# Patient Record
Sex: FEMALE | Race: WHITE | NOT HISPANIC OR LATINO | Employment: OTHER | URBAN - METROPOLITAN AREA
[De-identification: names, ages, dates, MRNs, and addresses within clinical notes are randomized per-mention and may not be internally consistent; named-entity substitution may affect disease eponyms.]

---

## 2017-01-17 NOTE — PROGRESS NOTES
Rheumatology Visit     Heather Guillory MRN# [id number]   YOB: 1980 Age: 36 year old     Date of Visit: January 18, 2017    Primary care provider: No Ref-Primary, Physician          Assessment and Plan:   Mixed connective tissue disease (fatigue, peripartum polyarthralgia, +NINOSKA, +RNP): apart from modest, episodic fatigue/joint pain, symptoms have been well-controlled since summer 2016. Exam is benign. 8-16 laboratory evaluation reveals borderline positive NINOSKA and RNP antibodies, but negative RF and CCP; 1-17 CBC is WNL. Overall disease activity is controlled.    We discussed once more the overall good prognosis of MCTD compared with other NINOSKA spectrum disorders (SLE, Systemic sclerosis), and the notion that early immunomodulation with hydroxychloroquine might both quell symptoms and forestall development of additional autoimmune phenomena.     We discussed again the risks and benefits of being on HCQ, and the large dataset attesting to the relative safety of HCQ for both mother and child during pregnancy and lactation. Patient voiced intention to continue with her current dose while attempting to conceive. We discussed the following plan:     A. Continue  mg BID and reassess benefit in 3-6 months   B. Yearly eye exam while on HCQ. Patient had baseline exam last month.   C. Repeat UA, BMP, CBC in 6 weeks with plan to repeat labs every 4 months to monitor organ function.    Follow up in 6 months.     Maikol Brewer M.D.  Staff Rheumatologist,  Health  Pager [phone]          Active Problem List:     Patient Active Problem List    Diagnosis Date Noted     Family history of genetic disorder 12/23/2015     Priority: Medium     Patient's sister's son with Elijah's disease       Female infertility 04/04/2014     Priority: Medium     IVF with first pregnancy.       Anxiety 04/04/2014     Priority: Medium     Has weaned off Zoloft, feels stable.  Will consider re-starting if she feels anxiety is  "increasing.       Family history of malignant neoplasm of breast 07/12/2013     Priority: Medium     Overview:   Paternal GM       Irritable colon 10/26/2009     Priority: Medium     Eating disorder 02/05/2004     Priority: Medium     History of, feels stable now.              History of Present Illness:   Heather Guillory presents for f/u for probable mixed connective tissue disease. Last seen in 9-16, when HCQ was continued.    Background: received diagnosis of mixed connective tissue given pain in hand and feet, stiffness, Raynaud's  Syndrome, fatigue, positive NINOSKA and anti-RNP in 2016. Ms. Guillory first experienced swelling and pain in her fingers and feet during her second pregnancy this past spring that prevented her from wearing rings on her fingers and provoked an evaluation for DVT, which was negative. Despite weight loss post partum, her pain, mild swelling, and stiffness in her digits persisted. She described the pain as a 7/10 when she wakes to feed her infant in the middle of the night and in the morning that decreases to a 2-3/10 over the course of the day. Stiffness lasts 30 mins to 1 hour in the morning. In general she feels tired all the time, but cannot determine if it is associated with that fact that she has two young children and is breastfeeding every 2 hours or if it is related to her joint symptoms. She was evaluated by an internist who found that she had a positive NINOSKA/ She saw Dr. Iniguez in 8-16 who discovered +RNP. She started Plaquenil 200 mg BID.    Interval history 1-17:    Doing well overall. She had an episode of \"feeling low\" aching joints and muscles, shortly after d/cing nursing in 11-16, and one other episode of fatigue/pain around the time of menses. She had trouble getting out of bed. Carrying her infant children is sometimes stressful. Raynaud's is being controlled with thermo protection, nonetheless, Patient has noted increased cold on her feet. Mrs. Guillory complained about eye " "dryness, exacerbated during cold days.  HCQ treatment has been tolerated (200 BID) with no GI or other adverse effects noted. Also, she was seen by Ophthalmology where she had a OCT test, and counselled to return in 1 year. She reports not using birth control with plans to conceive in the near future.    She denies any skin changes, rashes, mouth ulcers, fevers, SOB, CP, visual changes, diarrhea, hematuria, or dysuria.          Review of Systems:   Review Of Systems  Constitutional: HPI  Skin: negative  Eyes: dry eyes--occasional \"spots\" and shooting spots--saw Ophtho in the Fall 2016  Ears/Nose/Throat: negative  Respiratory: No shortness of breath, dyspnea on exertion, cough, or hemoptysis  Cardiovascular: negative  Gastrointestinal: mild nausea before her period  Genitourinary: negative  Musculoskeletal: negative  Neurologic: negative  Psychiatric: negative  Hematologic/Lymphatic/Immunologic: negative  Endocrine: resumed menses in 12-16          Past Medical History:     Past Medical History   Diagnosis Date     NO ACTIVE PROBLEMS      Abnormal Pap smear      Over 10 years ago     Anxiety      Hx of previous reproductive problem December 2013     Past Surgical History   Procedure Laterality Date     Dilation and curettage suction       Operative hysteroscopy with morcellator  4/8/2014     Procedure: OPERATIVE HYSTEROSCOPY WITH MORCELLATOR;  Hysteroscopic Polypectomy;  Surgeon: Cate Mansfield MD;  Location: [facility] OR     Biopsy  April 2014     Uterine polyp removed     Raynaud's syndrome since puberty. Her main trigger is cold.  She had two uneventful pregnancies with vaginal births, though the conception of her first child required IVF.       Social History:     Social History     Occupational History      [employer]     Social History Main Topics     Smoking status: Never Smoker      Smokeless tobacco: Never Used     Alcohol Use: No     Drug Use: No     Sexual Activity:     Partners: " "Male     Birth Control/ Protection: None            Family History:     Family History   Problem Relation Age of Onset     DIABETES Paternal Grandfather      C.A.D. Paternal Grandfather      Hypertension Paternal Grandfather      Breast Cancer Paternal Grandmother      CANCER Paternal Grandmother      lung ca     OSTEOPOROSIS Paternal Grandmother      Hypertension Paternal Grandmother      Cancer - colorectal Maternal Grandfather      CANCER Maternal Grandmother      cervical ca     Neurologic Disorder Maternal Grandmother      alzheimers     Alzheimer Disease Maternal Grandmother      Cardiovascular Brother 28     cardiomyopathy     HEART DISEASE Paternal Uncle 55     mitral valve replacement     Colon Cancer Maternal Grandfather      Genetic Disorder Sister 21     una's disease     Genetic Disorder Sister      No history of AI disease       Allergies:     Allergies   Allergen Reactions     Kiwi Other (See Comments)     Throat itching     Benzoyl Peroxide Swelling     Duricef [Cefadroxil] Unknown     Monistat [Miconazole] Swelling     Sulfa Drugs Rash            Medications:     Current Outpatient Prescriptions   Medication Sig Dispense Refill     hydroxychloroquine (PLAQUENIL) 200 MG tablet Take 2 tablets (400 mg) by mouth daily 60 tablet 5     ibuprofen (ADVIL,MOTRIN) 600 MG tablet Take 1 tablet (600 mg) by mouth every 6 hours as needed for moderate pain 30 tablet 1     sertraline (ZOLOFT) 50 MG tablet Take 1 tablet (50 mg) by mouth daily 90 tablet 3     PRENATAL VITAMINS PO               Physical Exam:   Blood pressure 93/62, pulse 66, height 1.702 m (5' 7\"), weight 78.472 kg (173 lb), SpO2 99 %, currently breastfeeding.  Wt Readings from Last 4 Encounters:   01/18/17 78.472 kg (173 lb)   09/16/16 77.565 kg (171 lb)   09/09/16 77.248 kg (170 lb 4.8 oz)   07/22/16 80.74 kg (178 lb)       Constitutional: well-developed, appearing stated age; cooperative  Eyes: nl EOM, PERRLA, vision, conjunctiva, sclera  ENT: " nl external ears, nose, hearing, lips, teeth, gums, throat  No mucous membrane lesions, normal saliva pool  Neck: no mass or thyroid enlargement  Resp: lungs clear to auscultation, nl to palpation  CV: RRR, no murmurs, rubs or gallops, no edema  GI: no ABD mass or tenderness, no HSM  : not tested  Lymph: no cervical, supraclavicular, inguinal or epitrochlear nodes  MS: The TMJ, neck, shoulder, elbow, wrist, MCP/PIP/DIP, spine, hip, knee, ankle, and foot MTP/IP joints were examined. No active synovitis or altered joint anatomy. Full joint ROM. Normal  strength. No dactylitis,  tenosynovitis, enthesopathy.  Skin: no nail pitting, alopecia, rash, nodules or lesions  Neuro: nl cranial nerves, strength, sensation, DTRs.   Psych: nl judgement, orientation, memory, affect.         Data:     Results for orders placed or performed in visit on 10/24/16   Creatinine   Result Value Ref Range    Creatinine 0.77 0.52 - 1.04 mg/dL    GFR Estimate 86 >60 mL/min/1.7m2    GFR Estimate If Black >90   GFR Calc   >60 mL/min/1.7m2   CBC with platelets   Result Value Ref Range    WBC 6.4 4.0 - 11.0 10e9/L    RBC Count 4.55 3.8 - 5.2 10e12/L    Hemoglobin 13.2 11.7 - 15.7 g/dL    Hematocrit 39.8 35.0 - 47.0 %    MCV 88 78 - 100 fl    MCH 29.0 26.5 - 33.0 pg    MCHC 33.2 31.5 - 36.5 g/dL    RDW 12.8 10.0 - 15.0 %    Platelet Count 189 150 - 450 10e9/L   CRP inflammation   Result Value Ref Range    CRP Inflammation <2.9 0.0 - 8.0 mg/L   *UA reflex to Microscopic and Culture ([facility] (except [place] and [place])   Result Value Ref Range    Color Urine Yellow     Appearance Urine Clear     Glucose Urine Negative NEG mg/dL    Bilirubin Urine Negative NEG    Ketones Urine Negative NEG mg/dL    Specific Gravity Urine >1.030 1.003 - 1.035    Blood Urine Trace (A) NEG    pH Urine 5.5 5.0 - 7.0 pH    Protein Albumin Urine Negative NEG mg/dL    Urobilinogen Urine 0.2 0.2 - 1.0 EU/dL    Nitrite  Urine Negative NEG    Leukocyte Esterase Urine Trace (A) NEG    Source Midstream Urine    Urine Microscopic   Result Value Ref Range    WBC Urine 2-5 (A) 0 - 2 /HPF    RBC Urine O - 2 0 - 2 /HPF    Squamous Epithelial /LPF Urine Few FEW /LPF    Mucous Urine Present (A) NEG /LPF     Labs from outside laboratory reviewed from 8/12/16  RNP antibodies 1.6- positive    Negative for Parvo Virus B19, Lyme Disease  Negative SLE panel   Negative SSA, SSB  Negative Anti-Sury-1  Negative Centromere B antibodies  Negative  CCP  Recent Labs   Lab Test  07/22/16   1110  05/29/16   0651  05/27/16   1032   01/09/16   0724   WBC  8.1   --   15.4*   --   16.8*   RBC  4.86   --   4.25   --   4.75   HGB  14.1  11.2*  12.4   < >  14.2   HCT  42.1   --   37.6   --   40.1   MCV  87   --   89   --   84   RDW  13.0   --   14.0   --   13.7   PLT  228   --   184   --   202   ALBUMIN   --    --    --    --   3.3*   CRP  3.0   --    --    --    --    BUN   --    --    --    --   9    < > = values in this interval not displayed.      Recent Labs   Lab Test  07/22/16   1110   TSH  1.10     HEMOGLOBIN   Date Value Ref Range Status   01/18/2017 12.8 11.7 - 15.7 g/dL Final   10/24/2016 13.2 11.7 - 15.7 g/dL Final   10/05/2016 12.8 11.7 - 15.7 g/dL Final     UREA NITROGEN   Date Value Ref Range Status   01/09/2016 9 7 - 30 mg/dL Final     SED RATE   Date Value Ref Range Status   07/22/2016 9 0 - 20 mm/h Final     CRP INFLAMMATION   Date Value Ref Range Status   10/24/2016 <2.9 0.0 - 8.0 mg/L Final   10/05/2016 42.0* 0.0 - 8.0 mg/L Final   07/22/2016 3.0 0.0 - 8.0 mg/L Final     AST   Date Value Ref Range Status   01/09/2016 12 0 - 45 U/L Final     ALBUMIN   Date Value Ref Range Status   01/09/2016 3.3* 3.4 - 5.0 g/dL Final     ALKALINE PHOSPHATASE   Date Value Ref Range Status   01/09/2016 70 40 - 150 U/L Final     ALT   Date Value Ref Range Status   01/09/2016 15 0 - 50 U/L Final     RHEUMATOID FACTOR   Date Value Ref Range Status   07/22/2016 <20 <20  IU/mL Final     Recent Labs   Lab Test  01/18/17   0845  10/24/16   0910  10/05/16   1331  07/22/16   1110   01/09/16   0724   WBC  5.3  6.4  14.0*  8.1   < >  16.8*   HGB  12.8  13.2  12.8  14.1   < >  14.2   HCT  39.2  39.8  37.9  42.1   < >  40.1   MCV  86  88  86  87   < >  84   PLT  188  189  230  228   < >  202   BUN   --    --    --    --    --   9   TSH   --    --    --   1.10   --    --    AST   --    --    --    --    --   12   ALT   --    --    --    --    --   15   ALKPHOS   --    --    --    --    --   70    < > = values in this interval not displayed.       Reviewed Rheumatology lab flowsheet

## 2017-01-18 ENCOUNTER — OFFICE VISIT (OUTPATIENT)
Dept: RHEUMATOLOGY | Facility: CLINIC | Age: 37
End: 2017-01-18
Attending: INTERNAL MEDICINE
Payer: COMMERCIAL

## 2017-01-18 VITALS
HEIGHT: 67 IN | HEART RATE: 66 BPM | WEIGHT: 173 LBS | OXYGEN SATURATION: 99 % | DIASTOLIC BLOOD PRESSURE: 62 MMHG | SYSTOLIC BLOOD PRESSURE: 93 MMHG | BODY MASS INDEX: 27.15 KG/M2

## 2017-01-18 DIAGNOSIS — M35.1 MCTD (MIXED CONNECTIVE TISSUE DISEASE) (H): Primary | ICD-10-CM

## 2017-01-18 DIAGNOSIS — Z13.9 SCREENING FOR CONDITION: ICD-10-CM

## 2017-01-18 DIAGNOSIS — M35.1 MCTD (MIXED CONNECTIVE TISSUE DISEASE) (H): ICD-10-CM

## 2017-01-18 LAB
ALBUMIN UR-MCNC: NEGATIVE MG/DL
APPEARANCE UR: CLEAR
BILIRUB UR QL STRIP: NEGATIVE
COLOR UR AUTO: ABNORMAL
CREAT SERPL-MCNC: 0.63 MG/DL (ref 0.52–1.04)
ERYTHROCYTE [DISTWIDTH] IN BLOOD BY AUTOMATED COUNT: 14.2 % (ref 10–15)
GFR SERPL CREATININE-BSD FRML MDRD: NORMAL ML/MIN/1.7M2
GLUCOSE UR STRIP-MCNC: NEGATIVE MG/DL
HCT VFR BLD AUTO: 39.2 % (ref 35–47)
HGB BLD-MCNC: 12.8 G/DL (ref 11.7–15.7)
HGB UR QL STRIP: NEGATIVE
KETONES UR STRIP-MCNC: NEGATIVE MG/DL
LEUKOCYTE ESTERASE UR QL STRIP: NEGATIVE
MCH RBC QN AUTO: 27.9 PG (ref 26.5–33)
MCHC RBC AUTO-ENTMCNC: 32.7 G/DL (ref 31.5–36.5)
MCV RBC AUTO: 86 FL (ref 78–100)
MUCOUS THREADS #/AREA URNS LPF: PRESENT /LPF
NITRATE UR QL: NEGATIVE
PH UR STRIP: 7 PH (ref 5–7)
PLATELET # BLD AUTO: 188 10E9/L (ref 150–450)
RBC # BLD AUTO: 4.58 10E12/L (ref 3.8–5.2)
RBC #/AREA URNS AUTO: 2 /HPF (ref 0–2)
SP GR UR STRIP: 1 (ref 1–1.03)
SQUAMOUS #/AREA URNS AUTO: 1 /HPF (ref 0–1)
URN SPEC COLLECT METH UR: ABNORMAL
UROBILINOGEN UR STRIP-MCNC: 0 MG/DL (ref 0–2)
WBC # BLD AUTO: 5.3 10E9/L (ref 4–11)
WBC #/AREA URNS AUTO: 0 /HPF (ref 0–2)

## 2017-01-18 PROCEDURE — 81001 URINALYSIS AUTO W/SCOPE: CPT | Performed by: INTERNAL MEDICINE

## 2017-01-18 PROCEDURE — 86747 PARVOVIRUS ANTIBODY: CPT | Performed by: OBSTETRICS & GYNECOLOGY

## 2017-01-18 PROCEDURE — 36415 COLL VENOUS BLD VENIPUNCTURE: CPT | Performed by: OBSTETRICS & GYNECOLOGY

## 2017-01-18 PROCEDURE — 82565 ASSAY OF CREATININE: CPT | Performed by: OBSTETRICS & GYNECOLOGY

## 2017-01-18 PROCEDURE — 85027 COMPLETE CBC AUTOMATED: CPT | Performed by: OBSTETRICS & GYNECOLOGY

## 2017-01-18 PROCEDURE — 99212 OFFICE O/P EST SF 10 MIN: CPT | Mod: ZF

## 2017-01-18 RX ORDER — HYDROXYCHLOROQUINE SULFATE 200 MG/1
400 TABLET, FILM COATED ORAL DAILY
Qty: 60 TABLET | Refills: 5 | Status: SHIPPED | OUTPATIENT
Start: 2017-01-18 | End: 2017-08-14

## 2017-01-18 ASSESSMENT — PAIN SCALES - GENERAL: PAINLEVEL: NO PAIN (0)

## 2017-01-18 NOTE — MR AVS SNAPSHOT
After Visit Summary   1/18/2017    Heather Guillory    MRN: [id number]           Patient Information     Date Of Birth          1980        Visit Information        Provider Department      1/18/2017 7:30 AM Maikol Brewer MD [facility] Rheumatology        Today's Diagnoses     MCTD (mixed connective tissue disease) (H)    -  1        Follow-ups after your visit        Follow-up notes from your care team     Return in about 6 months (around 7/18/2017).      Your next 10 appointments already scheduled     Jan 18, 2017  8:30 AM   Lab with  LAB   [facility] ([facility])    [address]  [address] [facility] [address] [phone]            Jul 14, 2017  9:00 AM   (Arrive by 8:45 AM)   Return Visit with Maikol Brewer MD   [facility] Rheumatology ([facility])    [address] [place] [address]  [address] [facility] [address] [phone]              Future tests that were ordered for you today     Open Standing Orders        Priority Remaining Interval Expires Ordered    CBC with platelets Routine 4/4 q 4 mos 1/18/2018 1/18/2017    Routine UA with Micro Reflex to Culture Routine 4/4 q 4 mos 1/18/2018 1/18/2017    Creatinine Routine 4/4 q 4 mos 1/18/2018 1/18/2017            Who to contact     If you have questions or need follow up information about today's clinic visit or your schedule please contact [facility] RHEUMATOLOGY directly at [phone].  Normal or non-critical lab and imaging results will be communicated to you by MyChart, letter or phone within 4 business days after the clinic has received the results. If you do not hear from us within 7 days, please contact the clinic through MyChart or phone. If you have a critical or abnormal lab result, we will notify you by phone as soon as possible.  Submit refill requests through [employer] or call your pharmacy and they will forward the refill request to us. Please allow 3 business  "days for your refill to be completed.          Additional Information About Your Visit        MyChart Information     [employer] gives you secure access to your electronic health record. If you see a primary care provider, you can also send messages to your care team and make appointments. If you have questions, please call your primary care clinic.  If you do not have a primary care provider, please call [phone] and they will assist you.        Care EveryWhere ID     This is your Care EveryWhere ID. This could be used by other organizations to access your [facility] medical records  [id number]        Your Vitals Were     Pulse Height BMI (Body Mass Index) Pulse Oximetry          66 1.702 m (5' 7\") 27.09 kg/m2 99%         Blood Pressure from Last 3 Encounters:   01/18/17 93/62   09/16/16 100/58   09/09/16 102/67    Weight from Last 3 Encounters:   01/18/17 78.472 kg (173 lb)   09/16/16 77.565 kg (171 lb)   09/09/16 77.248 kg (170 lb 4.8 oz)                 Today's Medication Changes          These changes are accurate as of: 1/18/17  8:17 AM.  If you have any questions, ask your nurse or doctor.               Stop taking these medicines if you haven't already. Please contact your care team if you have questions.     amoxicillin 500 MG capsule   Commonly known as:  AMOXIL   Stopped by:  Maikol Brewer MD           predniSONE 5 MG tablet   Commonly known as:  DELTASONE   Stopped by:  Maikol Brewer MD                Where to get your medicines      These medications were sent to [facility] [id number] [place] [address] [address]     Phone:  [phone]    - hydroxychloroquine 200 MG tablet             Primary Care Provider    Physician No Ref-Primary       No address on file        Thank you!     Thank you for choosing [facility] RHEUMATOLOGY  for your care. Our goal is always to provide you with excellent care. Hearing back from our patients is one way we can " continue to improve our services. Please take a few minutes to complete the written survey that you may receive in the mail after your visit with us. Thank you!             Your Updated Medication List - Protect others around you: Learn how to safely use, store and throw away your medicines at www.disposemymeds.org.          This list is accurate as of: 1/18/17  8:17 AM.  Always use your most recent med list.                   Brand Name Dispense Instructions for use    hydroxychloroquine 200 MG tablet    PLAQUENIL    60 tablet    Take 2 tablets (400 mg) by mouth daily       ibuprofen 600 MG tablet    ADVIL/MOTRIN    30 tablet    Take 1 tablet (600 mg) by mouth every 6 hours as needed for moderate pain       PRENATAL VITAMINS PO          sertraline 50 MG tablet    ZOLOFT    90 tablet    Take 1 tablet (50 mg) by mouth daily

## 2017-01-18 NOTE — Clinical Note
1/18/2017      RE: Heather Guillory  [address] [facility] [place] [address]       Rheumatology Visit     Heather Guillory MRN# [id number]   YOB: 1980 Age: 36 year old     Date of Visit: January 18, 2017    Primary care provider: No Ref-Primary, Physician          Assessment and Plan:   Mixed connective tissue disease (fatigue, peripartum polyarthralgia, +NINOSKA, +RNP): apart from modest, episodic fatigue/joint pain, symptoms have been well-controlled since summer 2016. Exam is benign. 8-16 laboratory evaluation reveals borderline positive NINOSKA and RNP antibodies, but negative RF and CCP; 1-17 CBC is WNL. Overall disease activity is controlled.    We discussed once more the overall good prognosis of MCTD compared with other NINOSKA spectrum disorders (SLE, Systemic sclerosis), and the notion that early immunomodulation with hydroxychloroquine might both quell symptoms and forestall development of additional autoimmune phenomena.     We discussed again the risks and benefits of being on HCQ, and the large dataset attesting to the relative safety of HCQ for both mother and child during pregnancy and lactation. Patient voiced intention to continue with her current dose while attempting to conceive. We discussed the following plan:     A. Continue  mg BID and reassess benefit in 3-6 months   B. Yearly eye exam while on HCQ. Patient had baseline exam last month.   C. Repeat UA, BMP, CBC in 6 weeks with plan to repeat labs every 4 months to monitor organ function.    Follow up in 6 months.     Maikol Brewer M.D.  Staff Rheumatologist,  Health  Pager [phone]          Active Problem List:     Patient Active Problem List    Diagnosis Date Noted     Family history of genetic disorder 12/23/2015     Priority: Medium     Patient's sister's son with Elijah's disease       Female infertility 04/04/2014     Priority: Medium     IVF with first pregnancy.       Anxiety 04/04/2014     Priority: Medium     Has  "weaned off Zoloft, feels stable.  Will consider re-starting if she feels anxiety is increasing.       Family history of malignant neoplasm of breast 07/12/2013     Priority: Medium     Overview:   Paternal GM       Irritable colon 10/26/2009     Priority: Medium     Eating disorder 02/05/2004     Priority: Medium     History of, feels stable now.              History of Present Illness:   Heather Guillory presents for f/u for probable mixed connective tissue disease. Last seen in 9-16, when HCQ was continued.    Background: received diagnosis of mixed connective tissue given pain in hand and feet, stiffness, Raynaud's  Syndrome, fatigue, positive NINOSKA and anti-RNP in 2016. Ms. Guillory first experienced swelling and pain in her fingers and feet during her second pregnancy this past spring that prevented her from wearing rings on her fingers and provoked an evaluation for DVT, which was negative. Despite weight loss post partum, her pain, mild swelling, and stiffness in her digits persisted. She described the pain as a 7/10 when she wakes to feed her infant in the middle of the night and in the morning that decreases to a 2-3/10 over the course of the day. Stiffness lasts 30 mins to 1 hour in the morning. In general she feels tired all the time, but cannot determine if it is associated with that fact that she has two young children and is breastfeeding every 2 hours or if it is related to her joint symptoms. She was evaluated by an internist who found that she had a positive NINOSKA/ She saw Dr. Iniguez in 8-16 who discovered +RNP. She started Plaquenil 200 mg BID.    Interval history 1-17:    Doing well overall. She had an episode of \"feeling low\" aching joints and muscles, shortly after d/cing nursing in 11-16, and one other episode of fatigue/pain around the time of menses. She had trouble getting out of bed. Carrying her infant children is sometimes stressful. Raynaud's is being controlled with thermo protection, " "nonetheless, Patient has noted increased cold on her feet. Mrs. Guillory complained about eye dryness, exacerbated during cold days.  HCQ treatment has been tolerated (200 BID) with no GI or other adverse effects noted. Also, she was seen by Ophthalmology where she had a OCT test, and counselled to return in 1 year. She reports not using birth control with plans to conceive in the near future.    She denies any skin changes, rashes, mouth ulcers, fevers, SOB, CP, visual changes, diarrhea, hematuria, or dysuria.          Review of Systems:   Review Of Systems  Constitutional: HPI  Skin: negative  Eyes: dry eyes--occasional \"spots\" and shooting spots--saw Ophtho in the Fall 2016  Ears/Nose/Throat: negative  Respiratory: No shortness of breath, dyspnea on exertion, cough, or hemoptysis  Cardiovascular: negative  Gastrointestinal: mild nausea before her period  Genitourinary: negative  Musculoskeletal: negative  Neurologic: negative  Psychiatric: negative  Hematologic/Lymphatic/Immunologic: negative  Endocrine: resumed menses in 12-16          Past Medical History:     Past Medical History   Diagnosis Date     NO ACTIVE PROBLEMS      Abnormal Pap smear      Over 10 years ago     Anxiety      Hx of previous reproductive problem December 2013     Past Surgical History   Procedure Laterality Date     Dilation and curettage suction       Operative hysteroscopy with morcellator  4/8/2014     Procedure: OPERATIVE HYSTEROSCOPY WITH MORCELLATOR;  Hysteroscopic Polypectomy;  Surgeon: Cate Mansfield MD;  Location: [facility] OR     Biopsy  April 2014     Uterine polyp removed     Raynaud's syndrome since puberty. Her main trigger is cold.  She had two uneventful pregnancies with vaginal births, though the conception of her first child required IVF.       Social History:     Social History     Occupational History      [employer]     Social History Main Topics     Smoking status: Never Smoker      Smokeless " "tobacco: Never Used     Alcohol Use: No     Drug Use: No     Sexual Activity:     Partners: Male     Birth Control/ Protection: None            Family History:     Family History   Problem Relation Age of Onset     DIABETES Paternal Grandfather      C.A.D. Paternal Grandfather      Hypertension Paternal Grandfather      Breast Cancer Paternal Grandmother      CANCER Paternal Grandmother      lung ca     OSTEOPOROSIS Paternal Grandmother      Hypertension Paternal Grandmother      Cancer - colorectal Maternal Grandfather      CANCER Maternal Grandmother      cervical ca     Neurologic Disorder Maternal Grandmother      alzheimers     Alzheimer Disease Maternal Grandmother      Cardiovascular Brother 28     cardiomyopathy     HEART DISEASE Paternal Uncle 55     mitral valve replacement     Colon Cancer Maternal Grandfather      Genetic Disorder Sister 21     una's disease     Genetic Disorder Sister      No history of AI disease       Allergies:     Allergies   Allergen Reactions     Kiwi Other (See Comments)     Throat itching     Benzoyl Peroxide Swelling     Duricef [Cefadroxil] Unknown     Monistat [Miconazole] Swelling     Sulfa Drugs Rash            Medications:     Current Outpatient Prescriptions   Medication Sig Dispense Refill     hydroxychloroquine (PLAQUENIL) 200 MG tablet Take 2 tablets (400 mg) by mouth daily 60 tablet 5     ibuprofen (ADVIL,MOTRIN) 600 MG tablet Take 1 tablet (600 mg) by mouth every 6 hours as needed for moderate pain 30 tablet 1     sertraline (ZOLOFT) 50 MG tablet Take 1 tablet (50 mg) by mouth daily 90 tablet 3     PRENATAL VITAMINS PO               Physical Exam:   Blood pressure 93/62, pulse 66, height 1.702 m (5' 7\"), weight 78.472 kg (173 lb), SpO2 99 %, currently breastfeeding.  Wt Readings from Last 4 Encounters:   01/18/17 78.472 kg (173 lb)   09/16/16 77.565 kg (171 lb)   09/09/16 77.248 kg (170 lb 4.8 oz)   07/22/16 80.74 kg (178 lb)       Constitutional: well-developed, " appearing stated age; cooperative  Eyes: nl EOM, PERRLA, vision, conjunctiva, sclera  ENT: nl external ears, nose, hearing, lips, teeth, gums, throat  No mucous membrane lesions, normal saliva pool  Neck: no mass or thyroid enlargement  Resp: lungs clear to auscultation, nl to palpation  CV: RRR, no murmurs, rubs or gallops, no edema  GI: no ABD mass or tenderness, no HSM  : not tested  Lymph: no cervical, supraclavicular, inguinal or epitrochlear nodes  MS: The TMJ, neck, shoulder, elbow, wrist, MCP/PIP/DIP, spine, hip, knee, ankle, and foot MTP/IP joints were examined. No active synovitis or altered joint anatomy. Full joint ROM. Normal  strength. No dactylitis,  tenosynovitis, enthesopathy.  Skin: no nail pitting, alopecia, rash, nodules or lesions  Neuro: nl cranial nerves, strength, sensation, DTRs.   Psych: nl judgement, orientation, memory, affect.         Data:     Results for orders placed or performed in visit on 10/24/16   Creatinine   Result Value Ref Range    Creatinine 0.77 0.52 - 1.04 mg/dL    GFR Estimate 86 >60 mL/min/1.7m2    GFR Estimate If Black >90   GFR Calc   >60 mL/min/1.7m2   CBC with platelets   Result Value Ref Range    WBC 6.4 4.0 - 11.0 10e9/L    RBC Count 4.55 3.8 - 5.2 10e12/L    Hemoglobin 13.2 11.7 - 15.7 g/dL    Hematocrit 39.8 35.0 - 47.0 %    MCV 88 78 - 100 fl    MCH 29.0 26.5 - 33.0 pg    MCHC 33.2 31.5 - 36.5 g/dL    RDW 12.8 10.0 - 15.0 %    Platelet Count 189 150 - 450 10e9/L   CRP inflammation   Result Value Ref Range    CRP Inflammation <2.9 0.0 - 8.0 mg/L   *UA reflex to Microscopic and Culture ([facility] (except [place] and [place])   Result Value Ref Range    Color Urine Yellow     Appearance Urine Clear     Glucose Urine Negative NEG mg/dL    Bilirubin Urine Negative NEG    Ketones Urine Negative NEG mg/dL    Specific Gravity Urine >1.030 1.003 - 1.035    Blood Urine Trace (A) NEG    pH Urine 5.5 5.0 - 7.0 pH     Protein Albumin Urine Negative NEG mg/dL    Urobilinogen Urine 0.2 0.2 - 1.0 EU/dL    Nitrite Urine Negative NEG    Leukocyte Esterase Urine Trace (A) NEG    Source Midstream Urine    Urine Microscopic   Result Value Ref Range    WBC Urine 2-5 (A) 0 - 2 /HPF    RBC Urine O - 2 0 - 2 /HPF    Squamous Epithelial /LPF Urine Few FEW /LPF    Mucous Urine Present (A) NEG /LPF     Labs from outside laboratory reviewed from 8/12/16  RNP antibodies 1.6- positive    Negative for Parvo Virus B19, Lyme Disease  Negative SLE panel   Negative SSA, SSB  Negative Anti-Sury-1  Negative Centromere B antibodies  Negative  CCP  Recent Labs   Lab Test  07/22/16   1110  05/29/16   0651  05/27/16   1032   01/09/16   0724   WBC  8.1   --   15.4*   --   16.8*   RBC  4.86   --   4.25   --   4.75   HGB  14.1  11.2*  12.4   < >  14.2   HCT  42.1   --   37.6   --   40.1   MCV  87   --   89   --   84   RDW  13.0   --   14.0   --   13.7   PLT  228   --   184   --   202   ALBUMIN   --    --    --    --   3.3*   CRP  3.0   --    --    --    --    BUN   --    --    --    --   9    < > = values in this interval not displayed.      Recent Labs   Lab Test  07/22/16   1110   TSH  1.10     HEMOGLOBIN   Date Value Ref Range Status   01/18/2017 12.8 11.7 - 15.7 g/dL Final   10/24/2016 13.2 11.7 - 15.7 g/dL Final   10/05/2016 12.8 11.7 - 15.7 g/dL Final     UREA NITROGEN   Date Value Ref Range Status   01/09/2016 9 7 - 30 mg/dL Final     SED RATE   Date Value Ref Range Status   07/22/2016 9 0 - 20 mm/h Final     CRP INFLAMMATION   Date Value Ref Range Status   10/24/2016 <2.9 0.0 - 8.0 mg/L Final   10/05/2016 42.0* 0.0 - 8.0 mg/L Final   07/22/2016 3.0 0.0 - 8.0 mg/L Final     AST   Date Value Ref Range Status   01/09/2016 12 0 - 45 U/L Final     ALBUMIN   Date Value Ref Range Status   01/09/2016 3.3* 3.4 - 5.0 g/dL Final     ALKALINE PHOSPHATASE   Date Value Ref Range Status   01/09/2016 70 40 - 150 U/L Final     ALT   Date Value Ref Range Status    01/09/2016 15 0 - 50 U/L Final     RHEUMATOID FACTOR   Date Value Ref Range Status   07/22/2016 <20 <20 IU/mL Final     Recent Labs   Lab Test  01/18/17   0845  10/24/16   0910  10/05/16   1331  07/22/16   1110   01/09/16   0724   WBC  5.3  6.4  14.0*  8.1   < >  16.8*   HGB  12.8  13.2  12.8  14.1   < >  14.2   HCT  39.2  39.8  37.9  42.1   < >  40.1   MCV  86  88  86  87   < >  84   PLT  188  189  230  228   < >  202   BUN   --    --    --    --    --   9   TSH   --    --    --   1.10   --    --    AST   --    --    --    --    --   12   ALT   --    --    --    --    --   15   ALKPHOS   --    --    --    --    --   70    < > = values in this interval not displayed.       Reviewed Rheumatology lab flowsheet    Maikol Brewer MD

## 2017-01-18 NOTE — NURSING NOTE
"Chief Complaint   Patient presents with     RECHECK     Follow up for joint pain/MCTD       Initial BP 93/62 mmHg  Pulse 66  Ht 1.702 m (5' 7\")  Wt 78.472 kg (173 lb)  BMI 27.09 kg/m2  SpO2 99% Estimated body mass index is 27.09 kg/(m^2) as calculated from the following:    Height as of this encounter: 1.702 m (5' 7\").    Weight as of this encounter: 78.472 kg (173 lb).  BP completed using cuff size: hayden Rondon CMA    "

## 2017-01-20 LAB
B19V IGG SER IA-ACNC: 0.67
B19V IGM SER IA-ACNC: 0.4

## 2017-02-15 ENCOUNTER — MYC MEDICAL ADVICE (OUTPATIENT)
Dept: RHEUMATOLOGY | Facility: CLINIC | Age: 37
End: 2017-02-15

## 2017-03-13 NOTE — TELEPHONE ENCOUNTER
Copy of the article mailed to pt.  RON Barragan., BENTLEY Workman, MICHAEL Rome., GUERITA Kaplan., CRISTY [place]., GERTRUDE England, . . . ISMAEL Whyte (2017). In Vitro Fertilization in 37 Women with Systemic Lupus Erythematosus or Antiphospholipid Syndrome: A Series of 97 Procedures. The Journal of Rheumatology. doi:[email or web address]

## 2017-03-19 ENCOUNTER — MYC MEDICAL ADVICE (OUTPATIENT)
Dept: RHEUMATOLOGY | Facility: CLINIC | Age: 37
End: 2017-03-19

## 2017-03-20 NOTE — TELEPHONE ENCOUNTER
Thanks for the good question. My reading of the literature suggests that incidence of pulmonary hypertension (PAH) in MCTD is extremely low--much lower than with some other connective tissue disorders (like scleroderma, which you do not have). If you are not experiencing symptoms that might suggest pulmonary vascular issues--such as shortness of breath with slight exertion-- I think that you can confidently go ahead with pregnancy without additional diagnostic testing presently.

## 2017-05-19 DIAGNOSIS — F41.9 ANXIETY: ICD-10-CM

## 2017-05-19 NOTE — TELEPHONE ENCOUNTER
Received refill request for sertraline.  Last in clinic 7/2016 for 6 week post-partum visit.    Spoke with Heather and advised annual exam is needed for refills. She reports she does not see a primary care provider and Dr. Mansfield has prescribed this for her for several years. She is doing well and wishes to remain on current dose.      Heather will call back to make annual exam appointment. One month refill sent.

## 2017-07-13 NOTE — PROGRESS NOTES
Rheumatology Visit     Heather Guillory MRN# [id number]   YOB: 1980 Age: 37 year old     Date of Visit: July 14, 2017  Primary care provider: No Ref-Primary, Physician          Assessment and Plan:   Mixed connective tissue disease (fatigue, peripartum polyarthralgia, +NINOSKA, +RNP): overall stable symptoms, suspect fatigue is more disrupted sleep and possibly from stopping her Zoloft, rather than to MCTD. She does have dry, rough skin on lateral aspects of most of her digits bilaterally which likely is secondary to frequent handwashing at home rather than a manifestation of MCTD.  CBC, urinalysis, and creatinine are within normal limits today. Overall, I think that autoimmunity is clinically quiescent.    I do not know whether patient should expect any effects of estrogen therapy on her connective tissue disease. Certainly, estrogen containing oral contraceptives are safe for use in patients with systemic lupus erythematosus, but the doses of estrogen used in preparation for embryo transfer are likely somewhat higher than in standard contraceptives. I think it is reasonable to proceed with embryo transfer and associated treatments, and to watch carefully for symptoms and signs of autoimmunity.    She is planning to undergo embryo transfer on 7/24/17 and after discussion about HCQ at last visit and her own research, she feels comfortable continuing this during her pregnancy. Discussed that it was reasonable to take her  mg once daily rather than 200 BID. She will have retinal exam for possible in the next week.      A.  mg daily and reassess benefit in 6 months   B. Yearly eye exam while on HCQ. Will be completed in next week.   C. Repeat UA, BMP, CBC now and at 6 month follow up.     Follow up in 6 months.     The patient was seen and examined with Dr. Osman MD.    Omer Hurtado  Internal Medicine PGY-3    I saw this patient with the medical resident. I agree with the findings and  recommendations.    Maikol Brewer M.D.  Staff Rheumatologist,  Health  Pager [phone]              Active Problem List:     Patient Active Problem List    Diagnosis Date Noted     Family history of genetic disorder 12/23/2015     Priority: Medium     Patient's sister's son with Elijah's disease       Female infertility 04/04/2014     Priority: Medium     IVF with first pregnancy.       Anxiety 04/04/2014     Priority: Medium     Has weaned off Zoloft, feels stable.  Will consider re-starting if she feels anxiety is increasing.       Family history of malignant neoplasm of breast 07/12/2013     Priority: Medium     Overview:   Paternal GM       Irritable colon 10/26/2009     Priority: Medium     Eating disorder 02/05/2004     Priority: Medium     History of, feels stable now.              History of Present Illness:   Heather Guillory presents for f/u for probable mixed connective tissue disease. Last seen in 1-17, when HCQ was continued.    Background: received diagnosis of mixed connective tissue given pain in hand and feet, stiffness, Raynaud's  Syndrome, fatigue, positive NINOSKA and anti-RNP in 2016. Ms. Guillory first experienced swelling and pain in her fingers and feet during her second pregnancy this past spring that prevented her from wearing rings on her fingers and provoked an evaluation for DVT, which was negative. Despite weight loss post partum, her pain, mild swelling, and stiffness in her digits persisted. She described the pain as a 7/10 when she wakes to feed her infant in the middle of the night and in the morning that decreases to a 2-3/10 over the course of the day. Stiffness lasts 30 mins to 1 hour in the morning. In general she feels tired all the time, but cannot determine if it is associated with that fact that she has two young children and is breastfeeding every 2 hours or if it is related to her joint symptoms. She was evaluated by an internist who found that she had a positive NINOSKA/ She saw  Dr. Iniguez in 8-16 who discovered +RNP. She started Plaquenil 200 mg BID.    Interval history 7/14/17  Heather Guillory is stable since her last visit in January, despite feeling more fatigued recently. She denied arthralgias/myalgias but reports having sore feet/toes at end of day and when she first wakes up. She states the fatigue worsened since she ran out of Zoloft 3.5 weeks ago; also reports irritability and no sleeping soundly during the night since that time too. Zoloft prescribed by her OBGYN.     She is currently receiving fertility treatment and plans to undergo embryo transfer on 7/24/17. She remains on HCQ and feels comfortable continuing this medication during pregnancy. She wonders if taking HCQ once daily (400) rather than 200 twice daily is acceptable.  She is using estrogen therapy in preparation for the embryo transfer, and relates that her gynecologist recommends that she remained on the supplement for approximately one month after the transfer.    She recently pinched a nerve in her neck and has numbness/tingling with burning sensation in her left arm that radiates down into her 1st-3rd digits. She was evaluated at [facility], received percocet for pain and told to have PT. If it did not improve in 6 weeks, she will return for another evaluation.     Lastly, she had dry, rough skin on lateral aspects of fingers. She states this often occurs in winter due to weather but it is new for her this summer. She is using a lot of moisturizers but it has not been helping.           Review of Systems:   Review Of Systems  Constitutional: HPI  Skin: no new rashes but has dry skin on hands  Eyes: denied dry eyes, irritation, redness  Ears/Nose/Throat: negative for dry mouth, sores  Respiratory: No shortness of breath, dyspnea on exertion, cough, or hemoptysis  Cardiovascular: negative for chest pain; has raynauds with cold weather (not new)  Gastrointestinal: mild nausea before her period; occasional  abdominal pain with eating certain foods, associated with diarrhea  Genitourinary: negative  Musculoskeletal: negative for joint pains, swelling, weakness  Neurologic: reports left arm numbness/tingling, burning sensation since sleeping strange on neck few weeks ago  Psychiatric: negative  Hematologic/Lymphatic/Immunologic: negative  Endocrine: resumed menses in 12-16          Past Medical History:     Past Medical History:   Diagnosis Date     Abnormal Pap smear     Over 10 years ago     Anxiety      Hx of previous reproductive problem December 2013     NO ACTIVE PROBLEMS      Past Surgical History:   Procedure Laterality Date     BIOPSY  April 2014    Uterine polyp removed     DILATION AND CURETTAGE SUCTION       OPERATIVE HYSTEROSCOPY WITH MORCELLATOR  4/8/2014    Procedure: OPERATIVE HYSTEROSCOPY WITH MORCELLATOR;  Hysteroscopic Polypectomy;  Surgeon: Cate Mansfield MD;  Location: [facility] OR     Raynaud's syndrome since puberty. Her main trigger is cold.  She had two uneventful pregnancies with vaginal births, though the conception of her first child required IVF.   she is undergoing embryo transfer in July 2017 in an attempt to conceive again. Estrogen therapy was used in preparation for and following embryo transfer.   cervical radiculopathy, 2017.       Social History:     Social History     Occupational History      [employer]     Social History Main Topics     Smoking status: Never Smoker     Smokeless tobacco: Never Used     Alcohol use No     Drug use: No     Sexual activity: Yes     Partners: Male     Birth control/ protection: None            Family History:     Family History   Problem Relation Age of Onset     DIABETES Paternal Grandfather      C.A.D. Paternal Grandfather      Hypertension Paternal Grandfather      Breast Cancer Paternal Grandmother      CANCER Paternal Grandmother      lung ca     OSTEOPOROSIS Paternal Grandmother      Hypertension Paternal Grandmother       "Cancer - colorectal Maternal Grandfather      CANCER Maternal Grandmother      cervical ca     Neurologic Disorder Maternal Grandmother      alzheimers     Alzheimer Disease Maternal Grandmother      Cardiovascular Brother 28     cardiomyopathy     HEART DISEASE Paternal Uncle 55     mitral valve replacement     Colon Cancer Maternal Grandfather      Genetic Disorder Sister 21     una's disease     Genetic Disorder Sister      No history of AI disease       Allergies:     Allergies   Allergen Reactions     Kiwi Other (See Comments)     Throat itching     Benzoyl Peroxide Swelling     Duricef [Cefadroxil] Unknown     Monistat [Miconazole] Swelling     Sulfa Drugs Rash            Medications:     Current Outpatient Prescriptions   Medication Sig Dispense Refill     Estradiol (ESTRACE PO) Take 2 mg by mouth 3 times daily       aspirin 81 MG tablet Take 81 mg by mouth daily       hydroxychloroquine (PLAQUENIL) 200 MG tablet Take 2 tablets (400 mg) by mouth daily 60 tablet 5     PRENATAL VITAMINS PO        sertraline (ZOLOFT) 50 MG tablet Take 1 tablet (50 mg) by mouth daily (Patient not taking: Reported on 7/14/2017) 30 tablet 0     ibuprofen (ADVIL,MOTRIN) 600 MG tablet Take 1 tablet (600 mg) by mouth every 6 hours as needed for moderate pain (Patient not taking: Reported on 7/14/2017) 30 tablet 1            Physical Exam:   Blood pressure 112/71, pulse 73, height 1.702 m (5' 7\"), weight 75.8 kg (167 lb), SpO2 97 %, currently breastfeeding.  Wt Readings from Last 4 Encounters:   07/14/17 75.8 kg (167 lb)   01/18/17 78.5 kg (173 lb)   09/16/16 77.6 kg (171 lb)   09/09/16 77.2 kg (170 lb 4.8 oz)     Constitutional: well-developed, appearing stated age; cooperative  Eyes: nl EOM, PERRLA, vision, conjunctiva, sclera  ENT: nl external ears, nose, hearing, lips, teeth, gums, throat  No mucous membrane lesions, normal saliva pool  Neck: no mass or thyroid enlargement  Resp: lungs clear to auscultation, nl to palpation  CV: " RRR, no murmurs, rubs or gallops, no edema  GI: no ABD mass or tenderness, no HSM  : not tested  Lymph: no cervical, supraclavicular, inguinal or epitrochlear nodes  MS: The TMJ, neck, shoulder, elbow, wrist, MCP/PIP/DIP, spine, hip, knee, ankle, and foot MTP/IP joints were examined. No active synovitis or altered joint anatomy. Full joint ROM. Normal  strength. No dactylitis,  tenosynovitis, enthesopathy.  Skin: dry, rough skin on lateral aspects of most digits bilaterally. No nail pitting, alopecia, rash, nodules or lesions  Neuro: nl cranial nerves, strength, abnormal sensation of left arm compared to right  Psych: nl judgement, orientation, memory, affect.         Data:     Results for orders placed or performed in visit on 01/18/17   Parvovirus B19 antibodies IgG IgM   Result Value Ref Range    Parvovirus B19 IgG 0.67     Parvovirus B19 IgM 0.40    CBC with platelets   Result Value Ref Range    WBC 5.3 4.0 - 11.0 10e9/L    RBC Count 4.58 3.8 - 5.2 10e12/L    Hemoglobin 12.8 11.7 - 15.7 g/dL    Hematocrit 39.2 35.0 - 47.0 %    MCV 86 78 - 100 fl    MCH 27.9 26.5 - 33.0 pg    MCHC 32.7 31.5 - 36.5 g/dL    RDW 14.2 10.0 - 15.0 %    Platelet Count 188 150 - 450 10e9/L   Routine UA with Micro Reflex to Culture   Result Value Ref Range    Color Urine Straw     Appearance Urine Clear     Glucose Urine Negative NEG mg/dL    Bilirubin Urine Negative NEG    Ketones Urine Negative NEG mg/dL    Specific Gravity Urine 1.001 (L) 1.003 - 1.035    Blood Urine Negative NEG    pH Urine 7.0 5.0 - 7.0 pH    Protein Albumin Urine Negative NEG mg/dL    Urobilinogen mg/dL 0.0 0.0 - 2.0 mg/dL    Nitrite Urine Negative NEG    Leukocyte Esterase Urine Negative NEG    Source Midstream Urine     WBC Urine 0 0 - 2 /HPF    RBC Urine 2 0 - 2 /HPF    Squamous Epithelial /HPF Urine 1 0 - 1 /HPF    Mucous Urine Present (A) NEG /LPF   Creatinine   Result Value Ref Range    Creatinine 0.63 0.52 - 1.04 mg/dL    GFR Estimate >90  Non African  American GFR Calc   >60 mL/min/1.7m2    GFR Estimate If Black >90   GFR Calc   >60 mL/min/1.7m2     Labs from outside laboratory reviewed from 8/12/16  RNP antibodies 1.6- positive    Negative for Parvo Virus B19, Lyme Disease  Negative SLE panel   Negative SSA, SSB  Negative Anti-Sury-1  Negative Centromere B antibodies  Negative  CCP  Recent Labs   Lab Test  07/22/16   1110  05/29/16   0651  05/27/16   1032   01/09/16   0724   WBC  8.1   --   15.4*   --   16.8*   RBC  4.86   --   4.25   --   4.75   HGB  14.1  11.2*  12.4   < >  14.2   HCT  42.1   --   37.6   --   40.1   MCV  87   --   89   --   84   RDW  13.0   --   14.0   --   13.7   PLT  228   --   184   --   202   ALBUMIN   --    --    --    --   3.3*   CRP  3.0   --    --    --    --    BUN   --    --    --    --   9    < > = values in this interval not displayed.      Recent Labs   Lab Test  07/22/16   1110   TSH  1.10     Hemoglobin   Date Value Ref Range Status   07/14/2017 13.8 11.7 - 15.7 g/dL Final   01/18/2017 12.8 11.7 - 15.7 g/dL Final   10/24/2016 13.2 11.7 - 15.7 g/dL Final     Urea Nitrogen   Date Value Ref Range Status   01/09/2016 9 7 - 30 mg/dL Final     Sed Rate   Date Value Ref Range Status   07/22/2016 9 0 - 20 mm/h Final     CRP Inflammation   Date Value Ref Range Status   10/24/2016 <2.9 0.0 - 8.0 mg/L Final   10/05/2016 42.0 (H) 0.0 - 8.0 mg/L Final   07/22/2016 3.0 0.0 - 8.0 mg/L Final     AST   Date Value Ref Range Status   01/09/2016 12 0 - 45 U/L Final     Albumin   Date Value Ref Range Status   01/09/2016 3.3 (L) 3.4 - 5.0 g/dL Final     Alkaline Phosphatase   Date Value Ref Range Status   01/09/2016 70 40 - 150 U/L Final     ALT   Date Value Ref Range Status   01/09/2016 15 0 - 50 U/L Final     Rheumatoid Factor   Date Value Ref Range Status   07/22/2016 <20 <20 IU/mL Final     RHEUM RESULTS Latest Ref Rng & Units 10/24/2016 1/18/2017 7/14/2017   SED RATE 0 - 20 mm/h - - -   CRP, INFLAMMATION 0.0 - 8.0 mg/L <2.9 - -    RHEUMATOID FACTOR <20 IU/mL - - -   NINOSKA SCREEN BY EIA <1.0 - - -   AST 0 - 45 U/L - - -   ALT 0 - 50 U/L - - -   ALBUMIN 3.4 - 5.0 g/dL - - -   WBC 4.0 - 11.0 10e9/L 6.4 5.3 6.4   RBC 3.8 - 5.2 10e12/L 4.55 4.58 4.91   HGB 11.7 - 15.7 g/dL 13.2 12.8 13.8   HCT 35.0 - 47.0 % 39.8 39.2 43.0   MCV 78 - 100 fl 88 86 88   MCHC 31.5 - 36.5 g/dL 33.2 32.7 32.1   RDW 10.0 - 15.0 % 12.8 14.2 12.2    - 450 10e9/L 189 188 193   CREATININE 0.52 - 1.04 mg/dL 0.77 0.63 0.60   GFR ESTIMATE, IF BLACK >60 mL/min/1.7m2 >90  African American GFR Calc   >90   GFR Calc   >90   GFR Calc     GFR ESTIMATE >60 mL/min/1.7m2 86 >90  Non  GFR Calc   >90  Non  GFR Calc       Reviewed Rheumatology lab flowsheet

## 2017-07-14 ENCOUNTER — OFFICE VISIT (OUTPATIENT)
Dept: RHEUMATOLOGY | Facility: CLINIC | Age: 37
End: 2017-07-14
Attending: INTERNAL MEDICINE
Payer: COMMERCIAL

## 2017-07-14 VITALS
OXYGEN SATURATION: 97 % | WEIGHT: 167 LBS | SYSTOLIC BLOOD PRESSURE: 112 MMHG | HEIGHT: 67 IN | HEART RATE: 73 BPM | BODY MASS INDEX: 26.21 KG/M2 | DIASTOLIC BLOOD PRESSURE: 71 MMHG

## 2017-07-14 DIAGNOSIS — M35.1 MCTD (MIXED CONNECTIVE TISSUE DISEASE) (H): ICD-10-CM

## 2017-07-14 DIAGNOSIS — M35.1 MCTD (MIXED CONNECTIVE TISSUE DISEASE) (H): Primary | ICD-10-CM

## 2017-07-14 LAB
ALBUMIN UR-MCNC: NEGATIVE MG/DL
APPEARANCE UR: ABNORMAL
BACTERIA #/AREA URNS HPF: ABNORMAL /HPF
BILIRUB UR QL STRIP: NEGATIVE
COLOR UR AUTO: YELLOW
CREAT SERPL-MCNC: 0.6 MG/DL (ref 0.52–1.04)
ERYTHROCYTE [DISTWIDTH] IN BLOOD BY AUTOMATED COUNT: 12.2 % (ref 10–15)
GFR SERPL CREATININE-BSD FRML MDRD: NORMAL ML/MIN/1.7M2
GLUCOSE UR STRIP-MCNC: NEGATIVE MG/DL
HCT VFR BLD AUTO: 43 % (ref 35–47)
HGB BLD-MCNC: 13.8 G/DL (ref 11.7–15.7)
HGB UR QL STRIP: NEGATIVE
KETONES UR STRIP-MCNC: NEGATIVE MG/DL
LEUKOCYTE ESTERASE UR QL STRIP: ABNORMAL
MCH RBC QN AUTO: 28.1 PG (ref 26.5–33)
MCHC RBC AUTO-ENTMCNC: 32.1 G/DL (ref 31.5–36.5)
MCV RBC AUTO: 88 FL (ref 78–100)
NITRATE UR QL: NEGATIVE
PH UR STRIP: 8 PH (ref 5–7)
PLATELET # BLD AUTO: 193 10E9/L (ref 150–450)
RBC # BLD AUTO: 4.91 10E12/L (ref 3.8–5.2)
RBC #/AREA URNS AUTO: 3 /HPF (ref 0–2)
SP GR UR STRIP: 1.01 (ref 1–1.03)
SPERM #/AREA URNS HPF: PRESENT /HPF
SQUAMOUS #/AREA URNS AUTO: 9 /HPF (ref 0–1)
URN SPEC COLLECT METH UR: ABNORMAL
UROBILINOGEN UR STRIP-MCNC: 0 MG/DL (ref 0–2)
WBC # BLD AUTO: 6.4 10E9/L (ref 4–11)
WBC #/AREA URNS AUTO: 5 /HPF (ref 0–2)

## 2017-07-14 PROCEDURE — 36415 COLL VENOUS BLD VENIPUNCTURE: CPT | Performed by: INTERNAL MEDICINE

## 2017-07-14 PROCEDURE — 81001 URINALYSIS AUTO W/SCOPE: CPT | Performed by: INTERNAL MEDICINE

## 2017-07-14 PROCEDURE — 85027 COMPLETE CBC AUTOMATED: CPT | Performed by: INTERNAL MEDICINE

## 2017-07-14 PROCEDURE — 82565 ASSAY OF CREATININE: CPT | Performed by: INTERNAL MEDICINE

## 2017-07-14 PROCEDURE — 99212 OFFICE O/P EST SF 10 MIN: CPT | Mod: ZF

## 2017-07-14 ASSESSMENT — PAIN SCALES - GENERAL: PAINLEVEL: EXTREME PAIN (9)

## 2017-07-14 NOTE — NURSING NOTE
"Chief Complaint   Patient presents with     RECHECK     Follow up for MCTD       Initial /71  Pulse 73  Ht 1.702 m (5' 7\")  Wt 75.8 kg (167 lb)  SpO2 97%  BMI 26.16 kg/m2 Estimated body mass index is 26.16 kg/(m^2) as calculated from the following:    Height as of this encounter: 1.702 m (5' 7\").    Weight as of this encounter: 75.8 kg (167 lb).  Medication Reconciliation: complete   Christine Rondon CMA    "

## 2017-07-14 NOTE — LETTER
7/14/2017      RE: Heather Guillory  [address] [facility] [place] [address]       Rheumatology Visit     Heather Guillory MRN# [id number]   YOB: 1980 Age: 37 year old     Date of Visit: July 14, 2017  Primary care provider: No Ref-Primary, Physician          Assessment and Plan:   Mixed connective tissue disease (fatigue, peripartum polyarthralgia, +NINOSKA, +RNP): overall stable symptoms, suspect fatigue is more disrupted sleep and possibly from stopping her Zoloft, rather than to MCTD. She does have dry, rough skin on lateral aspects of most of her digits bilaterally which likely is secondary to frequent handwashing at home rather than a manifestation of MCTD.  CBC, urinalysis, and creatinine are within normal limits today. Overall, I think that autoimmunity is clinically quiescent.    I do not know whether patient should expect any effects of estrogen therapy on her connective tissue disease. Certainly, estrogen containing oral contraceptives are safe for use in patients with systemic lupus erythematosus, but the doses of estrogen used in preparation for embryo transfer are likely somewhat higher than in standard contraceptives. I think it is reasonable to proceed with embryo transfer and associated treatments, and to watch carefully for symptoms and signs of autoimmunity.    She is planning to undergo embryo transfer on 7/24/17 and after discussion about HCQ at last visit and her own research, she feels comfortable continuing this during her pregnancy. Discussed that it was reasonable to take her  mg once daily rather than 200 BID. She will have retinal exam for possible in the next week.      A.  mg daily and reassess benefit in 6 months   B. Yearly eye exam while on HCQ. Will be completed in next week.   C. Repeat UA, BMP, CBC now and at 6 month follow up.     Follow up in 6 months.     The patient was seen and examined with Dr. Osman MD.    Omer Hurtado  Internal Medicine  PGY-3    I saw this patient with the medical resident. I agree with the findings and recommendations.    Maikol Brewer M.D.  Staff Rheumatologist,  Health  Pager [phone]              Active Problem List:     Patient Active Problem List    Diagnosis Date Noted     Family history of genetic disorder 12/23/2015     Priority: Medium     Patient's sister's son with Elijah's disease       Female infertility 04/04/2014     Priority: Medium     IVF with first pregnancy.       Anxiety 04/04/2014     Priority: Medium     Has weaned off Zoloft, feels stable.  Will consider re-starting if she feels anxiety is increasing.       Family history of malignant neoplasm of breast 07/12/2013     Priority: Medium     Overview:   Paternal GM       Irritable colon 10/26/2009     Priority: Medium     Eating disorder 02/05/2004     Priority: Medium     History of, feels stable now.              History of Present Illness:   Heather Guillory presents for f/u for probable mixed connective tissue disease. Last seen in 1-17, when HCQ was continued.    Background: received diagnosis of mixed connective tissue given pain in hand and feet, stiffness, Raynaud's  Syndrome, fatigue, positive NINOSKA and anti-RNP in 2016. Ms. Guillory first experienced swelling and pain in her fingers and feet during her second pregnancy this past spring that prevented her from wearing rings on her fingers and provoked an evaluation for DVT, which was negative. Despite weight loss post partum, her pain, mild swelling, and stiffness in her digits persisted. She described the pain as a 7/10 when she wakes to feed her infant in the middle of the night and in the morning that decreases to a 2-3/10 over the course of the day. Stiffness lasts 30 mins to 1 hour in the morning. In general she feels tired all the time, but cannot determine if it is associated with that fact that she has two young children and is breastfeeding every 2 hours or if it is related to her joint  symptoms. She was evaluated by an internist who found that she had a positive NINOSKA/ She saw Dr. Iniguez in 8-16 who discovered +RNP. She started Plaquenil 200 mg BID.    Interval history 7/14/17  Heather Guillory is stable since her last visit in January, despite feeling more fatigued recently. She denied arthralgias/myalgias but reports having sore feet/toes at end of day and when she first wakes up. She states the fatigue worsened since she ran out of Zoloft 3.5 weeks ago; also reports irritability and no sleeping soundly during the night since that time too. Zoloft prescribed by her OBGYN.     She is currently receiving fertility treatment and plans to undergo embryo transfer on 7/24/17. She remains on HCQ and feels comfortable continuing this medication during pregnancy. She wonders if taking HCQ once daily (400) rather than 200 twice daily is acceptable.  She is using estrogen therapy in preparation for the embryo transfer, and relates that her gynecologist recommends that she remained on the supplement for approximately one month after the transfer.    She recently pinched a nerve in her neck and has numbness/tingling with burning sensation in her left arm that radiates down into her 1st-3rd digits. She was evaluated at [facility] orthopedics, received percocet for pain and told to have PT. If it did not improve in 6 weeks, she will return for another evaluation.     Lastly, she had dry, rough skin on lateral aspects of fingers. She states this often occurs in winter due to weather but it is new for her this summer. She is using a lot of moisturizers but it has not been helping.           Review of Systems:   Review Of Systems  Constitutional: HPI  Skin: no new rashes but has dry skin on hands  Eyes: denied dry eyes, irritation, redness  Ears/Nose/Throat: negative for dry mouth, sores  Respiratory: No shortness of breath, dyspnea on exertion, cough, or hemoptysis  Cardiovascular: negative for chest pain; has raynauds  with cold weather (not new)  Gastrointestinal: mild nausea before her period; occasional abdominal pain with eating certain foods, associated with diarrhea  Genitourinary: negative  Musculoskeletal: negative for joint pains, swelling, weakness  Neurologic: reports left arm numbness/tingling, burning sensation since sleeping strange on neck few weeks ago  Psychiatric: negative  Hematologic/Lymphatic/Immunologic: negative  Endocrine: resumed menses in 12-16          Past Medical History:     Past Medical History:   Diagnosis Date     Abnormal Pap smear     Over 10 years ago     Anxiety      Hx of previous reproductive problem December 2013     NO ACTIVE PROBLEMS      Past Surgical History:   Procedure Laterality Date     BIOPSY  April 2014    Uterine polyp removed     DILATION AND CURETTAGE SUCTION       OPERATIVE HYSTEROSCOPY WITH MORCELLATOR  4/8/2014    Procedure: OPERATIVE HYSTEROSCOPY WITH MORCELLATOR;  Hysteroscopic Polypectomy;  Surgeon: Cate Mansfield MD;  Location: [facility] OR     Raynaud's syndrome since puberty. Her main trigger is cold.  She had two uneventful pregnancies with vaginal births, though the conception of her first child required IVF.   she is undergoing embryo transfer in July 2017 in an attempt to conceive again. Estrogen therapy was used in preparation for and following embryo transfer.   cervical radiculopathy, 2017.       Social History:     Social History     Occupational History      [employer]     Social History Main Topics     Smoking status: Never Smoker     Smokeless tobacco: Never Used     Alcohol use No     Drug use: No     Sexual activity: Yes     Partners: Male     Birth control/ protection: None            Family History:     Family History   Problem Relation Age of Onset     DIABETES Paternal Grandfather      C.A.D. Paternal Grandfather      Hypertension Paternal Grandfather      Breast Cancer Paternal Grandmother      CANCER Paternal Grandmother       "lung ca     OSTEOPOROSIS Paternal Grandmother      Hypertension Paternal Grandmother      Cancer - colorectal Maternal Grandfather      CANCER Maternal Grandmother      cervical ca     Neurologic Disorder Maternal Grandmother      alzheimers     Alzheimer Disease Maternal Grandmother      Cardiovascular Brother 28     cardiomyopathy     HEART DISEASE Paternal Uncle 55     mitral valve replacement     Colon Cancer Maternal Grandfather      Genetic Disorder Sister 21     una's disease     Genetic Disorder Sister      No history of AI disease       Allergies:     Allergies   Allergen Reactions     Kiwi Other (See Comments)     Throat itching     Benzoyl Peroxide Swelling     Duricef [Cefadroxil] Unknown     Monistat [Miconazole] Swelling     Sulfa Drugs Rash            Medications:     Current Outpatient Prescriptions   Medication Sig Dispense Refill     Estradiol (ESTRACE PO) Take 2 mg by mouth 3 times daily       aspirin 81 MG tablet Take 81 mg by mouth daily       hydroxychloroquine (PLAQUENIL) 200 MG tablet Take 2 tablets (400 mg) by mouth daily 60 tablet 5     PRENATAL VITAMINS PO        sertraline (ZOLOFT) 50 MG tablet Take 1 tablet (50 mg) by mouth daily (Patient not taking: Reported on 7/14/2017) 30 tablet 0     ibuprofen (ADVIL,MOTRIN) 600 MG tablet Take 1 tablet (600 mg) by mouth every 6 hours as needed for moderate pain (Patient not taking: Reported on 7/14/2017) 30 tablet 1            Physical Exam:   Blood pressure 112/71, pulse 73, height 1.702 m (5' 7\"), weight 75.8 kg (167 lb), SpO2 97 %, currently breastfeeding.  Wt Readings from Last 4 Encounters:   07/14/17 75.8 kg (167 lb)   01/18/17 78.5 kg (173 lb)   09/16/16 77.6 kg (171 lb)   09/09/16 77.2 kg (170 lb 4.8 oz)     Constitutional: well-developed, appearing stated age; cooperative  Eyes: nl EOM, PERRLA, vision, conjunctiva, sclera  ENT: nl external ears, nose, hearing, lips, teeth, gums, throat  No mucous membrane lesions, normal saliva " pool  Neck: no mass or thyroid enlargement  Resp: lungs clear to auscultation, nl to palpation  CV: RRR, no murmurs, rubs or gallops, no edema  GI: no ABD mass or tenderness, no HSM  : not tested  Lymph: no cervical, supraclavicular, inguinal or epitrochlear nodes  MS: The TMJ, neck, shoulder, elbow, wrist, MCP/PIP/DIP, spine, hip, knee, ankle, and foot MTP/IP joints were examined. No active synovitis or altered joint anatomy. Full joint ROM. Normal  strength. No dactylitis,  tenosynovitis, enthesopathy.  Skin: dry, rough skin on lateral aspects of most digits bilaterally. No nail pitting, alopecia, rash, nodules or lesions  Neuro: nl cranial nerves, strength, abnormal sensation of left arm compared to right  Psych: nl judgement, orientation, memory, affect.         Data:     Results for orders placed or performed in visit on 01/18/17   Parvovirus B19 antibodies IgG IgM   Result Value Ref Range    Parvovirus B19 IgG 0.67     Parvovirus B19 IgM 0.40    CBC with platelets   Result Value Ref Range    WBC 5.3 4.0 - 11.0 10e9/L    RBC Count 4.58 3.8 - 5.2 10e12/L    Hemoglobin 12.8 11.7 - 15.7 g/dL    Hematocrit 39.2 35.0 - 47.0 %    MCV 86 78 - 100 fl    MCH 27.9 26.5 - 33.0 pg    MCHC 32.7 31.5 - 36.5 g/dL    RDW 14.2 10.0 - 15.0 %    Platelet Count 188 150 - 450 10e9/L   Routine UA with Micro Reflex to Culture   Result Value Ref Range    Color Urine Straw     Appearance Urine Clear     Glucose Urine Negative NEG mg/dL    Bilirubin Urine Negative NEG    Ketones Urine Negative NEG mg/dL    Specific Gravity Urine 1.001 (L) 1.003 - 1.035    Blood Urine Negative NEG    pH Urine 7.0 5.0 - 7.0 pH    Protein Albumin Urine Negative NEG mg/dL    Urobilinogen mg/dL 0.0 0.0 - 2.0 mg/dL    Nitrite Urine Negative NEG    Leukocyte Esterase Urine Negative NEG    Source Midstream Urine     WBC Urine 0 0 - 2 /HPF    RBC Urine 2 0 - 2 /HPF    Squamous Epithelial /HPF Urine 1 0 - 1 /HPF    Mucous Urine Present (A) NEG /LPF    Creatinine   Result Value Ref Range    Creatinine 0.63 0.52 - 1.04 mg/dL    GFR Estimate >90  Non  GFR Calc   >60 mL/min/1.7m2    GFR Estimate If Black >90   GFR Calc   >60 mL/min/1.7m2     Labs from outside laboratory reviewed from 8/12/16  RNP antibodies 1.6- positive    Negative for Parvo Virus B19, Lyme Disease  Negative SLE panel   Negative SSA, SSB  Negative Anti-Sury-1  Negative Centromere B antibodies  Negative  CCP  Recent Labs   Lab Test  07/22/16   1110  05/29/16   0651  05/27/16   1032   01/09/16   0724   WBC  8.1   --   15.4*   --   16.8*   RBC  4.86   --   4.25   --   4.75   HGB  14.1  11.2*  12.4   < >  14.2   HCT  42.1   --   37.6   --   40.1   MCV  87   --   89   --   84   RDW  13.0   --   14.0   --   13.7   PLT  228   --   184   --   202   ALBUMIN   --    --    --    --   3.3*   CRP  3.0   --    --    --    --    BUN   --    --    --    --   9    < > = values in this interval not displayed.      Recent Labs   Lab Test  07/22/16   1110   TSH  1.10     Hemoglobin   Date Value Ref Range Status   07/14/2017 13.8 11.7 - 15.7 g/dL Final   01/18/2017 12.8 11.7 - 15.7 g/dL Final   10/24/2016 13.2 11.7 - 15.7 g/dL Final     Urea Nitrogen   Date Value Ref Range Status   01/09/2016 9 7 - 30 mg/dL Final     Sed Rate   Date Value Ref Range Status   07/22/2016 9 0 - 20 mm/h Final     CRP Inflammation   Date Value Ref Range Status   10/24/2016 <2.9 0.0 - 8.0 mg/L Final   10/05/2016 42.0 (H) 0.0 - 8.0 mg/L Final   07/22/2016 3.0 0.0 - 8.0 mg/L Final     AST   Date Value Ref Range Status   01/09/2016 12 0 - 45 U/L Final     Albumin   Date Value Ref Range Status   01/09/2016 3.3 (L) 3.4 - 5.0 g/dL Final     Alkaline Phosphatase   Date Value Ref Range Status   01/09/2016 70 40 - 150 U/L Final     ALT   Date Value Ref Range Status   01/09/2016 15 0 - 50 U/L Final     Rheumatoid Factor   Date Value Ref Range Status   07/22/2016 <20 <20 IU/mL Final     RHEUM RESULTS Latest Ref Rng & Units  10/24/2016 1/18/2017 7/14/2017   SED RATE 0 - 20 mm/h - - -   CRP, INFLAMMATION 0.0 - 8.0 mg/L <2.9 - -   RHEUMATOID FACTOR <20 IU/mL - - -   NINOSKA SCREEN BY EIA <1.0 - - -   AST 0 - 45 U/L - - -   ALT 0 - 50 U/L - - -   ALBUMIN 3.4 - 5.0 g/dL - - -   WBC 4.0 - 11.0 10e9/L 6.4 5.3 6.4   RBC 3.8 - 5.2 10e12/L 4.55 4.58 4.91   HGB 11.7 - 15.7 g/dL 13.2 12.8 13.8   HCT 35.0 - 47.0 % 39.8 39.2 43.0   MCV 78 - 100 fl 88 86 88   MCHC 31.5 - 36.5 g/dL 33.2 32.7 32.1   RDW 10.0 - 15.0 % 12.8 14.2 12.2    - 450 10e9/L 189 188 193   CREATININE 0.52 - 1.04 mg/dL 0.77 0.63 0.60   GFR ESTIMATE, IF BLACK >60 mL/min/1.7m2 >90  African American GFR Calc   >90   GFR Calc   >90   GFR Calc     GFR ESTIMATE >60 mL/min/1.7m2 86 >90  Non  GFR Calc   >90  Non  GFR Calc       Reviewed Rheumatology lab flowsheet    Maikol Brewer MD

## 2017-07-14 NOTE — MR AVS SNAPSHOT
After Visit Summary   7/14/2017    Heather Guillory    MRN: [id number]           Patient Information     Date Of Birth          1980        Visit Information        Provider Department      7/14/2017 9:00 AM Maikol Brewer MD [facility] Rheumatology         Follow-ups after your visit        Your next 10 appointments already scheduled     Jul 17, 2017  2:15 PM CDT   RETURN RETINA with Tj Britt MD   Eye Clinic ([facility])    [address]  [address]  [facility] [id number] [phone]              Who to contact     If you have questions or need follow up information about today's clinic visit or your schedule please contact [facility] RHEUMATOLOGY directly at [phone].  Normal or non-critical lab and imaging results will be communicated to you by MyChart, letter or phone within 4 business days after the clinic has received the results. If you do not hear from us within 7 days, please contact the clinic through MyChart or phone. If you have a critical or abnormal lab result, we will notify you by phone as soon as possible.  Submit refill requests through [facility] or call your pharmacy and they will forward the refill request to us. Please allow 3 business days for your refill to be completed.          Additional Information About Your Visit        MyChart Information     [facility] gives you secure access to your electronic health record. If you see a primary care provider, you can also send messages to your care team and make appointments. If you have questions, please call your primary care clinic.  If you do not have a primary care provider, please call [phone] and they will assist you.        Care EveryWhere ID     This is your Care EveryWhere ID. This could be used by other organizations to access your [facility] medical records  [id number]        Your Vitals Were     Pulse Height Pulse Oximetry BMI (Body Mass Index)     "      73 1.702 m (5' 7\") 97% 26.16 kg/m2         Blood Pressure from Last 3 Encounters:   07/14/17 112/71   01/18/17 93/62   09/16/16 100/58    Weight from Last 3 Encounters:   07/14/17 75.8 kg (167 lb)   01/18/17 78.5 kg (173 lb)   09/16/16 77.6 kg (171 lb)              Today, you had the following     No orders found for display       Primary Care Provider    Physician No Ref-Primary       No address on file        Equal Access to Services     [facility]: Hadii juanjo Bahena, wagisselda luqadaha, qaybta kaalmada cailin, john graham . So [facility] [phone].    ATENCIÓN: Si habla español, tiene a nelson disposición servicios gratuitos de asistencia lingüística. Llame al [phone].    We comply with applicable federal civil rights laws and [place] laws. We do not discriminate on the basis of race, color, national origin, age, disability sex, sexual orientation or gender identity.            Thank you!     Thank you for choosing [facility] RHEUMATOLOGY  for your care. Our goal is always to provide you with excellent care. Hearing back from our patients is one way we can continue to improve our services. Please take a few minutes to complete the written survey that you may receive in the mail after your visit with us. Thank you!             Your Updated Medication List - Protect others around you: Learn how to safely use, store and throw away your medicines at www.disposemymeds.org.          This list is accurate as of: 7/14/17 10:27 AM.  Always use your most recent med list.                   Brand Name Dispense Instructions for use Diagnosis    aspirin 81 MG tablet      Take 81 mg by mouth daily        ESTRACE PO      Take 2 mg by mouth 3 times daily        hydroxychloroquine 200 MG tablet    PLAQUENIL    60 tablet    Take 2 tablets (400 mg) by mouth daily    MCTD (mixed connective tissue disease) (H)       ibuprofen 600 MG tablet    ADVIL/MOTRIN    30 tablet    Take 1 tablet " (600 mg) by mouth every 6 hours as needed for moderate pain     (normal spontaneous vaginal delivery)       PRENATAL VITAMINS PO           sertraline 50 MG tablet    ZOLOFT    30 tablet    Take 1 tablet (50 mg) by mouth daily    Anxiety

## 2017-07-17 ENCOUNTER — OFFICE VISIT (OUTPATIENT)
Dept: OPHTHALMOLOGY | Facility: CLINIC | Age: 37
End: 2017-07-17
Attending: OPHTHALMOLOGY
Payer: COMMERCIAL

## 2017-07-17 ENCOUNTER — MYC MEDICAL ADVICE (OUTPATIENT)
Dept: RHEUMATOLOGY | Facility: CLINIC | Age: 37
End: 2017-07-17

## 2017-07-17 DIAGNOSIS — Z79.899 LONG-TERM USE OF PLAQUENIL: ICD-10-CM

## 2017-07-17 DIAGNOSIS — H52.13 MYOPIA OF BOTH EYES: Primary | ICD-10-CM

## 2017-07-17 DIAGNOSIS — R82.90 ABNORMAL URINE FINDINGS: Primary | ICD-10-CM

## 2017-07-17 PROCEDURE — 92250 FUNDUS PHOTOGRAPHY W/I&R: CPT | Mod: ZF | Performed by: OPHTHALMOLOGY

## 2017-07-17 PROCEDURE — 99212 OFFICE O/P EST SF 10 MIN: CPT | Mod: 25,ZF

## 2017-07-17 PROCEDURE — 92134 CPTRZ OPH DX IMG PST SGM RTA: CPT | Mod: ZF | Performed by: OPHTHALMOLOGY

## 2017-07-17 ASSESSMENT — TONOMETRY
OS_IOP_MMHG: 12
IOP_METHOD: TONOPEN
OD_IOP_MMHG: 12

## 2017-07-17 ASSESSMENT — VISUAL ACUITY
OD_SC+: -2
OS_SC: 20/30
METHOD: SNELLEN - LINEAR
OD_SC: 20/25
OS_PH_SC: 20/25
OS_SC+: -2

## 2017-07-17 ASSESSMENT — CONF VISUAL FIELD
OS_NORMAL: 1
OD_NORMAL: 1

## 2017-07-17 ASSESSMENT — CUP TO DISC RATIO
OD_RATIO: 0.15
OS_RATIO: 0.15

## 2017-07-17 ASSESSMENT — SLIT LAMP EXAM - LIDS
COMMENTS: NORMAL
COMMENTS: NORMAL

## 2017-07-17 ASSESSMENT — REFRACTION_WEARINGRX
OS_CYLINDER: +1.25
OD_AXIS: 45
OD_CYLINDER: +0.75
OS_SPHERE: -1.50
OS_AXIS: 120
OD_SPHERE: -1.25

## 2017-07-17 ASSESSMENT — EXTERNAL EXAM - LEFT EYE: OS_EXAM: NORMAL

## 2017-07-17 ASSESSMENT — EXTERNAL EXAM - RIGHT EYE: OD_EXAM: NORMAL

## 2017-07-17 NOTE — PROGRESS NOTES
I have confirmed the patient's and reviewed Past Medical History, Past Surgical History, Social History, Family History, Problem List, Medication List and agree with Tech note.    CC: started on plaquenil 1 year ago    HPI: Currently on 400mg daily plaquenil for mixed CT disorder. She denies vision change, denies flashes, new floaters. Denies pain, redness, or tearing.     Imaging:  OCT 7/17/17   OD normal OCT, no maculopathy   OS normal OCT, no maculopathy    FAF 7/17/17   OD normal pigmentation, normal fluorescence   OS normal pigmentation, normal fluorescence    Assessment/plan:   1.  Plaquenil use   Normal dilated exam   Return to clinic one year  Reviewed testing and exam schedule for next five years   Current dose 400 mg daily.     2. Myopia with astigmatism   -doing well with current Rx    RTC one year for dilated Exam, OVF Macula top both eyes     Braulio Vieira MD  PGY3, Dept of Ophthalmology  Pager [phone]    ATTESTATION:  I have seen and examined the patient with Dr. Vieira and agree with the findings in this note, as well as the interpretations of the diagnostic tests.    Tj Horta MD PhD.  Professor & Chair

## 2017-07-17 NOTE — NURSING NOTE
Chief Complaints and History of Present Illnesses   Patient presents with     Follow Up For     Plaquenil use     HPI    Affected eye(s):  Both   Symptoms:        Duration:  9 months   Frequency:  Constant       Do you have eye pain now?:  No      Comments:  Pt. States that she is doing well.  Does have occasional light sensitivity.   No c/o comfort BE.  No change in VA BE.  Currently undergoing fertility treatment.   nAtonella Spaulding COT 2:16 PM July 17, 2017

## 2017-07-17 NOTE — MR AVS SNAPSHOT
After Visit Summary   7/17/2017    Heather Guillory    MRN: [id number]           Patient Information     Date Of Birth          1980        Visit Information        Provider Department      7/17/2017 2:15 PM Tj [facility] MD [facility]        Today's Diagnoses     Myopia of both eyes    -  1    Long-term use of Plaquenil           Follow-ups after your visit        Follow-up notes from your care team     Return in about 1 year (around 7/17/2018) for plauenil screening, OVF macula top.      Your next 10 appointments already scheduled     Jan 12, 2018  9:00 AM CST   (Arrive by 8:45 AM)   Return Visit with Maikol Brewer MD   [facility] ([facility])    [address] [address] [phone]              Future tests that were ordered for you today     Open Future Orders        Priority Expected Expires Ordered    Macula Top OU Routine  1/18/2019 7/17/2017            Who to contact     Please call your clinic at [phone] to:    Ask questions about your health    Make or cancel appointments    Discuss your medicines    Learn about your test results    Speak to your doctor   If you have compliments or concerns about an experience at your clinic, or if you wish to file a complaint, please contact [facility] Patient Relations at [phone] or email us at [email or web address]         Additional Information About Your Visit        MyChart Information     LocalMedhart gives you secure access to your electronic health record. If you see a primary care provider, you can also send messages to your care team and make appointments. If you have questions, please call your primary care clinic.  If you do not have a primary care provider, please call [phone] and they will assist you.      Impulsiv is an electronic gateway that provides easy, online access to your medical records. With  Jihan, you can request a clinic appointment, read your test results, renew a prescription or communicate with your care team.     To access your existing account, please contact your [facility] or call [phone] for assistance.        Care EveryWhere ID     This is your Care EveryWhere ID. This could be used by other organizations to access your [place] medical records  [id number]         Blood Pressure from Last 3 Encounters:   07/14/17 112/71   01/18/17 93/62   09/16/16 100/58    Weight from Last 3 Encounters:   07/14/17 75.8 kg (167 lb)   01/18/17 78.5 kg (173 lb)   09/16/16 77.6 kg (171 lb)              We Performed the Following     Fundus Autofluorescence Image (FAF) OU (both eyes)     OCT Retina Spectralis OU (both eyes)        Primary Care Provider    Physician No Ref-Primary       No address on file        Equal Access to Services     ROSELIA OREILLY : Hadii juanjo corea [place], waaxda luqadaha, qaybta kaalmada adegabby, john graham . So [facility] [phone].    ATENCIÓN: Si habla español, tiene a nelson disposición servicios gratuitos de asistencia lingüística. Llame al [phone].    We comply with applicable federal civil rights laws and [place] laws. We do not discriminate on the basis of race, color, national origin, age, disability sex, sexual orientation or gender identity.            Thank you!     Thank you for choosing EYE CLINIC  for your care. Our goal is always to provide you with excellent care. Hearing back from our patients is one way we can continue to improve our services. Please take a few minutes to complete the written survey that you may receive in the mail after your visit with us. Thank you!             Your Updated Medication List - Protect others around you: Learn how to safely use, store and throw away your medicines at www.disposemymeds.org.          This list is accurate as of: 7/17/17  3:39 PM.  Always use your  most recent med list.                   Brand Name Dispense Instructions for use Diagnosis    aspirin 81 MG tablet      Take 81 mg by mouth daily        ESTRACE PO      Take 2 mg by mouth 3 times daily        hydroxychloroquine 200 MG tablet    PLAQUENIL    60 tablet    Take 2 tablets (400 mg) by mouth daily    MCTD (mixed connective tissue disease) (H)       ibuprofen 600 MG tablet    ADVIL/MOTRIN    30 tablet    Take 1 tablet (600 mg) by mouth every 6 hours as needed for moderate pain     (normal spontaneous vaginal delivery)       PRENATAL VITAMINS PO           sertraline 50 MG tablet    ZOLOFT    30 tablet    Take 1 tablet (50 mg) by mouth daily    Anxiety

## 2017-07-26 NOTE — TELEPHONE ENCOUNTER
Pulled from communication note;     Maikol Brewer MD                    There are a few red and white blood cells in the urine. The significance is uncertain; most likely simple contamination from vaginal fluid is the cause. I recommend repeat UA/UCx in 3 weeks--please set up order. (Routing comment)               Writer updated patient of provider's note. Patient stated understanding and plans to schedule a lab visit via MyChart.   Thais Marin LPN

## 2017-08-04 ENCOUNTER — TELEPHONE (OUTPATIENT)
Dept: OBGYN | Facility: CLINIC | Age: 37
End: 2017-08-04

## 2017-08-04 NOTE — TELEPHONE ENCOUNTER
Spoke with Heather who reports she is pregnant with help from Reproductive medicine and is on plaquenil due to mixed connective tissue disorder. She was told by rheumatology that it was OK to continue on plaquenil during pregnnacy. When she picked up the prescription, the pharmacist emphatically told her NOT to take the medicine so she is concerned.    Spoke with Dr. Mansfield who consulted with [facility]. Recommendation is to continue taking plaquenil.    Spoke again with Heather and gave her this information. She agreed with plan and will schedule prenatal appointment. Discussed having early consultation appointment with MD/resident to discuss connectie tissue disorder in pregnancy. She agreed with plan.

## 2017-08-14 DIAGNOSIS — R82.90 ABNORMAL URINE FINDINGS: ICD-10-CM

## 2017-08-14 DIAGNOSIS — M35.1 MCTD (MIXED CONNECTIVE TISSUE DISEASE) (H): ICD-10-CM

## 2017-08-14 LAB
ALBUMIN UR-MCNC: NEGATIVE MG/DL
APPEARANCE UR: CLEAR
BILIRUB UR QL STRIP: NEGATIVE
COLOR UR AUTO: YELLOW
GLUCOSE UR STRIP-MCNC: NEGATIVE MG/DL
HGB UR QL STRIP: ABNORMAL
KETONES UR STRIP-MCNC: NEGATIVE MG/DL
LEUKOCYTE ESTERASE UR QL STRIP: NEGATIVE
NITRATE UR QL: NEGATIVE
PH UR STRIP: 6.5 PH (ref 5–7)
RBC #/AREA URNS AUTO: NORMAL /HPF (ref 0–2)
SP GR UR STRIP: 1.01 (ref 1–1.03)
URN SPEC COLLECT METH UR: ABNORMAL
UROBILINOGEN UR STRIP-ACNC: 0.2 EU/DL (ref 0.2–1)
WBC #/AREA URNS AUTO: NORMAL /HPF (ref 0–2)

## 2017-08-14 PROCEDURE — 81001 URINALYSIS AUTO W/SCOPE: CPT | Performed by: INTERNAL MEDICINE

## 2017-08-14 NOTE — LETTER
Patient:  Heather Guillory  :   1980  MRN:     [id number]        Ms.Jennifer Guillory  [address] [address]        August 15, 2017    Dear ,    We are writing to inform you of your test results.      Resulted Orders   *UA reflex to Microscopic and Culture ([place] [facility] [place] [facility] (except [place] and [place])   Result Value Ref Range    Color Urine Yellow     Appearance Urine Clear     Glucose Urine Negative NEG mg/dL    Bilirubin Urine Negative NEG    Ketones Urine Negative NEG mg/dL    Specific Gravity Urine 1.010 1.003 - 1.035    Blood Urine Trace (A) NEG    pH Urine 6.5 5.0 - 7.0 pH    Protein Albumin Urine Negative NEG mg/dL    Urobilinogen Urine 0.2 0.2 - 1.0 EU/dL    Nitrite Urine Negative NEG    Leukocyte Esterase Urine Negative NEG    Source Midstream Urine    Urine Microscopic   Result Value Ref Range    WBC Urine O - 2 0 - 2 /HPF    RBC Urine O - 2 0 - 2 /HPF     Urine is now clear. Please let me know if you have questions.    Sincerely,    Maikol Brewer MD  Rheumatologist, [facility]   [phone]  1980

## 2017-08-15 RX ORDER — HYDROXYCHLOROQUINE SULFATE 200 MG/1
400 TABLET, FILM COATED ORAL DAILY
Qty: 180 TABLET | Refills: 1 | Status: ON HOLD | OUTPATIENT
Start: 2017-08-15 | End: 2017-09-06

## 2017-08-15 NOTE — TELEPHONE ENCOUNTER
PLAQUENIL  200 MG       Last Written Prescription Date:  1/18/17  Last Fill Quantity: 60,   # refills: 5  Last Office Visit : 7/14/17  Future Office visit:  1/12/18  EY  EXAM   7/17/17

## 2017-08-21 ENCOUNTER — TELEPHONE (OUTPATIENT)
Dept: OBGYN | Facility: CLINIC | Age: 37
End: 2017-08-21

## 2017-08-21 DIAGNOSIS — O09.521 AMA (ADVANCED MATERNAL AGE) MULTIGRAVIDA 35+, FIRST TRIMESTER: Primary | ICD-10-CM

## 2017-08-21 NOTE — TELEPHONE ENCOUNTER
Patient called would like to establish prenatal care  Patient LMP 17  Patient is   Patient complains of nausea and Patient is taking prenatal vitamins

## 2017-08-31 ENCOUNTER — TELEPHONE (OUTPATIENT)
Dept: OBGYN | Facility: CLINIC | Age: 37
End: 2017-08-31

## 2017-08-31 ENCOUNTER — TELEPHONE (OUTPATIENT)
Dept: RHEUMATOLOGY | Facility: CLINIC | Age: 37
End: 2017-08-31

## 2017-08-31 DIAGNOSIS — R30.0 DYSURIA: ICD-10-CM

## 2017-08-31 DIAGNOSIS — O09.91 HIGH-RISK PREGNANCY IN FIRST TRIMESTER: ICD-10-CM

## 2017-08-31 DIAGNOSIS — R30.0 DYSURIA: Primary | ICD-10-CM

## 2017-08-31 LAB
ALBUMIN UR-MCNC: 30 MG/DL
APPEARANCE UR: ABNORMAL
BACTERIA #/AREA URNS HPF: ABNORMAL /HPF
BILIRUB UR QL STRIP: NEGATIVE
COLOR UR AUTO: YELLOW
GLUCOSE UR STRIP-MCNC: NEGATIVE MG/DL
HGB UR QL STRIP: ABNORMAL
KETONES UR STRIP-MCNC: NEGATIVE MG/DL
LEUKOCYTE ESTERASE UR QL STRIP: ABNORMAL
NITRATE UR QL: NEGATIVE
NON-SQ EPI CELLS #/AREA URNS LPF: ABNORMAL /LPF
PH UR STRIP: 7 PH (ref 5–7)
RBC #/AREA URNS AUTO: ABNORMAL /HPF
SOURCE: ABNORMAL
SP GR UR STRIP: >1.03 (ref 1–1.03)
UROBILINOGEN UR STRIP-ACNC: 0.2 EU/DL (ref 0.2–1)
WBC #/AREA URNS AUTO: ABNORMAL /HPF

## 2017-08-31 PROCEDURE — 87086 URINE CULTURE/COLONY COUNT: CPT | Performed by: ADVANCED PRACTICE MIDWIFE

## 2017-08-31 PROCEDURE — 81001 URINALYSIS AUTO W/SCOPE: CPT | Performed by: ADVANCED PRACTICE MIDWIFE

## 2017-08-31 PROCEDURE — 87088 URINE BACTERIA CULTURE: CPT | Performed by: ADVANCED PRACTICE MIDWIFE

## 2017-08-31 PROCEDURE — 87186 SC STD MICRODIL/AGAR DIL: CPT | Performed by: ADVANCED PRACTICE MIDWIFE

## 2017-08-31 PROCEDURE — 87181 SC STD AGAR DILUTION PER AGT: CPT | Performed by: ADVANCED PRACTICE MIDWIFE

## 2017-08-31 RX ORDER — NITROFURANTOIN 25; 75 MG/1; MG/1
100 CAPSULE ORAL 2 TIMES DAILY
Qty: 10 CAPSULE | Refills: 0 | Status: SHIPPED | OUTPATIENT
Start: 2017-08-31 | End: 2017-09-06

## 2017-08-31 NOTE — TELEPHONE ENCOUNTER
----- Message from Kwabena Madrigal sent at 8/28/2017 10:18 AM CDT -----  Regarding: Dr King /Os[place] - pt req to see Dr King   Contact: [phone]  Return Dr Brewer pt, req to switch providers and wants to see Dr King sometime this year. She can be reached at [phone]  Thanks  gigi [facility] call center  Please DO NOT send this message and/or reply back to sender.  Call Center Representatives DO NOT respond to messages.

## 2017-08-31 NOTE — TELEPHONE ENCOUNTER
Received call from Heather reporting s/s of uti with burning, frequency, and urgency. She states she is about 8w1d pregnant. Advised order placed per RN protocol for her to have ua/uc done in outpatient lab and will discuss with on call CNM once resulted. Text page sent to on call CNM as fyi. Encouraged her to increase fluids. She understood plan and had no further questions.

## 2017-08-31 NOTE — TELEPHONE ENCOUNTER
Discussed UA with on call CNM. Pts symptoms have worsened and she can see blood in her urine. Will send rx for nitrofurantoin and will wait for urine culture results. Pt will  rx today.

## 2017-08-31 NOTE — TELEPHONE ENCOUNTER
"Patient reported that she is newly pregnant and would like to transfer care from Dr. Brewer to Dr. King as \" Dr. King has the expertise with her condition and pregnancy.\" Writer informed patient of the process behind transfer of care/providers. Patient stated understanding.   "

## 2017-09-05 ENCOUNTER — TELEPHONE (OUTPATIENT)
Dept: OBGYN | Facility: CLINIC | Age: 37
End: 2017-09-05

## 2017-09-05 ASSESSMENT — ANXIETY QUESTIONNAIRES
3. WORRYING TOO MUCH ABOUT DIFFERENT THINGS: NOT AT ALL
5. BEING SO RESTLESS THAT IT IS HARD TO SIT STILL: NOT AT ALL
4. TROUBLE RELAXING: NOT AT ALL
6. BECOMING EASILY ANNOYED OR IRRITABLE: NOT AT ALL
1. FEELING NERVOUS, ANXIOUS, OR ON EDGE: NOT AT ALL
GAD7 TOTAL SCORE: 0
GAD7 TOTAL SCORE: 0
7. FEELING AFRAID AS IF SOMETHING AWFUL MIGHT HAPPEN: NOT AT ALL
7. FEELING AFRAID AS IF SOMETHING AWFUL MIGHT HAPPEN: NOT AT ALL
4. TROUBLE RELAXING: NOT AT ALL
6. BECOMING EASILY ANNOYED OR IRRITABLE: NOT AT ALL
2. NOT BEING ABLE TO STOP OR CONTROL WORRYING: NOT AT ALL
3. WORRYING TOO MUCH ABOUT DIFFERENT THINGS: NOT AT ALL
7. FEELING AFRAID AS IF SOMETHING AWFUL MIGHT HAPPEN: NOT AT ALL
GAD7 TOTAL SCORE: 0
7. FEELING AFRAID AS IF SOMETHING AWFUL MIGHT HAPPEN: NOT AT ALL
2. NOT BEING ABLE TO STOP OR CONTROL WORRYING: NOT AT ALL
GAD7 TOTAL SCORE: 0
GAD7 TOTAL SCORE: 0
5. BEING SO RESTLESS THAT IT IS HARD TO SIT STILL: NOT AT ALL
GAD7 TOTAL SCORE: 0
1. FEELING NERVOUS, ANXIOUS, OR ON EDGE: NOT AT ALL

## 2017-09-05 NOTE — TELEPHONE ENCOUNTER
Spoke to Heather who is about 9 wks GA and had an UC +for e-coli ESBL 8/31/17 and prescribed Macrobid x 5 days.  She finished her macrobid last night.  Today she is feeling run down, achy, and chills. She doesn't have any pain or burning with urination or voiding small frequent amounts.  No other family members ill at home.  She is scheduled for dating US and OB intake tomorrow in clinic.      Spoke to on call midwife, Babita Stevens and informed her above. She didn't want to do anything more tonight.   If Heather continue to feels worse, fever 100.4 or any urinary s/s she should go to ED.Pt indicated understanding and agreed with plan.

## 2017-09-06 ENCOUNTER — HOSPITAL ENCOUNTER (INPATIENT)
Facility: CLINIC | Age: 37
LOS: 1 days | Discharge: HOME | End: 2017-09-07
Attending: OBSTETRICS & GYNECOLOGY | Admitting: OBSTETRICS & GYNECOLOGY
Payer: COMMERCIAL

## 2017-09-06 ENCOUNTER — OFFICE VISIT (OUTPATIENT)
Dept: OBGYN | Facility: CLINIC | Age: 37
End: 2017-09-06
Attending: OBSTETRICS & GYNECOLOGY
Payer: COMMERCIAL

## 2017-09-06 VITALS
TEMPERATURE: 98.6 F | BODY MASS INDEX: 26.35 KG/M2 | WEIGHT: 167.9 LBS | SYSTOLIC BLOOD PRESSURE: 107 MMHG | DIASTOLIC BLOOD PRESSURE: 73 MMHG | HEIGHT: 67 IN | HEART RATE: 67 BPM

## 2017-09-06 DIAGNOSIS — Z36.82 ENCOUNTER FOR NUCHAL TRANSLUCENCY TESTING: ICD-10-CM

## 2017-09-06 DIAGNOSIS — O09.521 AMA (ADVANCED MATERNAL AGE) MULTIGRAVIDA 35+, FIRST TRIMESTER: ICD-10-CM

## 2017-09-06 DIAGNOSIS — O09.811 PREGNANCY RESULTING FROM IN VITRO FERTILIZATION IN FIRST TRIMESTER: ICD-10-CM

## 2017-09-06 DIAGNOSIS — Z36.89 ENCOUNTER FOR FETAL ANATOMIC SURVEY: ICD-10-CM

## 2017-09-06 DIAGNOSIS — O09.529 SUPERVISION OF HIGH-RISK PREGNANCY OF ELDERLY MULTIGRAVIDA: Primary | ICD-10-CM

## 2017-09-06 DIAGNOSIS — N30.01 ACUTE CYSTITIS WITH HEMATURIA: ICD-10-CM

## 2017-09-06 PROBLEM — L85.3 XEROSIS CUTIS: Status: ACTIVE | Noted: 2017-07-21

## 2017-09-06 PROBLEM — M35.1 MCTD (MIXED CONNECTIVE TISSUE DISEASE) (H): Status: ACTIVE | Noted: 2017-09-06

## 2017-09-06 PROBLEM — N39.0 UTI (URINARY TRACT INFECTION): Status: ACTIVE | Noted: 2017-09-06

## 2017-09-06 PROCEDURE — 12000001 ZZH R&B MED SURG/OB UMMC

## 2017-09-06 PROCEDURE — 25000128 H RX IP 250 OP 636: Performed by: STUDENT IN AN ORGANIZED HEALTH CARE EDUCATION/TRAINING PROGRAM

## 2017-09-06 PROCEDURE — 25000132 ZZH RX MED GY IP 250 OP 250 PS 637: Performed by: STUDENT IN AN ORGANIZED HEALTH CARE EDUCATION/TRAINING PROGRAM

## 2017-09-06 PROCEDURE — 76817 TRANSVAGINAL US OBSTETRIC: CPT | Mod: ZF

## 2017-09-06 PROCEDURE — 99213 OFFICE O/P EST LOW 20 MIN: CPT | Mod: 25,ZF

## 2017-09-06 RX ORDER — METOCLOPRAMIDE HYDROCHLORIDE 5 MG/ML
10 INJECTION INTRAMUSCULAR; INTRAVENOUS EVERY 6 HOURS PRN
Status: DISCONTINUED | OUTPATIENT
Start: 2017-09-06 | End: 2017-09-07 | Stop reason: HOSPADM

## 2017-09-06 RX ORDER — OXYCODONE HYDROCHLORIDE 5 MG/1
5-10 TABLET ORAL
Status: DISCONTINUED | OUTPATIENT
Start: 2017-09-06 | End: 2017-09-07 | Stop reason: HOSPADM

## 2017-09-06 RX ORDER — HYDROXYZINE HYDROCHLORIDE 50 MG/1
50-100 TABLET, FILM COATED ORAL
Status: DISCONTINUED | OUTPATIENT
Start: 2017-09-06 | End: 2017-09-07 | Stop reason: HOSPADM

## 2017-09-06 RX ORDER — METOCLOPRAMIDE 10 MG/1
10 TABLET ORAL EVERY 6 HOURS PRN
Status: DISCONTINUED | OUTPATIENT
Start: 2017-09-06 | End: 2017-09-07 | Stop reason: HOSPADM

## 2017-09-06 RX ORDER — PROCHLORPERAZINE MALEATE 5 MG
5-10 TABLET ORAL EVERY 6 HOURS PRN
Status: DISCONTINUED | OUTPATIENT
Start: 2017-09-06 | End: 2017-09-07 | Stop reason: HOSPADM

## 2017-09-06 RX ORDER — SODIUM CHLORIDE 9 MG/ML
INJECTION, SOLUTION INTRAVENOUS
Status: DISPENSED
Start: 2017-09-06 | End: 2017-09-07

## 2017-09-06 RX ORDER — ACETAMINOPHEN 325 MG/1
650 TABLET ORAL EVERY 4 HOURS PRN
Status: DISCONTINUED | OUTPATIENT
Start: 2017-09-06 | End: 2017-09-07 | Stop reason: HOSPADM

## 2017-09-06 RX ORDER — PRENATAL VIT/IRON FUM/FOLIC AC 27MG-0.8MG
1 TABLET ORAL DAILY
Status: DISCONTINUED | OUTPATIENT
Start: 2017-09-07 | End: 2017-09-07 | Stop reason: HOSPADM

## 2017-09-06 RX ORDER — ONDANSETRON 4 MG/1
4 TABLET, ORALLY DISINTEGRATING ORAL EVERY 6 HOURS PRN
Status: DISCONTINUED | OUTPATIENT
Start: 2017-09-06 | End: 2017-09-07 | Stop reason: HOSPADM

## 2017-09-06 RX ORDER — HYDROXYCHLOROQUINE SULFATE 200 MG/1
200 TABLET, FILM COATED ORAL 2 TIMES DAILY
COMMUNITY
End: 2018-06-29

## 2017-09-06 RX ORDER — NALOXONE HYDROCHLORIDE 0.4 MG/ML
.1-.4 INJECTION, SOLUTION INTRAMUSCULAR; INTRAVENOUS; SUBCUTANEOUS
Status: DISCONTINUED | OUTPATIENT
Start: 2017-09-06 | End: 2017-09-07 | Stop reason: HOSPADM

## 2017-09-06 RX ORDER — ERTAPENEM 1 G/1
1 INJECTION, POWDER, LYOPHILIZED, FOR SOLUTION INTRAMUSCULAR; INTRAVENOUS EVERY 24 HOURS
Status: DISCONTINUED | OUTPATIENT
Start: 2017-09-06 | End: 2017-09-07 | Stop reason: HOSPADM

## 2017-09-06 RX ORDER — ESTRADIOL 2 MG/1
2 TABLET ORAL 3 TIMES DAILY
Status: DISCONTINUED | OUTPATIENT
Start: 2017-09-06 | End: 2017-09-07 | Stop reason: HOSPADM

## 2017-09-06 RX ORDER — HYDROXYCHLOROQUINE SULFATE 200 MG/1
400 TABLET, FILM COATED ORAL
COMMUNITY
Start: 2016-08-23 | End: 2017-09-06

## 2017-09-06 RX ORDER — PNV NO.95/FERROUS FUM/FOLIC AC 28MG-0.8MG
1 TABLET ORAL DAILY
Status: ON HOLD | COMMUNITY
Start: 2013-08-14 | End: 2021-04-23

## 2017-09-06 RX ORDER — HYDROXYCHLOROQUINE SULFATE 200 MG/1
200 TABLET, FILM COATED ORAL 2 TIMES DAILY
Status: DISCONTINUED | OUTPATIENT
Start: 2017-09-06 | End: 2017-09-07 | Stop reason: HOSPADM

## 2017-09-06 RX ORDER — ONDANSETRON 2 MG/ML
4 INJECTION INTRAMUSCULAR; INTRAVENOUS EVERY 6 HOURS PRN
Status: DISCONTINUED | OUTPATIENT
Start: 2017-09-06 | End: 2017-09-07 | Stop reason: HOSPADM

## 2017-09-06 RX ORDER — ESTRADIOL 0.5 MG/1
2 TABLET ORAL
Status: ON HOLD | COMMUNITY
End: 2017-09-06

## 2017-09-06 RX ORDER — PROCHLORPERAZINE 25 MG
25 SUPPOSITORY, RECTAL RECTAL EVERY 12 HOURS PRN
Status: DISCONTINUED | OUTPATIENT
Start: 2017-09-06 | End: 2017-09-07 | Stop reason: HOSPADM

## 2017-09-06 RX ORDER — ESTRADIOL 2 MG/1
2 TABLET ORAL DAILY
Status: DISCONTINUED | OUTPATIENT
Start: 2017-09-06 | End: 2017-09-06 | Stop reason: CLARIF

## 2017-09-06 RX ADMIN — ERTAPENEM SODIUM 1 G: 1 INJECTION, POWDER, LYOPHILIZED, FOR SOLUTION INTRAMUSCULAR; INTRAVENOUS at 19:45

## 2017-09-06 RX ADMIN — HYDROXYCHLOROQUINE SULFATE 200 MG: 200 TABLET, FILM COATED ENTERAL at 19:45

## 2017-09-06 RX ADMIN — ESTRADIOL 2 MG: 2 TABLET ORAL at 21:49

## 2017-09-06 ASSESSMENT — ANXIETY QUESTIONNAIRES
GAD7 TOTAL SCORE: 0
GAD7 TOTAL SCORE: 0

## 2017-09-06 ASSESSMENT — PAIN SCALES - GENERAL: PAINLEVEL: NO PAIN (0)

## 2017-09-06 NOTE — MR AVS SNAPSHOT
"              After Visit Summary   9/6/2017    Heather Guillory    MRN: [id number]           Patient Information     Date Of Birth          1980        Visit Information        Provider Department      9/6/2017 1:30 PM Dalila Stringer CNM [facility]        Today's Diagnoses     Supervision of high-risk pregnancy of elderly multigravida    -  1    Acute cystitis with hematuria        Pregnancy resulting from in vitro fertilization in first trimester        Encounter for nuchal translucency testing        Encounter for fetal anatomic survey          Care Instructions      Thank you for choosing [facility] for your obstetrical care.  We are an integrated health clinic with obstetricians, midwives, a psychologist, an acupuncturist, a nutritionist, a pharmacist, internal medicine and family practice all in one.  If you have questions about services offered please ask.      o Please keep all appointments with your provider.  You will help catch, prevent and treat problems early.  o Review your Expectant Family booklet and folder given to you at this intake visit.  It can answer many basic questions including:    Treatment for nausea and vomiting    Medications that are safe in pregnancy    Healthy diet and weight gain    Exercise and activity  o ASK questions!!  Please use \"Questions for my New OB visit\" form to write down any questions or concerns for your next visit.  o Eat a healthy diet.  Visit www.choosemyplate.gov and click on  Pregnancy and Breastfeeding  for information and tips  o Do not smoke.  Avoid other people's smoke, too.  We are happy to help with referrals to stop smoking programs.  o Do not drink alcohol.  o Try to avoid people who have colds or other infections.  Practice good hand washing.  o Consider registering for our Healthy Pregnancy Class here at [facility].  This class is offered every 3rd Wednesday from 2:30-4:30 p.m.  [place] at " "[phone] or online at TopOPPS or [email or web address]  o Consider registering for prenatal education classes through [place] at [facility].  You can view class schedules and register online at [email or web address] or call [phone] for questions     For urgent concerns, call [facility] at [phone] to speak with a triage nurse during regular clinic hours (8:00 am - 4:30 pm).  This line is answered by our service 24 hours a day, after hours a provider will return your call.  Thank you for choosing [facility] for your obstetrical care.  We are an integrated health clinic with obstetricians, midwives, a psychologist, an acupuncturist, a nutritionist, a pharmacist, internal medicine and family practice all in one.  If you have questions about services offered please ask.      o Please keep all appointments with your provider.  You will help catch, prevent and treat problems early.  o Review your Expectant Family booklet and folder given to you at this intake visit.  It can answer many basic questions including:    Treatment for nausea and vomiting    Medications that are safe in pregnancy    Healthy diet and weight gain    Exercise and activity  o ASK questions!!  Please use \"Questions for my New OB visit\" form to write down any questions or concerns for your next visit.  o Eat a healthy diet.  Visit www.choosemyplate.gov and click on  Pregnancy and Breastfeeding  for information and tips  o Do not smoke.  Avoid other people's smoke, too.  We are happy to help with referrals to stop smoking programs.  o Do not drink alcohol.  o Try to avoid people who have colds or other infections.  Practice good hand washing.  o Consider registering for our Healthy Pregnancy Class here at [facility].  This class is offered every 3rd Wednesday from 2:30-4:30 p.m.  [place] at [phone] or online at TopOPPS or " [email or web address]  o Consider registering for prenatal education classes through [place] at [facility].  You can view class schedules and register online at [email or web address] or call [phone] for questions     For urgent concerns, call [facility] at [phone] to speak with a triage nurse during regular clinic hours (8:00 am - 4:30 pm).  This line is answered by our service 24 hours a day, after hours a provider will return your call.          Follow-ups after your visit        Additional Services     MAT FETAL MED CTR REFERRAL-PREGNANCY       IVF pregnancy, desires genetic testing.  Fetal echo indicated 22-24 weeks     >> Patient may proceed with recommendations for further testing as directed by the Maternal Fetal Medicine Specialist >>    >> If requesting Fetal Echo: MFM will determine appropriate location for exam due to indication.    >> If requesting Lung Maturity Amnio:  If results indicate fetal lung maturity, induction or C/S is recommended within 36 hours.  Please schedule accordingly.     Dear Patient:   Please be aware that coverage of these services is subject to the terms and limitations of your health insurance plan.  Call member services at your health plan with any benefit or coverage questions.      Please bring the following to your appointment:    >>  Any x-rays, CTs or MRIs which have been performed.  Contact the facility where they were done to arrange for  prior to your scheduled appointment.  Any new CT, MRI or other procedures ordered by your specialist must be performed at a [place] facility or coordinated by your clinic's referral office.  >>  List of current medications   >>  This referral request   >>  Any documents/labs given to you for this referral                  Follow-up notes from your care team     Return for Admit to inpatient, NOB approx 1-2 weeks.      Your next 10 appointments already scheduled     Sep 09, 2017  1:30  PM CDT   Level 1 with SH INFUSION CHAIR 12   [facility] ([facility])    [facility]  [address]  [place] [address]   [phone]            Sep 10, 2017  1:30 PM CDT   Level 1 with SH INFUSION CHAIR 14   [facility] ([facility])    [facility]  [address]  [place] [address]   [phone]            Sep 11, 2017 12:00 PM CDT   Level 1 with SH INFUSION CHAIR 19   [facility] ([facility])    [facility]  [address]  [place] [address]   [phone]            Sep 12, 2017  1:30 PM CDT   Level 1 with SH INFUSION CHAIR 11   [facility] ([facility])    [facility]  [address]  [place] [address]   [phone]            Sep 15, 2017  1:00 PM CDT   RETURN EXTENDED with Cate Mansfield MD   [facility] ([facility])    [facility]  [address]  [address]  [facility] [address] [phone]            Sep 28, 2017  8:00 AM CDT   (Arrive by 7:45 AM)   Return Visit with Nasrin King MD   [facility] ([facility])    [address] [place] [id number] [phone]            Jan 12, 2018  9:00 AM CST   (Arrive by 8:45 AM)   Return Visit with Maikol Brewer MD   [facility] ([facility])    [address] [place] [id number] [phone]              Who to contact     Please call your clinic at [phone] to:    Ask questions about your health    Make or cancel appointments    Discuss your medicines    Learn about your test results    Speak to your doctor   If you have compliments or concerns  "about an experience at your clinic, or if you wish to file a complaint, please contact [facility] Patient Relations at [phone] or email us at [email or web address]         Additional Information About Your Visit        Incuronhart Information     Eco-Sitet gives you secure access to your electronic health record. If you see a primary care provider, you can also send messages to your care team and make appointments. If you have questions, please call your primary care clinic.  If you do not have a primary care provider, please call [phone] and they will assist you.      Mengcao is an electronic gateway that provides easy, online access to your medical records. With Mengcao, you can request a clinic appointment, read your test results, renew a prescription or communicate with your care team.     To access your existing account, please contact your [facility] or call [phone] for assistance.        Care EveryWhere ID     This is your Care EveryWhere ID. This could be used by other organizations to access your [place] medical records  [id number]        Your Vitals Were     Pulse Temperature Height BMI (Body Mass Index)          67 98.6  F (37  C) (Oral) 1.702 m (5' 7\") 26.3 kg/m2         Blood Pressure from Last 3 Encounters:   09/08/17 94/64   09/07/17 93/59   09/06/17 107/73    Weight from Last 3 Encounters:   09/06/17 76.2 kg (167 lb 14.4 oz)   07/14/17 75.8 kg (167 lb)   01/18/17 78.5 kg (173 lb)              We Performed the Following     MAT FETAL MED CTR REFERRAL-PREGNANCY        Primary Care Provider    Physician No Ref-Primary       No address on file        Equal Access to Services     ROSELIA OREILLY : columba Arce, john kellogg. So [facility] [phone].    ATENCIÓN: Si habla español, tiene a nelson disposición servicios gratuitos de asistencia " lingüística. Julieta al [phone].    We comply with applicable federal civil rights laws and [place] laws. We do not discriminate on the basis of race, color, national origin, age, disability sex, sexual orientation or gender identity.            Thank you!     Thank you for choosing [facility]  for your care. Our goal is always to provide you with excellent care. Hearing back from our patients is one way we can continue to improve our services. Please take a few minutes to complete the written survey that you may receive in the mail after your visit with us. Thank you!             Your Updated Medication List - Protect others around you: Learn how to safely use, store and throw away your medicines at www.disposemymeds.org.          This list is accurate as of: 9/6/17  5:14 PM.  Always use your most recent med list.                   Brand Name Dispense Instructions for use Diagnosis    Prenatal Vitamins 28-0.8 MG Tabs      Take 1 tablet by mouth daily    Supervision of high-risk pregnancy of elderly multigravida       progesterone 100 MG    ENDOMETRIN     Place 100 mg vaginally 3 times daily    Supervision of high-risk pregnancy of elderly multigravida

## 2017-09-06 NOTE — PHARMACY-ADMISSION MEDICATION HISTORY
Admission Medication History status for the 9/6/2017 admission is complete.  See EPIC admission navigator for Prior to Admission medications.    Medication history sources:  Patient. Also pt brought prescription of estrace     Medication history source reliability: Good    Medication adherence:  Good    Changes made to PTA medication list (reason)  Added: None  Deleted: None  Changed: Plaquenil from 400 mg daily to 200 mg BID. Estrace from 2 mg daily to 2 mg TID. Endometrin Suppository to 100 mg TID    Additional medication history information (including reliability of information, actions taken by pharmacist): None    Time spent in this activity: 15 minutes    Medication history completed by: Patricia Blackwell     Prior to Admission medications    Medication Sig Last Dose Taking? Auth Provider   progesterone (ENDOMETRIN) 100 MG Place 100 mg vaginally 3 times daily  9/6/2017 at Unknown time Yes Reported, Patient   Prenatal Vit-Fe Fumarate-FA (PRENATAL VITAMINS) 28-0.8 MG TABS Take 1 tablet by mouth daily  9/6/2017 at Unknown time Yes Reported, Patient   Estradiol (ESTRACE PO) Take 2 mg by mouth 3 times daily 9/6/2017 at Unknown time Yes Unknown, Entered By History   hydroxychloroquine (PLAQUENIL) 200 MG tablet Take 200 mg by mouth 2 times daily 9/6/2017 at Unknown time Yes Unknown, Entered By History

## 2017-09-06 NOTE — LETTER
2017     RE: Heather Guillory  [address] [address] [place] [address]     Dear Colleague,    Thank you for referring your patient, Heather Guillory, to the [facility] at [facility]. Please see a copy of my visit note below.    37 year old female, ,  Estimated Date of Delivery: 18, 9 0/7 weeks  presents for confirmation of dates and assessment of viability. This study was done transvaginally.    Measurements     CRL = 24.7 mm = 9 1/7 weeks  EGA.   JOSEPH = 4/10/18.     Fetal anatomy appears normal for gestational age.     Fetal/Fetal Cardiac Activity: Present.  FHR = 180bpm.     Implantation: Intrauterine.     Cervix = 6.0 cm      Maternal structures appear normal.    Impression: Viable IUP @9w0d     Recommend comprehensive scan at 18 to 20 weeks.    ZEUS Stearns MD

## 2017-09-06 NOTE — IP AVS SNAPSHOT
MRN:[id number]                      After Visit Summary   9/6/2017    Heather Guillory    MRN: [id number]           Thank you!     Thank you for choosing [facility] for your care. Our goal is always to provide you with excellent care. Hearing back from our patients is one way we can continue to improve our services. Please take a few minutes to complete the written survey that you may receive in the mail after you visit with us. Thank you!        Patient Information     Date Of Birth          1980        Designated Caregiver       Most Recent Value    Caregiver    Will someone help with your care after discharge? yes    Name of designated caregiver  Chi    Phone number of caregiver home phone    Caregiver address home address      About your hospital stay     You were admitted on:  September 6, 2017 You last received care in the:  [facility] Unit [facility]    You were discharged on:  September 7, 2017        Reason for your hospital stay       Urinary tract infection                  Who to Call     For medical emergencies, please call 911.  For non-urgent questions about your medical care, please call your primary care provider or clinic, None          Attending Provider     Provider Specialty    Cate Mansfield MD OB/Gyn       Primary Care Provider    Physician No Ref-Primary      After Care Instructions     Activity       Your activity upon discharge: activity as tolerated            Diet       Follow this diet upon discharge: regular diet            IV access       **Ordering Provider MUST call/page Care Coordinator/ to discuss arranging this service**    You are going home with the following vascular access device: Peripheral IV.                  Follow-up Appointments     Adult [facility] Follow-up and recommended labs and tests       Follow-up with Infusion center for final abx dose 9/8/17    Appointments on [place] and/or [facility] (with [facility] or [facility] provider or  service). Call [phone] if you haven't heard regarding these appointments within 7 days of discharge.                  Your next 10 appointments already scheduled     Sep 08, 2017  3:00 PM CDT   Level 1 with  INFUSION CHAIR 13   [facility] ([facility])    [facility] Raheem Crowe  [address]  [place] [address]   [phone]            Sep 15, 2017  1:00 PM CDT   RETURN EXTENDED with Cate Mansfield MD   [facility] ([facility])    [place] [facility] [address]  [address]rd [address]  [address]  [facility] [address] [phone]            Sep 28, 2017  8:00 AM CDT   (Arrive by 7:45 AM)   Return Visit with Nasrin King MD   [facility] ([facility])    [address]  3rd [facility] [id number] [phone]            Jan 12, 2018  9:00 AM CST   (Arrive by 8:45 AM)   Return Visit with Maikol Brewer MD   [facility] ([facility])    [address]  3rd Floor  [facility] [address] [phone]              Pending Results     Date and Time Order Name Status Description    9/7/2017 0736 Rubella Antibody IgG Quantitative In process     9/7/2017 0005 US Renal Complete Preliminary             Statement of Approval     Ordered          09/07/17 1999  I have reviewed and agree with all the recommendations and orders detailed in this document.  EFFECTIVE NOW     Approved and electronically signed by:  Rcahelle Anderson MD             Admission Information     Date & Time Provider Department Dept. Phone    9/6/2017 Cate Mansfield MD [facility] Unit [facility] [phone]      Your Vitals Were     Blood Pressure Pulse Temperature Respirations Pulse Oximetry       93/59 70 97.8  F (36.6  C) (Oral) 16 97%       MyChart Information     Share [employer] Style[employer] gives you secure access to your electronic health record.  If you see a primary care provider, you can also send messages to your care team and make appointments. If you have questions, please call your primary care clinic.  If you do not have a primary care provider, please call [phone] and they will assist you.        Care EveryWhere ID     This is your Care EveryWhere ID. This could be used by other organizations to access your [place] medical records  [id number]        Equal Access to Services     ROSELIA OREILLY : Hadii juanjo freyo Soemilianoali, waaxda luqadaha, qaybta kaalmada adesumirylielexis, john corleyalbakarrie sheikh. So [facility] [phone].    ATENCIÓN: Si habla lane, tiene a nelson disposición servicios gratuitos de asistencia lingüística. Llame al [phone].    We comply with applicable federal civil rights laws and [place] laws. We do not discriminate on the basis of race, color, national origin, age, disability sex, sexual orientation or gender identity.               Review of your medicines      CONTINUE these medicines which have NOT CHANGED        Dose / Directions    ESTRACE PO        Dose:  2 mg   Take 2 mg by mouth 3 times daily   Refills:  0       hydroxychloroquine 200 MG tablet   Commonly known as:  PLAQUENIL        Dose:  200 mg   Take 200 mg by mouth 2 times daily   Refills:  0       Prenatal Vitamins 28-0.8 MG Tabs   Used for:  Supervision of high-risk pregnancy of elderly multigravida        Dose:  1 tablet   Take 1 tablet by mouth daily   Refills:  0       progesterone 100 MG   Commonly known as:  ENDOMETRIN   Used for:  Supervision of high-risk pregnancy of elderly multigravida        Dose:  100 mg   Place 100 mg vaginally 3 times daily   Refills:  0                Protect others around you: Learn how to safely use, store and throw away your medicines at www.disposemymeds.org.             Medication List: This is a list of all your medications and when to take them. Check marks below indicate your daily home schedule. Keep this  list as a reference.      Medications           Morning Afternoon Evening Bedtime As Needed    ESTRACE PO   Take 2 mg by mouth 3 times daily   Last time this was given:  2 mg on 9/7/2017  2:10 PM                                hydroxychloroquine 200 MG tablet   Commonly known as:  PLAQUENIL   Take 200 mg by mouth 2 times daily   Last time this was given:  200 mg on 9/7/2017  8:15 AM                                Prenatal Vitamins 28-0.8 MG Tabs   Take 1 tablet by mouth daily                                progesterone 100 MG   Commonly known as:  ENDOMETRIN   Place 100 mg vaginally 3 times daily   Last time this was given:  100 mg on 9/6/2017  9:49 PM

## 2017-09-06 NOTE — LETTER
Transition Communication Hand-off for Care Transitions to Next Level of Care Provider    Name: Heather Guillory  MRN #: [id number]  Primary Care Provider: Physician No Ref-Primary     Primary Clinic: No address on file     Reason for Hospitalization:  complicated UTI  UTI (urinary tract infection)  Admit Date/Time: 9/6/2017  5:14 PM  Discharge Date: 9/7/17  Payor Source: Payor: [employer] PPO / Plan: HEALTH EZ / Product Type: PPO /              Discharge Plan: home with outpatient infusion tomorrow for last dose of IV Ertepenum  Follow-up plan:  Future Appointments  Date Time Provider Department Center   9/8/2017 3:00 PM  INFUSION CHAIR 13 [facility]   9/15/2017 1:00 PM Cate Mansfield MD [facility]   9/28/2017 8:00 AM Nasrin King MD [facility]   1/12/2018 9:00 AM Maikol Brewer MD [facility]       AVS/Discharge Summary included

## 2017-09-06 NOTE — MR AVS SNAPSHOT
After Visit Summary   9/6/2017    Heather Guillory    MRN: [id number]           Patient Information     Date Of Birth          1980        Visit Information        Provider Department      9/6/2017 1:00 PM [facility] ULTRASOUND [facility]        Today's Diagnoses     AMA (advanced maternal age) multigravida 35+, first trimester           Follow-ups after your visit        Your next 10 appointments already scheduled     Sep 15, 2017  1:00 PM CDT   RETURN EXTENDED with Cate Mansfield MD   [facility] ([facility])    [place] [facility] [address]  [address]  [address]  [facility] [address]   [phone]            Sep 28, 2017  8:00 AM CDT   (Arrive by 7:45 AM)   Return Visit with Nasrin King MD   [facility] ([facility])    [address]  3rd [facility] [address] [phone]            Jan 12, 2018  9:00 AM CST   (Arrive by 8:45 AM)   Return Visit with Maikol Brewer MD   [facility] ([facility])    [address]  [address]rd [facility] [address] [phone]              Who to contact     Please call your clinic at [phone] to:    Ask questions about your health    Make or cancel appointments    Discuss your medicines    Learn about your test results    Speak to your doctor   If you have compliments or concerns about an experience at your clinic, or if you wish to file a complaint, please contact [facility] Patient Relations at [phone] or email us at [email or web address]         Additional Information About Your Visit        MyChart Information     [employer] gives you secure access to your electronic health record. If you see a primary care provider, you can also send messages to your care team and make appointments. If you have questions, please call your primary care  clinic.  If you do not have a primary care provider, please call [phone] and they will assist you.      [email or web address] is an electronic gateway that provides easy, online access to your medical records. With [email or web address], you can request a clinic appointment, read your test results, renew a prescription or communicate with your care team.     To access your existing account, please contact your [facility] or call [phone] for assistance.        Care EveryWhere ID     This is your Care EveryWhere ID. This could be used by other organizations to access your [place] medical records  [id number]         Blood Pressure from Last 3 Encounters:   09/06/17 107/73   07/14/17 112/71   01/18/17 93/62    Weight from Last 3 Encounters:   09/06/17 76.2 kg (167 lb 14.4 oz)   07/14/17 75.8 kg (167 lb)   01/18/17 78.5 kg (173 lb)              We Performed the Following     Dating ultrasound less than 14 weeks gestation Transvag  - 12342 (In Clinic)        Primary Care Provider    Physician No Ref-Primary       No address on file        Equal Access to Services     ROSELIA OREILLY : Hadii juanjo freyo [place], waaxda luqadaha, qaybta kaalmalexis simeon, john graham . So [place] -[phone].    ATENCIÓN: Si habla español, tiene a nelson disposición servicios gratuitos de asistencia lingüística. Llame al [phone].    We comply with applicable federal civil rights laws and [place] laws. We do not discriminate on the basis of race, color, national origin, age, disability sex, sexual orientation or gender identity.            Thank you!     Thank you for choosing [facility]  for your care. Our goal is always to provide you with excellent care. Hearing back from our patients is one way we can continue to improve our services. Please take a few minutes to complete the written survey that you may receive in the mail after your visit with us. Thank you!              Your Updated Medication List - Protect others around you: Learn how to safely use, store and throw away your medicines at www.disposemymeds.org.          This list is accurate as of: 9/6/17  3:35 PM.  Always use your most recent med list.                   Brand Name Dispense Instructions for use Diagnosis    estradiol 0.5 MG tablet    ESTRACE     Take 2 mg by mouth        hydroxychloroquine 200 MG tablet    PLAQUENIL    180 tablet    Take 2 tablets (400 mg) by mouth daily    MCTD (mixed connective tissue disease) (H)       Prenatal Vitamins 28-0.8 MG Tabs           progesterone 100 MG    ENDOMETRIN

## 2017-09-06 NOTE — PATIENT INSTRUCTIONS
"  Thank you for choosing [facility] for your obstetrical care.  We are an integrated health clinic with obstetricians, midwives, a psychologist, an acupuncturist, a nutritionist, a pharmacist, internal medicine and family practice all in one.  If you have questions about services offered please ask.      o Please keep all appointments with your provider.  You will help catch, prevent and treat problems early.  o Review your Expectant Family booklet and folder given to you at this intake visit.  It can answer many basic questions including:    Treatment for nausea and vomiting    Medications that are safe in pregnancy    Healthy diet and weight gain    Exercise and activity  o ASK questions!!  Please use \"Questions for my New OB visit\" form to write down any questions or concerns for your next visit.  o Eat a healthy diet.  Visit www.choosemyplate.gov and click on  Pregnancy and Breastfeeding  for information and tips  o Do not smoke.  Avoid other people's smoke, too.  We are happy to help with referrals to stop smoking programs.  o Do not drink alcohol.  o Try to avoid people who have colds or other infections.  Practice good hand washing.  o Consider registering for our Healthy Pregnancy Class here at [facility].  This class is offered every 3rd Wednesday from 2:30-4:30 p.m.  [place] at [phone] or online at [email or web address]  o Consider registering for prenatal education classes through [place] at [facility].  You can view class schedules and register online at [email or web address] or call [phone] for questions     For urgent concerns, call [facility] at [phone] to speak with a triage nurse during regular clinic hours (8:00 am - 4:30 pm).  This line is answered by our service 24 hours a day, after hours a provider will return your call.  Thank you for choosing [facility] for your " "obstetrical care.  We are an integrated health clinic with obstetricians, midwives, a psychologist, an acupuncturist, a nutritionist, a pharmacist, internal medicine and family practice all in one.  If you have questions about services offered please ask.      o Please keep all appointments with your provider.  You will help catch, prevent and treat problems early.  o Review your Expectant Family booklet and folder given to you at this intake visit.  It can answer many basic questions including:    Treatment for nausea and vomiting    Medications that are safe in pregnancy    Healthy diet and weight gain    Exercise and activity  o ASK questions!!  Please use \"Questions for my New OB visit\" form to write down any questions or concerns for your next visit.  o Eat a healthy diet.  Visit www.choosemyplate.gov and click on  Pregnancy and Breastfeeding  for information and tips  o Do not smoke.  Avoid other people's smoke, too.  We are happy to help with referrals to stop smoking programs.  o Do not drink alcohol.  o Try to avoid people who have colds or other infections.  Practice good hand washing.  o Consider registering for our Healthy Pregnancy Class here at [facility].  This class is offered every 3rd Wednesday from 2:30-4:30 p.m.  [place] at [phone] or online at [email or web address] or [email or web address]  o Consider registering for prenatal education classes through [place] at [facility].  You can view class schedules and register online at [email or web address] or call [phone] for questions     For urgent concerns, call [facility] at [phone] to speak with a triage nurse during regular clinic hours (8:00 am - 4:30 pm).  This line is answered by our service 24 hours a day, after hours a provider will return your call.  "

## 2017-09-06 NOTE — PROGRESS NOTES
"  Subjective   37 year old female who presents to clinic for initiation of OB care.     at 9w0d with estimated date of delivery of 2018 based on date of IVF. Dating ultrasound today consistent.     Pregnancy is planned, IVF pregnancy.       PERSONAL/SOCIAL HISTORY  Lives  and children.  . , Chi.  Employment:  at [employer].   Additional items: Denies past or present domestic violence, sexual and psychological abuse.    CONCERNS:  1) UTI s/s    Pt reports urinary symptoms started last Thursday- initially dysuria and urgency.  She was started on PO macrobid. UC on 17 showed >100,000 E. Coli and ESBL.   Reports she has been taking macrobid for approx 5 days and her symptoms are worsening. She is tearful as she explains how she is feeling.  Reports dysuria, hematuria, frequency, urgency, suprapubic tenderness, malaise and myalgias. States she feels \"feverish.\" Notes some mild back pain over the last day and also with palpation at costovertebral angle.      Received phone call from Dr. Mansfield prior to pt's appointment recommending possible admission for IV abx. Paged Dr. Mansfield after speaking with patient to give her an update of pt's condition.    2) MCTD- Mixed connective tissue disease    Diagnosed approx 10 weeks postpartum after her last baby.  Presented as fatigue and joint pain. Has been followed by MD who started her on plaquenil. Recently switched providers to Dr. Steele. She will be having her intial appt on .    Has been symptom free for 1 year.   She spoke with Dr. Mansfield who consulted with [facility] about plaquenil- ok to take in pregnancy.    3) IVF pregnancy  - Desires 1st trimester screening.  Discussed recommendation for fetal echo 22-24 weeks.        Objective  -VS: reviewed and within normal limits   -General appearance: no acute distress, patient is comfortable   NEUROLOGICAL/PSYCHIATRIC   - Orientated x3,   -Mood and affect: : normal " "  Genetic/Infection questionnaire completed, risks include:   - IVF pregnancy    Pap 3/20/15: NIL with neg HPV    Exam:  +suprapubic tenderness  + mild CVAT  /73  Pulse 67  Temp 98.6  F (37  C) (Oral)  Ht 1.702 m (5' 7\")  Wt 76.2 kg (167 lb 14.4 oz)  BMI 26.3 kg/m2     Answers for HPI/ROS submitted by the patient on 2017   JAVIER 7 TOTAL SCORE: 0    Assessment/Plan   at 9w0d  by IVF date, c/w dating u/s    Encounter Diagnoses   Name Primary?     Supervision of high-risk pregnancy of elderly multigravida Yes     Acute cystitis with hematuria      Pregnancy resulting from in vitro fertilization in first trimester      Encounter for nuchal translucency testing      Encounter for fetal anatomic survey      Orders Placed This Encounter   Procedures     Vitamin D Deficiency     CBC with platelets differential     Anti Treponema     Rubella Antibody IgG Quantitative     Hepatitis B surface antigen     HIV Antigen Antibody Combo     ABO/Rh type and screen     Orders Placed This Encounter   Medications     progesterone (ENDOMETRIN) 100 MG     Sig: Place 100 mg vaginally 3 times daily      DISCONTD: estradiol (ESTRACE) 0.5 MG tablet     Sig: Take 2 mg by mouth     DISCONTD: hydroxychloroquine (PLAQUENIL) 200 MG tablet     Sig: Take 400 mg by mouth     Prenatal Vit-Fe Fumarate-FA (PRENATAL VITAMINS) 28-0.8 MG TABS     Sig: Take 1 tablet by mouth daily      - Oriented to Practice, types of care, and how to reach a provider. Desires MD care.  - Patient received 1st trimester new OB education packet complete with aide of The Expectant Family booklet including information on genetic screening test options.  - Patient desires 1st trimester screening which was ordered.  - Patient was encouraged to continue prenatal vitamins as tolerated.     - Patient instructed to schedule new OB exam with provider in 1-2 weeks.    - Pregnancy concerns to be addressed by provider at new OB exam include: Needs GC/CT    - Reviewed " pt's current urinary s/s with Dr. Mansfield and Dr. Anderson.  - Recommended IV antibiotic treatment in the inpatient setting.  - Pt waiting in clinic for bed assignment on inpatient unit.    - OBI education reviewed.  - OBI labs ordered.  - Continue care with Dr. Steele for MCTD.  - Pap with hpv cotesting up to date.  - [facility]eds [facility].  - [place] 1st trimester screening and level 2 ultrasoud, MFM referral placed.  - Will need fetal echo d/t IVF- noted in mfm referral.  - Follow up for NOB appt in 1-2 weeks.  - [place] YAN care.      HARVEY Adamson, RIANM

## 2017-09-06 NOTE — PROGRESS NOTES
37 year old female, ,  Estimated Date of Delivery: 18, 9 0/7 weeks  presents for confirmation of dates and assessment of viability. This study was done transvaginally.    Measurements     CRL = 24.7 mm = 9 1/7 weeks  EGA.   JOSEPH = 4/10/18.     Fetal anatomy appears normal for gestational age.     Fetal/Fetal Cardiac Activity: Present.  FHR = 180bpm.     Implantation: Intrauterine.     Cervix = 6.0 cm      Maternal structures appear normal.    Impression: Viable IUP @9w0d     Recommend comprehensive scan at 18 to 20 weeks.    ZEUS Stearns MD

## 2017-09-06 NOTE — H&P
[facility] History and Physical    Heather Guillory MRN# [id number]   Age: 37 year old YOB: 1980     Date of Admission: 2017    Primary care provider: No Ref-Primary, Physician    HPI: Heather Guillory is a 37 year old  at 9w0d set by ETD who presents for antibiotic therapy for persistent UTI symptoms in early pregnancy. Was initially started on a six day course of Macrobid for urinary frequency, urgency, and burning and UA suggestive of UTI. Urine culture on  growing extended beta lactamase E. Coli and patient is still experiencing s/s today, including dysuria, hematuria, frequency, urgency, suprapubic tenderness. Also endorses myalgias.  Denies fevers, back pain, abdominal pain, change in bowel movements.  Was seen in clinic today by Dr. Anderson.    Pregnancy is complicated by:  - Mixed connective tissue disorder, on plaquenil  - Embryo transfer on 17    ROS: 10 point ROS neg other than the symptoms noted above in the HPI.    PMH:  Past Medical History:   Diagnosis Date     Abnormal Pap smear     Over 10 years ago     Anxiety      Hx of previous reproductive problem 2013     Mixed connective tissue disease (H)     Dr. Steele- switching        PSHx:   Past Surgical History:   Procedure Laterality Date     BIOPSY  2014    Uterine polyp removed     DILATION AND CURETTAGE SUCTION       OPERATIVE HYSTEROSCOPY WITH MORCELLATOR  2014    Procedure: OPERATIVE HYSTEROSCOPY WITH MORCELLATOR;  Hysteroscopic Polypectomy;  Surgeon: Cate Mansfield MD;  Location: [facility] OR       Medications:   Progesterone (for IVF)  Estradiol (for IVF)  Plaquenil for connective tissue disorder  Prenatal vitamins    Allergies:  Allergies   Allergen Reactions     Kiwi Other (See Comments)     Throat itching     Benzoyl Peroxide Swelling     Duricef [Cefadroxil] Unknown     Monistat [Miconazole] Swelling     Sulfa Drugs Rash     Social History:   Social History     Social History      Marital status:      Spouse name: Chi     Number of children: 2     Years of education: N/A     Occupational History      [employer]     Social History Main Topics     Smoking status: Never Smoker     Smokeless tobacco: Never Used     Alcohol use No     Drug use: No     Sexual activity: Yes     Partners: Male     Birth control/ protection: None     Other Topics Concern      Service No     Blood Transfusions No     Caffeine Concern No     Occupational Exposure No     Hobby Hazards No     Sleep Concern No     Stress Concern No     Weight Concern No     Special Diet No     Back Care No     Exercise No     Bike Helmet Yes     n/a     Seat Belt Yes     Self-Exams No     Parent/Sibling W/ Cabg, Mi Or Angioplasty Before 65f 55m? No     Social History Narrative    How much exercise per week? n/a    How much calcium per day?1-2       How much caffeine per day? 0    How much vitamin D per day? no    Do you/your family wear seatbelts?  Yes    Do you/your family use safety helmets? No-n/a    Do you/your family use sunscreen? Yes    Do you/your family keep firearms in the home? No    Do you/your family have a smoke detector(s)? Yes        Do you feel safe in your home? Yes    Has anyone ever touched you in an unwanted manner? No                      Social History     Social History     Marital status:      Spouse name: Chi     Number of children: 2     Years of education: N/A     Occupational History      [employer]     Social History Main Topics     Smoking status: Never Smoker     Smokeless tobacco: Never Used     Alcohol use No     Drug use: No     Sexual activity: Yes     Partners: Male     Birth control/ protection: None     Other Topics Concern      Service No     Blood Transfusions No     Caffeine Concern No     Occupational Exposure No     Hobby Hazards No     Sleep Concern No     Stress Concern No     Weight Concern No     Special Diet No      Back Care No     Exercise No     Bike Helmet Yes     n/a     Seat Belt Yes     Self-Exams No     Parent/Sibling W/ Cabg, Mi Or Angioplasty Before 65f 55m? No     Social History Narrative    How much exercise per week? n/a    How much calcium per day?1-2       How much caffeine per day? 0    How much vitamin D per day? no    Do you/your family wear seatbelts?  Yes    Do you/your family use safety helmets? No-n/a    Do you/your family use sunscreen? Yes    Do you/your family keep firearms in the home? No    Do you/your family have a smoke detector(s)? Yes        Do you feel safe in your home? Yes    Has anyone ever touched you in an unwanted manner? No                        Physical Exam:   Vitals:    17 1700   BP: 112/74   Pulse: 78   Resp: 16   Temp: 98  F (36.7  C)   TempSrc: Oral      Gen: No distress. Sitting comfortably in bed.  HEENT: Neck supple, no cervical lymphadenopathy; oral MMM  CV: RRR, no murmurs/rubs/gallops  Pulm: CTAB, no increased work of breathing, no wheezing/rhonchi/crackles  Abd: non-tender, non-distended  Pelvis: Deferred  Extremities: non-tender, no erythema; no edema    A&P: Heather Guillory is a 37 year old  at 9w0d who presents for inpatient antibiotic therapy for persistent UTI symptoms in early pregnancy. Initially on 7 day course of Macrobid, but s/s not improving after 5 days. Urine culture growing extended beta lactamase E. Coli. Will admit for IV antibiotic therapy and renal US due to recurrent UTIs, h/o nephrolithiasis.  - Ertapenem 1g IV x3 doses (q24h) per discussion with pharmacy   - May go home tomorrow and return to infusion center for last two doses.   - Follow-up with lab regarding fosfomycin sensitivies in AM  - Renal US in AM to rule out other renal pathology given hx of stones and recurrent pain  - Continue Plaquenil, ok to use in pregnancy per MFM  - Tylenol prn for pain  -Continue estradiol and progesterone for IVF per KELVIN.    Cedric Oconnor MD,  PGY-1  9/6/2017 4:45 PM     I personally examined and evaluated Heather Guillory on 9/6/2017.  I discussed the patient with Dr. Oconnor and agree with the presentation, exam and plan of care documented in this note with edits by me.   Marta Ordonez MD MPH

## 2017-09-06 NOTE — LETTER
"2017       RE: Heather Guillory  [id number] [facility] [place] [address]     Dear Colleague,    Thank you for referring your patient, Heather Guillory, to the [facility] at [facility]. Please see a copy of my visit note below.      Subjective   37 year old female who presents to clinic for initiation of OB care.     at 9w0d with estimated date of delivery of 2018 based on date of IVF. Dating ultrasound today consistent.     Pregnancy is planned, IVF pregnancy.       PERSONAL/SOCIAL HISTORY  Lives  and children.  . , Chi.  Employment:  at [employer].   Additional items: Denies past or present domestic violence, sexual and psychological abuse.    CONCERNS:  1) UTI s/s    Pt reports urinary symptoms started last Thursday- initially dysuria and urgency.  She was started on PO macrobid. UC on 17 showed >100,000 E. Coli and ESBL.   Reports she has been taking macrobid for approx 5 days and her symptoms are worsening. She is tearful as she explains how she is feeling.  Reports dysuria, hematuria, frequency, urgency, suprapubic tenderness, malaise and myalgias. States she feels \"feverish.\" Notes some mild back pain over the last day and also with palpation at costovertebral angle.      Received phone call from Dr. Mansfield prior to pt's appointment recommending possible admission for IV abx. Paged Dr. Mansfield after speaking with patient to give her an update of pt's condition.    2) MCTD- Mixed connective tissue disease    Diagnosed approx 10 weeks postpartum after her last baby.  Presented as fatigue and joint pain. Has been followed by MD who started her on plaquenil. Recently switched providers to Dr. Steele. She will be having her intial appt on .    Has been symptom free for 1 year.   She spoke with Dr. Mansfield who consulted with [facility] about plaquenil- ok to take in pregnancy.    3) IVF " "pregnancy  - Desires 1st trimester screening.  Discussed recommendation for fetal echo 22-24 weeks.        Objective  -VS: reviewed and within normal limits   -General appearance: no acute distress, patient is comfortable   NEUROLOGICAL/PSYCHIATRIC   - Orientated x3,   -Mood and affect: : normal   Genetic/Infection questionnaire completed, risks include:   - IVF pregnancy    Pap 3/20/15: NIL with neg HPV    Exam:  +suprapubic tenderness  + mild CVAT  /73  Pulse 67  Temp 98.6  F (37  C) (Oral)  Ht 1.702 m (5' 7\")  Wt 76.2 kg (167 lb 14.4 oz)  BMI 26.3 kg/m2     Answers for HPI/ROS submitted by the patient on 2017   JAVIER 7 TOTAL SCORE: 0    Assessment/Plan   at 9w0d  by IVF date, c/w dating u/s    Encounter Diagnoses   Name Primary?     Supervision of high-risk pregnancy of elderly multigravida Yes     Acute cystitis with hematuria      Pregnancy resulting from in vitro fertilization in first trimester      Encounter for nuchal translucency testing      Encounter for fetal anatomic survey      Orders Placed This Encounter   Procedures     Vitamin D Deficiency     CBC with platelets differential     Anti Treponema     Rubella Antibody IgG Quantitative     Hepatitis B surface antigen     HIV Antigen Antibody Combo     ABO/Rh type and screen     Orders Placed This Encounter   Medications     progesterone (ENDOMETRIN) 100 MG     Sig: Place 100 mg vaginally 3 times daily      DISCONTD: estradiol (ESTRACE) 0.5 MG tablet     Sig: Take 2 mg by mouth     DISCONTD: hydroxychloroquine (PLAQUENIL) 200 MG tablet     Sig: Take 400 mg by mouth     Prenatal Vit-Fe Fumarate-FA (PRENATAL VITAMINS) 28-0.8 MG TABS     Sig: Take 1 tablet by mouth daily      - Oriented to Practice, types of care, and how to reach a provider. Desires MD care.  - Patient received 1st trimester new OB education packet complete with aide of [employer] booklet including information on genetic screening test options.  - Patient " desires 1st trimester screening which was ordered.  - Patient was encouraged to continue prenatal vitamins as tolerated.     - Patient instructed to schedule new OB exam with provider in 1-2 weeks.    - Pregnancy concerns to be addressed by provider at new OB exam include: Needs GC/CT    - Reviewed pt's current urinary s/s with Dr. Mansfield and Dr. Anderson.  - Recommended IV antibiotic treatment in the inpatient setting.  - Pt waiting in clinic for bed assignment on inpatient unit.    - OBI education reviewed.  - OBI labs ordered.  - Continue care with Dr. Steele for MCTD.  - Pap with hpv cotesting up to date.  - Needs GC/CT at [facility].  - Desires 1st trimester screening and level 2 ultrasoud, MFM referral placed.  - Will need fetal echo d/t IVF- noted in mfm referral.  - Follow up for NOB appt in 1-2 weeks.  - Ofelia YAN care.      Again, thank you for allowing me to participate in the care of your patient.      Sincerely,    Dalila Stringer CNM

## 2017-09-07 ENCOUNTER — APPOINTMENT (OUTPATIENT)
Dept: ULTRASOUND IMAGING | Facility: CLINIC | Age: 37
End: 2017-09-07
Attending: OBSTETRICS & GYNECOLOGY
Payer: COMMERCIAL

## 2017-09-07 VITALS
TEMPERATURE: 97.8 F | DIASTOLIC BLOOD PRESSURE: 59 MMHG | RESPIRATION RATE: 16 BRPM | SYSTOLIC BLOOD PRESSURE: 93 MMHG | HEART RATE: 70 BPM | OXYGEN SATURATION: 97 %

## 2017-09-07 LAB
ABO + RH BLD: NORMAL
ABO + RH BLD: NORMAL
BASOPHILS # BLD AUTO: 0 10E9/L (ref 0–0.2)
BASOPHILS NFR BLD AUTO: 0.6 %
BLD GP AB SCN SERPL QL: NORMAL
BLOOD BANK CMNT PATIENT-IMP: NORMAL
DEPRECATED CALCIDIOL+CALCIFEROL SERPL-MC: 54 UG/L (ref 20–75)
DIFFERENTIAL METHOD BLD: NORMAL
EOSINOPHIL # BLD AUTO: 0.1 10E9/L (ref 0–0.7)
EOSINOPHIL NFR BLD AUTO: 1 %
ERYTHROCYTE [DISTWIDTH] IN BLOOD BY AUTOMATED COUNT: 12.4 % (ref 10–15)
HBV SURFACE AB SERPL IA-ACNC: 157.66 M[IU]/ML
HCT VFR BLD AUTO: 38.6 % (ref 35–47)
HGB BLD-MCNC: 13.1 G/DL (ref 11.7–15.7)
HIV 1+2 AB+HIV1 P24 AG SERPL QL IA: NONREACTIVE
IMM GRANULOCYTES # BLD: 0 10E9/L (ref 0–0.4)
IMM GRANULOCYTES NFR BLD: 0 %
LYMPHOCYTES # BLD AUTO: 2.2 10E9/L (ref 0.8–5.3)
LYMPHOCYTES NFR BLD AUTO: 43.4 %
MCH RBC QN AUTO: 28.4 PG (ref 26.5–33)
MCHC RBC AUTO-ENTMCNC: 33.9 G/DL (ref 31.5–36.5)
MCV RBC AUTO: 84 FL (ref 78–100)
MONOCYTES # BLD AUTO: 0.3 10E9/L (ref 0–1.3)
MONOCYTES NFR BLD AUTO: 5.1 %
NEUTROPHILS # BLD AUTO: 2.5 10E9/L (ref 1.6–8.3)
NEUTROPHILS NFR BLD AUTO: 49.9 %
NRBC # BLD AUTO: 0 10*3/UL
NRBC BLD AUTO-RTO: 0 /100
PLATELET # BLD AUTO: 168 10E9/L (ref 150–450)
RBC # BLD AUTO: 4.62 10E12/L (ref 3.8–5.2)
SPECIMEN EXP DATE BLD: NORMAL
T PALLIDUM IGG+IGM SER QL: NEGATIVE
WBC # BLD AUTO: 5.1 10E9/L (ref 4–11)

## 2017-09-07 PROCEDURE — 25000128 H RX IP 250 OP 636: Performed by: STUDENT IN AN ORGANIZED HEALTH CARE EDUCATION/TRAINING PROGRAM

## 2017-09-07 PROCEDURE — 86762 RUBELLA ANTIBODY: CPT | Performed by: OBSTETRICS & GYNECOLOGY

## 2017-09-07 PROCEDURE — 25000132 ZZH RX MED GY IP 250 OP 250 PS 637: Performed by: STUDENT IN AN ORGANIZED HEALTH CARE EDUCATION/TRAINING PROGRAM

## 2017-09-07 PROCEDURE — 36415 COLL VENOUS BLD VENIPUNCTURE: CPT | Performed by: OBSTETRICS & GYNECOLOGY

## 2017-09-07 PROCEDURE — 86706 HEP B SURFACE ANTIBODY: CPT | Performed by: OBSTETRICS & GYNECOLOGY

## 2017-09-07 PROCEDURE — 85025 COMPLETE CBC W/AUTO DIFF WBC: CPT | Performed by: OBSTETRICS & GYNECOLOGY

## 2017-09-07 PROCEDURE — 86780 TREPONEMA PALLIDUM: CPT | Performed by: OBSTETRICS & GYNECOLOGY

## 2017-09-07 PROCEDURE — 87389 HIV-1 AG W/HIV-1&-2 AB AG IA: CPT | Performed by: OBSTETRICS & GYNECOLOGY

## 2017-09-07 PROCEDURE — 86900 BLOOD TYPING SEROLOGIC ABO: CPT | Performed by: OBSTETRICS & GYNECOLOGY

## 2017-09-07 PROCEDURE — 25000125 ZZHC RX 250: Performed by: STUDENT IN AN ORGANIZED HEALTH CARE EDUCATION/TRAINING PROGRAM

## 2017-09-07 PROCEDURE — 76770 US EXAM ABDO BACK WALL COMP: CPT

## 2017-09-07 PROCEDURE — 82306 VITAMIN D 25 HYDROXY: CPT | Performed by: OBSTETRICS & GYNECOLOGY

## 2017-09-07 PROCEDURE — 86901 BLOOD TYPING SEROLOGIC RH(D): CPT | Performed by: OBSTETRICS & GYNECOLOGY

## 2017-09-07 PROCEDURE — 86850 RBC ANTIBODY SCREEN: CPT | Performed by: OBSTETRICS & GYNECOLOGY

## 2017-09-07 RX ORDER — ERTAPENEM 1 G/1
1 INJECTION, POWDER, LYOPHILIZED, FOR SOLUTION INTRAMUSCULAR; INTRAVENOUS ONCE
Status: CANCELLED | OUTPATIENT
Start: 2017-09-08 | End: 2017-09-08

## 2017-09-07 RX ADMIN — ESTRADIOL 2 MG: 2 TABLET ORAL at 08:15

## 2017-09-07 RX ADMIN — ERTAPENEM SODIUM 1 G: 1 INJECTION, POWDER, LYOPHILIZED, FOR SOLUTION INTRAMUSCULAR; INTRAVENOUS at 15:02

## 2017-09-07 RX ADMIN — HYDROXYCHLOROQUINE SULFATE 200 MG: 200 TABLET, FILM COATED ENTERAL at 08:15

## 2017-09-07 RX ADMIN — PRENATAL VIT W/ FE FUMARATE-FA TAB 27-0.8 MG 1 TABLET: 27-0.8 TAB at 08:15

## 2017-09-07 RX ADMIN — ESTRADIOL 2 MG: 2 TABLET ORAL at 14:10

## 2017-09-07 RX ADMIN — ONDANSETRON 4 MG: 4 TABLET, ORALLY DISINTEGRATING ORAL at 16:07

## 2017-09-07 NOTE — PLAN OF CARE
Problem: Goal Outcome Summary  Goal: Goal Outcome Summary  Outcome: No Change  Pt admitted to unit 1700 for UTI caused by extended beta lactamase E. Coli.  VSS.  LS clear. No SOB or CP.  BS x4, BM this am (somewhat loose, pt reports her single daily BM's have been since starting abx).  Voiding clear, yellow urine with dysuria.  Pain is tolerable and pt denies pain otherwise.  Tolerating reg diet no N/V. IV Ertapenem infused (see MAR).   Independent in room.  Call light is within reach and pt is able to make needs known.

## 2017-09-07 NOTE — PLAN OF CARE
Problem: Goal Outcome Summary  Goal: Goal Outcome Summary  Outcome: Improving  Pt reports improvement with urination. Only some mild burning present at the end of urination. Otherwise denies pain. Up independently in room. Renal US completed this morning. Tolerating regular diet. Took suppository from home as we don't have correct dose here. Plan for pt to get abx this afternoon around 1500 and then discharge later this afternoon. Pt will continue care with infusion center. Pt able to make needs known.

## 2017-09-07 NOTE — PLAN OF CARE
Problem: Goal Outcome Summary  Goal: Goal Outcome Summary  Outcome: Improving  Pt alert and oriented.  Experiencing slight dysuria, but feels it has gotten better.  VSS.  LS clear.  BS+.  Tolerating reg diet no N/V.  Voiding spontaneously.  IV ABX x2 and pt will be dc'd after second infusion and receive next dose of ABX at infusion clinic.  After second dose of ABX pt experienced sudden severe nausea.  Admin of PO disintegrating Zofran with positive results.  IV was also bothering pt and was removed.  Pt is independent in room and will DC once orders are in place.

## 2017-09-07 NOTE — PLAN OF CARE
Problem: Goal Outcome Summary  Goal: Goal Outcome Summary  Outcome: Adequate for Discharge Date Met:  09/07/17  Pt discharged to home.  Appt is scheduled for tomorrow at infusion clinic for 3rd dose of ABX.  Primary OB provider will follow up regarding possible additional doses of ABX.

## 2017-09-07 NOTE — DISCHARGE SUMMARY
Discharge Summary    Heather Guillory MRN# [id number]   YOB: 1980 Age: 37 year old     Date of Admission:  9/6/2017  Date of Discharge:  9/7/2017   Admitting Physician:  Cate Mansfield MD  Discharge Physician:  Rachelle Anderson MD  Discharging Service:  Gynecology     Primary Provider: Dr. Anderson          Admission Diagnoses:   complicated UTI  UTI (urinary tract infection)  Mixed connective tissue disease, on plaquenil  IUP at 9w0d via IVF, on estradiol and progesterone          Discharge Diagnosis:   Same as above             Discharge Disposition:   Discharged to home           Procedures:   Imaging performed:   Renal ultrasound          Medications Prior to Admission:     Prescriptions Prior to Admission   Medication Sig Dispense Refill Last Dose     progesterone (ENDOMETRIN) 100 MG Place 100 mg vaginally 3 times daily    9/6/2017 at Unknown time     Prenatal Vit-Fe Fumarate-FA (PRENATAL VITAMINS) 28-0.8 MG TABS Take 1 tablet by mouth daily    9/6/2017 at Unknown time     Estradiol (ESTRACE PO) Take 2 mg by mouth 3 times daily   9/6/2017 at Unknown time     hydroxychloroquine (PLAQUENIL) 200 MG tablet Take 200 mg by mouth 2 times daily   9/6/2017 at Unknown time             Discharge Medications:     Current Discharge Medication List      CONTINUE these medications which have NOT CHANGED    Details   progesterone (ENDOMETRIN) 100 MG Place 100 mg vaginally 3 times daily     Associated Diagnoses: Supervision of high-risk pregnancy of elderly multigravida      Prenatal Vit-Fe Fumarate-FA (PRENATAL VITAMINS) 28-0.8 MG TABS Take 1 tablet by mouth daily     Associated Diagnoses: Supervision of high-risk pregnancy of elderly multigravida      Estradiol (ESTRACE PO) Take 2 mg by mouth 3 times daily      hydroxychloroquine (PLAQUENIL) 200 MG tablet Take 200 mg by mouth 2 times daily                   Consultations:   No consultations were requested during this admission             Brief  History of Illness:     Heather Guillory is a 37 year old  at 9w0d set by ETD who presented for antibiotic therapy for persistent UTI symptoms in early pregnancy. Was initially started on a six day course of Macrobid for urinary frequency, urgency, and burning and UA suggestive of UTI. Urine culture on  grew extended beta lactamase resistant E. Coli sensitive to ertapenem. Patient was still experiencing s/s upon admission, including dysuria, hematuria, frequency, urgency, suprapubic tenderness. Denied fevers, back pain, abdominal pain, change in bowel movements. Was seen in clinic day of admission by Dr. Anderson.          Hospital Course:     Patient admitted to floor 17 and started on IV ertapenem x3 doses q24h, sensitivities to fosfomycin pending. Patient stable o/n, Tmax 98.6, pain well controlled on prn tylenol. HD2 urinary sx improved. Renal US performed HD2 to rule out other renal pathology given hx of stones and persistent s/s revealed mild bilateral pelviectasis with improvement on post void imaging, otherwise within normal limits. Second dose of ertapenem administered prior to discharge with plan to receive 3rd dose as outpatient at infusion center 17. Pre- labs also obtained during admission.            Discharge Instructions and Follow-Up:   Discharge diet: Regular   Discharge activity: Activity as tolerated   Discharge follow-up: Follow up with Dr. Mansfield in clinic Friday, 9/15/17   Other instructions: Return to infusion dose of ertapenem 17     Jay Jay Oconnor MD, PGY1  2017, 11:57 AM    Appreciate note by Dr. Oconnor. Patient has been seen and examined by me separate from the resident, agree with above note. Will re-discuss with ID and consider 7 total doses of IV abx.     Rachelle Anderson MD  10:54 PM

## 2017-09-07 NOTE — PROGRESS NOTES
Gynecology Progress Note     S: Pt doing well. Slept well overnight and urinary symptoms have improved. Denies fevers, chills, chest pain, shortness of breath, abdominal pain, n/v. Wondering if she can receive her next antibiotic dose before discharge.    Vitals: BP 96/55 (BP Location: Left arm)  Pulse 70  Temp 97.9  F (36.6  C) (Oral)  Resp 16  SpO2 98%      Gen: Alert, oriented. No distress. Sitting comfortably in bed.  HEENT: Neck supple, no cervical lymphadenopathy; oral MMM  CV/Pulm: CTAB, no increased work of breathing, no wheezing/rhonchi/crackles  Abd: non-tender, non-distended,   Pelvis: Deferred  Extremities:no erythema; no edema    UOP:  No new labs  Renal US this morning    A/P:  Heather Guillory is a 37 year old  at 9w1d who presented for antibiotic therapy for persistent UTI symptoms in early pregnancy. Initially on 6 day course of Macrobid, but s/s not improving after 5 days. Urine culture growing extended beta lactamase E. Coli. Admitted for IV antibiotic therapy and renal US due to recurrent UTIs  - Ertapenem 1g IV x3 doses (q24h) per discussion with pharmacy                         - Likely to discharge toady and return to infusion center for last two doses.                         - Follow-up with lab regarding fosfomycin sensitivies this AM  - Renal US this AM to rule out other renal pathology given hx of stones and persistent s/s  - Continue Plaquenil, ok to use in pregnancy per MFM  - Ibuprofen, tylenol prn for pain  - Prenatal care:                         -Continue estradiol and progesterone for IVF. Will follow up in clinic this week for further management.                       -FEN: Regular diet    -Dispo: home today     Cedric Oconnor MD  OB/GYN PGY1    Appreciate note by Dr. Oconnor. Patient has been seen and examined by me separate from the resident, agree with above note. Has outpatient set up for tomorrow, will discuss with ID about possibly continuing x 7 doses.      Rachelle Anderson MD

## 2017-09-07 NOTE — PLAN OF CARE
Problem: Goal Outcome Summary  Goal: Goal Outcome Summary  Outcome: Improving  A&O x4. VSS. Soft BP pt. Reported this is her baseline high 90s SBP. BP 96/55. LS clear. Denies SOB, chest pain, nausea. Vomiting. BS active and audible. Voiding clear yellow urine with mild dysuria. Pt. Reports the burning and pressure with urination was lesser this morning. Pt. Reports neglible pain otherwise. Tolerating reg. Diet. Independent in room. Call light in reach and pt. Is able to make needs known. Pt. Slept throughout the night. Continue with POC.

## 2017-09-08 ENCOUNTER — TELEPHONE (OUTPATIENT)
Dept: OBGYN | Facility: CLINIC | Age: 37
End: 2017-09-08

## 2017-09-08 ENCOUNTER — INFUSION THERAPY VISIT (OUTPATIENT)
Dept: INFUSION THERAPY | Facility: CLINIC | Age: 37
End: 2017-09-08
Attending: OBSTETRICS & GYNECOLOGY
Payer: COMMERCIAL

## 2017-09-08 VITALS
HEART RATE: 66 BPM | SYSTOLIC BLOOD PRESSURE: 94 MMHG | TEMPERATURE: 98.2 F | RESPIRATION RATE: 20 BRPM | DIASTOLIC BLOOD PRESSURE: 64 MMHG | OXYGEN SATURATION: 99 %

## 2017-09-08 DIAGNOSIS — N30.01 ACUTE CYSTITIS WITH HEMATURIA: Primary | ICD-10-CM

## 2017-09-08 PROBLEM — O09.811 PREGNANCY RESULTING FROM IN VITRO FERTILIZATION IN FIRST TRIMESTER: Status: ACTIVE | Noted: 2017-09-08

## 2017-09-08 LAB
BACTERIA SPEC CULT: ABNORMAL
BACTERIA SPEC CULT: ABNORMAL
RUBV IGG SERPL IA-ACNC: 20 IU/ML
SPECIMEN SOURCE: ABNORMAL

## 2017-09-08 PROCEDURE — 25000128 H RX IP 250 OP 636: Performed by: STUDENT IN AN ORGANIZED HEALTH CARE EDUCATION/TRAINING PROGRAM

## 2017-09-08 RX ORDER — ERTAPENEM 1 G/1
1 INJECTION, POWDER, LYOPHILIZED, FOR SOLUTION INTRAMUSCULAR; INTRAVENOUS ONCE
Status: CANCELLED | OUTPATIENT
Start: 2017-09-09 | End: 2017-09-09

## 2017-09-08 RX ADMIN — SODIUM CHLORIDE 1 G: 9 INJECTION, SOLUTION INTRAVENOUS at 15:05

## 2017-09-08 ASSESSMENT — PAIN SCALES - GENERAL: PAINLEVEL: NO PAIN (0)

## 2017-09-08 NOTE — MR AVS SNAPSHOT
After Visit Summary   9/8/2017    Heather Guillory    MRN: [id number]           Patient Information     Date Of Birth          1980        Visit Information        Provider Department      9/8/2017 3:00 PM SH INFUSION CHAIR 13 [facility]        Today's Diagnoses     Acute cystitis with hematuria    -  1       Follow-ups after your visit        Your next 10 appointments already scheduled     Sep 09, 2017  1:30 PM CDT   Level 1 with SH INFUSION CHAIR 12   [facility] ([facility])    [facility] [place]  [address]  [place] [address]   [phone]            Sep 10, 2017  1:30 PM CDT   Level 1 with SH INFUSION CHAIR 14   [facility] ([facility])    [facility] [place]  [address]  [place] [address]   [phone]            Sep 11, 2017 12:00 PM CDT   Level 1 with SH INFUSION CHAIR 19   [facility] ([facility])    [facility] [place]  [address]  [place] [address]   [phone]            Sep 12, 2017  1:30 PM CDT   Level 1 with SH INFUSION CHAIR 11   [facility] ([facility])    [facility] [place]  [address]  [place] [address]   [phone]            Sep 15, 2017  1:00 PM CDT   RETURN EXTENDED with Cate Mansfield MD   [facility] ([facility])    [facility] [facility] [address]  [address]  [address]  [facility] [address]   [phone]            Sep 28, 2017  8:00 AM CDT   (Arrive by 7:45 AM)   Return Visit with Nasrin King MD   [facility] ([facility])    [address] [facility]  3rd Floor  [facility] [address]   [phone]            Jan 12, 2018  9:00 AM CST    (Arrive by 8:45 AM)   Return Visit with Maikol Brewer MD   [facility] ([facility])    [address]  3rd Floor  [facility] [address] [phone]              Who to contact     If you have questions or need follow up information about today's clinic visit or your schedule please contact [facility] CANCER [facility] AND [facility] directly at [phone].  Normal or non-critical lab and imaging results will be communicated to you by [employer], letter or phone within 4 business days after the clinic has received the results. If you do not hear from us within 7 days, please contact the clinic through LiveSaf[employer] or phone. If you have a critical or abnormal lab result, we will notify you by phone as soon as possible.  Submit refill requests through [employer] or call your pharmacy and they will forward the refill request to us. Please allow 3 business days for your refill to be completed.          Additional Information About Your Visit        [employer] Information     [employer] gives you secure access to your electronic health record. If you see a primary care provider, you can also send messages to your care team and make appointments. If you have questions, please call your primary care clinic.  If you do not have a primary care provider, please call [phone] and they will assist you.        Care EveryWhere ID     This is your Care EveryWhere ID. This could be used by other organizations to access your [place] medical records  [id number]        Your Vitals Were     Pulse Temperature Respirations Pulse Oximetry          66 98.2  F (36.8  C) (Oral) 20 99%         Blood Pressure from Last 3 Encounters:   09/08/17 94/64   09/07/17 93/59   09/06/17 107/73    Weight from Last 3 Encounters:   09/06/17 76.2 kg (167 lb 14.4 oz)   07/14/17 75.8 kg (167 lb)   01/18/17 78.5 kg (173 lb)              Today, you had the following     No orders found for display       Primary Care  Provider    Physician No Ref-Primary       No address on file        Equal Access to Services     ROSELIA OREILLY : Hadii juanjo galarza nahomy Bahena, wagisselda demetricepatrice, jerrod fridabiancalexis rairylielexis, john corleyalbakarrie sheikh. So [facility] [phone].    ATENCIÓN: Si habla español, tiene a nelson disposición servicios gratuitos de asistencia lingüística. Llame al [phone].    We comply with applicable federal civil rights laws and [place] laws. We do not discriminate on the basis of race, color, national origin, age, disability sex, sexual orientation or gender identity.            Thank you!     Thank you for choosing [facility]ER [facility] AND [facility]ENTER  for your care. Our goal is always to provide you with excellent care. Hearing back from our patients is one way we can continue to improve our services. Please take a few minutes to complete the written survey that you may receive in the mail after your visit with us. Thank you!             Your Updated Medication List - Protect others around you: Learn how to safely use, store and throw away your medicines at www.disposemymeds.org.          This list is accurate as of: 9/8/17  3:51 PM.  Always use your most recent med list.                   Brand Name Dispense Instructions for use Diagnosis    ESTRACE PO      Take 2 mg by mouth 3 times daily        hydroxychloroquine 200 MG tablet    PLAQUENIL     Take 200 mg by mouth 2 times daily        Prenatal Vitamins 28-0.8 MG Tabs      Take 1 tablet by mouth daily    Supervision of high-risk pregnancy of elderly multigravida       progesterone 100 MG    ENDOMETRIN     Place 100 mg vaginally 3 times daily    Supervision of high-risk pregnancy of elderly multigravida

## 2017-09-08 NOTE — TELEPHONE ENCOUNTER
Received request to update antibiotic order to reflect 7 days needed instead of 3 (see note copied below). Therapy plan updated and pt notified of status.    NOTE: pt states the infusion center is open both Saturday and Sunday so she can get the infusions over the weekend.      Cate Mansfield MD sent to Rachelle Anderson MD Cc: RON Luo Rn-p [facility]                   I am fine with 7 days, but the rec came from Dr. Quevedo in [facility].       I will have the RNs set that up if family is more comfortable with that?     RN team, please help facilitate 7 days of IV abx as out pt. Currently getting 3 days per prior rec.     SMD

## 2017-09-08 NOTE — PROGRESS NOTES
Infusion Nursing Note:  Heather Guillory presents today for Invanz.    Patient seen by provider today: No   present during visit today: Not Applicable.    Note: N/A.    Intravenous Access:  Peripheral IV placed.    Treatment Conditions:  Not Applicable.      Post Infusion Assessment:  Patient tolerated infusion without incident.  Blood return noted pre and post infusion.  Site patent and intact, free from redness, edema or discomfort.  No evidence of extravasations.    Discharge Plan:   Patient declined prescription refills.  Discharge instructions reviewed with: Patient.  Patient and/or family verbalized understanding of discharge instructions and all questions answered.  AVS to patient via [employer]  Patient will return 9/9/17 for next appointment.   Patient discharged in stable condition accompanied by: self.  Departure Mode: Ambulatory.    Heather Powell RN

## 2017-09-09 ENCOUNTER — INFUSION THERAPY VISIT (OUTPATIENT)
Dept: INFUSION THERAPY | Facility: CLINIC | Age: 37
End: 2017-09-09
Attending: OBSTETRICS & GYNECOLOGY
Payer: COMMERCIAL

## 2017-09-09 VITALS
HEART RATE: 61 BPM | RESPIRATION RATE: 16 BRPM | TEMPERATURE: 97.7 F | SYSTOLIC BLOOD PRESSURE: 96 MMHG | DIASTOLIC BLOOD PRESSURE: 65 MMHG

## 2017-09-09 DIAGNOSIS — N30.01 ACUTE CYSTITIS WITH HEMATURIA: Primary | ICD-10-CM

## 2017-09-09 DIAGNOSIS — R30.0 DYSURIA: Primary | ICD-10-CM

## 2017-09-09 DIAGNOSIS — R11.0 NAUSEA: ICD-10-CM

## 2017-09-09 PROCEDURE — 96365 THER/PROPH/DIAG IV INF INIT: CPT

## 2017-09-09 PROCEDURE — 25000128 H RX IP 250 OP 636: Performed by: STUDENT IN AN ORGANIZED HEALTH CARE EDUCATION/TRAINING PROGRAM

## 2017-09-09 RX ORDER — SODIUM CHLORIDE, SODIUM LACTATE, POTASSIUM CHLORIDE, CALCIUM CHLORIDE 600; 310; 30; 20 MG/100ML; MG/100ML; MG/100ML; MG/100ML
INJECTION, SOLUTION INTRAVENOUS ONCE
Status: CANCELLED
Start: 2017-09-10 | End: 2017-09-10

## 2017-09-09 RX ORDER — ONDANSETRON 4 MG/1
4 TABLET, FILM COATED ORAL EVERY 4 HOURS PRN
Qty: 20 TABLET | Refills: 0 | Status: SHIPPED | OUTPATIENT
Start: 2017-09-09 | End: 2017-10-06

## 2017-09-09 RX ORDER — ONDANSETRON 2 MG/ML
8 INJECTION INTRAMUSCULAR; INTRAVENOUS DAILY PRN
Status: CANCELLED
Start: 2017-09-10

## 2017-09-09 RX ADMIN — SODIUM CHLORIDE 1 G: 9 INJECTION, SOLUTION INTRAVENOUS at 13:32

## 2017-09-09 ASSESSMENT — PAIN SCALES - GENERAL: PAINLEVEL: NO PAIN (0)

## 2017-09-09 NOTE — MR AVS SNAPSHOT
After Visit Summary   9/9/2017    Heather Guillory    MRN: [id number]           Patient Information     Date Of Birth          1980        Visit Information        Provider Department      9/9/2017 1:30 PM SH INFUSION CHAIR 12 [facility]        Today's Diagnoses     Acute cystitis with hematuria    -  1       Follow-ups after your visit        Your next 10 appointments already scheduled     Sep 10, 2017  1:30 PM CDT   Level 1 with SH INFUSION CHAIR 14   [facility] ([facility])    [facility]  [address]  [place] [address]   [phone]            Sep 11, 2017 12:00 PM CDT   Level 1 with SH INFUSION CHAIR 19   [facility] ([facility])    [facility]  [address]  [place] [address]   [phone]            Sep 12, 2017  1:30 PM CDT   Level 1 with SH INFUSION CHAIR 11   [facility] ([facility])    [facility]  [address]  [place] [address]   [phone]            Sep 15, 2017  1:00 PM CDT   RETURN EXTENDED with Cate Mansfield MD   [facility] ([facility])    [place] [facility] [address]  [address]  [address]  [facility] [address]   [phone]            Sep 28, 2017  8:00 AM CDT   (Arrive by 7:45 AM)   Return Visit with Nasrin King MD   [facility] ([facility])    [address] [place] [address]  3rd [facility] [id number] [phone]            Jan 12, 2018  9:00 AM CST   (Arrive by 8:45 AM)   Return Visit with Maikol Brewer MD   [facility] ([facility])    [address] [place] [address]  3rd [facility] [address] [phone]              Who to contact     If you have  questions or need follow up information about today's clinic visit or your schedule please contact [facility] AND [facility]ENTER directly at [phone].  Normal or non-critical lab and imaging results will be communicated to you by MyChart, letter or phone within 4 business days after the clinic has received the results. If you do not hear from us within 7 days, please contact the clinic through TeamVisibilityhart or phone. If you have a critical or abnormal lab result, we will notify you by phone as soon as possible.  Submit refill requests through [employer] or call your pharmacy and they will forward the refill request to us. Please allow 3 business days for your refill to be completed.          Additional Information About Your Visit        [employer] Information     [employer] gives you secure access to your electronic health record. If you see a primary care provider, you can also send messages to your care team and make appointments. If you have questions, please call your primary care clinic.  If you do not have a primary care provider, please call [phone] and they will assist you.        Care EveryWhere ID     This is your Care EveryWhere ID. This could be used by other organizations to access your [place] medical records  [id number]        Your Vitals Were     Pulse Temperature Respirations             61 97.7  F (36.5  C) (Oral) 16          Blood Pressure from Last 3 Encounters:   09/09/17 96/65   09/08/17 94/64   09/07/17 93/59    Weight from Last 3 Encounters:   09/06/17 76.2 kg (167 lb 14.4 oz)   07/14/17 75.8 kg (167 lb)   01/18/17 78.5 kg (173 lb)              Today, you had the following     No orders found for display       Primary Care Provider    Physician No Ref-Primary       No address on file        Equal Access to Services     ROSELIA OREILLY : Arabella Bahen[place], columba major, john kellogg . So [facility]  [phone].    ATENCIÓN: Si veronika sherman, tiene a nelson disposición servicios gratuitos de asistencia lingüística. Julieta vidal [phone].    We comply with applicable federal civil rights laws and [place] laws. We do not discriminate on the basis of race, color, national origin, age, disability sex, sexual orientation or gender identity.            Thank you!     Thank you for choosing [facility] CANCER [facility] AND [facility]  for your care. Our goal is always to provide you with excellent care. Hearing back from our patients is one way we can continue to improve our services. Please take a few minutes to complete the written survey that you may receive in the mail after your visit with us. Thank you!             Your Updated Medication List - Protect others around you: Learn how to safely use, store and throw away your medicines at www.disposemymeds.org.          This list is accurate as of: 9/9/17  2:22 PM.  Always use your most recent med list.                   Brand Name Dispense Instructions for use Diagnosis    ESTRACE PO      Take 2 mg by mouth 3 times daily        hydroxychloroquine 200 MG tablet    PLAQUENIL     Take 200 mg by mouth 2 times daily        Prenatal Vitamins 28-0.8 MG Tabs      Take 1 tablet by mouth daily    Supervision of high-risk pregnancy of elderly multigravida       progesterone 100 MG    ENDOMETRIN     Place 100 mg vaginally 3 times daily    Supervision of high-risk pregnancy of elderly multigravida

## 2017-09-09 NOTE — TELEPHONE ENCOUNTER
Called patient to check in with her and offer support since her office visit on Wednesday when she was admitted to inpatient unit for IV antibiotics for uti.  States she is feeling much better. Has been receiving outpatient IV abx since her discharge. Notes that her urinary symptoms have improved greatly, and she no longer feels malaise and myalgias.   Very thankful for the follow up phone call. Expressed that she feels grateful for the care that she received.   Has follow up appt with Dr. Mansfield on 9/15. Has her remaining abx treatment scheduled with infusion center.     Reviewed s/s to notify provider. Pt verbalized understanding and agreed with poc.

## 2017-09-09 NOTE — PROGRESS NOTES
Infusion Nursing Note:  Heather Guillory presents today for Invanz.    Patient seen by provider today: No   present during visit today: Not Applicable.    Note: N/A.    Intravenous Access:  Peripheral IV placed.    Treatment Conditions:  Not Applicable.      Post Infusion Assessment:  Patient tolerated infusion without incident.  Site patent and intact, free from redness, edema or discomfort.  No evidence of extravasations.    Discharge Plan:   Discharge instructions reviewed with: Patient.  Patient and/or family verbalized understanding of discharge instructions and all questions answered.  AVS to patient via [email or web address].  Patient will return 9/10/2017 for next appointment.   Patient discharged in stable condition accompanied by: self.  Departure Mode: Ambulatory.    Eva Nelson RN

## 2017-09-10 ENCOUNTER — INFUSION THERAPY VISIT (OUTPATIENT)
Dept: INFUSION THERAPY | Facility: CLINIC | Age: 37
End: 2017-09-10
Attending: OBSTETRICS & GYNECOLOGY
Payer: COMMERCIAL

## 2017-09-10 VITALS
TEMPERATURE: 97.7 F | SYSTOLIC BLOOD PRESSURE: 109 MMHG | HEART RATE: 58 BPM | DIASTOLIC BLOOD PRESSURE: 71 MMHG | RESPIRATION RATE: 16 BRPM

## 2017-09-10 DIAGNOSIS — N30.01 ACUTE CYSTITIS WITH HEMATURIA: Primary | ICD-10-CM

## 2017-09-10 PROCEDURE — 25000128 H RX IP 250 OP 636: Performed by: STUDENT IN AN ORGANIZED HEALTH CARE EDUCATION/TRAINING PROGRAM

## 2017-09-10 PROCEDURE — 96365 THER/PROPH/DIAG IV INF INIT: CPT

## 2017-09-10 PROCEDURE — 25000128 H RX IP 250 OP 636: Performed by: OBSTETRICS & GYNECOLOGY

## 2017-09-10 RX ORDER — ONDANSETRON 2 MG/ML
8 INJECTION INTRAMUSCULAR; INTRAVENOUS DAILY PRN
Status: CANCELLED
Start: 2017-09-10

## 2017-09-10 RX ORDER — SODIUM CHLORIDE, SODIUM LACTATE, POTASSIUM CHLORIDE, CALCIUM CHLORIDE 600; 310; 30; 20 MG/100ML; MG/100ML; MG/100ML; MG/100ML
INJECTION, SOLUTION INTRAVENOUS ONCE
Status: COMPLETED | OUTPATIENT
Start: 2017-09-10 | End: 2017-09-10

## 2017-09-10 RX ORDER — SODIUM CHLORIDE, SODIUM LACTATE, POTASSIUM CHLORIDE, CALCIUM CHLORIDE 600; 310; 30; 20 MG/100ML; MG/100ML; MG/100ML; MG/100ML
INJECTION, SOLUTION INTRAVENOUS ONCE
Status: CANCELLED
Start: 2017-09-10 | End: 2017-09-10

## 2017-09-10 RX ADMIN — SODIUM CHLORIDE 1 G: 9 INJECTION, SOLUTION INTRAVENOUS at 13:34

## 2017-09-10 RX ADMIN — SODIUM CHLORIDE, POTASSIUM CHLORIDE, SODIUM LACTATE AND CALCIUM CHLORIDE: 600; 310; 30; 20 INJECTION, SOLUTION INTRAVENOUS at 13:31

## 2017-09-10 ASSESSMENT — PAIN SCALES - GENERAL: PAINLEVEL: NO PAIN (0)

## 2017-09-10 NOTE — PROGRESS NOTES
Infusion Nursing Note:  Heather Guillory presents today for Fluids and Invanz.    Patient seen by provider today: No   present during visit today: Not Applicable.    Note: Increased nausea overnight. She talked to on call Ob/Gyn yesterday; new orders placed for LR fluid bolus and PRN IV compazine and zofran available today. Pt was prescribed oral zofran which as been effective in controlling nausea. Denies need for IV antiemetic.     Intravenous Access:  Peripheral IV placed.    Treatment Conditions:  Not Applicable.      Post Infusion Assessment:  Patient tolerated infusion without incident.  Site patent and intact, free from redness, edema or discomfort.  No evidence of extravasations.  Access discontinued.     Discharge Plan:   Discharge instructions reviewed with: Patient.  Patient and/or family verbalized understanding of discharge instructions and all questions answered.  AVS to patient via [employer].  Patient will return 9/11/2017 for next appointment.   Patient discharged in stable condition accompanied by: self.  Departure Mode: Ambulatory.    Eva Nelson RN

## 2017-09-10 NOTE — MR AVS SNAPSHOT
After Visit Summary   9/10/2017    Heather Guillory    MRN: [id number]           Patient Information     Date Of Birth          1980        Visit Information        Provider Department      9/10/2017 1:30 PM  INFUSION CHAIR 14 [facility]        Today's Diagnoses     Acute cystitis with hematuria    -  1       Follow-ups after your visit        Your next 10 appointments already scheduled     Sep 11, 2017 12:00 PM CDT   Level 1 with  INFUSION CHAIR 19   [facility] ([facility])    [facility]  [address]  [place] [address]   [phone]            Sep 12, 2017  1:30 PM CDT   Level 1 with  INFUSION CHAIR 11   [facility] ([facility])    [facility]  [address]  [place] [address]   [phone]            Sep 15, 2017  1:00 PM CDT   RETURN EXTENDED with Cate Mansfield MD   [facility] ([facility])    [place] [facility] [address]  [address]  [address]  [facility] [address]   [phone]            Sep 28, 2017  8:00 AM CDT   (Arrive by 7:45 AM)   Return Visit with Nasrin King MD   [facility] ([facility])    [address] [facility]  3rd [facility] [address] [phone]            Jan 12, 2018  9:00 AM CST   (Arrive by 8:45 AM)   Return Visit with Maikol Brewer MD   [facility] ([facility])    [address] [facility]  [address]rd [facility] [address] [phone]              Who to contact     If you have questions or need follow up information about today's clinic visit or your schedule please contact [facility] AND INFUSION CENTER directly at [phone].  Normal or non-critical lab and imaging results will be communicated to you by  MyChart, letter or phone within 4 business days after the clinic has received the results. If you do not hear from us within 7 days, please contact the clinic through [employer] or phone. If you have a critical or abnormal lab result, we will notify you by phone as soon as possible.  Submit refill requests through [employer] or call your pharmacy and they will forward the refill request to us. Please allow 3 business days for your refill to be completed.          Additional Information About Your Visit        [employer] Information     [employer] gives you secure access to your electronic health record. If you see a primary care provider, you can also send messages to your care team and make appointments. If you have questions, please call your primary care clinic.  If you do not have a primary care provider, please call [phone] and they will assist you.        Care EveryWhere ID     This is your Care EveryWhere ID. This could be used by other organizations to access your [place] medical records  [id number]        Your Vitals Were     Pulse Temperature Respirations             58 97.7  F (36.5  C) (Oral) 16          Blood Pressure from Last 3 Encounters:   09/10/17 109/71   09/09/17 96/65   09/08/17 94/64    Weight from Last 3 Encounters:   09/06/17 76.2 kg (167 lb 14.4 oz)   07/14/17 75.8 kg (167 lb)   01/18/17 78.5 kg (173 lb)              Today, you had the following     No orders found for display       Primary Care Provider    Physician No Ref-Primary       No address on file        Equal Access to Services     ROSELIA OREILLY : Hadii juanjo corea [place], waaxda luqadaha, qaybta kaalmada adeegyada, john graham . So [facility] [phone].    ATENCIÓN: Si habla español, tiene a nelson disposición servicios gratuitos de asistencia lingüística. Llame al [phone].    We comply with applicable federal civil rights laws and [place] laws. We do not discriminate on the basis of race, color,  national origin, age, disability sex, sexual orientation or gender identity.            Thank you!     Thank you for choosing [facility] CANC[facility] AND [facility] CENTER  for your care. Our goal is always to provide you with excellent care. Hearing back from our patients is one way we can continue to improve our services. Please take a few minutes to complete the written survey that you may receive in the mail after your visit with us. Thank you!             Your Updated Medication List - Protect others around you: Learn how to safely use, store and throw away your medicines at www.disposemymeds.org.          This list is accurate as of: 9/10/17  3:20 PM.  Always use your most recent med list.                   Brand Name Dispense Instructions for use Diagnosis    ESTRACE PO      Take 2 mg by mouth 3 times daily        hydroxychloroquine 200 MG tablet    PLAQUENIL     Take 200 mg by mouth 2 times daily        ondansetron 4 MG tablet    ZOFRAN    20 tablet    Take 1 tablet (4 mg) by mouth every 4 hours as needed for nausea or vomiting    Nausea       Prenatal Vitamins 28-0.8 MG Tabs      Take 1 tablet by mouth daily    Supervision of high-risk pregnancy of elderly multigravida       progesterone 100 MG    ENDOMETRIN     Place 100 mg vaginally 3 times daily    Supervision of high-risk pregnancy of elderly multigravida

## 2017-09-10 NOTE — PROGRESS NOTES
Telephone note    Received call from patient. She is currently on day 5/7 of IV ertapenem for ESBL e coli UTI. She has had some nausea with antibiotics for the last week. The nausea significantly worsened today and she has additionally had vomiting and loose stools. She denies abdominal pain, fever, chills, dysuria, cramping or bleeding. She has tried various foods including plain rice, and has been unable to keep anything down. No sick contacts at home.    I suspect her GI upset may be due in part to IV antibiotics, however her symptoms are concerning for an acute viral gastroenteritis given the sudden worsening and association with diarrhea. Discussed options with patient of coming to ED for IV fluids and antiemetics, vs trying to manage at home. She desires to try to manage at home at this time. Rx for Zofran called into pharmacy, as this antiemetic worked well for her while in the hospital. Explained importance of staying well hydrated, and recommended electrolyte containing liquids such as Pedialyte or gatorade, as well as continuing small bites of bland foods such as crackers or toast. If symptoms not improving with these measures, recommended coming to ED for further eval and treatment.     Heather has 3 more infusions of ertapenem scheduled, and will plan to also give IV fluids at appointment tomorrow at 1300 at [facility]. We could add IV antiemetics as well if needed- she will call back if so.    Jenelle Gay MD

## 2017-09-11 ENCOUNTER — TELEPHONE (OUTPATIENT)
Dept: OBGYN | Facility: CLINIC | Age: 37
End: 2017-09-11

## 2017-09-11 ENCOUNTER — INFUSION THERAPY VISIT (OUTPATIENT)
Dept: INFUSION THERAPY | Facility: CLINIC | Age: 37
End: 2017-09-11
Attending: OBSTETRICS & GYNECOLOGY
Payer: COMMERCIAL

## 2017-09-11 VITALS
SYSTOLIC BLOOD PRESSURE: 104 MMHG | RESPIRATION RATE: 16 BRPM | HEART RATE: 65 BPM | DIASTOLIC BLOOD PRESSURE: 63 MMHG | TEMPERATURE: 97.8 F

## 2017-09-11 DIAGNOSIS — N30.01 ACUTE CYSTITIS WITH HEMATURIA: Primary | ICD-10-CM

## 2017-09-11 PROCEDURE — 96365 THER/PROPH/DIAG IV INF INIT: CPT

## 2017-09-11 PROCEDURE — 25000128 H RX IP 250 OP 636: Performed by: STUDENT IN AN ORGANIZED HEALTH CARE EDUCATION/TRAINING PROGRAM

## 2017-09-11 RX ORDER — ONDANSETRON 2 MG/ML
8 INJECTION INTRAMUSCULAR; INTRAVENOUS DAILY PRN
Status: CANCELLED
Start: 2017-09-11

## 2017-09-11 RX ADMIN — SODIUM CHLORIDE 1 G: 9 INJECTION, SOLUTION INTRAVENOUS at 12:29

## 2017-09-11 ASSESSMENT — PAIN SCALES - GENERAL: PAINLEVEL: NO PAIN (0)

## 2017-09-11 NOTE — PROGRESS NOTES
Infusion Nursing Note:  Heather Guillory presents today for Invanz.    Patient seen by provider today: No   present during visit today: Not Applicable.    Note: Patient reports two episodes of diarrhea this morning. States she is hydrating well and drinking Gatorade. She will call her physician if this worsens. Continues with nausea which is well managed with antiemetics. Declines IV antiemetics today.    Intravenous Access:  Peripheral IV placed.    Treatment Conditions:  Not Applicable.      Post Infusion Assessment:  Patient tolerated infusion without incident.  Blood return noted pre and post infusion.  Site patent and intact, free from redness, edema or discomfort.  No evidence of extravasations.  Access discontinued per protocol.    Discharge Plan:   Discharge instructions reviewed with: Patient.  Patient and/or family verbalized understanding of discharge instructions and all questions answered.  AVS to patient via [employer].  Patient will return tomorrow for next appointment.   Patient discharged in stable condition accompanied by: self.  Departure Mode: Ambulatory.    Susie Hinkle RN

## 2017-09-11 NOTE — TELEPHONE ENCOUNTER
Spoke with Heather who is 10 weeks pregnant. She states that both of her kids have come down with Hand foot mouth disease. She is wondering if this impacts her pregnancy. Per guidelines, nurse explained that there is no danger to pregnancy. Informed patient that she should monitor for fever and call back if she develops a fever greater than 100.4. Patient agreeable to plan.

## 2017-09-11 NOTE — MR AVS SNAPSHOT
After Visit Summary   9/11/2017    Heather Guillory    MRN: [id number]           Patient Information     Date Of Birth          1980        Visit Information        Provider Department      9/11/2017 12:00 PM  INFUSION CHAIR 19 [facility]        Today's Diagnoses     Acute cystitis with hematuria    -  1       Follow-ups after your visit        Your next 10 appointments already scheduled     Sep 12, 2017  1:30 PM CDT   Level 1 with  INFUSION CHAIR 11   [facility] ([facility])    [facility] [place]  [address]  [place] [address]   [phone]            Sep 15, 2017  1:00 PM CDT   RETURN EXTENDED with Cate Mansfield MD   [facility] ([facility])    [place] [facility] [address]  [address]  [facility] [address]   [phone]            Sep 27, 2017  8:45 AM CDT   Genetic Counseling with UR GEN COUNSELOR 2   [facility] Maternal Fetal Medicine - [place] ([facility])    [address]  [facility] [address]   [phone]            Sep 27, 2017  9:30 AM CDT   MFM NUCHAL TRANSLUCENCY with JOHNNY   [facility] Maternal [facility]etal Medicine Ultrasound - [place] ([facility])    [address]  [facility] [address]   [phone]            Sep 27, 2017 10:00 AM CDT   Radiology MD with KRYSTA BOWMAN MD   [facility] - [place] ([facility])    [address]  [facility] [address]   [phone]           Please arrive at the time given for your first appointment. This visit is used internally to schedule the physician's time during your ultrasound.            Sep 28, 2017  8:00 AM CDT   (Arrive by 7:45 AM)   Return Visit with Nasrin King MD   [facility] ([facility])    [address]  [address] [facility] [id number] [phone]            Jan 12, 2018  9:00 AM CST   (Arrive by 8:45 AM)   Return Visit with Maikol Brewer MD   [facility] ([facility])    [address]  [address] [facility] [address] [phone]              Future tests that were ordered for you today     Open Future Orders        Priority Expected Expires Ordered    MFM Genetic Counseling Routine  9/11/2018 9/11/2017    MFM Nuchal Transluc w US Single Routine  7/11/2018 9/11/2017    MFM US Comprehensive Single Routine  7/11/2018 9/11/2017    MFM Read Screen Fetal Echo Single Routine  7/11/2018 9/11/2017            Who to contact     If you have questions or need follow up information about today's clinic visit or your schedule please contact [facility] directly at [phone].  Normal or non-critical lab and imaging results will be communicated to you by [employer], letter or phone within 4 business days after the clinic has received the results. If you do not hear from us within 7 days, please contact the clinic through [employer] or phone. If you have a critical or abnormal lab result, we will notify you by phone as soon as possible.  Submit refill requests through [employer] across global offices or call your pharmacy and they will forward the refill request to us. Please allow 3 business days for your refill to be completed.          Additional Information About Your Visit        [employer] Information     [employer] team[employer] across global offices gives you secure access to your electronic health record. If you see a primary care provider, you can also send messages to your care team and make appointments. If you have questions, please call your primary care clinic.  If you do not have a primary care provider, please call [phone] and they will assist you.        Care EveryWhere ID     This is your Care EveryWhere ID. This could be used by other  organizations to access your [place] medical records  [id number]        Your Vitals Were     Pulse Temperature Respirations             65 97.8  F (36.6  C) (Oral) 16          Blood Pressure from Last 3 Encounters:   09/11/17 104/63   09/10/17 109/71   09/09/17 96/65    Weight from Last 3 Encounters:   09/06/17 76.2 kg (167 lb 14.4 oz)   07/14/17 75.8 kg (167 lb)   01/18/17 78.5 kg (173 lb)              Today, you had the following     No orders found for display       Primary Care Provider    Physician No Ref-Primary       No address on file        Equal Access to Services     [facility]: Hadii juanjo Bahena, wasierra major, ceceliaybbernadette kaalmada cailin, john graham . So [facility] [phone].    ATENCIÓN: Si habla español, tiene a nelson disposición servicios gratuitos de asistencia lingüística. Llame al [phone].    We comply with applicable federal civil rights laws and [place] laws. We do not discriminate on the basis of race, color, national origin, age, disability sex, sexual orientation or gender identity.            Thank you!     Thank you for choosing [facility] AND [facility]  for your care. Our goal is always to provide you with excellent care. Hearing back from our patients is one way we can continue to improve our services. Please take a few minutes to complete the written survey that you may receive in the mail after your visit with us. Thank you!             Your Updated Medication List - Protect others around you: Learn how to safely use, store and throw away your medicines at www.disposemymeds.org.          This list is accurate as of: 9/11/17  1:09 PM.  Always use your most recent med list.                   Brand Name Dispense Instructions for use Diagnosis    ESTRACE PO      Take 2 mg by mouth 3 times daily        hydroxychloroquine 200 MG tablet    PLAQUENIL     Take 200 mg by mouth 2 times daily        ondansetron 4 MG tablet     ZOFRAN    20 tablet    Take 1 tablet (4 mg) by mouth every 4 hours as needed for nausea or vomiting    Nausea       Prenatal Vitamins 28-0.8 MG Tabs      Take 1 tablet by mouth daily    Supervision of high-risk pregnancy of elderly multigravida       progesterone 100 MG    ENDOMETRIN     Place 100 mg vaginally 3 times daily    Supervision of high-risk pregnancy of elderly multigravida

## 2017-09-12 ENCOUNTER — INFUSION THERAPY VISIT (OUTPATIENT)
Dept: INFUSION THERAPY | Facility: CLINIC | Age: 37
End: 2017-09-12
Attending: OBSTETRICS & GYNECOLOGY
Payer: COMMERCIAL

## 2017-09-12 VITALS
SYSTOLIC BLOOD PRESSURE: 93 MMHG | HEART RATE: 67 BPM | RESPIRATION RATE: 16 BRPM | TEMPERATURE: 98.1 F | DIASTOLIC BLOOD PRESSURE: 69 MMHG

## 2017-09-12 DIAGNOSIS — N30.01 ACUTE CYSTITIS WITH HEMATURIA: Primary | ICD-10-CM

## 2017-09-12 PROCEDURE — 96365 THER/PROPH/DIAG IV INF INIT: CPT

## 2017-09-12 PROCEDURE — 25000128 H RX IP 250 OP 636: Performed by: STUDENT IN AN ORGANIZED HEALTH CARE EDUCATION/TRAINING PROGRAM

## 2017-09-12 RX ORDER — ONDANSETRON 2 MG/ML
8 INJECTION INTRAMUSCULAR; INTRAVENOUS DAILY PRN
Status: CANCELLED
Start: 2017-09-12

## 2017-09-12 RX ADMIN — SODIUM CHLORIDE 1 G: 9 INJECTION, SOLUTION INTRAVENOUS at 13:48

## 2017-09-12 ASSESSMENT — PAIN SCALES - GENERAL: PAINLEVEL: NO PAIN (0)

## 2017-09-12 NOTE — PROGRESS NOTES
Infusion Nursing Note:  Heather Guillory presents today for Invanz.    Patient seen by provider today: No   present during visit today: Not Applicable.    Note: Patient complained of waking up with a scratchy throat. Throat looks slightly red; no evidence of mouth sores or thrush.   Heather states she took zofran prior to arrival today.    Intravenous Access:  Peripheral IV placed.    Treatment Conditions:  Not Applicable.    Post Infusion Assessment:  Patient tolerated infusion without incident.  Site patent and intact, free from redness, edema or discomfort.  No evidence of extravasations.  Access discontinued per protocol.    Discharge Plan:   Discharge instructions reviewed with: Patient.  Patient and/or family verbalized understanding of discharge instructions and all questions answered.  AVS to patient via [employer].  Patient will return as directed by MD for next appointment.   Patient discharged in stable condition accompanied by: self.  Departure Mode: Ambulatory.    Eva Nelson RN

## 2017-09-12 NOTE — MR AVS SNAPSHOT
After Visit Summary   9/12/2017    Heather Guillory    MRN: [id number]           Patient Information     Date Of Birth          1980        Visit Information        Provider Department      9/12/2017 1:30 PM  INFUSION CHAIR 11 [facility]        Today's Diagnoses     Acute cystitis with hematuria    -  1       Follow-ups after your visit        Your next 10 appointments already scheduled     Sep 15, 2017  1:00 PM CDT   RETURN EXTENDED with Cate Mansfield MD   [facility] ([facility])    [place] [facility]  [address]  [address]  [facility] [address]   [phone]            Sep 27, 2017  8:45 AM CDT   Genetic Counseling with KRYSTA GEN COUNSELOR 2   [facility] Maternal Fetal Medicine - [facility])    [address]  [facility] [address]   [phone]            Sep 27, 2017  9:30 AM CDT   MFM NUCHAL TRANSLUCENCY with [id number]   [facility]etal [facility] Ultrasound - [facility])    [address]  [facility] [address] [phone]            Sep 27, 2017 10:00 AM CDT   Radiology MD with KRYSTA BOWMAN MD   [facility] Maternal Fetal Medicine - [facility])    [address]  [facility] [address]   [phone]           Please arrive at the time given for your first appointment. This visit is used internally to schedule the physician's time during your ultrasound.            Sep 28, 2017  8:00 AM CDT   (Arrive by 7:45 AM)   Return Visit with Nasrin King MD   [facility] Rheumatology ([facility])    [address] [place] [address]  3rd Floor  [facility] [address]   [phone]            Jan 12, 2018  9:00 AM CST   (Arrive by 8:45 AM)   Return Visit with Maikol Brewer MD   [facility] ([facility]  [place])    [address] [facility]  3rd [facility] [address] [phone]              Future tests that were ordered for you today     Open Future Orders        Priority Expected Expires Ordered    MFM Genetic Counseling Routine  9/11/2018 9/11/2017    MFM Nuchal Transluc w US Single Routine  7/11/2018 9/11/2017    MFM US Comprehensive Single Routine  7/11/2018 9/11/2017    MFM Read Screen Fetal Echo Single Routine  7/11/2018 9/11/2017            Who to contact     If you have questions or need follow up information about today's clinic visit or your schedule please contact [facility] directly at [phone].  Normal or non-critical lab and imaging results will be communicated to you by [employer], letter or phone within 4 business days after the clinic has received the results. If you do not hear from us within 7 days, please contact the clinic through [employer] or phone. If you have a critical or abnormal lab result, we will notify you by phone as soon as possible.  Submit refill requests through [employer] or call your pharmacy and they will forward the refill request to us. Please allow 3 business days for your refill to be completed.          Additional Information About Your Visit        [employer] Information     [employer] gives you secure access to your electronic health record. If you see a primary care provider, you can also send messages to your care team and make appointments. If you have questions, please call your primary care clinic.  If you do not have a primary care provider, please call [phone] and they will assist you.        Care EveryWhere ID     This is your Care EveryWhere ID. This could be used by other organizations to access your [place] medical records  [id number]        Your Vitals Were     Pulse Temperature Respirations             67 98.1  F (36.7  C) (Oral) 16          Blood Pressure from Last 3 Encounters:   09/12/17 93/69   09/11/17 104/63    09/10/17 109/71    Weight from Last 3 Encounters:   09/06/17 76.2 kg (167 lb 14.4 oz)   07/14/17 75.8 kg (167 lb)   01/18/17 78.5 kg (173 lb)              Today, you had the following     No orders found for display       Primary Care Provider    Physician No Ref-Primary       No address on file        Equal Access to Services     [facility]: Hadii aad ku hadasho Soomaali, waaxda luqadaha, qaybta kaalmada adeegyada, john reyna archanan adesumi octavianokarrie graham . So [facility] [phone].    ATENCIÓN: Si habla español, tiene a nelson disposición servicios gratuitos de asistencia lingüística. Corinnaame al [phone].    We comply with applicable federal civil rights laws and [place] laws. We do not discriminate on the basis of race, color, national origin, age, disability sex, sexual orientation or gender identity.            Thank you!     Thank you for choosing [facility] AND [facility]  for your care. Our goal is always to provide you with excellent care. Hearing back from our patients is one way we can continue to improve our services. Please take a few minutes to complete the written survey that you may receive in the mail after your visit with us. Thank you!             Your Updated Medication List - Protect others around you: Learn how to safely use, store and throw away your medicines at www.disposemymeds.org.          This list is accurate as of: 9/12/17  2:22 PM.  Always use your most recent med list.                   Brand Name Dispense Instructions for use Diagnosis    ESTRACE PO      Take 2 mg by mouth 3 times daily        hydroxychloroquine 200 MG tablet    PLAQUENIL     Take 200 mg by mouth 2 times daily        ondansetron 4 MG tablet    ZOFRAN    20 tablet    Take 1 tablet (4 mg) by mouth every 4 hours as needed for nausea or vomiting    Nausea       Prenatal Vitamins 28-0.8 MG Tabs      Take 1 tablet by mouth daily    Supervision of high-risk pregnancy of elderly multigravida        progesterone 100 MG    ENDOMETRIN     Place 100 mg vaginally 3 times daily    Supervision of high-risk pregnancy of elderly multigravida

## 2017-09-15 ENCOUNTER — OFFICE VISIT (OUTPATIENT)
Dept: OBGYN | Facility: CLINIC | Age: 37
End: 2017-09-15
Attending: OBSTETRICS & GYNECOLOGY
Payer: COMMERCIAL

## 2017-09-15 VITALS
DIASTOLIC BLOOD PRESSURE: 77 MMHG | WEIGHT: 166.7 LBS | SYSTOLIC BLOOD PRESSURE: 111 MMHG | HEART RATE: 78 BPM | HEIGHT: 67 IN | BODY MASS INDEX: 26.16 KG/M2

## 2017-09-15 DIAGNOSIS — Z86.19 HISTORY OF ESBL E. COLI INFECTION: ICD-10-CM

## 2017-09-15 DIAGNOSIS — O09.529 SUPERVISION OF HIGH-RISK PREGNANCY OF ELDERLY MULTIGRAVIDA: Primary | ICD-10-CM

## 2017-09-15 DIAGNOSIS — F41.9 ANXIETY: ICD-10-CM

## 2017-09-15 PROCEDURE — 87086 URINE CULTURE/COLONY COUNT: CPT | Performed by: OBSTETRICS & GYNECOLOGY

## 2017-09-15 PROCEDURE — 99212 OFFICE O/P EST SF 10 MIN: CPT | Mod: ZF

## 2017-09-15 RX ORDER — SERTRALINE HYDROCHLORIDE 25 MG/1
25 TABLET, FILM COATED ORAL DAILY
Qty: 90 TABLET | Refills: 6 | Status: SHIPPED | OUTPATIENT
Start: 2017-09-15 | End: 2018-01-21

## 2017-09-15 NOTE — MR AVS SNAPSHOT
"              After Visit Summary   9/15/2017    Heather Guillory    MRN: [id number]           Patient Information     Date Of Birth          1980        Visit Information        Provider Department      9/15/2017 1:00 PM Cate Mansfield MD [facility]        Today's Diagnoses     Supervision of high-risk pregnancy of elderly multigravida    -  1    Anxiety        History of ESBL E. coli infection          Care Instructions      Thank you for choosing [facility] for your obstetrical care.  We are an integrated health clinic with obstetricians, midwives, a psychologist, an acupuncturist, a nutritionist, a pharmacist, internal medicine and family practice all in one.  If you have questions about services offered please ask.      o Please keep all appointments with your provider.  You will help catch, prevent and treat problems early.  o Review your Expectant Family booklet and folder given to you at this intake visit.  It can answer many basic questions including:    Treatment for nausea and vomiting    Medications that are safe in pregnancy    Healthy diet and weight gain    Exercise and activity  o ASK questions!!  Please use \"Questions for my New OB visit\" form to write down any questions or concerns for your next visit.  o Eat a healthy diet.  Visit www.choosemyplate.gov and click on  Pregnancy and Breastfeeding  for information and tips  o Do not smoke.  Avoid other people's smoke, too.  We are happy to help with referrals to stop smoking programs.  o Do not drink alcohol.  o Try to avoid people who have colds or other infections.  Practice good hand washing.  o Consider registering for our Healthy Pregnancy Class here at [facility].  This class is offered every 3rd Wednesday from 2:30-4:30 p.m.  [place] at [phone] or online at [email or web address]  o Consider registering for prenatal education classes through " [place] [facility].  You can view class schedules and register online at [email or web address] or call [phone] for questions     For urgent concerns, call [facility] at [phone] to speak with a triage nurse during regular clinic hours (8:00 am - 4:30 pm).  This line is answered by our service 24 hours a day, after hours a provider will return your call.          Follow-ups after your visit        Follow-up notes from your care team     Return for next OB visit.      Your next 10 appointments already scheduled     Sep 27, 2017  8:45 AM CDT   Genetic Counseling with UR GEN COUNSELOR 2   [facility] Maternal Fetal Medicine - [facility])    [address]  [facility] [address] [phone]            Sep 27, 2017  9:30 AM CDT   MFM NUCHAL TRANSLUCENCY with BUCKUSR2   [facility] Maternal Fetal Medicine Ultrasound - [facility])    [address]  [facility] [address] [phone]            Sep 27, 2017 10:00 AM CDT   Radiology MD with KRYSTA BOWMAN MD   [facility] - [facility])    [address]  [facility] [address]   [phone]           Please arrive at the time given for your first appointment. This visit is used internally to schedule the physician's time during your ultrasound.            Sep 28, 2017  8:00 AM CDT   (Arrive by 7:45 AM)   Return Visit with Nasrin King MD   [facility] Rheumatology ([facility])    [address]  3rd Floor  [facility] [address]   [phone]            Oct 13, 2017  2:30 PM CDT   Return Visit with Cate Mansfield MD   [facility] ([facility])    [facility] [address]  [address]  [address]  [facility] [address] [phone]            Jan 12, 2018  9:00 AM CST   (Arrive by 8:45  "AM)   Return Visit with Maikol Brewer MD   [facility] Rheumatology ([facility] [place])    [address] [address] [phone]              Who to contact     Please call your clinic at [phone] to:    Ask questions about your health    Make or cancel appointments    Discuss your medicines    Learn about your test results    Speak to your doctor   If you have compliments or concerns about an experience at your clinic, or if you wish to file a complaint, please contact [facility] Patient Relations at [phone] or email us at [email or web address]         Additional Information About Your Visit        Obvious EngineeringharOfferial Information     [employer] gives you secure access to your electronic health record. If you see a primary care provider, you can also send messages to your care team and make appointments. If you have questions, please call your primary care clinic.  If you do not have a primary care provider, please call [phone] and they will assist you.      [employer] is an electronic gateway that provides easy, online access to your medical records. With [employer], you can request a clinic appointment, read your test results, renew a prescription or communicate with your care team.     To access your existing account, please contact your [facility] or call [phone] for assistance.        Care EveryWhere ID     This is your Care EveryWhere ID. This could be used by other organizations to access your [facility] medical records  [id number]        Your Vitals Were     Pulse Height BMI (Body Mass Index)             78 1.702 m (5' 7\") 26.11 kg/m2          Blood Pressure from Last 3 Encounters:   09/15/17 111/77   09/12/17 93/69   09/11/17 104/63    Weight from Last 3 Encounters:   09/15/17 75.6 kg (166 lb 11.2 oz)   09/06/17 76.2 kg (167 lb 14.4 oz)   07/14/17 75.8 kg (167 lb)              We " Performed the Following     Urine Culture Aerobic Bacterial [CTP367]          Today's Medication Changes          These changes are accurate as of: 9/15/17  2:38 PM.  If you have any questions, ask your nurse or doctor.               Start taking these medicines.        Dose/Directions    sertraline 25 MG tablet   Commonly known as:  ZOLOFT   Used for:  Anxiety   Started by:  Cate Mansfield MD        Dose:  25 mg   Take 1 tablet (25 mg) by mouth daily Increase to 50 mg daily after one week if symptoms improved but not enough   Quantity:  90 tablet   Refills:  6            Where to get your medicines      These medications were sent to [facility] [place] [facility] [place] - [address]  [address], [place] [facility] [address]     Phone:  [phone]     sertraline 25 MG tablet                Primary Care Provider    Physician No Ref-Primary       No address on file        Equal Access to Services     ROSELIA OREILLY : Hadkey Bahena, waaxda luqadaha, qaybta kaalmada cailin, john graham . So [facility] [phone].    ATENCIÓN: Si habla español, tiene a nelson disposición servicios gratuitos de asistencia lingüística. Julieta al [phone].    We comply with applicable federal civil rights laws and [place] laws. We do not discriminate on the basis of race, color, national origin, age, disability sex, sexual orientation or gender identity.            Thank you!     Thank you for choosing [facility]  for your care. Our goal is always to provide you with excellent care. Hearing back from our patients is one way we can continue to improve our services. Please take a few minutes to complete the written survey that you may receive in the mail after your visit with us. Thank you!             Your Updated Medication List - Protect others around you: Learn how to safely use, store and throw away your medicines at www.disposemymeds.org.           This list is accurate as of: 9/15/17  2:38 PM.  Always use your most recent med list.                   Brand Name Dispense Instructions for use Diagnosis    ESTRACE PO      Take 2 mg by mouth 3 times daily        hydroxychloroquine 200 MG tablet    PLAQUENIL     Take 200 mg by mouth 2 times daily        ondansetron 4 MG tablet    ZOFRAN    20 tablet    Take 1 tablet (4 mg) by mouth every 4 hours as needed for nausea or vomiting    Nausea       Prenatal Vitamins 28-0.8 MG Tabs      Take 1 tablet by mouth daily    Supervision of high-risk pregnancy of elderly multigravida       progesterone 100 MG    ENDOMETRIN     Place 100 mg vaginally 3 times daily    Supervision of high-risk pregnancy of elderly multigravida       sertraline 25 MG tablet    ZOLOFT    90 tablet    Take 1 tablet (25 mg) by mouth daily Increase to 50 mg daily after one week if symptoms improved but not enough    Anxiety

## 2017-09-15 NOTE — PATIENT INSTRUCTIONS
"  Thank you for choosing [facility] for your obstetrical care.  We are an integrated health clinic with obstetricians, midwives, a psychologist, an acupuncturist, a nutritionist, a pharmacist, internal medicine and family practice all in one.  If you have questions about services offered please ask.      o Please keep all appointments with your provider.  You will help catch, prevent and treat problems early.  o Review your Expectant Family booklet and folder given to you at this intake visit.  It can answer many basic questions including:    Treatment for nausea and vomiting    Medications that are safe in pregnancy    Healthy diet and weight gain    Exercise and activity  o ASK questions!!  Please use \"Questions for my New OB visit\" form to write down any questions or concerns for your next visit.  o Eat a healthy diet.  Visit www.choosemyplate.gov and click on  Pregnancy and Breastfeeding  for information and tips  o Do not smoke.  Avoid other people's smoke, too.  We are happy to help with referrals to stop smoking programs.  o Do not drink alcohol.  o Try to avoid people who have colds or other infections.  Practice good hand washing.  o Consider registering for our Healthy Pregnancy Class here at [facility].  This class is offered every 3rd Wednesday from 2:30-4:30 p.m.  [place] at [phone] or online at [email or web address] or [email or web address]  o Consider registering for prenatal education classes through [place] [facility].  You can view class schedules and register online at [email or web address] or call [phone] for questions     For urgent concerns, call [facility] at [phone] to speak with a triage nurse during regular clinic hours (8:00 am - 4:30 pm).  This line is answered by our service 24 hours a day, after hours a provider will return your call.  "

## 2017-09-15 NOTE — LETTER
"9/15/2017       RE: Heather Guillory  [address] [facility] [place] [address]     Dear Colleague,    Thank you for referring your patient, Heather Guillory, to the [facility] at [facility]. Please see a copy of my visit note below.    SUBJECTIVE   Heather Guillory is a 37 year old  at 10w2d by ETD who presents for early pregnancy visit, hospital follow up.    Hospitalized o/n for IV abx for ESBL E. Coli UTI without significant sx improvement with PO abx, now s/p 7 days IV abx (invanz).  Reports sx improvement (less fatigue, myalgias), never febrile.    Other items:  -- Thinks was exposed and had hand/foot/mouth infection (two boys also exp sx) - red spots in mouth with white dots, self-limiting, no fever.    -- Hemorroids, feels thin overlying skin, pain  -- Also reports pretty significant anxiety... Was on zoloft 50 mg PP with good response, self d/c'd prior to ETD.  Worried about \"what I'm exposing this baby to\" (re: meds, infections).    Past Medical History  Past Medical History:   Diagnosis Date     Abnormal Pap smear     Over 10 years ago     Anxiety      E coli infection     ESBL     History of extended-spectrum beta-lactamase producing Escherichia coli infection      Hx of previous reproductive problem 2013    Infertility     Mixed connective tissue disease (H)     Dr. Steele- switching      Nephrolithiasis        Medications  Current Outpatient Prescriptions   Medication     ondansetron (ZOFRAN) 4 MG tablet     progesterone (ENDOMETRIN) 100 MG     Prenatal Vit-Fe Fumarate-FA (PRENATAL VITAMINS) 28-0.8 MG TABS     Estradiol (ESTRACE PO)     hydroxychloroquine (PLAQUENIL) 200 MG tablet     No current facility-administered medications for this visit.      Facility-Administered Medications Ordered in Other Visits   Medication     lidocaine (PF) (XYLOCAINE) 2 % injection     lidocaine 2%-EPINEPHrine 1:100,000 2 %-1:210841 injection     fentaNYL " "(SUBLIMAZE) 2 mcg/mL, bupivacaine (MARCAINE) 0.125% in  mL EPIDURAL Drip     bupivacaine HCl 0.25 % preservative free injection SOLN     lidocaine (PF) (XYLOCAINE) 2 % injection     lidocaine-EPINEPHrine 1.5 %-1:672654 injection     lidocaine (PF) (XYLOCAINE) 2 % injection     fentaNYL (SUBLIMAZE) 2 mcg/mL, bupivacaine (MARCAINE) 0.125% in  mL EPIDURAL Drip     bupivacaine (MARCAINE) 0.125 % injection (diluted from stock concentration by MD or CRNA)       Allergies  Allergies   Allergen Reactions     Kiwi Other (See Comments)     Throat itching     Benzoyl Peroxide Swelling     Duricef [Cefadroxil] Unknown     Monistat [Miconazole] Swelling     Sulfa Drugs Rash       Review of Systems   ROS: 10 point ROS neg other than the symptoms noted above in the HPI.    OBJECTIVE   /77  Pulse 78  Ht 1.702 m (5' 7\")  Wt 75.6 kg (166 lb 11.2 oz)  BMI 26.11 kg/m2  General:  Alert, no distress   Head:  Normocephalic, without obvious abnormality  Oropharynx without lesions   Abdomen:  Soft, non-tender, non-distended, bowel sounds normal   Pelvic: deferred   Extremities:  normal     New ob lab WNL    ASSESSMENT   Heather Guillory is a 37 year old  at 10w2d by ETD, [facility].    PLAN   1.  Recent ESBL E. Coli UTI: JHONNY today (ucx)  2.  MFM referral: AMA + mixed CT disease, appt 17  3.  Mixed CTD: switching providers to Dr. Steele, appt 17  4.  Hemorrhoids: reviewed s/sx thrombosis, rec witch hazel pads, prep H prn  5. JAVIER: Rx zoloft sent today, 25 mg daily and increase prn    F/u 4 weeks/prn    Marta Ordonez MD MPH      Again, thank you for allowing me to participate in the care of your patient.      Sincerely,    Cate Mansfield MD      "

## 2017-09-15 NOTE — PROGRESS NOTES
"SUBJECTIVE   Heather Guillory is a 37 year old  at 10w2d by ETD who presents for early pregnancy visit, hospital follow up.    Hospitalized o/n for IV abx for ESBL E. Coli UTI without significant sx improvement with PO abx, now s/p 7 days IV abx (invanz).  Reports sx improvement (less fatigue, myalgias), never febrile.    Other items:  -- Thinks was exposed and had hand/foot/mouth infection (two boys also exp sx) - red spots in mouth with white dots, self-limiting, no fever.    -- Hemorroids, feels thin overlying skin, pain  -- Also reports pretty significant anxiety... Was on zoloft 50 mg PP with good response, self d/c'd prior to ETD.  Worried about \"what I'm exposing this baby to\" (re: meds, infections).    Past Medical History  Past Medical History:   Diagnosis Date     Abnormal Pap smear     Over 10 years ago     Anxiety      E coli infection     ESBL     History of extended-spectrum beta-lactamase producing Escherichia coli infection      Hx of previous reproductive problem 2013    Infertility     Mixed connective tissue disease (H)     Dr. Steele- switching      Nephrolithiasis        Medications  Current Outpatient Prescriptions   Medication     ondansetron (ZOFRAN) 4 MG tablet     progesterone (ENDOMETRIN) 100 MG     Prenatal Vit-Fe Fumarate-FA (PRENATAL VITAMINS) 28-0.8 MG TABS     Estradiol (ESTRACE PO)     hydroxychloroquine (PLAQUENIL) 200 MG tablet     No current facility-administered medications for this visit.      Facility-Administered Medications Ordered in Other Visits   Medication     lidocaine (PF) (XYLOCAINE) 2 % injection     lidocaine 2%-EPINEPHrine 1:100,000 2 %-1:756107 injection     fentaNYL (SUBLIMAZE) 2 mcg/mL, bupivacaine (MARCAINE) 0.125% in  mL EPIDURAL Drip     bupivacaine HCl 0.25 % preservative free injection SOLN     lidocaine (PF) (XYLOCAINE) 2 % injection     lidocaine-EPINEPHrine 1.5 %-1:078175 injection     lidocaine (PF) (XYLOCAINE) 2 % injection     fentaNYL " "(SUBLIMAZE) 2 mcg/mL, bupivacaine (MARCAINE) 0.125% in  mL EPIDURAL Drip     bupivacaine (MARCAINE) 0.125 % injection (diluted from stock concentration by MD or CRNA)       Allergies  Allergies   Allergen Reactions     Kiwi Other (See Comments)     Throat itching     Benzoyl Peroxide Swelling     Duricef [Cefadroxil] Unknown     Monistat [Miconazole] Swelling     Sulfa Drugs Rash       Review of Systems   ROS: 10 point ROS neg other than the symptoms noted above in the HPI.    OBJECTIVE   /77  Pulse 78  Ht 1.702 m (5' 7\")  Wt 75.6 kg (166 lb 11.2 oz)  BMI 26.11 kg/m2  General:  Alert, no distress   Head:  Normocephalic, without obvious abnormality  Oropharynx without lesions   Abdomen:  Soft, non-tender, non-distended, bowel sounds normal   Pelvic: deferred   Extremities:  normal     New ob lab WNL    ASSESSMENT   Heather Guillory is a 37 year old  at 10w2d by ETD, \[facility]\"".    PLAN   1.  Recent ESBL E. Coli UTI: JHONNY today (ucx)  2.  MFM referral: AMA + mixed CT disease, appt 17  3.  Mixed CTD: switching providers to Dr. Steele, appt 17  4.  Hemorrhoids: reviewed s/sx thrombosis, rec witch hazel pads, prep H prn  5. JAVIER: Rx zoloft sent today, 25 mg daily and increase prn    F/u 4 weeks/prn    Marta Ordonez MD MPH    Answers for HPI/ROS submitted by the patient on 2017   JAVIER 7 TOTAL SCORE: 0    "

## 2017-09-16 LAB
BACTERIA SPEC CULT: NORMAL
Lab: NORMAL
SPECIMEN SOURCE: NORMAL

## 2017-09-20 ASSESSMENT — ANXIETY QUESTIONNAIRES
6. BECOMING EASILY ANNOYED OR IRRITABLE: NOT AT ALL
GAD7 TOTAL SCORE: 0
GAD7 TOTAL SCORE: 0
1. FEELING NERVOUS, ANXIOUS, OR ON EDGE: NOT AT ALL
3. WORRYING TOO MUCH ABOUT DIFFERENT THINGS: NOT AT ALL
GAD7 TOTAL SCORE: 0
7. FEELING AFRAID AS IF SOMETHING AWFUL MIGHT HAPPEN: NOT AT ALL
5. BEING SO RESTLESS THAT IT IS HARD TO SIT STILL: NOT AT ALL
7. FEELING AFRAID AS IF SOMETHING AWFUL MIGHT HAPPEN: NOT AT ALL
4. TROUBLE RELAXING: NOT AT ALL
2. NOT BEING ABLE TO STOP OR CONTROL WORRYING: NOT AT ALL

## 2017-09-21 ASSESSMENT — ANXIETY QUESTIONNAIRES: GAD7 TOTAL SCORE: 0

## 2017-09-22 ENCOUNTER — PRE VISIT (OUTPATIENT)
Dept: MATERNAL FETAL MEDICINE | Facility: CLINIC | Age: 37
End: 2017-09-22

## 2017-09-23 ENCOUNTER — TELEPHONE (OUTPATIENT)
Dept: OBGYN | Facility: CLINIC | Age: 37
End: 2017-09-23

## 2017-09-23 NOTE — TELEPHONE ENCOUNTER
9.23.17 Pt called complaining of yeast type symptoms after taking antibiotics.  She had skin reaction to monistat 1 10 years ago.  She wonders if other OTC remedies are safe in pregnancy.  Ticonozole-1 was recommended. MD Ishaan

## 2017-09-25 ENCOUNTER — OFFICE VISIT (OUTPATIENT)
Dept: OBGYN | Facility: CLINIC | Age: 37
End: 2017-09-25
Attending: OBSTETRICS & GYNECOLOGY
Payer: COMMERCIAL

## 2017-09-25 VITALS
HEART RATE: 78 BPM | WEIGHT: 169.7 LBS | SYSTOLIC BLOOD PRESSURE: 111 MMHG | BODY MASS INDEX: 26.58 KG/M2 | DIASTOLIC BLOOD PRESSURE: 78 MMHG

## 2017-09-25 DIAGNOSIS — O09.529 SUPERVISION OF HIGH-RISK PREGNANCY OF ELDERLY MULTIGRAVIDA: Primary | ICD-10-CM

## 2017-09-25 PROCEDURE — 99212 OFFICE O/P EST SF 10 MIN: CPT | Mod: ZF

## 2017-09-25 ASSESSMENT — PAIN SCALES - GENERAL: PAINLEVEL: NO PAIN (0)

## 2017-09-25 NOTE — LETTER
9/25/2017       RE: Heather Guillory  [address] [id number]     Dear Colleague,    Thank you for referring your patient, Heather Guillory, to the [facility] at [facility]. Please see a copy of my visit note below.      Answers for HPI/ROS submitted by the patient on 9/20/2017   JAVIER 7 TOTAL SCORE: 0      Doing ok. Has some ongoing anxiety issues. Has not seen a therapist yet, but is open to it. Denies SI/HI.   Feels that she is irrationally worrying about pregnancy, kids, fetus, etc. Has improved bladder symptoms s/p IV abx.     Vitals:    09/25/17 1616   BP: 111/78   Pulse: 78   Weight: 77 kg (169 lb 11.2 oz)     See flow    A/p: 36 yo G [id number] at 11+ 5, pregnancy c/b IVF, ESBL UTI and anxiety    1. UTI: test of cure w/ cx neg 9/15.   2. Mixed CT disease.  F/u MFM.    3. Has rheumatology f/u upcoming for mixed CT disease  4. Continued zoloft, rec increase to 50 daily  5. F/u 2 wks  6. Milagro Tirado referral in meantime.     Cate Mansfield MD, FACOG  [facility]  OB/GYN    9/26/2017  4:00 PM

## 2017-09-25 NOTE — NURSING NOTE
Chief Complaint   Patient presents with     Prenatal Care     SOUMYA 11 weeks and 5 days   Tigist Lloyd LPN

## 2017-09-25 NOTE — MR AVS SNAPSHOT
After Visit Summary   9/25/2017    Heather Guillory    MRN: [id number]           Patient Information     Date Of Birth          1980        Visit Information        Provider Department      9/25/2017 4:00 PM Cate Mansfield MD [facility]        Today's Diagnoses     Supervision of high-risk pregnancy of elderly multigravida, IVF, S MD    -  1       Follow-ups after your visit        Your next 10 appointments already scheduled     Sep 27, 2017  8:45 AM CDT   Genetic Counseling with KRYSTA GEN COUNSELOR 2   [facility] M[facility]etal [facility] - [facility])    [address]  [facility] [address]   [phone]            Sep 27, 2017  9:30 AM CDT   MFM NUCHAL TRANSLUCENCY with [id number]   [facility] Maternal Fetal Medicine Ultrasound - [place] ([facility])    [address]  [facility] [address] [phone]            Sep 27, 2017 10:00 AM CDT   Radiology MD with KRYSTA BOWMAN MD   [facility] Maternal Fetal Medicine - [facility])    [address]  [facility] [address]   [phone]           Please arrive at the time given for your first appointment. This visit is used internally to schedule the physician's time during your ultrasound.            Sep 28, 2017  8:00 AM CDT   (Arrive by 7:45 AM)   Return Visit with Nasrin King MD   [facility] Rheumatology ([facility])    [address]  3rd Floor  [facility] [address]   [phone]            Oct 13, 2017  2:30 PM CDT   Return Visit with Cate Mansfield MD   [facility] ([facility])    [place] [facility]  [address]  [facility] [address] [phone]            Jan 12, 2018  9:00 AM CST   (Arrive by 8:45 AM)   Return Visit with Maikol Brewer MD   [facility] Rheumatology  ([facility])    [address]  3rd Floor  [facility] [id number] [phone]              Who to contact     Please call your clinic at [phone] to:    Ask questions about your health    Make or cancel appointments    Discuss your medicines    Learn about your test results    Speak to your doctor   If you have compliments or concerns about an experience at your clinic, or if you wish to file a complaint, please contact [facility] Patient Relations at [phone] or email us at [email or web address]         Additional Information About Your Visit        [email or web address] Information     [employer] gives you secure access to your electronic health record. If you see a primary care provider, you can also send messages to your care team and make appointments. If you have questions, please call your primary care clinic.  If you do not have a primary care provider, please call [phone] and they will assist you.      [employer] is an electronic gateway that provides easy, online access to your medical records. With [employer], you can request a clinic appointment, read your test results, renew a prescription or communicate with your care team.     To access your existing account, please contact your [facility] or call [phone] for assistance.        Care EveryWhere ID     This is your Care EveryWhere ID. This could be used by other organizations to access your [place] medical records  [id number]        Your Vitals Were     Pulse Breastfeeding? BMI (Body Mass Index)             78 No 26.58 kg/m2          Blood Pressure from Last 3 Encounters:   09/25/17 111/78   09/15/17 111/77   09/12/17 93/69    Weight from Last 3 Encounters:   09/25/17 77 kg (169 lb 11.2 oz)   09/15/17 75.6 kg (166 lb 11.2 oz)   09/06/17 76.2 kg (167 lb 14.4 oz)              Today, you had the following     No orders found for display       Primary Care  Provider    Physician No Ref-Primary       No address on file        Equal Access to Services     GRANTSOLIS AFIA : Hadii juanjo galarza nahomy Bahena, wagisselda lusaipatrice, qamarielata fridalinda tesfayeslimlexis, waxgigi axel corleyalbakarrie sheikh. So [facility] [phone].    ATENCIÓN: Si habla español, tiene a nelson disposición servicios gratuitos de asistencia lingüística. Llame al [phone].    We comply with applicable federal civil rights laws and [place] laws. We do not discriminate on the basis of race, color, national origin, age, disability sex, sexual orientation or gender identity.            Thank you!     Thank you for choosing [facility]  for your care. Our goal is always to provide you with excellent care. Hearing back from our patients is one way we can continue to improve our services. Please take a few minutes to complete the written survey that you may receive in the mail after your visit with us. Thank you!             Your Updated Medication List - Protect others around you: Learn how to safely use, store and throw away your medicines at www.disposemymeds.org.          This list is accurate as of: 9/25/17 11:59 PM.  Always use your most recent med list.                   Brand Name Dispense Instructions for use Diagnosis    ESTRACE PO      Take 2 mg by mouth 3 times daily        hydroxychloroquine 200 MG tablet    PLAQUENIL     Take 200 mg by mouth 2 times daily        ondansetron 4 MG tablet    ZOFRAN    20 tablet    Take 1 tablet (4 mg) by mouth every 4 hours as needed for nausea or vomiting    Nausea       Prenatal Vitamins 28-0.8 MG Tabs      Take 1 tablet by mouth daily    Supervision of high-risk pregnancy of elderly multigravida       progesterone 100 MG    ENDOMETRIN     Place 100 mg vaginally 3 times daily    Supervision of high-risk pregnancy of elderly multigravida       sertraline 25 MG tablet    ZOLOFT    90 tablet    Take 1 tablet (25 mg) by mouth daily Increase to 50 mg daily  after one week if symptoms improved but not enough    Anxiety

## 2017-09-26 NOTE — PROGRESS NOTES
Doing ok. Has some ongoing anxiety issues. Has not seen a therapist yet, but is open to it. Denies SI/HI.   Feels that she is irrationally worrying about pregnancy, kids, fetus, etc. Has improved bladder symptoms s/p IV abx.     Vitals:    09/25/17 1616   BP: 111/78   Pulse: 78   Weight: 77 kg (169 lb 11.2 oz)     See flow    A/p: 38 yo G [id number] at 11+ 5, pregnancy c/b IVF, ESBL UTI and anxiety    1. UTI: test of cure w/ cx neg 9/15.   2. Mixed CT disease.  F/u MFM.    3. Has rheumatology f/u upcoming for mixed CT disease  4. Continued zoloft, rec increase to 50 daily  5. F/u 2 wks  6. Milagro Tirado referral in meantime.     Cate Mansfield MD, FACOG  [facility]  OB/GYN    9/26/2017  4:00 PM

## 2017-09-27 ENCOUNTER — HOSPITAL ENCOUNTER (OUTPATIENT)
Dept: ULTRASOUND IMAGING | Facility: CLINIC | Age: 37
Discharge: HOME | End: 2017-09-27
Attending: ADVANCED PRACTICE MIDWIFE | Admitting: SOCIAL WORKER
Payer: COMMERCIAL

## 2017-09-27 ENCOUNTER — OFFICE VISIT (OUTPATIENT)
Dept: MATERNAL FETAL MEDICINE | Facility: CLINIC | Age: 37
End: 2017-09-27
Attending: ADVANCED PRACTICE MIDWIFE
Payer: COMMERCIAL

## 2017-09-27 DIAGNOSIS — O09.521 ELDERLY MULTIGRAVIDA IN FIRST TRIMESTER: Primary | ICD-10-CM

## 2017-09-27 DIAGNOSIS — O09.811 PREGNANCY RESULTING FROM IN VITRO FERTILIZATION, FIRST TRIMESTER: ICD-10-CM

## 2017-09-27 DIAGNOSIS — O09.521 ELDERLY MULTIGRAVIDA IN FIRST TRIMESTER: ICD-10-CM

## 2017-09-27 DIAGNOSIS — O26.90 PREGNANCY RELATED CONDITION, UNSPECIFIED TRIMESTER: ICD-10-CM

## 2017-09-27 PROCEDURE — 36415 COLL VENOUS BLD VENIPUNCTURE: CPT | Performed by: OBSTETRICS & GYNECOLOGY

## 2017-09-27 PROCEDURE — 76813 OB US NUCHAL MEAS 1 GEST: CPT

## 2017-09-27 PROCEDURE — 96040 ZZH GENETIC COUNSELING, EACH 30 MINUTES: CPT | Mod: ZF | Performed by: GENETIC COUNSELOR, MS

## 2017-09-27 PROCEDURE — 40000791 ZZHCL STATISTIC VERIFI PRENATAL TRISOMY 21,18,13: Performed by: OBSTETRICS & GYNECOLOGY

## 2017-09-27 NOTE — PROGRESS NOTES
Please see ultrasound report under imaging tab for details on ultrasound performed today.    April Torre MD  , OB/GYN  Maternal-Fetal Medicine  [email or web address]  [phone] (Academic office)  [phone] (Pager)

## 2017-09-27 NOTE — MR AVS SNAPSHOT
After Visit Summary   9/27/2017    Heather Guillory    MRN: [id number]           Patient Information     Date Of Birth          1980        Visit Information        Provider Department      9/27/2017 8:45 AM Renetta Stevens GC [facility] [place]        Today's Diagnoses     Elderly multigravida in first trimester    -  1    Pregnancy related condition, unspecified trimester           Follow-ups after your visit        Your next 10 appointments already scheduled     Sep 28, 2017  8:00 AM CDT   (Arrive by 7:45 AM)   Return Visit with Nasrin King MD   [facility] ([facility])    [address]  3rd Floor  [facility] [place] [address]   [phone]            Sep 29, 2017  8:00 AM CDT   New Patient Visit with Milagro Tirado, PhD    [facility]  ([facility])    [place] [facility]  [address]rd [address]  [address]  [facility] [place] [address]   [phone]            Oct 13, 2017  2:30 PM CDT   Return Visit with Cate Mansfield MD   [facility] ([facility])    [place] [facility] [address]  [address]  [address]  [facility] [place] [address] [phone]            Nov 08, 2017  8:45 AM ZARINA BOWMAN US COMP with [id number]   [facility] [place] ([facility])    [address]  [facility] [place] [address] [phone]           Wear comfortable clothes and leave your valuables at home.            Nov 08, 2017  9:15 AM ZARINA   Radiology MD with KRYSTA BOWMAN MD   [facility])    [address]  [facility] [address]   [phone]           Please arrive at the time given for your first appointment. This visit is used internally to schedule the physician's time during your ultrasound.             Dec 11, 2017  2:15 PM CST   GRACIE READ SCREEN FETAL EHCO Ham with [id number]   [facility] Maternal Fetal Medicine Ultrasound - [place] ([facility])    [address]  [facility] [address] [phone]            Dec 11, 2017  2:45 PM CST   Radiology MD with UR GRACIE YAN   [facility] Maternal Fetal Medicine - [place] ([facility])    [address]  [facility] [address]   [phone]           Please arrive at the time given for your first appointment. This visit is used internally to schedule the physician's time during your ultrasound.            Jan 12, 2018  9:00 AM CST   (Arrive by 8:45 AM)   Return Visit with Maikol Brewer MD   [facility] ([facility])    [address] [id number] [phone]              Who to contact     If you have questions or need follow up information about today's clinic visit or your schedule please contact [facility] MATERNAL FETAL MEDIC[facility] directly at [phone].  Normal or non-critical lab and imaging results will be communicated to you by MyChart, letter or phone within 4 business days after the clinic has received the results. If you do not hear from us within 7 days, please contact the clinic through [employer] or phone. If you have a critical or abnormal lab result, we will notify you by phone as soon as possible.  Submit refill requests through [employer] or call your pharmacy and they will forward the refill request to us. Please allow 3 business days for your refill to be completed.          Additional Information About Your Visit        MyChart Information     [employer] gives you secure access to your electronic health record. If you see a primary care provider, you can also send messages to your care team and make appointments. If you have questions, please call your primary care clinic.  If you do not have  a primary care provider, please call [phone] and they will assist you.        Care EveryWhere ID     This is your Care EveryWhere ID. This could be used by other organizations to access your [place] medical records  [id number]         Blood Pressure from Last 3 Encounters:   09/25/17 111/78   09/15/17 111/77   09/12/17 93/69    Weight from Last 3 Encounters:   09/25/17 77 kg (169 lb 11.2 oz)   09/15/17 75.6 kg (166 lb 11.2 oz)   09/06/17 76.2 kg (167 lb 14.4 oz)              We Performed the Following     [facility] Genetic Counseling        Primary Care Provider    Physician No Ref-Primary       No address on file        Equal Access to Services     ROSELIA OREILLY : Arabella Bahena, columba major, jerrod kaalmalexis simeon, john sheikh. So [facility] [phone].    ATENCIÓN: Si habla español, tiene a nelson disposición servicios gratuitos de asistencia lingüística. Llame al [phone].    We comply with applicable federal civil rights laws and [place] laws. We do not discriminate on the basis of race, color, national origin, age, disability sex, sexual orientation or gender identity.            Thank you!     Thank you for choosing [facility]  for your care. Our goal is always to provide you with excellent care. Hearing back from our patients is one way we can continue to improve our services. Please take a few minutes to complete the written survey that you may receive in the mail after your visit with us. Thank you!             Your Updated Medication List - Protect others around you: Learn how to safely use, store and throw away your medicines at www.disposemymeds.org.          This list is accurate as of: 9/27/17 11:59 AM.  Always use your most recent med list.                   Brand Name Dispense Instructions for use Diagnosis    ESTRACE PO      Take 2 mg by mouth 3 times daily        hydroxychloroquine 200 MG tablet    PLAQUENIL      Take 200 mg by mouth 2 times daily        ondansetron 4 MG tablet    ZOFRAN    20 tablet    Take 1 tablet (4 mg) by mouth every 4 hours as needed for nausea or vomiting    Nausea       Prenatal Vitamins 28-0.8 MG Tabs      Take 1 tablet by mouth daily    Supervision of high-risk pregnancy of elderly multigravida       progesterone 100 MG    ENDOMETRIN     Place 100 mg vaginally 3 times daily    Supervision of high-risk pregnancy of elderly multigravida       sertraline 25 MG tablet    ZOLOFT    90 tablet    Take 1 tablet (25 mg) by mouth daily Increase to 50 mg daily after one week if symptoms improved but not enough    Anxiety

## 2017-09-27 NOTE — PROGRESS NOTES
[facility]aternal [facility]etal [facility]  Genetic Counseling Consult    Patient: Heather Guillory YOB: 1980   Date of Service: 17      Heather Guillory was seen at [facility] [place] for genetic consultation to discuss the options for screening and testing for fetal chromosome abnormalities.  The indication for genetic counseling is advanced maternal age.        Impression/Plan:   1.  Heather had an ultrasound and blood draw for NIPT (Innatal test through [employer]).  Results are expected within 7-10 days, and will be available in [employer].  We will contact her to discuss the results, and a copy will be forwarded to the office of the referring OB provider. Heather requested a detailed message with results on her voicemail ([phone]). She DOES NOT want fetal sex information. She is aware we would be discussing the fetal sex should a sex chromosome condition be identified.      2.  Maternal serum AFP (single marker screen) is recommended after 15 weeks to screen for open neural tube defects. A quad screen should not be performed.    3.  An 18-20 week comprehensive ultrasound is standard of care for all women 35 or older at delivery. We discussed a fetal echocardiogram is also recommended as this is an IVF pregnancy.      Pregnancy History:   /Parity:    Age at Delivery: 37 year old  JOSEPH: 2018, by Embryo Transfer  Gestational Age: 12w0d    No significant complications or exposures were reported in the current pregnancy.    Heather puri pregnancy history is significant for two health sons and a miscarriage.      This is an IVF pregnancy and PGS was performed on the embryo.      Medical History:   Heather puri reported medical history is not expected to impact pregnancy management or risks to fetal development.       Family History:   A detailed family history was previously obtained during a genetic counseling consultation in Heather's last  pregnancy in 2015. Heather reports no significant changes to her family history since that time. 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  Her  had negative carrier screening so the chance for their children to have Elijah disease is low.  Each of her children has a 50% chance to be a carrier.         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  Her  screened negative for this condition.  They both were negative for the remaining conditions on the panel.         Risk Assessment for Chromosome Conditions:   We explained that the risk for fetal chromosome abnormalities increases with maternal age. We discussed specific features of common chromosome abnormalities, including Down syndrome, trisomy 13, trisomy 18, and sex chromosome trisomies.      - At age 37 at midtrimester, the risk to have a baby with Down syndrome is 1 in 274.     - At age 37 at midtrimester, the risk to have a baby with any chromosome abnormality is 1 in 135.          Testing Options:   We discussed the following options:   First trimester screening    First trimester ultrasound with nuchal translucency and nasal bone assessments, maternal plasma hCG, MAKENZIE-A, and AFP measurement    Screens for fetal trisomy 21, trisomy 13, and trisomy 18    Cannot screen for open neural tube defects; maternal serum AFP after 15 weeks is recommended     Non-invasive Prenatal Testing (NIPT)    Maternal plasma cell-free DNA testing; first trimester ultrasound with nuchal translucency and nasal bone assessment is recommended, when appropriate    Screens for fetal trisomy 21, trisomy 13, trisomy 18, and sex chromosome aneuploidy    Cannot screen for open neural tube defects; maternal serum AFP  after 15 weeks is recommended     Chorionic villus sampling (CVS)    Invasive procedure typically performed in the first trimester by which placental villi are obtained for the purpose of chromosome analysis and/or other prenatal genetic analysis    Diagnostic results; >99% sensitivity for fetal chromosome abnormalities    Cannot test for open neural tube defects; maternal serum AFP after 15 weeks is recommended     Genetic Amniocentesis    Invasive procedure typically performed in the second trimester by which amniotic fluid is obtained for the purpose of chromosome analysis and/or other prenatal genetic analysis    Diagnostic results; >99% sensitivity for fetal chromosome abnormalities    AFAFP measurement tests for open neural tube defects     Comprehensive (Level II) ultrasound: Detailed ultrasound performed between 18-22 weeks gestation to screen for major birth defects and markers for aneuploidy.      We reviewed the benefits and limitations of this testing.  Screening tests provide a risk assessment specific to the pregnancy for certain fetal chromosome abnormalities, but cannot definitively diagnose or exclude a fetal chromosome abnormality.  Follow-up genetic counseling and consideration of diagnostic testing is recommended with any abnormal screening result.     Diagnostic tests carry inherent risks- including risk of miscarriage- that require careful consideration.  These tests can detect fetal chromosome abnormalities with greater than 99% certainty.  Results can be compromised by maternal cell contamination or mosaicism, and are limited by the resolution of cytogenetic G-banding technology.  There is no screening nor diagnostic test that can detect all forms of birth defects or mental disability.     It was a pleasure to be involved with Heather puri care. Face-to-face time of the meeting was 30 minutes.    Renetta Stevens, [facility]  Certified Genetic Counselor  VM: [phone]

## 2017-09-27 NOTE — MR AVS SNAPSHOT
After Visit Summary   9/27/2017    Heather Guillory    MRN: [id number]           Patient Information     Date Of Birth          1980        Visit Information        Provider Department      9/27/2017 10:00 AM April Torre MD [facility] [place]        Today's Diagnoses     Elderly multigravida in first trimester    -  1    Pregnancy resulting from in vitro fertilization, first trimester           Follow-ups after your visit        Your next 10 appointments already scheduled     Sep 28, 2017  8:00 AM CDT   (Arrive by 7:45 AM)   Return Visit with Nasrin King MD   [facility] Rheumatology ([facility])    [address]  3rd Floor  [facility] [address]   [phone]            Sep 29, 2017  8:00 AM CDT   New Patient Visit with Milagro Tirado, PhD    [facility]  ([facility])    [facility]  [address]rd [address]  [address]  [facility] [address]   [phone]            Oct 13, 2017  2:30 PM CDT   Return Visit with Cate Mansfield MD   [facility] ([facility])    [place] [facility] [address] [facility] [address]  [address]  [facility] [address] [phone]            Nov 08, 2017  8:45 AM ZARINA BOWMAN US COMP with [id number]   [facility] [place] ([facility])    [address]  [facility] [id number] [phone]           Wear comfortable clothes and leave your valuables at home.            Nov 08, 2017  9:15 AM CST   Radiology MD with KRYSTA BOWMAN MD   [facility] ([facility])    [address]  [facility] [address]   [phone]           Please arrive at the time given for your first appointment. This visit is used internally to schedule the physician's time during your  ultrasound.            Dec 11, 2017  2:15 PM CST   GRACIE READ SCREEN FETAL EHCO Ham with [id number]   [facility]aternal Fetal Medicine Ultrasound - [place] ([facility])    [address]  [facility] [id number] [phone]            Dec 11, 2017  2:45 PM CST   Radiology MD with UR GRACIE YAN   [facility]aternal Fetal Medicine - [place] ([facility])    [address]  [facility] [address]   [phone]           Please arrive at the time given for your first appointment. This visit is used internally to schedule the physician's time during your ultrasound.            Jan 12, 2018  9:00 AM CST   (Arrive by 8:45 AM)   Return Visit with Maikol Brewer MD   [facility] Rheumatology ([facility])    [address] [id number] [phone]              Who to contact     If you have questions or need follow up information about today's clinic visit or your schedule please contact [facility] MATERNAL FETAL MEDIC[facility] directly at [phone].  Normal or non-critical lab and imaging results will be communicated to you by [employer], letter or phone within 4 business days after the clinic has received the results. If you do not hear from us within 7 days, please contact the clinic through [employer] or phone. If you have a critical or abnormal lab result, we will notify you by phone as soon as possible.  Submit refill requests through [employer] or call your pharmacy and they will forward the refill request to us. Please allow 3 business days for your refill to be completed.          Additional Information About Your Visit        [employer] Information     [employer] gives you secure access to your electronic health record. If you see a primary care provider, you can also send messages to your care team and make appointments. If you have questions, please call your primary care  clinic.  If you do not have a primary care provider, please call [phone] and they will assist you.        Care EveryWhere ID     This is your Care EveryWhere ID. This could be used by other organizations to access your [place] medical records  [id number]         Blood Pressure from Last 3 Encounters:   09/25/17 111/78   09/15/17 111/77   09/12/17 93/69    Weight from Last 3 Encounters:   09/25/17 77 kg (169 lb 11.2 oz)   09/15/17 75.6 kg (166 lb 11.2 oz)   09/06/17 76.2 kg (167 lb 14.4 oz)              Today, you had the following     No orders found for display       Primary Care Provider    Physician No Ref-Primary       No address on file        Equal Access to Services     ROSELIA OREILLY : Hadii juanjo freyo [place], waaxda luqadaha, qaybta kaalmada adesumiyalexis, john graham . So [facility] [phone].    ATENCIÓN: Si habla español, tiene a nelson disposición servicios gratuitos de asistencia lingüística. Llame al [phone].    We comply with applicable federal civil rights laws and [place] laws. We do not discriminate on the basis of race, color, national origin, age, disability sex, sexual orientation or gender identity.            Thank you!     Thank you for choosing [facility]  for your care. Our goal is always to provide you with excellent care. Hearing back from our patients is one way we can continue to improve our services. Please take a few minutes to complete the written survey that you may receive in the mail after your visit with us. Thank you!             Your Updated Medication List - Protect others around you: Learn how to safely use, store and throw away your medicines at www.disposemymeds.org.          This list is accurate as of: 9/27/17 11:35 AM.  Always use your most recent med list.                   Brand Name Dispense Instructions for use Diagnosis    ESTRACE PO      Take 2 mg by mouth 3 times daily        hydroxychloroquine  200 MG tablet    PLAQUENIL     Take 200 mg by mouth 2 times daily        ondansetron 4 MG tablet    ZOFRAN    20 tablet    Take 1 tablet (4 mg) by mouth every 4 hours as needed for nausea or vomiting    Nausea       Prenatal Vitamins 28-0.8 MG Tabs      Take 1 tablet by mouth daily    Supervision of high-risk pregnancy of elderly multigravida       progesterone 100 MG    ENDOMETRIN     Place 100 mg vaginally 3 times daily    Supervision of high-risk pregnancy of elderly multigravida       sertraline 25 MG tablet    ZOLOFT    90 tablet    Take 1 tablet (25 mg) by mouth daily Increase to 50 mg daily after one week if symptoms improved but not enough    Anxiety

## 2017-09-28 ENCOUNTER — TELEPHONE (OUTPATIENT)
Dept: OBGYN | Facility: CLINIC | Age: 37
End: 2017-09-28

## 2017-09-28 ENCOUNTER — OFFICE VISIT (OUTPATIENT)
Dept: RHEUMATOLOGY | Facility: CLINIC | Age: 37
End: 2017-09-28
Attending: INTERNAL MEDICINE
Payer: COMMERCIAL

## 2017-09-28 VITALS
HEART RATE: 72 BPM | OXYGEN SATURATION: 98 % | HEIGHT: 67 IN | SYSTOLIC BLOOD PRESSURE: 110 MMHG | BODY MASS INDEX: 26.15 KG/M2 | WEIGHT: 166.6 LBS | DIASTOLIC BLOOD PRESSURE: 75 MMHG

## 2017-09-28 DIAGNOSIS — M35.9 UNDIFFERENTIATED CONNECTIVE TISSUE DISEASE (H): ICD-10-CM

## 2017-09-28 DIAGNOSIS — M35.9 UNDIFFERENTIATED CONNECTIVE TISSUE DISEASE (H): Primary | ICD-10-CM

## 2017-09-28 DIAGNOSIS — O09.529 SUPERVISION OF HIGH-RISK PREGNANCY OF ELDERLY MULTIGRAVIDA: ICD-10-CM

## 2017-09-28 LAB
ABO + RH BLD: NORMAL
ABO + RH BLD: NORMAL
ALBUMIN UR-MCNC: NEGATIVE MG/DL
ALT SERPL W P-5'-P-CCNC: 21 U/L (ref 0–50)
AMORPH CRY #/AREA URNS HPF: ABNORMAL /HPF
APPEARANCE UR: ABNORMAL
AST SERPL W P-5'-P-CCNC: 16 U/L (ref 0–45)
BACTERIA #/AREA URNS HPF: ABNORMAL /HPF
BASOPHILS # BLD AUTO: 0 10E9/L (ref 0–0.2)
BASOPHILS NFR BLD AUTO: 0.5 %
BILIRUB UR QL STRIP: NEGATIVE
BLD GP AB SCN SERPL QL: NORMAL
BLOOD BANK CMNT PATIENT-IMP: NORMAL
C3 SERPL-MCNC: 97 MG/DL (ref 76–169)
C4 SERPL-MCNC: 25 MG/DL (ref 15–50)
CARDIOLIPIN ANTIBODY IGG: <1.6 GPL-U/ML (ref 0–19.9)
CARDIOLIPIN ANTIBODY IGM: 0.2 MPL-U/ML (ref 0–19.9)
COLOR UR AUTO: ABNORMAL
CREAT SERPL-MCNC: 0.56 MG/DL (ref 0.52–1.04)
CREAT UR-MCNC: 206 MG/DL
CREAT UR-MCNC: 206 MG/DL
CRP SERPL-MCNC: <2.9 MG/L (ref 0–8)
DEPRECATED CALCIDIOL+CALCIFEROL SERPL-MC: 52 UG/L (ref 20–75)
DIFFERENTIAL METHOD BLD: ABNORMAL
ENA RNP IGG SER IA-ACNC: 0.6 AI (ref 0–0.9)
EOSINOPHIL # BLD AUTO: 0.1 10E9/L (ref 0–0.7)
EOSINOPHIL NFR BLD AUTO: 0.8 %
ERYTHROCYTE [DISTWIDTH] IN BLOOD BY AUTOMATED COUNT: 12.2 % (ref 10–15)
ERYTHROCYTE [SEDIMENTATION RATE] IN BLOOD BY WESTERGREN METHOD: 8 MM/H (ref 0–20)
GFR SERPL CREATININE-BSD FRML MDRD: >90 ML/MIN/1.7M2
GLUCOSE UR STRIP-MCNC: NEGATIVE MG/DL
HBV SURFACE AG SERPL QL IA: NONREACTIVE
HCT VFR BLD AUTO: 43.2 % (ref 35–47)
HGB BLD-MCNC: 14.6 G/DL (ref 11.7–15.7)
HGB UR QL STRIP: NEGATIVE
HIV 1+2 AB+HIV1 P24 AG SERPL QL IA: NONREACTIVE
HYALINE CASTS #/AREA URNS LPF: 1 /LPF (ref 0–2)
IMM GRANULOCYTES # BLD: 0 10E9/L (ref 0–0.4)
IMM GRANULOCYTES NFR BLD: 0.4 %
KETONES UR STRIP-MCNC: NEGATIVE MG/DL
LEUKOCYTE ESTERASE UR QL STRIP: ABNORMAL
LYMPHOCYTES # BLD AUTO: 2.3 10E9/L (ref 0.8–5.3)
LYMPHOCYTES NFR BLD AUTO: 31.2 %
MCH RBC QN AUTO: 28 PG (ref 26.5–33)
MCHC RBC AUTO-ENTMCNC: 33.8 G/DL (ref 31.5–36.5)
MCV RBC AUTO: 83 FL (ref 78–100)
MONOCYTES # BLD AUTO: 0.4 10E9/L (ref 0–1.3)
MONOCYTES NFR BLD AUTO: 4.7 %
MUCOUS THREADS #/AREA URNS LPF: PRESENT /LPF
NEUTROPHILS # BLD AUTO: 4.6 10E9/L (ref 1.6–8.3)
NEUTROPHILS NFR BLD AUTO: 62.4 %
NITRATE UR QL: NEGATIVE
NRBC # BLD AUTO: 0 10*3/UL
NRBC BLD AUTO-RTO: 0 /100
PH UR STRIP: 6 PH (ref 5–7)
PLATELET # BLD AUTO: 215 10E9/L (ref 150–450)
PROT UR-MCNC: 0.53 G/L
PROT/CREAT 24H UR: 0.26 G/G CR (ref 0–0.2)
RBC # BLD AUTO: 5.22 10E12/L (ref 3.8–5.2)
RBC #/AREA URNS AUTO: 0 /HPF (ref 0–2)
RUBV IGG SERPL IA-ACNC: 22 IU/ML
SOURCE: ABNORMAL
SP GR UR STRIP: 1.02 (ref 1–1.03)
SPECIMEN EXP DATE BLD: NORMAL
SQUAMOUS #/AREA URNS AUTO: 15 /HPF (ref 0–1)
T PALLIDUM IGG+IGM SER QL: NEGATIVE
UROBILINOGEN UR STRIP-MCNC: 0 MG/DL (ref 0–2)
WBC # BLD AUTO: 7.4 10E9/L (ref 4–11)
WBC #/AREA URNS AUTO: 12 /HPF (ref 0–2)

## 2017-09-28 PROCEDURE — 85025 COMPLETE CBC W/AUTO DIFF WBC: CPT | Performed by: ADVANCED PRACTICE MIDWIFE

## 2017-09-28 PROCEDURE — 86146 BETA-2 GLYCOPROTEIN ANTIBODY: CPT | Performed by: ADVANCED PRACTICE MIDWIFE

## 2017-09-28 PROCEDURE — 00000167 ZZHCL STATISTIC INR NC: Performed by: INTERNAL MEDICINE

## 2017-09-28 PROCEDURE — 86160 COMPLEMENT ANTIGEN: CPT | Performed by: ADVANCED PRACTICE MIDWIFE

## 2017-09-28 PROCEDURE — 86900 BLOOD TYPING SEROLOGIC ABO: CPT | Performed by: ADVANCED PRACTICE MIDWIFE

## 2017-09-28 PROCEDURE — 85730 THROMBOPLASTIN TIME PARTIAL: CPT | Performed by: INTERNAL MEDICINE

## 2017-09-28 PROCEDURE — 86850 RBC ANTIBODY SCREEN: CPT | Performed by: ADVANCED PRACTICE MIDWIFE

## 2017-09-28 PROCEDURE — 86780 TREPONEMA PALLIDUM: CPT | Performed by: ADVANCED PRACTICE MIDWIFE

## 2017-09-28 PROCEDURE — 84450 TRANSFERASE (AST) (SGOT): CPT | Performed by: ADVANCED PRACTICE MIDWIFE

## 2017-09-28 PROCEDURE — 84156 ASSAY OF PROTEIN URINE: CPT | Performed by: INTERNAL MEDICINE

## 2017-09-28 PROCEDURE — 84460 ALANINE AMINO (ALT) (SGPT): CPT | Performed by: ADVANCED PRACTICE MIDWIFE

## 2017-09-28 PROCEDURE — 86901 BLOOD TYPING SEROLOGIC RH(D): CPT | Performed by: ADVANCED PRACTICE MIDWIFE

## 2017-09-28 PROCEDURE — 36415 COLL VENOUS BLD VENIPUNCTURE: CPT | Performed by: ADVANCED PRACTICE MIDWIFE

## 2017-09-28 PROCEDURE — 86225 DNA ANTIBODY NATIVE: CPT | Performed by: ADVANCED PRACTICE MIDWIFE

## 2017-09-28 PROCEDURE — 86762 RUBELLA ANTIBODY: CPT | Performed by: ADVANCED PRACTICE MIDWIFE

## 2017-09-28 PROCEDURE — 86162 COMPLEMENT TOTAL (CH50): CPT | Performed by: ADVANCED PRACTICE MIDWIFE

## 2017-09-28 PROCEDURE — 99212 OFFICE O/P EST SF 10 MIN: CPT | Mod: ZF

## 2017-09-28 PROCEDURE — 86147 CARDIOLIPIN ANTIBODY EA IG: CPT | Performed by: INTERNAL MEDICINE

## 2017-09-28 PROCEDURE — 82306 VITAMIN D 25 HYDROXY: CPT | Performed by: ADVANCED PRACTICE MIDWIFE

## 2017-09-28 PROCEDURE — 87389 HIV-1 AG W/HIV-1&-2 AB AG IA: CPT | Performed by: ADVANCED PRACTICE MIDWIFE

## 2017-09-28 PROCEDURE — 82565 ASSAY OF CREATININE: CPT | Performed by: ADVANCED PRACTICE MIDWIFE

## 2017-09-28 PROCEDURE — 00000401 ZZHCL STATISTIC THROMBIN TIME NC: Performed by: INTERNAL MEDICINE

## 2017-09-28 PROCEDURE — 86140 C-REACTIVE PROTEIN: CPT | Performed by: ADVANCED PRACTICE MIDWIFE

## 2017-09-28 PROCEDURE — 81001 URINALYSIS AUTO W/SCOPE: CPT | Performed by: INTERNAL MEDICINE

## 2017-09-28 PROCEDURE — 87340 HEPATITIS B SURFACE AG IA: CPT | Performed by: ADVANCED PRACTICE MIDWIFE

## 2017-09-28 PROCEDURE — 85613 RUSSELL VIPER VENOM DILUTED: CPT | Performed by: INTERNAL MEDICINE

## 2017-09-28 PROCEDURE — 86235 NUCLEAR ANTIGEN ANTIBODY: CPT | Performed by: INTERNAL MEDICINE

## 2017-09-28 PROCEDURE — 87086 URINE CULTURE/COLONY COUNT: CPT | Performed by: INTERNAL MEDICINE

## 2017-09-28 PROCEDURE — 85652 RBC SED RATE AUTOMATED: CPT | Performed by: ADVANCED PRACTICE MIDWIFE

## 2017-09-28 ASSESSMENT — PAIN SCALES - GENERAL: PAINLEVEL: NO PAIN (0)

## 2017-09-28 NOTE — TELEPHONE ENCOUNTER
Spoke with Heather who is experiencing a yeast infection which she is guessing is from the many antibiotics she has had. She is also 12 weeks pregnant. She has tried monistat with no success so she tried another similar product OTC. This product caused an allergic reaction including much pain and inflammation. She had to use an ice pack on her labia to make the pain go away. She is wondering if there is anything else she can try. Nurse advised that the only other option during pregnancy is terconazole which is also in the same medication family as the other medications. Patient is going to stop all medications and try and let the yeast heal on its own through diet and probiotics. She will call back if no relief from these remedies.

## 2017-09-28 NOTE — NURSING NOTE
"Chief Complaint   Patient presents with     RECHECK     Follow up for MCTD during pregnancy        Initial /75  Pulse 72  Ht 1.702 m (5' 7\")  Wt 75.6 kg (166 lb 9.6 oz)  SpO2 98%  BMI 26.09 kg/m2 Estimated body mass index is 26.09 kg/(m^2) as calculated from the following:    Height as of this encounter: 1.702 m (5' 7\").    Weight as of this encounter: 75.6 kg (166 lb 9.6 oz).  Medication Reconciliation: complete   Christine Rondon CMA    "

## 2017-09-28 NOTE — LETTER
Patient:  Heather Guillory  :   1980  MRN:     [id number]        Ms.Jennifer Guillory  [address] [address]        October 15, 2017    Dear ,    We are writing to inform you of your test results. Labs really look good with negative lupus markers, numbers are within expected range. This is good news. Expect good pregnancy outcome.      Resulted Orders   AST   Result Value Ref Range    AST 16 0 - 45 U/L   ALT   Result Value Ref Range    ALT 21 0 - 50 U/L   Creatinine   Result Value Ref Range    Creatinine 0.56 0.52 - 1.04 mg/dL    GFR Estimate >90 >60 mL/min/1.7m2      Comment:      Non  GFR Calc    GFR Estimate If Black >90 >60 mL/min/1.7m2      Comment:       GFR Calc   Complement C4   Result Value Ref Range    Complement C4 25 15 - 50 mg/dL   Complement C3   Result Value Ref Range    Complement C3 97 76 - 169 mg/dL   Complement total   Result Value Ref Range    Complement CH50 Total 128 60 - 144  Units      Comment:      (Note)  INTERPRETIVE INFORMATION: Complement Activity, Total EIA   59   Units or less .......... Low      Units .............. Normal   145  Units or greater ....... High  Performed by [employer],  [address] [place] [id number] [phone]  [email or web address], Ahsan Kaur MD, Lab. Director     DNA double stranded antibodies   Result Value Ref Range    DNA-ds 4 <10 IU/mL      Comment:      Negative   Routine UA with micro reflex to culture   Result Value Ref Range    Color Urine Gertrudis     Appearance Urine Cloudy     Glucose Urine Negative NEG^Negative mg/dL    Bilirubin Urine Negative NEG^Negative    Ketones Urine Negative NEG^Negative mg/dL    Specific Gravity Urine 1.018 1.003 - 1.035    Blood Urine Negative NEG^Negative    pH Urine 6.0 5.0 - 7.0 pH    Protein Albumin Urine Negative NEG^Negative mg/dL    Urobilinogen mg/dL 0.0 0.0 - 2.0 mg/dL    Nitrite Urine Negative NEG^Negative    Leukocyte Esterase  Urine Moderate (A) NEG^Negative    Source Midstream Urine     WBC Urine 12 (H) 0 - 2 /HPF    RBC Urine 0 0 - 2 /HPF    Bacteria Urine Few (A) NEG^Negative /HPF    Squamous Epithelial /HPF Urine 15 (H) 0 - 1 /HPF    Mucous Urine Present (A) NEG^Negative /LPF    Hyaline Casts 1 0 - 2 /LPF    Amorphous Crystals Few (A) NEG^Negative /HPF   Protein  random urine with Creat Ratio   Result Value Ref Range    Protein Random Urine 0.53 g/L    Protein Total Urine g/gr Creatinine 0.26 (H) 0 - 0.2 g/g Cr   Creatinine random urine   Result Value Ref Range    Creatinine Urine Random 206 mg/dL   CRP inflammation   Result Value Ref Range    CRP Inflammation <2.9 0.0 - 8.0 mg/L   Erythrocyte sedimentation rate auto   Result Value Ref Range    Sed Rate 8 0 - 20 mm/h   Beta 2 Glycoprotein 1 Antibody IgG   Result Value Ref Range    Beta 2 Glycoprotein 1 Antibody IgG 0.7 <7 U/mL      Comment:      Negative   Beta 2 Glycoprotein 1 Antibody IgM   Result Value Ref Range    Beta 2 Glycoprotein 1 Antibody IgM 1.3 <7 U/mL      Comment:      Negative   Creatinine urine calculation only   Result Value Ref Range    Creatinine Urine 206 mg/dL   Urine Culture Aerobic Bacterial   Result Value Ref Range    Specimen Description Midstream Urine     Special Requests Specimen received in preservative     Culture Micro       10,000 to 50,000 colonies/mL  mixed urogenital jamia  Susceptibility testing not routinely done         Nasrin King MD

## 2017-09-28 NOTE — PATIENT INSTRUCTIONS
Diagnosis: UCTD (undifferentiated connective tissue disease)    Stay on plaquenil, safe in pregnancy and with breastfeeding    Labs today    Follow up on 12/14/2017

## 2017-09-28 NOTE — PROGRESS NOTES
Rheumatology Visit     Heather Guillory MRN# [id number]   YOB: 1980 Age: 37 year old     Date of Last Visit with Dr. Brewer: 2017  DOS: 2017        Assessment and Plan:   Undifferentiated connective tissue disease (UCTD) and pregnancy via IVF (JOSEPH 2018):    Heather is a 36 yo WF , a former patient of Dr. Brewer who is being seen today for the 1st time in my clinic. She was diagnosed with MCTD in 2016, 6 wk postpartum based on fatigue, arthritis, mild Raynaud's, low positive NINOSKA 1.1 and low positive RNP Ab 1.6. She is on  mg qd since 2017 with great response.    She had neg SSA/SSB antibodies in 2016, no concern for CHB.    She never had any miscarriages or thrombosis, but will check APS panel.    She had neg anti-Sm, neg anti-DNA and neg centromere Ab in 2016.    I reviewed Heather's chart and discussed with her that I believe her most likely diagnosis is UCTD not MCTD as she does not have classic features of MCTD, had very low titer of RNP Ab in the past and had a mild disease which never required prednisone.    Since her disease is under excellent control on HCQ, I don't expect flare up with IVF or during pregnancy; however given safety of HCQ during pregnancy and breastfeeding, recommend HCQ to be continued at current dose and be considered to be tapered after 6-8 weeks post partum if no flare of UCTD.    I shared results of recent paper with her about IVF in patients with SLE and APS:     The pregnancy and live-birth rates were close to what is expected in the general population (28% and 85%, respectively). These results confirm that IVF can be safely and successfully performed in women with SLE and/or APS who are in remission and receiving adequate treatment.      Yung P, Ji A, Mercy Rome V, et al. In vitro fertilization in 37 women with systemic lupus erythematosus or antiphospholipid syndrome: a series of 97 procedures. [published  online January 15, 2017] J Rheumatol. doi: 10.3899/[email or web address]      I reassured her that I expect her UCTD not to flare during pregnancy, but will monitor her closely.    I ordered labs including repeat RNP Ab.    She will do f/u with MFM.     HCQ monitoring. Eye exam is updated.    I'll see her back in 12/2017.      Orders Placed This Encounter   Procedures     AST     ALT     Creatinine     Complement C4     Complement C3     Complement total     DNA double stranded antibodies     Routine UA with micro reflex to culture     Protein  random urine with Creat Ratio     Creatinine random urine     CRP inflammation     Erythrocyte sedimentation rate auto     RNP Antibody IgG     Cardiolipin Alissa IgG and IgM     Lupus Anticoagulant Panel     Beta 2 Glycoprotein 1 Antibody IgG     Beta 2 Glycoprotein 1 Antibody IgM                   Active Problem List:     Patient Active Problem List    Diagnosis Date Noted     Pregnancy resulting from in vitro fertilization in first trimester 09/08/2017     Priority: Medium     Will need fetal echo 22-24 weeks       MCTD (mixed connective tissue disease) (H) 09/06/2017     Priority: Medium     On Plaquenil- OK per MFM       Supervision of high-risk pregnancy of elderly multigravida, IVF, TRICIA YAN 09/06/2017     Priority: Medium     JOSEPH 4/11/18 by IVF date, dating u/s consistent    9/6/17: OB intake   Labs ordered   Needs GC/CT at NOB ___  Admitted to inpatient for IV antibiotic treatment of UTI   Desires 1st trimester screening and level 2, MFM referral placed   Will need fetal echo d/t IVF       UTI (urinary tract infection) 09/06/2017     Priority: Medium     8/31/17: >100, 000 E. Coli, extended spectrum beta lactamase  Macrobid PO- no improvement    9/6/17: Admitted to inpatient:  Ertapenem IV x 2 inpatient.  Remaining doses at infusion center  F/U appt with Dr. Mansfield 9/15/17       Xerosis cutis 07/21/2017     Priority: Medium     Overview:       Xerosis of the hands which is  moderately severe and associated with fissuring.       Family history of genetic disorder 12/23/2015     Priority: Medium     Patient's sister's son with Elijah's disease       Female infertility 04/04/2014     Priority: Medium     IVF with first pregnancy.       Anxiety 04/04/2014     Priority: Medium     Has weaned off Zoloft, feels stable.  Will consider re-starting if she feels anxiety is increasing.       Female fertility problem 03/03/2014     Priority: Medium     Tension headache 03/03/2014     Priority: Medium     Dysuria 08/14/2013     Priority: Medium     Family history of malignant neoplasm of breast 07/12/2013     Priority: Medium     Overview:   Paternal GM       Decreased libido 12/12/2012     Priority: Medium     Overview:   Due to Effexor.       Irritable colon 10/26/2009     Priority: Medium     Panic disorder without agoraphobia 02/06/2008     Priority: Medium     Overview:   Panic Disorder w/o Agoraphobia       Eating disorder 02/05/2004     Priority: Medium     History of, feels stable now.              History of Present Illness:   Heather Guillory presents for f/u for probable mixed connective tissue disease. Last seen in 1-17, when HCQ was continued.    Background: received diagnosis of mixed connective tissue given pain in hand and feet, stiffness, Raynaud's  Syndrome, fatigue, positive NINOSKA and anti-RNP in 2016. Ms. Guillory first experienced swelling and pain in her fingers and feet during her second pregnancy this past spring that prevented her from wearing rings on her fingers and provoked an evaluation for DVT, which was negative. Despite weight loss post partum, her pain, mild swelling, and stiffness in her digits persisted. She described the pain as a 7/10 when she wakes to feed her infant in the middle of the night and in the morning that decreases to a 2-3/10 over the course of the day. Stiffness lasts 30 mins to 1 hour in the morning. In general she feels tired all the time, but cannot  determine if it is associated with that fact that she has two young children and is breastfeeding every 2 hours or if it is related to her joint symptoms. She was evaluated by an internist who found that she had a positive NINOSKA/ She saw Dr. Iniguez in 8-16 who discovered +RNP. She started Plaquenil 200 mg BID.    Interval history 7/14/17  Heather Guillory is stable since her last visit in January, despite feeling more fatigued recently. She denied arthralgias/myalgias but reports having sore feet/toes at end of day and when she first wakes up. She states the fatigue worsened since she ran out of Zoloft 3.5 weeks ago; also reports irritability and no sleeping soundly during the night since that time too. Zoloft prescribed by her OBGYN.     She is currently receiving fertility treatment and plans to undergo embryo transfer on 7/24/17. She remains on HCQ and feels comfortable continuing this medication during pregnancy. She wonders if taking HCQ once daily (400) rather than 200 twice daily is acceptable.  She is using estrogen therapy in preparation for the embryo transfer, and relates that her gynecologist recommends that she remained on the supplement for approximately one month after the transfer.    She recently pinched a nerve in her neck and has numbness/tingling with burning sensation in her left arm that radiates down into her 1st-3rd digits. She was evaluated at [facility] orthoped[facility], received percocet for pain and told to have PT. If it did not improve in 6 weeks, she will return for another evaluation.     Lastly, she had dry, rough skin on lateral aspects of fingers. She states this often occurs in winter due to weather but it is new for her this summer. She is using a lot of moisturizers but it has not been helping.         Today: Heather is former patient of Dr. Brewer, is seeing me for 2nd opinion and is transferring care. She is currently pregnant and is concerned about her flare of MCTD during pregnancy,  especially worried about flare being triggered by IVF.     She had her 1st pregnancy in 2/2015. It was via IVF. It was unexplained fertility. It was uneventful. Her son was born term. His 2nd son was born natuarally in 5/2016, term and healthy.       At 6 week post partum check up, she mentioned AM stiffness and stiffness of hands (new), feet were painful. Hands were swollen. Had fatigue, was breastfeeding att hat time.    No hair loss, skin rash, photosensitivity (but burns easily), oral/nasal ulcers, or sicca.    Has h/o Raynaud's since teen. Fingers turn white, toes turn purple, not painful. They feel cold not painful.    No myositis.     No serositis.    No seziures, headaches, psychosis.    Has sensitive stomach.    No fevers.     No h/o proteinuria.    She was diagnosed with MTCD in 6/2016. She started taking HCQ in 9/2016.  She started HCQ after she stopped breastfeeding. Had one flare up after stopping breastfeeding. She started  bid, last eye exam was this summer of 2017.     No prednisone.    Lack of sleep causes joint pain.     She did another round of IVF in 4/2017.  She was on OC x 10 wk . Embryo transfer was 7/14/2017. Now is off estrogen, progestron x 2 wk, now 12 wk pregnant.     No flare of her disease after IVF.    She was on ASA 81 mg qd till she was confirmed to be pregnant.    No preganncy loses. No thrombosis.    Feet feel sore and achy only with lack of sleep.     JOSEPH is 4/11/2018.          Review of Systems:   A comprehensive ROS was done. Positives are per HPI.          Past Medical History:     Past Medical History:   Diagnosis Date     Abnormal Pap smear     Over 10 years ago     Anxiety      E coli infection     ESBL     History of extended-spectrum beta-lactamase producing Escherichia coli infection      Hx of previous reproductive problem 12/2013    Infertility     Mixed connective tissue disease (H)     Dr. Steele- switching      Nephrolithiasis      Past Surgical History:    Procedure Laterality Date     BIOPSY  April 2014    Uterine polyp removed     DILATION AND CURETTAGE SUCTION       OPERATIVE HYSTEROSCOPY WITH MORCELLATOR  4/8/2014    Procedure: OPERATIVE HYSTEROSCOPY WITH MORCELLATOR;  Hysteroscopic Polypectomy;  Surgeon: Cate Mansfield MD;  Location: [facility] OR     Raynaud's syndrome since puberty. Her main trigger is cold.  She had two uneventful pregnancies with vaginal births, though the conception of her first child required IVF.   she is undergoing embryo transfer in July 2017 in an attempt to conceive again. Estrogen therapy was used in preparation for and following embryo transfer.   cervical radiculopathy, 2017.       Social History:     Social History     Occupational History      [employer]     Social History Main Topics     Smoking status: Never Smoker     Smokeless tobacco: Never Used     Alcohol use No     Drug use: No     Sexual activity: Yes     Partners: Male     Birth control/ protection: None            Family History:     Family History   Problem Relation Age of Onset     DIABETES Paternal Grandfather      C.A.D. Paternal Grandfather      Hypertension Paternal Grandfather      Other Cancer Paternal Grandfather      Lung     Breast Cancer Paternal Grandmother      CANCER Paternal Grandmother      lung ca     OSTEOPOROSIS Paternal Grandmother      Hypertension Paternal Grandmother      Cancer - colorectal Maternal Grandfather      Colon Cancer Maternal Grandfather      CANCER Maternal Grandmother      cervical ca     Neurologic Disorder Maternal Grandmother      alzheimers     Alzheimer Disease Maternal Grandmother      Cardiovascular Brother 28     cardiomyopathy     Genetic Disorder Sister 21     una's disease     HEART DISEASE Paternal Uncle 55     mitral valve replacement     Anxiety Disorder Sister      Anxiety Disorder Brother      Genetic Disorder Sister      Una's Disease     No history of AI disease       Allergies:  "    Allergies   Allergen Reactions     Kiwi Other (See Comments)     Throat itching     Benzoyl Peroxide Swelling     Duricef [Cefadroxil] Unknown     Monistat [Miconazole] Swelling     Sulfa Drugs Rash            Medications:     Current Outpatient Prescriptions   Medication Sig Dispense Refill     sertraline (ZOLOFT) 25 MG tablet Take 1 tablet (25 mg) by mouth daily Increase to 50 mg daily after one week if symptoms improved but not enough 90 tablet 6     Prenatal Vit-Fe Fumarate-FA (PRENATAL VITAMINS) 28-0.8 MG TABS Take 1 tablet by mouth daily        hydroxychloroquine (PLAQUENIL) 200 MG tablet Take 200 mg by mouth 2 times daily       ondansetron (ZOFRAN) 4 MG tablet Take 1 tablet (4 mg) by mouth every 4 hours as needed for nausea or vomiting (Patient not taking: Reported on 9/28/2017) 20 tablet 0            Physical Exam:   Blood pressure 110/75, pulse 72, height 1.702 m (5' 7\"), weight 75.6 kg (166 lb 9.6 oz), SpO2 98 %, not currently breastfeeding.  Wt Readings from Last 4 Encounters:   09/28/17 75.6 kg (166 lb 9.6 oz)   09/25/17 77 kg (169 lb 11.2 oz)   09/15/17 75.6 kg (166 lb 11.2 oz)   09/06/17 76.2 kg (167 lb 14.4 oz)     Constitutional: well-developed, appearing stated age; cooperative  Eyes: nl EOM, PERRLA, vision, conjunctiva, sclera  ENT: nl external ears, nose, hearing, lips, teeth, gums, throat  No mucous membrane lesions, normal saliva pool  Neck: no mass or thyroid enlargement  Resp: lungs clear to auscultation  CV: RRR, no murmurs, rubs or gallops, no edema  GI: no ABD mass or tenderness  Lymph: no cervical, supraclavicular nodes  MS: no active synovitis or joint tenderness  Skin: no skin rash  Neuro: nl cranial nerves, strength  Psych: nl affect.         Data:     Results for orders placed or performed during the hospital encounter of 09/27/17   MFM Nuchal Transluc w US Single    Narrative    "         NT  ---------------------------------------------------------------------------------------------------------  Pat. Name: RITA GAONA       Study Date:  2017 9:17am  Pat. NO:  [id number]        Referring  MD: BUTCH HOPE  Site:  [facility]       Sonographer: Gino Huffman RDMS  :  1980        Age:   37  ---------------------------------------------------------------------------------------------------------    INDICATION  ---------------------------------------------------------------------------------------------------------  Advanced Maternal Age, In Vitro Fertilization.      METHOD  ---------------------------------------------------------------------------------------------------------  Transabdominal ultrasound examination. View: Good view.      PREGNANCY  ---------------------------------------------------------------------------------------------------------  Ham pregnancy. Number of fetuses: 1.      DATING  ---------------------------------------------------------------------------------------------------------                                           Date                                Details                                                                                      Gest. age                      JOSEPH  Conception                         2017                        Conception: IVF                                                                         12 w + 1 d                     4/10/2018  Embryo transfer                  2017                        IVF / ET: 5 d                                                                               12 w + 1 d                     4/10/2018  External assessment          2017                          GA: 9 w + 1 d                                                                             12 w + 1 d                     4/10/2018  U/S                                   2017                         based upon  CRL                                                                        12 w + 2 d                     4/9/2018  Assigned dating                  Dating performed on 09/27/2017, based on the conception date                                            12 w + 1 d                     4/10/2018      GENERAL EVALUATION  ---------------------------------------------------------------------------------------------------------  Cardiac activity: present.  Placenta: right lateral.  Cord vessels: normal insertion.  Amniotic fluid: normal amount.      FETAL BIOMETRY  ---------------------------------------------------------------------------------------------------------  CRL                                    56.6            mm                                        12w 2d                               [place]  NT                                      1.5              mm                                                                                   FHR                                    155             bpm                                                                                      FETAL ANATOMY  ---------------------------------------------------------------------------------------------------------  Neck: Normal Nuchal Translucency    The following structures were visualized:  Cranium. Face: Nasal bone present. Abdominal wall. GI tract. Urogenital tract. Arms. Legs.      MATERNAL STRUCTURES  ---------------------------------------------------------------------------------------------------------  Cervix                                  Visualized, Appears Closed.  Right Ovary                          Visualized.  Left Ovary                            Visualized.      RECOMMENDATION  ---------------------------------------------------------------------------------------------------------  Thank you for referring your patient for first trimester screening.    Your patient had cell free DNA screening today. The  results of this screen will be forwarded to you as soon as they are available. Because cell free DNA screening does not  screen for open neural tube defects, the patient should be offered MSAFP screening at 15-20 weeks gestation.    Targeted ultrasound is scheduled at 18 weeks. A fetal echocardiogram is ordered for 20-22 weeks.    Return to primary provider for continued prenatal care.    If you have questions regarding today's evaluation or if we can be of further service, please contact the [facility].    **Fetal anomalies may be present but not detected**.        Impression    IMPRESSION  ---------------------------------------------------------------------------------------------------------  1) Ham intrauterine pregnancy at 12 & 0/7 weeks gestational age.  2) The nuchal translucency measurement is within the normal range.  3) The nasal bone was visualized.         Labs from outside laboratory reviewed from 8/12/16  RNP antibodies 1.6- positive    Negative for Parvo Virus B19, Lyme Disease  Negative SLE panel   Negative SSA, SSB  Negative Anti-Sury-1  Negative Centromere B antibodies  Negative  CCP  Recent Labs   Lab Test  07/22/16   1110  05/29/16   0651  05/27/16   1032   01/09/16   0724   WBC  8.1   --   15.4*   --   16.8*   RBC  4.86   --   4.25   --   4.75   HGB  14.1  11.2*  12.4   < >  14.2   HCT  42.1   --   37.6   --   40.1   MCV  87   --   89   --   84   RDW  13.0   --   14.0   --   13.7   PLT  228   --   184   --   202   ALBUMIN   --    --    --    --   3.3*   CRP  3.0   --    --    --    --    BUN   --    --    --    --   9    < > = values in this interval not displayed.      Recent Labs   Lab Test  07/22/16   1110   TSH  1.10     Hemoglobin   Date Value Ref Range Status   09/07/2017 13.1 11.7 - 15.7 g/dL Final   07/14/2017 13.8 11.7 - 15.7 g/dL Final   01/18/2017 12.8 11.7 - 15.7 g/dL Final     Urea Nitrogen   Date Value Ref Range Status   01/09/2016 9 7 - 30 mg/dL Final      Sed Rate   Date Value Ref Range Status   07/22/2016 9 0 - 20 mm/h Final     CRP Inflammation   Date Value Ref Range Status   10/24/2016 <2.9 0.0 - 8.0 mg/L Final   10/05/2016 42.0 (H) 0.0 - 8.0 mg/L Final   07/22/2016 3.0 0.0 - 8.0 mg/L Final     AST   Date Value Ref Range Status   01/09/2016 12 0 - 45 U/L Final     Albumin   Date Value Ref Range Status   01/09/2016 3.3 (L) 3.4 - 5.0 g/dL Final     Alkaline Phosphatase   Date Value Ref Range Status   01/09/2016 70 40 - 150 U/L Final     ALT   Date Value Ref Range Status   01/09/2016 15 0 - 50 U/L Final     Rheumatoid Factor   Date Value Ref Range Status   07/22/2016 <20 <20 IU/mL Final     RHEUM RESULTS Latest Ref Rng & Units 1/18/2017 7/14/2017 9/7/2017   SED RATE 0 - 20 mm/h - - -   CRP, INFLAMMATION 0.0 - 8.0 mg/L - - -   RHEUMATOID FACTOR <20 IU/mL - - -   NINOSKA SCREEN BY EIA <1.0 - - -   AST 0 - 45 U/L - - -   ALT 0 - 50 U/L - - -   ALBUMIN 3.4 - 5.0 g/dL - - -   WBC 4.0 - 11.0 10e9/L 5.3 6.4 5.1   RBC 3.8 - 5.2 10e12/L 4.58 4.91 4.62   HGB 11.7 - 15.7 g/dL 12.8 13.8 13.1   HCT 35.0 - 47.0 % 39.2 43.0 38.6   MCV 78 - 100 fl 86 88 84   MCHC 31.5 - 36.5 g/dL 32.7 32.1 33.9   RDW 10.0 - 15.0 % 14.2 12.2 12.4    - 450 10e9/L 188 193 168   CREATININE 0.52 - 1.04 mg/dL 0.63 0.60 -   GFR ESTIMATE, IF BLACK >60 mL/min/1.7m2 >90  African American GFR Calc   >90   GFR Calc   -   GFR ESTIMATE >60 mL/min/1.7m2 >90  Non African American GFR Calc   >90  Non  GFR Calc   -     Reviewed Rheumatology lab flowsheet

## 2017-09-28 NOTE — TELEPHONE ENCOUNTER
----- Message from Jess Hunt sent at 9/28/2017 10:56 AM CDT -----  Regarding: Yeast Infection - OTC products not working  Contact: [phone]  Has yeast infection since last Saturday.  OTC cream not working.  What else can she try for relief.

## 2017-09-28 NOTE — LETTER
2017      RE: Heather Guillory  [id number] [facility] [id number]       Rheumatology Visit     Heather Guillory MRN# [id number]   YOB: 1980 Age: 37 year old     Date of Last Visit with Dr. Brewer: 2017  DOS: 2017        Assessment and Plan:   Undifferentiated connective tissue disease (UCTD) and pregnancy via IVF (JOSEPH 2018):    Heather is a 36 yo WF , a former patient of Dr. Brewer who is being seen today for the 1st time in my clinic. She was diagnosed with MCTD in 2016, 6 wk postpartum based on fatigue, arthritis, mild Raynaud's, low positive NINOSKA 1.1 and low positive RNP Ab 1.6. She is on  mg qd since 2017 with great response.    She had neg SSA/SSB antibodies in 2016, no concern for CHB.    She never had any miscarriages or thrombosis, but will check APS panel.    She had neg anti-Sm, neg anti-DNA and neg centromere Ab in 2016.    I reviewed Heather's chart and discussed with her that I believe her most likely diagnosis is UCTD not MCTD as she does not have classic features of MCTD, had very low titer of RNP Ab in the past and had a mild disease which never required prednisone.    Since her disease is under excellent control on HCQ, I don't expect flare up with IVF or during pregnancy; however given safety of HCQ during pregnancy and breastfeeding, recommend HCQ to be continued at current dose and be considered to be tapered after 6-8 weeks post partum if no flare of UCTD.    I shared results of recent paper with her about IVF in patients with SLE and APS:     The pregnancy and live-birth rates were close to what is expected in the general population (28% and 85%, respectively). These results confirm that IVF can be safely and successfully performed in women with SLE and/or APS who are in remission and receiving adequate treatment.      Yung VELASQUEZ, Ji PADGETT, [facility] Rome V, et al. In vitro fertilization in 37 women with systemic lupus  erythematosus or antiphospholipid syndrome: a series of 97 procedures. [published online January 15, 2017] J Rheumatol. doi: 10.3899/[email or web address].395466      I reassured her that I expect her UCTD not to flare during pregnancy, but will monitor her closely.    I ordered labs including repeat RNP Ab.    She will do f/u with MFM.     HCQ monitoring. Eye exam is updated.    I'll see her back in 12/2017.      Orders Placed This Encounter   Procedures     AST     ALT     Creatinine     Complement C4     Complement C3     Complement total     DNA double stranded antibodies     Routine UA with micro reflex to culture     Protein  random urine with Creat Ratio     Creatinine random urine     CRP inflammation     Erythrocyte sedimentation rate auto     RNP Antibody IgG     Cardiolipin Alissa IgG and IgM     Lupus Anticoagulant Panel     Beta 2 Glycoprotein 1 Antibody IgG     Beta 2 Glycoprotein 1 Antibody IgM                   Active Problem List:     Patient Active Problem List    Diagnosis Date Noted     Pregnancy resulting from in vitro fertilization in first trimester 09/08/2017     Priority: Medium     Will need fetal echo 22-24 weeks       MCTD (mixed connective tissue disease) (H) 09/06/2017     Priority: Medium     On Plaquenil- OK per MFM       Supervision of high-risk pregnancy of elderly multigravida, IVF, TRICIA YAN 09/06/2017     Priority: Medium     JOSEPH 4/11/18 by IVF date, dating u/s consistent    9/6/17: OB intake   Labs ordered   Needs GC/CT at NOB ___  Admitted to inpatient for IV antibiotic treatment of UTI   Desires 1st trimester screening and level 2, MFM referral placed   Will need fetal echo d/t IVF       UTI (urinary tract infection) 09/06/2017     Priority: Medium     8/31/17: >100, 000 E. Coli, extended spectrum beta lactamase  Macrobid PO- no improvement    9/6/17: Admitted to inpatient:  Ertapenem IV x 2 inpatient.  Remaining doses at infusion center  F/U appt with Dr. Mansfield 9/15/17       Xerosis cutis  07/21/2017     Priority: Medium     Overview:       Xerosis of the hands which is moderately severe and associated with fissuring.       Family history of genetic disorder 12/23/2015     Priority: Medium     Patient's sister's son with Elijah's disease       Female infertility 04/04/2014     Priority: Medium     IVF with first pregnancy.       Anxiety 04/04/2014     Priority: Medium     Has weaned off Zoloft, feels stable.  Will consider re-starting if she feels anxiety is increasing.       Female fertility problem 03/03/2014     Priority: Medium     Tension headache 03/03/2014     Priority: Medium     Dysuria 08/14/2013     Priority: Medium     Family history of malignant neoplasm of breast 07/12/2013     Priority: Medium     Overview:   Paternal GM       Decreased libido 12/12/2012     Priority: Medium     Overview:   Due to Effexor.       Irritable colon 10/26/2009     Priority: Medium     Panic disorder without agoraphobia 02/06/2008     Priority: Medium     Overview:   Panic Disorder w/o Agoraphobia       Eating disorder 02/05/2004     Priority: Medium     History of, feels stable now.              History of Present Illness:   Heather Guillory presents for f/u for probable mixed connective tissue disease. Last seen in 1-17, when HCQ was continued.    Background: received diagnosis of mixed connective tissue given pain in hand and feet, stiffness, Raynaud's  Syndrome, fatigue, positive NINOSKA and anti-RNP in 2016. Ms. Guillory first experienced swelling and pain in her fingers and feet during her second pregnancy this past spring that prevented her from wearing rings on her fingers and provoked an evaluation for DVT, which was negative. Despite weight loss post partum, her pain, mild swelling, and stiffness in her digits persisted. She described the pain as a 7/10 when she wakes to feed her infant in the middle of the night and in the morning that decreases to a 2-3/10 over the course of the day. Stiffness lasts 30  mins to 1 hour in the morning. In general she feels tired all the time, but cannot determine if it is associated with that fact that she has two young children and is breastfeeding every 2 hours or if it is related to her joint symptoms. She was evaluated by an internist who found that she had a positive NINOSKA/ She saw Dr. Iniguez in 8-16 who discovered +RNP. She started Plaquenil 200 mg BID.    Interval history 7/14/17  Heather Guillory is stable since her last visit in January, despite feeling more fatigued recently. She denied arthralgias/myalgias but reports having sore feet/toes at end of day and when she first wakes up. She states the fatigue worsened since she ran out of Zoloft 3.5 weeks ago; also reports irritability and no sleeping soundly during the night since that time too. Zoloft prescribed by her OBGYN.     She is currently receiving fertility treatment and plans to undergo embryo transfer on 7/24/17. She remains on HCQ and feels comfortable continuing this medication during pregnancy. She wonders if taking HCQ once daily (400) rather than 200 twice daily is acceptable.  She is using estrogen therapy in preparation for the embryo transfer, and relates that her gynecologist recommends that she remained on the supplement for approximately one month after the transfer.    She recently pinched a nerve in her neck and has numbness/tingling with burning sensation in her left arm that radiates down into her 1st-3rd digits. She was evaluated at [facility] orthopedics, received percocet for pain and told to have PT. If it did not improve in 6 weeks, she will return for another evaluation.     Lastly, she had dry, rough skin on lateral aspects of fingers. She states this often occurs in winter due to weather but it is new for her this summer. She is using a lot of moisturizers but it has not been helping.         Today: Heather is former patient of Dr. Brewer, is seeing me for 2nd opinion and is transferring care. She  is currently pregnant and is concerned about her flare of MCTD during pregnancy, especially worried about flare being triggered by IVF.     She had her 1st pregnancy in 2/2015. It was via IVF. It was unexplained fertility. It was uneventful. Her son was born term. His 2nd son was born natuarally in 5/2016, term and healthy.       At 6 week post partum check up, she mentioned AM stiffness and stiffness of hands (new), feet were painful. Hands were swollen. Had fatigue, was breastfeeding att hat time.    No hair loss, skin rash, photosensitivity (but burns easily), oral/nasal ulcers, or sicca.    Has h/o Raynaud's since teen. Fingers turn white, toes turn purple, not painful. They feel cold not painful.    No myositis.     No serositis.    No seziures, headaches, psychosis.    Has sensitive stomach.    No fevers.     No h/o proteinuria.    She was diagnosed with MTCD in 6/2016. She started taking HCQ in 9/2016.  She started HCQ after she stopped breastfeeding. Had one flare up after stopping breastfeeding. She started  bid, last eye exam was this summer of 2017.     No prednisone.    Lack of sleep causes joint pain.     She did another round of IVF in 4/2017.  She was on OC x 10 wk . Embryo transfer was 7/14/2017. Now is off estrogen, progestron x 2 wk, now 12 wk pregnant.     No flare of her disease after IVF.    She was on ASA 81 mg qd till she was confirmed to be pregnant.    No preganncy loses. No thrombosis.    Feet feel sore and achy only with lack of sleep.     JOSEPH is 4/11/2018.          Review of Systems:   A comprehensive ROS was done. Positives are per HPI.          Past Medical History:     Past Medical History:   Diagnosis Date     Abnormal Pap smear     Over 10 years ago     Anxiety      E coli infection     ESBL     History of extended-spectrum beta-lactamase producing Escherichia coli infection      Hx of previous reproductive problem 12/2013    Infertility     Mixed connective tissue disease (H)      Dr. Steele- switching      Nephrolithiasis      Past Surgical History:   Procedure Laterality Date     BIOPSY  April 2014    Uterine polyp removed     DILATION AND CURETTAGE SUCTION       OPERATIVE HYSTEROSCOPY WITH MORCELLATOR  4/8/2014    Procedure: OPERATIVE HYSTEROSCOPY WITH MORCELLATOR;  Hysteroscopic Polypectomy;  Surgeon: Cate Mansfield MD;  Location: [facility] OR     Raynaud's syndrome since puberty. Her main trigger is cold.  She had two uneventful pregnancies with vaginal births, though the conception of her first child required IVF.   she is undergoing embryo transfer in July 2017 in an attempt to conceive again. Estrogen therapy was used in preparation for and following embryo transfer.   cervical radiculopathy, 2017.       Social History:     Social History     Occupational History      [employer]     Social History Main Topics     Smoking status: Never Smoker     Smokeless tobacco: Never Used     Alcohol use No     Drug use: No     Sexual activity: Yes     Partners: Male     Birth control/ protection: None            Family History:     Family History   Problem Relation Age of Onset     DIABETES Paternal Grandfather      C.A.D. Paternal Grandfather      Hypertension Paternal Grandfather      Other Cancer Paternal Grandfather      Lung     Breast Cancer Paternal Grandmother      CANCER Paternal Grandmother      lung ca     OSTEOPOROSIS Paternal Grandmother      Hypertension Paternal Grandmother      Cancer - colorectal Maternal Grandfather      Colon Cancer Maternal Grandfather      CANCER Maternal Grandmother      cervical ca     Neurologic Disorder Maternal Grandmother      alzheimers     Alzheimer Disease Maternal Grandmother      Cardiovascular Brother 28     cardiomyopathy     Genetic Disorder Sister 21     una's disease     HEART DISEASE Paternal Uncle 55     mitral valve replacement     Anxiety Disorder Sister      Anxiety Disorder Brother      Genetic Disorder  "Sister      Elijah's Disease     No history of AI disease       Allergies:     Allergies   Allergen Reactions     Kiwi Other (See Comments)     Throat itching     Benzoyl Peroxide Swelling     Duricef [Cefadroxil] Unknown     Monistat [Miconazole] Swelling     Sulfa Drugs Rash            Medications:     Current Outpatient Prescriptions   Medication Sig Dispense Refill     sertraline (ZOLOFT) 25 MG tablet Take 1 tablet (25 mg) by mouth daily Increase to 50 mg daily after one week if symptoms improved but not enough 90 tablet 6     Prenatal Vit-Fe Fumarate-FA (PRENATAL VITAMINS) 28-0.8 MG TABS Take 1 tablet by mouth daily        hydroxychloroquine (PLAQUENIL) 200 MG tablet Take 200 mg by mouth 2 times daily       ondansetron (ZOFRAN) 4 MG tablet Take 1 tablet (4 mg) by mouth every 4 hours as needed for nausea or vomiting (Patient not taking: Reported on 9/28/2017) 20 tablet 0            Physical Exam:   Blood pressure 110/75, pulse 72, height 1.702 m (5' 7\"), weight 75.6 kg (166 lb 9.6 oz), SpO2 98 %, not currently breastfeeding.  Wt Readings from Last 4 Encounters:   09/28/17 75.6 kg (166 lb 9.6 oz)   09/25/17 77 kg (169 lb 11.2 oz)   09/15/17 75.6 kg (166 lb 11.2 oz)   09/06/17 76.2 kg (167 lb 14.4 oz)     Constitutional: well-developed, appearing stated age; cooperative  Eyes: nl EOM, PERRLA, vision, conjunctiva, sclera  ENT: nl external ears, nose, hearing, lips, teeth, gums, throat  No mucous membrane lesions, normal saliva pool  Neck: no mass or thyroid enlargement  Resp: lungs clear to auscultation  CV: RRR, no murmurs, rubs or gallops, no edema  GI: no ABD mass or tenderness  Lymph: no cervical, supraclavicular nodes  MS: no active synovitis or joint tenderness  Skin: no skin rash  Neuro: nl cranial nerves, strength  Psych: nl affect.         Data:     Results for orders placed or performed during the hospital encounter of 09/27/17   MFM Nuchal Transluc w US Single    Narrative    "         NT  ---------------------------------------------------------------------------------------------------------  Pat. Name: RITA GAONA       Study Date:  2017 9:17am  Pat. NO:  [id number]        Referring  MD: BUTCH HOPE  Site:  [facility]       Sonographer: Gino Huffman RDMS  :  1980        Age:   37  ---------------------------------------------------------------------------------------------------------    INDICATION  ---------------------------------------------------------------------------------------------------------  Advanced Maternal Age, In Vitro Fertilization.      METHOD  ---------------------------------------------------------------------------------------------------------  Transabdominal ultrasound examination. View: Good view.      PREGNANCY  ---------------------------------------------------------------------------------------------------------  Ham pregnancy. Number of fetuses: 1.      DATING  ---------------------------------------------------------------------------------------------------------                                           Date                                Details                                                                                      Gest. age                      JOSEPH  Conception                         2017                        Conception: IVF                                                                         12 w + 1 d                     4/10/2018  Embryo transfer                  2017                        IVF / ET: 5 d                                                                               12 w + 1 d                     4/10/2018  External assessment          2017                          GA: 9 w + 1 d                                                                             12 w + 1 d                     4/10/2018  U/S                                   2017                         based upon  CRL                                                                        12 w + 2 d                     4/9/2018  Assigned dating                  Dating performed on 09/27/2017, based on the conception date                                            12 w + 1 d                     4/10/2018      GENERAL EVALUATION  ---------------------------------------------------------------------------------------------------------  Cardiac activity: present.  Placenta: right lateral.  Cord vessels: normal insertion.  Amniotic fluid: normal amount.      FETAL BIOMETRY  ---------------------------------------------------------------------------------------------------------  CRL                                    56.6            mm                                        12w 2d                               [place]  NT                                      1.5              mm                                                                                   FHR                                    155             bpm                                                                                      FETAL ANATOMY  ---------------------------------------------------------------------------------------------------------  Neck: Normal Nuchal Translucency    The following structures were visualized:  Cranium. Face: Nasal bone present. Abdominal wall. GI tract. Urogenital tract. Arms. Legs.      MATERNAL STRUCTURES  ---------------------------------------------------------------------------------------------------------  Cervix                                  Visualized, Appears Closed.  Right Ovary                          Visualized.  Left Ovary                            Visualized.      RECOMMENDATION  ---------------------------------------------------------------------------------------------------------  Thank you for referring your patient for first trimester screening.    Your patient had cell free DNA screening today. The  results of this screen will be forwarded to you as soon as they are available. Because cell free DNA screening does not  screen for open neural tube defects, the patient should be offered MSAFP screening at 15-20 weeks gestation.    Targeted ultrasound is scheduled at 18 weeks. A fetal echocardiogram is ordered for 20-22 weeks.    Return to primary provider for continued prenatal care.    If you have questions regarding today's evaluation or if we can be of further service, please contact the [facility].    **Fetal anomalies may be present but not detected**.        Impression    IMPRESSION  ---------------------------------------------------------------------------------------------------------  1) Ham intrauterine pregnancy at 12 & 0/7 weeks gestational age.  2) The nuchal translucency measurement is within the normal range.  3) The nasal bone was visualized.         Labs from outside laboratory reviewed from 8/12/16  RNP antibodies 1.6- positive    Negative for Parvo Virus B19, Lyme Disease  Negative SLE panel   Negative SSA, SSB  Negative Anti-Sury-1  Negative Centromere B antibodies  Negative  CCP  Recent Labs   Lab Test  07/22/16   1110  05/29/16   0651  05/27/16   1032   01/09/16   0724   WBC  8.1   --   15.4*   --   16.8*   RBC  4.86   --   4.25   --   4.75   HGB  14.1  11.2*  12.4   < >  14.2   HCT  42.1   --   37.6   --   40.1   MCV  87   --   89   --   84   RDW  13.0   --   14.0   --   13.7   PLT  228   --   184   --   202   ALBUMIN   --    --    --    --   3.3*   CRP  3.0   --    --    --    --    BUN   --    --    --    --   9    < > = values in this interval not displayed.      Recent Labs   Lab Test  07/22/16   1110   TSH  1.10     Hemoglobin   Date Value Ref Range Status   09/07/2017 13.1 11.7 - 15.7 g/dL Final   07/14/2017 13.8 11.7 - 15.7 g/dL Final   01/18/2017 12.8 11.7 - 15.7 g/dL Final     Urea Nitrogen   Date Value Ref Range Status   01/09/2016 9 7 - 30 mg/dL Final      Sed Rate   Date Value Ref Range Status   07/22/2016 9 0 - 20 mm/h Final     CRP Inflammation   Date Value Ref Range Status   10/24/2016 <2.9 0.0 - 8.0 mg/L Final   10/05/2016 42.0 (H) 0.0 - 8.0 mg/L Final   07/22/2016 3.0 0.0 - 8.0 mg/L Final     AST   Date Value Ref Range Status   01/09/2016 12 0 - 45 U/L Final     Albumin   Date Value Ref Range Status   01/09/2016 3.3 (L) 3.4 - 5.0 g/dL Final     Alkaline Phosphatase   Date Value Ref Range Status   01/09/2016 70 40 - 150 U/L Final     ALT   Date Value Ref Range Status   01/09/2016 15 0 - 50 U/L Final     Rheumatoid Factor   Date Value Ref Range Status   07/22/2016 <20 <20 IU/mL Final     RHEUM RESULTS Latest Ref Rng & Units 1/18/2017 7/14/2017 9/7/2017   SED RATE 0 - 20 mm/h - - -   CRP, INFLAMMATION 0.0 - 8.0 mg/L - - -   RHEUMATOID FACTOR <20 IU/mL - - -   NINOSKA SCREEN BY EIA <1.0 - - -   AST 0 - 45 U/L - - -   ALT 0 - 50 U/L - - -   ALBUMIN 3.4 - 5.0 g/dL - - -   WBC 4.0 - 11.0 10e9/L 5.3 6.4 5.1   RBC 3.8 - 5.2 10e12/L 4.58 4.91 4.62   HGB 11.7 - 15.7 g/dL 12.8 13.8 13.1   HCT 35.0 - 47.0 % 39.2 43.0 38.6   MCV 78 - 100 fl 86 88 84   MCHC 31.5 - 36.5 g/dL 32.7 32.1 33.9   RDW 10.0 - 15.0 % 14.2 12.2 12.4    - 450 10e9/L 188 193 168   CREATININE 0.52 - 1.04 mg/dL 0.63 0.60 -   GFR ESTIMATE, IF BLACK >60 mL/min/1.7m2 >90  African American GFR Calc   >90   GFR Calc   -   GFR ESTIMATE >60 mL/min/1.7m2 >90  Non African American GFR Calc   >90  Non  GFR Calc   -     Reviewed Rheumatology lab flowsheet    Repeat anti-RNP is negative, this is great news! These labs are normal.    Nasrin King MD

## 2017-09-28 NOTE — MR AVS SNAPSHOT
After Visit Summary   9/28/2017    Heather Guillory    MRN: [id number]           Patient Information     Date Of Birth          1980        Visit Information        Provider Department      9/28/2017 8:00 AM Nasrin King MD [facility]        Today's Diagnoses     Undifferentiated connective tissue disease (H)    -  1      Care Instructions    Diagnosis: UCTD (undifferentiated connective tissue disease)    Stay on plaquenil, safe in pregnancy and with breastfeeding    Labs today    Follow up on 12/14/2017          Follow-ups after your visit        Your next 10 appointments already scheduled     Dec 11, 2017  1:45 PM CST   RETURN OB with Cate Mansfield MD   [facility] ([facility])    [place] [facility] [address]  [address]  [address]  [facility] [address] [phone]            Dec 11, 2017  2:15 PM CST   GRACIE READ SCREEN FETAL EHCO Ham with [id number]   [facility]aternal [facility]etal Medicine Ultrasound - [place] ([facility])    [address]  [facility] [id number] [phone]            Dec 11, 2017  2:45 PM CST   Radiology MD with UR GRACIE YAN   ealth Maternal Fetal Medicine - [place] ([facility])    [address]  [facility] [address] [phone]           Please arrive at the time given for your first appointment. This visit is used internally to schedule the physician's time during your ultrasound.            Dec 14, 2017  8:30 AM CST   (Arrive by 8:15 AM)   Return Visit with Nasrin King MD   [facility] ([facility])    [address] [place] [address]  3rd [facility] [address]   [phone]            Dec 22, 2017  9:00 AM CST   ULTRASOUND with [facility] ULTRASOUND   [facility] ([facility])    [place] [facility] [address]  [address]  [address]  "[address]  [facility] [id number] [phone]              Who to contact     If you have questions or need follow up information about today's clinic visit or your schedule please contact [facility] RHEUMATOLOGY directly at [phone].  Normal or non-critical lab and imaging results will be communicated to you by NuvoMedhart, letter or phone within 4 business days after the clinic has received the results. If you do not hear from us within 7 days, please contact the clinic through NuvoMedhart or phone. If you have a critical or abnormal lab result, we will notify you by phone as soon as possible.  Submit refill requests through Pins or call your pharmacy and they will forward the refill request to us. Please allow 3 business days for your refill to be completed.          Additional Information About Your Visit        Pins Information     Pins gives you secure access to your electronic health record. If you see a primary care provider, you can also send messages to your care team and make appointments. If you have questions, please call your primary care clinic.  If you do not have a primary care provider, please call [phone] and they will assist you.        Care EveryWhere ID     This is your Care EveryWhere ID. This could be used by other organizations to access your [facility] medical records  [id number]        Your Vitals Were     Pulse Height Pulse Oximetry BMI (Body Mass Index)          72 1.702 m (5' 7\") 98% 26.09 kg/m2         Blood Pressure from Last 3 Encounters:   11/10/17 101/64   10/13/17 109/71   10/06/17 100/70    Weight from Last 3 Encounters:   11/10/17 78 kg (171 lb 14.4 oz)   10/13/17 76.9 kg (169 lb 9.6 oz)   10/06/17 76.9 kg (169 lb 8 oz)              We Performed the Following     Cardiolipin Alissa IgG and IgM     Lupus Anticoagulant Panel     RNP Antibody IgG        Primary Care Provider    Physician No Ref-Primary       NO REF-PRIMARY PHYSICIAN        Equal Access to Services     ROSELIA OREILLY " AH: Arabella corea Ryanali, wagisselda luqadaha, qaybta kalinda simeon, waxgigi axel archanacas corleyalbakarrie sheikh. So [facility] [phone].    ATENCIÓN: Si habla español, tiene a nelson disposición servicios gratuitos de asistencia lingüística. Llame al [phone].    We comply with applicable federal civil rights laws and [place] laws. We do not discriminate on the basis of race, color, national origin, age, disability, sex, sexual orientation, or gender identity.            Thank you!     Thank you for choosing [facility] RHEUMATOLOGY  for your care. Our goal is always to provide you with excellent care. Hearing back from our patients is one way we can continue to improve our services. Please take a few minutes to complete the written survey that you may receive in the mail after your visit with us. Thank you!             Your Updated Medication List - Protect others around you: Learn how to safely use, store and throw away your medicines at www.disposemymeds.org.          This list is accurate as of: 9/28/17 11:59 PM.  Always use your most recent med list.                   Brand Name Dispense Instructions for use Diagnosis    hydroxychloroquine 200 MG tablet    PLAQUENIL     Take 200 mg by mouth 2 times daily        ondansetron 4 MG tablet    ZOFRAN    20 tablet    Take 1 tablet (4 mg) by mouth every 4 hours as needed for nausea or vomiting    Nausea       Prenatal Vitamins 28-0.8 MG Tabs      Take 1 tablet by mouth daily    Supervision of high-risk pregnancy of elderly multigravida       sertraline 25 MG tablet    ZOLOFT    90 tablet    Take 1 tablet (25 mg) by mouth daily Increase to 50 mg daily after one week if symptoms improved but not enough    Anxiety

## 2017-09-28 NOTE — LETTER
Patient:  Heather Guillory  :   1980  MRN:     [id number]        Ms.Jennifer Guillory  [address] [address]        October 15, 2017    Dear ,    We are writing to inform you of your test results. These labs are normal.      Resulted Orders   RNP Antibody IgG   Result Value Ref Range    RNP Antibody IgG 0.6 0.0 - 0.9 AI      Comment:      Negative  Antibody index (AI) values reflect qualitative changes in antibody   concentration that cannot be directly associated with clinical condition or   disease state.     Cardiolipin Alissa IgG and IgM   Result Value Ref Range    Cardiolipin Antibody IgG <1.6 0.0 - 19.9 GPL-U/mL      Comment:      Negative    Cardiolipin Antibody IgM 0.2 0.0 - 19.9 MPL-U/mL      Comment:      Negative   Lupus Anticoagulant Panel   Result Value Ref Range    Lupus Result Negative NEG^Negative      Comment:      (Note)  COMMENTS:  The INR is normal.  APTT ratio is normal.    DRVVT Screen ratio is normal.  Thrombin time is normal.  NEGATIVE TEST; A LUPUS ANTICOAGULANT WAS NOT DETECTED IN THIS   SPECIMEN WITHIN THE LIMITS OF THE TESTING REPERTOIRE.  If the clinical picture is strongly suggestive of an antiphospholipid   syndrome, recommend anticardiolipin and beta-2-glycoprotein (IgG and   IgM) antibody tests.  Results interpreted by Florence Montero M.D., Pathology Resident,   PGY-4  I personally reviewed the coagulation test results and agree with the   interpretation documented by the resident /fellow and   edited/confirmed by me.  Eva Adkins M.D.  [phone]  2017                  INR =      1.06        Reference range: 0.86-1.14         Thrombin Time=     15.8        Reference range: 13.0-19.0 sec                    APTT:           Ratio       Patient  =      1.06       1:2 Mix  =      N/A       Reference:              Negative: Less than or equal to 1.16            Posit  heather: Greater than or equal to 1.17                             DILUTE  DOUGLSA VIPER VENOM TEST:                 Screen Ratio =     0.88       Normal is less than 1.21              Nasrin King MD

## 2017-09-29 LAB
B2 GLYCOPROT1 IGG SERPL IA-ACNC: 0.7 U/ML
B2 GLYCOPROT1 IGM SERPL IA-ACNC: 1.3 U/ML
BACTERIA SPEC CULT: NORMAL
DSDNA AB SER-ACNC: 4 IU/ML
LA PPP-IMP: NEGATIVE
Lab: NORMAL
SPECIMEN SOURCE: NORMAL

## 2017-10-02 ENCOUNTER — TELEPHONE (OUTPATIENT)
Dept: MATERNAL FETAL MEDICINE | Facility: CLINIC | Age: 37
End: 2017-10-02

## 2017-10-02 LAB — LAB SCANNED RESULT: NORMAL

## 2017-10-02 NOTE — TELEPHONE ENCOUNTER
"I contacted Heather with normal \"Innatal\" free fetal DNA screening results -no aneuploidy detected for chromosomes 13, 18, 21, X or Y. These normal results suggest the likelihood of fetal Down syndrome, trisomy 13, or trisomy 18 is very low. Results consistent with two sex chromosomes (See scanned report under lab tab in epic). Sex chromosome information was NOT disclosed.     These results are reported to have the following sensitivities: Down syndrome- 99%, trisomy 18- 98%, and trisomy 13- 98% with greater than 99% specificity. While this test is a highly accurate screen, it does not replace the diagnostic capabilities of standard prenatal diagnostic tests such as amniocentesis and CVS. Heather indicated her understanding and currently declines prenatal diagnosis.     Plan:  Level II ultrasound is recommended, given advanced maternal age.  This has been scheduled through [facility]. MSAFP is the appropriate second trimester screening test for open neural tube defects; the maternal quad screen is not indicated.      Savana Griffith MS [facility]  Certified Genetic Counselor  Maternal Fetal Medicine  [facility]  Voice Mail:  [phone]  E-Mail:  [email or web address]  Pager:  [phone]    "

## 2017-10-04 LAB — CH50 SERPL-ACNC: 128 CAE UNITS (ref 60–144)

## 2017-10-06 ENCOUNTER — OFFICE VISIT (OUTPATIENT)
Dept: OBGYN | Facility: CLINIC | Age: 37
End: 2017-10-06
Attending: OBSTETRICS & GYNECOLOGY
Payer: COMMERCIAL

## 2017-10-06 VITALS
SYSTOLIC BLOOD PRESSURE: 100 MMHG | BODY MASS INDEX: 26.55 KG/M2 | DIASTOLIC BLOOD PRESSURE: 70 MMHG | WEIGHT: 169.5 LBS | HEART RATE: 73 BPM

## 2017-10-06 DIAGNOSIS — O09.529 SUPERVISION OF HIGH-RISK PREGNANCY OF ELDERLY MULTIGRAVIDA: Primary | ICD-10-CM

## 2017-10-06 DIAGNOSIS — B37.31 YEAST INFECTION OF THE VAGINA: ICD-10-CM

## 2017-10-06 LAB
CLUE CELLS: NORMAL
TRICHOMONAS (WET PREP): NORMAL
YEAST (WET PREP): NORMAL

## 2017-10-06 PROCEDURE — 87591 N.GONORRHOEAE DNA AMP PROB: CPT | Performed by: OBSTETRICS & GYNECOLOGY

## 2017-10-06 PROCEDURE — 87210 SMEAR WET MOUNT SALINE/INK: CPT | Mod: ZF | Performed by: OBSTETRICS & GYNECOLOGY

## 2017-10-06 PROCEDURE — 87491 CHLMYD TRACH DNA AMP PROBE: CPT | Performed by: OBSTETRICS & GYNECOLOGY

## 2017-10-06 PROCEDURE — 99212 OFFICE O/P EST SF 10 MIN: CPT | Mod: ZF

## 2017-10-06 RX ORDER — FLUCONAZOLE 150 MG/1
150 TABLET ORAL ONCE
Qty: 1 TABLET | Refills: 0 | Status: SHIPPED | OUTPATIENT
Start: 2017-10-06 | End: 2017-10-31

## 2017-10-06 ASSESSMENT — PAIN SCALES - GENERAL: PAINLEVEL: NO PAIN (0)

## 2017-10-06 NOTE — NURSING NOTE
Chief Complaint   Patient presents with     Prenatal Care     SOUMYA 13 weeks and 2 days  C/O vaginal itching   Tigist Lloyd LPN

## 2017-10-06 NOTE — LETTER
10/6/2017       RE: Heather Guillory  [address] [facility] [address]     Dear Colleague,    Thank you for referring your patient, Heather Guillory, to the [facility] at [facility]. Please see a copy of my visit note below.      SUBJECTIVE:  Heather Guillory is an 37 year old  Female,  at 13w2d, who presents with complaints of local irritation, vulvar itching and burning.  Onset of symptoms 7 days ago, tried OTC products, but she is allergic and had more vulvar burning since.        Predisposing factors - pregnancy.    Menstrual History:  Menstrual History 2014 10/9/2015 2017   LAST MENSTRUAL PERIOD 2014 -   Menarche age - - 14   Period Cycle (Days) - - 28   Period Duration (Days) - - 4   Method of Contraception - - None   Period Pattern - - Regular   Menstrual Flow - - Heavy;Light   Menstrual Control - - -   Dysmenorrhea - - None   PMS Symptoms - - -   Reviewed Today - - Yes       Past Medical History:   Diagnosis Date     Abnormal Pap smear     Over 10 years ago     Anxiety      E coli infection     ESBL     History of extended-spectrum beta-lactamase producing Escherichia coli infection      Hx of previous reproductive problem 2013    Infertility     Mixed connective tissue disease (H)     Dr. Steele- switching      Nephrolithiasis         OBJECTIVE:   /70  Pulse 73  Wt 76.9 kg (169 lb 8 oz)  Breastfeeding? No  BMI 26.55 kg/m2     She appears well, afebrile.  Abdomen: benign, soft, nontender, no masses.  Fundus = dates, FHTs 154  No CVA tenderness to percussion.    Pelvic Exam:  EG/BUS: Normal genitalia, Bartholin's, Urethra, Brigantine's normal and red    Vagina: moist, pink, rugae with creamy, white and odorlesssecretions  Cervix: no lesions and pink, moist, closed, without lesion or CMT  Uterus:13 week IUP   Adnexa:Within normal limits and No masses, nodularity, tenderness  Rectum:anus normal    POCT UA: not  done and negative UCx x2 since Rx for ESBL UTI.  POCT Wet prep: ph 4.5  monilia      ASSESSMENT:   Encounter Diagnoses   Name Primary?     Supervision of high-risk pregnancy of elderly multigravida, IVF, WHS MD Yes     Yeast infection of the vagina     IUP at 13w2d   Yeast- allergic to OTC meds and creams, recommend diflucan x 1 dose.  Pregnancy data reviewed and patient is out of 1st trimester, given symptoms and findings on exam patient is reassured to take medication    PLAN:   Orders Placed This Encounter   Procedures     Wet Prep POCT     Orders Placed This Encounter   Medications     fluconazole (DIFLUCAN) 150 MG tablet     Sig: Take 1 tablet (150 mg) by mouth once for 1 dose     Dispense:  1 tablet     Refill:  0      GC/CT sent for prenatal labs    Return to clinic prn if symptoms persist or worsen, has SOUMYA next week.      Again, thank you for allowing me to participate in the care of your patient.      Sincerely,    Jackie Zamora MD

## 2017-10-06 NOTE — PROGRESS NOTES
SUBJECTIVE:  Heather Guillory is an 37 year old  Female,  at 13w2d, who presents with complaints of local irritation, vulvar itching and burning.  Onset of symptoms 7 days ago, tried OTC products, but she is allergic and had more vulvar burning since.        Predisposing factors - pregnancy.    Menstrual History:  Menstrual History 2014 10/9/2015 2017   LAST MENSTRUAL PERIOD 2014 -   Menarche age - - 14   Period Cycle (Days) - - 28   Period Duration (Days) - - 4   Method of Contraception - - None   Period Pattern - - Regular   Menstrual Flow - - Heavy;Light   Menstrual Control - - -   Dysmenorrhea - - None   PMS Symptoms - - -   Reviewed Today - - Yes       Past Medical History:   Diagnosis Date     Abnormal Pap smear     Over 10 years ago     Anxiety      E coli infection     ESBL     History of extended-spectrum beta-lactamase producing Escherichia coli infection      Hx of previous reproductive problem 2013    Infertility     Mixed connective tissue disease (H)     Dr. Steele- switching      Nephrolithiasis         OBJECTIVE:   /70  Pulse 73  Wt 76.9 kg (169 lb 8 oz)  Breastfeeding? No  BMI 26.55 kg/m2     She appears well, afebrile.  Abdomen: benign, soft, nontender, no masses.  Fundus = dates, FHTs 154  No CVA tenderness to percussion.    Pelvic Exam:  EG/BUS: Normal genitalia, Bartholin's, Urethra, [place]'s normal and red    Vagina: moist, pink, rugae with creamy, white and odorlesssecretions  Cervix: no lesions and pink, moist, closed, without lesion or CMT  Uterus:13 week IUP   Adnexa:Within normal limits and No masses, nodularity, tenderness  Rectum:anus normal    POCT UA: not done and negative UCx x2 since Rx for ESBL UTI.  POCT Wet prep: ph 4.5  monilia      ASSESSMENT:   Encounter Diagnoses   Name Primary?     Supervision of high-risk pregnancy of elderly multigravida, IVF, WHS MD Yes     Yeast infection of the vagina     IUP at 13w2d   Yeast- allergic to OTC  meds and creams, recommend diflucan x 1 dose.  Pregnancy data reviewed and patient is out of 1st trimester, given symptoms and findings on exam patient is reassured to take medication    PLAN:   Orders Placed This Encounter   Procedures     Wet Prep POCT     Orders Placed This Encounter   Medications     fluconazole (DIFLUCAN) 150 MG tablet     Sig: Take 1 tablet (150 mg) by mouth once for 1 dose     Dispense:  1 tablet     Refill:  0      GC/CT sent for prenatal labs    Return to clinic prn if symptoms persist or worsen, has SOUMYA next week.    Jackie Zamora MD

## 2017-10-06 NOTE — MR AVS SNAPSHOT
After Visit Summary   10/6/2017    Heather Guillory    MRN: [id number]           Patient Information     Date Of Birth          1980        Visit Information        Provider Department      10/6/2017 3:15 PM Jackie Zamora MD [facility]        Today's Diagnoses     Supervision of high-risk pregnancy of elderly multigravida, IVF, WHS MD    -  1    Yeast infection of the vagina           Follow-ups after your visit        Follow-up notes from your care team     Return in about 1 week (around 10/13/2017).      Your next 10 appointments already scheduled     Oct 13, 2017  2:30 PM CDT   RETURN OB with Cate Mansfield MD   [facility] ([facility])    [place] [facility] [address]  [address]  [address]  [facility] [address] [phone]            Nov 08, 2017  8:45 AM ZARINA BOWMAN US COMP with [id number]   [facility])    [address]  [facility] [id number] [phone]           Wear comfortable clothes and leave your valuables at home.            Nov 08, 2017  9:15 AM ZARINA   Radiology MD with KRYSTA BOWMAN MD   [facility]    [address]  [facility] [id number] [phone]           Please arrive at the time given for your first appointment. This visit is used internally to schedule the physician's time during your ultrasound.            Dec 11, 2017  2:15 PM ZARINA BOWMAN READ SCREEN FETAL EHCO Ham with [id number]   [facility] [place] ([facility])    [address]  [facility] [address] [phone]            Dec 11, 2017  2:45 PM CST   Radiology MD with KRYSTA BOWMAN MD   [facility] [place]  [facility]    [address]  [facility] [address]   [phone]           Please arrive at the time given for your first appointment. This visit is used internally to schedule the physician's time during your ultrasound.            Jan 12, 2018  9:00 AM CST   (Arrive by 8:45 AM)   Return Visit with Maikol Brewer MD   [facility] ([facility])    [address] [facility]  3rd [facility] [address] [phone]              Who to contact     Please call your clinic at [phone] to:    Ask questions about your health    Make or cancel appointments    Discuss your medicines    Learn about your test results    Speak to your doctor   If you have compliments or concerns about an experience at your clinic, or if you wish to file a complaint, please contact [facility] Patient Relations at [phone] or email us at [email or web address]         Additional Information About Your Visit        Upkeep CharlieharJocoos Information     CreativeD gives you secure access to your electronic health record. If you see a primary care provider, you can also send messages to your care team and make appointments. If you have questions, please call your primary care clinic.  If you do not have a primary care provider, please call [phone] and they will assist you.      CreativeD is an electronic gateway that provides easy, online access to your medical records. With CreativeD, you can request a clinic appointment, read your test results, renew a prescription or communicate with your care team.     To access your existing account, please contact your [facility] or call [phone] for assistance.        Care EveryWhere ID     This is your Care EveryWhere ID. This could be used by other organizations to access your [facility] medical records  [id number]        Your Vitals Were     Pulse Breastfeeding? BMI (Body Mass Index)             73  No 26.55 kg/m2          Blood Pressure from Last 3 Encounters:   10/06/17 100/70   09/28/17 110/75   09/25/17 111/78    Weight from Last 3 Encounters:   10/06/17 76.9 kg (169 lb 8 oz)   09/28/17 75.6 kg (166 lb 9.6 oz)   09/25/17 77 kg (169 lb 11.2 oz)              We Performed the Following     Chlamydia PCR (Clinic Collect)     Gonorrhea PCR     Wet Prep POCT          Today's Medication Changes          These changes are accurate as of: 10/6/17  4:20 PM.  If you have any questions, ask your nurse or doctor.               Start taking these medicines.        Dose/Directions    fluconazole 150 MG tablet   Commonly known as:  DIFLUCAN   Used for:  Yeast infection of the vagina   Started by:  Jackie Zamora MD        Dose:  150 mg   Take 1 tablet (150 mg) by mouth once for 1 dose   Quantity:  1 tablet   Refills:  0         Stop taking these medicines if you haven't already. Please contact your care team if you have questions.     ondansetron 4 MG tablet   Commonly known as:  ZOFRAN   Stopped by:  Jackie Zamora MD                Where to get your medicines      These medications were sent to [facility] [place], [place] - [address]  [address] [address]     Phone:  [phone]     fluconazole 150 MG tablet                Primary Care Provider Fax #    Physician No Ref-Primary [phone]       No address on file        Equal Access to Services     ROSELIA OREILLY AH: Hadii juanjo corea [place], waaxda luqadaha, qaybta kaalmada adeegyada, john graham . So [facility] [phone].    ATENCIÓN: Si habla español, tiene a nelson disposición servicios gratuitos de asistencia lingüística. Llame al [phone].    We comply with applicable federal civil rights laws and [place] laws. We do not discriminate on the basis of race, color, national origin, age, disability, sex, sexual orientation, or gender identity.            Thank you!     Thank you for  choosing [facility]  for your care. Our goal is always to provide you with excellent care. Hearing back from our patients is one way we can continue to improve our services. Please take a few minutes to complete the written survey that you may receive in the mail after your visit with us. Thank you!             Your Updated Medication List - Protect others around you: Learn how to safely use, store and throw away your medicines at www.disposemymeds.org.          This list is accurate as of: 10/6/17  4:20 PM.  Always use your most recent med list.                   Brand Name Dispense Instructions for use Diagnosis    fluconazole 150 MG tablet    DIFLUCAN    1 tablet    Take 1 tablet (150 mg) by mouth once for 1 dose    Yeast infection of the vagina       hydroxychloroquine 200 MG tablet    PLAQUENIL     Take 200 mg by mouth 2 times daily        Prenatal Vitamins 28-0.8 MG Tabs      Take 1 tablet by mouth daily    Supervision of high-risk pregnancy of elderly multigravida       sertraline 25 MG tablet    ZOLOFT    90 tablet    Take 1 tablet (25 mg) by mouth daily Increase to 50 mg daily after one week if symptoms improved but not enough    Anxiety

## 2017-10-08 LAB
C TRACH DNA SPEC QL NAA+PROBE: NEGATIVE
N GONORRHOEA DNA SPEC QL NAA+PROBE: NEGATIVE
SPECIMEN SOURCE: NORMAL
SPECIMEN SOURCE: NORMAL

## 2017-10-09 ASSESSMENT — ANXIETY QUESTIONNAIRES
GAD7 TOTAL SCORE: 0
2. NOT BEING ABLE TO STOP OR CONTROL WORRYING: NOT AT ALL
1. FEELING NERVOUS, ANXIOUS, OR ON EDGE: NOT AT ALL
3. WORRYING TOO MUCH ABOUT DIFFERENT THINGS: NOT AT ALL
GAD7 TOTAL SCORE: 0
4. TROUBLE RELAXING: NOT AT ALL
7. FEELING AFRAID AS IF SOMETHING AWFUL MIGHT HAPPEN: NOT AT ALL
7. FEELING AFRAID AS IF SOMETHING AWFUL MIGHT HAPPEN: NOT AT ALL
5. BEING SO RESTLESS THAT IT IS HARD TO SIT STILL: NOT AT ALL
GAD7 TOTAL SCORE: 0
6. BECOMING EASILY ANNOYED OR IRRITABLE: NOT AT ALL

## 2017-10-10 ASSESSMENT — ANXIETY QUESTIONNAIRES: GAD7 TOTAL SCORE: 0

## 2017-10-13 ENCOUNTER — OFFICE VISIT (OUTPATIENT)
Dept: OBGYN | Facility: CLINIC | Age: 37
End: 2017-10-13
Attending: OBSTETRICS & GYNECOLOGY
Payer: COMMERCIAL

## 2017-10-13 VITALS
HEART RATE: 75 BPM | HEIGHT: 67 IN | BODY MASS INDEX: 26.62 KG/M2 | DIASTOLIC BLOOD PRESSURE: 71 MMHG | SYSTOLIC BLOOD PRESSURE: 109 MMHG | WEIGHT: 169.6 LBS

## 2017-10-13 DIAGNOSIS — O09.529 SUPERVISION OF HIGH-RISK PREGNANCY OF ELDERLY MULTIGRAVIDA: ICD-10-CM

## 2017-10-13 DIAGNOSIS — Z23 ENCOUNTER FOR IMMUNIZATION: Primary | ICD-10-CM

## 2017-10-13 PROCEDURE — G0008 ADMIN INFLUENZA VIRUS VAC: HCPCS | Mod: ZF

## 2017-10-13 PROCEDURE — 99212 OFFICE O/P EST SF 10 MIN: CPT | Mod: ZF

## 2017-10-13 PROCEDURE — 90686 IIV4 VACC NO PRSV 0.5 ML IM: CPT | Mod: ZF

## 2017-10-13 PROCEDURE — 25000128 H RX IP 250 OP 636: Mod: ZF

## 2017-10-13 NOTE — PROGRESS NOTES
"S: Heather Guillory is a 37 y.o.  at 14w2d who presents for routine Ob care and follow up of a yeast infection. She has been feeling good and feels that the pregnancy is progressing well. Denies headache, abdominal pain, fetal movement, and vaginal bleeding.   She was prescribed fluconazole for the yeast infection at her previous visit, but decided not to use the medication because she was worried about potential fetal effects. Instead she cut sugar out of her diet and is applying coconut oil topically to the vulva. Her symptoms have improved but she is still experiencing dysuria, itching, and irritation that have led to breaks in the vulvar skin. She is now willing to use the medication to treat the yeast infection.   Her mood has been stable since changing Zoloft to 50 mg and she feels that her anxiety is under much better control.     O: /71  Pulse 75  Ht 1.702 m (5' 7\")  Wt 76.9 kg (169 lb 9.6 oz)  BMI 26.56 kg/m2    Gen: appears well, no distress  Abdominal: gravid, soft, non-tender    FHR: 150    A/P: Heather Guillory is a 37 y.o.  at 14w2d who presents for routine Ob care and follow up of a yeast infection.    1. Routine prenatal care  -Discussed aneuploidy screening for next visit- AFP only  -Comprehensive US scheduled prior to next visit  -Flu shot this visit- ordered.  -RTC 4 weeks for SOUMYA    2. Yeast infection  -Fluconazole 150 mg PO 1x dose  -Can continue to apply coconut oil topically to vulva to avoid irritation    This patient was seen with Dr. Mansfield and I am acting as the scribe for the visit  [facility]  Medical Student MS3    I agree with the PFSH and ROS as completed by the MS, except for changes made by me. The remainder of the encounter was performed by me and scribed by the MS. The scribed note accurately reflects my personal services and the decisions made by me.    Cate Mansfield MD, FACOG  [facility]  OB/GYN    10/13/2017  4:36 " PM        Answers for HPI/ROS submitted by the patient on 10/9/2017   JAVIER 7 TOTAL SCORE: 0

## 2017-10-13 NOTE — MR AVS SNAPSHOT
After Visit Summary   10/13/2017    Heather Guillory    MRN: [id number]           Patient Information     Date Of Birth          1980        Visit Information        Provider Department      10/13/2017 2:30 PM Cate Mansfield MD [facility]        Today's Diagnoses     Encounter for immunization    -  1    Supervision of high-risk pregnancy of elderly multigravida, IVF, [facility] MD           Follow-ups after your visit        Your next 10 appointments already scheduled     Nov 08, 2017  8:45 AM CST   MFM US COMP with [id number]   [facility] [place] ([facility])    [address]  [facility] [address]   [phone]           Wear comfortable clothes and leave your valuables at home.            Nov 08, 2017  9:15 AM CST   Radiology MD with KRYSTA BOWMAN MD   [facility] [place] ([facility])    [address]  [facility] [address]   [phone]           Please arrive at the time given for your first appointment. This visit is used internally to schedule the physician's time during your ultrasound.            Nov 10, 2017  2:30 PM CST   RETURN OB with Cate Mansfield MD   [facility] ([facility])    [place] [facility] [address]  [address]  [address]  [facility] [address]   [phone]            Dec 11, 2017  2:15 PM CST   MFM READ SCREEN FETAL EHCO Ham with [id number]   [facility] [place] ([facility])    [address]  [facility] [address]   [phone]            Dec 11, 2017  2:45 PM CST   Radiology MD with KRYSTA BOWMAN MD   [facility] [place] ([facility])    [address]  [facility] [address]   [phone]           Please  "arrive at the time given for your first appointment. This visit is used internally to schedule the physician's time during your ultrasound.            Dec 14, 2017  8:30 AM CST   (Arrive by 8:15 AM)   Return Visit with Nasrin King MD   [facility] Rheumatology ([facility])    [address] [address] [phone]              Who to contact     Please call your clinic at [phone] to:    Ask questions about your health    Make or cancel appointments    Discuss your medicines    Learn about your test results    Speak to your doctor   If you have compliments or concerns about an experience at your clinic, or if you wish to file a complaint, please contact [facility] Patient Relations at [phone] or email us at [email or web address]         Additional Information About Your Visit        MyChart Information     [employer] gives you secure access to your electronic health record. If you see a primary care provider, you can also send messages to your care team and make appointments. If you have questions, please call your primary care clinic.  If you do not have a primary care provider, please call [phone] and they will assist you.      [employer] is an electronic gateway that provides easy, online access to your medical records. With [employer], you can request a clinic appointment, read your test results, renew a prescription or communicate with your care team.     To access your existing account, please contact your [facility] or call [phone] for assistance.        Care EveryWhere ID     This is your Care EveryWhere ID. This could be used by other organizations to access your [place] medical records  [id number]        Your Vitals Were     Pulse Height BMI (Body Mass Index)             75 1.702 m (5' 7\") 26.56 kg/m2          Blood Pressure from Last 3 Encounters:   10/13/17 109/71 "   10/06/17 100/70   09/28/17 110/75    Weight from Last 3 Encounters:   10/13/17 76.9 kg (169 lb 9.6 oz)   10/06/17 76.9 kg (169 lb 8 oz)   09/28/17 75.6 kg (166 lb 9.6 oz)              We Performed the Following     HC FLU VAC PRESRV FREE QUAD SPLIT VIR 3+YRS IM        Primary Care Provider    Physician No Ref-Primary       NO REF-PRIMARY PHYSICIAN        Equal Access to Services     ROSELIA OREILLY : Hadii aad ku hadasho Soomaali, waaxda luqadaha, qaybta kaalmada adeegyada, waxay alexusin haybriin eccelia graham . So [facility] [phone].    ATENCIÓN: Si veronika sherman, tiene a nelson disposición servicios gratuitos de asistencia lingüística. Llame al [phone].    We comply with applicable federal civil rights laws and [place] laws. We do not discriminate on the basis of race, color, national origin, age, disability, sex, sexual orientation, or gender identity.            Thank you!     Thank you for choosing [facility]  for your care. Our goal is always to provide you with excellent care. Hearing back from our patients is one way we can continue to improve our services. Please take a few minutes to complete the written survey that you may receive in the mail after your visit with us. Thank you!             Your Updated Medication List - Protect others around you: Learn how to safely use, store and throw away your medicines at www.disposemymeds.org.          This list is accurate as of: 10/13/17  4:37 PM.  Always use your most recent med list.                   Brand Name Dispense Instructions for use Diagnosis    hydroxychloroquine 200 MG tablet    PLAQUENIL     Take 200 mg by mouth 2 times daily        Prenatal Vitamins 28-0.8 MG Tabs      Take 1 tablet by mouth daily    Supervision of high-risk pregnancy of elderly multigravida       sertraline 25 MG tablet    ZOLOFT    90 tablet    Take 1 tablet (25 mg) by mouth daily Increase to 50 mg daily after one week if symptoms improved but not enough     Anxiety

## 2017-10-15 NOTE — PROGRESS NOTES
Labs really look good with negative lupus markers, numbers are within expected range. This is good news. Expect good pregnancy outcome.

## 2017-10-30 ENCOUNTER — MYC MEDICAL ADVICE (OUTPATIENT)
Dept: OBGYN | Facility: CLINIC | Age: 37
End: 2017-10-30

## 2017-10-30 DIAGNOSIS — B37.31 YEAST INFECTION OF THE VAGINA: ICD-10-CM

## 2017-10-31 RX ORDER — FLUCONAZOLE 150 MG/1
150 TABLET ORAL ONCE
Qty: 1 TABLET | Refills: 0 | Status: SHIPPED | OUTPATIENT
Start: 2017-10-31 | End: 2017-10-31

## 2017-10-31 NOTE — TELEPHONE ENCOUNTER
Patient stating that she has yeast again. Spoke with Dr. Mansfield in clinic who states she can have one more diflucan and if this doesn't work she will need to get a culture at her next OB visit. [employer] message sent and Rx sent.

## 2017-11-07 ASSESSMENT — ANXIETY QUESTIONNAIRES
GAD7 TOTAL SCORE: 0
2. NOT BEING ABLE TO STOP OR CONTROL WORRYING: NOT AT ALL
3. WORRYING TOO MUCH ABOUT DIFFERENT THINGS: NOT AT ALL
1. FEELING NERVOUS, ANXIOUS, OR ON EDGE: NOT AT ALL
7. FEELING AFRAID AS IF SOMETHING AWFUL MIGHT HAPPEN: NOT AT ALL
6. BECOMING EASILY ANNOYED OR IRRITABLE: NOT AT ALL
5. BEING SO RESTLESS THAT IT IS HARD TO SIT STILL: NOT AT ALL
GAD7 TOTAL SCORE: 0
GAD7 TOTAL SCORE: 0
7. FEELING AFRAID AS IF SOMETHING AWFUL MIGHT HAPPEN: NOT AT ALL
4. TROUBLE RELAXING: NOT AT ALL

## 2017-11-08 ENCOUNTER — HOSPITAL ENCOUNTER (OUTPATIENT)
Dept: ULTRASOUND IMAGING | Facility: CLINIC | Age: 37
Discharge: HOME | End: 2017-11-08
Attending: ADVANCED PRACTICE MIDWIFE | Admitting: OBSTETRICS & GYNECOLOGY
Payer: COMMERCIAL

## 2017-11-08 ENCOUNTER — OFFICE VISIT (OUTPATIENT)
Dept: MATERNAL FETAL MEDICINE | Facility: CLINIC | Age: 37
End: 2017-11-08
Attending: ADVANCED PRACTICE MIDWIFE
Payer: COMMERCIAL

## 2017-11-08 DIAGNOSIS — O09.812 PREGNANCY RESULTING FROM IN VITRO FERTILIZATION IN SECOND TRIMESTER: ICD-10-CM

## 2017-11-08 DIAGNOSIS — O09.522 ELDERLY MULTIGRAVIDA IN SECOND TRIMESTER: Primary | ICD-10-CM

## 2017-11-08 DIAGNOSIS — O26.90 PREGNANCY RELATED CONDITION, UNSPECIFIED TRIMESTER: ICD-10-CM

## 2017-11-08 PROCEDURE — 76811 OB US DETAILED SNGL FETUS: CPT

## 2017-11-08 ASSESSMENT — ANXIETY QUESTIONNAIRES: GAD7 TOTAL SCORE: 0

## 2017-11-08 NOTE — MR AVS SNAPSHOT
After Visit Summary   11/8/2017    Heather Guillory    MRN: [id number]           Patient Information     Date Of Birth          1980        Visit Information        Provider Department      11/8/2017 9:15 AM Everardo Betancourt MD [facility] Maternal Fetal Medicine [facility]        Today's Diagnoses     Elderly multigravida in second trimester    -  1    Pregnancy resulting from in vitro fertilization in second trimester           Follow-ups after your visit        Your next 10 appointments already scheduled     Nov 10, 2017  2:30 PM CST   RETURN OB with Cate Mansfield MD   [facility] ([facility])    [place] [facility] [address]  [address]  [address]  [facility] [address] [phone]            Dec 11, 2017  2:15 PM CST   MFM READ SCREEN FETAL EHCO Ham with [id number]   [facility]etal [facility] Ultrasound - [place] ([facility])    [address]  [facility] [address] [phone]            Dec 11, 2017  2:45 PM CST   Radiology MD with UR GRACIE YAN   [facility]etal Medicine - [place] ([facility])    [address]  [facility] [address]   [phone]           Please arrive at the time given for your first appointment. This visit is used internally to schedule the physician's time during your ultrasound.            Dec 14, 2017  8:30 AM CST   (Arrive by 8:15 AM)   Return Visit with Nasrin King MD   [facility] Rheumatology ([facility])    [address] [place] [address]  3rd [facility] [address] [phone]              Who to contact     If you have questions or need follow up information about today's clinic visit or your schedule please contact [facility] MATERNAL FETAL MEDICINE [facility] directly at [phone].  Normal or non-critical lab and imaging results will be communicated to you by Ji[employer]  letter or phone within 4 business days after the clinic has received the results. If you do not hear from us within 7 days, please contact the clinic through [employer] or phone. If you have a critical or abnormal lab result, we will notify you by phone as soon as possible.  Submit refill requests through [employer] or call your pharmacy and they will forward the refill request to us. Please allow 3 business days for your refill to be completed.          Additional Information About Your Visit        [employer] Information     [employer] gives you secure access to your electronic health record. If you see a primary care provider, you can also send messages to your care team and make appointments. If you have questions, please call your primary care clinic.  If you do not have a primary care provider, please call [phone] and they will assist you.        Care EveryWhere ID     This is your Care EveryWhere ID. This could be used by other organizations to access your [place] medical records  [id number]         Blood Pressure from Last 3 Encounters:   10/13/17 109/71   10/06/17 100/70   09/28/17 110/75    Weight from Last 3 Encounters:   10/13/17 76.9 kg (169 lb 9.6 oz)   10/06/17 76.9 kg (169 lb 8 oz)   09/28/17 75.6 kg (166 lb 9.6 oz)              Today, you had the following     No orders found for display       Primary Care Provider    Physician No Ref-Primary       NO REF-PRIMARY PHYSICIAN        Equal Access to Services     ROSELIA [facility] : Hadii juanjo ku hadasho [place], waaxda luqadaha, qaybta kaalmada cailin, john graham . So [facility] [phone].    ATENCIÓN: Si habla español, tiene a nelson disposición servicios gratuitos de asistencia lingüística. Julieta al [phone].    We comply with applicable federal civil rights laws and [place] laws. We do not discriminate on the basis of race, color, national origin, age, disability, sex, sexual orientation, or gender identity.            Thank  you!     Thank you for choosing [facility]  for your care. Our goal is always to provide you with excellent care. Hearing back from our patients is one way we can continue to improve our services. Please take a few minutes to complete the written survey that you may receive in the mail after your visit with us. Thank you!             Your Updated Medication List - Protect others around you: Learn how to safely use, store and throw away your medicines at www.disposemymeds.org.          This list is accurate as of: 11/8/17 11:41 AM.  Always use your most recent med list.                   Brand Name Dispense Instructions for use Diagnosis    hydroxychloroquine 200 MG tablet    PLAQUENIL     Take 200 mg by mouth 2 times daily        Prenatal Vitamins 28-0.8 MG Tabs      Take 1 tablet by mouth daily    Supervision of high-risk pregnancy of elderly multigravida       sertraline 25 MG tablet    ZOLOFT    90 tablet    Take 1 tablet (25 mg) by mouth daily Increase to 50 mg daily after one week if symptoms improved but not enough    Anxiety

## 2017-11-08 NOTE — PROGRESS NOTES
"Please see \"Imaging\" tab under \"Chart Review\" for details of today's visit.    Everardo Betancourt    "

## 2017-11-10 ENCOUNTER — OFFICE VISIT (OUTPATIENT)
Dept: OBGYN | Facility: CLINIC | Age: 37
End: 2017-11-10
Attending: OBSTETRICS & GYNECOLOGY
Payer: COMMERCIAL

## 2017-11-10 VITALS
BODY MASS INDEX: 26.98 KG/M2 | WEIGHT: 171.9 LBS | HEART RATE: 73 BPM | DIASTOLIC BLOOD PRESSURE: 64 MMHG | HEIGHT: 67 IN | SYSTOLIC BLOOD PRESSURE: 101 MMHG

## 2017-11-10 DIAGNOSIS — M35.1 MCTD (MIXED CONNECTIVE TISSUE DISEASE) (H): ICD-10-CM

## 2017-11-10 DIAGNOSIS — Z32.01 PREGNANCY TEST POSITIVE: Primary | ICD-10-CM

## 2017-11-10 DIAGNOSIS — B34.9 VIRAL ILLNESS: ICD-10-CM

## 2017-11-10 PROCEDURE — 36415 COLL VENOUS BLD VENIPUNCTURE: CPT | Performed by: OBSTETRICS & GYNECOLOGY

## 2017-11-10 PROCEDURE — 86644 CMV ANTIBODY: CPT | Performed by: OBSTETRICS & GYNECOLOGY

## 2017-11-10 PROCEDURE — 82105 ALPHA-FETOPROTEIN SERUM: CPT | Performed by: OBSTETRICS & GYNECOLOGY

## 2017-11-10 NOTE — LETTER
"11/10/2017       RE: Heather Guillory  [address] [facility] [address]     Dear Colleague,    Thank you for referring your patient, Heather Guillory, to the [facility] at [facility]. Please see a copy of my visit note below.    Subjective:      37 year old  at 18w2d presents for a routine prenatal appointment.      She has no complaints. She is doing well and has had increased appetite. Of last week she has been feeling fetal movement. She has been getting limited sleep due to 1 and 2 year old sons. She is concerned about her CMV status. She was curious if she should take probiotics to reduce her risk of yeast infection reoccurrence.     She denies headaches, fever, chills, shortness of breath, chest pain, abdominal pain, nausea, constipation, diarrhea, increase in urinary frequency, vaginal bleeding, abnormal discharge, vaginal itching, joint pain, or decreased mood.      Objective:  Vitals:    11/10/17 1424   BP: 101/64   Pulse: 73   Weight: 78 kg (171 lb 14.4 oz)   Height: 1.702 m (5' 7\")     See OB flowsheet    Fetal Doppler: Fetal heart rate within normal range    Assessment/Plan   Heather Guillory is a  at 18w2d consistent with 9w US with a pregnancy complicated by connective tissue disease.    -anxiety. Stable on zoloft    -Connective tissue disorder  Joint pain resolved. Feels well managed by rheumatology    -Pregnancy screening   -AFP screen today   -CMV Antibody IgG -- not routine but patient is concerned about status   -Recent flu shot   -Follow up for growth u/s q 4-6 wks per [facility] recs   -Follow up in 4 weeks for routine OB visit   -Fetal echo on     Encounter Diagnoses   Name Primary?     Pregnancy test positive Yes     Viral illness      Orders Placed This Encounter   Procedures     AFP - Alpha Fetoprotein Maternal Screen     CMV Antibody IgG     No orders of the defined types were placed in this encounter.    ICarri " [facility] MS3 am acting as a scribe for Dr. Mansfield.      Answers for HPI/ROS submitted by the patient on 11/7/2017   JAVIER 7 TOTAL SCORE: 0    I agree with the PFSH and ROS as completed by the MS, except for changes made by me. The remainder of the encounter was performed by me and scribed by the MS. The scribed note accurately reflects my personal services and the decisions made by me.    Cate Mansfield MD, FACOG  [facility]  OB/GYN    11/15/2017  8:50 AM

## 2017-11-10 NOTE — MR AVS SNAPSHOT
After Visit Summary   11/10/2017    Heather Guillory    MRN: [id number]           Patient Information     Date Of Birth          1980        Visit Information        Provider Department      11/10/2017 2:30 PM Cate Mansfield MD [facility]        Today's Diagnoses     Pregnancy test positive    -  1    Viral illness        MCTD (mixed connective tissue disease) (H)           Follow-ups after your visit        Follow-up notes from your care team     Return in about 4 weeks (around 12/8/2017).      Your next 10 appointments already scheduled     Dec 11, 2017  1:45 PM CST   RETURN OB with Cate Mansfield MD   [facility] ([facility])    [place] [facility] [address]  [address]  [address]  [facility] [address] [phone]            Dec 11, 2017  2:15 PM CST   GRACIE READ SCREEN FETAL EHCO Ham with [id number]   [facility] [place] ([facility])    [address]  [facility] [address] [phone]            Dec 11, 2017  2:45 PM CST   Radiology MD with UR GRACIE YAN   [facility] [place] ([facility])    [address]  [facility] [address] [phone]           Please arrive at the time given for your first appointment. This visit is used internally to schedule the physician's time during your ultrasound.            Dec 14, 2017  8:30 AM CST   (Arrive by 8:15 AM)   Return Visit with Nasrin King MD   [facility] ([facility])    [address] [place] [address]  3rd [facility] [address] [phone]            Dec 22, 2017  9:00 AM CST   ULTRASOUND with [facility] ULTRASOUND   [facility] ([facility])    [place] [facility] [address]  [address]  [address]  [facility] [address]  "  [phone]              Who to contact     Please call your clinic at [phone] to:    Ask questions about your health    Make or cancel appointments    Discuss your medicines    Learn about your test results    Speak to your doctor   If you have compliments or concerns about an experience at your clinic, or if you wish to file a complaint, please contact [facility] Patient Relations at [phone] or email us at [email or web address]         Additional Information About Your Visit        [employer] Information     [employer] gives you secure access to your electronic health record. If you see a primary care provider, you can also send messages to your care team and make appointments. If you have questions, please call your primary care clinic.  If you do not have a primary care provider, please call [phone] and they will assist you.      [employer] is an electronic gateway that provides easy, online access to your medical records. With [employer], you can request a clinic appointment, read your test results, renew a prescription or communicate with your care team.     To access your existing account, please contact your [facility] or call [phone] for assistance.        Care EveryWhere ID     This is your Care EveryWhere ID. This could be used by other organizations to access your [place] medical records  [id number]        Your Vitals Were     Pulse Height BMI (Body Mass Index)             73 1.702 m (5' 7\") 26.92 kg/m2          Blood Pressure from Last 3 Encounters:   11/10/17 101/64   10/13/17 109/71   10/06/17 100/70    Weight from Last 3 Encounters:   11/10/17 78 kg (171 lb 14.4 oz)   10/13/17 76.9 kg (169 lb 9.6 oz)   10/06/17 76.9 kg (169 lb 8 oz)              We Performed the Following     AFP - Alpha Fetoprotein Maternal Screen     CMV Antibody IgG        Primary Care Provider    Physician No Ref-Primary       NO REF-PRIMARY " PHYSICIAN        Equal Access to Services     ROSELIA OREILLY : Hadii juanjo [place], wagisselda luqadaha, qaybta john hinton. So [facility] [phone].    ATENCIÓN: Si habla español, tiene a nelson disposición servicios gratuitos de asistencia lingüística. Llame al [phone].    We comply with applicable federal civil rights laws and [place] laws. We do not discriminate on the basis of race, color, national origin, age, disability, sex, sexual orientation, or gender identity.            Thank you!     Thank you for choosing [facility]  for your care. Our goal is always to provide you with excellent care. Hearing back from our patients is one way we can continue to improve our services. Please take a few minutes to complete the written survey that you may receive in the mail after your visit with us. Thank you!             Your Updated Medication List - Protect others around you: Learn how to safely use, store and throw away your medicines at www.disposemymeds.org.          This list is accurate as of: 11/10/17 11:59 PM.  Always use your most recent med list.                   Brand Name Dispense Instructions for use Diagnosis    hydroxychloroquine 200 MG tablet    PLAQUENIL     Take 200 mg by mouth 2 times daily        Prenatal Vitamins 28-0.8 MG Tabs      Take 1 tablet by mouth daily    Supervision of high-risk pregnancy of elderly multigravida       sertraline 25 MG tablet    ZOLOFT    90 tablet    Take 1 tablet (25 mg) by mouth daily Increase to 50 mg daily after one week if symptoms improved but not enough    Anxiety

## 2017-11-10 NOTE — PROGRESS NOTES
"Subjective:      37 year old  at 18w2d presents for a routine prenatal appointment.      She has no complaints. She is doing well and has had increased appetite. Of last week she has been feeling fetal movement. She has been getting limited sleep due to 1 and 2 year old sons. She is concerned about her CMV status. She was curious if she should take probiotics to reduce her risk of yeast infection reoccurrence.     She denies headaches, fever, chills, shortness of breath, chest pain, abdominal pain, nausea, constipation, diarrhea, increase in urinary frequency, vaginal bleeding, abnormal discharge, vaginal itching, joint pain, or decreased mood.      Objective:  Vitals:    11/10/17 1424   BP: 101/64   Pulse: 73   Weight: 78 kg (171 lb 14.4 oz)   Height: 1.702 m (5' 7\")     See OB flowsheet    Fetal Doppler: Fetal heart rate within normal range    Assessment/Plan   Heather Guillory is a  at 18w2d consistent with 9w US with a pregnancy complicated by connective tissue disease.    -anxiety. Stable on zoloft    -Connective tissue disorder  Joint pain resolved. Feels well managed by rheumatology    -Pregnancy screening   -AFP screen today   -CMV Antibody IgG -- not routine but patient is concerned about status   -Recent flu shot   -Follow up for growth u/s q 4-6 wks per M recs   -Follow up in 4 weeks for routine OB visit   -Fetal echo on     Encounter Diagnoses   Name Primary?     Pregnancy test positive Yes     Viral illness      Orders Placed This Encounter   Procedures     AFP - Alpha Fetoprotein Maternal Screen     CMV Antibody IgG     No orders of the defined types were placed in this encounter.    I, Carri De La Torre MS3 am acting as a scribe for Dr. Mansfield.      Answers for HPI/ROS submitted by the patient on 2017   JAVIER 7 TOTAL SCORE: 0    I agree with the PFSH and ROS as completed by the MS, except for changes made by me. The remainder of the encounter was performed by me and scribed by the MS. " The scribed note accurately reflects my personal services and the decisions made by me.    Cate Mansfield MD, FACOG  [facility]  OB/GYN    11/15/2017  8:50 AM

## 2017-11-13 ENCOUNTER — TELEPHONE (OUTPATIENT)
Dept: MATERNAL FETAL MEDICINE | Facility: CLINIC | Age: 37
End: 2017-11-13

## 2017-11-13 LAB
# FETUSES US: NORMAL
AFP ADJ MOM AMN: 1.4
AFP SERPL-MCNC: 56 NG/ML
AGE - REPORTED: 35.8 YR
CMV IGG SERPL QL IA: 6.1 AI (ref 0–0.8)
DATING METHOD: NORMAL
DIABETIC AT CONCEPTION: NO
FAMILY MEMBER DISEASES HX: NO
FAMILY MEMBER DISEASES HX: NO
GA METHOD: NORMAL
GA: 18.29 WEEKS
HX OF HEREDITARY DISORDERS: NO
IDDM PATIENT QL: NO
INTEGRATED SCN PATIENT-IMP: NORMAL
LMP START DATE: NORMAL
PREV HX CHROMOSOME ABNORMALITY: NO
SPECIMEN DRAWN SERPL: NORMAL
TWINS: NORMAL

## 2017-11-14 NOTE — TELEPHONE ENCOUNTER
I called Heather to let her know that I did communicate with Dr. King and that repeat assessment of her SSA antibodies is not needed, as these were negative in 8/2016.  She expressed understanding and will continue with her routine prenatal care and follow up growth US as previously recommended with [facility].    Everardo Betancourt

## 2017-11-15 ENCOUNTER — TELEPHONE (OUTPATIENT)
Dept: RHEUMATOLOGY | Facility: CLINIC | Age: 37
End: 2017-11-15

## 2017-11-15 NOTE — TELEPHONE ENCOUNTER
"Pt called to report symptoms of throat pain and request callback regarding possible appt, labs, treatment.    Pt reports \"On 11./11 [she] felt like [she] was coming down with a cold. Now [she] has pain in front of neck, around thyroid area. Feels like external pain, not internal pain. Neck is tender to the touch, pain scale 7/10. Pain 4/10 whe not moving or touching neck. Also feeling sluggish.\"    Pt denies any visible swelling or redness. No trouble swallowing or breathing. Pt is 20 weeks pregnant.    Please callback pt at [phone].        "

## 2017-11-16 ENCOUNTER — TELEPHONE (OUTPATIENT)
Dept: OBGYN | Facility: CLINIC | Age: 37
End: 2017-11-16

## 2017-11-16 NOTE — TELEPHONE ENCOUNTER
Spoke to Heather MAURER P2 at 19 1/7 wks [place] who has had a sore neck since Saturday. She feels run down. No fever or chills. Her throat doesn't hurt when she swallows. She feels like there is a lump in her throat. No SOB or problems breathing. No s/s cold virus.    Scheduled her with provider,[facility] ,for this Monday.  Instructed if s/s worsen or develops fever before seen in clinic to go to urgent care.Pt indicated understanding and agreed with plan.

## 2017-11-16 NOTE — TELEPHONE ENCOUNTER
Patient reports that since 11/11/2017 she has as throat pain which feels like the area of her thyroid. The area is tender to touch and it's uncomfortable to swallow. Patient reports that the surrounding neck muscles also hurt and are tender to the touch. Heather states that she has had an increase in fatigue. She denies a fever, chills, nausea and vomiting as well as cold-like symptoms. Patient reported mild sinus congestion associated with neck pain. Patient denies joint pain, swelling and redness. Heather denies taking any prednisone and has taken acetaminophen as needed. Patient doesn't have  PCP and is worried that it maybe auto-immune related as well as she's 19 weeks pregnant. Writer will route to provider to review and advise.   Thais Marin LPN

## 2017-11-20 ENCOUNTER — OFFICE VISIT (OUTPATIENT)
Dept: OBGYN | Facility: CLINIC | Age: 37
End: 2017-11-20
Attending: ADVANCED PRACTICE MIDWIFE
Payer: COMMERCIAL

## 2017-11-20 VITALS — SYSTOLIC BLOOD PRESSURE: 110 MMHG | DIASTOLIC BLOOD PRESSURE: 72 MMHG | WEIGHT: 169.4 LBS | BODY MASS INDEX: 26.53 KG/M2

## 2017-11-20 DIAGNOSIS — B37.31 YEAST INFECTION OF THE VAGINA: Primary | ICD-10-CM

## 2017-11-20 DIAGNOSIS — N89.8 VAGINAL ITCHING: ICD-10-CM

## 2017-11-20 DIAGNOSIS — O09.529 SUPERVISION OF HIGH-RISK PREGNANCY OF ELDERLY MULTIGRAVIDA: ICD-10-CM

## 2017-11-20 LAB
CLUE CELLS: NORMAL
TRICHOMONAS (WET PREP): NORMAL
YEAST (WET PREP): NORMAL

## 2017-11-20 PROCEDURE — 87102 FUNGUS ISOLATION CULTURE: CPT | Performed by: ADVANCED PRACTICE MIDWIFE

## 2017-11-20 PROCEDURE — 87106 FUNGI IDENTIFICATION YEAST: CPT | Performed by: ADVANCED PRACTICE MIDWIFE

## 2017-11-20 PROCEDURE — 87529 HSV DNA AMP PROBE: CPT | Performed by: ADVANCED PRACTICE MIDWIFE

## 2017-11-20 PROCEDURE — 87210 SMEAR WET MOUNT SALINE/INK: CPT | Mod: ZF | Performed by: ADVANCED PRACTICE MIDWIFE

## 2017-11-20 ASSESSMENT — PAIN SCALES - GENERAL: PAINLEVEL: NO PAIN (0)

## 2017-11-20 NOTE — LETTER
2017       RE: Heather Guillory  [address]  [place] [address]     Dear Colleague,    Thank you for referring your patient, Heather Guillory, to the [facility] at [facility]. Please see a copy of my visit note below.      SUBJECTIVE:  Heather Guillory is an 37 year old  Female, , who presents with complaints of recurrent yeast infection.  Onset of symptoms 10 days ago, relapsing/remitting since.   Used Diflucan x2 with some relief but symptoms return. Unable to use OTC topical antifungals as she is allergic.     - Has not had sex in several weeks.  No bleeding, cramping, malodor.      Predisposing factors - pregnancy and recent IV ABX.    Menstrual History:  Menstrual History 2014 10/9/2015 2017   LAST MENSTRUAL PERIOD 2014 -   Menarche age - - 14   Period Cycle (Days) - - 28   Period Duration (Days) - - 4   Method of Contraception - - None   Period Pattern - - Regular   Menstrual Flow - - Heavy;Light   Menstrual Control - - -   Dysmenorrhea - - None   PMS Symptoms - - -   Reviewed Today - - Yes       Past Medical History:   Diagnosis Date     Abnormal Pap smear     Over 10 years ago     Anxiety      E coli infection     ESBL     History of extended-spectrum beta-lactamase producing Escherichia coli infection      Hx of previous reproductive problem 2013    Infertility     Mixed connective tissue disease (H)     Dr. Steele- switching      Nephrolithiasis         OBJECTIVE:   /72  Wt 76.8 kg (169 lb 6.4 oz)  BMI 26.53 kg/m2     She appears well, afebrile.  Abdomen: benign, gravid soft, nontender, no masses.  No CVA tenderness to percussion.    Pelvic Exam:  EG/BUS: Normal genitalia and Bartholin's, Urethra, Mathews's normal.   1cm fissure noted above clitoral  - no leaking, pt states has been present since yeast infection symptoms started   Vagina: moist, pink, rugae with white and odorless  secretions  Cervix: Multiparous,, no lesions and pink, moist, closed, discharge or blood.  Uterus:Gravid c/w 19weeks   Adnexa:Not palpable secondary to gravid pregnancy  Rectum:anus normal    POCT UA: not done.  POCT Wet prep: ph 4.5  Negative clue cells, Negative trich, +yeast, negative KOH whiff  POCT UPT: Deferred - +pregancy ~19 weesk    ASSESSMENT:   Encounter Diagnoses   Name Primary?     Yeast infection of the vagina Yes     Supervision of high-risk pregnancy of elderly multigravida, IVF, WHS MD      Vaginal itching       Yeast    PLAN:   Orders Placed This Encounter   Procedures     Wet Prep POCT     (B37.3) Yeast infection of the vagina  (primary encounter diagnosis)    Plan: Yeast Culture, Wet Prep POCT     (L29.8) Vaginal itching  Plan: HSV 1 and 2 DNA by PCR      - Reviewed plain, sugar free/fruit free yogurt topically in vagina prn.  - Reivewed tea tree oil topically prn.  Reviewed gentian violet or apple cider vinegar prn.  Vrvanat message sent with information.     Return to clinic prn if symptoms persist or worsen.      Again, thank you for allowing me to participate in the care of your patient.      Sincerely,    HARVEY Whitaker CNM

## 2017-11-20 NOTE — MR AVS SNAPSHOT
After Visit Summary   11/20/2017    Heather Guillory    MRN: [id number]           Patient Information     Date Of Birth          1980        Visit Information        Provider Department      11/20/2017 11:30 AM Babita Stevens APRN [facility]        Today's Diagnoses     Yeast infection of the vagina    -  1    Supervision of high-risk pregnancy of elderly multigravida, IVF, WHS MD        Vaginal itching           Follow-ups after your visit        Follow-up notes from your care team     Will only call if abnormal Return if symptoms worsen or fail to improve.      Your next 10 appointments already scheduled     Dec 11, 2017  1:45 PM CST   RETURN OB with Cate Mansfield MD   [facility] ([facility])    [place] [facility] [address]  [address]  [address]  [facility] [address]   [phone]            Dec 11, 2017  2:15 PM CST   MFM READ SCREEN FETAL EHCO Ham with [id number]   [facility] Ultrasound [facility] [place] ([facility])    [address]  [facility] [id number] [phone]            Dec 11, 2017  2:45 PM CST   Radiology MD with UR GRACIE YAN   [facility] [place] ([facility])    [address]  [facility] [address]   [phone]           Please arrive at the time given for your first appointment. This visit is used internally to schedule the physician's time during your ultrasound.            Dec 14, 2017  8:30 AM CST   (Arrive by 8:15 AM)   Return Visit with Nasrin King MD   [facility] Rheumatology ([facility])    [address]  3rd [facility] [address]   [phone]            Dec 22, 2017  9:00 AM CST   ULTRASOUND with [facility]TRASOUND   [facility] ([facility])    [place] [facility]  [address]  [address]  [facility] [id number] [phone]              Who to contact     Please call your clinic at [phone] to:    Ask questions about your health    Make or cancel appointments    Discuss your medicines    Learn about your test results    Speak to your doctor   If you have compliments or concerns about an experience at your clinic, or if you wish to file a complaint, please contact [facility] Patient Relations at [phone] or email us at [email or web address]         Additional Information About Your Visit        [employer] Information     [employer] gives you secure access to your electronic health record. If you see a primary care provider, you can also send messages to your care team and make appointments. If you have questions, please call your primary care clinic.  If you do not have a primary care provider, please call [phone] and they will assist you.      [employer] is an electronic gateway that provides easy, online access to your medical records. With [employer], you can request a clinic appointment, read your test results, renew a prescription or communicate with your care team.     To access your existing account, please contact your [facility] or call [phone] for assistance.        Care EveryWhere ID     This is your Care EveryWhere ID. This could be used by other organizations to access your [place] medical records  [id number]        Your Vitals Were     BMI (Body Mass Index)                   26.53 kg/m2            Blood Pressure from Last 3 Encounters:   11/20/17 110/72   11/10/17 101/64   10/13/17 109/71    Weight from Last 3 Encounters:   11/20/17 76.8 kg (169 lb 6.4 oz)   11/10/17 78 kg (171 lb 14.4 oz)   10/13/17 76.9 kg (169 lb 9.6 oz)              We Performed the Following     HSV 1 and 2 DNA by PCR     Wet Prep POCT     Yeast Culture        Primary Care Provider Fax #     Physician No Ref-Primary [phone]       No address on file        Equal Access to Services     GRANTSOLIS AFIA : Hadii aad ku hadjosebernice Latishamilton, wagisselda demetricechanceha, eveta fridabiancalexis rairylielexis, john corleyalbakarrie sheikh. So [facility] [phone].    ATENCIÓN: Si habla español, tiene a nelson disposición servicios gratuitos de asistencia lingüística. Llame al [phone].    We comply with applicable federal civil rights laws and [place] laws. We do not discriminate on the basis of race, color, national origin, age, disability, sex, sexual orientation, or gender identity.            Thank you!     Thank you for choosing [facility]  for your care. Our goal is always to provide you with excellent care. Hearing back from our patients is one way we can continue to improve our services. Please take a few minutes to complete the written survey that you may receive in the mail after your visit with us. Thank you!             Your Updated Medication List - Protect others around you: Learn how to safely use, store and throw away your medicines at www.disposemymeds.org.          This list is accurate as of: 11/20/17 11:59 PM.  Always use your most recent med list.                   Brand Name Dispense Instructions for use Diagnosis    hydroxychloroquine 200 MG tablet    PLAQUENIL     Take 200 mg by mouth 2 times daily        Prenatal Vitamins 28-0.8 MG Tabs      Take 1 tablet by mouth daily    Supervision of high-risk pregnancy of elderly multigravida       sertraline 25 MG tablet    ZOLOFT    90 tablet    Take 1 tablet (25 mg) by mouth daily Increase to 50 mg daily after one week if symptoms improved but not enough    Anxiety

## 2017-11-20 NOTE — PROGRESS NOTES
SUBJECTIVE:  Heather Guillory is an 37 year old  Female, , who presents with complaints of recurrent yeast infection.  Onset of symptoms 10 days ago, relapsing/remitting since.   Used Diflucan x2 with some relief but symptoms return. Unable to use OTC topical antifungals as she is allergic.     - Has not had sex in several weeks.  No bleeding, cramping, malodor.      Predisposing factors - pregnancy and recent IV ABX.    Menstrual History:  Menstrual History 2014 10/9/2015 2017   LAST MENSTRUAL PERIOD 2014 -   Menarche age - - 14   Period Cycle (Days) - - 28   Period Duration (Days) - - 4   Method of Contraception - - None   Period Pattern - - Regular   Menstrual Flow - - Heavy;Light   Menstrual Control - - -   Dysmenorrhea - - None   PMS Symptoms - - -   Reviewed Today - - Yes       Past Medical History:   Diagnosis Date     Abnormal Pap smear     Over 10 years ago     Anxiety      E coli infection     ESBL     History of extended-spectrum beta-lactamase producing Escherichia coli infection      Hx of previous reproductive problem 2013    Infertility     Mixed connective tissue disease (H)     Dr. Steele- switching      Nephrolithiasis         OBJECTIVE:   /72  Wt 76.8 kg (169 lb 6.4 oz)  BMI 26.53 kg/m2     She appears well, afebrile.  Abdomen: benign, gravid soft, nontender, no masses.  No CVA tenderness to percussion.    Pelvic Exam:  EG/BUS: Normal genitalia and Bartholin's, Urethra, Lyford's normal.   1cm fissure noted above clitoral  - no leaking, pt states has been present since yeast infection symptoms started   Vagina: moist, pink, rugae with white and odorless secretions  Cervix: Multiparous,, no lesions and pink, moist, closed, discharge or blood.  Uterus:Gravid c/w 19weeks   Adnexa:Not palpable secondary to gravid pregnancy  Rectum:anus normal    POCT UA: not done.  POCT Wet prep: ph 4.5  Negative clue cells, Negative trich, +yeast, negative KOH whiff  POCT  UPT: Deferred - +pregancy ~19 weesk    ASSESSMENT:   Encounter Diagnoses   Name Primary?     Yeast infection of the vagina Yes     Supervision of high-risk pregnancy of elderly multigravida, IVF, WHS MD      Vaginal itching       Yeast    PLAN:   Orders Placed This Encounter   Procedures     Wet Prep POCT     (B37.3) Yeast infection of the vagina  (primary encounter diagnosis)    Plan: Yeast Culture, Wet Prep POCT     (L29.8) Vaginal itching  Plan: HSV 1 and 2 DNA by PCR      - Reviewed plain, sugar free/fruit free yogurt topically in vagina prn.  - Reivewed tea tree oil topically prn.  Reviewed gentian violet or apple cider vinegar prn.  [employer] message sent with information.     Return to clinic prn if symptoms persist or worsen.    Babita GABRIEL, CNM  Answers for HPI/ROS submitted by the patient on 11/7/2017   JAVIER 7 TOTAL SCORE: 0

## 2017-11-21 LAB
HSV1 DNA SPEC QL NAA+PROBE: NEGATIVE
HSV2 DNA SPEC QL NAA+PROBE: NEGATIVE
SPECIMEN SOURCE: NORMAL

## 2017-11-24 LAB
SPECIMEN SOURCE: ABNORMAL
YEAST SPEC QL CULT: ABNORMAL

## 2017-11-27 DIAGNOSIS — B37.31 CANDIDIASIS OF VULVA AND VAGINA: Primary | ICD-10-CM

## 2017-11-27 RX ORDER — FLUCONAZOLE 150 MG/1
150 TABLET ORAL ONCE
Qty: 1 TABLET | Refills: 0 | Status: SHIPPED | OUTPATIENT
Start: 2017-11-27 | End: 2017-11-27

## 2017-12-05 ENCOUNTER — TELEPHONE (OUTPATIENT)
Dept: OBGYN | Facility: CLINIC | Age: 37
End: 2017-12-05

## 2017-12-05 NOTE — TELEPHONE ENCOUNTER
Spoke with patient re: continued symptoms of yeast infection after diflucan treatment. Offered pt earlier appt on Thursday 12/7 to address this concern. Pt states she would like to try recommended OTC/home remedies first as discussed with Babita Butcher, and will keep appt on Dec 11.     Encouraged pt to call if symptoms worsen or is she has any additional questions or concerns    Pt expressed understanding and is agreeable to plan.

## 2017-12-08 ASSESSMENT — ANXIETY QUESTIONNAIRES
GAD7 TOTAL SCORE: 0
3. WORRYING TOO MUCH ABOUT DIFFERENT THINGS: NOT AT ALL
2. NOT BEING ABLE TO STOP OR CONTROL WORRYING: NOT AT ALL
7. FEELING AFRAID AS IF SOMETHING AWFUL MIGHT HAPPEN: NOT AT ALL
5. BEING SO RESTLESS THAT IT IS HARD TO SIT STILL: NOT AT ALL
1. FEELING NERVOUS, ANXIOUS, OR ON EDGE: NOT AT ALL
4. TROUBLE RELAXING: NOT AT ALL
6. BECOMING EASILY ANNOYED OR IRRITABLE: NOT AT ALL

## 2017-12-09 ASSESSMENT — ANXIETY QUESTIONNAIRES: GAD7 TOTAL SCORE: 0

## 2017-12-11 ENCOUNTER — OFFICE VISIT (OUTPATIENT)
Dept: MATERNAL FETAL MEDICINE | Facility: CLINIC | Age: 37
End: 2017-12-11
Attending: ADVANCED PRACTICE MIDWIFE
Payer: COMMERCIAL

## 2017-12-11 ENCOUNTER — HOSPITAL ENCOUNTER (OUTPATIENT)
Dept: ULTRASOUND IMAGING | Facility: CLINIC | Age: 37
Discharge: HOME | End: 2017-12-11
Attending: ADVANCED PRACTICE MIDWIFE | Admitting: OBSTETRICS & GYNECOLOGY
Payer: COMMERCIAL

## 2017-12-11 DIAGNOSIS — O26.90 PREGNANCY RELATED CONDITION, UNSPECIFIED TRIMESTER: ICD-10-CM

## 2017-12-11 DIAGNOSIS — O09.812 PREGNANCY RESULTING FROM IN VITRO FERTILIZATION IN SECOND TRIMESTER: Primary | ICD-10-CM

## 2017-12-11 PROCEDURE — 76825 ECHO EXAM OF FETAL HEART: CPT

## 2017-12-11 NOTE — PROGRESS NOTES
"Please see \"Imaging\" tab under \"Chart Review\" for details of today's US.    Siobhan Slaughter, DO    "

## 2017-12-11 NOTE — MR AVS SNAPSHOT
After Visit Summary   12/11/2017    Heather Guillory    MRN: [id number]           Patient Information     Date Of Birth          1980        Visit Information        Provider Department      12/11/2017 2:45 PM Siobhan Slaughter,  [facility] Maternal Fetal Medicine [facility]        Today's Diagnoses     Pregnancy resulting from in vitro fertilization in second trimester    -  1       Follow-ups after your visit        Your next 10 appointments already scheduled     Dec 14, 2017  8:30 AM CST   (Arrive by 8:15 AM)   Return Visit with Nasrin King MD   [facility] Rheumatology ([facility])    [address] [facility] [address]  3rd Floor  [facility] [address]   [phone]            Dec 18, 2017 10:45 AM CST   RETURN OB with April Palma MD   [facility] ([facility])    [place] [facility] [address]  [address]  [address]  [facility] [id number] [phone]            Dec 22, 2017  9:00 AM CST   ULTRASOUND with [facility] ULTRASOUND   [facility] ([facility])    [place] [facility] [address]  [address]  [address]  [facility] [id number] [phone]              Who to contact     If you have questions or need follow up information about today's clinic visit or your schedule please contact [facility]ERNAL FETAL [facility]IC[facility] directly at [phone].  Normal or non-critical lab and imaging results will be communicated to you by MyChart, letter or phone within 4 business days after the clinic has received the results. If you do not hear from us within 7 days, please contact the clinic through MyChart or phone. If you have a critical or abnormal lab result, we will notify you by phone as soon as possible.  Submit refill requests through [employer] or call your pharmacy and they will forward the refill request to us. Please allow 3 business days for your refill to be completed.           Additional Information About Your Visit        [employer] Information     [employer] gives you secure access to your electronic health record. If you see a primary care provider, you can also send messages to your care team and make appointments. If you have questions, please call your primary care clinic.  If you do not have a primary care provider, please call [phone] and they will assist you.        Care EveryWhere ID     This is your Care EveryWhere ID. This could be used by other organizations to access your [place] medical records  [id number]         Blood Pressure from Last 3 Encounters:   11/20/17 110/72   11/10/17 101/64   10/13/17 109/71    Weight from Last 3 Encounters:   11/20/17 76.8 kg (169 lb 6.4 oz)   11/10/17 78 kg (171 lb 14.4 oz)   10/13/17 76.9 kg (169 lb 9.6 oz)              Today, you had the following     No orders found for display       Primary Care Provider Fax #    Physician No Ref-Primary [phone]       No address on file        Equal Access to Services     ROSELIA OREILLY : Hadii juanjo ku hadasho Soomaali, waaxda luqadaha, qaybta kaalmada adeegyada, john graham . So [facility] [phone].    ATENCIÓN: Si habla español, tiene a nelson disposición servicios gratuitos de asistencia lingüística. Llame al [phone].    We comply with applicable federal civil rights laws and [place] laws. We do not discriminate on the basis of race, color, national origin, age, disability, sex, sexual orientation, or gender identity.            Thank you!     Thank you for choosing [facility]  for your care. Our goal is always to provide you with excellent care. Hearing back from our patients is one way we can continue to improve our services. Please take a few minutes to complete the written survey that you may receive in the mail after your visit with us. Thank you!             Your Updated Medication List - Protect others around you: Learn  how to safely use, store and throw away your medicines at www.disposemymeds.org.          This list is accurate as of: 12/11/17  4:26 PM.  Always use your most recent med list.                   Brand Name Dispense Instructions for use Diagnosis    hydroxychloroquine 200 MG tablet    PLAQUENIL     Take 200 mg by mouth 2 times daily        Prenatal Vitamins 28-0.8 MG Tabs      Take 1 tablet by mouth daily    Supervision of high-risk pregnancy of elderly multigravida       sertraline 25 MG tablet    ZOLOFT    90 tablet    Take 1 tablet (25 mg) by mouth daily Increase to 50 mg daily after one week if symptoms improved but not enough    Anxiety

## 2017-12-14 ENCOUNTER — OFFICE VISIT (OUTPATIENT)
Dept: RHEUMATOLOGY | Facility: CLINIC | Age: 37
End: 2017-12-14
Attending: INTERNAL MEDICINE
Payer: COMMERCIAL

## 2017-12-14 VITALS
WEIGHT: 173.6 LBS | HEART RATE: 77 BPM | DIASTOLIC BLOOD PRESSURE: 65 MMHG | HEIGHT: 67 IN | BODY MASS INDEX: 27.25 KG/M2 | SYSTOLIC BLOOD PRESSURE: 100 MMHG | TEMPERATURE: 98.1 F

## 2017-12-14 DIAGNOSIS — M35.1 MCTD (MIXED CONNECTIVE TISSUE DISEASE) (H): ICD-10-CM

## 2017-12-14 DIAGNOSIS — M35.9 UNDIFFERENTIATED CONNECTIVE TISSUE DISEASE (H): Primary | ICD-10-CM

## 2017-12-14 DIAGNOSIS — M35.9 UNDIFFERENTIATED CONNECTIVE TISSUE DISEASE (H): ICD-10-CM

## 2017-12-14 LAB
ALBUMIN SERPL-MCNC: 3.1 G/DL (ref 3.4–5)
ALT SERPL W P-5'-P-CCNC: 15 U/L (ref 0–50)
AST SERPL W P-5'-P-CCNC: 14 U/L (ref 0–45)
BASOPHILS # BLD AUTO: 0 10E9/L (ref 0–0.2)
BASOPHILS NFR BLD AUTO: 0.3 %
CREAT SERPL-MCNC: 0.52 MG/DL (ref 0.52–1.04)
DIFFERENTIAL METHOD BLD: ABNORMAL
EOSINOPHIL # BLD AUTO: 0.1 10E9/L (ref 0–0.7)
EOSINOPHIL NFR BLD AUTO: 1 %
ERYTHROCYTE [DISTWIDTH] IN BLOOD BY AUTOMATED COUNT: 13.7 % (ref 10–15)
GFR SERPL CREATININE-BSD FRML MDRD: >90 ML/MIN/1.7M2
HCT VFR BLD AUTO: 37.8 % (ref 35–47)
HGB BLD-MCNC: 12.4 G/DL (ref 11.7–15.7)
IMM GRANULOCYTES # BLD: 0.1 10E9/L (ref 0–0.4)
IMM GRANULOCYTES NFR BLD: 0.8 %
LYMPHOCYTES # BLD AUTO: 2.7 10E9/L (ref 0.8–5.3)
LYMPHOCYTES NFR BLD AUTO: 21.5 %
MCH RBC QN AUTO: 28.8 PG (ref 26.5–33)
MCHC RBC AUTO-ENTMCNC: 32.8 G/DL (ref 31.5–36.5)
MCV RBC AUTO: 88 FL (ref 78–100)
MONOCYTES # BLD AUTO: 0.7 10E9/L (ref 0–1.3)
MONOCYTES NFR BLD AUTO: 5.2 %
NEUTROPHILS # BLD AUTO: 9.1 10E9/L (ref 1.6–8.3)
NEUTROPHILS NFR BLD AUTO: 71.2 %
NRBC # BLD AUTO: 0 10*3/UL
NRBC BLD AUTO-RTO: 0 /100
PLATELET # BLD AUTO: 210 10E9/L (ref 150–450)
RBC # BLD AUTO: 4.3 10E12/L (ref 3.8–5.2)
WBC # BLD AUTO: 12.7 10E9/L (ref 4–11)

## 2017-12-14 PROCEDURE — 84460 ALANINE AMINO (ALT) (SGPT): CPT | Performed by: INTERNAL MEDICINE

## 2017-12-14 PROCEDURE — 36415 COLL VENOUS BLD VENIPUNCTURE: CPT | Performed by: INTERNAL MEDICINE

## 2017-12-14 PROCEDURE — 82565 ASSAY OF CREATININE: CPT | Performed by: INTERNAL MEDICINE

## 2017-12-14 PROCEDURE — 84450 TRANSFERASE (AST) (SGOT): CPT | Performed by: INTERNAL MEDICINE

## 2017-12-14 PROCEDURE — 85025 COMPLETE CBC W/AUTO DIFF WBC: CPT | Performed by: INTERNAL MEDICINE

## 2017-12-14 PROCEDURE — 82040 ASSAY OF SERUM ALBUMIN: CPT | Performed by: INTERNAL MEDICINE

## 2017-12-14 PROCEDURE — 99213 OFFICE O/P EST LOW 20 MIN: CPT | Mod: ZF

## 2017-12-14 ASSESSMENT — PAIN SCALES - GENERAL: PAINLEVEL: NO PAIN (0)

## 2017-12-14 NOTE — NURSING NOTE
"Chief Complaint   Patient presents with     RECHECK     follow up with prenatal care, tzimmer cma       Initial /65  Pulse 77  Temp 98.1  F (36.7  C) (Oral)  Ht 1.702 m (5' 7\")  Wt 78.7 kg (173 lb 9.6 oz)  BMI 27.19 kg/m2 Estimated body mass index is 27.19 kg/(m^2) as calculated from the following:    Height as of this encounter: 1.702 m (5' 7\").    Weight as of this encounter: 78.7 kg (173 lb 9.6 oz).  Medication Reconciliation: complete    "

## 2017-12-14 NOTE — LETTER
2017       RE: Heather Guillory  [address]  [place] [address]     Dear Colleague,    Thank you for referring your patient, Heather Guillory, to the [facility] RHEUMATOLOGY at [facility]. Please see a copy of my visit note below.    Rheumatology Visit     Heather Guillory MRN# [id number]   YOB: 1980 Age: 37 year old     Date of Last Visit with Dr. Brewer: 2017  Date of initial visit with me: 2017   DOS: 2017       Assessment and Plan:   Undifferentiated connective tissue disease (UCTD) and pregnancy via IVF (JOSEPH 2018):    Heather is a 36 yo WF , a former patient of Dr. Brewer. She was diagnosed with MCTD in 2016, 6 wk postpartum based on fatigue, arthritis, mild Raynaud's, low positive NINOSKA 1.1 and low positive RNP Ab 1.6. She is on  mg qd since 2017 with great response.  UCTD continues to be most likely diagnosis not MCTD as she does not have classic features of MCTD, had very low titer of RNP Ab in the past, (negative on most recent check), and had a mild disease which never required prednisone.  All autoimmunity labs (2017) came back negative which is further reassuring for UCTD, including APS panel, repeat RNP and inflammatory markers, dsDNA and complement levels.  UA was significant for bacterial infection, otherwise no significant proteinuria.     She had neg SSA/SSB antibodies in 2016, no concern for CHB.  APS panel.  She had neg anti-Sm, neg anti-DNA and neg centromere Ab in 2016.    17  Disease continues to be under excellent control on HCQ, I don't expect flare up during pregnancy; however given safety of HCQ during pregnancy and breastfeeding, recommend HCQ to be continued at current dose and be considered to be tapered after 6-8 weeks post partum if no flare of UCTD.    Reassured, once again, that I expect her UCTD not to flare duing pregnancy but will monitor her  closely.    1) Labs today:  Orders Placed This Encounter   Procedures     ALT     AST     CBC with platelets differential     Albumin level     Creatinine     Routine UA with Micro Reflex to Culture     2) RTC in March 2018    I saw and examined the patient with Dr. King. I acted as scribe for Dr. King.      Morales Christian MS4    Attending Note: I saw and examined the patient with medical student Morales. This note was written by her who acted as scribe for me. I agree with findings and recommendations written in this note. The note reflects decisions made by me.    Nasrin King MD          Active Problem List:     Patient Active Problem List    Diagnosis Date Noted     Pregnancy resulting from in vitro fertilization in first trimester 09/08/2017     Priority: Medium     Will need fetal echo 22-24 weeks       MCTD (mixed connective tissue disease) (H) 09/06/2017     Priority: Medium     On Plaquenil- OK per MFM  NEEDS serial growth u/s 4-6 wks       Supervision of high-risk pregnancy of elderly multigravida, IVF, [facility] MD 09/06/2017     Priority: Medium     JOSEPH 4/11/18 by IVF date, dating u/s consistent    9/6/17: OB intake   Labs ordered   Needs GC/CT at B ___  Admitted to inpatient for IV antibiotic treatment of UTI   Desires 1st trimester screening and level 2, MFM referral placed   Will need fetal echo d/t IVF  11/13  Per [facility] called Heather to let her know that I did communicate with Dr. King and that repeat assessment of her SSA antibodies is not needed, as these were negative in 8/2016.  She expressed understanding and will continue with her routine prenatal care and follow up growth US as previously recommended with [facility].   Everardo Betancourt  12/12/2017 - Normal Fetal Echo.  Per MFM continue serial growth US at [facility].         UTI (urinary tract infection) 09/06/2017     Priority: Medium     8/31/17: >100, 000 E. Coli, extended spectrum beta lactamase  Macrobid PO- no improvement    9/6/17: Admitted to  inpatient:  Ertapenem IV x 2 inpatient.  Remaining doses at infusion center  F/U appt with Dr. Mansfield 9/15/17       Xerosis cutis 07/21/2017     Priority: Medium     Overview:       Xerosis of the hands which is moderately severe and associated with fissuring.       Family history of genetic disorder 12/23/2015     Priority: Medium     Patient's sister's son with Elijah's disease       Female infertility 04/04/2014     Priority: Medium     IVF with first pregnancy.       Anxiety 04/04/2014     Priority: Medium     Has weaned off Zoloft, feels stable.  Will consider re-starting if she feels anxiety is increasing.       Female fertility problem 03/03/2014     Priority: Medium     Tension headache 03/03/2014     Priority: Medium     Dysuria 08/14/2013     Priority: Medium     Family history of malignant neoplasm of breast 07/12/2013     Priority: Medium     Overview:   Paternal GM       Decreased libido 12/12/2012     Priority: Medium     Overview:   Due to Effexor.       Irritable colon 10/26/2009     Priority: Medium     Panic disorder without agoraphobia 02/06/2008     Priority: Medium     Overview:   Panic Disorder w/o Agoraphobia       Eating disorder 02/05/2004     Priority: Medium     History of, feels stable now.            History of Present Illness:   Heather Guillory presents for f/u for probable mixed connective tissue disease. Last seen in 1-17, when HCQ was continued.    Background: received diagnosis of mixed connective tissue given pain in hand and feet, stiffness, Raynaud's  Syndrome, fatigue, positive NINOSKA and anti-RNP in 2016. Ms. Guillory first experienced swelling and pain in her fingers and feet during her second pregnancy this past spring that prevented her from wearing rings on her fingers and provoked an evaluation for DVT, which was negative. Despite weight loss post partum, her pain, mild swelling, and stiffness in her digits persisted. She described the pain as a 7/10 when she wakes to feed her  infant in the middle of the night and in the morning that decreases to a 2-3/10 over the course of the day. Stiffness lasts 30 mins to 1 hour in the morning. In general she feels tired all the time, but cannot determine if it is associated with that fact that she has two young children and is breastfeeding every 2 hours or if it is related to her joint symptoms. She was evaluated by an internist who found that she had a positive NINOSKA/ She saw Dr. Iniguez in 8-16 who discovered +RNP. She started Plaquenil 200 mg BID.    Interval history 7/14/17  Heather Guillory is stable since her last visit in January, despite feeling more fatigued recently. She denied arthralgias/myalgias but reports having sore feet/toes at end of day and when she first wakes up. She states the fatigue worsened since she ran out of Zoloft 3.5 weeks ago; also reports irritability and no sleeping soundly during the night since that time too. Zoloft prescribed by her OBGYN.     She is currently receiving fertility treatment and plans to undergo embryo transfer on 7/24/17. She remains on HCQ and feels comfortable continuing this medication during pregnancy. She wonders if taking HCQ once daily (400) rather than 200 twice daily is acceptable.  She is using estrogen therapy in preparation for the embryo transfer, and relates that her gynecologist recommends that she remained on the supplement for approximately one month after the transfer.    She recently pinched a nerve in her neck and has numbness/tingling with burning sensation in her left arm that radiates down into her 1st-3rd digits. She was evaluated at [facility] orthopedics, received percocet for pain and told to have PT. If it did not improve in 6 weeks, she will return for another evaluation.     Lastly, she had dry, rough skin on lateral aspects of fingers. She states this often occurs in winter due to weather but it is new for her this summer. She is using a lot of moisturizers but it has not been  helping.     9/28/17:   Heather is former patient of Dr. Brewer, is seeing me for 2nd opinion and is transferring care. She is currently pregnant and is concerned about her flare of MCTD during pregnancy, especially worried about flare being triggered by IVF.     She had her 1st pregnancy in 2/2015. It was via IVF. It was unexplained fertility. It was uneventful. Her son was born term. His 2nd son was born natuarally in 5/2016, term and healthy.     At 6 week post partum check up, she mentioned AM stiffness and stiffness of hands (new), feet were painful. Hands were swollen. Had fatigue, was breastfeeding att hat time.    No hair loss, skin rash, photosensitivity (but burns easily), oral/nasal ulcers, or sicca.    Has h/o Raynaud's since teen. Fingers turn white, toes turn purple, not painful. They feel cold not painful.    No myositis.     No serositis.    No seziures, headaches, psychosis.    Has sensitive stomach.    No fevers.     No h/o proteinuria.    She was diagnosed with MTCD in 6/2016. She started taking HCQ in 9/2016.  She started HCQ after she stopped breastfeeding. Had one flare up after stopping breastfeeding. She started  bid, last eye exam was this summer of 2017.     No prednisone.    Lack of sleep causes joint pain.     She did another round of IVF in 4/2017.  She was on OC x 10 wk . Embryo transfer was 7/14/2017. Now is off estrogen, progestron x 2 wk, now 12 wk pregnant.     No flare of her disease after IVF.    She was on ASA 81 mg qd till she was confirmed to be pregnant.    No preganncy loses. No thrombosis.    Feet feel sore and achy only with lack of sleep.     JOSEPH is 4/11/2018.    Interval History 12/14/17:  Heather has no major concerns or changes in symptoms at today's appointment.    Hasn't experienced any joint pains for about 9-12 months.  No stiffness in her muscles or joints.  She hasn't had any Raynaud's and notes that she is keeping her hands and feet adequately covered  and warm.      She does note that she has been fatigued lately but attributes that to her 2 toddlers at home who aren't sleeping throughout the night.  Also notes easy bruising but thinks that it is chronic.  Denies any hair loss, mouth sores/ulcers, HA, F/chills, night sweats.    She is taking hydroxychloroquine and is tolerating it well.      No significant infections although she wanted to inform us about her UTI with ESBL bacteria which was diagnosed when she was 8 weeks pregnant.  At that time, she was hospitalized and given IV antibiotics.  Ever since, she has experienced recurring yeast infections.  She tried diflucan x 3 without improvement.  Finally, she has changed her diet, cutting out sugars, which is improving her symptoms.            Review of Systems:   A comprehensive ROS was done. Positives are per HPI.          Past Medical History:     Past Medical History:   Diagnosis Date     Abnormal Pap smear     Over 10 years ago     Anxiety      E coli infection     ESBL     History of extended-spectrum beta-lactamase producing Escherichia coli infection      Hx of previous reproductive problem 12/2013    Infertility     Mixed connective tissue disease (H)     Dr. Steele- switching      Nephrolithiasis      Past Surgical History:   Procedure Laterality Date     BIOPSY  April 2014    Uterine polyp removed     DILATION AND CURETTAGE SUCTION       OPERATIVE HYSTEROSCOPY WITH MORCELLATOR  4/8/2014    Procedure: OPERATIVE HYSTEROSCOPY WITH MORCELLATOR;  Hysteroscopic Polypectomy;  Surgeon: Cate Mansfield MD;  Location: [facility] OR     Raynaud's syndrome since puberty. Her main trigger is cold.  She had two uneventful pregnancies with vaginal births, though the conception of her first child required IVF.   she is undergoing embryo transfer in July 2017 in an attempt to conceive again. Estrogen therapy was used in preparation for and following embryo transfer.   cervical radiculopathy, 2017.       Social  History:     Social History     Occupational History      [employer]     Social History Main Topics     Smoking status: Never Smoker     Smokeless tobacco: Never Used     Alcohol use No     Drug use: No     Sexual activity: Yes     Partners: Male     Birth control/ protection: None            Family History:     Family History   Problem Relation Age of Onset     DIABETES Paternal Grandfather      C.A.D. Paternal Grandfather      Hypertension Paternal Grandfather      Other Cancer Paternal Grandfather      Lung     Breast Cancer Paternal Grandmother      CANCER Paternal Grandmother      lung ca     OSTEOPOROSIS Paternal Grandmother      Hypertension Paternal Grandmother      Cancer - colorectal Maternal Grandfather      Colon Cancer Maternal Grandfather      CANCER Maternal Grandmother      cervical ca     Neurologic Disorder Maternal Grandmother      alzheimers     Alzheimer Disease Maternal Grandmother      Cardiovascular Brother 28     cardiomyopathy     Genetic Disorder Sister 21     una's disease     HEART DISEASE Paternal Uncle 55     mitral valve replacement     Anxiety Disorder Sister      Anxiety Disorder Brother      Genetic Disorder Sister      Una's Disease     No history of AI disease       Allergies:     Allergies   Allergen Reactions     Kiwi Other (See Comments)     Throat itching     Benzoyl Peroxide Swelling     Duricef [Cefadroxil] Unknown     Monistat [Miconazole] Swelling     Sulfa Drugs Rash            Medications:     Current Outpatient Prescriptions   Medication Sig Dispense Refill     sertraline (ZOLOFT) 25 MG tablet Take 1 tablet (25 mg) by mouth daily Increase to 50 mg daily after one week if symptoms improved but not enough 90 tablet 6     Prenatal Vit-Fe Fumarate-FA (PRENATAL VITAMINS) 28-0.8 MG TABS Take 1 tablet by mouth daily        hydroxychloroquine (PLAQUENIL) 200 MG tablet Take 200 mg by mouth 2 times daily            Physical Exam:   Blood pressure  "100/65, pulse 77, temperature 98.1  F (36.7  C), temperature source Oral, height 1.702 m (5' 7\"), weight 78.7 kg (173 lb 9.6 oz), not currently breastfeeding.  Wt Readings from Last 4 Encounters:   17 78.7 kg (173 lb 9.6 oz)   17 76.8 kg (169 lb 6.4 oz)   11/10/17 78 kg (171 lb 14.4 oz)   10/13/17 76.9 kg (169 lb 9.6 oz)     Constitutional: well-developed, appearing stated age; cooperative  Eyes: nl EOM, PERRLA, vision, conjunctiva, sclera  ENT: nl external ears, nose, hearing, lips, teeth, gums, throat  No mucous membrane lesions, normal saliva pool  Neck: no mass or thyroid enlargement  Resp: lungs clear to auscultation  CV: RRR, no murmurs, rubs or gallops, no edema  GI: no ABD mass or tenderness  Lymph: no cervical, supraclavicular nodes  MS: no active synovitis or joint tenderness  Skin: no skin rash  Neuro: nl cranial nerves, strength  Psych: nl affect.         Data:     Results for orders placed or performed during the hospital encounter of 17   M Read Screen Fetal Echo Single    Narrative            Fetal Echo  ---------------------------------------------------------------------------------------------------------  Pat. Name: RITA GAONA       Study Date:  2017 2:08pm  Pat. NO:  [id number]        Referring  MD: BUTCH HOPE  Site:  [facility]       Sonographer: Gino Huffman RDMS  :  1980        Age:   37  ---------------------------------------------------------------------------------------------------------    INDICATION  ---------------------------------------------------------------------------------------------------------  In vitro fertilization  .      METHOD  ---------------------------------------------------------------------------------------------------------  Grayscale imaging, Doppler echocardiography color flow velocity mapping and Doppler echocardiography fetal pulsed wave and or wave with spectral display were used to  assess fetal cardiac structures for " today's [facility] fetal echocardiogram. View: Good view.      PREGNANCY  ---------------------------------------------------------------------------------------------------------  Ham pregnancy. Number of fetuses: 1.      DATING  ---------------------------------------------------------------------------------------------------------                                           Date                                Details                                                                                      Gest. age                      JOSEPH  Conception                         7/18/2017                        Conception: IVF                                                                         22 w + 6 d                     4/10/2018  Embryo transfer                  7/23/2017                        IVF / ET: 5 d                                                                               22 w + 6 d                     4/10/2018  External assessment          9/6/2017                          GA: 9 w + 1 d                                                                             22 w + 6 d                     4/10/2018  Assigned dating                  Dating performed on 09/27/2017, based on the conception date                                            22 w + 6 d                     4/10/2018      GENERAL EVALUATION  ---------------------------------------------------------------------------------------------------------  Cardiac activity: present.  bpm.  Fetal movements: visualized.  Presentation: Variable.  Placenta:  posterior, There is no evidence of placenta previa.  Umbilical cord: 3 vessel cord.  Amniotic fluid: normal.      FETAL ECHOCARDIOGRAM  ---------------------------------------------------------------------------------------------------------  Cardiac position: normal  Cardiac axis: normal  Atria: Atria approximately equal in size  Foramen ovale: Normal, patent foramen ovale  Atrial septum:  normal size and morphology  Ventricles: Ventricles approximately equal in size  Ventricular septum: Ventricular septum appears intact (apex to crux)  Tricuspid valve: No significant regurgitation seen  Mitral valve: No significant regurgitation seen  Atrioventricular connections: Normal alignment  Pulmonary valve: normal size and morphology  Aortic valve: normal size and morphology  Ventriculo-arterial connections: Normal size and morphology  Pulmonary trunk: normal size and morphology  Pulmonary veins: Two or more pulmonary veins are identified entering the left atrium.  Main pulmonary artery: the main pulmonary artery can be seen bifurcating into the arterial duct and the right pulmonary artery  Aortic root: normal size and morphology  Ascending aorta: normal size and morphology  Crossing of the great arteries: Normal 4 chamber view with normal axis and situs. Normal relationship of the great arteries.  Descending aorta: normal size and morphology  Superior vena cava: normal  Inferior vena cava: normal      RECOMMENDATION  ---------------------------------------------------------------------------------------------------------  We discussed the findings on today's ultrasound with the patient.    Echo due to IVF. We reviewed the limitations of ultrasound. Plans serial growth US in [facility] office due to mixed connective tissue disease.    Return to primary provider for continued prenatal care.    Thank you for the opportunity to participate in the care of this patient. If you have questions regarding today's evaluation or if we can be of further service, please contact the  [facility].    **Fetal anomalies may be present but not detected**.        Impression    IMPRESSION  ---------------------------------------------------------------------------------------------------------  Normal fetal echo for this gestational age. On any fetal echocardiography one cannot rule out small VSD, ASD and or  Coarctation of the aorta.         Labs from outside laboratory reviewed from 8/12/16  RNP antibodies 1.6- positive    Negative for Parvo Virus B19, Lyme Disease  Negative SLE panel   Negative SSA, SSB  Negative Anti-Sury-1  Negative Centromere B antibodies  Negative  CCP  Recent Labs   Lab Test  07/22/16   1110  05/29/16   0651  05/27/16   1032   01/09/16   0724   WBC  8.1   --   15.4*   --   16.8*   RBC  4.86   --   4.25   --   4.75   HGB  14.1  11.2*  12.4   < >  14.2   HCT  42.1   --   37.6   --   40.1   MCV  87   --   89   --   84   RDW  13.0   --   14.0   --   13.7   PLT  228   --   184   --   202   ALBUMIN   --    --    --    --   3.3*   CRP  3.0   --    --    --    --    BUN   --    --    --    --   9    < > = values in this interval not displayed.      Recent Labs   Lab Test  07/22/16   1110   TSH  1.10     Hemoglobin   Date Value Ref Range Status   12/14/2017 12.4 11.7 - 15.7 g/dL Final   09/28/2017 14.6 11.7 - 15.7 g/dL Final   09/07/2017 13.1 11.7 - 15.7 g/dL Final     Urea Nitrogen   Date Value Ref Range Status   01/09/2016 9 7 - 30 mg/dL Final     Sed Rate   Date Value Ref Range Status   09/28/2017 8 0 - 20 mm/h Final   07/22/2016 9 0 - 20 mm/h Final     CRP Inflammation   Date Value Ref Range Status   09/28/2017 <2.9 0.0 - 8.0 mg/L Final   10/24/2016 <2.9 0.0 - 8.0 mg/L Final   10/05/2016 42.0 (H) 0.0 - 8.0 mg/L Final     AST   Date Value Ref Range Status   12/14/2017 14 0 - 45 U/L Final   09/28/2017 16 0 - 45 U/L Final   01/09/2016 12 0 - 45 U/L Final     Albumin   Date Value Ref Range Status   12/14/2017 3.1 (L) 3.4 - 5.0 g/dL Final   01/09/2016 3.3 (L) 3.4 - 5.0 g/dL Final     Alkaline Phosphatase   Date Value Ref Range Status   01/09/2016 70 40 - 150 U/L Final     ALT   Date Value Ref Range Status   12/14/2017 15 0 - 50 U/L Final   09/28/2017 21 0 - 50 U/L Final   01/09/2016 15 0 - 50 U/L Final     Rheumatoid Factor   Date Value Ref Range Status   07/22/2016 <20 <20 IU/mL Final     RHEUM RESULTS  Latest Ref Rng & Units 9/7/2017 9/28/2017 12/14/2017   COMPLEMENT C3 76 - 169 mg/dL - 97 -   COMPLEMENT C4 15 - 50 mg/dL - 25 -   DNA-DS <10 IU/mL - 4 -   SED RATE 0 - 20 mm/h - 8 -   CRP, INFLAMMATION 0.0 - 8.0 mg/L - <2.9 -   RHEUMATOID FACTOR <20 IU/mL - - -   NINOSKA SCREEN BY EIA <1.0 - - -   AST 0 - 45 U/L - 16 14   ALT 0 - 50 U/L - 21 15   ALBUMIN 3.4 - 5.0 g/dL - - 3.1(L)   WBC 4.0 - 11.0 10e9/L 5.1 7.4 12.7(H)   RBC 3.8 - 5.2 10e12/L 4.62 5.22(H) 4.30   HGB 11.7 - 15.7 g/dL 13.1 14.6 12.4   HCT 35.0 - 47.0 % 38.6 43.2 37.8   MCV 78 - 100 fl 84 83 88   MCHC 31.5 - 36.5 g/dL 33.9 33.8 32.8   RDW 10.0 - 15.0 % 12.4 12.2 13.7    - 450 10e9/L 168 215 210   CREATININE 0.52 - 1.04 mg/dL - 0.56 0.52   GFR ESTIMATE, IF BLACK >60 mL/min/1.7m2 - >90 >90   GFR ESTIMATE >60 mL/min/1.7m2 - >90 >90     Reviewed Rheumatology lab flowsheet    Again, thank you for allowing me to participate in the care of your patient.      Sincerely,    Nasrin King MD

## 2017-12-14 NOTE — LETTER
Patient:  Heather Guillory  :   1980  MRN:     [id number]        Ms.Jennifer Guillory  [address] [facility] [address]        2017    Dear ,    We are writing to inform you of your test results. Stable labs except slightly elevated white count, did you have any cold or other infection at the time of blood draw? Please make sure to do the urine test.      Resulted Orders   ALT   Result Value Ref Range    ALT 15 0 - 50 U/L   AST   Result Value Ref Range    AST 14 0 - 45 U/L   CBC with platelets differential   Result Value Ref Range    WBC 12.7 (H) 4.0 - 11.0 10e9/L    RBC Count 4.30 3.8 - 5.2 10e12/L    Hemoglobin 12.4 11.7 - 15.7 g/dL    Hematocrit 37.8 35.0 - 47.0 %    MCV 88 78 - 100 fl    MCH 28.8 26.5 - 33.0 pg    MCHC 32.8 31.5 - 36.5 g/dL    RDW 13.7 10.0 - 15.0 %    Platelet Count 210 150 - 450 10e9/L    Diff Method Automated Method     % Neutrophils 71.2 %    % Lymphocytes 21.5 %    % Monocytes 5.2 %    % Eosinophils 1.0 %    % Basophils 0.3 %    % Immature Granulocytes 0.8 %    Nucleated RBCs 0 0 /100    Absolute Neutrophil 9.1 (H) 1.6 - 8.3 10e9/L    Absolute Lymphocytes 2.7 0.8 - 5.3 10e9/L    Absolute Monocytes 0.7 0.0 - 1.3 10e9/L    Absolute Eosinophils 0.1 0.0 - 0.7 10e9/L    Absolute Basophils 0.0 0.0 - 0.2 10e9/L    Abs Immature Granulocytes 0.1 0 - 0.4 10e9/L    Absolute Nucleated RBC 0.0    Albumin level   Result Value Ref Range    Albumin 3.1 (L) 3.4 - 5.0 g/dL       Nasrin King MD

## 2017-12-14 NOTE — PROGRESS NOTES
Rheumatology Visit     Heather Guillory MRN# [id number]   YOB: 1980 Age: 37 year old     Date of Last Visit with Dr. Brewer: 2017  Date of initial visit with me: 2017   DOS: 2017       Assessment and Plan:   Undifferentiated connective tissue disease (UCTD) and pregnancy via IVF (JOSEPH 2018):    Heather is a 38 yo WF , a former patient of Dr. Brewer. She was diagnosed with MCTD in 2016, 6 wk postpartum based on fatigue, arthritis, mild Raynaud's, low positive NINOSKA 1.1 and low positive RNP Ab 1.6. She is on  mg qd since 2017 with great response.  UCTD continues to be most likely diagnosis not MCTD as she does not have classic features of MCTD, had very low titer of RNP Ab in the past, (negative on most recent check), and had a mild disease which never required prednisone.  All autoimmunity labs (2017) came back negative which is further reassuring for UCTD, including APS panel, repeat RNP and inflammatory markers, dsDNA and complement levels.  UA was significant for bacterial infection, otherwise no significant proteinuria.     She had neg SSA/SSB antibodies in 2016, no concern for CHB.  APS panel.  She had neg anti-Sm, neg anti-DNA and neg centromere Ab in 2016.    17  Disease continues to be under excellent control on HCQ, I don't expect flare up during pregnancy; however given safety of HCQ during pregnancy and breastfeeding, recommend HCQ to be continued at current dose and be considered to be tapered after 6-8 weeks post partum if no flare of UCTD.    Reassured, once again, that I expect her UCTD not to flare duing pregnancy but will monitor her closely.    1) Labs today:  Orders Placed This Encounter   Procedures     ALT     AST     CBC with platelets differential     Albumin level     Creatinine     Routine UA with Micro Reflex to Culture     2) RTC in 2018    I saw and examined the patient with Dr. King. I acted as scribe for  Dr. King.      Morales Christian MS4    Attending Note: I saw and examined the patient with medical student Morales. This note was written by her who acted as scribe for me. I agree with findings and recommendations written in this note. The note reflects decisions made by me.    Nasrin King MD          Active Problem List:     Patient Active Problem List    Diagnosis Date Noted     Pregnancy resulting from in vitro fertilization in first trimester 09/08/2017     Priority: Medium     Will need fetal echo 22-24 weeks       MCTD (mixed connective tissue disease) (H) 09/06/2017     Priority: Medium     On Plaquenil- OK per MFM  NEEDS serial growth u/s 4-6 wks       Supervision of high-risk pregnancy of elderly multigravida, IVF, TRICIA YAN 09/06/2017     Priority: Medium     JOSEPH 4/11/18 by IVF date, dating u/s consistent    9/6/17: OB intake   Labs ordered   Needs GC[facility]CT at [facility] ___  Admitted to inpatient for IV antibiotic treatment of UTI   Desires 1st trimester screening and level 2, MFM referral placed   Will need fetal echo d/t IVF  11/13  Per [facility] called Heather to let her know that I did communicate with Dr. King and that repeat assessment of her SSA antibodies is not needed, as these were negative in 8/2016.  She expressed understanding and will continue with her routine prenatal care and follow up growth US as previously recommended with [facility].   Everardo Betancourt  12/12/2017 - Normal Fetal Echo.  Per MFM continue serial growth US at [facility].         UTI (urinary tract infection) 09/06/2017     Priority: Medium     8/31/17: >100, 000 E. Coli, extended spectrum beta lactamase  Macrobid PO- no improvement    9/6/17: Admitted to inpatient:  Ertapenem IV x 2 inpatient.  Remaining doses at infusion center  F/U appt with Dr. Mansfield 9/15/17       Xerosis cutis 07/21/2017     Priority: Medium     Overview:       Xerosis of the hands which is moderately severe and associated with fissuring.       Family history of genetic disorder  12/23/2015     Priority: Medium     Patient's sister's son with Elijah's disease       Female infertility 04/04/2014     Priority: Medium     IVF with first pregnancy.       Anxiety 04/04/2014     Priority: Medium     Has weaned off Zoloft, feels stable.  Will consider re-starting if she feels anxiety is increasing.       Female fertility problem 03/03/2014     Priority: Medium     Tension headache 03/03/2014     Priority: Medium     Dysuria 08/14/2013     Priority: Medium     Family history of malignant neoplasm of breast 07/12/2013     Priority: Medium     Overview:   Paternal GM       Decreased libido 12/12/2012     Priority: Medium     Overview:   Due to Effexor.       Irritable colon 10/26/2009     Priority: Medium     Panic disorder without agoraphobia 02/06/2008     Priority: Medium     Overview:   Panic Disorder w/o Agoraphobia       Eating disorder 02/05/2004     Priority: Medium     History of, feels stable now.              History of Present Illness:   Heather Guillory presents for f/u for probable mixed connective tissue disease. Last seen in 1-17, when HCQ was continued.    Background: received diagnosis of mixed connective tissue given pain in hand and feet, stiffness, Raynaud's  Syndrome, fatigue, positive NINOSKA and anti-RNP in 2016. Ms. Guillory first experienced swelling and pain in her fingers and feet during her second pregnancy this past spring that prevented her from wearing rings on her fingers and provoked an evaluation for DVT, which was negative. Despite weight loss post partum, her pain, mild swelling, and stiffness in her digits persisted. She described the pain as a 7/10 when she wakes to feed her infant in the middle of the night and in the morning that decreases to a 2-3/10 over the course of the day. Stiffness lasts 30 mins to 1 hour in the morning. In general she feels tired all the time, but cannot determine if it is associated with that fact that she has two young children and is  breastfeeding every 2 hours or if it is related to her joint symptoms. She was evaluated by an internist who found that she had a positive NINOSKA/ She saw Dr. Iniguez in 8-16 who discovered +RNP. She started Plaquenil 200 mg BID.    Interval history 7/14/17  Heather Guillory is stable since her last visit in January, despite feeling more fatigued recently. She denied arthralgias/myalgias but reports having sore feet/toes at end of day and when she first wakes up. She states the fatigue worsened since she ran out of Zoloft 3.5 weeks ago; also reports irritability and no sleeping soundly during the night since that time too. Zoloft prescribed by her OBGYN.     She is currently receiving fertility treatment and plans to undergo embryo transfer on 7/24/17. She remains on HCQ and feels comfortable continuing this medication during pregnancy. She wonders if taking HCQ once daily (400) rather than 200 twice daily is acceptable.  She is using estrogen therapy in preparation for the embryo transfer, and relates that her gynecologist recommends that she remained on the supplement for approximately one month after the transfer.    She recently pinched a nerve in her neck and has numbness/tingling with burning sensation in her left arm that radiates down into her 1st-3rd digits. She was evaluated at [facility] orthopedics, received percocet for pain and told to have PT. If it did not improve in 6 weeks, she will return for another evaluation.     Lastly, she had dry, rough skin on lateral aspects of fingers. She states this often occurs in winter due to weather but it is new for her this summer. She is using a lot of moisturizers but it has not been helping.         9/28/17:   Heather is former patient of Dr. Brewer, is seeing me for 2nd opinion and is transferring care. She is currently pregnant and is concerned about her flare of MCTD during pregnancy, especially worried about flare being triggered by IVF.     She had her 1st  pregnancy in 2/2015. It was via IVF. It was unexplained fertility. It was uneventful. Her son was born term. His 2nd son was born natuarally in 5/2016, term and healthy.       At 6 week post partum check up, she mentioned AM stiffness and stiffness of hands (new), feet were painful. Hands were swollen. Had fatigue, was breastfeeding att hat time.    No hair loss, skin rash, photosensitivity (but burns easily), oral/nasal ulcers, or sicca.    Has h/o Raynaud's since teen. Fingers turn white, toes turn purple, not painful. They feel cold not painful.    No myositis.     No serositis.    No seziures, headaches, psychosis.    Has sensitive stomach.    No fevers.     No h/o proteinuria.    She was diagnosed with MTCD in 6/2016. She started taking HCQ in 9/2016.  She started HCQ after she stopped breastfeeding. Had one flare up after stopping breastfeeding. She started  bid, last eye exam was this summer of 2017.     No prednisone.    Lack of sleep causes joint pain.     She did another round of IVF in 4/2017.  She was on OC x 10 wk . Embryo transfer was 7/14/2017. Now is off estrogen, progestron x 2 wk, now 12 wk pregnant.     No flare of her disease after IVF.    She was on ASA 81 mg qd till she was confirmed to be pregnant.    No preganncy loses. No thrombosis.    Feet feel sore and achy only with lack of sleep.     JOSEPH is 4/11/2018.    Interval History 12/14/17:  Heathre has no major concerns or changes in symptoms at today's appointment.    Hasn't experienced any joint pains for about 9-12 months.  No stiffness in her muscles or joints.  She hasn't had any Raynaud's and notes that she is keeping her hands and feet adequately covered and warm.      She does note that she has been fatigued lately but attributes that to her 2 toddlers at home who aren't sleeping throughout the night.  Also notes easy bruising but thinks that it is chronic.  Denies any hair loss, mouth sores/ulcers, HA, F/chills, night  sweats.    She is taking hydroxychloroquine and is tolerating it well.      No significant infections although she wanted to inform us about her UTI with ESBL bacteria which was diagnosed when she was 8 weeks pregnant.  At that time, she was hospitalized and given IV antibiotics.  Ever since, she has experienced recurring yeast infections.  She tried diflucan x 3 without improvement.  Finally, she has changed her diet, cutting out sugars, which is improving her symptoms.            Review of Systems:   A comprehensive ROS was done. Positives are per HPI.          Past Medical History:     Past Medical History:   Diagnosis Date     Abnormal Pap smear     Over 10 years ago     Anxiety      E coli infection     ESBL     History of extended-spectrum beta-lactamase producing Escherichia coli infection      Hx of previous reproductive problem 12/2013    Infertility     Mixed connective tissue disease (H)     Dr. Steele- switching      Nephrolithiasis      Past Surgical History:   Procedure Laterality Date     BIOPSY  April 2014    Uterine polyp removed     DILATION AND CURETTAGE SUCTION       OPERATIVE HYSTEROSCOPY WITH MORCELLATOR  4/8/2014    Procedure: OPERATIVE HYSTEROSCOPY WITH MORCELLATOR;  Hysteroscopic Polypectomy;  Surgeon: Cate Mansfield MD;  Location: [facility] OR     Raynaud's syndrome since puberty. Her main trigger is cold.  She had two uneventful pregnancies with vaginal births, though the conception of her first child required IVF.   she is undergoing embryo transfer in July 2017 in an attempt to conceive again. Estrogen therapy was used in preparation for and following embryo transfer.   cervical radiculopathy, 2017.       Social History:     Social History     Occupational History      [employer]     Social History Main Topics     Smoking status: Never Smoker     Smokeless tobacco: Never Used     Alcohol use No     Drug use: No     Sexual activity: Yes     Partners: Male      "Birth control/ protection: None            Family History:     Family History   Problem Relation Age of Onset     DIABETES Paternal Grandfather      C.A.D. Paternal Grandfather      Hypertension Paternal Grandfather      Other Cancer Paternal Grandfather      Lung     Breast Cancer Paternal Grandmother      CANCER Paternal Grandmother      lung ca     OSTEOPOROSIS Paternal Grandmother      Hypertension Paternal Grandmother      Cancer - colorectal Maternal Grandfather      Colon Cancer Maternal Grandfather      CANCER Maternal Grandmother      cervical ca     Neurologic Disorder Maternal Grandmother      alzheimers     Alzheimer Disease Maternal Grandmother      Cardiovascular Brother 28     cardiomyopathy     Genetic Disorder Sister 21     una's disease     HEART DISEASE Paternal Uncle 55     mitral valve replacement     Anxiety Disorder Sister      Anxiety Disorder Brother      Genetic Disorder Sister      Una's Disease     No history of AI disease       Allergies:     Allergies   Allergen Reactions     Kiwi Other (See Comments)     Throat itching     Benzoyl Peroxide Swelling     Duricef [Cefadroxil] Unknown     Monistat [Miconazole] Swelling     Sulfa Drugs Rash            Medications:     Current Outpatient Prescriptions   Medication Sig Dispense Refill     sertraline (ZOLOFT) 25 MG tablet Take 1 tablet (25 mg) by mouth daily Increase to 50 mg daily after one week if symptoms improved but not enough 90 tablet 6     Prenatal Vit-Fe Fumarate-FA (PRENATAL VITAMINS) 28-0.8 MG TABS Take 1 tablet by mouth daily        hydroxychloroquine (PLAQUENIL) 200 MG tablet Take 200 mg by mouth 2 times daily              Physical Exam:   Blood pressure 100/65, pulse 77, temperature 98.1  F (36.7  C), temperature source Oral, height 1.702 m (5' 7\"), weight 78.7 kg (173 lb 9.6 oz), not currently breastfeeding.  Wt Readings from Last 4 Encounters:   12/14/17 78.7 kg (173 lb 9.6 oz)   11/20/17 76.8 kg (169 lb 6.4 oz) "   11/10/17 78 kg (171 lb 14.4 oz)   10/13/17 76.9 kg (169 lb 9.6 oz)     Constitutional: well-developed, appearing stated age; cooperative  Eyes: nl EOM, PERRLA, vision, conjunctiva, sclera  ENT: nl external ears, nose, hearing, lips, teeth, gums, throat  No mucous membrane lesions, normal saliva pool  Neck: no mass or thyroid enlargement  Resp: lungs clear to auscultation  CV: RRR, no murmurs, rubs or gallops, no edema  GI: no ABD mass or tenderness  Lymph: no cervical, supraclavicular nodes  MS: no active synovitis or joint tenderness  Skin: no skin rash  Neuro: nl cranial nerves, strength  Psych: nl affect.         Data:     Results for orders placed or performed during the hospital encounter of 17   [facility] Read Screen Fetal Echo Single    Narrative            Fetal Echo  ---------------------------------------------------------------------------------------------------------  Pat. Name: RITA GAONA       Study Date:  2017 2:08pm  Pat. NO:  [id number]        Referring  MD: BUTCH HOPE  Site:  [facility]       Sonographer: Gino Huffman RDMS  :  1980        Age:   37  ---------------------------------------------------------------------------------------------------------    INDICATION  ---------------------------------------------------------------------------------------------------------  In vitro fertilization  .      METHOD  ---------------------------------------------------------------------------------------------------------  Grayscale imaging, Doppler echocardiography color flow velocity mapping and Doppler echocardiography fetal pulsed wave and or wave with spectral display were used to  assess fetal cardiac structures for today's [facility] fetal echocardiogram. View: Good view.      PREGNANCY  ---------------------------------------------------------------------------------------------------------  Ham pregnancy. Number of fetuses:  1.      DATING  ---------------------------------------------------------------------------------------------------------                                           Date                                Details                                                                                      Gest. age                      JOSEPH  Conception                         7/18/2017                        Conception: IVF                                                                         22 w + 6 d                     4/10/2018  Embryo transfer                  7/23/2017                        IVF / ET: 5 d                                                                               22 w + 6 d                     4/10/2018  External assessment          9/6/2017                          GA: 9 w + 1 d                                                                             22 w + 6 d                     4/10/2018  Assigned dating                  Dating performed on 09/27/2017, based on the conception date                                            22 w + 6 d                     4/10/2018      GENERAL EVALUATION  ---------------------------------------------------------------------------------------------------------  Cardiac activity: present.  bpm.  Fetal movements: visualized.  Presentation: Variable.  Placenta:  posterior, There is no evidence of placenta previa.  Umbilical cord: 3 vessel cord.  Amniotic fluid: normal.      FETAL ECHOCARDIOGRAM  ---------------------------------------------------------------------------------------------------------  Cardiac position: normal  Cardiac axis: normal  Atria: Atria approximately equal in size  Foramen ovale: Normal, patent foramen ovale  Atrial septum: normal size and morphology  Ventricles: Ventricles approximately equal in size  Ventricular septum: Ventricular septum appears intact (apex to crux)  Tricuspid valve: No significant regurgitation seen  Mitral valve:  No significant regurgitation seen  Atrioventricular connections: Normal alignment  Pulmonary valve: normal size and morphology  Aortic valve: normal size and morphology  Ventriculo-arterial connections: Normal size and morphology  Pulmonary trunk: normal size and morphology  Pulmonary veins: Two or more pulmonary veins are identified entering the left atrium.  Main pulmonary artery: the main pulmonary artery can be seen bifurcating into the arterial duct and the right pulmonary artery  Aortic root: normal size and morphology  Ascending aorta: normal size and morphology  Crossing of the great arteries: Normal 4 chamber view with normal axis and situs. Normal relationship of the great arteries.  Descending aorta: normal size and morphology  Superior vena cava: normal  Inferior vena cava: normal      RECOMMENDATION  ---------------------------------------------------------------------------------------------------------  We discussed the findings on today's ultrasound with the patient.    Echo due to IVF. We reviewed the limitations of ultrasound. Plans serial growth US in S office due to mixed connective tissue disease.    Return to primary provider for continued prenatal care.    Thank you for the opportunity to participate in the care of this patient. If you have questions regarding today's evaluation or if we can be of further service, please contact the  [facility].    **Fetal anomalies may be present but not detected**.        Impression    IMPRESSION  ---------------------------------------------------------------------------------------------------------  Normal fetal echo for this gestational age. On any fetal echocardiography one cannot rule out small VSD, ASD and or Coarctation of the aorta.         Labs from outside laboratory reviewed from 8/12/16  RNP antibodies 1.6- positive    Negative for Parvo Virus B19, Lyme Disease  Negative SLE panel   Negative SSA, SSB  Negative  Anti-Sury-1  Negative Centromere B antibodies  Negative  CCP  Recent Labs   Lab Test  07/22/16   1110  05/29/16   0651  05/27/16   1032   01/09/16   0724   WBC  8.1   --   15.4*   --   16.8*   RBC  4.86   --   4.25   --   4.75   HGB  14.1  11.2*  12.4   < >  14.2   HCT  42.1   --   37.6   --   40.1   MCV  87   --   89   --   84   RDW  13.0   --   14.0   --   13.7   PLT  228   --   184   --   202   ALBUMIN   --    --    --    --   3.3*   CRP  3.0   --    --    --    --    BUN   --    --    --    --   9    < > = values in this interval not displayed.      Recent Labs   Lab Test  07/22/16   1110   TSH  1.10     Hemoglobin   Date Value Ref Range Status   12/14/2017 12.4 11.7 - 15.7 g/dL Final   09/28/2017 14.6 11.7 - 15.7 g/dL Final   09/07/2017 13.1 11.7 - 15.7 g/dL Final     Urea Nitrogen   Date Value Ref Range Status   01/09/2016 9 7 - 30 mg/dL Final     Sed Rate   Date Value Ref Range Status   09/28/2017 8 0 - 20 mm/h Final   07/22/2016 9 0 - 20 mm/h Final     CRP Inflammation   Date Value Ref Range Status   09/28/2017 <2.9 0.0 - 8.0 mg/L Final   10/24/2016 <2.9 0.0 - 8.0 mg/L Final   10/05/2016 42.0 (H) 0.0 - 8.0 mg/L Final     AST   Date Value Ref Range Status   12/14/2017 14 0 - 45 U/L Final   09/28/2017 16 0 - 45 U/L Final   01/09/2016 12 0 - 45 U/L Final     Albumin   Date Value Ref Range Status   12/14/2017 3.1 (L) 3.4 - 5.0 g/dL Final   01/09/2016 3.3 (L) 3.4 - 5.0 g/dL Final     Alkaline Phosphatase   Date Value Ref Range Status   01/09/2016 70 40 - 150 U/L Final     ALT   Date Value Ref Range Status   12/14/2017 15 0 - 50 U/L Final   09/28/2017 21 0 - 50 U/L Final   01/09/2016 15 0 - 50 U/L Final     Rheumatoid Factor   Date Value Ref Range Status   07/22/2016 <20 <20 IU/mL Final     RHEUM RESULTS Latest Ref Rng & Units 9/7/2017 9/28/2017 12/14/2017   COMPLEMENT C3 76 - 169 mg/dL - 97 -   COMPLEMENT C4 15 - 50 mg/dL - 25 -   DNA-DS <10 IU/mL - 4 -   SED RATE 0 - 20 mm/h - 8 -   CRP, INFLAMMATION 0.0 - 8.0  mg/L - <2.9 -   RHEUMATOID FACTOR <20 IU/mL - - -   NINOSKA SCREEN BY EIA <1.0 - - -   AST 0 - 45 U/L - 16 14   ALT 0 - 50 U/L - 21 15   ALBUMIN 3.4 - 5.0 g/dL - - 3.1(L)   WBC 4.0 - 11.0 10e9/L 5.1 7.4 12.7(H)   RBC 3.8 - 5.2 10e12/L 4.62 5.22(H) 4.30   HGB 11.7 - 15.7 g/dL 13.1 14.6 12.4   HCT 35.0 - 47.0 % 38.6 43.2 37.8   MCV 78 - 100 fl 84 83 88   MCHC 31.5 - 36.5 g/dL 33.9 33.8 32.8   RDW 10.0 - 15.0 % 12.4 12.2 13.7    - 450 10e9/L 168 215 210   CREATININE 0.52 - 1.04 mg/dL - 0.56 0.52   GFR ESTIMATE, IF BLACK >60 mL/min/1.7m2 - >90 >90   GFR ESTIMATE >60 mL/min/1.7m2 - >90 >90     Reviewed Rheumatology lab flowsheet

## 2017-12-14 NOTE — LETTER
2017      RE: Heather Guillory  [address]  [place] [address]       Rheumatology Visit     Heather Guillory MRN# [id number]   YOB: 1980 Age: 37 year old     Date of Last Visit with Dr. Brewer: 2017  Date of initial visit with me: 2017   DOS: 2017       Assessment and Plan:   Undifferentiated connective tissue disease (UCTD) and pregnancy via IVF (JOSEPH 2018):    Heather is a 36 yo WF , a former patient of Dr. Brewer. She was diagnosed with MCTD in 2016, 6 wk postpartum based on fatigue, arthritis, mild Raynaud's, low positive NINOSKA 1.1 and low positive RNP Ab 1.6. She is on  mg qd since 2017 with great response.  UCTD continues to be most likely diagnosis not MCTD as she does not have classic features of MCTD, had very low titer of RNP Ab in the past, (negative on most recent check), and had a mild disease which never required prednisone.  All autoimmunity labs (2017) came back negative which is further reassuring for UCTD, including APS panel, repeat RNP and inflammatory markers, dsDNA and complement levels.  UA was significant for bacterial infection, otherwise no significant proteinuria.     She had neg SSA/SSB antibodies in 2016, no concern for CHB.  APS panel.  She had neg anti-Sm, neg anti-DNA and neg centromere Ab in 2016.    17  Disease continues to be under excellent control on HCQ, I don't expect flare up during pregnancy; however given safety of HCQ during pregnancy and breastfeeding, recommend HCQ to be continued at current dose and be considered to be tapered after 6-8 weeks post partum if no flare of UCTD.    Reassured, once again, that I expect her UCTD not to flare duing pregnancy but will monitor her closely.    1) Labs today:  Orders Placed This Encounter   Procedures     ALT     AST     CBC with platelets differential     Albumin level     Creatinine     Routine UA with Micro Reflex to Culture     2)  RTC in March 2018    I saw and examined the patient with Dr. King. I acted as scribe for Dr. King.      Morales Christian MS4    Attending Note: I saw and examined the patient with medical student Morales. This note was written by her who acted as scribe for me. I agree with findings and recommendations written in this note. The note reflects decisions made by me.    Nasrin King MD          Active Problem List:     Patient Active Problem List    Diagnosis Date Noted     Pregnancy resulting from in vitro fertilization in first trimester 09/08/2017     Priority: Medium     Will need fetal echo 22-24 weeks       MCTD (mixed connective tissue disease) (H) 09/06/2017     Priority: Medium     On Plaquenil- OK per MFM  NEEDS serial growth u/s 4-6 wks       Supervision of high-risk pregnancy of elderly multigravida, IVF, TANYA YAN 09/06/2017     Priority: Medium     JOSEPH 4/11/18 by IVF date, dating u/s consistent    9/6/17: OB intake   Labs ordered   Needs GC[facility]CT at [facility] ___  Admitted to inpatient for IV antibiotic treatment of UTI   Desires 1st trimester screening and level 2, MFM referral placed   Will need fetal echo d/t IVF  11/13  Per [facility] called Heather to let her know that I did communicate with Dr. King and that repeat assessment of her SSA antibodies is not needed, as these were negative in 8/2016.  She expressed understanding and will continue with her routine prenatal care and follow up growth US as previously recommended with [facility].   Everardo Betancourt  12/12/2017 - Normal Fetal Echo.  Per MFM continue serial growth US at [facility].         UTI (urinary tract infection) 09/06/2017     Priority: Medium     8/31/17: >100, 000 E. Coli, extended spectrum beta lactamase  Macrobid PO- no improvement    9/6/17: Admitted to inpatient:  Ertapenem IV x 2 inpatient.  Remaining doses at infusion center  F/U appt with Dr. Mansfield 9/15/17       Xerosis cutis 07/21/2017     Priority: Medium     Overview:       Xerosis of the hands which is  moderately severe and associated with fissuring.       Family history of genetic disorder 12/23/2015     Priority: Medium     Patient's sister's son with Elijah's disease       Female infertility 04/04/2014     Priority: Medium     IVF with first pregnancy.       Anxiety 04/04/2014     Priority: Medium     Has weaned off Zoloft, feels stable.  Will consider re-starting if she feels anxiety is increasing.       Female fertility problem 03/03/2014     Priority: Medium     Tension headache 03/03/2014     Priority: Medium     Dysuria 08/14/2013     Priority: Medium     Family history of malignant neoplasm of breast 07/12/2013     Priority: Medium     Overview:   Paternal GM       Decreased libido 12/12/2012     Priority: Medium     Overview:   Due to Effexor.       Irritable colon 10/26/2009     Priority: Medium     Panic disorder without agoraphobia 02/06/2008     Priority: Medium     Overview:   Panic Disorder w/o Agoraphobia       Eating disorder 02/05/2004     Priority: Medium     History of, feels stable now.              History of Present Illness:   Heather Guillory presents for f/u for probable mixed connective tissue disease. Last seen in 1-17, when HCQ was continued.    Background: received diagnosis of mixed connective tissue given pain in hand and feet, stiffness, Raynaud's  Syndrome, fatigue, positive NINOSKA and anti-RNP in 2016. Ms. Guillory first experienced swelling and pain in her fingers and feet during her second pregnancy this past spring that prevented her from wearing rings on her fingers and provoked an evaluation for DVT, which was negative. Despite weight loss post partum, her pain, mild swelling, and stiffness in her digits persisted. She described the pain as a 7/10 when she wakes to feed her infant in the middle of the night and in the morning that decreases to a 2-3/10 over the course of the day. Stiffness lasts 30 mins to 1 hour in the morning. In general she feels tired all the time, but cannot  determine if it is associated with that fact that she has two young children and is breastfeeding every 2 hours or if it is related to her joint symptoms. She was evaluated by an internist who found that she had a positive NINOSKA/ She saw Dr. Iniguez in 8-16 who discovered +RNP. She started Plaquenil 200 mg BID.    Interval history 7/14/17  Heather Guillory is stable since her last visit in January, despite feeling more fatigued recently. She denied arthralgias/myalgias but reports having sore feet/toes at end of day and when she first wakes up. She states the fatigue worsened since she ran out of Zoloft 3.5 weeks ago; also reports irritability and no sleeping soundly during the night since that time too. Zoloft prescribed by her OBGYN.     She is currently receiving fertility treatment and plans to undergo embryo transfer on 7/24/17. She remains on HCQ and feels comfortable continuing this medication during pregnancy. She wonders if taking HCQ once daily (400) rather than 200 twice daily is acceptable.  She is using estrogen therapy in preparation for the embryo transfer, and relates that her gynecologist recommends that she remained on the supplement for approximately one month after the transfer.    She recently pinched a nerve in her neck and has numbness/tingling with burning sensation in her left arm that radiates down into her 1st-3rd digits. She was evaluated at [facility] orthopedics, received percocet for pain and told to have PT. If it did not improve in 6 weeks, she will return for another evaluation.     Lastly, she had dry, rough skin on lateral aspects of fingers. She states this often occurs in winter due to weather but it is new for her this summer. She is using a lot of moisturizers but it has not been helping.         9/28/17:   Heather is former patient of Dr. Brewer, is seeing me for 2nd opinion and is transferring care. She is currently pregnant and is concerned about her flare of MCTD during  pregnancy, especially worried about flare being triggered by IVF.     She had her 1st pregnancy in 2/2015. It was via IVF. It was unexplained fertility. It was uneventful. Her son was born term. His 2nd son was born natuarally in 5/2016, term and healthy.       At 6 week post partum check up, she mentioned AM stiffness and stiffness of hands (new), feet were painful. Hands were swollen. Had fatigue, was breastfeeding att hat time.    No hair loss, skin rash, photosensitivity (but burns easily), oral/nasal ulcers, or sicca.    Has h/o Raynaud's since teen. Fingers turn white, toes turn purple, not painful. They feel cold not painful.    No myositis.     No serositis.    No seziures, headaches, psychosis.    Has sensitive stomach.    No fevers.     No h/o proteinuria.    She was diagnosed with MTCD in 6/2016. She started taking HCQ in 9/2016.  She started HCQ after she stopped breastfeeding. Had one flare up after stopping breastfeeding. She started  bid, last eye exam was this summer of 2017.     No prednisone.    Lack of sleep causes joint pain.     She did another round of IVF in 4/2017.  She was on OC x 10 wk . Embryo transfer was 7/14/2017. Now is off estrogen, progestron x 2 wk, now 12 wk pregnant.     No flare of her disease after IVF.    She was on ASA 81 mg qd till she was confirmed to be pregnant.    No preganncy loses. No thrombosis.    Feet feel sore and achy only with lack of sleep.     JOSEPH is 4/11/2018.    Interval History 12/14/17:  Heather has no major concerns or changes in symptoms at today's appointment.    Hasn't experienced any joint pains for about 9-12 months.  No stiffness in her muscles or joints.  She hasn't had any Raynaud's and notes that she is keeping her hands and feet adequately covered and warm.      She does note that she has been fatigued lately but attributes that to her 2 toddlers at home who aren't sleeping throughout the night.  Also notes easy bruising but thinks that it  is chronic.  Denies any hair loss, mouth sores/ulcers, HA, F/chills, night sweats.    She is taking hydroxychloroquine and is tolerating it well.      No significant infections although she wanted to inform us about her UTI with ESBL bacteria which was diagnosed when she was 8 weeks pregnant.  At that time, she was hospitalized and given IV antibiotics.  Ever since, she has experienced recurring yeast infections.  She tried diflucan x 3 without improvement.  Finally, she has changed her diet, cutting out sugars, which is improving her symptoms.            Review of Systems:   A comprehensive ROS was done. Positives are per HPI.          Past Medical History:     Past Medical History:   Diagnosis Date     Abnormal Pap smear     Over 10 years ago     Anxiety      E coli infection     ESBL     History of extended-spectrum beta-lactamase producing Escherichia coli infection      Hx of previous reproductive problem 12/2013    Infertility     Mixed connective tissue disease (H)     Dr. Steele- switching      Nephrolithiasis      Past Surgical History:   Procedure Laterality Date     BIOPSY  April 2014    Uterine polyp removed     DILATION AND CURETTAGE SUCTION       OPERATIVE HYSTEROSCOPY WITH MORCELLATOR  4/8/2014    Procedure: OPERATIVE HYSTEROSCOPY WITH MORCELLATOR;  Hysteroscopic Polypectomy;  Surgeon: Cate Mansfield MD;  Location: [facility] OR     Raynaud's syndrome since puberty. Her main trigger is cold.  She had two uneventful pregnancies with vaginal births, though the conception of her first child required IVF.   she is undergoing embryo transfer in July 2017 in an attempt to conceive again. Estrogen therapy was used in preparation for and following embryo transfer.   cervical radiculopathy, 2017.       Social History:     Social History     Occupational History      [employer]     Social History Main Topics     Smoking status: Never Smoker     Smokeless tobacco: Never Used     Alcohol  "use No     Drug use: No     Sexual activity: Yes     Partners: Male     Birth control/ protection: None            Family History:     Family History   Problem Relation Age of Onset     DIABETES Paternal Grandfather      C.A.D. Paternal Grandfather      Hypertension Paternal Grandfather      Other Cancer Paternal Grandfather      Lung     Breast Cancer Paternal Grandmother      CANCER Paternal Grandmother      lung ca     OSTEOPOROSIS Paternal Grandmother      Hypertension Paternal Grandmother      Cancer - colorectal Maternal Grandfather      Colon Cancer Maternal Grandfather      CANCER Maternal Grandmother      cervical ca     Neurologic Disorder Maternal Grandmother      alzheimers     Alzheimer Disease Maternal Grandmother      Cardiovascular Brother 28     cardiomyopathy     Genetic Disorder Sister 21     una's disease     HEART DISEASE Paternal Uncle 55     mitral valve replacement     Anxiety Disorder Sister      Anxiety Disorder Brother      Genetic Disorder Sister      Una's Disease     No history of AI disease       Allergies:     Allergies   Allergen Reactions     Kiwi Other (See Comments)     Throat itching     Benzoyl Peroxide Swelling     Duricef [Cefadroxil] Unknown     Monistat [Miconazole] Swelling     Sulfa Drugs Rash            Medications:     Current Outpatient Prescriptions   Medication Sig Dispense Refill     sertraline (ZOLOFT) 25 MG tablet Take 1 tablet (25 mg) by mouth daily Increase to 50 mg daily after one week if symptoms improved but not enough 90 tablet 6     Prenatal Vit-Fe Fumarate-FA (PRENATAL VITAMINS) 28-0.8 MG TABS Take 1 tablet by mouth daily        hydroxychloroquine (PLAQUENIL) 200 MG tablet Take 200 mg by mouth 2 times daily              Physical Exam:   Blood pressure 100/65, pulse 77, temperature 98.1  F (36.7  C), temperature source Oral, height 1.702 m (5' 7\"), weight 78.7 kg (173 lb 9.6 oz), not currently breastfeeding.  Wt Readings from Last 4 Encounters: "   17 78.7 kg (173 lb 9.6 oz)   17 76.8 kg (169 lb 6.4 oz)   11/10/17 78 kg (171 lb 14.4 oz)   10/13/17 76.9 kg (169 lb 9.6 oz)     Constitutional: well-developed, appearing stated age; cooperative  Eyes: nl EOM, PERRLA, vision, conjunctiva, sclera  ENT: nl external ears, nose, hearing, lips, teeth, gums, throat  No mucous membrane lesions, normal saliva pool  Neck: no mass or thyroid enlargement  Resp: lungs clear to auscultation  CV: RRR, no murmurs, rubs or gallops, no edema  GI: no ABD mass or tenderness  Lymph: no cervical, supraclavicular nodes  MS: no active synovitis or joint tenderness  Skin: no skin rash  Neuro: nl cranial nerves, strength  Psych: nl affect.         Data:     Results for orders placed or performed during the hospital encounter of 17   [facility] Read Screen Fetal Echo Single    Narrative            Fetal Echo  ---------------------------------------------------------------------------------------------------------  Pat. Name: RITA GAONA       Study Date:  2017 2:08pm  Pat. NO:  [id number]        Referring  MD: BUTCH HOPE  Site:  [facility]       Sonographer: Gino Huffman RDMS  :  1980        Age:   37  ---------------------------------------------------------------------------------------------------------    INDICATION  ---------------------------------------------------------------------------------------------------------  In vitro fertilization  .      METHOD  ---------------------------------------------------------------------------------------------------------  Grayscale imaging, Doppler echocardiography color flow velocity mapping and Doppler echocardiography fetal pulsed wave and or wave with spectral display were used to  assess fetal cardiac structures for today's [facility] fetal echocardiogram. View: Good view.      PREGNANCY  ---------------------------------------------------------------------------------------------------------  Ham pregnancy.  Number of fetuses: 1.      DATING  ---------------------------------------------------------------------------------------------------------                                           Date                                Details                                                                                      Gest. age                      JOSEPH  Conception                         7/18/2017                        Conception: IVF                                                                         22 w + 6 d                     4/10/2018  Embryo transfer                  7/23/2017                        IVF / ET: 5 d                                                                               22 w + 6 d                     4/10/2018  External assessment          9/6/2017                          GA: 9 w + 1 d                                                                             22 w + 6 d                     4/10/2018  Assigned dating                  Dating performed on 09/27/2017, based on the conception date                                            22 w + 6 d                     4/10/2018      GENERAL EVALUATION  ---------------------------------------------------------------------------------------------------------  Cardiac activity: present.  bpm.  Fetal movements: visualized.  Presentation: Variable.  Placenta:  posterior, There is no evidence of placenta previa.  Umbilical cord: 3 vessel cord.  Amniotic fluid: normal.      FETAL ECHOCARDIOGRAM  ---------------------------------------------------------------------------------------------------------  Cardiac position: normal  Cardiac axis: normal  Atria: Atria approximately equal in size  Foramen ovale: Normal, patent foramen ovale  Atrial septum: normal size and morphology  Ventricles: Ventricles approximately equal in size  Ventricular septum: Ventricular septum appears intact (apex to crux)  Tricuspid valve: No significant regurgitation  seen  Mitral valve: No significant regurgitation seen  Atrioventricular connections: Normal alignment  Pulmonary valve: normal size and morphology  Aortic valve: normal size and morphology  Ventriculo-arterial connections: Normal size and morphology  Pulmonary trunk: normal size and morphology  Pulmonary veins: Two or more pulmonary veins are identified entering the left atrium.  Main pulmonary artery: the main pulmonary artery can be seen bifurcating into the arterial duct and the right pulmonary artery  Aortic root: normal size and morphology  Ascending aorta: normal size and morphology  Crossing of the great arteries: Normal 4 chamber view with normal axis and situs. Normal relationship of the great arteries.  Descending aorta: normal size and morphology  Superior vena cava: normal  Inferior vena cava: normal      RECOMMENDATION  ---------------------------------------------------------------------------------------------------------  We discussed the findings on today's ultrasound with the patient.    Echo due to IVF. We reviewed the limitations of ultrasound. Plans serial growth US in S office due to mixed connective tissue disease.    Return to primary provider for continued prenatal care.    Thank you for the opportunity to participate in the care of this patient. If you have questions regarding today's evaluation or if we can be of further service, please contact the  [facility].    **Fetal anomalies may be present but not detected**.        Impression    IMPRESSION  ---------------------------------------------------------------------------------------------------------  Normal fetal echo for this gestational age. On any fetal echocardiography one cannot rule out small VSD, ASD and or Coarctation of the aorta.         Labs from outside laboratory reviewed from 8/12/16  RNP antibodies 1.6- positive    Negative for Parvo Virus B19, Lyme Disease  Negative SLE panel   Negative SSA,  SSB  Negative Anti-Sury-1  Negative Centromere B antibodies  Negative  CCP  Recent Labs   Lab Test  07/22/16   1110  05/29/16   0651  05/27/16   1032   01/09/16   0724   WBC  8.1   --   15.4*   --   16.8*   RBC  4.86   --   4.25   --   4.75   HGB  14.1  11.2*  12.4   < >  14.2   HCT  42.1   --   37.6   --   40.1   MCV  87   --   89   --   84   RDW  13.0   --   14.0   --   13.7   PLT  228   --   184   --   202   ALBUMIN   --    --    --    --   3.3*   CRP  3.0   --    --    --    --    BUN   --    --    --    --   9    < > = values in this interval not displayed.      Recent Labs   Lab Test  07/22/16   1110   TSH  1.10     Hemoglobin   Date Value Ref Range Status   12/14/2017 12.4 11.7 - 15.7 g/dL Final   09/28/2017 14.6 11.7 - 15.7 g/dL Final   09/07/2017 13.1 11.7 - 15.7 g/dL Final     Urea Nitrogen   Date Value Ref Range Status   01/09/2016 9 7 - 30 mg/dL Final     Sed Rate   Date Value Ref Range Status   09/28/2017 8 0 - 20 mm/h Final   07/22/2016 9 0 - 20 mm/h Final     CRP Inflammation   Date Value Ref Range Status   09/28/2017 <2.9 0.0 - 8.0 mg/L Final   10/24/2016 <2.9 0.0 - 8.0 mg/L Final   10/05/2016 42.0 (H) 0.0 - 8.0 mg/L Final     AST   Date Value Ref Range Status   12/14/2017 14 0 - 45 U/L Final   09/28/2017 16 0 - 45 U/L Final   01/09/2016 12 0 - 45 U/L Final     Albumin   Date Value Ref Range Status   12/14/2017 3.1 (L) 3.4 - 5.0 g/dL Final   01/09/2016 3.3 (L) 3.4 - 5.0 g/dL Final     Alkaline Phosphatase   Date Value Ref Range Status   01/09/2016 70 40 - 150 U/L Final     ALT   Date Value Ref Range Status   12/14/2017 15 0 - 50 U/L Final   09/28/2017 21 0 - 50 U/L Final   01/09/2016 15 0 - 50 U/L Final     Rheumatoid Factor   Date Value Ref Range Status   07/22/2016 <20 <20 IU/mL Final     RHEUM RESULTS Latest Ref Rng & Units 9/7/2017 9/28/2017 12/14/2017   COMPLEMENT C3 76 - 169 mg/dL - 97 -   COMPLEMENT C4 15 - 50 mg/dL - 25 -   DNA-DS <10 IU/mL - 4 -   SED RATE 0 - 20 mm/h - 8 -   CRP, INFLAMMATION  0.0 - 8.0 mg/L - <2.9 -   RHEUMATOID FACTOR <20 IU/mL - - -   NINOSKA SCREEN BY EIA <1.0 - - -   AST 0 - 45 U/L - 16 14   ALT 0 - 50 U/L - 21 15   ALBUMIN 3.4 - 5.0 g/dL - - 3.1(L)   WBC 4.0 - 11.0 10e9/L 5.1 7.4 12.7(H)   RBC 3.8 - 5.2 10e12/L 4.62 5.22(H) 4.30   HGB 11.7 - 15.7 g/dL 13.1 14.6 12.4   HCT 35.0 - 47.0 % 38.6 43.2 37.8   MCV 78 - 100 fl 84 83 88   MCHC 31.5 - 36.5 g/dL 33.9 33.8 32.8   RDW 10.0 - 15.0 % 12.4 12.2 13.7    - 450 10e9/L 168 215 210   CREATININE 0.52 - 1.04 mg/dL - 0.56 0.52   GFR ESTIMATE, IF BLACK >60 mL/min/1.7m2 - >90 >90   GFR ESTIMATE >60 mL/min/1.7m2 - >90 >90     Reviewed Rheumatology lab flowsheet    Nasrin King MD

## 2017-12-15 ASSESSMENT — ANXIETY QUESTIONNAIRES
7. FEELING AFRAID AS IF SOMETHING AWFUL MIGHT HAPPEN: NOT AT ALL
2. NOT BEING ABLE TO STOP OR CONTROL WORRYING: NOT AT ALL
1. FEELING NERVOUS, ANXIOUS, OR ON EDGE: NOT AT ALL
3. WORRYING TOO MUCH ABOUT DIFFERENT THINGS: NOT AT ALL
4. TROUBLE RELAXING: NOT AT ALL
7. FEELING AFRAID AS IF SOMETHING AWFUL MIGHT HAPPEN: NOT AT ALL
GAD7 TOTAL SCORE: 0
5. BEING SO RESTLESS THAT IT IS HARD TO SIT STILL: NOT AT ALL
6. BECOMING EASILY ANNOYED OR IRRITABLE: NOT AT ALL
GAD7 TOTAL SCORE: 0

## 2017-12-18 ENCOUNTER — OFFICE VISIT (OUTPATIENT)
Dept: OBGYN | Facility: CLINIC | Age: 37
End: 2017-12-18
Attending: OBSTETRICS & GYNECOLOGY
Payer: COMMERCIAL

## 2017-12-18 VITALS
HEART RATE: 87 BPM | SYSTOLIC BLOOD PRESSURE: 104 MMHG | BODY MASS INDEX: 27.72 KG/M2 | WEIGHT: 176.6 LBS | HEIGHT: 67 IN | DIASTOLIC BLOOD PRESSURE: 61 MMHG

## 2017-12-18 DIAGNOSIS — O09.529 SUPERVISION OF HIGH-RISK PREGNANCY OF ELDERLY MULTIGRAVIDA: Primary | ICD-10-CM

## 2017-12-18 DIAGNOSIS — M35.1 MCTD (MIXED CONNECTIVE TISSUE DISEASE) (H): ICD-10-CM

## 2017-12-18 DIAGNOSIS — F41.9 ANXIETY: ICD-10-CM

## 2017-12-18 NOTE — PROGRESS NOTES
Stable labs except slightly elevated white count, did you have any cold or other infection at the time of blood draw? Please make sure to do the urine test.

## 2017-12-18 NOTE — MR AVS SNAPSHOT
After Visit Summary   12/18/2017    Heather Guillory    MRN: [id number]           Patient Information     Date Of Birth          1980        Visit Information        Provider Department      12/18/2017 10:45 AM April Palma MD [facility]        Today's Diagnoses     Supervision of high-risk pregnancy of elderly multigravida, IVF, [facility] MD    -  1    MCTD (mixed connective tissue disease) (H)        Anxiety           Follow-ups after your visit        Follow-up notes from your care team     Return in about 4 weeks (around 1/15/2018) for Growth US and EOB Visit.      Your next 10 appointments already scheduled     Dec 22, 2017  9:00 AM CST   ULTRASOUND with [facility] ULTRASOUND   [facility] ([facility])    [facility]  [facility] [address]   [phone]            Jan 16, 2018  1:00 PM CST   ULTRASOUND with [facility] ULTRASOUND   [facility] ([facility])    [facility] [address]  [address]  [facility] [address] [phone]            Jan 16, 2018  1:30 PM CST   Return Obstetrics Extended with Sheila Hull MD   [facility] ([facility])    [facility] [address]  [address]  [facility] [address] [phone]            Mar 02, 2018  8:00 AM CST   (Arrive by 7:45 AM)   Return Visit with Nasrin King MD   [facility] ([facility])    [address] [facility]  3rd [facility] [id number] [phone]              Who to contact     Please call your clinic at [phone] to:    Ask questions about your health    Make or cancel appointments    Discuss your medicines    Learn about your test results    Speak to your doctor   If you have compliments or concerns about an experience at your clinic, or if you wish to  "file a complaint, please contact [facility] Patient Relations at [phone] or email us at [email or web address]         Additional Information About Your Visit        [employer] Information     [employer] gives you secure access to your electronic health record. If you see a primary care provider, you can also send messages to your care team and make appointments. If you have questions, please call your primary care clinic.  If you do not have a primary care provider, please call [phone] and they will assist you.      [employer] is an electronic gateway that provides easy, online access to your medical records. With [employer], you can request a clinic appointment, read your test results, renew a prescription or communicate with your care team.     To access your existing account, please contact your [facility] or call [phone] for assistance.        Care EveryWhere ID     This is your Care EveryWhere ID. This could be used by other organizations to access your [place] medical records  [id number]        Your Vitals Were     Pulse Height BMI (Body Mass Index)             87 1.702 m (5' 7\") 27.66 kg/m2          Blood Pressure from Last 3 Encounters:   12/18/17 104/61   12/14/17 100/65   11/20/17 110/72    Weight from Last 3 Encounters:   12/18/17 80.1 kg (176 lb 9.6 oz)   12/14/17 78.7 kg (173 lb 9.6 oz)   11/20/17 76.8 kg (169 lb 6.4 oz)              Today, you had the following     No orders found for display         Today's Medication Changes          These changes are accurate as of: 12/18/17  8:28 PM.  If you have any questions, ask your nurse or doctor.               These medicines have changed or have updated prescriptions.        Dose/Directions    * sertraline 25 MG tablet   Commonly known as:  ZOLOFT   This may have changed:  Another medication with the same name was added. Make sure you understand how and when to take each.   Used for:  " Anxiety   Changed by:  Cate Mansfield MD        Dose:  25 mg   Take 1 tablet (25 mg) by mouth daily Increase to 50 mg daily after one week if symptoms improved but not enough   Quantity:  90 tablet   Refills:  6       * sertraline 50 MG tablet   Commonly known as:  ZOLOFT   This may have changed:  You were already taking a medication with the same name, and this prescription was added. Make sure you understand how and when to take each.   Used for:  Anxiety   Changed by:  April Palma MD        Dose:  50 mg   Take 1 tablet (50 mg) by mouth daily   Quantity:  90 tablet   Refills:  3       * Notice:  This list has 2 medication(s) that are the same as other medications prescribed for you. Read the directions carefully, and ask your doctor or other care provider to review them with you.         Where to get your medicines      These medications were sent to [place] [address] [place] [facility] [address]  [address] [address]     Phone:  [phone]     sertraline 50 MG tablet                Primary Care Provider Fax #    Physician No Ref-Primary [phone]       No address on file        Equal Access to Services     [facility]: Hadii juanjo galarza hadasho [place], waaxda luqadaha, qaybta kaalmada adegabby, john graham . So [facility] [phone].    ATENCIÓN: Si habla español, tiene a nelson disposición servicios gratwalkeros de asistencia lingüística. [facility] [phone].    We comply with applicable federal civil rights laws and [place] laws. We do not discriminate on the basis of race, color, national origin, age, disability, sex, sexual orientation, or gender identity.            Thank you!     Thank you for choosing [facility]  for your care. Our goal is always to provide you with excellent care. Hearing back from our patients is one way we can continue to improve our services. Please take a few minutes to  complete the written survey that you may receive in the mail after your visit with us. Thank you!             Your Updated Medication List - Protect others around you: Learn how to safely use, store and throw away your medicines at www.disposemymeds.org.          This list is accurate as of: 12/18/17  8:28 PM.  Always use your most recent med list.                   Brand Name Dispense Instructions for use Diagnosis    hydroxychloroquine 200 MG tablet    PLAQUENIL     Take 200 mg by mouth 2 times daily        Prenatal Vitamins 28-0.8 MG Tabs      Take 1 tablet by mouth daily    Supervision of high-risk pregnancy of elderly multigravida       * sertraline 25 MG tablet    ZOLOFT    90 tablet    Take 1 tablet (25 mg) by mouth daily Increase to 50 mg daily after one week if symptoms improved but not enough    Anxiety       * sertraline 50 MG tablet    ZOLOFT    90 tablet    Take 1 tablet (50 mg) by mouth daily    Anxiety       * Notice:  This list has 2 medication(s) that are the same as other medications prescribed for you. Read the directions carefully, and ask your doctor or other care provider to review them with you.

## 2017-12-18 NOTE — PROGRESS NOTES
SOUMYA Visit 17    S: 36 yo  @ 23w5d presents for SOUMYA visit. Denies pain or contractions, gushes of fluid or bleeding. No headache, swelling, vision changes, SOB or swelling. She reports continued symptoms of itching and vaginal irritation with continuous discharge. She has suffered from a chronic yeast infection since IV antibiotics were administered for a UTI in the first trimester of pregnancy with failed outpatient management. She shares that her yeast infection symptoms were not alleviated with 3 separate courses of PO medications. She reports that she has changed her diet recently to avoid sugars and carbohydrates and she has seen some improvement with this. She would like to keep trying this to see if her symptoms resolve. She is interested in treatment at a later date if her symptoms do not resolve, especially near the time of delivery as she would like to have it resolved by her JOSEPH.     Otherwise, she reports she is doing well and has no other concerns or questions.  She denies ctx, VB or LOF.  + FM.  Denies HA, scotoma, SOB, RUQ pain or increased swelling in her extremities.    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  On Plaquenil with good control.  Had negative SSA/SSB testing  so no concerns for heartblock.  Negative APS testing this pregnancy.  [facility] recommends serial growth scans, next is scheduled this Friday.  4) UTI in pregnancy: In first trimester with failed outpatient management, was admitted for IV antibiotic therapy, continue to monitor for symptoms.    5) Recurrent yeast infections: Can not take local therapy secondary to allergy, s/p multiple Diflucan  doses, recently tried home remedies and a low-carb/sugar-free diet with good improvement in symptoms. She would like to see how this goes and then let us know at the next visit if symptoms have not fully resolved. She is interested in a more aggressive therapy if her symptoms do not resolve by 36 weeks, or become more bothersome to her. Follow up with symptoms at next visit. Discuss treatment options of 4 doses Diflucan q3 days at next visit again. She is comfortable with this plan.  6) History panic disorder without agoraphobia: Started on Zoloft in early pregnancy, now on 50 mg daily, mood symptoms are good.   7) s/p flu vaccine  8) RTC In 4 weeks for Growth US and EOB Visit  9) FYI: patient would NOT like to know the sex of her baby - do not disclose!     April Palma MD

## 2017-12-18 NOTE — LETTER
2017       RE: Heather Guillory  [address]  [place] [address]     Dear Colleague,    Thank you for referring your patient, Heather Guillory, to the [facility] at [facility]. Please see a copy of my visit note below.    SOUMYA Visit 17    S: 36 yo  @ 23w5d presents for SOUMYA visit. Denies pain or contractions, gushes of fluid or bleeding. No headache, swelling, vision changes, SOB or swelling. She reports continued symptoms of itching and vaginal irritation with continuous discharge. She has suffered from a chronic yeast infection since IV antibiotics were administered for a UTI in the first trimester of pregnancy with failed outpatient management. She shares that her yeast infection symptoms were not alleviated with 3 separate courses of PO medications. She reports that she has changed her diet recently to avoid sugars and carbohydrates and she has seen some improvement with this. She would like to keep trying this to see if her symptoms resolve. She is interested in treatment at a later date if her symptoms do not resolve, especially near the time of delivery as she would like to have it resolved by her JOSEPH.     Otherwise, she reports she is doing well and has no other concerns or questions.  She denies ctx, VB or LOF.  + FM.  Denies HA, scotoma, SOB, RUQ pain or increased swelling in her extremities.    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  On Plaquenil with good control.  Had negative SSA/SSB testing 2016 so no concerns for heartblock.  Negative APS testing this pregnancy.  [facility] recommends serial growth  scans, next is scheduled this Friday.  4) UTI in pregnancy: In first trimester with failed outpatient management, was admitted for IV antibiotic therapy, continue to monitor for symptoms.    5) Recurrent yeast infections: Can not take local therapy secondary to allergy, s/p multiple Diflucan doses, recently tried home remedies and a low-carb/sugar-free diet with good improvement in symptoms. She would like to see how this goes and then let us know at the next visit if symptoms have not fully resolved. She is interested in a more aggressive therapy if her symptoms do not resolve by 36 weeks, or become more bothersome to her. Follow up with symptoms at next visit. Discuss treatment options of 4 doses Diflucan q3 days at next visit again. She is comfortable with this plan.  6) History panic disorder without agoraphobia: Started on Zoloft in early pregnancy, now on 50 mg daily, mood symptoms are good.   7) s/p flu vaccine  8) RTC In 4 weeks for Growth US and EOB Visit  9) FYI: patient would NOT like to know the sex of her baby - do not disclose!     April Palma MD

## 2017-12-22 ENCOUNTER — OFFICE VISIT (OUTPATIENT)
Dept: OBGYN | Facility: CLINIC | Age: 37
End: 2017-12-22
Attending: OBSTETRICS & GYNECOLOGY
Payer: COMMERCIAL

## 2017-12-22 DIAGNOSIS — O09.529 SUPERVISION OF HIGH-RISK PREGNANCY OF ELDERLY MULTIGRAVIDA: Primary | ICD-10-CM

## 2017-12-22 DIAGNOSIS — M35.1 MCTD (MIXED CONNECTIVE TISSUE DISEASE) (H): ICD-10-CM

## 2017-12-22 PROBLEM — N39.0 UTI (URINARY TRACT INFECTION): Status: RESOLVED | Noted: 2017-09-06 | Resolved: 2017-12-22

## 2017-12-22 PROBLEM — O09.811 PREGNANCY RESULTING FROM IN VITRO FERTILIZATION IN FIRST TRIMESTER: Status: RESOLVED | Noted: 2017-09-08 | Resolved: 2017-12-22

## 2017-12-22 LAB
ALBUMIN UR-MCNC: 30 MG/DL
APPEARANCE UR: CLEAR
BILIRUB UR QL STRIP: NEGATIVE
COLOR UR AUTO: YELLOW
GLUCOSE UR STRIP-MCNC: NEGATIVE MG/DL
HGB UR QL STRIP: NEGATIVE
KETONES UR STRIP-MCNC: NEGATIVE MG/DL
LEUKOCYTE ESTERASE UR QL STRIP: ABNORMAL
MUCOUS THREADS #/AREA URNS LPF: PRESENT /LPF
NITRATE UR QL: NEGATIVE
PH UR STRIP: 6.5 PH (ref 5–7)
RBC #/AREA URNS AUTO: 1 /HPF (ref 0–2)
SOURCE: ABNORMAL
SP GR UR STRIP: 1.02 (ref 1–1.03)
SQUAMOUS #/AREA URNS AUTO: 9 /HPF (ref 0–1)
UROBILINOGEN UR STRIP-MCNC: NORMAL MG/DL (ref 0–2)
WBC #/AREA URNS AUTO: 2 /HPF (ref 0–2)

## 2017-12-22 PROCEDURE — 81001 URINALYSIS AUTO W/SCOPE: CPT | Performed by: INTERNAL MEDICINE

## 2017-12-22 PROCEDURE — 87086 URINE CULTURE/COLONY COUNT: CPT | Performed by: INTERNAL MEDICINE

## 2017-12-22 PROCEDURE — 76816 OB US FOLLOW-UP PER FETUS: CPT | Mod: ZF

## 2017-12-22 NOTE — LETTER
2017       RE: Heather Guillory  [address]  [place] [address]     Dear Colleague,    Thank you for referring your patient, Heather Guillory, to the [facility] at [facility]. Please see a copy of my visit note below.    37 year old female, ,  presents at 24 2/7 weeks for obstetric ultrasound assessment indicated by AMA, IVF, mixed connective tissue disorder.    Single fetus    Presentation - breech    USEGA = 25 0/7 weeks.  EFW = 820 grams, 89 % for 24 weeks.      RODERICK = 16.2cm.  FHR = 140bpm     Placenta posterior and grade 0    Comments: AGA, normal fluid.    Findings discussed with patient.    Continue q 3-4 week growth us.    ZEUS Kunz MD MPH

## 2017-12-22 NOTE — PROGRESS NOTES
37 year old female, ,  presents at 24 2/7 weeks for obstetric ultrasound assessment indicated by AMA, IVF, mixed connective tissue disorder.    Single fetus    Presentation - breech    USEGA = 25 0/7 weeks.  EFW = 820 grams, 89 % for 24 weeks.      RODERICK = 16.2cm.  FHR = 140bpm     Placenta posterior and grade 0    Comments: AGA, normal fluid.    Findings discussed with patient.    Continue q 3-4 week growth us.    ZEUS Kunz MD MPH

## 2017-12-22 NOTE — LETTER
Patient:  Heather Guillory  :   1980  MRN:     [id number]        Ms.Jennifer Guillory  [address] [facility] [address]        January 3, 2018    Dear ,    We are writing to inform you of your test results. No UTI.      Resulted Orders   UA with Microscopic reflex to Culture   Result Value Ref Range    Color Urine Yellow     Appearance Urine Clear     Glucose Urine Negative NEG^Negative mg/dL    Bilirubin Urine Negative NEG^Negative    Ketones Urine Negative NEG^Negative mg/dL    Specific Gravity Urine 1.018 1.003 - 1.035    Blood Urine Negative NEG^Negative    pH Urine 6.5 5.0 - 7.0 pH    Protein Albumin Urine 30 (A) NEG^Negative mg/dL    Urobilinogen mg/dL Normal 0.0 - 2.0 mg/dL    Nitrite Urine Negative NEG^Negative    Leukocyte Esterase Urine Moderate (A) NEG^Negative    Source Midstream Urine     WBC Urine 2 0 - 2 /HPF    RBC Urine 1 0 - 2 /HPF    Squamous Epithelial /HPF Urine 9 (H) 0 - 1 /HPF    Mucous Urine Present (A) NEG^Negative /LPF   Urine Culture Aerobic Bacterial   Result Value Ref Range    Specimen Description Midstream Urine     Special Requests Specimen received in preservative     Culture Micro       10,000 to 50,000 colonies/mL  mixed urogenital jamai  Susceptibility testing not routinely done       Nasrin King MD

## 2017-12-22 NOTE — MR AVS SNAPSHOT
After Visit Summary   12/22/2017    Heather Guillory    MRN: [id number]           Patient Information     Date Of Birth          1980        Visit Information        Provider Department      12/22/2017 9:00 AM [facility] ULTRASOUND [facility]        Today's Diagnoses     Supervision of high-risk pregnancy of elderly multigravida, IVF, S MD    -  1       Follow-ups after your visit        Your next 10 appointments already scheduled     Michele 15, 2018 10:30 AM CST   ULTRASOUND with [facility] ULTRASOUND   [facility] ([facility])    [facility] [address]  [address]  [facility] [address] [phone]            Michele 15, 2018 11:00 AM CST   Return Obstetrics Extended with Dalila Stringer [facility]   [facility] ([facility])    [facility] [address]  [address]  [facility] [id number] [phone]            Mar 02, 2018  8:00 AM CST   (Arrive by 7:45 AM)   Return Visit with Nasrin King MD   [facility] ([facility])    [address] [facility]  [address]rd [facility] [address] [phone]              Who to contact     Please call your clinic at [phone] to:    Ask questions about your health    Make or cancel appointments    Discuss your medicines    Learn about your test results    Speak to your doctor   If you have compliments or concerns about an experience at your clinic, or if you wish to file a complaint, please contact [facility] Patient Relations at [phone] or email us at [email or web address]         Additional Information About Your Visit        MyChart Information     MyChart gives you secure access to your electronic health record. If you see a primary care provider, you can also send messages to your care team and make appointments. If you have  questions, please call your primary care clinic.  If you do not have a primary care provider, please call [phone] and they will assist you.      [employer] is an electronic gateway that provides easy, online access to your medical records. With [employer], you can request a clinic appointment, read your test results, renew a prescription or communicate with your care team.     To access your existing account, please contact your [facility] or call [phone] for assistance.        Care EveryWhere ID     This is your Care EveryWhere ID. This could be used by other organizations to access your [place] medical records  [id number]         Blood Pressure from Last 3 Encounters:   12/18/17 104/61   12/14/17 100/65   11/20/17 110/72    Weight from Last 3 Encounters:   12/18/17 80.1 kg (176 lb 9.6 oz)   12/14/17 78.7 kg (173 lb 9.6 oz)   11/20/17 76.8 kg (169 lb 6.4 oz)               Primary Care Provider Fax #    Physician No Ref-Primary [phone]       No address on file        Equal Access to Services     [facility]REILL[facility] : Hadii juanjo corea [place], waaxda luqpatrice, qaybta kaalmalexis simeon, john sheikh. So [facility] [phone].    ATENCIÓN: Si habla español, tiene a nelson disposición servicios gratuitos de asistencia lingüística. Julieta al [phone].    We comply with applicable federal civil rights laws and [place] laws. We do not discriminate on the basis of race, color, national origin, age, disability, sex, sexual orientation, or gender identity.            Thank you!     Thank you for choosing [facility]  for your care. Our goal is always to provide you with excellent care. Hearing back from our patients is one way we can continue to improve our services. Please take a few minutes to complete the written survey that you may receive in the mail after your visit with us. Thank you!             Your Updated Medication List -  Protect others around you: Learn how to safely use, store and throw away your medicines at www.disposemymeds.org.          This list is accurate as of: 12/22/17  3:08 PM.  Always use your most recent med list.                   Brand Name Dispense Instructions for use Diagnosis    hydroxychloroquine 200 MG tablet    PLAQUENIL     Take 200 mg by mouth 2 times daily        Prenatal Vitamins 28-0.8 MG Tabs      Take 1 tablet by mouth daily    Supervision of high-risk pregnancy of elderly multigravida       * sertraline 25 MG tablet    ZOLOFT    90 tablet    Take 1 tablet (25 mg) by mouth daily Increase to 50 mg daily after one week if symptoms improved but not enough    Anxiety       * sertraline 50 MG tablet    ZOLOFT    90 tablet    Take 1 tablet (50 mg) by mouth daily    Anxiety       * Notice:  This list has 2 medication(s) that are the same as other medications prescribed for you. Read the directions carefully, and ask your doctor or other care provider to review them with you.

## 2017-12-23 LAB
BACTERIA SPEC CULT: NORMAL
Lab: NORMAL
SPECIMEN SOURCE: NORMAL

## 2018-01-06 ENCOUNTER — TELEPHONE (OUTPATIENT)
Dept: OBGYN | Facility: CLINIC | Age: 38
End: 2018-01-06

## 2018-01-06 DIAGNOSIS — Z20.828 EXPOSURE TO INFLUENZA: Primary | ICD-10-CM

## 2018-01-06 RX ORDER — OSELTAMIVIR PHOSPHATE 75 MG/1
75 CAPSULE ORAL DAILY
Qty: 10 CAPSULE | Refills: 0 | Status: SHIPPED | OUTPATIENT
Start: 2018-01-06 | End: 2018-01-15

## 2018-01-06 NOTE — TELEPHONE ENCOUNTER
Heather called to report son with fever, vomiting, visit today with Peds who recommended starting oseltamivir.  Rec oseltamivir ppx for Heather as well... Rx sent to pharmacy.

## 2018-01-07 NOTE — TELEPHONE ENCOUNTER
"Returned page for \"question on medication\".  Heather states she picked up medication from pharmacy for influenza ppx but then this afternoon noted cough and then ~1 hour ago, fever to 101.3.  Encouraged Heather to adjust dosing of oseltamivir from ppx to treatment dosing given her sx and tylenol and to present if change in breathing, worsening other sx.  MD Jaylene  "

## 2018-01-08 ASSESSMENT — ANXIETY QUESTIONNAIRES
GAD7 TOTAL SCORE: 0
1. FEELING NERVOUS, ANXIOUS, OR ON EDGE: NOT AT ALL
GAD7 TOTAL SCORE: 0
7. FEELING AFRAID AS IF SOMETHING AWFUL MIGHT HAPPEN: NOT AT ALL
3. WORRYING TOO MUCH ABOUT DIFFERENT THINGS: NOT AT ALL
2. NOT BEING ABLE TO STOP OR CONTROL WORRYING: NOT AT ALL
6. BECOMING EASILY ANNOYED OR IRRITABLE: NOT AT ALL
4. TROUBLE RELAXING: NOT AT ALL
5. BEING SO RESTLESS THAT IT IS HARD TO SIT STILL: NOT AT ALL
7. FEELING AFRAID AS IF SOMETHING AWFUL MIGHT HAPPEN: NOT AT ALL

## 2018-01-09 ASSESSMENT — ANXIETY QUESTIONNAIRES: GAD7 TOTAL SCORE: 0

## 2018-01-11 ENCOUNTER — TELEPHONE (OUTPATIENT)
Dept: OBGYN | Facility: CLINIC | Age: 38
End: 2018-01-11

## 2018-01-11 NOTE — TELEPHONE ENCOUNTER
"Spoke to Heather MAURER P2 at 27 wks GA who had been prescribed Tamiflu d/t her son showing s/s influenza. She stopped taking the tamiflu 2 days ago because of some mild stomach irritation and her son was never diagnosed.  Now today her son tested positive for influenza and she feels like she \"screwed up\" Reassured her tamiflu doesn't prevent the influenza,it  is supposed to decrease the severity of the influenza, so reassured her she didn't do anything wrong. If she wants to resume the tamiflu ok, but if she develops fever >110.4 or not feeling good ,call back or get seen by PCP. Reviewed good handwasing,rest and increase of oral intake.Pt indicated understanding and agreed with plan.       "

## 2018-01-15 ENCOUNTER — OFFICE VISIT (OUTPATIENT)
Dept: OBGYN | Facility: CLINIC | Age: 38
End: 2018-01-15
Attending: ADVANCED PRACTICE MIDWIFE
Payer: COMMERCIAL

## 2018-01-15 VITALS
SYSTOLIC BLOOD PRESSURE: 89 MMHG | BODY MASS INDEX: 27.78 KG/M2 | WEIGHT: 177 LBS | HEIGHT: 67 IN | HEART RATE: 94 BPM | DIASTOLIC BLOOD PRESSURE: 59 MMHG

## 2018-01-15 DIAGNOSIS — F41.9 ANXIETY: ICD-10-CM

## 2018-01-15 DIAGNOSIS — O09.529 SUPERVISION OF HIGH-RISK PREGNANCY OF ELDERLY MULTIGRAVIDA: Primary | ICD-10-CM

## 2018-01-15 DIAGNOSIS — M35.1 MCTD (MIXED CONNECTIVE TISSUE DISEASE) (H): Primary | ICD-10-CM

## 2018-01-15 DIAGNOSIS — O09.812 PREGNANCY RESULTING FROM IN VITRO FERTILIZATION IN SECOND TRIMESTER: ICD-10-CM

## 2018-01-15 DIAGNOSIS — Z23 NEED FOR VACCINATION: ICD-10-CM

## 2018-01-15 LAB
DEPRECATED CALCIDIOL+CALCIFEROL SERPL-MC: 49 UG/L (ref 20–75)
ERYTHROCYTE [DISTWIDTH] IN BLOOD BY AUTOMATED COUNT: 13.4 % (ref 10–15)
GLUCOSE 1H P 50 G GLC PO SERPL-MCNC: 104 MG/DL (ref 60–129)
HBV SURFACE AB SERPL IA-ACNC: 165.04 M[IU]/ML
HBV SURFACE AG SERPL QL IA: NONREACTIVE
HCT VFR BLD AUTO: 34.1 % (ref 35–47)
HGB BLD-MCNC: 11.3 G/DL (ref 11.7–15.7)
HIV 1+2 AB+HIV1 P24 AG SERPL QL IA: NONREACTIVE
MCH RBC QN AUTO: 28.7 PG (ref 26.5–33)
MCHC RBC AUTO-ENTMCNC: 33.1 G/DL (ref 31.5–36.5)
MCV RBC AUTO: 87 FL (ref 78–100)
PLATELET # BLD AUTO: 207 10E9/L (ref 150–450)
RBC # BLD AUTO: 3.94 10E12/L (ref 3.8–5.2)
WBC # BLD AUTO: 12 10E9/L (ref 4–11)

## 2018-01-15 PROCEDURE — 86780 TREPONEMA PALLIDUM: CPT | Performed by: ADVANCED PRACTICE MIDWIFE

## 2018-01-15 PROCEDURE — 25000128 H RX IP 250 OP 636: Mod: ZF

## 2018-01-15 PROCEDURE — 36415 COLL VENOUS BLD VENIPUNCTURE: CPT | Performed by: ADVANCED PRACTICE MIDWIFE

## 2018-01-15 PROCEDURE — 87389 HIV-1 AG W/HIV-1&-2 AB AG IA: CPT | Performed by: ADVANCED PRACTICE MIDWIFE

## 2018-01-15 PROCEDURE — 82950 GLUCOSE TEST: CPT | Performed by: ADVANCED PRACTICE MIDWIFE

## 2018-01-15 PROCEDURE — 86706 HEP B SURFACE ANTIBODY: CPT | Performed by: ADVANCED PRACTICE MIDWIFE

## 2018-01-15 PROCEDURE — 87340 HEPATITIS B SURFACE AG IA: CPT | Performed by: ADVANCED PRACTICE MIDWIFE

## 2018-01-15 PROCEDURE — G0463 HOSPITAL OUTPT CLINIC VISIT: HCPCS | Mod: 25

## 2018-01-15 PROCEDURE — 82306 VITAMIN D 25 HYDROXY: CPT | Performed by: ADVANCED PRACTICE MIDWIFE

## 2018-01-15 PROCEDURE — 85027 COMPLETE CBC AUTOMATED: CPT | Performed by: ADVANCED PRACTICE MIDWIFE

## 2018-01-15 PROCEDURE — 76816 OB US FOLLOW-UP PER FETUS: CPT | Mod: ZF

## 2018-01-15 PROCEDURE — 90715 TDAP VACCINE 7 YRS/> IM: CPT | Mod: ZF

## 2018-01-15 PROCEDURE — 90471 IMMUNIZATION ADMIN: CPT | Mod: ZF

## 2018-01-15 ASSESSMENT — PAIN SCALES - GENERAL: PAINLEVEL: NO PAIN (0)

## 2018-01-15 NOTE — MR AVS SNAPSHOT
After Visit Summary   1/15/2018    Heather Guillory    MRN: [id number]           Patient Information     Date Of Birth          1980        Visit Information        Provider Department      1/15/2018 11:00 AM Dalila Stringer CNM [facility]        Today's Diagnoses     Supervision of high-risk pregnancy of elderly multigravida, IVF, [facility] MD    -  1    Need for vaccination        Anxiety           Follow-ups after your visit        Follow-up notes from your care team     Return in about 4 weeks (around 2/12/2018) for Growth u/s and MATEO YAN.      Your next 10 appointments already scheduled     Feb 12, 2018  8:00 AM CST   ULTRASOUND with [facility] ULTRASOUND   [facility] ([facility])    [facility] [address]  [address]  [facility] [id number] [phone]            Feb 12, 2018  8:30 AM CST   RETURN OB with Molly Jordan MD   [facility] ([facility])    [facility] [address]  [address]  [facility] [id number] [phone]            Mar 02, 2018  8:00 AM CST   (Arrive by 7:45 AM)   Return Visit with Nasrin King MD   [facility] Rheumatology ([facility])    [address] [place] [address]  [address] [place] [address] [phone]              Who to contact     Please call your clinic at [phone] to:    Ask questions about your health    Make or cancel appointments    Discuss your medicines    Learn about your test results    Speak to your doctor   If you have compliments or concerns about an experience at your clinic, or if you wish to file a complaint, please contact [facility] Patient Relations at [phone] or email us at [email or web address]         Additional Information About Your Visit        MyChart Information     MyChart gives you secure  "access to your electronic health record. If you see a primary care provider, you can also send messages to your care team and make appointments. If you have questions, please call your primary care clinic.  If you do not have a primary care provider, please call [phone] and they will assist you.      Cloud.CM is an electronic gateway that provides easy, online access to your medical records. With Cloud.CM, you can request a clinic appointment, read your test results, renew a prescription or communicate with your care team.     To access your existing account, please contact your [facility] or call [phone] for assistance.        Care EveryWhere ID     This is your Care EveryWhere ID. This could be used by other organizations to access your [place] medical records  [id number]        Your Vitals Were     Pulse Height BMI (Body Mass Index)             94 1.702 m (5' 7\") 27.72 kg/m2          Blood Pressure from Last 3 Encounters:   01/15/18 (!) 89/59   12/18/17 104/61   12/14/17 100/65    Weight from Last 3 Encounters:   01/15/18 80.3 kg (177 lb)   12/18/17 80.1 kg (176 lb 9.6 oz)   12/14/17 78.7 kg (173 lb 9.6 oz)              We Performed the Following     25- OH-Vitamin D     Anti Treponema     CBC with Platelets     Glucose 1 Hour     Hepatitis B Surface Antibody     Hepatitis B Surface Antigen     HIV Antigen Antibody Combo     TDAP VACCINE (BOOSTRIX)          Today's Medication Changes          These changes are accurate as of: 1/15/18 11:59 PM.  If you have any questions, ask your nurse or doctor.               These medicines have changed or have updated prescriptions.        Dose/Directions    sertraline 25 MG tablet   Commonly known as:  ZOLOFT   This may have changed:  how much to take   Used for:  Anxiety   Changed by:  Dalila Stringer CNM        Dose:  50 mg   Take 2 tablets (50 mg) by mouth daily Increase to 50 mg daily after one week if symptoms " improved but not enough   Quantity:  90 tablet   Refills:  6            Where to get your medicines      These medications were sent to [facility] [address] [place] [facility] [place] - [address]  [address], [facility] [address]     Phone:  [phone]     sertraline 25 MG tablet                Primary Care Provider Fax #    Physician No Ref-Primary [phone]       No address on file        Equal Access to Services     SOLIS OREILLY : Hadii aad ku hadasho Soomaali, waaxda luqadaha, qaybta kaalmada adeegyada, waxay idiin haybriin adeeg patricia laLeticiashaka . So [facility] [phone].    ATENCIÓN: Si veronika sherman, tiene a nelson disposición servicios gratuitos de asistencia lingüística. Corinnaame al [phone].    We comply with applicable federal civil rights laws and [place] laws. We do not discriminate on the basis of race, color, national origin, age, disability, sex, sexual orientation, or gender identity.            Thank you!     Thank you for choosing [facility]  for your care. Our goal is always to provide you with excellent care. Hearing back from our patients is one way we can continue to improve our services. Please take a few minutes to complete the written survey that you may receive in the mail after your visit with us. Thank you!             Your Updated Medication List - Protect others around you: Learn how to safely use, store and throw away your medicines at www.disposemymeds.org.          This list is accurate as of: 1/15/18 11:59 PM.  Always use your most recent med list.                   Brand Name Dispense Instructions for use Diagnosis    hydroxychloroquine 200 MG tablet    PLAQUENIL     Take 200 mg by mouth 2 times daily        Prenatal Vitamins 28-0.8 MG Tabs      Take 1 tablet by mouth daily    Supervision of high-risk pregnancy of elderly multigravida       sertraline 25 MG tablet    ZOLOFT    90 tablet    Take 2 tablets (50 mg) by mouth daily Increase to 50 mg daily after  one week if symptoms improved but not enough    Anxiety

## 2018-01-15 NOTE — LETTER
"1/15/2018       RE: Heather Guillory  [address]  [place] [address]     Dear Colleague,    Thank you for referring your patient, Heather Guillory, to the [facility] at [facility]. Please see a copy of my visit note below.     37 year old, , 27w5d, presents for EOB visit.    Patient concerns: Feeling well overall. Does has some concerns with her anxiety- inquiring if it would be reasonable to increase zoloft dose. Discussed starting by increasing to 50mg- pt agrees with this poc. States that her mood is good overall but she tends to occasionally focus on certain things and then worry about them. Feels that she has support from family and friends. Notes that she is currently fixated on resting HIV and Hepatitis B- has not had any known exposure or new sexual partners. Requests these labs be added to EOB labs today for \"piece of mind.\" Not interested in counseling at this time. PHQ9=0, GAD7= 2    Reports that her yeast symptoms have improved- home remedies and has changed diet. No current s/s- notices she cannot have any sugar in her diet or symptoms will return.    Had growth ultrasound today, EFW 61%tile. Continue serial growth ultrasound d/t plaquenil. Has appts scheduled regularly with her provider, Dr. King.     Education completed today includes breast feeding, [facility] hand out , contraception, counting movements, signs of pre-term labor, when to present to birthplace, post partum depression, GBS, getting enough iron and labor induction.    Birth preferences reviewed: Medicated; desires epidural; open to unmedicated if shorter/faster labor  Had epidural with last 2. Tried nitrous oxide last time but didn't like it.   Would plan IOL at 41 weeks (was induced for late term last 2 pregnancies)    Labor support:  , possibly mom and sister   [place] Feeding plans : Breastfeeding; would like breastfeed longer than 6 months- desires 1 " year   6 months with last 2 babies     Contraception planned: Undecided- need to discuss further    The following labs were ordered today:       GCT, CBC w platelets, Vitamin D and Anti-treponema  Water birth consent form was not given.    Blood type:   ABO   Date Value Ref Range Status   09/28/2017 O  Final     RH(D)   Date Value Ref Range Status   09/28/2017 Pos  Final     Antibody Screen   Date Value Ref Range Status   09/28/2017 Neg  Final   Rhogam  was not given.    TDAP  Was given.    A/P:  Encounter Diagnosis   Name Primary?     Supervision of high-risk pregnancy of elderly multigravida, IVF, [facility] MD Yes     Orders Placed This Encounter   Procedures     TDAP VACCINE (BOOSTRIX)     Glucose 1 Hour     CBC with Platelets     Anti Treponema     25- OH-Vitamin D     HIV Antigen Antibody Combo     Hepatitis B Surface Antibody     Hepatitis B Surface Antigen     Reviewed EOB education.  EOB labs ordered- 1 hour glucose, cbc with plts, anti-trep, vitamin d.  HIV and Hepatitis B antigen and antibody added to labs per pt request.  Tdap given today.  Reviewed today's growth ultrasound. Continue serial growth u/s d/t plaquenil.  Discussed anxiety symptoms, resources and current medication regimen. Pt desires to increase zoloft dose to 50mg from 25mg.  May continue to titrate as needed- follow up with clinic to discuss.  Plan 1-2 week postpartum mood check.  Continue scheduled prenatal care, RTC in approx 3-4 weeks for SOUMYA and Growth u/s.    HARVEY Adamson, SUELLEN

## 2018-01-15 NOTE — LETTER
1/15/2018       RE: Heather Guillory  [address]  [place] [address]     Dear Colleague,    Thank you for referring your patient, Heather Guillory, to the [facility] at [facility]. Please see a copy of my visit note below.    37 year old female, , presents at 27 5/7 weeks for obstetric ultrasound assessment indicated by IVF, mixed connective tissue disorder,AMA.    Single fetus    Presentation - cephalic    USEGA = 28 3/7 weeks.  EFW = 1,209 grams, 61 % for 27 weeks.    RODERICK = 17.1cm.  FHR = 141bpm     Placenta posterior and grade 0    Comments: AGA    Findings discussed with patient.    Further studies: serial growth scans every 3-4 weeks per MFM recommendations.    ZEUS Kunz MD    Again, thank you for allowing me to participate in the care of your patient.      Sincerely,    [facility] Ultrasound

## 2018-01-15 NOTE — MR AVS SNAPSHOT
After Visit Summary   1/15/2018    Heather Guillory    MRN: [id number]           Patient Information     Date Of Birth          1980        Visit Information        Provider Department      1/15/2018 10:30 AM [email or web address]        Today's Diagnoses     MCTD (mixed connective tissue disease) (H)    -  1    Pregnancy resulting from in vitro fertilization in second trimester           Follow-ups after your visit        Your next 10 appointments already scheduled     Feb 12, 2018  8:00 AM CST   ULTRASOUND with [email or web address] UL[facility]OUND   [facility] ([facility])    [facility] [address]  [address]  [facility] [address] [phone]            Feb 12, 2018  8:30 AM CST   RETURN OB with Molly Jordan MD   [facility] ([facility])    [facility] [address]  [address]  [facility] [address] [phone]            Mar 02, 2018  8:00 AM CST   (Arrive by 7:45 AM)   Return Visit with Nasrin King MD   [facility] Rheumatology ([facility] [place])    [address]  [address]  [facility] [address] [phone]              Future tests that were ordered for you today     Open Standing Orders        Priority Remaining Interval Expires Ordered    Growth Ultrasound 86[address] Routine 2/3  5/15/2018 1/15/2018            Who to contact     Please call your clinic at [phone] to:    Ask questions about your health    Make or cancel appointments    Discuss your medicines    Learn about your test results    Speak to your doctor   If you have compliments or concerns about an experience at your clinic, or if you wish to file a complaint, please contact [facility] Patient Relations at [phone] or email us at [email or web address]         Additional Information About Your Visit         [employer] Information     [employer] gives you secure access to your electronic health record. If you see a primary care provider, you can also send messages to your care team and make appointments. If you have questions, please call your primary care clinic.  If you do not have a primary care provider, please call [phone] and they will assist you.      [employer]and is an electronic gateway that provides easy, online access to your medical records. With [employer], you can request a clinic appointment, read your test results, renew a prescription or communicate with your care team.     To access your existing account, please contact your [facility] or call [phone] for assistance.        Care EveryWhere ID     This is your Care EveryWhere ID. This could be used by other organizations to access your [place] medical records  [id number]         Blood Pressure from Last 3 Encounters:   01/15/18 (!) 89/59   12/18/17 104/61   12/14/17 100/65    Weight from Last 3 Encounters:   01/15/18 80.3 kg (177 lb)   12/18/17 80.1 kg (176 lb 9.6 oz)   12/14/17 78.7 kg (173 lb 9.6 oz)               Primary Care Provider Fax #    Physician No Ref-Primary [phone]       No address on file        Equal Access to Services     ROSELIA OREILLY : Hadii jaunjo ku hadasho Soomaali, waaxda luqadaha, qaybta kaalmada adeegyada, waxay axel sheikh. So [facility] [phone].    ATENCIÓN: Si habla español, tiene a nelson disposición servicios gratVeritractos de asistencia lingüística. Llame al [phone].    We comply with applicable federal civil rights laws and [place] laws. We do not discriminate on the basis of race, color, national origin, age, disability, sex, sexual orientation, or gender identity.            Thank you!     Thank you for choosing [facility]  for your care. Our goal is always to provide you with excellent care. Hearing back from our patients is one way we can  continue to improve our services. Please take a few minutes to complete the written survey that you may receive in the mail after your visit with us. Thank you!             Your Updated Medication List - Protect others around you: Learn how to safely use, store and throw away your medicines at www.disposemymeds.org.          This list is accurate as of: 1/15/18 12:01 PM.  Always use your most recent med list.                   Brand Name Dispense Instructions for use Diagnosis    hydroxychloroquine 200 MG tablet    PLAQUENIL     Take 200 mg by mouth 2 times daily        Prenatal Vitamins 28-0.8 MG Tabs      Take 1 tablet by mouth daily    Supervision of high-risk pregnancy of elderly multigravida       sertraline 25 MG tablet    ZOLOFT    90 tablet    Take 1 tablet (25 mg) by mouth daily Increase to 50 mg daily after one week if symptoms improved but not enough    Anxiety

## 2018-01-16 LAB — T PALLIDUM IGG+IGM SER QL: NEGATIVE

## 2018-01-21 PROBLEM — O09.529 SUPERVISION OF HIGH-RISK PREGNANCY OF ELDERLY MULTIGRAVIDA: Status: ACTIVE | Noted: 2017-09-06

## 2018-01-21 RX ORDER — SERTRALINE HYDROCHLORIDE 25 MG/1
50 TABLET, FILM COATED ORAL DAILY
Qty: 90 TABLET | Refills: 6 | Status: SHIPPED | OUTPATIENT
Start: 2018-01-21 | End: 2019-04-26

## 2018-02-09 ASSESSMENT — ANXIETY QUESTIONNAIRES
6. BECOMING EASILY ANNOYED OR IRRITABLE: NOT AT ALL
7. FEELING AFRAID AS IF SOMETHING AWFUL MIGHT HAPPEN: NOT AT ALL
4. TROUBLE RELAXING: NOT AT ALL
1. FEELING NERVOUS, ANXIOUS, OR ON EDGE: NOT AT ALL
GAD7 TOTAL SCORE: 0
2. NOT BEING ABLE TO STOP OR CONTROL WORRYING: NOT AT ALL
3. WORRYING TOO MUCH ABOUT DIFFERENT THINGS: NOT AT ALL
5. BEING SO RESTLESS THAT IT IS HARD TO SIT STILL: NOT AT ALL
GAD7 TOTAL SCORE: 0
7. FEELING AFRAID AS IF SOMETHING AWFUL MIGHT HAPPEN: NOT AT ALL

## 2018-02-10 ASSESSMENT — ANXIETY QUESTIONNAIRES: GAD7 TOTAL SCORE: 0

## 2018-02-12 ENCOUNTER — OFFICE VISIT (OUTPATIENT)
Dept: OBGYN | Facility: CLINIC | Age: 38
End: 2018-02-12
Attending: OBSTETRICS & GYNECOLOGY
Payer: COMMERCIAL

## 2018-02-12 VITALS
WEIGHT: 184.3 LBS | HEIGHT: 67 IN | DIASTOLIC BLOOD PRESSURE: 68 MMHG | SYSTOLIC BLOOD PRESSURE: 94 MMHG | BODY MASS INDEX: 28.93 KG/M2 | HEART RATE: 81 BPM

## 2018-02-12 DIAGNOSIS — B37.31 YEAST VAGINITIS: ICD-10-CM

## 2018-02-12 DIAGNOSIS — M35.1 MCTD (MIXED CONNECTIVE TISSUE DISEASE) (H): ICD-10-CM

## 2018-02-12 DIAGNOSIS — O09.529 SUPERVISION OF HIGH-RISK PREGNANCY OF ELDERLY MULTIGRAVIDA: ICD-10-CM

## 2018-02-12 DIAGNOSIS — O09.893 SUPERVISION OF OTHER HIGH RISK PREGNANCIES, THIRD TRIMESTER: Primary | ICD-10-CM

## 2018-02-12 DIAGNOSIS — O09.812 PREGNANCY RESULTING FROM IN VITRO FERTILIZATION IN SECOND TRIMESTER: ICD-10-CM

## 2018-02-12 PROCEDURE — G0463 HOSPITAL OUTPT CLINIC VISIT: HCPCS | Mod: ZF

## 2018-02-12 PROCEDURE — 76816 OB US FOLLOW-UP PER FETUS: CPT | Mod: ZF

## 2018-02-12 RX ORDER — FLUCONAZOLE 150 MG/1
150 TABLET ORAL ONCE
Qty: 1 TABLET | Refills: 3 | Status: SHIPPED | OUTPATIENT
Start: 2018-02-12 | End: 2018-02-12

## 2018-02-12 ASSESSMENT — ANXIETY QUESTIONNAIRES
5. BEING SO RESTLESS THAT IT IS HARD TO SIT STILL: NOT AT ALL
7. FEELING AFRAID AS IF SOMETHING AWFUL MIGHT HAPPEN: NOT AT ALL
2. NOT BEING ABLE TO STOP OR CONTROL WORRYING: NOT AT ALL
3. WORRYING TOO MUCH ABOUT DIFFERENT THINGS: NOT AT ALL
GAD7 TOTAL SCORE: 0
6. BECOMING EASILY ANNOYED OR IRRITABLE: NOT AT ALL
1. FEELING NERVOUS, ANXIOUS, OR ON EDGE: NOT AT ALL

## 2018-02-12 ASSESSMENT — PAIN SCALES - GENERAL: PAINLEVEL: NO PAIN (0)

## 2018-02-12 ASSESSMENT — PATIENT HEALTH QUESTIONNAIRE - PHQ9: 5. POOR APPETITE OR OVEREATING: NOT AT ALL

## 2018-02-12 NOTE — MR AVS SNAPSHOT
After Visit Summary   2/12/2018    Heather Guillory    MRN: [id number]           Patient Information     Date Of Birth          1980        Visit Information        Provider Department      2/12/2018 8:00 AM [facility] ULTRASOUND [facility]        Today's Diagnoses     MCTD (mixed connective tissue disease) (H)        Pregnancy resulting from in vitro fertilization in second trimester           Follow-ups after your visit        Your next 10 appointments already scheduled     Feb 26, 2018  3:30 PM CST   RETURN OB with Cate Mansfield MD   [facility] ([facility])    [facility] [address]  [address]  [facility] [address] [phone]            Mar 02, 2018  8:00 AM CST   (Arrive by 7:45 AM)   Return Visit with Nasrin King MD   [facility] Rheumatology ([facility] [place])    [address]  [address]  [facility] [address] [phone]            Mar 12, 2018  2:00 PM CDT   ULTRASOUND with [facility] ULTRASOUND   [facility] ([facility])    [facility] [address]  [address]  [address]  [facility] [address] [phone]            Mar 12, 2018  2:30 PM CDT   RETURN OB with Cate Mansfield MD   [facility] ([facility])    [facility] [address]  [address]  [facility] [address] [phone]              Who to contact     Please call your clinic at [phone] to:    Ask questions about your health    Make or cancel appointments    Discuss your medicines    Learn about your test results    Speak to your doctor            Additional Information About Your Visit        MyChart Information     [employer] gives you secure access to your electronic health record. If you see a primary care provider, you can also send messages to your care team and make  appointments. If you have questions, please call your primary care clinic.  If you do not have a primary care provider, please call [phone] and they will assist you.      Accountable is an electronic gateway that provides easy, online access to your medical records. With Accountable, you can request a clinic appointment, read your test results, renew a prescription or communicate with your care team.     To access your existing account, please contact your [facility] or call [phone] for assistance.        Care EveryWhere ID     This is your Care EveryWhere ID. This could be used by other organizations to access your [place] medical records  [id number]         Blood Pressure from Last 3 Encounters:   02/12/18 94/68   01/15/18 (!) 89/59   12/18/17 104/61    Weight from Last 3 Encounters:   02/12/18 83.6 kg (184 lb 4.8 oz)   01/15/18 80.3 kg (177 lb)   12/18/17 80.1 kg (176 lb 9.6 oz)              We Performed the Following     Growth Ultrasound [address]          Today's Medication Changes          These changes are accurate as of 2/12/18 10:29 AM.  If you have any questions, ask your nurse or doctor.               Start taking these medicines.        Dose/Directions    fluconazole 150 MG tablet   Commonly known as:  DIFLUCAN   Used for:  Yeast vaginitis   Started by:  Molly Jordan MD        Dose:  150 mg   Take 1 tablet (150 mg) by mouth once for 1 dose   Quantity:  1 tablet   Refills:  3            Where to get your medicines      These medications were sent to [place] [address] [place] [address] [address]     Phone:  [phone]     fluconazole 150 MG tablet                Primary Care Provider Fax #    Physician No Ref-Primary [phone]       No address on file        Equal Access to Services     ROSELIA OREILLY AH: Arabella Bahena, columba major, qajohn whiting  patricia graham ah. So [facility] [phone].    ATENCIÓN: Si veronika sherman, tiene a nelson disposición servicios gratuitos de asistencia lingüística. Julieta al [phone].    We comply with applicable federal civil rights laws and [place] laws. We do not discriminate on the basis of race, color, national origin, age, disability, sex, sexual orientation, or gender identity.            Thank you!     Thank you for choosing [facility]  for your care. Our goal is always to provide you with excellent care. Hearing back from our patients is one way we can continue to improve our services. Please take a few minutes to complete the written survey that you may receive in the mail after your visit with us. Thank you!             Your Updated Medication List - Protect others around you: Learn how to safely use, store and throw away your medicines at www.disposemymeds.org.          This list is accurate as of 2/12/18 10:29 AM.  Always use your most recent med list.                   Brand Name Dispense Instructions for use Diagnosis    fluconazole 150 MG tablet    DIFLUCAN    1 tablet    Take 1 tablet (150 mg) by mouth once for 1 dose    Yeast vaginitis       hydroxychloroquine 200 MG tablet    PLAQUENIL     Take 200 mg by mouth 2 times daily        Prenatal Vitamins 28-0.8 MG Tabs      Take 1 tablet by mouth daily    Supervision of high-risk pregnancy of elderly multigravida       sertraline 25 MG tablet    ZOLOFT    90 tablet    Take 2 tablets (50 mg) by mouth daily Increase to 50 mg daily after one week if symptoms improved but not enough    Anxiety

## 2018-02-12 NOTE — MR AVS SNAPSHOT
After Visit Summary   2/12/2018    Heather Guillory    MRN: [id number]           Patient Information     Date Of Birth          1980        Visit Information        Provider Department      2/12/2018 8:30 AM Molly Jordan MD [facility]        Today's Diagnoses     Supervision of other high risk pregnancies, third trimester    -  1    Yeast vaginitis        MCTD (mixed connective tissue disease) (H)        Supervision of high-risk pregnancy of elderly multigravida, IVF, [facility] MD           Follow-ups after your visit        Follow-up notes from your care team     Return in about 2 weeks (around 2/26/2018).      Your next 10 appointments already scheduled     Feb 26, 2018  3:30 PM CST   RETURN OB with Cate Mansfield MD   [facility] ([facility])    [facility] [address]  [address]  [facility] [address] [phone]            Mar 02, 2018  8:00 AM CST   (Arrive by 7:45 AM)   Return Visit with Nasrin King MD   [facility] Rheumatology ([facility])    [address]  [address]  [facility] [address]   [phone]            Mar 12, 2018  2:00 PM CDT   ULTRASOUND with [facility] ULTRASOUND   [facility] ([facility])    [facility] [address]  [address]  [facility] [address] [phone]            Mar 12, 2018  2:30 PM CDT   RETURN OB with Cate Mansfield MD   [facility] ([facility])    [facility] [address]  [address]  [facility] [address] [phone]              Who to contact     Please call your clinic at [phone] to:    Ask questions about your health    Make or cancel appointments    Discuss your medicines    Learn about your test results    Speak to your doctor            Additional Information About Your  "Visit        Voya[employer]gehart Information     [employer] gives you secure access to your electronic health record. If you see a primary care provider, you can also send messages to your care team and make appointments. If you have questions, please call your primary care clinic.  If you do not have a primary care provider, please call [phone] and they will assist you.      [employer] is an electronic gateway that provides easy, online access to your medical records. With [employer], you can request a clinic appointment, read your test results, renew a prescription or communicate with your care team.     To access your existing account, please contact your [facility] or call [phone] for assistance.        Care EveryWhere ID     This is your Care EveryWhere ID. This could be used by other organizations to access your [facility] medical records  [id number]        Your Vitals Were     Pulse Height BMI (Body Mass Index)             81 1.702 m (5' 7\") 28.87 kg/m2          Blood Pressure from Last 3 Encounters:   02/12/18 94/68   01/15/18 (!) 89/59   12/18/17 104/61    Weight from Last 3 Encounters:   02/12/18 83.6 kg (184 lb 4.8 oz)   01/15/18 80.3 kg (177 lb)   12/18/17 80.1 kg (176 lb 9.6 oz)              Today, you had the following     No orders found for display         Today's Medication Changes          These changes are accurate as of 2/12/18 11:59 PM.  If you have any questions, ask your nurse or doctor.               Start taking these medicines.        Dose/Directions    fluconazole 150 MG tablet   Commonly known as:  DIFLUCAN   Used for:  Yeast vaginitis   Started by:  Molly Jordan MD        Dose:  150 mg   Take 1 tablet (150 mg) by mouth once for 1 dose   Quantity:  1 tablet   Refills:  3            Where to get your medicines      These medications were sent to [place] [address] [place] [facility] [address] [address]     Phone:  " [phone]     fluconazole 150 MG tablet                Primary Care Provider Fax #    Physician No Ref-Primary [phone]       No address on file        Equal Access to Services     ROSELIA OREILLY : Hadii aad ku hadkira Bahena, columba demetricepatrice, jerrod simeon, john clarkcas aguilar. So [facility] [phone].    ATENCIÓN: Si habla español, tiene a nelson disposición servicios gratuitos de asistencia lingüística. Llame al [phone].    We comply with applicable federal civil rights laws and [place] laws. We do not discriminate on the basis of race, color, national origin, age, disability, sex, sexual orientation, or gender identity.            Thank you!     Thank you for choosing [facility]  for your care. Our goal is always to provide you with excellent care. Hearing back from our patients is one way we can continue to improve our services. Please take a few minutes to complete the written survey that you may receive in the mail after your visit with us. Thank you!             Your Updated Medication List - Protect others around you: Learn how to safely use, store and throw away your medicines at www.disposemymeds.org.          This list is accurate as of 2/12/18 11:59 PM.  Always use your most recent med list.                   Brand Name Dispense Instructions for use Diagnosis    fluconazole 150 MG tablet    DIFLUCAN    1 tablet    Take 1 tablet (150 mg) by mouth once for 1 dose    Yeast vaginitis       hydroxychloroquine 200 MG tablet    PLAQUENIL     Take 200 mg by mouth 2 times daily        Prenatal Vitamins 28-0.8 MG Tabs      Take 1 tablet by mouth daily    Supervision of high-risk pregnancy of elderly multigravida       sertraline 25 MG tablet    ZOLOFT    90 tablet    Take 2 tablets (50 mg) by mouth daily Increase to 50 mg daily after one week if symptoms improved but not enough    Anxiety

## 2018-02-12 NOTE — LETTER
2018       RE: Haether Guillory  [address]  [place] [address]     Dear Colleague,    Thank you for referring your patient, Heather Guillory, to the [facility] at [facility]. Please see a copy of my visit note below.    37 year old female, , presents at 31 5/7 weeks for obstetric ultrasound assessment indicated by IVF,mixed connective tissue disorder.    Single fetus    Presentation - breech    USEGA = 33 0/7 weeks.  EFW = 2,136 grams, 83 % for 31 weeks.      RODERICK = 21.8cm.  FHR = 141bpm     Placenta posterior and grade 0    Comments: Normal growth    Findings discussed with patient.    Further studies: repeat growth US in 4 weeks.      ZEUS Kunz MD        Again, thank you for allowing me to participate in the care of your patient.      Sincerely,    [facility]

## 2018-02-12 NOTE — PROGRESS NOTES
37 year old female, , presents at 31 5/7 weeks for obstetric ultrasound assessment indicated by IVF,mixed connective tissue disorder.    Single fetus    Presentation - breech    USEGA = 33 0/7 weeks.  EFW = 2,136 grams, 83 % for 31 weeks.      RODERICK = 21.8cm.  FHR = 141bpm     Placenta posterior and grade 0    Comments: Normal growth    Findings discussed with patient.    Further studies: repeat growth US in 4 weeks.      ZEUS Kunz MD

## 2018-02-12 NOTE — LETTER
2018       RE: Heather Guillory  [address]  [place] [address]     Dear Colleague,    Thank you for referring your patient, Heather Guillory, to the [facility] at [facility]. Please see a copy of my visit note below.    S:  Doing well, good FM, no regular contractions.  GCT was normal last visit, growth u/s today-AGA as noted below.    O: see flow    A:  38 y/o  at 31+5 weeks doing well.  Pregnancy complicated by AMA, h/o MCTD    P:  RTC 2 weeks, continue serial growth assessment q4 weeks.      Molly Jordan MD, FACOG

## 2018-02-12 NOTE — NURSING NOTE
Chief Complaint   Patient presents with     Prenatal Care     31w5d       See SNOW Najera 2/12/2018

## 2018-02-13 ASSESSMENT — PATIENT HEALTH QUESTIONNAIRE - PHQ9: SUM OF ALL RESPONSES TO PHQ QUESTIONS 1-9: 0

## 2018-02-13 NOTE — PROGRESS NOTES
S:  Doing well, good FM, no regular contractions.  GCT was normal last visit, growth u/s today-AGA as noted below.    O: see flow    A:  36 y/o  at 31+5 weeks doing well.  Pregnancy complicated by AMA, h/o MCTD    P:  RTC 2 weeks, continue serial growth assessment q4 weeks.      Molly Jordan MD, FACOG

## 2018-02-19 ASSESSMENT — ANXIETY QUESTIONNAIRES
5. BEING SO RESTLESS THAT IT IS HARD TO SIT STILL: NOT AT ALL
1. FEELING NERVOUS, ANXIOUS, OR ON EDGE: NOT AT ALL
6. BECOMING EASILY ANNOYED OR IRRITABLE: NOT AT ALL
3. WORRYING TOO MUCH ABOUT DIFFERENT THINGS: NOT AT ALL
GAD7 TOTAL SCORE: 0
4. TROUBLE RELAXING: NOT AT ALL
7. FEELING AFRAID AS IF SOMETHING AWFUL MIGHT HAPPEN: NOT AT ALL
2. NOT BEING ABLE TO STOP OR CONTROL WORRYING: NOT AT ALL
GAD7 TOTAL SCORE: 0
7. FEELING AFRAID AS IF SOMETHING AWFUL MIGHT HAPPEN: NOT AT ALL

## 2018-02-20 ASSESSMENT — ANXIETY QUESTIONNAIRES: GAD7 TOTAL SCORE: 0

## 2018-02-21 DIAGNOSIS — M35.1 MCTD (MIXED CONNECTIVE TISSUE DISEASE) (H): ICD-10-CM

## 2018-02-21 RX ORDER — HYDROXYCHLOROQUINE SULFATE 200 MG/1
400 TABLET, FILM COATED ORAL DAILY
Qty: 180 TABLET | Refills: 1 | Status: SHIPPED | OUTPATIENT
Start: 2018-02-21 | End: 2018-05-23

## 2018-02-26 ENCOUNTER — OFFICE VISIT (OUTPATIENT)
Dept: OBGYN | Facility: CLINIC | Age: 38
End: 2018-02-26
Attending: OBSTETRICS & GYNECOLOGY
Payer: COMMERCIAL

## 2018-02-26 VITALS
DIASTOLIC BLOOD PRESSURE: 69 MMHG | HEART RATE: 80 BPM | BODY MASS INDEX: 29.66 KG/M2 | HEIGHT: 67 IN | WEIGHT: 189 LBS | SYSTOLIC BLOOD PRESSURE: 105 MMHG

## 2018-02-26 DIAGNOSIS — O09.529 SUPERVISION OF HIGH-RISK PREGNANCY OF ELDERLY MULTIGRAVIDA: Primary | ICD-10-CM

## 2018-02-26 ASSESSMENT — PAIN SCALES - GENERAL: PAINLEVEL: NO PAIN (0)

## 2018-02-26 NOTE — LETTER
"2018       RE: Heather Guillory  [address]  [place] [address]     Dear Colleague,    Thank you for referring your patient, Heather Guillory, to the [facility] at [facility]. Please see a copy of my visit note below.    No new complaints.  Still ongoing issues w/ possible yeast vaginitis. Not bothersome enough that she wants to pursue tx.     Vitals:    18 1533   BP: 105/69   Pulse: 80   Weight: 85.7 kg (189 lb)   Height: 1.702 m (5' 7\")     O:   Vitals:    18 1533   BP: 105/69   Pulse: 80   Weight: 85.7 kg (189 lb)   Height: 1.702 m (5' 7\")     See flow    A/p; 36 yo  at 33+5 wks, with AMA and MCTD.     1. Growth scans q  4 wks d/t MCTD  2. F/u prn vaginal itching  3. Labor precautions discussed  4. GBS at 36 wks    3/6/2018  1:51 PM      Again, thank you for allowing me to participate in the care of your patient.      Sincerely,    Cate Mansfield MD      "

## 2018-02-26 NOTE — MR AVS SNAPSHOT
After Visit Summary   2/26/2018    Heather Guillory    MRN: [id number]           Patient Information     Date Of Birth          1980        Visit Information        Provider Department      2/26/2018 3:30 PM Cate Mansfield MD [facility]        Today's Diagnoses     Supervision of high-risk pregnancy of elderly multigravida, IVF, [facility] MD    -  1       Follow-ups after your visit        Follow-up notes from your care team     Return in about 2 weeks (around 3/12/2018).      Your next 10 appointments already scheduled     Mar 12, 2018  2:00 PM CDT   ULTRASOUND with [facility] ULTRASOUND   [facility] ([facility])    [facility] [address]  [address]  [facility] [address]   [phone]            Mar 12, 2018  2:30 PM CDT   RETURN OB with Cate Mansfield MD   [facility] ([facility])    [facility] [address]  [address]  [address]  [facility] [address]   [phone]            Mar 19, 2018 10:30 AM CDT   RETURN OB with Molly Jordan MD   [facility] ([facility])    [facility] [address]  [address]  [facility] [address]   [phone]            Mar 26, 2018  2:30 PM CDT   RETURN OB with Cate Mansfield MD   [facility] ([facility])    [facility] [address]  [address]  [facility] [address]   [phone]            Apr 02, 2018 11:00 AM CDT   RETURN OB with Tuyet Guzmán MD   [facility] ([facility])    [facility] [address]  [address]  [address]  [facility] [address]   [phone]            Jun 21, 2018  9:00 AM CDT   (Arrive by 8:45 AM)   Return Visit with Nasrin King MD   [facility] Rheumatology ([facility])     "[address]  [address] [place] [id number] [phone]              Who to contact     Please call your clinic at [phone] to:    Ask questions about your health    Make or cancel appointments    Discuss your medicines    Learn about your test results    Speak to your doctor            Additional Information About Your Visit        MyChart Information     [employer] gives you secure access to your electronic health record. If you see a primary care provider, you can also send messages to your care team and make appointments. If you have questions, please call your primary care clinic.  If you do not have a primary care provider, please call [phone] and they will assist you.      [employer] is an electronic gateway that provides easy, online access to your medical records. With [employer], you can request a clinic appointment, read your test results, renew a prescription or communicate with your care team.     To access your existing account, please contact your [facility] or call [phone] for assistance.        Care EveryWhere ID     This is your Care EveryWhere ID. This could be used by other organizations to access your [place] medical records  [id number]        Your Vitals Were     Pulse Height Breastfeeding? BMI (Body Mass Index)          80 1.702 m (5' 7\") No 29.6 kg/m2         Blood Pressure from Last 3 Encounters:   03/02/18 101/67   02/26/18 105/69   02/12/18 94/68    Weight from Last 3 Encounters:   03/02/18 84.5 kg (186 lb 3.2 oz)   02/26/18 85.7 kg (189 lb)   02/12/18 83.6 kg (184 lb 4.8 oz)              Today, you had the following     No orders found for display       Primary Care Provider Fax #    Physician No Ref-Primary [phone]       No address on file        Equal Access to Services     ROSELIA OREILLY AH: Arabella Bahena, columba major, john kellogg. So washawn " [phone].    ATENCIÓN: Si veronika sherman, tiene a nelson disposición servicios gratuitos de asistencia lingüística. Julieta vidal [phone].    We comply with applicable federal civil rights laws and [place] laws. We do not discriminate on the basis of race, color, national origin, age, disability, sex, sexual orientation, or gender identity.            Thank you!     Thank you for choosing [facility]  for your care. Our goal is always to provide you with excellent care. Hearing back from our patients is one way we can continue to improve our services. Please take a few minutes to complete the written survey that you may receive in the mail after your visit with us. Thank you!             Your Updated Medication List - Protect others around you: Learn how to safely use, store and throw away your medicines at www.disposemymeds.org.          This list is accurate as of 2/26/18 11:59 PM.  Always use your most recent med list.                   Brand Name Dispense Instructions for use Diagnosis    * hydroxychloroquine 200 MG tablet    PLAQUENIL     Take 200 mg by mouth 2 times daily        * hydroxychloroquine 200 MG tablet    PLAQUENIL    180 tablet    Take 2 tablets (400 mg) by mouth daily    MCTD (mixed connective tissue disease) (H)       Prenatal Vitamins 28-0.8 MG Tabs      Take 1 tablet by mouth daily    Supervision of high-risk pregnancy of elderly multigravida       sertraline 25 MG tablet    ZOLOFT    90 tablet    Take 2 tablets (50 mg) by mouth daily Increase to 50 mg daily after one week if symptoms improved but not enough    Anxiety       * Notice:  This list has 2 medication(s) that are the same as other medications prescribed for you. Read the directions carefully, and ask your doctor or other care provider to review them with you.

## 2018-02-26 NOTE — NURSING NOTE
Chief Complaint   Patient presents with     Prenatal Care     SOUMYA 33 weeks and 5 days   Tigist Lloyd LPN

## 2018-03-02 ENCOUNTER — OFFICE VISIT (OUTPATIENT)
Dept: RHEUMATOLOGY | Facility: CLINIC | Age: 38
End: 2018-03-02
Attending: INTERNAL MEDICINE
Payer: COMMERCIAL

## 2018-03-02 VITALS
HEART RATE: 80 BPM | SYSTOLIC BLOOD PRESSURE: 101 MMHG | HEIGHT: 67 IN | RESPIRATION RATE: 18 BRPM | BODY MASS INDEX: 29.22 KG/M2 | DIASTOLIC BLOOD PRESSURE: 67 MMHG | WEIGHT: 186.2 LBS

## 2018-03-02 DIAGNOSIS — M35.9 UNDIFFERENTIATED CONNECTIVE TISSUE DISEASE (H): Primary | ICD-10-CM

## 2018-03-02 PROBLEM — M35.1 MCTD (MIXED CONNECTIVE TISSUE DISEASE) (H): Status: RESOLVED | Noted: 2017-09-06 | Resolved: 2018-03-02

## 2018-03-02 PROCEDURE — G0463 HOSPITAL OUTPT CLINIC VISIT: HCPCS | Mod: ZF

## 2018-03-02 ASSESSMENT — PAIN SCALES - GENERAL: PAINLEVEL: NO PAIN (0)

## 2018-03-02 NOTE — PROGRESS NOTES
Rheumatology Visit     Heather Guillory MRN# [id number]   YOB: 1980 Age: 37 year old     Date of Last Visit: 2017  DOS: 3/2/2018       Assessment and Plan:   Undifferentiated connective tissue disease (UCTD) and pregnancy via IVF (JOSEPH 2018):    Heather is a 36 yo WF , a former patient of Dr. Brewer. She was diagnosed with MCTD in 2016, 6 wk postpartum based on fatigue, arthritis, mild Raynaud's, low positive NINOSKA 1.1 and low positive RNP Ab 1.6. She is on  mg qd since 2017 with great response.  UCTD continues to be most likely diagnosis not MCTD as she does not have classic features of MCTD, had very low titer of RNP Ab in the past, (negative on most recent check), and had a mild disease which never required prednisone.  All autoimmunity labs (2017) came back negative which is further reassuring for UCTD, including APS panel, repeat RNP and inflammatory markers, dsDNA and complement levels.  UA was significant for bacterial infection, otherwise no significant proteinuria.     She had neg SSA/SSB antibodies in 2016, no concern for CHB. No need to re-check.  APS panel was neg.  She had neg anti-Sm, neg anti-DNA and neg centromere Ab in 2016.    Today:  Disease continues to be under excellent control on HCQ, I don't expect flare up during pregnancy; however given safety of HCQ during pregnancy and breastfeeding, recommend HCQ to be continued at current dose and be considered to be tapered after 6-8 weeks post partum if no flare of UCTD.    Reassured, once again, that I expect her UCTD not to flare duing pregnancy but will monitor her closely.    1) Labs in late March/early April    2) For vaginal yeast infection, was advised to discuss use of nystatin powder with her OB/GYN, it is class C drug.    RTC in 2018    Orders Placed This Encounter   Procedures     AST     ALT     CBC with platelets differential     Creatinine     Routine UA with micro reflex to culture                Active Problem List:     Patient Active Problem List    Diagnosis Date Noted     MCTD (mixed connective tissue disease) (H) 09/06/2017     Priority: Medium     On Plaquenil- OK per MFM  NEEDS serial growth u/s 4-6 wks       Supervision of high-risk pregnancy of elderly multigravida, IVF, TRICIA YAN 09/06/2017     Priority: Medium     JOSEPH 4/11/18 by IVF date, dating u/s consistent    9/6/17: OB intake   Labs ordered   Needs GC/CT at [facility] ___  Admitted to inpatient for IV antibiotic treatment of UTI   Desires 1st trimester screening and level 2, MFM referral placed   Will need fetal echo d/t IVF  11/13  Per [facility] called Heather to let her know that I did communicate with Dr. King and that repeat assessment of her SSA antibodies is not needed, as these were negative in 8/2016.  She expressed understanding and will continue with her routine prenatal care and follow up growth US as previously recommended with [facility].   Everardo Betancourt  12/12/2017 - Normal Fetal Echo.  Per MFM continue serial growth US at [facility].      1/15/18: EOB visit, tdap given  Plan to increase zoloft to 50mg  Next growth u/s in 3-4 weeks    Growth 1/15: efw 61%tile       Xerosis cutis 07/21/2017     Priority: Medium     Overview:       Xerosis of the hands which is moderately severe and associated with fissuring.       Family history of genetic disorder 12/23/2015     Priority: Medium     Patient's sister's son with Elijah's disease       Female infertility 04/04/2014     Priority: Medium     IVF with first pregnancy.       Anxiety 04/04/2014     Priority: Medium     1/15/18: on 25mg Zoloft, discussed increasing to 50mg  Pt plans to increase    Plan 1-2 week pp mood check       Tension headache 03/03/2014     Priority: Medium     Family history of malignant neoplasm of breast 07/12/2013     Priority: Medium     Overview:   Paternal GM       Decreased libido 12/12/2012     Priority: Medium     Overview:   Due to Effexor.       Irritable colon  10/26/2009     Priority: Medium     Panic disorder without agoraphobia 02/06/2008     Priority: Medium     Overview:   Panic Disorder w/o Agoraphobia       Eating disorder 02/05/2004     Priority: Medium     History of, feels stable now.              History of Present Illness:   Heather Guillory presents for f/u for probable mixed connective tissue disease. Last seen in 1-17, when HCQ was continued.    Background: received diagnosis of mixed connective tissue given pain in hand and feet, stiffness, Raynaud's  Syndrome, fatigue, positive NINOSKA and anti-RNP in 2016. Ms. Guillory first experienced swelling and pain in her fingers and feet during her second pregnancy this past spring that prevented her from wearing rings on her fingers and provoked an evaluation for DVT, which was negative. Despite weight loss post partum, her pain, mild swelling, and stiffness in her digits persisted. She described the pain as a 7/10 when she wakes to feed her infant in the middle of the night and in the morning that decreases to a 2-3/10 over the course of the day. Stiffness lasts 30 mins to 1 hour in the morning. In general she feels tired all the time, but cannot determine if it is associated with that fact that she has two young children and is breastfeeding every 2 hours or if it is related to her joint symptoms. She was evaluated by an internist who found that she had a positive NINOSKA/ She saw Dr. Iniguez in 8-16 who discovered +RNP. She started Plaquenil 200 mg BID.    Interval history 7/14/17  Heather Guillory is stable since her last visit in January, despite feeling more fatigued recently. She denied arthralgias/myalgias but reports having sore feet/toes at end of day and when she first wakes up. She states the fatigue worsened since she ran out of Zoloft 3.5 weeks ago; also reports irritability and no sleeping soundly during the night since that time too. Zoloft prescribed by her OBGYN.     She is currently receiving fertility  treatment and plans to undergo embryo transfer on 7/24/17. She remains on HCQ and feels comfortable continuing this medication during pregnancy. She wonders if taking HCQ once daily (400) rather than 200 twice daily is acceptable.  She is using estrogen therapy in preparation for the embryo transfer, and relates that her gynecologist recommends that she remained on the supplement for approximately one month after the transfer.    She recently pinched a nerve in her neck and has numbness/tingling with burning sensation in her left arm that radiates down into her 1st-3rd digits. She was evaluated at [facility] orth[facility], received percocet for pain and told to have PT. If it did not improve in 6 weeks, she will return for another evaluation.     Lastly, she had dry, rough skin on lateral aspects of fingers. She states this often occurs in winter due to weather but it is new for her this summer. She is using a lot of moisturizers but it has not been helping.         9/28/17:   Heather is former patient of Dr. Brewer, is seeing me for 2nd opinion and is transferring care. She is currently pregnant and is concerned about her flare of MCTD during pregnancy, especially worried about flare being triggered by IVF.     She had her 1st pregnancy in 2/2015. It was via IVF. It was unexplained fertility. It was uneventful. Her son was born term. His 2nd son was born natuarally in 5/2016, term and healthy.       At 6 week post partum check up, she mentioned AM stiffness and stiffness of hands (new), feet were painful. Hands were swollen. Had fatigue, was breastfeeding att hat time.    No hair loss, skin rash, photosensitivity (but burns easily), oral/nasal ulcers, or sicca.    Has h/o Raynaud's since teen. Fingers turn white, toes turn purple, not painful. They feel cold not painful.    No myositis.     No serositis.    No seziures, headaches, psychosis.    Has sensitive stomach.    No fevers.     No h/o proteinuria.    She was  diagnosed with MTCD in 6/2016. She started taking HCQ in 9/2016.  She started HCQ after she stopped breastfeeding. Had one flare up after stopping breastfeeding. She started  bid, last eye exam was this summer of 2017.     No prednisone.    Lack of sleep causes joint pain.     She did another round of IVF in 4/2017.  She was on OC x 10 wk . Embryo transfer was 7/14/2017. Now is off estrogen, progestron x 2 wk, now 12 wk pregnant.     No flare of her disease after IVF.    She was on ASA 81 mg qd till she was confirmed to be pregnant.    No preganncy loses. No thrombosis.    Feet feel sore and achy only with lack of sleep.     JOSEPH is 4/11/2018.    Interval History 12/14/17:  Heather has no major concerns or changes in symptoms at today's appointment.    Hasn't experienced any joint pains for about 9-12 months.  No stiffness in her muscles or joints.  She hasn't had any Raynaud's and notes that she is keeping her hands and feet adequately covered and warm.      She does note that she has been fatigued lately but attributes that to her 2 toddlers at home who aren't sleeping throughout the night.  Also notes easy bruising but thinks that it is chronic.  Denies any hair loss, mouth sores/ulcers, HA, F/chills, night sweats.    She is taking hydroxychloroquine and is tolerating it well.      No significant infections although she wanted to inform us about her UTI with ESBL bacteria which was diagnosed when she was 8 weeks pregnant.  At that time, she was hospitalized and given IV antibiotics.  Ever since, she has experienced recurring yeast infections.  She tried diflucan x 3 without improvement.  Finally, she has changed her diet, cutting out sugars, which is improving her symptoms.      Today: Feeling very well with no flare ups or joint pain due to UCTD. Pregnancy is going well as well, she is due on 4/11/2018. She still has problem with vaginal yeast infection, is allergic to topical anti-fungal Tx, unresponsive  to one time fluconazole tx, was offered to try 7 days of fluconazole but prefers to hold off as pregnancy is almost over and per her experience, vaginal yeast infection revolves after delivery.          Review of Systems:   A comprehensive ROS was done. Positives are per HPI.          Past Medical History:     Past Medical History:   Diagnosis Date     Abnormal Pap smear     Over 10 years ago     Anxiety      E coli infection     ESBL     History of extended-spectrum beta-lactamase producing Escherichia coli infection      Hx of previous reproductive problem 12/2013    Infertility     Mixed connective tissue disease (H)     Dr. Steele- switching      Nephrolithiasis      Past Surgical History:   Procedure Laterality Date     BIOPSY  April 2014    Uterine polyp removed     DILATION AND CURETTAGE SUCTION       OPERATIVE HYSTEROSCOPY WITH MORCELLATOR  4/8/2014    Procedure: OPERATIVE HYSTEROSCOPY WITH MORCELLATOR;  Hysteroscopic Polypectomy;  Surgeon: Cate Mansfield MD;  Location: [facility] OR     Raynaud's syndrome since puberty. Her main trigger is cold.  She had two uneventful pregnancies with vaginal births, though the conception of her first child required IVF.   she is underwent embryo transfer in July 2017, now pregnant.   cervical radiculopathy, 2017.       Social History:     Social History     Occupational History      [employer]     Social History Main Topics     Smoking status: Never Smoker     Smokeless tobacco: Never Used     Alcohol use No     Drug use: No     Sexual activity: Yes     Partners: Male     Birth control/ protection: None            Family History:     Family History   Problem Relation Age of Onset     DIABETES Paternal Grandfather      C.A.D. Paternal Grandfather      Hypertension Paternal Grandfather      Other Cancer Paternal Grandfather      Lung     Breast Cancer Paternal Grandmother      CANCER Paternal Grandmother      lung ca     OSTEOPOROSIS Paternal  "Grandmother      Hypertension Paternal Grandmother      Cancer - colorectal Maternal Grandfather      Colon Cancer Maternal Grandfather      CANCER Maternal Grandmother      cervical ca     Neurologic Disorder Maternal Grandmother      alzheimers     Alzheimer Disease Maternal Grandmother      Cardiovascular Brother 28     cardiomyopathy     Genetic Disorder Sister 21     una's disease     HEART DISEASE Paternal Uncle 55     mitral valve replacement     Anxiety Disorder Sister      Anxiety Disorder Brother      Genetic Disorder Sister      Una's Disease     No history of AI disease       Allergies:     Allergies   Allergen Reactions     Kiwi Other (See Comments)     Throat itching     Benzoyl Peroxide Swelling     Duricef [Cefadroxil] Unknown     Monistat [Miconazole] Swelling     Sulfa Drugs Rash            Medications:     Current Outpatient Prescriptions   Medication Sig Dispense Refill     hydroxychloroquine (PLAQUENIL) 200 MG tablet Take 2 tablets (400 mg) by mouth daily 180 tablet 1     sertraline (ZOLOFT) 25 MG tablet Take 2 tablets (50 mg) by mouth daily Increase to 50 mg daily after one week if symptoms improved but not enough 90 tablet 6     Prenatal Vit-Fe Fumarate-FA (PRENATAL VITAMINS) 28-0.8 MG TABS Take 1 tablet by mouth daily        hydroxychloroquine (PLAQUENIL) 200 MG tablet Take 200 mg by mouth 2 times daily              Physical Exam:   Blood pressure 101/67, pulse 80, resp. rate 18, height 1.702 m (5' 7\"), weight 84.5 kg (186 lb 3.2 oz), not currently breastfeeding.  Wt Readings from Last 4 Encounters:   03/02/18 84.5 kg (186 lb 3.2 oz)   02/26/18 85.7 kg (189 lb)   02/12/18 83.6 kg (184 lb 4.8 oz)   01/15/18 80.3 kg (177 lb)     Constitutional: well-developed, appearing stated age; cooperative  Eyes: nl EOM, PERRLA, vision, conjunctiva, sclera  ENT: nl external ears, nose, hearing, lips, teeth, gums, throat  No mucous membrane lesions, normal saliva pool  Neck: no mass or thyroid " enlargement  Resp: lungs clear to auscultation  CV: RRR, no murmurs, rubs or gallops, no edema  GI: no ABD tenderness  Lymph: no cervical, supraclavicular nodes  MS: no active synovitis or joint tenderness  Skin: no skin rash  Neuro: nl cranial nerves, strength  Psych: nl affect.         Data:     Results for orders placed or performed in visit on 01/15/18   Glucose 1 Hour   Result Value Ref Range    Glu Gest Screen 1hr 50g 104 60 - 129 mg/dL   CBC with Platelets   Result Value Ref Range    WBC 12.0 (H) 4.0 - 11.0 10e9/L    RBC Count 3.94 3.8 - 5.2 10e12/L    Hemoglobin 11.3 (L) 11.7 - 15.7 g/dL    Hematocrit 34.1 (L) 35.0 - 47.0 %    MCV 87 78 - 100 fl    MCH 28.7 26.5 - 33.0 pg    MCHC 33.1 31.5 - 36.5 g/dL    RDW 13.4 10.0 - 15.0 %    Platelet Count 207 150 - 450 10e9/L   Anti Treponema   Result Value Ref Range    Treponema pallidum Antibody Negative NEG^Negative   25- OH-Vitamin D   Result Value Ref Range    Vitamin D Deficiency screening 49 20 - 75 ug/L   HIV Antigen Antibody Combo   Result Value Ref Range    HIV Antigen Antibody Combo Nonreactive NR^Nonreactive       Hepatitis B Surface Antibody   Result Value Ref Range    Hepatitis B Surface Antibody 165.04 (H) <8.00 m[IU]/mL   Hepatitis B Surface Antigen   Result Value Ref Range    Hep B Surface Agn Nonreactive NR^Nonreactive     Labs from outside laboratory reviewed from 8/12/16  RNP antibodies 1.6- positive    Negative for Parvo Virus B19, Lyme Disease  Negative SLE panel   Negative SSA, SSB  Negative Anti-Sury-1  Negative Centromere B antibodies  Negative  CCP  Recent Labs   Lab Test  07/22/16   1110  05/29/16   0651  05/27/16   1032   01/09/16   0724   WBC  8.1   --   15.4*   --   16.8*   RBC  4.86   --   4.25   --   4.75   HGB  14.1  11.2*  12.4   < >  14.2   HCT  42.1   --   37.6   --   40.1   MCV  87   --   89   --   84   RDW  13.0   --   14.0   --   13.7   PLT  228   --   184   --   202   ALBUMIN   --    --    --    --   3.3*   CRP  3.0   --    --     --    --    BUN   --    --    --    --   9    < > = values in this interval not displayed.      Recent Labs   Lab Test  07/22/16   1110   TSH  1.10     Hemoglobin   Date Value Ref Range Status   01/15/2018 11.3 (L) 11.7 - 15.7 g/dL Final   12/14/2017 12.4 11.7 - 15.7 g/dL Final   09/28/2017 14.6 11.7 - 15.7 g/dL Final     Urea Nitrogen   Date Value Ref Range Status   01/09/2016 9 7 - 30 mg/dL Final     Sed Rate   Date Value Ref Range Status   09/28/2017 8 0 - 20 mm/h Final   07/22/2016 9 0 - 20 mm/h Final     CRP Inflammation   Date Value Ref Range Status   09/28/2017 <2.9 0.0 - 8.0 mg/L Final   10/24/2016 <2.9 0.0 - 8.0 mg/L Final   10/05/2016 42.0 (H) 0.0 - 8.0 mg/L Final     AST   Date Value Ref Range Status   12/14/2017 14 0 - 45 U/L Final   09/28/2017 16 0 - 45 U/L Final   01/09/2016 12 0 - 45 U/L Final     Albumin   Date Value Ref Range Status   12/14/2017 3.1 (L) 3.4 - 5.0 g/dL Final   01/09/2016 3.3 (L) 3.4 - 5.0 g/dL Final     Alkaline Phosphatase   Date Value Ref Range Status   01/09/2016 70 40 - 150 U/L Final     ALT   Date Value Ref Range Status   12/14/2017 15 0 - 50 U/L Final   09/28/2017 21 0 - 50 U/L Final   01/09/2016 15 0 - 50 U/L Final     Rheumatoid Factor   Date Value Ref Range Status   07/22/2016 <20 <20 IU/mL Final     RHEUM RESULTS Latest Ref Rng & Units 9/28/2017 12/14/2017 1/15/2018   COMPLEMENT C3 76 - 169 mg/dL 97 - -   COMPLEMENT C4 15 - 50 mg/dL 25 - -   DNA-DS <10 IU/mL 4 - -   SED RATE 0 - 20 mm/h 8 - -   CRP, INFLAMMATION 0.0 - 8.0 mg/L <2.9 - -   RHEUMATOID FACTOR <20 IU/mL - - -   NINOSKA SCREEN BY EIA <1.0 - - -   AST 0 - 45 U/L 16 14 -   ALT 0 - 50 U/L 21 15 -   ALBUMIN 3.4 - 5.0 g/dL - 3.1(L) -   WBC 4.0 - 11.0 10e9/L 7.4 12.7(H) 12.0(H)   RBC 3.8 - 5.2 10e12/L 5.22(H) 4.30 3.94   HGB 11.7 - 15.7 g/dL 14.6 12.4 11.3(L)   HCT 35.0 - 47.0 % 43.2 37.8 34.1(L)   MCV 78 - 100 fl 83 88 87   MCHC 31.5 - 36.5 g/dL 33.8 32.8 33.1   RDW 10.0 - 15.0 % 12.2 13.7 13.4    - 450 10e9/L 215  210 207   CREATININE 0.52 - 1.04 mg/dL 0.56 0.52 -   GFR ESTIMATE, IF BLACK >60 mL/min/1.7m2 >90 >90 -   GFR ESTIMATE >60 mL/min/1.7m2 >90 >90 -     Reviewed Rheumatology lab flowsheet

## 2018-03-02 NOTE — MR AVS SNAPSHOT
After Visit Summary   3/2/2018    Heather Guillory    MRN: [id number]           Patient Information     Date Of Birth          1980        Visit Information        Provider Department      3/2/2018 8:00 AM Nasrin King MD [facility] Rheumatology        Today's Diagnoses     Undifferentiated connective tissue disease (H)    -  1      Care Instructions    Nystatin powder for yeast is class C drug, please ask OB if it's safe to use    Labs at the end of March or early April    Return 2 months post delivery, around June    Plan to start tapering plaquenil, from 400 to 300 mg a day in June    Return in June              Follow-ups after your visit        Your next 10 appointments already scheduled     Mar 12, 2018  2:00 PM CDT   ULTRASOUND with [facility] ULTRASOUND   [facility] ([facility])    [facility] [address]  [facility] [address]   [phone]            Mar 12, 2018  2:30 PM CDT   RETURN OB with Cate Mansfield MD   [facility] ([facility])    [facility] [address]  [address]  [facility] [address]   [phone]            Mar 19, 2018 10:30 AM CDT   RETURN OB with Molly Jordan MD   [facility] ([facility])    [facility] [address]  [address]  [facility] [address] [phone]            Mar 26, 2018  2:30 PM CDT   RETURN OB with Cate Mansfield MD   [facility] ([facility])    [facility] [address]  [address]  [facility] [address]   [phone]            Apr 02, 2018 11:00 AM CDT   RETURN OB with Tuyet Guzmán MD   [facility] ([facility])    [facility] [address]  [address]  [facility] [address]   [phone]             "Jun 21, 2018  9:00 AM CDT   (Arrive by 8:45 AM)   Return Visit with Nasrin King MD   [facility] Rheumatology ([facility])    [address]  [address]  [facility] [address] [phone]              Who to contact     If you have questions or need follow up information about today's clinic visit or your schedule please contact [facility] RHEUMATOLOGY directly at [phone].  Normal or non-critical lab and imaging results will be communicated to you by NearWoohart, letter or phone within 4 business days after the clinic has received the results. If you do not hear from us within 7 days, please contact the clinic through Bikantat or phone. If you have a critical or abnormal lab result, we will notify you by phone as soon as possible.  Submit refill requests through Lysanda or call your pharmacy and they will forward the refill request to us. Please allow 3 business days for your refill to be completed.          Additional Information About Your Visit        Lysanda Information     Lysanda gives you secure access to your electronic health record. If you see a primary care provider, you can also send messages to your care team and make appointments. If you have questions, please call your primary care clinic.  If you do not have a primary care provider, please call [phone] and they will assist you.        Care EveryWhere ID     This is your Care EveryWhere ID. This could be used by other organizations to access your [facility] medical records  [id number]        Your Vitals Were     Pulse Respirations Height BMI (Body Mass Index)          80 18 1.702 m (5' 7\") 29.16 kg/m2         Blood Pressure from Last 3 Encounters:   03/02/18 101/67   02/26/18 105/69   02/12/18 94/68    Weight from Last 3 Encounters:   03/02/18 84.5 kg (186 lb 3.2 oz)   02/26/18 85.7 kg (189 lb)   02/12/18 83.6 kg (184 lb 4.8 oz)               Primary Care Provider Fax #    Physician No Ref-Primary [phone] "       No address on file        Equal Access to Services     [facility] AFIA : Hadii aad geovanni nahomy Bahena, wagisselda luqadaha, qaybta kaaldebbie ceceliarylielexis, waxgigi axel corleyalbakarrie sheikh. So [facility] [phone].    ATENCIÓN: Si habla español, tiene a nelson disposición servicios gratuitos de asistencia lingüística. Julieta al [phone].    We comply with applicable federal civil rights laws and [place] laws. We do not discriminate on the basis of race, color, national origin, age, disability, sex, sexual orientation, or gender identity.            Thank you!     Thank you for choosing [facility] RHEUMATOLOGY  for your care. Our goal is always to provide you with excellent care. Hearing back from our patients is one way we can continue to improve our services. Please take a few minutes to complete the written survey that you may receive in the mail after your visit with us. Thank you!             Your Updated Medication List - Protect others around you: Learn how to safely use, store and throw away your medicines at www.disposemymeds.org.          This list is accurate as of 3/2/18 11:59 PM.  Always use your most recent med list.                   Brand Name Dispense Instructions for use Diagnosis    * hydroxychloroquine 200 MG tablet    PLAQUENIL     Take 200 mg by mouth 2 times daily        * hydroxychloroquine 200 MG tablet    PLAQUENIL    180 tablet    Take 2 tablets (400 mg) by mouth daily    MCTD (mixed connective tissue disease) (H)       Prenatal Vitamins 28-0.8 MG Tabs      Take 1 tablet by mouth daily    Supervision of high-risk pregnancy of elderly multigravida       sertraline 25 MG tablet    ZOLOFT    90 tablet    Take 2 tablets (50 mg) by mouth daily Increase to 50 mg daily after one week if symptoms improved but not enough    Anxiety       * Notice:  This list has 2 medication(s) that are the same as other medications prescribed for you. Read the directions carefully, and ask your doctor or other care  provider to review them with you.

## 2018-03-02 NOTE — PATIENT INSTRUCTIONS
Nystatin powder for yeast is class C drug, please ask OB if it's safe to use    Labs at the end of March or early April    Return 2 months post delivery, around June    Plan to start tapering plaquenil, from 400 to 300 mg a day in June    Return in June

## 2018-03-02 NOTE — LETTER
3/2/2018      RE: Heather Guillory  [address]  [place] [address]       Rheumatology Visit     Heather Guillory MRN# [id number]   YOB: 1980 Age: 37 year old     Date of Last Visit: 2017  DOS: 3/2/2018       Assessment and Plan:   Undifferentiated connective tissue disease (UCTD) and pregnancy via IVF (JOSEPH 2018):    Heather is a 38 yo WF , a former patient of Dr. Brewer. She was diagnosed with MCTD in 2016, 6 wk postpartum based on fatigue, arthritis, mild Raynaud's, low positive NINOSKA 1.1 and low positive RNP Ab 1.6. She is on  mg qd since 2017 with great response.  UCTD continues to be most likely diagnosis not MCTD as she does not have classic features of MCTD, had very low titer of RNP Ab in the past, (negative on most recent check), and had a mild disease which never required prednisone.  All autoimmunity labs (2017) came back negative which is further reassuring for UCTD, including APS panel, repeat RNP and inflammatory markers, dsDNA and complement levels.  UA was significant for bacterial infection, otherwise no significant proteinuria.     She had neg SSA/SSB antibodies in 2016, no concern for CHB. No need to re-check.  APS panel was neg.  She had neg anti-Sm, neg anti-DNA and neg centromere Ab in 2016.    Today:  Disease continues to be under excellent control on HCQ, I don't expect flare up during pregnancy; however given safety of HCQ during pregnancy and breastfeeding, recommend HCQ to be continued at current dose and be considered to be tapered after 6-8 weeks post partum if no flare of UCTD.    Reassured, once again, that I expect her UCTD not to flare duing pregnancy but will monitor her closely.    1) Labs in late March/early April    2) For vaginal yeast infection, was advised to discuss use of nystatin powder with her OB/GYN, it is class C drug.    RTC in 2018    Orders Placed This Encounter   Procedures     AST     ALT     CBC with  platelets differential     Creatinine     Routine UA with micro reflex to culture               Active Problem List:     Patient Active Problem List    Diagnosis Date Noted     MCTD (mixed connective tissue disease) (H) 09/06/2017     Priority: Medium     On Plaquenil- OK per MFM  NEEDS serial growth u/s 4-6 wks       Supervision of high-risk pregnancy of elderly multigravida, IVF, TRICIA YAN 09/06/2017     Priority: Medium     JOSEPH 4/11/18 by IVF date, dating u/s consistent    9/6/17: OB intake   Labs ordered   Needs GC/CT at B ___  Admitted to inpatient for IV antibiotic treatment of UTI   Desires 1st trimester screening and level 2, MFM referral placed   Will need fetal echo d/t IVF  11/13  Per [facility] called Heather to let her know that I did communicate with Dr. King and that repeat assessment of her SSA antibodies is not needed, as these were negative in 8/2016.  She expressed understanding and will continue with her routine prenatal care and follow up growth US as previously recommended with [facility].   Everardo Betancourt  12/12/2017 - Normal Fetal Echo.  Per MFM continue serial growth US at [facility].      1/15/18: EOB visit, tdap given  Plan to increase zoloft to 50mg  Next growth u/s in 3-4 weeks    Growth 1/15: efw 61%tile       Xerosis cutis 07/21/2017     Priority: Medium     Overview:       Xerosis of the hands which is moderately severe and associated with fissuring.       Family history of genetic disorder 12/23/2015     Priority: Medium     Patient's sister's son with Elijah's disease       Female infertility 04/04/2014     Priority: Medium     IVF with first pregnancy.       Anxiety 04/04/2014     Priority: Medium     1/15/18: on 25mg Zoloft, discussed increasing to 50mg  Pt plans to increase    Plan 1-2 week pp mood check       Tension headache 03/03/2014     Priority: Medium     Family history of malignant neoplasm of breast 07/12/2013     Priority: Medium     Overview:   Paternal GM       Decreased libido  12/12/2012     Priority: Medium     Overview:   Due to Effexor.       Irritable colon 10/26/2009     Priority: Medium     Panic disorder without agoraphobia 02/06/2008     Priority: Medium     Overview:   Panic Disorder w/o Agoraphobia       Eating disorder 02/05/2004     Priority: Medium     History of, feels stable now.              History of Present Illness:   Heather Guillory presents for f/u for probable mixed connective tissue disease. Last seen in 1-17, when HCQ was continued.    Background: received diagnosis of mixed connective tissue given pain in hand and feet, stiffness, Raynaud's  Syndrome, fatigue, positive NINOSKA and anti-RNP in 2016. Ms. Guillory first experienced swelling and pain in her fingers and feet during her second pregnancy this past spring that prevented her from wearing rings on her fingers and provoked an evaluation for DVT, which was negative. Despite weight loss post partum, her pain, mild swelling, and stiffness in her digits persisted. She described the pain as a 7/10 when she wakes to feed her infant in the middle of the night and in the morning that decreases to a 2-3/10 over the course of the day. Stiffness lasts 30 mins to 1 hour in the morning. In general she feels tired all the time, but cannot determine if it is associated with that fact that she has two young children and is breastfeeding every 2 hours or if it is related to her joint symptoms. She was evaluated by an internist who found that she had a positive NINOSKA/ She saw Dr. Iniguez in 8-16 who discovered +RNP. She started Plaquenil 200 mg BID.    Interval history 7/14/17  Heather Guillory is stable since her last visit in January, despite feeling more fatigued recently. She denied arthralgias/myalgias but reports having sore feet/toes at end of day and when she first wakes up. She states the fatigue worsened since she ran out of Zoloft 3.5 weeks ago; also reports irritability and no sleeping soundly during the night since that  time too. Zoloft prescribed by her OBGYN.     She is currently receiving fertility treatment and plans to undergo embryo transfer on 7/24/17. She remains on HCQ and feels comfortable continuing this medication during pregnancy. She wonders if taking HCQ once daily (400) rather than 200 twice daily is acceptable.  She is using estrogen therapy in preparation for the embryo transfer, and relates that her gynecologist recommends that she remained on the supplement for approximately one month after the transfer.    She recently pinched a nerve in her neck and has numbness/tingling with burning sensation in her left arm that radiates down into her 1st-3rd digits. She was evaluated at [facility] orthopedics, received percocet for pain and told to have PT. If it did not improve in 6 weeks, she will return for another evaluation.     Lastly, she had dry, rough skin on lateral aspects of fingers. She states this often occurs in winter due to weather but it is new for her this summer. She is using a lot of moisturizers but it has not been helping.         9/28/17:   Heather is former patient of Dr. Brewer, is seeing me for 2nd opinion and is transferring care. She is currently pregnant and is concerned about her flare of MCTD during pregnancy, especially worried about flare being triggered by IVF.     She had her 1st pregnancy in 2/2015. It was via IVF. It was unexplained fertility. It was uneventful. Her son was born term. His 2nd son was born natuarally in 5/2016, term and healthy.       At 6 week post partum check up, she mentioned AM stiffness and stiffness of hands (new), feet were painful. Hands were swollen. Had fatigue, was breastfeeding att hat time.    No hair loss, skin rash, photosensitivity (but burns easily), oral/nasal ulcers, or sicca.    Has h/o Raynaud's since teen. Fingers turn white, toes turn purple, not painful. They feel cold not painful.    No myositis.     No serositis.    No seziures, headaches,  psychosis.    Has sensitive stomach.    No fevers.     No h/o proteinuria.    She was diagnosed with MTCD in 6/2016. She started taking HCQ in 9/2016.  She started HCQ after she stopped breastfeeding. Had one flare up after stopping breastfeeding. She started  bid, last eye exam was this summer of 2017.     No prednisone.    Lack of sleep causes joint pain.     She did another round of IVF in 4/2017.  She was on OC x 10 wk . Embryo transfer was 7/14/2017. Now is off estrogen, progestron x 2 wk, now 12 wk pregnant.     No flare of her disease after IVF.    She was on ASA 81 mg qd till she was confirmed to be pregnant.    No preganncy loses. No thrombosis.    Feet feel sore and achy only with lack of sleep.     JOSEPH is 4/11/2018.    Interval History 12/14/17:  Heather has no major concerns or changes in symptoms at today's appointment.    Hasn't experienced any joint pains for about 9-12 months.  No stiffness in her muscles or joints.  She hasn't had any Raynaud's and notes that she is keeping her hands and feet adequately covered and warm.      She does note that she has been fatigued lately but attributes that to her 2 toddlers at home who aren't sleeping throughout the night.  Also notes easy bruising but thinks that it is chronic.  Denies any hair loss, mouth sores/ulcers, HA, F/chills, night sweats.    She is taking hydroxychloroquine and is tolerating it well.      No significant infections although she wanted to inform us about her UTI with ESBL bacteria which was diagnosed when she was 8 weeks pregnant.  At that time, she was hospitalized and given IV antibiotics.  Ever since, she has experienced recurring yeast infections.  She tried diflucan x 3 without improvement.  Finally, she has changed her diet, cutting out sugars, which is improving her symptoms.      Today: Feeling very well with no flare ups or joint pain due to UCTD. Pregnancy is going well as well, she is due on 4/11/2018. She still has  problem with vaginal yeast infection, is allergic to topical anti-fungal Tx, unresponsive to one time fluconazole tx, was offered to try 7 days of fluconazole but prefers to hold off as pregnancy is almost over and per her experience, vaginal yeast infection revolves after delivery.          Review of Systems:   A comprehensive ROS was done. Positives are per HPI.          Past Medical History:     Past Medical History:   Diagnosis Date     Abnormal Pap smear     Over 10 years ago     Anxiety      E coli infection     ESBL     History of extended-spectrum beta-lactamase producing Escherichia coli infection      Hx of previous reproductive problem 12/2013    Infertility     Mixed connective tissue disease (H)     Dr. Steele- switching      Nephrolithiasis      Past Surgical History:   Procedure Laterality Date     BIOPSY  April 2014    Uterine polyp removed     DILATION AND CURETTAGE SUCTION       OPERATIVE HYSTEROSCOPY WITH MORCELLATOR  4/8/2014    Procedure: OPERATIVE HYSTEROSCOPY WITH MORCELLATOR;  Hysteroscopic Polypectomy;  Surgeon: Cate Mansfield MD;  Location: [facility] OR     Raynaud's syndrome since puberty. Her main trigger is cold.  She had two uneventful pregnancies with vaginal births, though the conception of her first child required IVF.   she is underwent embryo transfer in July 2017, now pregnant.   cervical radiculopathy, 2017.       Social History:     Social History     Occupational History      [employer]     Social History Main Topics     Smoking status: Never Smoker     Smokeless tobacco: Never Used     Alcohol use No     Drug use: No     Sexual activity: Yes     Partners: Male     Birth control/ protection: None            Family History:     Family History   Problem Relation Age of Onset     DIABETES Paternal Grandfather      C.A.D. Paternal Grandfather      Hypertension Paternal Grandfather      Other Cancer Paternal Grandfather      Lung     Breast Cancer Paternal  "Grandmother      CANCER Paternal Grandmother      lung ca     OSTEOPOROSIS Paternal Grandmother      Hypertension Paternal Grandmother      Cancer - colorectal Maternal Grandfather      Colon Cancer Maternal Grandfather      CANCER Maternal Grandmother      cervical ca     Neurologic Disorder Maternal Grandmother      alzheimers     Alzheimer Disease Maternal Grandmother      Cardiovascular Brother 28     cardiomyopathy     Genetic Disorder Sister 21     una's disease     HEART DISEASE Paternal Uncle 55     mitral valve replacement     Anxiety Disorder Sister      Anxiety Disorder Brother      Genetic Disorder Sister      Una's Disease     No history of AI disease       Allergies:     Allergies   Allergen Reactions     Kiwi Other (See Comments)     Throat itching     Benzoyl Peroxide Swelling     Duricef [Cefadroxil] Unknown     Monistat [Miconazole] Swelling     Sulfa Drugs Rash            Medications:     Current Outpatient Prescriptions   Medication Sig Dispense Refill     hydroxychloroquine (PLAQUENIL) 200 MG tablet Take 2 tablets (400 mg) by mouth daily 180 tablet 1     sertraline (ZOLOFT) 25 MG tablet Take 2 tablets (50 mg) by mouth daily Increase to 50 mg daily after one week if symptoms improved but not enough 90 tablet 6     Prenatal Vit-Fe Fumarate-FA (PRENATAL VITAMINS) 28-0.8 MG TABS Take 1 tablet by mouth daily        hydroxychloroquine (PLAQUENIL) 200 MG tablet Take 200 mg by mouth 2 times daily              Physical Exam:   Blood pressure 101/67, pulse 80, resp. rate 18, height 1.702 m (5' 7\"), weight 84.5 kg (186 lb 3.2 oz), not currently breastfeeding.  Wt Readings from Last 4 Encounters:   03/02/18 84.5 kg (186 lb 3.2 oz)   02/26/18 85.7 kg (189 lb)   02/12/18 83.6 kg (184 lb 4.8 oz)   01/15/18 80.3 kg (177 lb)     Constitutional: well-developed, appearing stated age; cooperative  Eyes: nl EOM, PERRLA, vision, conjunctiva, sclera  ENT: nl external ears, nose, hearing, lips, teeth, gums, " throat  No mucous membrane lesions, normal saliva pool  Neck: no mass or thyroid enlargement  Resp: lungs clear to auscultation  CV: RRR, no murmurs, rubs or gallops, no edema  GI: no ABD tenderness  Lymph: no cervical, supraclavicular nodes  MS: no active synovitis or joint tenderness  Skin: no skin rash  Neuro: nl cranial nerves, strength  Psych: nl affect.         Data:     Results for orders placed or performed in visit on 01/15/18   Glucose 1 Hour   Result Value Ref Range    Glu Gest Screen 1hr 50g 104 60 - 129 mg/dL   CBC with Platelets   Result Value Ref Range    WBC 12.0 (H) 4.0 - 11.0 10e9/L    RBC Count 3.94 3.8 - 5.2 10e12/L    Hemoglobin 11.3 (L) 11.7 - 15.7 g/dL    Hematocrit 34.1 (L) 35.0 - 47.0 %    MCV 87 78 - 100 fl    MCH 28.7 26.5 - 33.0 pg    MCHC 33.1 31.5 - 36.5 g/dL    RDW 13.4 10.0 - 15.0 %    Platelet Count 207 150 - 450 10e9/L   Anti Treponema   Result Value Ref Range    Treponema pallidum Antibody Negative NEG^Negative   25- OH-Vitamin D   Result Value Ref Range    Vitamin D Deficiency screening 49 20 - 75 ug/L   HIV Antigen Antibody Combo   Result Value Ref Range    HIV Antigen Antibody Combo Nonreactive NR^Nonreactive       Hepatitis B Surface Antibody   Result Value Ref Range    Hepatitis B Surface Antibody 165.04 (H) <8.00 m[IU]/mL   Hepatitis B Surface Antigen   Result Value Ref Range    Hep B Surface Agn Nonreactive NR^Nonreactive     Labs from outside laboratory reviewed from 8/12/16  RNP antibodies 1.6- positive    Negative for Parvo Virus B19, Lyme Disease  Negative SLE panel   Negative SSA, SSB  Negative Anti-Sury-1  Negative Centromere B antibodies  Negative  CCP  Recent Labs   Lab Test  07/22/16   1110  05/29/16   0651  05/27/16   1032   01/09/16   0724   WBC  8.1   --   15.4*   --   16.8*   RBC  4.86   --   4.25   --   4.75   HGB  14.1  11.2*  12.4   < >  14.2   HCT  42.1   --   37.6   --   40.1   MCV  87   --   89   --   84   RDW  13.0   --   14.0   --   13.7   PLT  228   --    184   --   202   ALBUMIN   --    --    --    --   3.3*   CRP  3.0   --    --    --    --    BUN   --    --    --    --   9    < > = values in this interval not displayed.      Recent Labs   Lab Test  07/22/16   1110   TSH  1.10     Hemoglobin   Date Value Ref Range Status   01/15/2018 11.3 (L) 11.7 - 15.7 g/dL Final   12/14/2017 12.4 11.7 - 15.7 g/dL Final   09/28/2017 14.6 11.7 - 15.7 g/dL Final     Urea Nitrogen   Date Value Ref Range Status   01/09/2016 9 7 - 30 mg/dL Final     Sed Rate   Date Value Ref Range Status   09/28/2017 8 0 - 20 mm/h Final   07/22/2016 9 0 - 20 mm/h Final     CRP Inflammation   Date Value Ref Range Status   09/28/2017 <2.9 0.0 - 8.0 mg/L Final   10/24/2016 <2.9 0.0 - 8.0 mg/L Final   10/05/2016 42.0 (H) 0.0 - 8.0 mg/L Final     AST   Date Value Ref Range Status   12/14/2017 14 0 - 45 U/L Final   09/28/2017 16 0 - 45 U/L Final   01/09/2016 12 0 - 45 U/L Final     Albumin   Date Value Ref Range Status   12/14/2017 3.1 (L) 3.4 - 5.0 g/dL Final   01/09/2016 3.3 (L) 3.4 - 5.0 g/dL Final     Alkaline Phosphatase   Date Value Ref Range Status   01/09/2016 70 40 - 150 U/L Final     ALT   Date Value Ref Range Status   12/14/2017 15 0 - 50 U/L Final   09/28/2017 21 0 - 50 U/L Final   01/09/2016 15 0 - 50 U/L Final     Rheumatoid Factor   Date Value Ref Range Status   07/22/2016 <20 <20 IU/mL Final     RHEUM RESULTS Latest Ref Rng & Units 9/28/2017 12/14/2017 1/15/2018   COMPLEMENT C3 76 - 169 mg/dL 97 - -   COMPLEMENT C4 15 - 50 mg/dL 25 - -   DNA-DS <10 IU/mL 4 - -   SED RATE 0 - 20 mm/h 8 - -   CRP, INFLAMMATION 0.0 - 8.0 mg/L <2.9 - -   RHEUMATOID FACTOR <20 IU/mL - - -   NINOSKA SCREEN BY EIA <1.0 - - -   AST 0 - 45 U/L 16 14 -   ALT 0 - 50 U/L 21 15 -   ALBUMIN 3.4 - 5.0 g/dL - 3.1(L) -   WBC 4.0 - 11.0 10e9/L 7.4 12.7(H) 12.0(H)   RBC 3.8 - 5.2 10e12/L 5.22(H) 4.30 3.94   HGB 11.7 - 15.7 g/dL 14.6 12.4 11.3(L)   HCT 35.0 - 47.0 % 43.2 37.8 34.1(L)   MCV 78 - 100 fl 83 88 87   MCHC 31.5 - 36.5  g/dL 33.8 32.8 33.1   RDW 10.0 - 15.0 % 12.2 13.7 13.4    - 450 10e9/L 215 210 207   CREATININE 0.52 - 1.04 mg/dL 0.56 0.52 -   GFR ESTIMATE, IF BLACK >60 mL/min/1.7m2 >90 >90 -   GFR ESTIMATE >60 mL/min/1.7m2 >90 >90 -     Reviewed Rheumatology lab flowsheet    Nasrin King MD

## 2018-03-02 NOTE — NURSING NOTE
"Chief Complaint   Patient presents with     RECHECK     Rheum follow up       Initial /67  Pulse 80  Resp 18  Ht 1.702 m (5' 7\")  Wt 84.5 kg (186 lb 3.2 oz)  BMI 29.16 kg/m2 Estimated body mass index is 29.16 kg/(m^2) as calculated from the following:    Height as of this encounter: 1.702 m (5' 7\").    Weight as of this encounter: 84.5 kg (186 lb 3.2 oz).  Medication Reconciliation: complete     SANTHOSH ALBERTS CMA      "

## 2018-03-06 ASSESSMENT — ANXIETY QUESTIONNAIRES
3. WORRYING TOO MUCH ABOUT DIFFERENT THINGS: NOT AT ALL
7. FEELING AFRAID AS IF SOMETHING AWFUL MIGHT HAPPEN: NOT AT ALL
6. BECOMING EASILY ANNOYED OR IRRITABLE: NOT AT ALL
4. TROUBLE RELAXING: NOT AT ALL
5. BEING SO RESTLESS THAT IT IS HARD TO SIT STILL: NOT AT ALL
2. NOT BEING ABLE TO STOP OR CONTROL WORRYING: NOT AT ALL
GAD7 TOTAL SCORE: 0
7. FEELING AFRAID AS IF SOMETHING AWFUL MIGHT HAPPEN: NOT AT ALL
GAD7 TOTAL SCORE: 0
1. FEELING NERVOUS, ANXIOUS, OR ON EDGE: NOT AT ALL

## 2018-03-06 NOTE — PROGRESS NOTES
"No new complaints.  Still ongoing issues w/ possible yeast vaginitis. Not bothersome enough that she wants to pursue tx.     Vitals:    18 1533   BP: 105/69   Pulse: 80   Weight: 85.7 kg (189 lb)   Height: 1.702 m (5' 7\")     O:   Vitals:    18 1533   BP: 105/69   Pulse: 80   Weight: 85.7 kg (189 lb)   Height: 1.702 m (5' 7\")     See flow    A/p; 36 yo  at 33+5 wks, with AMA and MCTD.     1. Growth scans q  4 wks d/t MCTD  2. F/u prn vaginal itching  3. Labor precautions discussed  4. GBS at 36 wks    Cate Mansfield MD, [facility]  OB/GYN    3/6/2018  1:51 PM    "

## 2018-03-07 ASSESSMENT — ANXIETY QUESTIONNAIRES: GAD7 TOTAL SCORE: 0

## 2018-03-12 ENCOUNTER — OFFICE VISIT (OUTPATIENT)
Dept: OBGYN | Facility: CLINIC | Age: 38
End: 2018-03-12
Attending: OBSTETRICS & GYNECOLOGY
Payer: COMMERCIAL

## 2018-03-12 VITALS
HEART RATE: 82 BPM | WEIGHT: 189.7 LBS | HEIGHT: 67 IN | DIASTOLIC BLOOD PRESSURE: 73 MMHG | SYSTOLIC BLOOD PRESSURE: 110 MMHG | BODY MASS INDEX: 29.78 KG/M2

## 2018-03-12 DIAGNOSIS — O40.3XX1 POLYHYDRAMNIOS IN THIRD TRIMESTER, FETUS 1: Primary | ICD-10-CM

## 2018-03-12 DIAGNOSIS — M35.1 MCTD (MIXED CONNECTIVE TISSUE DISEASE) (H): ICD-10-CM

## 2018-03-12 DIAGNOSIS — O09.812 PREGNANCY RESULTING FROM IN VITRO FERTILIZATION IN SECOND TRIMESTER: ICD-10-CM

## 2018-03-12 DIAGNOSIS — Z34.93 NORMAL PREGNANCY, THIRD TRIMESTER: ICD-10-CM

## 2018-03-12 PROCEDURE — 76816 OB US FOLLOW-UP PER FETUS: CPT | Mod: ZF

## 2018-03-12 PROCEDURE — G0463 HOSPITAL OUTPT CLINIC VISIT: HCPCS

## 2018-03-12 PROCEDURE — 87653 STREP B DNA AMP PROBE: CPT | Performed by: OBSTETRICS & GYNECOLOGY

## 2018-03-12 NOTE — LETTER
"3/12/2018       RE: Heather Guillory  [address]  [place] [address]     Dear Colleague,    Thank you for referring your patient, Heather Guillory, to the [facility] at [facility]. Please see a copy of my visit note below.    Doing well.  Still has some d/c, but not having symptoms but intermittently.     Vitals:    18 1422   BP: 110/73   Pulse: 82   Weight: 86 kg (189 lb 11.2 oz)   Height: 1.702 m (5' 7\")     A/p:  36 yo  at 35+5 wks, AMA and MCTD.     1. PNC: GBS today.    2. Yeast vaginitis.  Consider treatment again if symptoms increase to help decrease vaginal yeast prior to delivery  3. Polyhydramnios: weekly BPPs until delivery  4. F/u weekly    Cate Mansfield MD, FACOG  [facility]  OB/GYN    3/14/2018  11:17 AM      "

## 2018-03-12 NOTE — LETTER
3/12/2018       RE: Heather Guillory  [address]  [place] [address]     Dear Colleague,    Thank you for referring your patient, Heather Guillory, to the [facility] at [facility]. Please see a copy of my visit note below.    37 year old female, , presents at 35 5/7 weeks for obstetric ultrasound assessment indicated by IVF, mixed connective tissue disorder.    Single fetus    Presentation - cephalic    USEGA = 36 5/7 weeks.  EFW = 3,027 grams, 77 % for 35 weeks. AC - 94%.      RODERICK = 28.0cm, polyhydramnios.  FHR = 133bpm     Placenta posterior and grade 1    Comments: Growth is AGA with AC 94%.  Polyhydramnios.     Findings discussed with patient.    Further studies w/ weekly BPP due to polyhydramnios.    ZEUS Kunz MD, FACOG  [facility]  OB/GYN    3/12/2018  3:49 PM        Again, thank you for allowing me to participate in the care of your patient.      Sincerely,    [facility]

## 2018-03-12 NOTE — PROGRESS NOTES
37 year old female, , presents at 35 5/7 weeks for obstetric ultrasound assessment indicated by IVF, mixed connective tissue disorder.    Single fetus    Presentation - cephalic    USEGA = 36 5/7 weeks.  EFW = 3,027 grams, 77 % for 35 weeks. AC - 94%.      RODERICK = 28.0cm, polyhydramnios.  FHR = 133bpm     Placenta posterior and grade 1    Comments: Growth is AGA with AC 94%.  Polyhydramnios.     Findings discussed with patient.    Further studies w/ weekly BPP due to polyhydramnios.    ZEUS Kunz MD, FACOG  [facility]  OB/GYN    3/12/2018  3:49 PM

## 2018-03-12 NOTE — MR AVS SNAPSHOT
After Visit Summary   3/12/2018    Heather Guillory    MRN: [id number]           Patient Information     Date Of Birth          1980        Visit Information        Provider Department      3/12/2018 2:30 PM Cate Mansfield MD [facility]        Today's Diagnoses     Polyhydramnios in third trimester, fetus 1    -  1    Normal pregnancy, third trimester           Follow-ups after your visit        Follow-up notes from your care team     Return in about 1 week (around 3/19/2018) for ultrasound, SOUMYA.      Your next 10 appointments already scheduled     Mar 19, 2018  3:30 PM CDT   ULTRASOUND with [facility] ULTRASOUND   [facility] ([facility])    [place] [facility] [address]  [facility] [address]   [phone]            Mar 19, 2018  4:00 PM CDT   RETURN OB with Cate Mansfield MD   [facility] ([facility])    [place] [facility] [address]  [address]  [facility] [address]   [phone]            Mar 26, 2018  2:00 PM CDT   ULTRASOUND with [facility] ULTRASOUND   [facility] ([facility])    [place] [facility] [address]  [address]  [facility] [address]   [phone]            Mar 26, 2018  2:30 PM CDT   RETURN OB with Cate Mansfield MD   [facility] ([facility])    [place] [facility] [address]  [address]  [facility] [address]   [phone]            Apr 02, 2018 11:00 AM CDT   RETURN OB with Tuyet Guzmán MD   [facility] ([facility])    [place] [facility] [address]  [address]  [facility] [address]   [phone]            Jun 21, 2018  9:00 AM CDT   (Arrive by 8:45 AM)   Return Visit with Nasrin King MD   [facility] Rheumatology ([facility]    [address]  [address]  [facility] [address] [phone]              Who to contact     Please call your clinic at [phone] to:    Ask questions about your health    Make or cancel appointments    Discuss your medicines    Learn about your test results    Speak to your doctor            Additional Information About Your Visit        [employer] Information     [employer] gives you secure access to your electronic health record. If you see a primary care provider, you can also send messages to your care team and make appointments. If you have questions, please call your primary care clinic.  If you do not have a primary care provider, please call [phone] and they will assist you.      [employer] is an electronic gateway that provides easy, online access to your medical records. With [employer], you can request a clinic appointment, read your test results, renew a prescription or communicate with your care team.     To access your existing account, please contact your [facility] or call [phone] for assistance.        Care EveryWhere ID     This is your Care EveryWhere ID. This could be used by other organizations to access your [place] medical records  [id number]        Your Vitals Were     Pulse Height BMI (Body Mass Index)             82 1.702 m (5' 7\") 29.71 kg/m2          Blood Pressure from Last 3 Encounters:   03/12/18 110/73   03/02/18 101/67   02/26/18 105/69    Weight from Last 3 Encounters:   03/12/18 86 kg (189 lb 11.2 oz)   03/02/18 84.5 kg (186 lb 3.2 oz)   02/26/18 85.7 kg (189 lb)              We Performed the Following     Group B strep PCR        Primary Care Provider Fax #    Physician No Ref-Primary [phone]       No address on file        Equal Access to Services     ROSELIA OREILLY : Arabella Bahena, columba major, john kellogg. So washawn " [phone].    ATENCIÓN: Si veronika sherman, tiene a nelson disposición servicios gratuitos de asistencia lingüística. Julieta vidal [phone].    We comply with applicable federal civil rights laws and [place] laws. We do not discriminate on the basis of race, color, national origin, age, disability, sex, sexual orientation, or gender identity.            Thank you!     Thank you for choosing [facility]  for your care. Our goal is always to provide you with excellent care. Hearing back from our patients is one way we can continue to improve our services. Please take a few minutes to complete the written survey that you may receive in the mail after your visit with us. Thank you!             Your Updated Medication List - Protect others around you: Learn how to safely use, store and throw away your medicines at www.disposemymeds.org.          This list is accurate as of 3/12/18 11:59 PM.  Always use your most recent med list.                   Brand Name Dispense Instructions for use Diagnosis    * hydroxychloroquine 200 MG tablet    PLAQUENIL     Take 200 mg by mouth 2 times daily        * hydroxychloroquine 200 MG tablet    PLAQUENIL    180 tablet    Take 2 tablets (400 mg) by mouth daily    MCTD (mixed connective tissue disease) (H)       Prenatal Vitamins 28-0.8 MG Tabs      Take 1 tablet by mouth daily    Supervision of high-risk pregnancy of elderly multigravida       sertraline 25 MG tablet    ZOLOFT    90 tablet    Take 2 tablets (50 mg) by mouth daily Increase to 50 mg daily after one week if symptoms improved but not enough    Anxiety       * Notice:  This list has 2 medication(s) that are the same as other medications prescribed for you. Read the directions carefully, and ask your doctor or other care provider to review them with you.

## 2018-03-12 NOTE — MR AVS SNAPSHOT
After Visit Summary   3/12/2018    Heather Guillory    MRN: [id number]           Patient Information     Date Of Birth          1980        Visit Information        Provider Department      3/12/2018 2:00 PM [facility] ULTRASOUND [facility]        Today's Diagnoses     MCTD (mixed connective tissue disease) (H)        Pregnancy resulting from in vitro fertilization in second trimester           Follow-ups after your visit        Follow-up notes from your care team     Return in about 1 week (around 3/19/2018) for ultrasound.      Your next 10 appointments already scheduled     Mar 19, 2018  3:30 PM CDT   ULTRASOUND with [facility] ULTRASOUND   [facility] ([facility])    [place] [facility] [address]  [address]  [facility] [address]   [phone]            Mar 19, 2018  4:00 PM CDT   RETURN OB with Cate Mansfield MD   [facility] ([facility])    [place] [facility] [address]  [address]  [facility] [address]   [phone]            Mar 26, 2018  2:00 PM CDT   ULTRASOUND with [facility] ULTRASOUND   [facility] ([facility])    [place] [facility] [address]  [address]  [facility] [address]   [phone]            Mar 26, 2018  2:30 PM CDT   RETURN OB with Cate Mansfield MD   [facility] ([facility])    [place] [facility] [address]  [address]  [facility] [address]   [phone]            Apr 02, 2018 11:00 AM CDT   RETURN OB with Tuyet Guzmán MD   [facility] ([facility])    [place] [facility] [address]  [address]  [facility] [address]   [phone]            Jun 21, 2018  9:00 AM CDT   (Arrive by 8:45 AM)   Return Visit with aNsrin King MD   [facility] Rheumatology ([facility])    [address]  [address]  [facility] [address] [phone]              Future tests that were ordered for you today     Open Future Orders        Priority Expected Expires Ordered    BPP (Single) w/out NST (In Clinic) Routine  7/10/2018 3/12/2018            Who to contact     Please call your clinic at [phone] to:    Ask questions about your health    Make or cancel appointments    Discuss your medicines    Learn about your test results    Speak to your doctor            Additional Information About Your Visit        [employer] Information     [employer] gives you secure access to your electronic health record. If you see a primary care provider, you can also send messages to your care team and make appointments. If you have questions, please call your primary care clinic.  If you do not have a primary care provider, please call [phone] and they will assist you.      [employer] is an electronic gateway that provides easy, online access to your medical records. With [employer], you can request a clinic appointment, read your test results, renew a prescription or communicate with your care team.     To access your existing account, please contact your [facility] or call [phone] for assistance.        Care EveryWhere ID     This is your Care EveryWhere ID. This could be used by other organizations to access your [place] medical records  [id number]         Blood Pressure from Last 3 Encounters:   03/12/18 110/73   03/02/18 101/67   02/26/18 105/69    Weight from Last 3 Encounters:   03/12/18 86 kg (189 lb 11.2 oz)   03/02/18 84.5 kg (186 lb 3.2 oz)   02/26/18 85.7 kg (189 lb)              We Performed the Following     Growth Ultrasound [id number]        Primary Care Provider Fax #    Physician No Ref-Primary [phone]       No address on file        Equal Access to Services     ROSELIA OREILLY AH: columba Arce  pedrito ceceliayenni galiciajohn goodson. So [facility] [phone].    ATENCIÓN: Si veronika sherman, tiene a nelson disposición servicios gratuitos de asistencia lingüística. Julieta al [phone].    We comply with applicable federal civil rights laws and [place] laws. We do not discriminate on the basis of race, color, national origin, age, disability, sex, sexual orientation, or gender identity.            Thank you!     Thank you for choosing [facility]  for your care. Our goal is always to provide you with excellent care. Hearing back from our patients is one way we can continue to improve our services. Please take a few minutes to complete the written survey that you may receive in the mail after your visit with us. Thank you!             Your Updated Medication List - Protect others around you: Learn how to safely use, store and throw away your medicines at www.disposemymeds.org.          This list is accurate as of 3/12/18  3:50 PM.  Always use your most recent med list.                   Brand Name Dispense Instructions for use Diagnosis    * hydroxychloroquine 200 MG tablet    PLAQUENIL     Take 200 mg by mouth 2 times daily        * hydroxychloroquine 200 MG tablet    PLAQUENIL    180 tablet    Take 2 tablets (400 mg) by mouth daily    MCTD (mixed connective tissue disease) (H)       Prenatal Vitamins 28-0.8 MG Tabs      Take 1 tablet by mouth daily    Supervision of high-risk pregnancy of elderly multigravida       sertraline 25 MG tablet    ZOLOFT    90 tablet    Take 2 tablets (50 mg) by mouth daily Increase to 50 mg daily after one week if symptoms improved but not enough    Anxiety       * Notice:  This list has 2 medication(s) that are the same as other medications prescribed for you. Read the directions carefully, and ask your doctor or other care provider to review them with you.

## 2018-03-13 LAB
GP B STREP DNA SPEC QL NAA+PROBE: NEGATIVE
SPECIMEN SOURCE: NORMAL

## 2018-03-13 ASSESSMENT — ANXIETY QUESTIONNAIRES
3. WORRYING TOO MUCH ABOUT DIFFERENT THINGS: NOT AT ALL
1. FEELING NERVOUS, ANXIOUS, OR ON EDGE: NOT AT ALL
7. FEELING AFRAID AS IF SOMETHING AWFUL MIGHT HAPPEN: NOT AT ALL
GAD7 TOTAL SCORE: 0
4. TROUBLE RELAXING: NOT AT ALL
2. NOT BEING ABLE TO STOP OR CONTROL WORRYING: NOT AT ALL
6. BECOMING EASILY ANNOYED OR IRRITABLE: NOT AT ALL
GAD7 TOTAL SCORE: 0
7. FEELING AFRAID AS IF SOMETHING AWFUL MIGHT HAPPEN: NOT AT ALL
5. BEING SO RESTLESS THAT IT IS HARD TO SIT STILL: NOT AT ALL

## 2018-03-14 ASSESSMENT — ANXIETY QUESTIONNAIRES: GAD7 TOTAL SCORE: 0

## 2018-03-14 NOTE — PROGRESS NOTES
"Doing well.  Still has some d/c, but not having symptoms but intermittently.     Vitals:    18 1422   BP: 110/73   Pulse: 82   Weight: 86 kg (189 lb 11.2 oz)   Height: 1.702 m (5' 7\")     A/p:  38 yo  at 35+5 wks, AMA and MCTD.     1. PNC: GBS today.    2. Yeast vaginitis.  Consider treatment again if symptoms increase to help decrease vaginal yeast prior to delivery  3. Polyhydramnios: weekly BPPs until delivery  4. F/u weekly    Cate Mansfield MD, FACOG  [facility]  OB/GYN    3/14/2018  11:17 AM      "

## 2018-03-19 ENCOUNTER — OFFICE VISIT (OUTPATIENT)
Dept: OBGYN | Facility: CLINIC | Age: 38
End: 2018-03-19
Attending: OBSTETRICS & GYNECOLOGY
Payer: COMMERCIAL

## 2018-03-19 ENCOUNTER — HOSPITAL ENCOUNTER (OUTPATIENT)
Facility: CLINIC | Age: 38
Discharge: HOME | End: 2018-03-19
Attending: OBSTETRICS & GYNECOLOGY | Admitting: OBSTETRICS & GYNECOLOGY
Payer: COMMERCIAL

## 2018-03-19 VITALS
HEART RATE: 80 BPM | BODY MASS INDEX: 29.91 KG/M2 | SYSTOLIC BLOOD PRESSURE: 113 MMHG | DIASTOLIC BLOOD PRESSURE: 77 MMHG | WEIGHT: 191 LBS

## 2018-03-19 VITALS — TEMPERATURE: 98.6 F | SYSTOLIC BLOOD PRESSURE: 108 MMHG | RESPIRATION RATE: 18 BRPM | DIASTOLIC BLOOD PRESSURE: 64 MMHG

## 2018-03-19 DIAGNOSIS — O40.3XX1 POLYHYDRAMNIOS IN THIRD TRIMESTER, FETUS 1: ICD-10-CM

## 2018-03-19 DIAGNOSIS — O09.529 SUPERVISION OF HIGH-RISK PREGNANCY OF ELDERLY MULTIGRAVIDA: Primary | ICD-10-CM

## 2018-03-19 PROBLEM — Z36.89 ENCOUNTER FOR TRIAGE IN PREGNANT PATIENT: Status: ACTIVE | Noted: 2018-03-19

## 2018-03-19 PROCEDURE — 76819 FETAL BIOPHYS PROFIL W/O NST: CPT | Mod: ZF

## 2018-03-19 PROCEDURE — G0463 HOSPITAL OUTPT CLINIC VISIT: HCPCS | Mod: ZF

## 2018-03-19 PROCEDURE — 59025 FETAL NON-STRESS TEST: CPT

## 2018-03-19 PROCEDURE — G0463 HOSPITAL OUTPT CLINIC VISIT: HCPCS | Mod: 25

## 2018-03-19 ASSESSMENT — PAIN SCALES - GENERAL: PAINLEVEL: NO PAIN (0)

## 2018-03-19 NOTE — NURSING NOTE
Chief Complaint   Patient presents with     Prenatal Care     SOUMYA 36 weeks and 5 days   Tigist Lloyd LPN

## 2018-03-19 NOTE — PROGRESS NOTES
37 year old,  , presents at 36 5/7 weeks in pregnancy complicated by IVF, mixed connective tissue disorder, polyhydramnios for biophysical assessment.    Fetal Breathing Movements (FBM): Normal - 2  Gross Body Movements (GBM): Normal - 2  Fetal Tone (FT): Normal - 2  AFV: Pocket of amniotic fluid > or = to 2 cm x 2 cm - 2  Quantitative Amniotic Fluid Volume Total: 27.5 cm, polyhydramnios.      BPP 8/8.  FHR = 130bpm Normal.   MCA Not done.  NST Not done.     Single fetus in cephalic presentation.  Placenta posterior and grade 1.    Recommend continue weekly BPP assessment due to polyhydramnios.    ZEUS Kunz MD

## 2018-03-19 NOTE — DISCHARGE INSTRUCTIONS
Discharge Instruction for Undelivered Patients      You were seen for: Fetal Assessment  We Consulted: TRICIA YAN  You had (Test or Medicine):NST     Diet:   Drink 8 to 12 glasses of liquids (milk, juice, water) every day.     Activity:  Call your doctor or nurse midwife if your baby is moving less than usual.     Call your provider if you notice:  Swelling in your face or increased swelling in your hands or legs.  Headaches that are not relieved by Tylenol (acetaminophen).  Changes in your vision (blurring: seeing spots or stars.)  Nausea (sick to your stomach) and vomiting (throwing up).   Weight gain of 5 pounds or more per week.  Heartburn that doesn't go away.  Signs of bladder infection: pain when you urinate (use the toilet), need to go more often and more urgently.  The bag of browne (rupture of membranes) breaks, or you notice leaking in your underwear.  Bright red blood in your underwear.  Abdominal (lower belly) or stomach pain.  For first baby: Contractions (tightening) less than 5 minutes apart for one hour or more.  Second (plus) baby: Contractions (tightening) less than 10 minutes apart and getting stronger.  *If less than 34 weeks: Contractions (tightenings) more than 6 times in one hour.  Increase or change in vaginal discharge (note the color and amount)  Other:     Follow-up:  As scheduled in the clinic

## 2018-03-19 NOTE — PROGRESS NOTES
OB Triage    S: Heather Guillory is a 37 year old  at 36w5d by embryo transfer date who presents from clinic for NST due to decreased fetal movement. She reports that for the past few nights, she hasn't felt the bigger movements she typically notices before bed. Today throughout the day, she has also generally noticed less movement. Since being in triage, she has noticed a bit more movement. Overall it is difficult for her to tell how much is just because she's been paying more attention to it.   She reports feeling well and denies any other complaints. No contractions, bleeding or LOF.     Pregnancy complicated by:  - Polyhydramnios, RODERICK 27.5 today  - IVF pregnancy  - Mixed connective tissue disease  - Anxiety  - Advanced maternal age    O:    Vitals:    18 1654   BP: 108/64   Resp: 18   Temp: 98.6  F (37  C)   TempSrc: Oral     Gen: alert, oriented, NAD    FHT: baseline 140, moderate variability, +accels, no decels  [place]: no contrations    A/P:  37 year old  at 36w5d with decreased fetal movement and reactive NST. BPP today .     Provided reassurance, reviewed normal NST. Discharge to home. F/up in one week for SOUMYA and BPP. Planning IOL at 39 weeks due to polyhydramnios.    Jenelle Gay MD

## 2018-03-19 NOTE — LETTER
3/19/2018       RE: Heather Guillory  [address]  [place] [address]     Dear Colleague,    Thank you for referring your patient, Heather Guillory, to the [facility] at [facility]. Please see a copy of my visit note below.    37 year old,  , presents at 36 5/7 weeks in pregnancy complicated by IVF, mixed connective tissue disorder, polyhydramnios for biophysical assessment.    Fetal Breathing Movements (FBM): Normal - 2  Gross Body Movements (GBM): Normal - 2  Fetal Tone (FT): Normal - 2  AFV: Pocket of amniotic fluid > or = to 2 cm x 2 cm - 2  Quantitative Amniotic Fluid Volume Total: 27.5 cm, polyhydramnios.      BPP 8/8.  FHR = 130bpm Normal.   MCA Not done.  NST Not done.     Single fetus in cephalic presentation.  Placenta posterior and grade 1.    Recommend continue weekly BPP assessment due to polyhydramnios.    Sahra Molina RDMS    Tuyet Guzmán MD    Again, thank you for allowing me to participate in the care of your patient.      Sincerely,    [facility] Ul[facility]

## 2018-03-19 NOTE — MR AVS SNAPSHOT
After Visit Summary   3/19/2018    Heather Guillory    MRN: [id number]           Patient Information     Date Of Birth          1980        Visit Information        Provider Department      3/19/2018 4:00 PM Cate Mansfield MD [facility]        Today's Diagnoses     Supervision of high-risk pregnancy of elderly multigravida, IVF, [facility] MD    -  1       Follow-ups after your visit        Follow-up notes from your care team     Return in about 1 week (around 3/26/2018).      Your next 10 appointments already scheduled     Mar 26, 2018  2:00 PM CDT   ULTRASOUND with [facility] ULTRASOUND   [facility] ([facility])    [place] [facility] [address]  [address]  [facility] [address] [phone]            Mar 26, 2018  2:30 PM CDT   RETURN OB with Cate Mansfield MD   [facility] ([facility])    [place] [facility] [address]  [address]  [facility] [address] [phone]            Apr 02, 2018 11:00 AM CDT   RETURN OB with Tuyet Guzmán MD   [facility] ([facility])    [place] [facility] [address]  [address]  [facility] [address] [phone]            Jun 21, 2018  9:00 AM CDT   (Arrive by 8:45 AM)   Return Visit with Nasrin King MD   [facility] ([facility])    [address]  [address]  [facility] [address] [phone]              Who to contact     Please call your clinic at [phone] to:    Ask questions about your health    Make or cancel appointments    Discuss your medicines    Learn about your test results    Speak to your doctor            Additional Information About Your Visit        MyChart Information     My[employer] gives you secure access to your electronic health record. If you see a primary care provider, you can also  send messages to your care team and make appointments. If you have questions, please call your primary care clinic.  If you do not have a primary care provider, please call [phone] and they will assist you.      [employer] is an electronic gateway that provides easy, online access to your medical records. With [employer], you can request a clinic appointment, read your test results, renew a prescription or communicate with your care team.     To access your existing account, please contact your [facility] or call [phone] for assistance.        Care EveryWhere ID     This is your Care EveryWhere ID. This could be used by other organizations to access your [place] medical records  [id number]        Your Vitals Were     Pulse Breastfeeding? BMI (Body Mass Index)             80 No 29.91 kg/m2          Blood Pressure from Last 3 Encounters:   03/19/18 108/64   03/19/18 113/77   03/12/18 110/73    Weight from Last 3 Encounters:   03/19/18 86.6 kg (191 lb)   03/12/18 86 kg (189 lb 11.2 oz)   03/02/18 84.5 kg (186 lb 3.2 oz)              Today, you had the following     No orders found for display       Primary Care Provider Office Phone # Fax #    Cate Mansfield [phone]       [address] [facility] [address]  [facility] [address]        Equal Access to Services     ROSELIA OREILLY : Hadii juanjo ku hadasho [place], waaxda luqadaha, qaybta kaalmada adeslimda, john sheikh. So [facility] [phone].    ATENCIÓN: Si habla español, tiene a nelson disposición servicios gratuitos de asistencia lingüística. Julieta al [phone].    We comply with applicable federal civil rights laws and [place] laws. We do not discriminate on the basis of race, color, national origin, age, disability, sex, sexual orientation, or gender identity.            Thank you!     Thank you for choosing [facility]  for your care. Our goal is always to provide  you with excellent care. Hearing back from our patients is one way we can continue to improve our services. Please take a few minutes to complete the written survey that you may receive in the mail after your visit with us. Thank you!             Your Updated Medication List - Protect others around you: Learn how to safely use, store and throw away your medicines at www.disposemymeds.org.          This list is accurate as of 3/19/18  4:41 PM.  Always use your most recent med list.                   Brand Name Dispense Instructions for use Diagnosis    * hydroxychloroquine 200 MG tablet    PLAQUENIL     Take 200 mg by mouth 2 times daily        * hydroxychloroquine 200 MG tablet    PLAQUENIL    180 tablet    Take 2 tablets (400 mg) by mouth daily    MCTD (mixed connective tissue disease) (H)       Prenatal Vitamins 28-0.8 MG Tabs      Take 1 tablet by mouth daily    Supervision of high-risk pregnancy of elderly multigravida       sertraline 25 MG tablet    ZOLOFT    90 tablet    Take 2 tablets (50 mg) by mouth daily Increase to 50 mg daily after one week if symptoms improved but not enough    Anxiety       * Notice:  This list has 2 medication(s) that are the same as other medications prescribed for you. Read the directions carefully, and ask your doctor or other care provider to review them with you.

## 2018-03-19 NOTE — DISCHARGE SUMMARY
Data: [facility] at 1641.   Reason for maternal/fetal assessment per patient is Decreased Fetal Movement  . Patient is a . Prenatal record reviewed.      Obstetric History       T2      L2     SAB0   TAB0   Ectopic0   Multiple0   Live Births2       # Outcome Date GA Lbr Yasir/2nd Weight Sex Delivery Anes PTL Lv   4 Current            3 Term 16 41w1d 00:45 / 02:05 3.997 kg (8 lb 13 oz) M Vag-Spont EPI N COSME      Complications: Placenta Previa      Apgar1:  9                Apgar5: 9   2 Term 02/06/15 41w1d 04:15 / 03:08 3.55 kg (7 lb 13.2 oz) M Vag-Spont EPI N COSME      Name: Krupa      Complications: Fever      Apgar1:  8                Apgar5: 9   1 AB      AB, COMPLETE            Medical History:   Past Medical History:   Diagnosis Date     Abnormal Pap smear     Over 10 years ago     Anxiety      E coli infection     ESBL     History of extended-spectrum beta-lactamase producing Escherichia coli infection      Hx of previous reproductive problem 2013    Infertility     Mixed connective tissue disease (H)     Dr. Steele- switching      Nephrolithiasis    . Gestational Age 36w5d. VSS. Cervix: not examined.  Fetal movement present. Patient denies cramping, backache, vaginal discharge, pelvic pressure, UTI symptoms, GI problems, bloody show, vaginal bleeding, edema, headache, visual disturbances, epigastric or URQ pain, abdominal pain, rupture of membranes. Support persons not present.  Action: Verbal consent for EFM. Triage assessment completed. EFM applied for fetal well being. Fetal assessment: Presumed adequate fetal oxygenation documented (see flow record). Patient education discharge instructions. Patient instructed to report change in fetal movement, vaginal leaking of fluid or bleeding, abdominal pain, or any concerns related to the pregnancy to her nurse/physician.   Response: Dr. Gay informed of patient arrival and assessment. Plan per provider is  to discharge to home. Patient verbalized understanding of education and verbalized agreement with plan. Discharged ambulatory at 1750.

## 2018-03-19 NOTE — IP AVS SNAPSHOT
MRN:[id number]                      After Visit Summary   3/19/2018    Heather Guillory    MRN: [id number]           Thank you!     Thank you for choosing [facility] for your care. Our goal is always to provide you with excellent care. Hearing back from our patients is one way we can continue to improve our services. Please take a few minutes to complete the written survey that you may receive in the mail after you visit with us. Thank you!        Patient Information     Date Of Birth          1980        Designated Caregiver       Most Recent Value    Caregiver    Will someone help with your care after discharge? no      About your hospital stay     You were admitted on:  March 19, 2018 You last received care in the:  [facility]    You were discharged on:  March 19, 2018       Who to Call     For medical emergencies, please call 911.  For non-urgent questions about your medical care, please call your primary care provider or clinic, [phone]          Attending Provider     Provider Specialty    Jenelle Gay MD OB/Gyn       Primary Care Provider Office Phone # Fax #    Cate Mansfield [phone]      Your next 10 appointments already scheduled     Mar 26, 2018  2:00 PM CDT   ULTRASOUND with [facility] ULTRASOUND   [facility] ([facility])    [facility] [address]  [facility] [id number] [phone]            Mar 26, 2018  2:30 PM CDT   RETURN OB with Cate Mansfield MD   [facility] ([facility])    [facility] [address]  [address] [facility] [id number] [phone]            Apr 02, 2018 11:00 AM CDT   RETURN OB with Tuyet Guzmán MD   [facility] ([facility])    [facility] [address]  [address]  [facility] [id number] [phone]             Jun 21, 2018  9:00 AM CDT   (Arrive by 8:45 AM)   Return Visit with Nasrin King MD   [facility]ology ([facility])    [address]  [address]  [facility] [address] [phone]              Further instructions from your care team       Discharge Instruction for Undelivered Patients      You were seen for: Fetal Assessment  We Consulted: TRICIA YAN  You had (Test or Medicine):NST     Diet:   Drink 8 to 12 glasses of liquids (milk, juice, water) every day.     Activity:  Call your doctor or nurse midwife if your baby is moving less than usual.     Call your provider if you notice:  Swelling in your face or increased swelling in your hands or legs.  Headaches that are not relieved by Tylenol (acetaminophen).  Changes in your vision (blurring: seeing spots or stars.)  Nausea (sick to your stomach) and vomiting (throwing up).   Weight gain of 5 pounds or more per week.  Heartburn that doesn't go away.  Signs of bladder infection: pain when you urinate (use the toilet), need to go more often and more urgently.  The bag of browne (rupture of membranes) breaks, or you notice leaking in your underwear.  Bright red blood in your underwear.  Abdominal (lower belly) or stomach pain.  For first baby: Contractions (tightening) less than 5 minutes apart for one hour or more.  Second (plus) baby: Contractions (tightening) less than 10 minutes apart and getting stronger.  *If less than 34 weeks: Contractions (tightenings) more than 6 times in one hour.  Increase or change in vaginal discharge (note the color and amount)  Other:     Follow-up:  As scheduled in the clinic          Pending Results     No orders found from 3/17/2018 to 3/20/2018.            Statement of Approval     Ordered          03/19/18 1746  I have reviewed and agree with all the recommendations and orders detailed in this document.  EFFECTIVE NOW     Approved and electronically signed by:  Jenelle Gay MD              Admission Information     Date & Time Provider Department Dept. Phone    3/19/2018 Jenelle Gay MD  [facility] [phone]      Your Vitals Were     Blood Pressure Temperature Respirations             108/64 98.6  F (37  C) (Oral) 18         MyChart Information     [employer] gives you secure access to your electronic health record. If you see a primary care provider, you can also send messages to your care team and make appointments. If you have questions, please call your primary care clinic.  If you do not have a primary care provider, please call [phone] and they will assist you.        Care EveryWhere ID     This is your Care EveryWhere ID. This could be used by other organizations to access your [place] medical records  [id number]        Equal Access to Services     ROSELIA OREILLY : Arabella Bahena, waaxda luqadaha, qaybta kaalmada cailin, john sheikh. So [facility] [phone].    ATENCIÓN: Si habla español, tiene a nelson disposición servicios gratuitos de asistencia lingüística. Llame al [phone].    We comply with applicable federal civil rights laws and [place] laws. We do not discriminate on the basis of race, color, national origin, age, disability, sex, sexual orientation, or gender identity.               Review of your medicines      UNREVIEWED medicines. Ask your doctor about these medicines        Dose / Directions    * hydroxychloroquine 200 MG tablet   Commonly known as:  PLAQUENIL        Dose:  200 mg   Take 200 mg by mouth 2 times daily   Refills:  0       * hydroxychloroquine 200 MG tablet   Commonly known as:  PLAQUENIL   Used for:  MCTD (mixed connective tissue disease) (H)        Dose:  400 mg   Take 2 tablets (400 mg) by mouth daily   Quantity:  180 tablet   Refills:  1       Prenatal Vitamins 28-0.8 MG Tabs   Used for:  Supervision of high-risk pregnancy of elderly multigravida        Dose:  1 tablet   Take 1 tablet by  mouth daily   Refills:  0       sertraline 25 MG tablet   Commonly known as:  ZOLOFT   Used for:  Anxiety        Dose:  50 mg   Take 2 tablets (50 mg) by mouth daily Increase to 50 mg daily after one week if symptoms improved but not enough   Quantity:  90 tablet   Refills:  6       * Notice:  This list has 2 medication(s) that are the same as other medications prescribed for you. Read the directions carefully, and ask your doctor or other care provider to review them with you.             Protect others around you: Learn how to safely use, store and throw away your medicines at www.disposemSocial Toucheds.org.             Medication List: This is a list of all your medications and when to take them. Check marks below indicate your daily home schedule. Keep this list as a reference.      Medications           Morning Afternoon Evening Bedtime As Needed    * hydroxychloroquine 200 MG tablet   Commonly known as:  PLAQUENIL   Take 200 mg by mouth 2 times daily                                * hydroxychloroquine 200 MG tablet   Commonly known as:  PLAQUENIL   Take 2 tablets (400 mg) by mouth daily                                Prenatal Vitamins 28-0.8 MG Tabs   Take 1 tablet by mouth daily                                sertraline 25 MG tablet   Commonly known as:  ZOLOFT   Take 2 tablets (50 mg) by mouth daily Increase to 50 mg daily after one week if symptoms improved but not enough                                * Notice:  This list has 2 medication(s) that are the same as other medications prescribed for you. Read the directions carefully, and ask your doctor or other care provider to review them with you.

## 2018-03-19 NOTE — LETTER
3/19/2018       RE: Heather Guillory  [address]  [place] [address]     Dear Colleague,    Thank you for referring your patient, Heather Guillory, to the [facility] at [facility]. Please see a copy of my visit note below.    S: doing ok. Notes change in fetal movement since weekend.  Feels like less.   BPP today  with polyhdramnios.    Vitals:    18 1607   BP: 113/77   Pulse: 80   Weight: 86.6 kg (191 lb)     See flow    A/p:  at 36+5 wks     1. PNC: labs wnl.   2. MCTD: q 4 growth u/s. Last growth: 77% w/ poly  3. Poly: weekly BPP  4. Decreased FM. Likely d/t poly. To L and D for NST today.  5. IOL by 39 wks d/t poly  6. Labor precautions reviewed    3/20/2018  10:23 AM      Again, thank you for allowing me to participate in the care of your patient.      Sincerely,    Cate Mansfield MD

## 2018-03-19 NOTE — MR AVS SNAPSHOT
After Visit Summary   3/19/2018    Heather Guillory    MRN: [id number]           Patient Information     Date Of Birth          1980        Visit Information        Provider Department      3/19/2018 3:30 PM [facility] ULTRASOUND [facility]        Today's Diagnoses     Polyhydramnios in third trimester, fetus 1           Follow-ups after your visit        Your next 10 appointments already scheduled     Mar 26, 2018  2:00 PM CDT   ULTRASOUND with [facility] ULTRASOUND   [facility] ([facility])    [facility] [address]  [address]  [facility] [address]   [phone]            Mar 26, 2018  2:30 PM CDT   RETURN OB with Cate Mansfield MD   [facility] ([facility])    [facility] [address]  [address]  [facility] [address]   [phone]            Apr 02, 2018 11:00 AM CDT   RETURN OB with Tuyet Guzmán MD   [facility] ([facility])    [facility] [address]  [address]  [facility] [address]   [phone]            Jun 21, 2018  9:00 AM CDT   (Arrive by 8:45 AM)   Return Visit with Nasrin King MD   [facility] ([facility])    [address]  [address]  [facility] [id number] [phone]              Who to contact     Please call your clinic at [phone] to:    Ask questions about your health    Make or cancel appointments    Discuss your medicines    Learn about your test results    Speak to your doctor            Additional Information About Your Visit        MyChart Information     [employer] gives you secure access to your electronic health record. If you see a primary care provider, you can also send messages to your care team and make appointments. If you have questions, please call your primary care clinic.  If you do not  have a primary care provider, please call [phone] and they will assist you.      [employer] is an electronic gateway that provides easy, online access to your medical records. With [employer], you can request a clinic appointment, read your test results, renew a prescription or communicate with your care team.     To access your existing account, please contact your [facility] or call [phone] for assistance.        Care EveryWhere ID     This is your Care EveryWhere ID. This could be used by other organizations to access your [place] medical records  [id number]         Blood Pressure from Last 3 Encounters:   03/19/18 108/64   03/19/18 113/77   03/12/18 110/73    Weight from Last 3 Encounters:   03/19/18 86.6 kg (191 lb)   03/12/18 86 kg (189 lb 11.2 oz)   03/02/18 84.5 kg (186 lb 3.2 oz)              We Performed the Following     BPP (Single) w/out NST (In Clinic)        Primary Care Provider Office Phone # Fax #    Cate Mansfield [phone]       [address]  [facility] [address]        Equal Access to Services     ROSELIA OREILLY : Hadii juanjo freyo [place], waaxda luqadaha, qaybta kaalmada adeegyada, john sheikh. So [facility] [phone].    ATENCIÓN: Si habla español, tiene a nelson disposición servicios gratuitos de asistencia lingüística. Llame al [phone].    We comply with applicable federal civil rights laws and [place] laws. We do not discriminate on the basis of race, color, national origin, age, disability, sex, sexual orientation, or gender identity.            Thank you!     Thank you for choosing [facility]  for your care. Our goal is always to provide you with excellent care. Hearing back from our patients is one way we can continue to improve our services. Please take a few minutes to complete the written survey that you may receive in the mail after your visit with us.  Thank you!             Your Updated Medication List - Protect others around you: Learn how to safely use, store and throw away your medicines at www.disposemymeds.org.          This list is accurate as of 3/19/18  4:41 PM.  Always use your most recent med list.                   Brand Name Dispense Instructions for use Diagnosis    * hydroxychloroquine 200 MG tablet    PLAQUENIL     Take 200 mg by mouth 2 times daily        * hydroxychloroquine 200 MG tablet    PLAQUENIL    180 tablet    Take 2 tablets (400 mg) by mouth daily    MCTD (mixed connective tissue disease) (H)       Prenatal Vitamins 28-0.8 MG Tabs      Take 1 tablet by mouth daily    Supervision of high-risk pregnancy of elderly multigravida       sertraline 25 MG tablet    ZOLOFT    90 tablet    Take 2 tablets (50 mg) by mouth daily Increase to 50 mg daily after one week if symptoms improved but not enough    Anxiety       * Notice:  This list has 2 medication(s) that are the same as other medications prescribed for you. Read the directions carefully, and ask your doctor or other care provider to review them with you.

## 2018-03-19 NOTE — PROVIDER NOTIFICATION
03/19/18 1742   Provider Notification   Provider Name/Title dr Gay   Method of Notification At Bedside   Notification Reason Other (Comment)   Pt okayed to be discharged to home with reactive NST.

## 2018-03-19 NOTE — PROVIDER NOTIFICATION
03/19/18 1708   Provider Notification   Provider Name/Title Dr Gay   Method of Notification Electronic Page   Request Evaluate in Person   Notification Reason Patient Arrived        03/19/18 1708   Provider Notification   Provider Name/Title Dr Gay   Method of Notification Electronic Page   Request Evaluate in Person   Notification Reason Patient Arrived   Pt arrived from clinic for decreased fetal movement

## 2018-03-19 NOTE — IP AVS SNAPSHOT
[facility]    [address]    [place] [address]    Phone:  [phone]                                       After Visit Summary   3/19/2018    Heather Guillory    MRN: [id number]           After Visit Summary Signature Page     I have received my discharge instructions, and my questions have been answered. I have discussed any challenges I see with this plan with the nurse or doctor.    ..........................................................................................................................................  Patient/Patient Representative Signature      ..........................................................................................................................................  Patient Representative Print Name and Relationship to Patient    ..................................................               ................................................  Date                                            Time    ..........................................................................................................................................  Reviewed by Signature/Title    ...................................................              ..............................................  Date                                                            Time

## 2018-03-20 NOTE — PROGRESS NOTES
S: doing ok. Notes change in fetal movement since weekend.  Feels like less.   BPP today  with polyhdramnios.    Vitals:    18 1607   BP: 113/77   Pulse: 80   Weight: 86.6 kg (191 lb)     See flow    A/p:  at 36+5 wks     1. PNC: labs wnl.   2. MCTD: q 4 growth u/s. Last growth: 77% w/ poly  3. Poly: weekly BPP  4. Decreased FM. Likely d/t poly. To L and D for NST today.  5. IOL by 39 wks d/t poly  6. Labor precautions reviewed    Cate Mansfield MD, [facility] Specialists Staff  OB/GYN    3/20/2018  10:23 AM

## 2018-03-26 ENCOUNTER — OFFICE VISIT (OUTPATIENT)
Dept: OBGYN | Facility: CLINIC | Age: 38
End: 2018-03-26
Attending: OBSTETRICS & GYNECOLOGY
Payer: COMMERCIAL

## 2018-03-26 VITALS
BODY MASS INDEX: 29.98 KG/M2 | HEART RATE: 86 BPM | HEIGHT: 67 IN | WEIGHT: 191 LBS | SYSTOLIC BLOOD PRESSURE: 106 MMHG | DIASTOLIC BLOOD PRESSURE: 72 MMHG

## 2018-03-26 DIAGNOSIS — B37.31 CANDIDIASIS OF VULVA AND VAGINA: Primary | ICD-10-CM

## 2018-03-26 DIAGNOSIS — O40.3XX0 POLYHYDRAMNIOS AFFECTING PREGNANCY IN THIRD TRIMESTER: ICD-10-CM

## 2018-03-26 DIAGNOSIS — O09.529 SUPERVISION OF HIGH-RISK PREGNANCY OF ELDERLY MULTIGRAVIDA: Primary | ICD-10-CM

## 2018-03-26 DIAGNOSIS — O09.529 SUPERVISION OF HIGH-RISK PREGNANCY OF ELDERLY MULTIGRAVIDA: ICD-10-CM

## 2018-03-26 PROCEDURE — 76819 FETAL BIOPHYS PROFIL W/O NST: CPT | Mod: ZF

## 2018-03-26 PROCEDURE — G0463 HOSPITAL OUTPT CLINIC VISIT: HCPCS | Mod: 25,ZF

## 2018-03-26 PROCEDURE — 40000809 ZZH STATISTIC NO DOCUMENTATION TO SUPPORT CHARGE

## 2018-03-26 RX ORDER — FLUCONAZOLE 150 MG/1
150 TABLET ORAL ONCE
Qty: 1 TABLET | Refills: 1 | Status: SHIPPED | OUTPATIENT
Start: 2018-03-26 | End: 2018-03-26

## 2018-03-26 ASSESSMENT — PAIN SCALES - GENERAL: PAINLEVEL: NO PAIN (0)

## 2018-03-26 NOTE — PROGRESS NOTES
"Doing great. Some yeast symptoms, but does not want treatment currently.     Vitals:    18 1420   BP: 106/72   Pulse: 86   Weight: 86.6 kg (191 lb)   Height: 1.702 m (5' 7\")     See flow    A/p:  at 37+5 wks.    1. PNC: labs wnl. GBS neg  2. MCTD: q 4 growth u/s. Last growth: 77% w/ poly  3. Poly: weekly BPP. Resolved on u/s today.   4 Yeast. Will plan to take diflucan 2 days prior to IOL to help decrease yeast burden before delivery.  Rx sent  5. IOL at 39 wks d/t poly. IOL plan in flowsheets. May need ripening.   6. Labor precautions reviewed    Cate Mansfield MD, FACOG  [facility]  OB/GYN    3/26/2018  2:44 PM        "

## 2018-03-26 NOTE — LETTER
3/26/2018       RE: Heather Guillory  [address]  [place] [address]     Dear Colleague,    Thank you for referring your patient, Heather Guillory, [facility] Please see a copy of my visit note below.    37 year old,  , presents at 37 5/7 weeks in pregnancy complicated by IVF, mixed connective tissue disorder, polyhydramnios for biophysical assessment.    Fetal Breathing Movements (FBM): Normal - 2  Gross Body Movements (GBM): Normal - 2  Fetal Tone (FT): Normal - 2  AFV: Pocket of amniotic fluid > or = to 2 cm x 2 cm - 2  Quantitative Amniotic Fluid Volume Total: 23.5 cm      BPP 8/8.  FHR = 144bpm Normal.   MCA Not done.  NST Not done.     Single fetus in cephalic presentation.  Placenta posterior and grade 1.    8/8 BPP with normalized fluid. Recommend continued weekly assessment.    ZEUS Kunz MD, FACOG  [facility]  OB/GYN    3/26/2018  2:21 PM          Again, thank you for allowing me to participate in the care of your patient.      Sincerely,    [facility]

## 2018-03-26 NOTE — PROGRESS NOTES
37 year old,  , presents at 37 5/7 weeks in pregnancy complicated by IVF, mixed connective tissue disorder, polyhydramnios for biophysical assessment.    Fetal Breathing Movements (FBM): Normal - 2  Gross Body Movements (GBM): Normal - 2  Fetal Tone (FT): Normal - 2  AFV: Pocket of amniotic fluid > or = to 2 cm x 2 cm - 2  Quantitative Amniotic Fluid Volume Total: 23.5 cm      BPP 8/8.  FHR = 144bpm Normal.   MCA Not done.  NST Not done.     Single fetus in cephalic presentation.  Placenta posterior and grade 1.    8/8 BPP with normalized fluid. Recommend continued weekly assessment.    ZEUS Kunz MD, FACOG  [facility]  OB/GYN    3/26/2018  2:21 PM

## 2018-03-26 NOTE — MR AVS SNAPSHOT
After Visit Summary   3/26/2018    Heather Guillory    MRN: [id number]           Patient Information     Date Of Birth          1980        Visit Information        Provider Department      3/26/2018 2:30 PM Cate Mansfield MD [facility]        Today's Diagnoses     Candidiasis of vulva and vagina    -  1    Supervision of high-risk pregnancy of elderly multigravida, IVF, TANYA YAN           Follow-ups after your visit        Your next 10 appointments already scheduled     Apr 02, 2018 10:00 AM CDT   ULTRASOUND with [facility] ULTRASOUND   [facility] ([facility])    [facility] [address]  [facility] [address] [phone]            Apr 02, 2018 10:45 AM CDT   RETURN OB with Tuyet Guzmán MD   [facility] ([facility])    [facility] [address]  [address]  [facility] [address] [phone]            Jun 21, 2018  9:00 AM CDT   (Arrive by 8:45 AM)   Return Visit with Nasrin King MD   [facility] ([facility] [place])    [address]  [address]  [facility] [address] [phone]              Who to contact     Please call your clinic at [phone] to:    Ask questions about your health    Make or cancel appointments    Discuss your medicines    Learn about your test results    Speak to your doctor            Additional Information About Your Visit        [employer] Information     internetstores gives you secure access to your electronic health record. If you see a primary care provider, you can also send messages to your care team and make appointments. If you have questions, please call your primary care clinic.  If you do not have a primary care provider, please call [phone] and they will assist you.      internetstores is an electronic gateway that provides easy, online access to your  "medical records. With PromoRepublic, you can request a clinic appointment, read your test results, renew a prescription or communicate with your care team.     To access your existing account, please contact your [facility] or call [phone] for assistance.        Care EveryWhere ID     This is your Care EveryWhere ID. This could be used by other organizations to access your [place] medical records  [id number]        Your Vitals Were     Pulse Height Breastfeeding? BMI (Body Mass Index)          86 1.702 m (5' 7\") No 29.91 kg/m2         Blood Pressure from Last 3 Encounters:   03/26/18 106/72   03/19/18 108/64   03/19/18 113/77    Weight from Last 3 Encounters:   03/26/18 86.6 kg (191 lb)   03/19/18 86.6 kg (191 lb)   03/12/18 86 kg (189 lb 11.2 oz)              Today, you had the following     No orders found for display         Today's Medication Changes          These changes are accurate as of 3/26/18  2:48 PM.  If you have any questions, ask your nurse or doctor.               Start taking these medicines.        Dose/Directions    fluconazole 150 MG tablet   Commonly known as:  DIFLUCAN   Used for:  Candidiasis of vulva and vagina   Started by:  Cate Mansfield MD        Dose:  150 mg   Take 1 tablet (150 mg) by mouth once for 1 dose   Quantity:  1 tablet   Refills:  1            Where to get your medicines      These medications were sent to [facility] [address] [facility] [address]  [address] [facility] [address]     Phone:  [phone]     fluconazole 150 MG tablet                Primary Care Provider Office Phone # Fax #    Cate Mansfield [phone]       [address]  [facility] [address]        Equal Access to Services     ROSELIA OREILLY AH: Hadii juanjo Bahena, waaxda luqadaha, qaybta kaalmada cailin, john sheikh. So [facility] [phone].    ATENCIÓN: Si " veronika sherman, tiene a nelson disposición servicios gratuitos de asistencia lingüística. Julieta vidal [phone].    We comply with applicable federal civil rights laws and [place] laws. We do not discriminate on the basis of race, color, national origin, age, disability, sex, sexual orientation, or gender identity.            Thank you!     Thank you for choosing [facility]  for your care. Our goal is always to provide you with excellent care. Hearing back from our patients is one way we can continue to improve our services. Please take a few minutes to complete the written survey that you may receive in the mail after your visit with us. Thank you!             Your Updated Medication List - Protect others around you: Learn how to safely use, store and throw away your medicines at www.disposemymeds.org.          This list is accurate as of 3/26/18  2:48 PM.  Always use your most recent med list.                   Brand Name Dispense Instructions for use Diagnosis    fluconazole 150 MG tablet    DIFLUCAN    1 tablet    Take 1 tablet (150 mg) by mouth once for 1 dose    Candidiasis of vulva and vagina       * hydroxychloroquine 200 MG tablet    PLAQUENIL     Take 200 mg by mouth 2 times daily        * hydroxychloroquine 200 MG tablet    PLAQUENIL    180 tablet    Take 2 tablets (400 mg) by mouth daily    MCTD (mixed connective tissue disease) (H)       Prenatal Vitamins 28-0.8 MG Tabs      Take 1 tablet by mouth daily    Supervision of high-risk pregnancy of elderly multigravida       sertraline 25 MG tablet    ZOLOFT    90 tablet    Take 2 tablets (50 mg) by mouth daily Increase to 50 mg daily after one week if symptoms improved but not enough    Anxiety       * Notice:  This list has 2 medication(s) that are the same as other medications prescribed for you. Read the directions carefully, and ask your doctor or other care provider to review them with you.

## 2018-03-26 NOTE — MR AVS SNAPSHOT
After Visit Summary   3/26/2018    Heather Guillory    MRN: [id number]           Patient Information     Date Of Birth          1980        Visit Information        Provider Department      3/26/2018 2:00 PM [facility] ULTRASOUND [facility]        Today's Diagnoses     Supervision of high-risk pregnancy of elderly multigravida, IVF, [facility] MD    -  1    Polyhydramnios affecting pregnancy in third trimester           Follow-ups after your visit        Your next 10 appointments already scheduled     Mar 26, 2018  2:30 PM CDT   RETURN OB with Cate Mansfield MD   [facility] ([facility])    [facility] [address]  [address]  [facility] [address] [phone]            Apr 02, 2018 11:00 AM CDT   RETURN OB with Tuyet Guzmán MD   [facility] ([facility])    [facility] [address]  [address]  [facility] [address] [phone]            Jun 21, 2018  9:00 AM CDT   (Arrive by 8:45 AM)   Return Visit with Nasrin King MD   [facility] Rheumatology ([facility] [place])    [address]  [address]  [facility] [address] [phone]              Who to contact     Please call your clinic at [phone] to:    Ask questions about your health    Make or cancel appointments    Discuss your medicines    Learn about your test results    Speak to your doctor            Additional Information About Your Visit        Help/Systems Information     Help/Systems gives you secure access to your electronic health record. If you see a primary care provider, you can also send messages to your care team and make appointments. If you have questions, please call your primary care clinic.  If you do not have a primary care provider, please call [phone] and they will assist you.      Help/Systems is an electronic gateway that provides easy, online  access to your medical records. With [employer], you can request a clinic appointment, read your test results, renew a prescription or communicate with your care team.     To access your existing account, please contact your [facility] or call [phone] for assistance.        Care EveryWhere ID     This is your Care EveryWhere ID. This could be used by other organizations to access your [place] medical records  [id number]         Blood Pressure from Last 3 Encounters:   03/19/18 108/64   03/19/18 113/77   03/12/18 110/73    Weight from Last 3 Encounters:   03/19/18 86.6 kg (191 lb)   03/12/18 86 kg (189 lb 11.2 oz)   03/02/18 84.5 kg (186 lb 3.2 oz)               Primary Care Provider Office Phone # Fax #    Cate Mansfield [phone]       [address]  [facility] [address]        Equal Access to Services     ROSELIA OREILLY AH: Hadii aad ku hadasho Soomaali, waaxda luqadaha, qaybta kaalmada adeegyada, waxay idiin hayaan tesfayeeg barneyarakarrie graham . So [facility] [phone].    ATENCIÓN: Si habla español, tiene a nelson disposición servicios gratuitos de asistencia lingüística. Llame al [phone].    We comply with applicable federal civil rights laws and [place] laws. We do not discriminate on the basis of race, color, national origin, age, disability, sex, sexual orientation, or gender identity.            Thank you!     Thank you for choosing [facility]  for your care. Our goal is always to provide you with excellent care. Hearing back from our patients is one way we can continue to improve our services. Please take a few minutes to complete the written survey that you may receive in the mail after your visit with us. Thank you!             Your Updated Medication List - Protect others around you: Learn how to safely use, store and throw away your medicines at www.disposemymeds.org.          This list is accurate as of 3/26/18  2:22  PM.  Always use your most recent med list.                   Brand Name Dispense Instructions for use Diagnosis    * hydroxychloroquine 200 MG tablet    PLAQUENIL     Take 200 mg by mouth 2 times daily        * hydroxychloroquine 200 MG tablet    PLAQUENIL    180 tablet    Take 2 tablets (400 mg) by mouth daily    MCTD (mixed connective tissue disease) (H)       Prenatal Vitamins 28-0.8 MG Tabs      Take 1 tablet by mouth daily    Supervision of high-risk pregnancy of elderly multigravida       sertraline 25 MG tablet    ZOLOFT    90 tablet    Take 2 tablets (50 mg) by mouth daily Increase to 50 mg daily after one week if symptoms improved but not enough    Anxiety       * Notice:  This list has 2 medication(s) that are the same as other medications prescribed for you. Read the directions carefully, and ask your doctor or other care provider to review them with you.

## 2018-03-26 NOTE — LETTER
"3/26/2018       RE: Heather Guillory  [address]  [place] [address]     Dear Colleague,    Thank you for referring your patient, Heather Guillory, to the [facility] at [facility]. Please see a copy of my visit note below.    Doing great. Some yeast symptoms, but does not want treatment currently.     Vitals:    18 1420   BP: 106/72   Pulse: 86   Weight: 86.6 kg (191 lb)   Height: 1.702 m (5' 7\")     See flow    A/p:  at 37+5 wks.    1. PNC: labs wnl. GBS neg  2. MCTD: q 4 growth u/s. Last growth: 77% w/ poly  3. Poly: weekly BPP. Resolved on u/s today.   4 Yeast. Will plan to take diflucan 2 days prior to IOL to help decrease yeast burden before delivery.  Rx sent  5. IOL at 39 wks d/t poly. IOL plan in flowsheets. May need ripening.   6. Labor precautions reviewed    Sincerely,    Cate Mansfield MD    "

## 2018-03-27 ASSESSMENT — ANXIETY QUESTIONNAIRES
2. NOT BEING ABLE TO STOP OR CONTROL WORRYING: NOT AT ALL
GAD7 TOTAL SCORE: 0
3. WORRYING TOO MUCH ABOUT DIFFERENT THINGS: NOT AT ALL
5. BEING SO RESTLESS THAT IT IS HARD TO SIT STILL: NOT AT ALL
GAD7 TOTAL SCORE: 0
7. FEELING AFRAID AS IF SOMETHING AWFUL MIGHT HAPPEN: NOT AT ALL
7. FEELING AFRAID AS IF SOMETHING AWFUL MIGHT HAPPEN: NOT AT ALL
4. TROUBLE RELAXING: NOT AT ALL
1. FEELING NERVOUS, ANXIOUS, OR ON EDGE: NOT AT ALL
6. BECOMING EASILY ANNOYED OR IRRITABLE: NOT AT ALL

## 2018-03-28 ASSESSMENT — ANXIETY QUESTIONNAIRES: GAD7 TOTAL SCORE: 0

## 2018-04-02 ENCOUNTER — OFFICE VISIT (OUTPATIENT)
Dept: OBGYN | Facility: CLINIC | Age: 38
End: 2018-04-02
Payer: COMMERCIAL

## 2018-04-02 ENCOUNTER — OFFICE VISIT (OUTPATIENT)
Dept: OBGYN | Facility: CLINIC | Age: 38
End: 2018-04-02
Attending: OBSTETRICS & GYNECOLOGY
Payer: COMMERCIAL

## 2018-04-02 VITALS
WEIGHT: 196 LBS | BODY MASS INDEX: 30.76 KG/M2 | SYSTOLIC BLOOD PRESSURE: 111 MMHG | HEIGHT: 67 IN | HEART RATE: 80 BPM | DIASTOLIC BLOOD PRESSURE: 71 MMHG

## 2018-04-02 DIAGNOSIS — O40.3XX0 POLYHYDRAMNIOS IN THIRD TRIMESTER COMPLICATION, SINGLE OR UNSPECIFIED FETUS: Primary | ICD-10-CM

## 2018-04-02 DIAGNOSIS — O09.529 SUPERVISION OF HIGH-RISK PREGNANCY OF ELDERLY MULTIGRAVIDA: Primary | ICD-10-CM

## 2018-04-02 PROBLEM — Z36.89 ENCOUNTER FOR TRIAGE IN PREGNANT PATIENT: Status: RESOLVED | Noted: 2018-03-19 | Resolved: 2018-04-02

## 2018-04-02 PROCEDURE — 76819 FETAL BIOPHYS PROFIL W/O NST: CPT | Mod: ZF

## 2018-04-02 PROCEDURE — G0463 HOSPITAL OUTPT CLINIC VISIT: HCPCS

## 2018-04-02 ASSESSMENT — PAIN SCALES - GENERAL: PAINLEVEL: NO PAIN (0)

## 2018-04-02 NOTE — NURSING NOTE
Chief Complaint   Patient presents with     Prenatal Care     SOUMYA 38 weeks and 5 days   Tigist Lloyd LPN

## 2018-04-02 NOTE — MR AVS SNAPSHOT
After Visit Summary   4/2/2018    Heather Guillory    MRN: [id number]           Patient Information     Date Of Birth          1980        Visit Information        Provider Department      4/2/2018 10:45 AM Tuyet Guzmán MD [facility]        Today's Diagnoses     Supervision of high-risk pregnancy of elderly multigravida, IVF, S MD    -  1       Follow-ups after your visit        Your next 10 appointments already scheduled     Jun 21, 2018  9:00 AM CDT   (Arrive by 8:45 AM)   Return Visit with Nasrin King MD   [facility]ology ([facility] [place])    [address]  [address]  [facility] [place] [facility] [address] [phone]              Who to contact     Please call your clinic at [phone] to:    Ask questions about your health    Make or cancel appointments    Discuss your medicines    Learn about your test results    Speak to your doctor            Additional Information About Your Visit        MyChart Information     Nanochip gives you secure access to your electronic health record. If you see a primary care provider, you can also send messages to your care team and make appointments. If you have questions, please call your primary care clinic.  If you do not have a primary care provider, please call [phone] and they will assist you.      Nanochip is an electronic gateway that provides easy, online access to your medical records. With Nanochip, you can request a clinic appointment, read your test results, renew a prescription or communicate with your care team.     To access your existing account, please contact your [facility] or call [phone] for assistance.        Care EveryWhere ID     This is your Care EveryWhere ID. This could be used by other organizations to access your [place] medical records  [id number]        Your Vitals Were     Pulse Height Breastfeeding? BMI (Body Mass Index)        "   80 1.702 m (5' 7\") No 30.7 kg/m2         Blood Pressure from Last 3 Encounters:   04/02/18 111/71   03/26/18 106/72   03/19/18 108/64    Weight from Last 3 Encounters:   04/02/18 88.9 kg (196 lb)   03/26/18 86.6 kg (191 lb)   03/19/18 86.6 kg (191 lb)              Today, you had the following     No orders found for display       Primary Care Provider Office Phone # Fax #    Cate Mansfield [phone]       [address]  [facility] [address]        Equal Access to Services     SOLIS OREILLY : Arabella Bahena, columba major, jerrod simeon, john sheikh. So [facility] [phone].    ATENCIÓN: Si habla español, tiene a nelson disposición servicios gratuitos de asistencia lingüística. Llame al [phone].    We comply with applicable federal civil rights laws and [place] laws. We do not discriminate on the basis of race, color, national origin, age, disability, sex, sexual orientation, or gender identity.            Thank you!     Thank you for choosing [facility]  for your care. Our goal is always to provide you with excellent care. Hearing back from our patients is one way we can continue to improve our services. Please take a few minutes to complete the written survey that you may receive in the mail after your visit with us. Thank you!             Your Updated Medication List - Protect others around you: Learn how to safely use, store and throw away your medicines at www.disposemymeds.org.          This list is accurate as of 4/2/18 11:45 AM.  Always use your most recent med list.                   Brand Name Dispense Instructions for use Diagnosis    * hydroxychloroquine 200 MG tablet    PLAQUENIL     Take 200 mg by mouth 2 times daily        * hydroxychloroquine 200 MG tablet    PLAQUENIL    180 tablet    Take 2 tablets (400 mg) by mouth daily    MCTD (mixed connective tissue disease) (H)       Prenatal " Vitamins 28-0.8 MG Tabs      Take 1 tablet by mouth daily    Supervision of high-risk pregnancy of elderly multigravida       sertraline 25 MG tablet    ZOLOFT    90 tablet    Take 2 tablets (50 mg) by mouth daily Increase to 50 mg daily after one week if symptoms improved but not enough    Anxiety       * Notice:  This list has 2 medication(s) that are the same as other medications prescribed for you. Read the directions carefully, and ask your doctor or other care provider to review them with you.

## 2018-04-02 NOTE — PROGRESS NOTES
37 year old,  , presents at 38 5/7 weeks in pregnancy complicated by h/o polyhydramnios, mixed connective tissue disorder for biophysical assessment.    Fetal Breathing Movements (FBM): Normal - 2  Gross Body Movements (GBM): Normal - 2  Fetal Tone (FT): Normal - 2  AFV: Pocket of amniotic fluid > or = to 2 cm x 2 cm - 2  Quantitative Amniotic Fluid Volume Total: 21.0 cm      BPP 8/8.  FHR = 141bpn Normal.   MCA Not done.  NST Not done.     Singel fetus in cephalic presentation.  Placenta posterior and grade 1.    Findings discussed with patient. Has induction of labor scheduled this week, no further f/u unless clinically indicated or undelivered as of next week.     ZEUS Kunz MD

## 2018-04-02 NOTE — PROGRESS NOTES
"SUBJECTIVE   Heather Guillory is a 37 year old  at 38w5d, here for return ob visit.  She denies ctx/LOF/vb/preE sx. Reports good FM  Concerns today are: none, has IOL scheduled Wednesday    OBJECTIVE   /71  Pulse 80  Ht 1.702 m (5' 7\")  Wt 88.9 kg (196 lb)  Breastfeeding? No  BMI 30.7 kg/m2    See Ob Flowsheet    ASSESSMENT & PLAN   37 year old  at 38w5d, SOUMYA.    1. PNC: routine ob labs reviewed   Rh pos, Rubella immune   GBS neg  2. MCTD: q4wk growth US, last growth 77th percentile  3. Polyhydramnios - weekly BPPs, continues to be resolved today. Plan IOL 39wks  4. Recurrent yeast - plan diflucan 2 days prior to IOL (will take today) per previous plans, patient states has rx  5. IOL 39wks scheduled 18    Tuyet Guzmán MD  2018        "

## 2018-04-02 NOTE — MR AVS SNAPSHOT
After Visit Summary   4/2/2018    Heather Guillory    MRN: [id number]           Patient Information     Date Of Birth          1980        Visit Information        Provider Department      4/2/2018 10:00 AM [facility] ULTRASOUND [facility]        Today's Diagnoses     Polyhydramnios in third trimester complication, single or unspecified fetus    -  1       Follow-ups after your visit        Your next 10 appointments already scheduled     Jun 21, 2018  9:00 AM CDT   (Arrive by 8:45 AM)   Return Visit with Nasrin King MD   [facility] Rheumatology ([facility])    [address] [place] [address]  [address]  [facility] [address] [phone]              Who to contact     Please call your clinic at [phone] to:    Ask questions about your health    Make or cancel appointments    Discuss your medicines    Learn about your test results    Speak to your doctor            Additional Information About Your Visit        MyChart Information     Appotat gives you secure access to your electronic health record. If you see a primary care provider, you can also send messages to your care team and make appointments. If you have questions, please call your primary care clinic.  If you do not have a primary care provider, please call [phone] and they will assist you.      Nexgence is an electronic gateway that provides easy, online access to your medical records. With Nexgence, you can request a clinic appointment, read your test results, renew a prescription or communicate with your care team.     To access your existing account, please contact your [facility] or call [phone] for assistance.        Care EveryWhere ID     This is your Care EveryWhere ID. This could be used by other organizations to access your [place] medical records  [id number]         Blood Pressure from Last 3 Encounters:   04/02/18 111/71   03/26/18 106/72    03/19/18 108/64    Weight from Last 3 Encounters:   04/02/18 88.9 kg (196 lb)   03/26/18 86.6 kg (191 lb)   03/19/18 86.6 kg (191 lb)               Primary Care Provider Office Phone # Fax #    Cate Vale Mansfield [phone]       [address] [facility] [address]  [facility] [address]        Equal Access to Services     ROSELIA OREILLY : Hadii aad ku hadasho Soomaali, waaxda luqadaha, qaybta kaalmada adeegyada, waxay idiin hayaan adeeg kharash laander . So [facility] [phone].    ATENCIÓN: Si veronika sherman, tiene a nelson disposición servicios gratuitos de asistencia lingüística. Julieta al [phone].    We comply with applicable federal civil rights laws and [place] laws. We do not discriminate on the basis of race, color, national origin, age, disability, sex, sexual orientation, or gender identity.            Thank you!     Thank you for choosing [facility]  for your care. Our goal is always to provide you with excellent care. Hearing back from our patients is one way we can continue to improve our services. Please take a few minutes to complete the written survey that you may receive in the mail after your visit with us. Thank you!             Your Updated Medication List - Protect others around you: Learn how to safely use, store and throw away your medicines at www.disposemymeds.org.          This list is accurate as of 4/2/18 12:47 PM.  Always use your most recent med list.                   Brand Name Dispense Instructions for use Diagnosis    * hydroxychloroquine 200 MG tablet    PLAQUENIL     Take 200 mg by mouth 2 times daily        * hydroxychloroquine 200 MG tablet    PLAQUENIL    180 tablet    Take 2 tablets (400 mg) by mouth daily    MCTD (mixed connective tissue disease) (H)       Prenatal Vitamins 28-0.8 MG Tabs      Take 1 tablet by mouth daily    Supervision of high-risk pregnancy of elderly multigravida       sertraline 25 MG tablet    ZOLOFT    90 tablet    Take 2  tablets (50 mg) by mouth daily Increase to 50 mg daily after one week if symptoms improved but not enough    Anxiety       * Notice:  This list has 2 medication(s) that are the same as other medications prescribed for you. Read the directions carefully, and ask your doctor or other care provider to review them with you.

## 2018-04-02 NOTE — LETTER
"2018       RE: Heather Guillory  [address]  [place] [address]     Dear Colleague,    Thank you for referring your patient, Heather Guillory, to the [facility] at [facility]. Please see a copy of my visit note below.    SUBJECTIVE   Heather Guillory is a 37 year old  at 38w5d, here for return ob visit.  She denies ctx/LOF/vb/preE sx. Reports good FM  Concerns today are: none, has IOL scheduled Wednesday    OBJECTIVE   /71  Pulse 80  Ht 1.702 m (5' 7\")  Wt 88.9 kg (196 lb)  Breastfeeding? No  BMI 30.7 kg/m2    See Ob Flowsheet    ASSESSMENT & PLAN   37 year old  at 38w5d, SOUMYA.    1. PNC: routine ob labs reviewed   Rh pos, Rubella immune   GBS neg  2. MCTD: q4wk growth US, last growth 77th percentile  3. Polyhydramnios - weekly BPPs, continues to be resolved today. Plan IOL 39wks  4. Recurrent yeast - plan diflucan 2 days prior to IOL (will take today) per previous plans, patient states has rx  5. IOL 39wks scheduled 18      Again, thank you for allowing me to participate in the care of your patient.      Sincerely,    Tuyet Guzmán MD      "

## 2018-04-04 ENCOUNTER — ANESTHESIA EVENT (OUTPATIENT)
Dept: OBGYN | Facility: CLINIC | Age: 38
End: 2018-04-04
Payer: COMMERCIAL

## 2018-04-04 ENCOUNTER — ANESTHESIA (OUTPATIENT)
Dept: OBGYN | Facility: CLINIC | Age: 38
End: 2018-04-04
Payer: COMMERCIAL

## 2018-04-04 ENCOUNTER — HOSPITAL ENCOUNTER (INPATIENT)
Facility: CLINIC | Age: 38
LOS: 2 days | Discharge: HOME HEALTH | End: 2018-04-06
Attending: OBSTETRICS & GYNECOLOGY | Admitting: OBSTETRICS & GYNECOLOGY
Payer: COMMERCIAL

## 2018-04-04 PROBLEM — O40.9XX0 POLYHYDRAMNIOS: Status: ACTIVE | Noted: 2018-04-04

## 2018-04-04 LAB
ABO + RH BLD: NORMAL
ABO + RH BLD: NORMAL
BASOPHILS # BLD AUTO: 0 10E9/L (ref 0–0.2)
BASOPHILS NFR BLD AUTO: 0.2 %
BLD GP AB SCN SERPL QL: NORMAL
BLOOD BANK CMNT PATIENT-IMP: NORMAL
DIFFERENTIAL METHOD BLD: ABNORMAL
EOSINOPHIL # BLD AUTO: 0.1 10E9/L (ref 0–0.7)
EOSINOPHIL NFR BLD AUTO: 0.4 %
ERYTHROCYTE [DISTWIDTH] IN BLOOD BY AUTOMATED COUNT: 13.9 % (ref 10–15)
HCT VFR BLD AUTO: 33.6 % (ref 35–47)
HGB BLD-MCNC: 10.6 G/DL (ref 11.7–15.7)
IMM GRANULOCYTES # BLD: 0.1 10E9/L (ref 0–0.4)
IMM GRANULOCYTES NFR BLD: 0.9 %
LYMPHOCYTES # BLD AUTO: 2 10E9/L (ref 0.8–5.3)
LYMPHOCYTES NFR BLD AUTO: 12.7 %
MCH RBC QN AUTO: 25.5 PG (ref 26.5–33)
MCHC RBC AUTO-ENTMCNC: 31.5 G/DL (ref 31.5–36.5)
MCV RBC AUTO: 81 FL (ref 78–100)
MONOCYTES # BLD AUTO: 1 10E9/L (ref 0–1.3)
MONOCYTES NFR BLD AUTO: 6.3 %
NEUTROPHILS # BLD AUTO: 12.3 10E9/L (ref 1.6–8.3)
NEUTROPHILS NFR BLD AUTO: 79.5 %
NRBC # BLD AUTO: 0 10*3/UL
NRBC BLD AUTO-RTO: 0 /100
PLATELET # BLD AUTO: 208 10E9/L (ref 150–450)
RBC # BLD AUTO: 4.15 10E12/L (ref 3.8–5.2)
SPECIMEN EXP DATE BLD: NORMAL
T PALLIDUM IGG+IGM SER QL: NEGATIVE
WBC # BLD AUTO: 15.4 10E9/L (ref 4–11)

## 2018-04-04 PROCEDURE — 25000125 ZZHC RX 250

## 2018-04-04 PROCEDURE — 85025 COMPLETE CBC W/AUTO DIFF WBC: CPT | Performed by: INTERNAL MEDICINE

## 2018-04-04 PROCEDURE — 25000128 H RX IP 250 OP 636: Performed by: INTERNAL MEDICINE

## 2018-04-04 PROCEDURE — 25000132 ZZH RX MED GY IP 250 OP 250 PS 637: Performed by: INTERNAL MEDICINE

## 2018-04-04 PROCEDURE — 36415 COLL VENOUS BLD VENIPUNCTURE: CPT | Performed by: INTERNAL MEDICINE

## 2018-04-04 PROCEDURE — 86850 RBC ANTIBODY SCREEN: CPT | Performed by: INTERNAL MEDICINE

## 2018-04-04 PROCEDURE — 25000125 ZZHC RX 250: Performed by: ANESTHESIOLOGY

## 2018-04-04 PROCEDURE — 86780 TREPONEMA PALLIDUM: CPT | Performed by: INTERNAL MEDICINE

## 2018-04-04 PROCEDURE — 10907ZC DRAINAGE OF AMNIOTIC FLUID, THERAPEUTIC FROM PRODUCTS OF CONCEPTION, VIA NATURAL OR ARTIFICIAL OPENING: ICD-10-PCS | Performed by: ADVANCED PRACTICE MIDWIFE

## 2018-04-04 PROCEDURE — 12000030 ZZH R&B OB INTERMEDIATE UMMC

## 2018-04-04 PROCEDURE — 86901 BLOOD TYPING SEROLOGIC RH(D): CPT | Performed by: INTERNAL MEDICINE

## 2018-04-04 PROCEDURE — 00HU33Z INSERTION OF INFUSION DEVICE INTO SPINAL CANAL, PERCUTANEOUS APPROACH: ICD-10-PCS | Performed by: ANESTHESIOLOGY

## 2018-04-04 PROCEDURE — 86900 BLOOD TYPING SEROLOGIC ABO: CPT | Performed by: INTERNAL MEDICINE

## 2018-04-04 PROCEDURE — 25000128 H RX IP 250 OP 636

## 2018-04-04 PROCEDURE — 3E0R3BZ INTRODUCTION OF ANESTHETIC AGENT INTO SPINAL CANAL, PERCUTANEOUS APPROACH: ICD-10-PCS | Performed by: ANESTHESIOLOGY

## 2018-04-04 RX ORDER — OXYTOCIN/0.9 % SODIUM CHLORIDE 30/500 ML
100-340 PLASTIC BAG, INJECTION (ML) INTRAVENOUS CONTINUOUS PRN
Status: COMPLETED | OUTPATIENT
Start: 2018-04-04 | End: 2018-04-05

## 2018-04-04 RX ORDER — FENTANYL/BUPIVACAINE/NS/PF 2-1250MCG
PLASTIC BAG, INJECTION (ML) INJECTION
Status: COMPLETED
Start: 2018-04-04 | End: 2018-04-04

## 2018-04-04 RX ORDER — SODIUM CHLORIDE, SODIUM LACTATE, POTASSIUM CHLORIDE, CALCIUM CHLORIDE 600; 310; 30; 20 MG/100ML; MG/100ML; MG/100ML; MG/100ML
INJECTION, SOLUTION INTRAVENOUS CONTINUOUS
Status: DISCONTINUED | OUTPATIENT
Start: 2018-04-04 | End: 2018-04-05

## 2018-04-04 RX ORDER — EPHEDRINE SULFATE 50 MG/ML
5 INJECTION, SOLUTION INTRAMUSCULAR; INTRAVENOUS; SUBCUTANEOUS
Status: DISCONTINUED | OUTPATIENT
Start: 2018-04-04 | End: 2018-04-06 | Stop reason: HOSPADM

## 2018-04-04 RX ORDER — MISOPROSTOL 200 UG/1
TABLET ORAL
Status: DISCONTINUED
Start: 2018-04-04 | End: 2018-04-05 | Stop reason: HOSPADM

## 2018-04-04 RX ORDER — ACETAMINOPHEN 325 MG/1
650 TABLET ORAL EVERY 4 HOURS PRN
Status: DISCONTINUED | OUTPATIENT
Start: 2018-04-04 | End: 2018-04-05

## 2018-04-04 RX ORDER — FENTANYL CITRATE 50 UG/ML
50-100 INJECTION, SOLUTION INTRAMUSCULAR; INTRAVENOUS
Status: DISCONTINUED | OUTPATIENT
Start: 2018-04-04 | End: 2018-04-05

## 2018-04-04 RX ORDER — IBUPROFEN 800 MG/1
800 TABLET, FILM COATED ORAL
Status: DISCONTINUED | OUTPATIENT
Start: 2018-04-04 | End: 2018-04-05

## 2018-04-04 RX ORDER — NALOXONE HYDROCHLORIDE 0.4 MG/ML
.1-.4 INJECTION, SOLUTION INTRAMUSCULAR; INTRAVENOUS; SUBCUTANEOUS
Status: DISCONTINUED | OUTPATIENT
Start: 2018-04-04 | End: 2018-04-05

## 2018-04-04 RX ORDER — LIDOCAINE 40 MG/G
CREAM TOPICAL
Status: DISCONTINUED | OUTPATIENT
Start: 2018-04-04 | End: 2018-04-05

## 2018-04-04 RX ORDER — CARBOPROST TROMETHAMINE 250 UG/ML
250 INJECTION, SOLUTION INTRAMUSCULAR
Status: DISCONTINUED | OUTPATIENT
Start: 2018-04-04 | End: 2018-04-05

## 2018-04-04 RX ORDER — NALBUPHINE HYDROCHLORIDE 10 MG/ML
2.5-5 INJECTION, SOLUTION INTRAMUSCULAR; INTRAVENOUS; SUBCUTANEOUS EVERY 6 HOURS PRN
Status: DISCONTINUED | OUTPATIENT
Start: 2018-04-04 | End: 2018-04-06 | Stop reason: HOSPADM

## 2018-04-04 RX ORDER — OXYTOCIN 10 [USP'U]/ML
10 INJECTION, SOLUTION INTRAMUSCULAR; INTRAVENOUS
Status: DISCONTINUED | OUTPATIENT
Start: 2018-04-04 | End: 2018-04-05

## 2018-04-04 RX ORDER — METHYLERGONOVINE MALEATE 0.2 MG/ML
200 INJECTION INTRAVENOUS
Status: DISCONTINUED | OUTPATIENT
Start: 2018-04-04 | End: 2018-04-05

## 2018-04-04 RX ORDER — ONDANSETRON 2 MG/ML
4 INJECTION INTRAMUSCULAR; INTRAVENOUS EVERY 6 HOURS PRN
Status: DISCONTINUED | OUTPATIENT
Start: 2018-04-04 | End: 2018-04-05

## 2018-04-04 RX ORDER — LIDOCAINE HYDROCHLORIDE 10 MG/ML
INJECTION, SOLUTION EPIDURAL; INFILTRATION; INTRACAUDAL; PERINEURAL
Status: DISCONTINUED
Start: 2018-04-04 | End: 2018-04-05 | Stop reason: HOSPADM

## 2018-04-04 RX ORDER — OXYTOCIN 10 [USP'U]/ML
INJECTION, SOLUTION INTRAMUSCULAR; INTRAVENOUS
Status: DISCONTINUED
Start: 2018-04-04 | End: 2018-04-05 | Stop reason: HOSPADM

## 2018-04-04 RX ORDER — OXYTOCIN/0.9 % SODIUM CHLORIDE 30/500 ML
PLASTIC BAG, INJECTION (ML) INTRAVENOUS
Status: COMPLETED
Start: 2018-04-04 | End: 2018-04-04

## 2018-04-04 RX ORDER — SERTRALINE HYDROCHLORIDE 25 MG/1
50 TABLET, FILM COATED ORAL DAILY
Status: DISCONTINUED | OUTPATIENT
Start: 2018-04-04 | End: 2018-04-05

## 2018-04-04 RX ORDER — OXYTOCIN/0.9 % SODIUM CHLORIDE 30/500 ML
1-24 PLASTIC BAG, INJECTION (ML) INTRAVENOUS CONTINUOUS
Status: DISCONTINUED | OUTPATIENT
Start: 2018-04-04 | End: 2018-04-05

## 2018-04-04 RX ORDER — HYDROXYCHLOROQUINE SULFATE 200 MG/1
200 TABLET, FILM COATED ORAL 2 TIMES DAILY
Status: DISCONTINUED | OUTPATIENT
Start: 2018-04-04 | End: 2018-04-05

## 2018-04-04 RX ORDER — LIDOCAINE HYDROCHLORIDE 20 MG/ML
INJECTION, SOLUTION INFILTRATION; PERINEURAL PRN
Status: DISCONTINUED | OUTPATIENT
Start: 2018-04-04 | End: 2018-12-11 | Stop reason: HOSPADM

## 2018-04-04 RX ORDER — MISOPROSTOL 100 UG/1
25 TABLET ORAL
Status: DISCONTINUED | OUTPATIENT
Start: 2018-04-04 | End: 2018-04-05

## 2018-04-04 RX ORDER — LIDOCAINE HYDROCHLORIDE AND EPINEPHRINE 15; 5 MG/ML; UG/ML
INJECTION, SOLUTION EPIDURAL PRN
Status: DISCONTINUED | OUTPATIENT
Start: 2018-04-04 | End: 2018-12-11 | Stop reason: HOSPADM

## 2018-04-04 RX ORDER — OXYCODONE AND ACETAMINOPHEN 5; 325 MG/1; MG/1
1 TABLET ORAL
Status: DISCONTINUED | OUTPATIENT
Start: 2018-04-04 | End: 2018-04-05

## 2018-04-04 RX ADMIN — LIDOCAINE HYDROCHLORIDE 4 ML: 20 INJECTION, SOLUTION INFILTRATION; PERINEURAL at 20:14

## 2018-04-04 RX ADMIN — Medication 25 MCG: at 11:00

## 2018-04-04 RX ADMIN — LIDOCAINE HYDROCHLORIDE,EPINEPHRINE BITARTRATE 3 ML: 15; .005 INJECTION, SOLUTION EPIDURAL; INFILTRATION; INTRACAUDAL; PERINEURAL at 20:31

## 2018-04-04 RX ADMIN — Medication 25 MCG: at 15:09

## 2018-04-04 RX ADMIN — SODIUM CHLORIDE, POTASSIUM CHLORIDE, SODIUM LACTATE AND CALCIUM CHLORIDE: 600; 310; 30; 20 INJECTION, SOLUTION INTRAVENOUS at 18:34

## 2018-04-04 RX ADMIN — HYDROXYCHLOROQUINE SULFATE 200 MG: 200 TABLET, FILM COATED ENTERAL at 20:58

## 2018-04-04 RX ADMIN — OXYTOCIN-SODIUM CHLORIDE 0.9% IV SOLN 30 UNIT/500ML 1 MILLI-UNITS/MIN: 30-0.9/5 SOLUTION at 23:42

## 2018-04-04 RX ADMIN — Medication: at 20:42

## 2018-04-04 RX ADMIN — Medication 25 MCG: at 13:01

## 2018-04-04 RX ADMIN — Medication 1 MILLI-UNITS/MIN: at 23:42

## 2018-04-04 NOTE — PROVIDER NOTIFICATION
04/04/18 0922   Provider Notification   Provider Name/Title Dr. Hurtado   Method of Notification Electronic Page   Request Evaluate in Person   Notification Reason Patient Arrived  (notified that pt here for IOL)

## 2018-04-04 NOTE — PROVIDER NOTIFICATION
04/04/18 0930   Provider Notification   Provider Name/Title Infection Prevention   Method of Notification Phone   Request Evaluate - Remote   Notification Reason Other (Comment)  (left message regarding need for iso precautions)   Pt reports ESBL in her urine that was treated with IV antibiotics. She reports multiple negative cultures since the treatment. Call made to ID to find out if isolation precautions can be lifted or if must remain permanently.

## 2018-04-04 NOTE — IP AVS SNAPSHOT
[facility]    [address] [facility] [address]    Phone:  [phone]                                       After Visit Summary   4/4/2018    Heather Guillory    MRN: [id number]           After Visit Summary Signature Page     I have received my discharge instructions, and my questions have been answered. I have discussed any challenges I see with this plan with the nurse or doctor.    ..........................................................................................................................................  Patient/Patient Representative Signature      ..........................................................................................................................................  Patient Representative Print Name and Relationship to Patient    ..................................................               ................................................  Date                                            Time    ..........................................................................................................................................  Reviewed by Signature/Title    ...................................................              ..............................................  Date                                                            Time

## 2018-04-04 NOTE — H&P
Labor and Delivery History and Physical    Heather Guillory MRN# [id number]   Age: 37 year old YOB: 1980     Date of Admission: 2018    Primary care provider: Cate Mansfield         CC:    Planned Induction of labor         HPI:   Heather Guillory is a 37 year old  at 39w0d by IVF date, here for an induction of labor. Pregnancy complicated by Polyhydramnios and a medical history of Mixed Connective Tissue Disorder.    Patient reports that she is doing well.  Reports good fetal movement. Specifically she denies decreased fetal movement, loss of fluid, vaginal bleeding, or regular contractions.  She denies cramping, HA, vision changes, chest pain, shortness of breath, nausea, vomiting, or dysuria. No history of fever during recent months.       OB Problem List:   1. IUP at 39w0d   2. Polyhrdramnios  3. Connective Tissue Disorder         Pregnancy history:     OBSTETRIC HISTORY:    Obstetric History       T2      L2     SAB0   TAB0   Ectopic0   Multiple0   Live Births2       # Outcome Date GA Lbr Yasir/2nd Weight Sex Delivery Anes PTL Lv   4 Current            3 Term 16 41w1d 00:45 / 02:05 3.997 kg (8 lb 13 oz) M Vag-Spont EPI N COSME      Complications: Placenta Previa      Apgar1:  9                Apgar5: 9   2 Term 02/06/15 41w1d 04:15 / 03:08 3.55 kg (7 lb 13.2 oz) M Vag-Spont EPI N COSME      Name: [facility]      Complications: Fever      Apgar1:  8                Apgar5: 9   1 AB      AB, COMPLETE             Prenatal Labs:   Lab Results   Component Value Date    ABO O 2018    RH Pos 2018    AS Neg 2018    HEPBANG Nonreactive 01/15/2018    CHPCRT Negative 10/06/2017    GCPCRT Negative 10/06/2017    TREPAB Negative 2018    HGB 10.6 (L) 2018       GBS Status:   Lab Results   Component Value Date    GBS Negative 2018       Active Problem List  Patient Active Problem List   Diagnosis     Female infertility     Anxiety     Eating  disorder     Family history of malignant neoplasm of breast     Irritable colon     Family history of genetic disorder     Decreased libido     Panic disorder without agoraphobia     Tension headache     Xerosis cutis     Supervision of high-risk pregnancy of elderly multigravida, IVF, [facility] MD     Polyhydramnios       Medication Prior to Admission  Prescriptions Prior to Admission   Medication Sig Dispense Refill Last Dose     Fluconazole (DIFLUCAN PO) Take 150 mg by mouth   Past Week at Unknown time     sertraline (ZOLOFT) 25 MG tablet Take 2 tablets (50 mg) by mouth daily Increase to 50 mg daily after one week if symptoms improved but not enough 90 tablet 6 4/4/2018 at Unknown time     Prenatal Vit-Fe Fumarate-FA (PRENATAL VITAMINS) 28-0.8 MG TABS Take 1 tablet by mouth daily    4/4/2018 at Unknown time     hydroxychloroquine (PLAQUENIL) 200 MG tablet Take 200 mg by mouth 2 times daily   4/4/2018 at Unknown time     hydroxychloroquine (PLAQUENIL) 200 MG tablet Take 2 tablets (400 mg) by mouth daily 180 tablet 1 Unknown at Unknown time           Maternal Past Medical History:     Past Medical History:   Diagnosis Date     Abnormal Pap smear     Over 10 years ago     Anxiety      E coli infection     ESBL     History of extended-spectrum beta-lactamase producing Escherichia coli infection      Hx of previous reproductive problem 12/2013    Infertility     Mixed connective tissue disease (H)     Dr. Steele- switching      Nephrolithiasis                        Family History:     Family History   Problem Relation Age of Onset     DIABETES Paternal Grandfather      C.A.D. Paternal Grandfather      Hypertension Paternal Grandfather      Other Cancer Paternal Grandfather      Lung     Breast Cancer Paternal Grandmother      CANCER Paternal Grandmother      lung ca     OSTEOPOROSIS Paternal Grandmother      Hypertension Paternal Grandmother      Cancer - colorectal Maternal Grandfather      Colon Cancer Maternal  Grandfather      CANCER Maternal Grandmother      cervical ca     Neurologic Disorder Maternal Grandmother      alzheimers     Alzheimer Disease Maternal Grandmother      Cardiovascular Brother 28     cardiomyopathy     Genetic Disorder Sister 21     una's disease     HEART DISEASE Paternal Uncle 55     mitral valve replacement     Anxiety Disorder Sister      Anxiety Disorder Brother      Genetic Disorder Sister      Una's Disease            Social History:     Social History     Social History     Marital status:      Spouse name: Chi     Number of children: 2     Years of education: N/A     Occupational History      [employer]     Social History Main Topics     Smoking status: Never Smoker     Smokeless tobacco: Never Used     Alcohol use No     Drug use: No     Sexual activity: Yes     Partners: Male     Birth control/ protection: None     Other Topics Concern      Service No     Blood Transfusions No     Caffeine Concern No     Occupational Exposure No     Hobby Hazards No     Sleep Concern No     Stress Concern No     Weight Concern No     Special Diet No     Back Care No     Exercise No     Bike Helmet Yes     n/a     Seat Belt Yes     Self-Exams No     Parent/Sibling W/ Cabg, Mi Or Angioplasty Before 65f 55m? No     Social History Narrative    How much exercise per week? n/a    How much calcium per day?1-2       How much caffeine per day? 0    How much vitamin D per day? no    Do you/your family wear seatbelts?  Yes    Do you/your family use safety helmets? No-n/a    Do you/your family use sunscreen? Yes    Do you/your family keep firearms in the home? No    Do you/your family have a smoke detector(s)? Yes        Do you feel safe in your home? Yes    Has anyone ever touched you in an unwanted manner? No                             Review of Systems:   Negative except as noted above in the HPI         Physical Exam:     Vitals:    04/04/18 1101 04/04/18 1200 04/04/18  1300 18 1509   BP: 118/69 121/68 123/78 122/66   Pulse:       Resp: 16 16 16 16   Temp:       TempSrc:       Weight:       Height:         Gen: Alert and oriented in NAD  Cardio: S1,S2 normal rate and rhythm; no murmurs  Resp: Air entry symmetric and good bilaterally; no wheezes or crackles.  Abdomen: gravid, soft, nontender. EFW 7.5lbs  Cervix: 3  Presentation:Cephalic by bedside US  Fetal Heart Rate Tracin, moderate variability, accels present, no decels  Tocometer: No contractions in 10 mn    Imaging:    Dating Ultrasound   GA:      Less than 14 weeks Single IUP, +Fetal cardiac activity   Fetal Anatomy Survey   GA: 18 weeks Single IUP, Fetal Anatomy WNL, 3 VC, Placenta: posterior           Assessment:   Heather Guillory is a 37 year old  at 39w0d by IVF admitted for planned induction of labor.        Plan:     - Labor   - Admit to L&D for induction of labor.   - PNC:     - Rh positive. Rubella immune . GBS negative. No intervention indicated.     - Placenta: posterior Grade 1   - Fen/GI: Regular diet   - SVE: 3              - Labs: CBC with diff, anti treponema ab, ABO/RH type and screen, Drug screen    - Membranes: not ruptured   - Plan: Oral Misoprostol 25 mcg Q2h prn for cervical ripening; Plan on Pitocin once Auguste score>6.   - Pain management: Discussed options available for analgesia. Currently, wants to wait and watch. Patient has had prior experience with Fentanyl, N2O, and epidural with past 2 pregnancies.    -Fetal Well Being   - Category I FHT. Reactive and reassuring   - Cephalic by BSUS. EFW 7.5 lbs   - Continue EFM and [place]      Marie Jane, MS-3  Pager #[phone]  ________________________________  I have seen and examined the patient with Marie Jane and appreciate the note above.          CC:    IOL for polyhydramnios         HPI:   Heather Guillory is a 37 year old  at 39w0d by EDT here for IOL for polyhydramnios that was diagnosed at 36w5d (27.5 cm, now resolved).  Pregnancy otherwise complicated by mixed connective tissue disorder, AMA, anxiety, and recurrent yeast infections. She is doing well today. She denies fever, chills, chest pain, shortness of breath, nausea, and vomiting. She reports good fetal movement and occasional contractions. She denies vaginal bleeding or loss of fluid.    OB Problem List:   1. IUP at 39w0d   2. Polyhydramnios diagnosed at 36w5d (27.5 cm). Resolved by 37w5d  3. History of  x2 (pelvis proven to 8 lb 13 oz)  4. Recurrent yeast infections  5. Mixed connective tissue disorder  6. Advanced maternal age  7. Anxiety - on zoloft         Pregnancy history:     OBSTETRIC HISTORY:    Obstetric History       T2      L2     SAB0   TAB0   Ectopic0   Multiple0   Live Births2       # Outcome Date GA Lbr Yasir/2nd Weight Sex Delivery Anes PTL Lv   4 Current            3 Term 16 41w1d 00:45 / 02:05 3.997 kg (8 lb 13 oz) M Vag-Spont EPI N COSME      Complications: Placenta Previa      Apgar1:  9                Apgar5: 9   2 Term 02/06/15 41w1d 04:15 / 03:08 3.55 kg (7 lb 13.2 oz) M Vag-Spont EPI N COSME      Name: [facility]      Complications: Fever      Apgar1:  8                Apgar5: 9   1 AB      AB, COMPLETE             Prenatal Labs:   Lab Results   Component Value Date    ABO O 2018    RH Pos 2018    AS Neg 2018    HEPBANG Nonreactive 01/15/2018    CHPCRT Negative 10/06/2017    GCPCRT Negative 10/06/2017    TREPAB Negative 2018    HGB 10.6 (L) 2018       GBS Status:   Lab Results   Component Value Date    GBS Negative 2018       Active Problem List  Patient Active Problem List   Diagnosis     Female infertility     Anxiety     Eating disorder     Family history of malignant neoplasm of breast     Irritable colon     Family history of genetic disorder     Decreased libido     Panic disorder without agoraphobia     Tension headache     Xerosis cutis     Supervision of high-risk pregnancy of elderly  multigravida, IVF, [facility] MD     Polyhydramnios       Medication Prior to Admission  Prescriptions Prior to Admission   Medication Sig Dispense Refill Last Dose     Fluconazole (DIFLUCAN PO) Take 150 mg by mouth   Past Week at Unknown time     sertraline (ZOLOFT) 25 MG tablet Take 2 tablets (50 mg) by mouth daily Increase to 50 mg daily after one week if symptoms improved but not enough 90 tablet 6 4/4/2018 at Unknown time     Prenatal Vit-Fe Fumarate-FA (PRENATAL VITAMINS) 28-0.8 MG TABS Take 1 tablet by mouth daily    4/4/2018 at Unknown time     hydroxychloroquine (PLAQUENIL) 200 MG tablet Take 200 mg by mouth 2 times daily   4/4/2018 at Unknown time     hydroxychloroquine (PLAQUENIL) 200 MG tablet Take 2 tablets (400 mg) by mouth daily 180 tablet 1 Unknown at Unknown time           Maternal Past Medical History:     Past Medical History:   Diagnosis Date     Abnormal Pap smear     Over 10 years ago     Anxiety      E coli infection     ESBL     History of extended-spectrum beta-lactamase producing Escherichia coli infection      Hx of previous reproductive problem 12/2013    Infertility     Mixed connective tissue disease (H)     Dr. Steele- switching      Nephrolithiasis                        Family History:     Family History   Problem Relation Age of Onset     DIABETES Paternal Grandfather      C.A.D. Paternal Grandfather      Hypertension Paternal Grandfather      Other Cancer Paternal Grandfather      Lung     Breast Cancer Paternal Grandmother      CANCER Paternal Grandmother      lung ca     OSTEOPOROSIS Paternal Grandmother      Hypertension Paternal Grandmother      Cancer - colorectal Maternal Grandfather      Colon Cancer Maternal Grandfather      CANCER Maternal Grandmother      cervical ca     Neurologic Disorder Maternal Grandmother      alzheimers     Alzheimer Disease Maternal Grandmother      Cardiovascular Brother 28     cardiomyopathy     Genetic Disorder Sister 21     una's disease      HEART DISEASE Paternal Uncle 55     mitral valve replacement     Anxiety Disorder Sister      Anxiety Disorder Brother      Genetic Disorder Sister      Elijah's Disease            Social History:     Social History     Social History     Marital status:      Spouse name: Chi     Number of children: 2     Years of education: N/A     Occupational History      [employer]     Social History Main Topics     Smoking status: Never Smoker     Smokeless tobacco: Never Used     Alcohol use No     Drug use: No     Sexual activity: Yes     Partners: Male     Birth control/ protection: None     Other Topics Concern      Service No     Blood Transfusions No     Caffeine Concern No     Occupational Exposure No     Hobby Hazards No     Sleep Concern No     Stress Concern No     Weight Concern No     Special Diet No     Back Care No     Exercise No     Bike Helmet Yes     n/a     Seat Belt Yes     Self-Exams No     Parent/Sibling W/ Cabg, Mi Or Angioplasty Before 65f 55m? No     Social History Narrative    How much exercise per week? n/a    How much calcium per day?1-2       How much caffeine per day? 0    How much vitamin D per day? no    Do you/your family wear seatbelts?  Yes    Do you/your family use safety helmets? No-n/a    Do you/your family use sunscreen? Yes    Do you/your family keep firearms in the home? No    Do you/your family have a smoke detector(s)? Yes        Do you feel safe in your home? Yes    Has anyone ever touched you in an unwanted manner? No                             Review of Systems:   Negative except as noted above in the HPI         Physical Exam:     Vitals:    04/04/18 1101 04/04/18 1200 04/04/18 1300 04/04/18 1509   BP: 118/69 121/68 123/78 122/66   Pulse:       Resp: 16 16 16 16   Temp:       TempSrc:       Weight:       Height:         Gen: Alert and oriented in NAD  Cardio: RRR  Resp: CTAB   Abdomen: gravid, soft, nontender. EFW 7.5 lbs  Cervix:  1/30/-3  Presentation: Cephalic by BSUS  Fetal Heart Rate Tracing: baseline 140 bpm, moderate variability, + accels, no decels  Tocometer: Quiet        Assessment:   Heather Guillory is a 37 year old  at 39w0d by ETD admitted for IOL for polyhydramnios in pregnancy otherwise complicated by mixed connective tissue disorder and anxiety.        Plan:     - Labor   - Admit to L&D for IOL.   - PNC:     - Rh positive. Rubella immune. GBS negative. No intervention indicated.     - Placenta: posterior   - Fen/GI: Regular diet   - SVE: 1/30/-3   - Membranes: Intact   - Plan: PO miso for cervical ripening. Plan to place cook catheter.   - Pain management: Per patient preference  -Mixed connective tissue disorder - well controlled on Plaquenil  -Anxiety - continue zoloft, monitor mood in postpartum period    -Fetal Well Being   - Category I FHT. Reactive and reassuring   - Cephalic by BSUS. EFW 7.5 lbs   - Continue EFM and [place]      Susanna Hurtado MD  Resident Physician-PGY2   2018, 11:59 AM       [facility] staff:  Appreciate note by Dr. Hurtado.  I have seen and examined the patient without the resident. I have reviewed, edited, and agree with the note.    My findings are:    Tuyet Guzmán MD  2018

## 2018-04-04 NOTE — PROGRESS NOTES
Cook catheter placed at 1700 with 80 cc in the uterine balloon and set to traction.    Susanna Hurtado MD  OB/GYN, PGY2  04/04/18

## 2018-04-04 NOTE — PROGRESS NOTES
"  Labor Progress Note    Heather Guillory      MRN: [id number]   Age: 37 year old YOB: 1980   Date of Admission: 2018    Subjective:  Feels well. Minimal increase in contractions since morning; describes it as crampy. Denies any leaking fluid, vaginal bleeding. Reports good fetal movement.  Membranes intact.     Objective:  Patient Vitals for the past 24 hrs:   BP Temp Temp src Pulse Resp Height Weight   18 1509 122/66 - - - 16 - -   18 1300 123/78 - - - 16 - -   18 1200 121/68 - - - 16 - -   18 1101 118/69 - - - 16 - -   18 0855 115/71 98.3  F (36.8  C) Oral 81 16 1.702 m (5' 7\") 86.2 kg (190 lb)     Gen: Well-appearing, NAD  Pulm: Breathing comfortably on room air; lungs clear to auscultation  Abd: Soft, gravid, non-tender, ~40 week sized uterus  Ext: 3+ LE edema b/l  SVE: 50/-3  FHT:  , moderate variability,  accels present, no decels  [place]: crampy contractions; utrine tone soft    Assessment/Plan:  Ms. Heather Guillory is a 37 year old  at 39w0d by IVF embryo transfer admitted for planned IOL.    Plan:  - PO Misoprostol 25 mcg Q2H continued for 3 doses  - Cook catheter placed at 1700h  - Auguste score at 1700h --> 3  - Plan to induce with Pitocin cervix >6 cm dilated.  - Continue to monitor with serial cervical exams q2h  - Continue PTA meds (Zoloft, Hydroxychloroquine).      FWB:  - Category I, reactive and reassuring    Dispo: - 1-2 d post delivery    # Pain Assessment:  Current Pain Score 2018   Patient currently in pain? yes   Pain score (0-10) -   Pain location Uterine   Pain descriptors Cramping   - Heather is experiencing pain due to labor. Pain management was discussed with Heather and her spouse and the plan was created in a collaborative fashion.  Heather's response to the current recommendations: engaged  - Please see the plan for pain management as documented above        Marie Jane, MS-3  Pager #[phone]  "

## 2018-04-04 NOTE — IP AVS SNAPSHOT
MRN:[id number]                      After Visit Summary   4/4/2018    Heather Guillory    MRN: [id number]           Thank you!     Thank you for choosing [facility] for your care. Our goal is always to provide you with excellent care. Hearing back from our patients is one way we can continue to improve our services. Please take a few minutes to complete the written survey that you may receive in the mail after you visit with us. Thank you!        Patient Information     Date Of Birth          1980        Designated Caregiver       Most Recent Value    Caregiver    Will someone help with your care after discharge? no      About your hospital stay     You were admitted on:  April 4, 2018 You last received care in the:  [facility]    You were discharged on:  April 6, 2018        Reason for your hospital stay       Maternity care                  Who to Call     For medical emergencies, please call 911.  For non-urgent questions about your medical care, please call your primary care provider or clinic, [phone]          Attending Provider     Provider Specialty    Tuyet Guzmán MD OB/Gyn    VadimEvita MD OB/Gyn       Primary Care Provider Office Phone # Fax #    Cate Mansfield [phone]      After Care Instructions     Activity       Review discharge instructions            Diet       Resume previous diet            Discharge Instructions - Postpartum visit       Schedule postpartum visit with your provider and return to clinic in 6 weeks and in one week for mood check    Activity Instructions:   - Vaginal delivery: Nothing in the vagina for 6 weeks, no intercourse for 6 weeks, you are not restricted on other activities, but rest for 1-2 weeks to allow your body to recover from delivery. No driving until you have stopped taking your pain medications.      Call your health care provider if you have any of the following: Fever above 100.4 F; opening or  drainage from your incision; soaking a sanitary pad with blood within 1 hour, or you see blood clots larger than a golf ball; malodorous vaginal discharge, severe or worsening pain uncontrolled by your pain medications, nausea and vomiting, severe headaches, changes in vision, calf swelling or pain, shortness of breath, problems coping with sadness, anxiety, or depression.  If you have any concerns about hurting yourself or the baby, call your provider immediately. You are encouraged to call with questions or concerns after you return home.                  Your next 10 appointments already scheduled     Jun 21, 2018  9:00 AM CDT   (Arrive by 8:45 AM)   Return Visit with Nasrin King MD   [facility] ([facility])    [address]  [address]  [facility] [address] [phone]              Further instructions from your care team       Postpartum Vaginal Delivery Instructions    Activity       Ask family and friends for help when you need it.    Do not place anything in your vagina for 6 weeks.    You are not restricted on other activities, but take it easy for a few weeks to allow your body to recover from delivery.  You are able to do any activities you feel up to that point.    No driving until you have stopped taking your pain medications (usually two weeks after delivery).     Call your health care provider if you have any of these symptoms:       Increased pain, swelling, redness, or fluid around your stiches from an episiotomy or perineal tear.    A fever above 100.4 F (38 C) with or without chills when placing a thermometer under your tongue.    You soak a sanitary pad with blood within 1 hour, or you see blood clots larger than a golf ball.    Bleeding that lasts more than 6 weeks.    Vaginal discharge that smells bad.    Severe pain, cramping or tenderness in your lower belly area.    A need to urinate more frequently (use the toilet more often), more  "urgently (use the toilet very quickly), or it burns when you urinate.    Nausea and vomiting.    Redness, swelling or pain around a vein in your leg.    Problems breastfeeding or a red or painful area on your breast.    Chest pain and cough or are gasping for air.    Problems coping with sadness, anxiety, or depression.  If you have any concerns about hurting yourself or the baby, call your provider immediately.     You have questions or concerns after you return home.     Keep your hands clean:  Always wash your hands before touching your perineal area and stitches.  This helps reduce your risk of infection.  If your hands aren't dirty, you may use an alcohol hand-rub to clean your hands. Keep your nails clean and short.        Pending Results     Date and Time Order Name Status Description    4/5/2018 0950 Placenta path order and indications In process             Statement of Approval     Ordered          04/06/18 1005  I have reviewed and agree with all the recommendations and orders detailed in this document.  EFFECTIVE NOW     Approved and electronically signed by:  April Palma MD             Admission Information     Date & Time Provider Department Dept. Phone    4/4/2018 Evita Fierro MD [facility] [phone]      Your Vitals Were     Blood Pressure Pulse Temperature Respirations Height Weight    97/52 70 97.6  F (36.4  C) (Oral) 16 1.702 m (5' 7\") 86.2 kg (190 lb)    Pulse Oximetry BMI (Body Mass Index)                99% 29.76 kg/m2          MyChart Information     [facility] gives you secure access to your electronic health record. If you see a primary care provider, you can also send messages to your care team and make appointments. If you have questions, please call your primary care clinic.  If you do not have a primary care provider, please call [phone] and they will assist you.        Care EveryWhere ID     This is your Care EveryWhere ID. This could be used by other organizations to " access your [facility] medical records  [id number]        Equal Access to Services     ROSELIA OREILLY AH: Hadii juanjo Bahena, wasierra major, qamarielabernadette galiciabiancalexis simeon, john corelyalbakarrie sheikh. So [facility] [place] [phone].    ATENCIÓN: Si habla español, tiene a nelson disposición servicios gratuitos de asistencia lingüística. Llame al [phone].    We comply with applicable federal civil rights laws and [place] laws. We do not discriminate on the basis of race, color, national origin, age, disability, sex, sexual orientation, or gender identity.               Review of your medicines      START taking        Dose / Directions    ferrous sulfate 325 (65 FE) MG tablet   Commonly known as:  IRON        Dose:  325 mg   Take 1 tablet (325 mg) by mouth daily (with breakfast)   Quantity:  30 tablet   Refills:  2       ibuprofen 600 MG tablet   Commonly known as:  ADVIL/MOTRIN        Dose:  600 mg   Take 1 tablet (600 mg) by mouth every 6 hours as needed for moderate pain   Quantity:  30 tablet   Refills:  1       senna-docusate 8.6-50 MG per tablet   Commonly known as:  JOSHUA-COLACE        Dose:  1-2 tablet   Take 1-2 tablets by mouth 2 times daily as needed   Quantity:  60 tablet   Refills:  1         CONTINUE these medicines which have NOT CHANGED        Dose / Directions    * hydroxychloroquine 200 MG tablet   Commonly known as:  PLAQUENIL        Dose:  200 mg   Take 200 mg by mouth 2 times daily   Refills:  0       * hydroxychloroquine 200 MG tablet   Commonly known as:  PLAQUENIL   Used for:  MCTD (mixed connective tissue disease) (H)        Dose:  400 mg   Take 2 tablets (400 mg) by mouth daily   Quantity:  180 tablet   Refills:  1       Prenatal Vitamins 28-0.8 MG Tabs   Used for:  Supervision of high-risk pregnancy of elderly multigravida        Dose:  1 tablet   Take 1 tablet by mouth daily   Refills:  0       sertraline 25 MG tablet   Commonly known as:  ZOLOFT   Used for:  Anxiety        Dose:   50 mg   Take 2 tablets (50 mg) by mouth daily Increase to 50 mg daily after one week if symptoms improved but not enough   Quantity:  90 tablet   Refills:  6       * Notice:  This list has 2 medication(s) that are the same as other medications prescribed for you. Read the directions carefully, and ask your doctor or other care provider to review them with you.      STOP taking     DIFLUCAN PO                Where to get your medicines      These medications were sent to [facility] [place], [place] - [address]  [address] [facility] [facility] [address], [facility] [address]     Phone:  [phone]     ferrous sulfate 325 (65 FE) MG tablet    ibuprofen 600 MG tablet    senna-docusate 8.6-50 MG per tablet                Protect others around you: Learn how to safely use, store and throw away your medicines at www.disposemymeds.org.             Medication List: This is a list of all your medications and when to take them. Check marks below indicate your daily home schedule. Keep this list as a reference.      Medications           Morning Afternoon Evening Bedtime As Needed    ferrous sulfate 325 (65 FE) MG tablet   Commonly known as:  IRON   Take 1 tablet (325 mg) by mouth daily (with breakfast)                                * hydroxychloroquine 200 MG tablet   Commonly known as:  PLAQUENIL   Take 200 mg by mouth 2 times daily   Last time this was given:  200 mg on 4/5/2018  9:05 PM                                * hydroxychloroquine 200 MG tablet   Commonly known as:  PLAQUENIL   Take 2 tablets (400 mg) by mouth daily   Last time this was given:  200 mg on 4/5/2018  9:05 PM                                ibuprofen 600 MG tablet   Commonly known as:  ADVIL/MOTRIN   Take 1 tablet (600 mg) by mouth every 6 hours as needed for moderate pain   Last time this was given:  800 mg on 4/5/2018  9:03 PM                                Prenatal Vitamins 28-0.8 MG Tabs   Take 1 tablet by mouth daily                                 senna-docusate 8.6-50 MG per tablet   Commonly known as:  JOSHUA-COLACE   Take 1-2 tablets by mouth 2 times daily as needed                                sertraline 25 MG tablet   Commonly known as:  ZOLOFT   Take 2 tablets (50 mg) by mouth daily Increase to 50 mg daily after one week if symptoms improved but not enough   Last time this was given:  50 mg on 4/5/2018  2:07 PM                                * Notice:  This list has 2 medication(s) that are the same as other medications prescribed for you. Read the directions carefully, and ask your doctor or other care provider to review them with you.

## 2018-04-05 PROCEDURE — 72200001 ZZH LABOR CARE VAGINAL DELIVERY SINGLE

## 2018-04-05 PROCEDURE — 25000125 ZZHC RX 250: Performed by: INTERNAL MEDICINE

## 2018-04-05 PROCEDURE — 0KQM0ZZ REPAIR PERINEUM MUSCLE, OPEN APPROACH: ICD-10-PCS | Performed by: ADVANCED PRACTICE MIDWIFE

## 2018-04-05 PROCEDURE — 88307 TISSUE EXAM BY PATHOLOGIST: CPT | Performed by: OBSTETRICS & GYNECOLOGY

## 2018-04-05 PROCEDURE — 25000125 ZZHC RX 250: Performed by: ANESTHESIOLOGY

## 2018-04-05 PROCEDURE — 25000132 ZZH RX MED GY IP 250 OP 250 PS 637: Performed by: INTERNAL MEDICINE

## 2018-04-05 PROCEDURE — 12000028 ZZH R&B OB UMMC

## 2018-04-05 PROCEDURE — 25000132 ZZH RX MED GY IP 250 OP 250 PS 637: Performed by: STUDENT IN AN ORGANIZED HEALTH CARE EDUCATION/TRAINING PROGRAM

## 2018-04-05 PROCEDURE — 88307 TISSUE EXAM BY PATHOLOGIST: CPT | Mod: 26 | Performed by: OBSTETRICS & GYNECOLOGY

## 2018-04-05 RX ORDER — IBUPROFEN 800 MG/1
800 TABLET, FILM COATED ORAL EVERY 6 HOURS PRN
Status: DISCONTINUED | OUTPATIENT
Start: 2018-04-05 | End: 2018-04-06 | Stop reason: HOSPADM

## 2018-04-05 RX ORDER — SERTRALINE HYDROCHLORIDE 25 MG/1
50 TABLET, FILM COATED ORAL DAILY
Status: DISCONTINUED | OUTPATIENT
Start: 2018-04-05 | End: 2018-04-06 | Stop reason: HOSPADM

## 2018-04-05 RX ORDER — HYDROXYCHLOROQUINE SULFATE 200 MG/1
200 TABLET, FILM COATED ORAL 2 TIMES DAILY
Status: DISCONTINUED | OUTPATIENT
Start: 2018-04-05 | End: 2018-04-06 | Stop reason: HOSPADM

## 2018-04-05 RX ORDER — OXYTOCIN 10 [USP'U]/ML
10 INJECTION, SOLUTION INTRAMUSCULAR; INTRAVENOUS
Status: DISCONTINUED | OUTPATIENT
Start: 2018-04-05 | End: 2018-04-06 | Stop reason: HOSPADM

## 2018-04-05 RX ORDER — LANOLIN 100 %
OINTMENT (GRAM) TOPICAL
Status: DISCONTINUED | OUTPATIENT
Start: 2018-04-05 | End: 2018-04-06 | Stop reason: HOSPADM

## 2018-04-05 RX ORDER — IBUPROFEN 600 MG/1
600 TABLET, FILM COATED ORAL EVERY 6 HOURS PRN
Qty: 30 TABLET | Refills: 1 | Status: SHIPPED | OUTPATIENT
Start: 2018-04-05 | End: 2019-03-08

## 2018-04-05 RX ORDER — AMOXICILLIN 250 MG
1-2 CAPSULE ORAL 2 TIMES DAILY PRN
Qty: 60 TABLET | Refills: 1 | Status: SHIPPED | OUTPATIENT
Start: 2018-04-05 | End: 2018-05-23

## 2018-04-05 RX ORDER — MISOPROSTOL 200 UG/1
400 TABLET ORAL
Status: DISCONTINUED | OUTPATIENT
Start: 2018-04-05 | End: 2018-04-06 | Stop reason: HOSPADM

## 2018-04-05 RX ORDER — HYDROXYCHLOROQUINE SULFATE 200 MG/1
400 TABLET, FILM COATED ORAL DAILY
Status: DISCONTINUED | OUTPATIENT
Start: 2018-04-05 | End: 2018-04-05

## 2018-04-05 RX ORDER — HYDROCORTISONE 2.5 %
CREAM (GRAM) TOPICAL 3 TIMES DAILY PRN
Status: DISCONTINUED | OUTPATIENT
Start: 2018-04-05 | End: 2018-04-06 | Stop reason: HOSPADM

## 2018-04-05 RX ORDER — BISACODYL 10 MG
10 SUPPOSITORY, RECTAL RECTAL DAILY PRN
Status: DISCONTINUED | OUTPATIENT
Start: 2018-04-07 | End: 2018-04-06 | Stop reason: HOSPADM

## 2018-04-05 RX ORDER — AMOXICILLIN 250 MG
1 CAPSULE ORAL 2 TIMES DAILY PRN
Status: DISCONTINUED | OUTPATIENT
Start: 2018-04-05 | End: 2018-04-06 | Stop reason: HOSPADM

## 2018-04-05 RX ORDER — FERROUS SULFATE 325(65) MG
325 TABLET ORAL
Qty: 30 TABLET | Refills: 2 | Status: SHIPPED | OUTPATIENT
Start: 2018-04-05 | End: 2018-05-23

## 2018-04-05 RX ORDER — ACETAMINOPHEN 325 MG/1
650 TABLET ORAL EVERY 4 HOURS PRN
Status: DISCONTINUED | OUTPATIENT
Start: 2018-04-05 | End: 2018-04-06 | Stop reason: HOSPADM

## 2018-04-05 RX ORDER — NALOXONE HYDROCHLORIDE 0.4 MG/ML
.1-.4 INJECTION, SOLUTION INTRAMUSCULAR; INTRAVENOUS; SUBCUTANEOUS
Status: DISCONTINUED | OUTPATIENT
Start: 2018-04-05 | End: 2018-04-06 | Stop reason: HOSPADM

## 2018-04-05 RX ORDER — OXYTOCIN/0.9 % SODIUM CHLORIDE 30/500 ML
100 PLASTIC BAG, INJECTION (ML) INTRAVENOUS CONTINUOUS
Status: DISCONTINUED | OUTPATIENT
Start: 2018-04-05 | End: 2018-04-06 | Stop reason: HOSPADM

## 2018-04-05 RX ORDER — AMOXICILLIN 250 MG
2 CAPSULE ORAL 2 TIMES DAILY PRN
Status: DISCONTINUED | OUTPATIENT
Start: 2018-04-05 | End: 2018-04-06 | Stop reason: HOSPADM

## 2018-04-05 RX ORDER — OXYTOCIN/0.9 % SODIUM CHLORIDE 30/500 ML
340 PLASTIC BAG, INJECTION (ML) INTRAVENOUS CONTINUOUS PRN
Status: DISCONTINUED | OUTPATIENT
Start: 2018-04-05 | End: 2018-04-06 | Stop reason: HOSPADM

## 2018-04-05 RX ADMIN — HYDROXYCHLOROQUINE SULFATE 200 MG: 200 TABLET, FILM COATED ENTERAL at 21:05

## 2018-04-05 RX ADMIN — LIDOCAINE HYDROCHLORIDE 20 ML: 10 INJECTION, SOLUTION EPIDURAL; INFILTRATION; INTRACAUDAL; PERINEURAL at 09:15

## 2018-04-05 RX ADMIN — IBUPROFEN 800 MG: 800 TABLET, FILM COATED ORAL at 21:03

## 2018-04-05 RX ADMIN — OXYTOCIN-SODIUM CHLORIDE 0.9% IV SOLN 30 UNIT/500ML 340 ML/HR: 30-0.9/5 SOLUTION at 08:53

## 2018-04-05 RX ADMIN — Medication 5 MG: at 07:36

## 2018-04-05 RX ADMIN — IBUPROFEN 800 MG: 800 TABLET, FILM COATED ORAL at 14:10

## 2018-04-05 RX ADMIN — SERTRALINE HYDROCHLORIDE 50 MG: 25 TABLET ORAL at 14:07

## 2018-04-05 NOTE — PROVIDER NOTIFICATION
04/05/18 0330   Provider Notification   Provider Name/Title Dr. Burdick   Method of Notification In Department   Notification Reason Decels;SVE;Labor Status   MD notified of pitocin discontinuation and intrauterine resuscitation interventions. MD reviewed FHT

## 2018-04-05 NOTE — PLAN OF CARE
Problem: Patient Care Overview  Goal: Plan of Care/Patient Progress Review  Outcome: Improving  Pt progressing toward delivery. Epidural continues to keep patient comfortable. Fetal heart tracing as documented. Intrauterine resuscitation measures performed as charted. IUPC placed per Dr. Burdick

## 2018-04-05 NOTE — L&D DELIVERY NOTE
Delivery Summary - No Perkins  Delivery Summary  DELIVERY NOTE:  Delivery Note:   Called to delivery for multip, MD team in surgery. Pushing well in semifowlers with coaching, epidural anesthesia working well.  NICU called and present for delivery. Slow crown over 3 contractions,  viable male, with tight nuchal cord around neck x2, cord around chest x1, somersault out. Good tone and spont respirations when placed on moms abd, no cry with stimulation, then handed to NICU team for further evaluation. IV with pitocin opened after delivery of baby, spont montes placenta appeared intact with small area in center appearing ragged. FF at U, no free flow or clots. 2nd degree laceration started, then MD team arrived and repair completed by Dr Hurtado. Ultrasound per Dr Hurtado to check for retained placenta pieces, appears wnl. See her note. Infant needing CPAP, so brought to NICU. Dr Goodson, family medicine service present for delivery as well.  IUP at 39 weeks gestation delivered on 2018.     delivery of a viable Male infant.  Weight : pending  Apgars of 5 at 1 minute and 7 at 5 minutes, 8 at 10 minutes.  Labor was induced.  Medications administered  in labor:  Pain Rx Epidural;  Perineum: 2nd degree repaired by Dr Hurtado  Placenta-mechanism: spontaneous, intact,  with a 3 vessel cord. IV oxytocin was given After delivery of baby. Ultrasound per Dr Hurtado appears wnl.   EBL 200cc.  Complications of labor and delivery: Meconium stained fluid and Nuchal cord  Anticipated Discharge Date: 18  Birth attendants: Joanna Armas CNM and  Resident Dr Arleth Armas, HARVEY Hackettnifer Pastora MRN# [id number]   Age: 37 year old YOB: 1980     Labor Event Times:    Labor Onset Date       Labor Onset Time    Dilation Complete Date    Dilation Complete Time       Start Pushing Date        Start Pushing Time            Labor Length:    1st Stage (hrs/min)     2nd Stage  (hrs/min) 1.00 21.00   3rd Stage (hrs/min) 0.00 14.00       Labor Events:     Labor No   Rupture Date     Rupture Time     Rupture Type Artificial Rupture of Membranes   Fluid Color     Labor Type     Induction    Induction Indication         Augmentation    Labor Complications     Additional Complications     Management of Labor        Antibiotics     IV Antibiotic Given     Additional Management     Fetal Status Prior to  Delivery     Fetal Status Comments         Cervical Ripening:    Date     Time     Type         Delivery:    Episiotomy None   Local Anesthetic        Lacerations 2nd   Sponge Count Correct       Needle Count Correct     Final Count by:    Sutures     Blood loss (ml)    Packing Intentionally Left In     Number     Comments           Information for the patient's :  Court Guillory [[id number]]       Delivery  2018 8:51 AM by  Vaginal, Spontaneous Delivery  Sex:  male Gestational Age: 39w1d  Delivery Clinician:     Living?:            APGARS  One minute Five minutes Ten minutes   Skin color:            Heart rate:            Grimace:            Muscle tone:            Breathing:            Totals: 5  7  8      Presentation/position:           Resuscitation and Interventions: Method:  Oxygen  Bag Mask  Oximetry  NCPAP  Arcadio Puff  Oxygen Type:     Intubation Time:   # of Attempts:     ETT Size:        Tracheal Suction:     Tracheal returns:       Care at Delivery:  SANGITA Delivery Note    Asked by Dr. Fierro to attend the delivery of this term, male infant with a gestational age of 39 0/7 weeks secondary to meconium stained fluid.      Infant delivered at 0851 hours on 2018. Infant had spontaneous respiration  s at birth. He was placed on mom's abdomen and given 1 minute of delayed cord clamping- stimulated, and bulb suctioned. Apgars were 5 at one minute, 7 at five minutes, 8 at ten minutes and 9 at 15 minutes of life. Gross PE is WNL except for pale colo  ring. HR  was always >100.  Secondary to apnea PPV was given at 2 minutes of life and continued until 4 minutes of life.  FiO2 increased to 100% due to saturations of 22%.  At 4 minutes of life the infant was deep suctioned for minimal evacuation of b  lood tinged secretions.  Saturations increased to 95%.  RA trial was given.  He developed retractions, nasal flaring and grunting- room air CPAP was given at 20 minutes of life with improvement of grunting.  MOB was allowed to hold.  Family updated.    Infant transferred to the NICU on room air CPAP with saturations of 100%.  FOB and MGF accompanied transfer.   Mony Agee PA-C 2018 10:00 AM   Advanced Practice Providers  [facility]          Cord information:     Disposition of cord blood:      Blood gases sent?    Complications:     Placenta: Delivered:           appearance.  Comments:  .  Disposition: Pathology   Measurements:  Weight: 7 lb 9 oz (3430 g)  Height:    Head circumference:    Chest circumference:     Temperature:     Other providers:       Additional  information:  Forceps:    Verbal Informed Consent Obtained:       Alternative Labor Strategies Discussed:     Emergency Resources Available:       Type:       Accrued Pulling Time:       # of Pulls:      Position:     Fetal Station:       Indications:      Other Indications:     Operative Vaginal Delivery Brief Note Forceps:        Vacuum:    Verbal Informed Consent Obtained:     Alternative Labor Strategies Discussed:     Emergency Resources Available:     Type:      Accrued Pulling Time:       # of Pop-Offs:       # of Pulls:       Position:     Fetal Station:      Indications for Vacuum:       Other Indications:    Operative Vaginal Delivery Brief Note Vacuum:        Shoulder Dystocia Shoulder Dystocia    Fetal Tracing Prior to Delivery:  Category 2   Shoulder dystocia present?:  No                                            Breech:       :  Type:     Indications for Primary:     Indications for Secondary:     Other Indications:        Observed anomalies     Output in Delivery Room: Voided

## 2018-04-05 NOTE — PLAN OF CARE
Problem: Patient Care Overview  Goal: Plan of Care/Patient Progress Review  Outcome: Improving  Pt has been visiting NICU. Began using breast pump. Last visit to the NICU, infant was off CPAP and was able to attempt breastfeeding. Pt returned to the unit and napped.

## 2018-04-05 NOTE — PLAN OF CARE
Problem: Labor (Cervical Ripen, Induct, Augment) (Adult,Obstetrics,Pediatric)  Goal: Signs and Symptoms of Listed Potential Problems Will be Absent, Minimized or Managed (Labor)  Signs and symptoms of listed potential problems will be absent, minimized or managed by discharge/transition of care (reference Labor (Cervical Ripen, Induct, Augment) (Adult,Obstetrics,Pediatric) CPG).   Outcome: Therapy, progress toward functional goals as expected  Vaginal Delivery Note   of viable Male with Bud Armas CNM in attendance.  NICU present for mec and decels.  Infant with spontaneous cry, to mother's abdomen, dried and stimulated.  Placenta delivered with out complication, 10 mu/min., 2nd laceration, with repair, taurus cares provided.  Mother and baby in stable condition.

## 2018-04-05 NOTE — PROVIDER NOTIFICATION
04/05/18 0130   FHR   Monitor External US   Variability 6-25 BPM   Baseline Rate (Fetus A) 135 bpm   Baseline Classification Normal   Accelerations Not present   Decelerations Intermittent   Difficulty tracing contractions. Unable to determine if decel is late or early. Dr. Gennaro zaragoza and IUPC placement requested

## 2018-04-05 NOTE — ANESTHESIA PROCEDURE NOTES
Epidural Procedure Note    Staff:     Anesthesiologist:  SAMUEL VELAZQUEZ  Location: OB     Procedure start time:  4/4/2018 8:00 PM     Procedure end time:  4/4/2018 8:38 PM   Pre-procedure checklist:   patient identified, IV checked, site marked, risks and benefits discussed, informed consent, monitors and equipment checked, pre-op evaluation, at physician/surgeon's request and post-op pain management      Correct Patient: Yes      Correct Position: Yes      Correct Site: Yes      Correct Procedure: Yes      Correct Laterality:  N/A    Site Marked:  N/A  Procedure:     Procedure:  Epidural catheter    ASA:  2    Diagnosis:  Labor analgesia    Position:  Sitting    Sterile Prep: chloraprep, mask, sterile gloves and patient draped      Insertion site:  L4-5    Local skin infiltration:  1% lidocaine    amount (mL):  4    Approach:  Midline    Needle gauge (G):  17    Needle Length (in):  3.5    Block Needle Type:  Touhy    Injection Technique:  LORT saline    BENJAMIN at (cm):  4.5    Attempts:  3    Redirects:  1    Catheter gauge (G):  19    Catheter threaded easily: Yes      Threaded to cm at skin:  9    Threaded in epidural space (cm):  4.5    Paresthesias:  Yes and Resolved (Left leg with initial attempt at catheter advancement, immediately resolved  after catheter removed)    Aspiration negative for Heme or CSF: Yes      Test dose (mL):  3     Local anesthetic:  Lidocaine 1.5% w/ 1:200,000 epinephrine    Test dose time:  20:36    Test dose negative for signs of intravascular, subdural or intrathecal injection: Yes

## 2018-04-05 NOTE — PROGRESS NOTES
[facility]  Labor Progress Note    S: Doing well. Comfortable w/ epidural.    O:   Patient Vitals for the past 4 hrs:   BP SpO2   18 2309 - 98 %   18 2303 112/66 98 %   18 2300 - 98 %   18 2238 111/61 -   18 2235 - 98 %   18 2230 - 97 %   18 2130 102/60 98 %   18 2103 116/66 -   18 2100 - 98 %   18 109/62 -   18 109/69 -   180 - 99 %   18 108/73 -   185 - 99 %   18 113/67 -   18 112/67 -   180 - 99 %   18 106/62 -   18 104/57 -   18 112/70 -   185 - 99 %   18 111/66 -   182 110/64 -   18 2030 - 100 %     SVE: 4.5/60/-3  FHT: Baseline 140, moderate variability, no accelerations, one spontaneous deceleration w/ return to baseline and continued moderate variability  [place]: 3-4 contractions in 10 minutes    A/P:  Ms. Heather Guillory is a 37 year old  at 39w0d by ETD who is admitted for IOL for polyhydramnios (resolved), mixed connective tissue d/o on plaquenil, and AMA. In latent labor. Cook came out and AROM'd w/ meconium stained fluid. Will start pitocin for further augmentation. FHT category II d/t one spontaneous deceleration; however return to baseline and continued moderate variability reassuring. Plan recheck in 4 hours or if feeling more pressure.    Amish Burdick MD  OB/GYN Resident, PGY-2  2018 11:13 PM

## 2018-04-05 NOTE — PLAN OF CARE
Problem: Patient Care Overview  Goal: Plan of Care/Patient Progress Review  Outcome: Improving  Patient arrived to [facility] unit via wheelchair at 1130,with belongings, accompanied by family, with infant in NICU. Received report from Diane PROCTOR RN and checked bands. Unit and room orientation started. Call light within arms reach; no concerns present at this time. Continue with plan of care.

## 2018-04-05 NOTE — PROGRESS NOTES
[facility]  Labor Progress Note    S: Doing well. Feeling more pressure. Requests cervical check    O:   Patient Vitals for the past 4 hrs:   BP Temp Temp src Resp SpO2   18 0105 99/55 - - - 98 %   18 2342 108/54 99.1  F (37.3  C) Oral 16 -   18 2309 - - - - 98 %   18 2303 112/66 - - - 98 %   18 2300 - - - - 98 %   18 2238 111/61 - - - -   18 2235 - - - - 98 %   18 2230 - - - - 97 %   18 2130 102/60 - - - 98 %     SVE: 4.5/60/-3  FHT: Baseline 140, minimal to moderate variability, no accelerations, occasional variable and one spontaneous deceleration w/ return to baseline and continued moderate variability  Flippin: 3-4 contractions in 10 minutes    A/P:  Ms. Heather Guillory is a 37 year old  at 39w0d by ETD who is admitted for IOL for polyhydramnios (resolved), mixed connective tissue d/o on plaquenil, and AMA. In latent labor.     IOL  - s/p miso x3, cook, pitocin running at 1 mu/min  - FHT category II d/t intermittent decelerations; however return to baseline and continued moderate variability reassuring.   - Plan recheck in 2 hours or if feeling more pressure. If no cervical change, place IUPC to up titrate pitocin    Amish Burdick MD  OB/GYN Resident, PGY-2  2018 1:18 AM

## 2018-04-05 NOTE — PLAN OF CARE
Problem: Postpartum (Vaginal Delivery) (Adult,Obstetrics,Pediatric)  Goal: Signs and Symptoms of Listed Potential Problems Will be Absent, Minimized or Managed (Postpartum)  Signs and symptoms of listed potential problems will be absent, minimized or managed by discharge/transition of care (reference Postpartum (Vaginal Delivery) (Adult,Obstetrics,Pediatric) CPG).  Data: Heather Guillory transferred to [facility] via wheelchair at 1150. Baby in the NICU.  Action: Receiving unit notified of transfer: Yes. Patient and family notified of room change. Report given to ALVARO Herrera RN at 1150. Belongings sent to receiving unit. Accompanied by Registered Nurse. Oriented patient to surroundings. Call light within reach.  Response: Patient tolerated transfer and is stable.

## 2018-04-05 NOTE — PROGRESS NOTES
[facility]  Labor Progress Note    S: Feeling a lot more rectal pressure    O:   Patient Vitals for the past 4 hrs:   BP Temp Temp src SpO2   18 0220 (!) 82/52 98.6  F (37  C) Oral 100 %   18 0120 105/58 - - 97 %   18 0105 99/55 - - 98 %     SVE: 8/80/0  FHT: Baseline 140, moderate variability, no accelerations, recurrent variable/early decelerations, occasional late decelerations w/ return to baseline and continued moderate variability  [place]: 3-4 contractions in 10 minutes    A/P:  Ms. Heather Guillory is a 37 year old  at 39w0d by ETD who is admitted for IOL for polyhydramnios (resolved), mixed connective tissue d/o on plaquenil, and AMA. In active labor, making good progress now.     IOL  - s/p miso x3, cook, pitocin running at 1 mu/min  - FHT category II d/t intermittent decelerations; however return to baseline and continued moderate variability reassuring. Making good labor progress. Anticipate  soon. Will continue to closely monitor FHT.  - Plan recheck in 1 hour or earlier if feeling more pressure.    Amish Burdick MD  OB/GYN Resident, PGY-2  2018 4:39 AM     I have seen and examined the patient without the resident. I have reviewed, edited, and agree with the note.   Evita Fierro MD

## 2018-04-05 NOTE — ANESTHESIA PREPROCEDURE EVALUATION
Anesthesia Evaluation       history and physical reviewed . Pt has had prior anesthetic. Type: General and Regional    No history of anesthetic complications          ROS/MED HX    ENT/Pulmonary:  - neg pulmonary ROS     Neurologic:  - neg neurologic ROS     Cardiovascular:  - neg cardiovascular ROS       METS/Exercise Tolerance:     Hematologic:         Musculoskeletal:         GI/Hepatic:  - neg GI/hepatic ROS       Renal/Genitourinary:         Endo:         Psychiatric:         Infectious Disease:         Malignancy:         Other:                     Physical Exam      Airway     Dental     Cardiovascular       Pulmonary     Other findings: Mixed connective tissue disorder. Has had prior epidurals without issue.       neg OB ROS                 Anesthesia Plan      History & Physical Review      ASA Status:  .  OB Epidural Asa: 2 and emergent            Postoperative Care      Consents  Anesthetic plan, risks, benefits and alternatives discussed with:  Patient..

## 2018-04-05 NOTE — PLAN OF CARE
Labor Shift Note  Data: Contraction frequency q 2-3 minutes and irregular, palpate moderate. Fetal assessment category two.   Vitals:    18 2059 18 2100 18 2103 18 2130   BP: 109/62  116/66 102/60   Pulse:       Resp:       Temp:       TempSrc:       SpO2:  98%  98%   Weight:       Height:       .  .  Leaking small amounts of clear fluid.  Signs and symptoms of infection absent.  Blood pressures stable. Signs and symptoms of pre-eclampsia: absent.  Support person  and sister present.  Interventions: Continue uterine/fetal assessment continuous. Vital Signs per order set. Comfort measures repositioned and peanut ball.  Medicated for Epidural.  Plan: Anticipate . Provide labor/coping assistance as needed by patient and support person.  Observe for and notify care provider of indications of progressing labor, need for pain medications,  or signs of fetal/maternal compromise.

## 2018-04-06 ENCOUNTER — TELEPHONE (OUTPATIENT)
Dept: OBGYN | Facility: CLINIC | Age: 38
End: 2018-04-06

## 2018-04-06 VITALS
TEMPERATURE: 97.9 F | BODY MASS INDEX: 29.82 KG/M2 | WEIGHT: 190 LBS | DIASTOLIC BLOOD PRESSURE: 75 MMHG | SYSTOLIC BLOOD PRESSURE: 107 MMHG | HEIGHT: 67 IN | RESPIRATION RATE: 16 BRPM | OXYGEN SATURATION: 99 % | HEART RATE: 73 BPM

## 2018-04-06 LAB — HGB BLD-MCNC: 9.3 G/DL (ref 11.7–15.7)

## 2018-04-06 PROCEDURE — 25000132 ZZH RX MED GY IP 250 OP 250 PS 637: Performed by: INTERNAL MEDICINE

## 2018-04-06 PROCEDURE — 25000132 ZZH RX MED GY IP 250 OP 250 PS 637: Performed by: STUDENT IN AN ORGANIZED HEALTH CARE EDUCATION/TRAINING PROGRAM

## 2018-04-06 PROCEDURE — 85018 HEMOGLOBIN: CPT | Performed by: OBSTETRICS & GYNECOLOGY

## 2018-04-06 PROCEDURE — 36415 COLL VENOUS BLD VENIPUNCTURE: CPT | Performed by: OBSTETRICS & GYNECOLOGY

## 2018-04-06 RX ORDER — FERROUS SULFATE 325(65) MG
325 TABLET ORAL
Qty: 90 TABLET | Refills: 1 | Status: SHIPPED | OUTPATIENT
Start: 2018-04-06 | End: 2018-04-06

## 2018-04-06 RX ADMIN — SENNOSIDES AND DOCUSATE SODIUM 2 TABLET: 8.6; 5 TABLET ORAL at 12:00

## 2018-04-06 RX ADMIN — HYDROXYCHLOROQUINE SULFATE 200 MG: 200 TABLET, FILM COATED ENTERAL at 12:00

## 2018-04-06 RX ADMIN — SERTRALINE HYDROCHLORIDE 50 MG: 25 TABLET ORAL at 11:59

## 2018-04-06 NOTE — CONSULTS
"[facility]  MATERNAL CHILD HEALTH   SOCIAL WORK PROGRESS NOTE    DATA:     SW was consulted re: \"baby admitted to the NICU\" order. SW attempted to meet with pt bedside this morning, pt away from bedside during attempted visit.    INTERVENTION:     SW consulted with multidisciplinary team on NFCC and NICU who reports plan for mother to discharge today; baby being transferred to 11th floor NICU this afternoon, mother intends to stay overnight in NICU with baby.     ASSESSMENT:     No indication for SW psychosocial assessment prior to discharge from [facility]. SW plans to remain available to family throughout baby's NICU admission. No immediate SW needs identified at this time.    PLAN:     Plan for mother to discharge; baby being moved from 4th floor NICU to 11th floor NICU.   Anticipate no further SW involvement prior to mother's discharge.   Re-consult for SW to follow if needs arise.    Aaliyah Man, SRAVANI, [facility]  Clinical   Maternal Child Health  [facility]  Phone:   [phone]  Pager:    [phone]  "

## 2018-04-06 NOTE — DISCHARGE SUMMARY
[facility] Discharge Summary    Heather Guillory MRN# [id number]   Age: 37 year old YOB: 1980     Date of Admission:  2018  Date of Discharge:  2018  Admitting Physician:  Evita Fierro MD  Discharge Physician:  April Palma MD    Admit Dx:   - Intrauterine pregnancy at 39w1d   - Polyhydramnios  - Connective tissue disorder    Discharge Dx:  - Same as above, s/p   - Acute blood loss anemia, asymptomatic, expected due to procedural blood loss    Procedures:  - Spontaneous vaginal delivery  - Epidural analgesia    Admit HPI/Labor Course:  Heather Guillory is  at 39w1d who was admitted for IOL in the setting of polyhydramnios. She received miso x3 then was started on pitocin. She progressed appropriately in labor and delivered a baby boy with tight nuchal cord x2 and chest x1. Placenta delivered spontaneously and appeared intact. 2nd degree laceration repaired in usual fashion.      Please see her Admission H&P and Delivery Summary for further details.    Postpartum Course:  Her postpartum course was uncomplicated. On PPD#1, she was meeting all of her postpartum goals and deemed stable for discharge. She was voiding without difficulty, tolerating a regular diet without nausea and vomiting, her pain was well controlled on oral pain medicines and her lochia was appropriate. Her Rh status was +, and Rhogam was not indicated.     Discharge Medications:   Heather Guillory   Home Medication Instructions SHANNON:[id number]    Printed on:18 1008   Medication Information                      ferrous sulfate (IRON) 325 (65 FE) MG tablet  Take 1 tablet (325 mg) by mouth daily (with breakfast)             hydroxychloroquine (PLAQUENIL) 200 MG tablet  Take 200 mg by mouth 2 times daily             hydroxychloroquine (PLAQUENIL) 200 MG tablet  Take 2 tablets (400 mg) by mouth daily             ibuprofen (ADVIL/MOTRIN) 600 MG tablet  Take 1 tablet (600 mg)  by mouth every 6 hours as needed for moderate pain             Prenatal Vit-Fe Fumarate-FA (PRENATAL VITAMINS) 28-0.8 MG TABS  Take 1 tablet by mouth daily              senna-docusate (JOSHUA-COLACE) 8.6-50 MG per tablet  Take 1-2 tablets by mouth 2 times daily as needed             sertraline (ZOLOFT) 25 MG tablet  Take 2 tablets (50 mg) by mouth daily Increase to 50 mg daily after one week if symptoms improved but not enough               Discharge/Disposition:  Heather Guillory was discharged to home in stable condition with the following instructions/medications:  1) Call for temperature > 100.4, bright red vaginal bleeding >1 pad an hour x 2 hours, foul smelling vaginal discharge, pain not controlled by usual oral pain meds, persistent nausea and vomiting not controlled on medications  2) She will discuss contraception at her postpartum visit.  3) For feeding she decided to breastfeed.  4) She was instructed to follow-up with her primary OB in 6 weeks for a routine postpartum visit.    April Palma MD

## 2018-04-06 NOTE — PLAN OF CARE
"Problem: Postpartum (Vaginal Delivery) (Adult,Obstetrics,Pediatric)  Goal: Signs and Symptoms of Listed Potential Problems Will be Absent, Minimized or Managed (Postpartum)  Signs and symptoms of listed potential problems will be absent, minimized or managed by discharge/transition of care (reference Postpartum (Vaginal Delivery) (Adult,Obstetrics,Pediatric) CPG).   Outcome: Improving  Vital signs stable. /69  Pulse 82  Temp 98.1  F (36.7  C) (Oral)  Resp 16  Ht 1.702 m (5' 7\")  Wt 86.2 kg (190 lb)  SpO2 99%  Breastfeeding? Unknown  BMI 29.76 kg/m2. Postpartum assessment WDL. Pain controlled with Ibuprofen at this time. Patient voiding without difficulty. Pt pumping and going down to NICU to BF infant. Will continue with current plan of care.       "

## 2018-04-06 NOTE — PLAN OF CARE
Referral made to [facility] for early discharge. Note made infant in NICU.

## 2018-04-06 NOTE — PLAN OF CARE
Problem: Patient Care Overview  Goal: Plan of Care/Patient Progress Review  Outcome: Improving  No complaints of pain reported. Fundus firm and midline, U/U. Pumping independently. Vss

## 2018-04-06 NOTE — PROGRESS NOTES
"Postpartum Progress Note  Heather Guillory  [id number]    Subjective:   Patient is feeling well.  Lochia minimal. Eating and drinking regular food without nausea/emesis. Ambulating without difficulty. Pain is adequately controlled. Breastfeeding without concerns/questions.  Voiding without difficulty.    Objective:  BP 97/52  Pulse 70  Temp 97.6  F (36.4  C) (Oral)  Resp 16  Ht 1.702 m (5' 7\")  Wt 86.2 kg (190 lb)  SpO2 99%  Breastfeeding? Unknown  BMI 29.76 kg/m2    I/O last 3 completed shifts:  In: -   Out: 200 [Blood:200]    General: lying in bed, NAD, comfortable  Heart/lungs: no increased work of breathing, well perfused  Abdomen: Soft, non-tender, non-distended; fundus firm and below umbilicus  Extremities: trace edema in BLE    Labs:  Hemoglobin   Date Value Ref Range Status   2018 9.3 (L) 11.7 - 15.7 g/dL Final     Hemoglobin   Date Value Ref Range Status   2018 9.3 (L) 11.7 - 15.7 g/dL Final   2018 10.6 (L) 11.7 - 15.7 g/dL Final       Assessment/Plan: 37 year old  who is PPD#1 s/p  Currently stable and doing well.    - Routine post-partum cares.     - Heme:    Hgb 10.6 > >9.3, asymptomatic, will discharge with iron supplementation  - Baby:     in room doing well  - Contraception:   Will discuss at PP visit  - Rh +  - Rubella immune  - Pain continue current regimen    Dispo: discharge today    Leticia Cross PGY3  2018 6:52 AM     # Pain Assessment:  Current Pain Score 2018   Patient currently in pain? (No Data)   Pain score (0-10) -   Pain location -   Pain descriptors -   - Heather is experiencing pain due to . Pain management was discussed and the plan was created in a collaborative fashion.  Heather's response to the current recommendations: engaged  - Please see the plan for pain management as documented above    Staff MD Note    I appreciate the note by Dr. Cross.  Any necessary changes have been made by me.  I evaluated the patient " with the resident and agree with the assessment and plan.  Patient doing well and meeting all post-partum goals.  She has a boarding room where she can room in with her son and very comfortable with this plan.  Plan discharge home today.    April Palma MD

## 2018-04-06 NOTE — PLAN OF CARE
Problem: Patient Care Overview  Goal: Plan of Care/Patient Progress Review  Outcome: Adequate for Discharge Date Met: 04/06/18   Data: Vital signs stable, assessments within normal limits. Discharge instruction given and instructed of signs/symptoms to look for and report per discharge instructions.   Discharge outcomes on care plan met. No apparent pain. Breast pump given.   Action: Review of care plan, teaching, and discharge instructions done with patient. verification with signature obtained.   Response: patient  states understanding and comfort with self cares. All questions about self care addressed. patient discharged with infant  at 1200.

## 2018-04-09 LAB — COPATH REPORT: NORMAL

## 2018-04-10 ENCOUNTER — TELEPHONE (OUTPATIENT)
Dept: OBGYN | Facility: CLINIC | Age: 38
End: 2018-04-10

## 2018-04-10 NOTE — TELEPHONE ENCOUNTER
Received call from Heather stating she remembers being told she was to start taking oral iron supplement but doesn't recall specifically what she should be doing. Denies feeling dizzy or lightheaded.    Review of Discharge summaries does not list starting iron but her hgb was 9.3 upon discharge. Encouraged use of OTC ferrous sulfate, 325mg take one per day with orange juice. Use colace to avoid constipation and may need to take with food. Continue until post-partum visit. Continue taking prenatal vitamin. She indicated understanding and agreed with plan.

## 2018-04-30 ENCOUNTER — TELEPHONE (OUTPATIENT)
Dept: OBGYN | Facility: CLINIC | Age: 38
End: 2018-04-30

## 2018-05-17 ASSESSMENT — ANXIETY QUESTIONNAIRES
7. FEELING AFRAID AS IF SOMETHING AWFUL MIGHT HAPPEN: NOT AT ALL
6. BECOMING EASILY ANNOYED OR IRRITABLE: NOT AT ALL
2. NOT BEING ABLE TO STOP OR CONTROL WORRYING: NOT AT ALL
4. TROUBLE RELAXING: NOT AT ALL
5. BEING SO RESTLESS THAT IT IS HARD TO SIT STILL: NOT AT ALL
1. FEELING NERVOUS, ANXIOUS, OR ON EDGE: NOT AT ALL
7. FEELING AFRAID AS IF SOMETHING AWFUL MIGHT HAPPEN: NOT AT ALL
3. WORRYING TOO MUCH ABOUT DIFFERENT THINGS: NOT AT ALL
GAD7 TOTAL SCORE: 0
GAD7 TOTAL SCORE: 0

## 2018-05-18 ENCOUNTER — OFFICE VISIT (OUTPATIENT)
Dept: OBGYN | Facility: CLINIC | Age: 38
End: 2018-05-18
Attending: OBSTETRICS & GYNECOLOGY
Payer: COMMERCIAL

## 2018-05-18 VITALS
HEIGHT: 67 IN | DIASTOLIC BLOOD PRESSURE: 69 MMHG | HEART RATE: 78 BPM | BODY MASS INDEX: 28.25 KG/M2 | WEIGHT: 180 LBS | SYSTOLIC BLOOD PRESSURE: 102 MMHG

## 2018-05-18 DIAGNOSIS — Z30.011 ENCOUNTER FOR INITIAL PRESCRIPTION OF CONTRACEPTIVE PILLS: Primary | ICD-10-CM

## 2018-05-18 PROCEDURE — G0463 HOSPITAL OUTPT CLINIC VISIT: HCPCS | Mod: ZF

## 2018-05-18 RX ORDER — ACETAMINOPHEN AND CODEINE PHOSPHATE 120; 12 MG/5ML; MG/5ML
1 SOLUTION ORAL DAILY
Qty: 84 TABLET | Refills: 3 | Status: SHIPPED | OUTPATIENT
Start: 2018-05-18 | End: 2018-05-23

## 2018-05-18 ASSESSMENT — ANXIETY QUESTIONNAIRES: GAD7 TOTAL SCORE: 0

## 2018-05-18 NOTE — LETTER
2018       RE: Heather Guillory  [address]  [place] [address]     Dear Colleague,    Thank you for referring your patient, Heather Guillory, to the [facility] at [facility]. Please see a copy of my visit note below.    Nursing Notes:   Mary Carmen Najera  2018  9:54 AM  Signed  SUBJECTIVE:   Heather Guillory is here for her 6-week postpartum checkup.     PHQ-9 score:   Hx of Abuse:  No    Delivery Date: 18  Delivering provider:  Bud Armas CNM.    Type of delivery:  .  Perineum:  2nd degree laceration.     Delivery complications: None  Infant gender:  boy, weight 7 pounds 9 oz.  Feeding Method:  .  Complications reported with feeding:  none, infant thriving .    Bleeding: Moderate.  Duration:  Off and on till around 5 weeks.  Menses resumed:  No  Bowel/Urinary problems:  No    Contraception Planned:  Will discuss.  She  has not had intercourse since delivery..       Heather Guillory is a 37-year-old  patient who is presenting to clinic for her 6 week postpartum visit. Patient has been doing well since her . She delivered a healthy baby boy. Breastfeeding is going very well and she has no problems with this. She has two boys at home that love their new baby brother. Her other boys are attending  during the day to keep their routines normal. No problems with baby blues and no depressed mood. No vaginal bleeding currently stopped a few weeks ago. No pain.     Planning to talk about contraception today. She would like to talk about her options and different types while she is breastfeeding. Her  and her are planning on deciding in the next year whether or not they would like to have a 4th child.     She has a history of mixed connective tissue disorder and is on Plaquenil. She has had some joint stiffness, but continues to following with rheumatology for this.  "    ================================================================  ROS: 10 point ROS neg other than the symptoms noted above in the HPI.     EXAM:  /69 (BP Location: Left arm, Patient Position: Chair)  Pulse 78  Ht 1.702 m (5' 7\")  Wt 81.6 kg (180 lb)  BMI 28.19 kg/m2    General: Appears well in no acute distress.   Psych: Normal affect.   Last PHQ-9 score on record= 0  Breasts: Appear normal. Nipples intact with no breaks in the skin. No erythema or warmth.   Incision:  Well healing episiotomy.   Vulva:  Normal appearing.   Recto-vaginal:  Normal appearing.       ASSESSMENT:   Heather Guillory is a 37-year-old  patient who is presenting to clinic for her 6 week postpartum visit who is doing well.       Normal postpartum exam after   Pregnancy was complicated by:  Yeast infections.      PLAN:    1. Contraception:   - Discussed in detail about the patient's options for contraception including different IUDs, progesterone only pills, etc.   - Patient and her  are discussing whether or not they would like to have another child and plan to decide on this in the next year   - During this time they would like to have some form of protection and the patient decided that progesterone only pills would be the best option for her currently. She will start these today.      2. Mixed Connective Tissue Disorder:   - History of MCT Disorder on Plaquenil   - Patient planning on starting OCPs no known drug-drug interactions with plaquenil   - Has had some symptoms of joint pain postpartum   - Continues to follow with Rheumatology for this     3. Routine Postpartum Care:   - Continue Breastfeeding   - Start Progesterone Only Pills today   - PAP up-to-date and not due till  (normal and negative HPV in )   - RTC in 1 year or PRN      Patient was seen and evaluated with MS4 Helene Song who acted as a scribe and prepared this note for Dr. Mansfield.     I agree with the PFSH and ROS as completed by the " MS, except for changes made by me. The remainder of the encounter was performed by me and scribed by the MS. The scribed note accurately reflects my personal services and the decisions made by me.    Cate Mansfield MD, FACOG  [facility]  OB/GYN    5/18/2018  1:22 PM

## 2018-05-18 NOTE — MR AVS SNAPSHOT
"              After Visit Summary   5/18/2018    Heather Guillory    MRN: [id number]           Patient Information     Date Of Birth          1980        Visit Information        Provider Department      5/18/2018 10:00 AM Cate Mansfield MD [facility]        Today's Diagnoses     Encounter for initial prescription of contraceptive pills    -  1       Follow-ups after your visit        Your next 10 appointments already scheduled     Jun 21, 2018  9:00 AM CDT   (Arrive by 8:45 AM)   Return Visit with Nasrin King MD   [facility] ([facility])    [address] [place] [address]  [address]  [facility] [address] [phone]              Who to contact     Please call your clinic at [phone] to:    Ask questions about your health    Make or cancel appointments    Discuss your medicines    Learn about your test results    Speak to your doctor            Additional Information About Your Visit        MyChart Information     [employer] gives you secure access to your electronic health record. If you see a primary care provider, you can also send messages to your care team and make appointments. If you have questions, please call your primary care clinic.  If you do not have a primary care provider, please call [phone] and they will assist you.      [employer] is an electronic gateway that provides easy, online access to your medical records. With [employer], you can request a clinic appointment, read your test results, renew a prescription or communicate with your care team.     To access your existing account, please contact your [facility] or call [phone] for assistance.        Care EveryWhere ID     This is your Care EveryWhere ID. This could be used by other organizations to access your [place] medical records  [id number]        Your Vitals Were     Pulse Height BMI (Body Mass Index)             78 1.702 m (5' 7\") " 28.19 kg/m2          Blood Pressure from Last 3 Encounters:   05/18/18 102/69   04/06/18 107/75   04/02/18 111/71    Weight from Last 3 Encounters:   05/18/18 81.6 kg (180 lb)   04/04/18 86.2 kg (190 lb)   04/02/18 88.9 kg (196 lb)              Today, you had the following     No orders found for display         Today's Medication Changes          These changes are accurate as of 5/18/18  1:25 PM.  If you have any questions, ask your nurse or doctor.               Start taking these medicines.        Dose/Directions    norethindrone 0.35 MG per tablet   Commonly known as:  MICRONOR   Used for:  Encounter for initial prescription of contraceptive pills   Started by:  Cate Mansfield MD        Dose:  1 tablet   Take 1 tablet (0.35 mg) by mouth daily   Quantity:  84 tablet   Refills:  3            Where to get your medicines      These medications were sent to [facility] [address] [facility] [place] - [address]  [facility] [address]     Phone:  [phone]     norethindrone 0.35 MG per tablet                Primary Care Provider Office Phone # Fax #    Cate Mansfield [phone]       [address]  [facility] [address]        Equal Access to Services     [facility]: Hadii aad ku hadasho Soomaali, waaxda luqadaha, qaybta kaalmada adeegyada, waxgigi sheikh. So [facility] [phone].    ATENCIÓN: Si habla español, tiene a nelson disposición servicios gratuitos de asistencia lingüística. Llame al [phone].    We comply with applicable federal civil rights laws and [place] laws. We do not discriminate on the basis of race, color, national origin, age, disability, sex, sexual orientation, or gender identity.            Thank you!     Thank you for choosing [facility]  for your care. Our goal is always to provide you with excellent care. Hearing back from our patients is one way we can continue  to improve our services. Please take a few minutes to complete the written survey that you may receive in the mail after your visit with us. Thank you!             Your Updated Medication List - Protect others around you: Learn how to safely use, store and throw away your medicines at www.disposemymeds.org.          This list is accurate as of 18  1:25 PM.  Always use your most recent med list.                   Brand Name Dispense Instructions for use Diagnosis    ferrous sulfate 325 (65 Fe) MG tablet    IRON    30 tablet    Take 1 tablet (325 mg) by mouth daily (with breakfast)     (normal spontaneous vaginal delivery)       * hydroxychloroquine 200 MG tablet    PLAQUENIL     Take 200 mg by mouth 2 times daily        * hydroxychloroquine 200 MG tablet    PLAQUENIL    180 tablet    Take 2 tablets (400 mg) by mouth daily    MCTD (mixed connective tissue disease) (H)       ibuprofen 600 MG tablet    ADVIL/MOTRIN    30 tablet    Take 1 tablet (600 mg) by mouth every 6 hours as needed for moderate pain     (normal spontaneous vaginal delivery)       norethindrone 0.35 MG per tablet    MICRONOR    84 tablet    Take 1 tablet (0.35 mg) by mouth daily    Encounter for initial prescription of contraceptive pills       Prenatal Vitamins 28-0.8 MG Tabs      Take 1 tablet by mouth daily    Supervision of high-risk pregnancy of elderly multigravida       senna-docusate 8.6-50 MG per tablet    JOSHUA-COLACE    60 tablet    Take 1-2 tablets by mouth 2 times daily as needed     (normal spontaneous vaginal delivery)       sertraline 25 MG tablet    ZOLOFT    90 tablet    Take 2 tablets (50 mg) by mouth daily Increase to 50 mg daily after one week if symptoms improved but not enough    Anxiety       * Notice:  This list has 2 medication(s) that are the same as other medications prescribed for you. Read the directions carefully, and ask your doctor or other care provider to review them with you.

## 2018-05-18 NOTE — PROGRESS NOTES
"Nursing Notes:   Mary Carmen Najera  2018  9:54 AM  Signed  SUBJECTIVE:   Heather Guillory is here for her 6-week postpartum checkup.     PHQ-9 score:   Hx of Abuse:  No    Delivery Date: 18  Delivering provider:  Bud Armas CNM.    Type of delivery:  .  Perineum:  2nd degree laceration.     Delivery complications: None  Infant gender:  boy, weight 7 pounds 9 oz.  Feeding Method:  .  Complications reported with feeding:  none, infant thriving .    Bleeding: Moderate.  Duration:  Off and on till around 5 weeks.  Menses resumed:  No  Bowel/Urinary problems:  No    Contraception Planned:  Will discuss.  She  has not had intercourse since delivery..       Heather Guillory is a 37-year-old  patient who is presenting to clinic for her 6 week postpartum visit. Patient has been doing well since her . She delivered a healthy baby boy. Breastfeeding is going very well and she has no problems with this. She has two boys at home that love their new baby brother. Her other boys are attending  during the day to keep their routines normal. No problems with baby blues and no depressed mood. No vaginal bleeding currently stopped a few weeks ago. No pain.     Planning to talk about contraception today. She would like to talk about her options and different types while she is breastfeeding. Her  and her are planning on deciding in the next year whether or not they would like to have a 4th child.     She has a history of mixed connective tissue disorder and is on Plaquenil. She has had some joint stiffness, but continues to following with rheumatology for this.     ================================================================  ROS: 10 point ROS neg other than the symptoms noted above in the HPI.     EXAM:  /69 (BP Location: Left arm, Patient Position: Chair)  Pulse 78  Ht 1.702 m (5' 7\")  Wt 81.6 kg (180 lb)  BMI 28.19 kg/m2    General: Appears well in no acute distress.   Psych: " Normal affect.   Last PHQ-9 score on record= 0  Breasts: Appear normal. Nipples intact with no breaks in the skin. No erythema or warmth.   Incision:  Well healing episiotomy.   Vulva:  Normal appearing.   Recto-vaginal:  Normal appearing.       ASSESSMENT:   Heather Guillory is a 37-year-old  patient who is presenting to clinic for her 6 week postpartum visit who is doing well.       Normal postpartum exam after   Pregnancy was complicated by:  Yeast infections.      PLAN:    1. Contraception:   - Discussed in detail about the patient's options for contraception including different IUDs, progesterone only pills, etc.   - Patient and her  are discussing whether or not they would like to have another child and plan to decide on this in the next year   - During this time they would like to have some form of protection and the patient decided that progesterone only pills would be the best option for her currently. She will start these today.      2. Mixed Connective Tissue Disorder:   - History of MCT Disorder on Plaquenil   - Patient planning on starting OCPs no known drug-drug interactions with plaquenil   - Has had some symptoms of joint pain postpartum   - Continues to follow with Rheumatology for this     3. Routine Postpartum Care:   - Continue Breastfeeding   - Start Progesterone Only Pills today   - PAP up-to-date and not due till  (normal and negative HPV in )   - RTC in 1 year or PRN      Patient was seen and evaluated with MS4 Helene Song who acted as a scribe and prepared this note for Dr. Mansfield.     I agree with the PFSH and ROS as completed by the MS, except for changes made by me. The remainder of the encounter was performed by me and scribed by the MS. The scribed note accurately reflects my personal services and the decisions made by me.    Cate Mansfield MD, FACOG  [facility]  OB/GYN    2018  1:22 PM

## 2018-05-18 NOTE — NURSING NOTE
"SUBJECTIVE:   Heather Guillory is here for her 6-week postpartum checkup.     PHQ-9 score: ***  Hx of Abuse:  {YES +++ /NO DEFAULT NO:360246}    Delivery Date: ***.    Delivering provider:  {[facility] PROVIDERS :024445}.    Type of delivery:  {[facility] DELIVERY METHOD:886864351}.     Delivery complications: {NONE DEFAULTED:646599::\"None\"}  Infant gender:  {BOY/GIRL:402714}, weight *** pounds *** oz.  Feeding Method:  {FEEDING METHOD INFANT:22965::\"\"}.  Complications reported with feeding:  {[facility] INFANT FEEDING PROBLEMS:830350115}.    Bleeding:  {[facility] lochia:984845::\"None\"}.  Duration:  ***.  Menses resumed:  {YES:772039}  Bowel/Urinary problems:  {YES:252151}    Contraception Planned:  {CONTRACEPTIVE METHOD:5051}  She  {yes/no:377090192::\"has not had intercourse since delivery.\"}.        "

## 2018-05-18 NOTE — NURSING NOTE
SUBJECTIVE:   Heather Guillory is here for her 6-week postpartum checkup.     PHQ-9 score:   Hx of Abuse:  No    Delivery Date: 18  Delivering provider:  Bud Armas CNM.    Type of delivery:  .  Perineum:  2nd degree laceration.     Delivery complications: None  Infant gender:  boy, weight 7 pounds 9 oz.  Feeding Method:  .  Complications reported with feeding:  none, infant thriving .    Bleeding: Moderate.  Duration:  Off and on till around 5 weeks.  Menses resumed:  No  Bowel/Urinary problems:  No    Contraception Planned:  Will discuss.  She  has not had intercourse since delivery..

## 2018-05-21 ENCOUNTER — HEALTH MAINTENANCE LETTER (OUTPATIENT)
Age: 38
End: 2018-05-21

## 2018-05-21 ENCOUNTER — TELEPHONE (OUTPATIENT)
Dept: RHEUMATOLOGY | Facility: CLINIC | Age: 38
End: 2018-05-21

## 2018-05-21 DIAGNOSIS — M25.50 PAIN IN JOINT, MULTIPLE SITES: Primary | ICD-10-CM

## 2018-05-21 NOTE — TELEPHONE ENCOUNTER
Called and spoke with pt.  She just want to let Dr. King know that she is now experiencing increased joint stiffness, does last for about 30 minutes after waking in the am, and during any prolonged periods of disuse.  Pt states this was about the time that she was diagnosed after her last pregnancy, she is close to 7 weeks postpartum.    Pt is wondering if  has any suggestions for if she should go on a progesterone based birth control, she was looking at an IUD. She feels as though her disease process is connected to her hormones, so she would like to know whatever she picks, she is hoping to be on for a year or two, will not interfere in her ability to manage her CTD and also taper off her plaquenil.    Pt is aware that Dr. King is out of the office and she may not hear anything until Wednesday and is fine with that.  Will send to Dr. King for review.    Dyana Rankin RN  Rheumatology Clinic

## 2018-05-21 NOTE — TELEPHONE ENCOUNTER
[facility] Call Center    Phone Message    May a detailed message be left on voicemail: yes    Reason for Call: Other: Pt requesting a call back from Fernando's nurse regarding a new symptom, and additional questions on medication. Please advise when availabl.e     Action Taken: Message routed to:  [facility]

## 2018-05-22 NOTE — TELEPHONE ENCOUNTER
Called and spoke with pt, provided Dr. King's response.  Pt had no further questions, will get labs done tomorrow.    Dyana Rankin RN  Rheumatology Clinic

## 2018-05-22 NOTE — TELEPHONE ENCOUNTER
[facility] Call Center    Phone Message    May a detailed message be left on voicemail: yes    Reason for Call: Other: pt returning cindi's call. the pt is avail now to take your call.     Action Taken: Message routed to:  [facility]

## 2018-05-22 NOTE — TELEPHONE ENCOUNTER
Either progesterone only or IUD is fine. I ordered labs to see if there is any signs of flare. Meanwhile, she could try ibuprofen 800 mg tid prn and stay on HCQ for now.

## 2018-05-23 ENCOUNTER — OFFICE VISIT (OUTPATIENT)
Dept: OBGYN | Facility: CLINIC | Age: 38
End: 2018-05-23
Attending: OBSTETRICS & GYNECOLOGY
Payer: COMMERCIAL

## 2018-05-23 VITALS — HEIGHT: 67 IN | BODY MASS INDEX: 28.25 KG/M2 | WEIGHT: 180 LBS

## 2018-05-23 DIAGNOSIS — M25.50 PAIN IN JOINT, MULTIPLE SITES: ICD-10-CM

## 2018-05-23 DIAGNOSIS — Z30.430 ENCOUNTER FOR IUD INSERTION: Primary | ICD-10-CM

## 2018-05-23 LAB
ALBUMIN SERPL-MCNC: 3.8 G/DL (ref 3.4–5)
ALBUMIN UR-MCNC: NEGATIVE MG/DL
ALT SERPL W P-5'-P-CCNC: 17 U/L (ref 0–50)
APPEARANCE UR: CLEAR
AST SERPL W P-5'-P-CCNC: 16 U/L (ref 0–45)
BASOPHILS # BLD AUTO: 0 10E9/L (ref 0–0.2)
BASOPHILS NFR BLD AUTO: 0.6 %
BILIRUB UR QL STRIP: NEGATIVE
COLOR UR AUTO: YELLOW
CREAT SERPL-MCNC: 0.71 MG/DL (ref 0.52–1.04)
CRP SERPL-MCNC: <2.9 MG/L (ref 0–8)
DIFFERENTIAL METHOD BLD: ABNORMAL
EOSINOPHIL # BLD AUTO: 0.2 10E9/L (ref 0–0.7)
EOSINOPHIL NFR BLD AUTO: 2.4 %
ERYTHROCYTE [DISTWIDTH] IN BLOOD BY AUTOMATED COUNT: 15.9 % (ref 10–15)
ERYTHROCYTE [SEDIMENTATION RATE] IN BLOOD BY WESTERGREN METHOD: 6 MM/H (ref 0–20)
GFR SERPL CREATININE-BSD FRML MDRD: >90 ML/MIN/1.7M2
GLUCOSE UR STRIP-MCNC: NEGATIVE MG/DL
HCT VFR BLD AUTO: 41.9 % (ref 35–47)
HGB BLD-MCNC: 12.8 G/DL (ref 11.7–15.7)
HGB UR QL STRIP: NEGATIVE
IMM GRANULOCYTES # BLD: 0 10E9/L (ref 0–0.4)
IMM GRANULOCYTES NFR BLD: 0.2 %
KETONES UR STRIP-MCNC: NEGATIVE MG/DL
LEUKOCYTE ESTERASE UR QL STRIP: ABNORMAL
LYMPHOCYTES # BLD AUTO: 2.4 10E9/L (ref 0.8–5.3)
LYMPHOCYTES NFR BLD AUTO: 38.5 %
MCH RBC QN AUTO: 25.3 PG (ref 26.5–33)
MCHC RBC AUTO-ENTMCNC: 30.5 G/DL (ref 31.5–36.5)
MCV RBC AUTO: 83 FL (ref 78–100)
MONOCYTES # BLD AUTO: 0.4 10E9/L (ref 0–1.3)
MONOCYTES NFR BLD AUTO: 6.2 %
NEUTROPHILS # BLD AUTO: 3.3 10E9/L (ref 1.6–8.3)
NEUTROPHILS NFR BLD AUTO: 52.1 %
NITRATE UR QL: NEGATIVE
NRBC # BLD AUTO: 0 10*3/UL
NRBC BLD AUTO-RTO: 0 /100
PH UR STRIP: 7 PH (ref 5–7)
PLATELET # BLD AUTO: 214 10E9/L (ref 150–450)
RBC # BLD AUTO: 5.06 10E12/L (ref 3.8–5.2)
RBC #/AREA URNS AUTO: 1 /HPF (ref 0–2)
SOURCE: ABNORMAL
SP GR UR STRIP: 1.01 (ref 1–1.03)
TRANS CELLS #/AREA URNS HPF: <1 /HPF (ref 0–1)
UROBILINOGEN UR STRIP-MCNC: NORMAL MG/DL (ref 0–2)
WBC # BLD AUTO: 6.3 10E9/L (ref 4–11)
WBC #/AREA URNS AUTO: 6 /HPF (ref 0–5)

## 2018-05-23 PROCEDURE — 25000125 ZZHC RX 250: Mod: ZF

## 2018-05-23 PROCEDURE — 86039 ANTINUCLEAR ANTIBODIES (ANA): CPT | Performed by: INTERNAL MEDICINE

## 2018-05-23 PROCEDURE — 82565 ASSAY OF CREATININE: CPT | Performed by: INTERNAL MEDICINE

## 2018-05-23 PROCEDURE — 87086 URINE CULTURE/COLONY COUNT: CPT | Performed by: INTERNAL MEDICINE

## 2018-05-23 PROCEDURE — 85025 COMPLETE CBC W/AUTO DIFF WBC: CPT | Performed by: INTERNAL MEDICINE

## 2018-05-23 PROCEDURE — 85652 RBC SED RATE AUTOMATED: CPT | Performed by: INTERNAL MEDICINE

## 2018-05-23 PROCEDURE — 86160 COMPLEMENT ANTIGEN: CPT | Performed by: INTERNAL MEDICINE

## 2018-05-23 PROCEDURE — 81025 URINE PREGNANCY TEST: CPT | Mod: ZF | Performed by: OBSTETRICS & GYNECOLOGY

## 2018-05-23 PROCEDURE — 84460 ALANINE AMINO (ALT) (SGPT): CPT | Performed by: INTERNAL MEDICINE

## 2018-05-23 PROCEDURE — 86038 ANTINUCLEAR ANTIBODIES: CPT | Performed by: INTERNAL MEDICINE

## 2018-05-23 PROCEDURE — 86225 DNA ANTIBODY NATIVE: CPT | Performed by: INTERNAL MEDICINE

## 2018-05-23 PROCEDURE — 84450 TRANSFERASE (AST) (SGOT): CPT | Performed by: INTERNAL MEDICINE

## 2018-05-23 PROCEDURE — 81001 URINALYSIS AUTO W/SCOPE: CPT | Performed by: INTERNAL MEDICINE

## 2018-05-23 PROCEDURE — 82040 ASSAY OF SERUM ALBUMIN: CPT | Performed by: INTERNAL MEDICINE

## 2018-05-23 PROCEDURE — 58300 INSERT INTRAUTERINE DEVICE: CPT | Mod: ZF | Performed by: OBSTETRICS & GYNECOLOGY

## 2018-05-23 PROCEDURE — 86140 C-REACTIVE PROTEIN: CPT | Performed by: INTERNAL MEDICINE

## 2018-05-23 PROCEDURE — 36415 COLL VENOUS BLD VENIPUNCTURE: CPT | Performed by: INTERNAL MEDICINE

## 2018-05-23 ASSESSMENT — PAIN SCALES - GENERAL: PAINLEVEL: NO PAIN (0)

## 2018-05-23 NOTE — PROGRESS NOTES
"[facility] Gynecology Visit    SUBJECTIVE: Heather Guillory is a 37 year old  female s/p vaginal delivery on 2018. Here for placement of Mirena IUD. She has a history of mixed connective tissue disorder, and she wanted to check with her rheumatologist to see if an IUD would be ok.     Verification of Procedure:  Just before the procedure begans through verbal and active participation of team members, I verified:     Initials   Patient Name TG   Patient  TG   Procedure to be performed TG     Consent:  Risks, benefits of treatment, and alternative options for contraception were discussed.  Patient's questions were elicited and answered.  Written consent was obtained and scanned into medical record.       OBJECTIVE: Ht 1.702 m (5' 7.01\")  Wt 81.6 kg (180 lb)  Breastfeeding? Yes  BMI 28.19 kg/m2    Pelvic Exam:  EG/BUS: Normal genital architecture without lesions, erythema or abnormal secretions. Bartholin's, Urethra, Milan's glands are normal. Well healed perineal laceration repair   Vagina: moist, pink, rugae with creamy, white and odorlesssecretions  Cervix: Multiparous, and no lesions      PROCEDURE NOTE  --  Mirena Insertion    Reason for Insertion:  contraception.    Pregnancy test: Negative    Counseling:  Patient counseled on efficacy, benefits, risks, potential side effects of IUD.  Insertion procedure and risk of infection, perforation, and spontaneous expulsion reviewed.   Advised to plan removal and/or replacement of IUD in 6 years from today or when desired.       Heather  was not pre-medicated prior to beginning the procedure.      Under sterile technique, cervix was visualized with a medium Graves speculum and prepped with Betadine solution. The uterus was sounded to 8 cm. The Mirena IUD insertion apparatus was prepared and placed through the cervix without significant resistance and deployed at the fundus in the usual fashion. The strings were trimmed 3 cm from the external os.      Device Lot " #: [id number]  Device Expiration Date: Nov 2020     EBL:  Minimal     Complications: None      Heather Guillory tolerated procedure well.    PLAN:      Written information on IUD use reviewed and given.  Symptoms to report reviewed. To report heavy bleeding, severe cramping, or abnormal vaginal discharge.  May take Ibuprofen 400-800 mg PO TID PRN or Naproxen 500 mg PO BID for cramping. Reminded to check for IUD strings every month. Patient has been counseled to use backup birth control method for 1 week.  Return to clinic in 4-6 weeks for string check. Heather Guillory  verbalized understanding of instructions.    Patient seen with Dr. Deidra Oconnor MD  Obstetrics & Gynecology, PGY1  May 23, 2018, 11:43 AM        Answers for HPI/ROS submitted by the patient on 5/17/2018   JAVIER 7 TOTAL SCORE: 0    Agree with the above note. I examined and evaluated Heather Guillory on 5/23/2018 along with Dr. Oconnor. Assessment and plan were jointly made. I was present for the entire IUD placement procedure as detailed above.     Jenelle Gay MD  [facility]

## 2018-05-23 NOTE — LETTER
Patient:  Heather Guillory  :   1980  MRN:     [id number]        Ms.Jennifer Guillory  [address] [address]        May 28, 2018    Dear ,    Congratulations on birth of your son.     Besides positive NINOSKA (not new finding), the rest of your labs look good/stable. Please let me know if you need a course of prednisone taper for your joint pain.      Results for orders placed or performed in visit on 18   Complement C3   Result Value Ref Range    Complement C3 85 76 - 169 mg/dL   Complement C4   Result Value Ref Range    Complement C4 21 15 - 50 mg/dL   CRP inflammation   Result Value Ref Range    CRP Inflammation <2.9 0.0 - 8.0 mg/L   Erythrocyte sedimentation rate auto   Result Value Ref Range    Sed Rate 6 0 - 20 mm/h   DNA double stranded antibodies   Result Value Ref Range    DNA-ds 5 <10 IU/mL   Routine UA with micro reflex to culture   Result Value Ref Range    Color Urine Yellow     Appearance Urine Clear     Glucose Urine Negative NEG^Negative mg/dL    Bilirubin Urine Negative NEG^Negative    Ketones Urine Negative NEG^Negative mg/dL    Specific Gravity Urine 1.014 1.003 - 1.035    Blood Urine Negative NEG^Negative    pH Urine 7.0 5.0 - 7.0 pH    Protein Albumin Urine Negative NEG^Negative mg/dL    Urobilinogen mg/dL Normal 0.0 - 2.0 mg/dL    Nitrite Urine Negative NEG^Negative    Leukocyte Esterase Urine Moderate (A) NEG^Negative    Source Urine     WBC Urine 6 (H) 0 - 5 /HPF    RBC Urine 1 0 - 2 /HPF    Transitional Epi <1 0 - 1 /HPF   CBC with platelets differential   Result Value Ref Range    WBC 6.3 4.0 - 11.0 10e9/L    RBC Count 5.06 3.8 - 5.2 10e12/L    Hemoglobin 12.8 11.7 - 15.7 g/dL    Hematocrit 41.9 35.0 - 47.0 %    MCV 83 78 - 100 fl    MCH 25.3 (L) 26.5 - 33.0 pg    MCHC 30.5 (L) 31.5 - 36.5 g/dL    RDW 15.9 (H) 10.0 - 15.0 %    Platelet Count 214 150 - 450 10e9/L    Diff Method Automated Method     % Neutrophils 52.1 %    % Lymphocytes 38.5 %    %  Monocytes 6.2 %    % Eosinophils 2.4 %    % Basophils 0.6 %    % Immature Granulocytes 0.2 %    Nucleated RBCs 0 0 /100    Absolute Neutrophil 3.3 1.6 - 8.3 10e9/L    Absolute Lymphocytes 2.4 0.8 - 5.3 10e9/L    Absolute Monocytes 0.4 0.0 - 1.3 10e9/L    Absolute Eosinophils 0.2 0.0 - 0.7 10e9/L    Absolute Basophils 0.0 0.0 - 0.2 10e9/L    Abs Immature Granulocytes 0.0 0 - 0.4 10e9/L    Absolute Nucleated RBC 0.0    Creatinine   Result Value Ref Range    Creatinine 0.71 0.52 - 1.04 mg/dL    GFR Estimate >90 >60 mL/min/1.7m2    GFR Estimate If Black >90 >60 mL/min/1.7m2   AST   Result Value Ref Range    AST 16 0 - 45 U/L   ALT   Result Value Ref Range    ALT 17 0 - 50 U/L   Albumin level   Result Value Ref Range    Albumin 3.8 3.4 - 5.0 g/dL   Anti Nuclear Alissa IgG by IFA with Reflex   Result Value Ref Range    NINOSKA interpretation Positive (A) NEG^Negative    NINOSKA pattern 1 DENSE FINE SPECKLED     NINOSKA titer 1 1:160    Urine Culture Aerobic Bacterial   Result Value Ref Range    Specimen Description Unspecified Urine     Special Requests Specimen received in preservative     Culture Micro No growth        Nasrin King MD

## 2018-05-23 NOTE — MR AVS SNAPSHOT
After Visit Summary   5/23/2018    Heather Guillory    MRN: [id number]           Patient Information     Date Of Birth          1980        Visit Information        Provider Department      5/23/2018 11:00 AM Jenelle Gay MD [facility]        Today's Diagnoses     Encounter for IUD insertion    -  1      Care Instructions      IUD pamphlet reviewed and given to patient.          Follow-ups after your visit        Your next 10 appointments already scheduled     Jun 21, 2018  9:00 AM CDT   (Arrive by 8:45 AM)   Return Visit with Nasrin King MD   [facility] Rheumatology ([facility])    [address]  [address]  [facility] [address] [phone]            Jun 22, 2018  2:30 PM CDT   Return Visit with Cate Mansfield MD   [facility] ([facility])    [place] [facility] [address]  [address]  [facility] [address] [phone]              Who to contact     Please call your clinic at [phone] to:    Ask questions about your health    Make or cancel appointments    Discuss your medicines    Learn about your test results    Speak to your doctor            Additional Information About Your Visit        [employer]oph[employer] Information     [employer] gives you secure access to your electronic health record. If you see a primary care provider, you can also send messages to your care team and make appointments. If you have questions, please call your primary care clinic.  If you do not have a primary care provider, please call [phone] and they will assist you.      [employer] is an electronic gateway that provides easy, online access to your medical records. With [employer], you can request a clinic appointment, read your test results, renew a prescription or communicate with your care team.     To access your existing account, please contact your [facility]  "Clinic or call [phone] for assistance.        Care EveryWhere ID     This is your Care EveryWhere ID. This could be used by other organizations to access your [place] medical records  [id number]        Your Vitals Were     Height Breastfeeding? BMI (Body Mass Index)             1.702 m (5' 7.01\") Yes 28.19 kg/m2          Blood Pressure from Last 3 Encounters:   05/18/18 102/69   04/06/18 107/75   04/02/18 111/71    Weight from Last 3 Encounters:   05/23/18 81.6 kg (180 lb)   05/18/18 81.6 kg (180 lb)   04/04/18 86.2 kg (190 lb)              We Performed the Following     hCG qual urine POCT [POC7]     Insertion of IUD [09237]          Today's Medication Changes          These changes are accurate as of 5/23/18 12:03 PM.  If you have any questions, ask your nurse or doctor.               Start taking these medicines.        Dose/Directions    levonorgestrel 20 MCG/24HR IUD   Commonly known as:  MIRENA (52 MG)   Used for:  Encounter for IUD insertion   Started by:  Jenelle Gay MD        Dose:  1 each   1 each (20 mcg) by Intrauterine route once for 1 dose   Quantity:  1 each   Refills:  0         These medicines have changed or have updated prescriptions.        Dose/Directions    hydroxychloroquine 200 MG tablet   Commonly known as:  PLAQUENIL   This may have changed:  Another medication with the same name was removed. Continue taking this medication, and follow the directions you see here.   Changed by:  Jenelle Gay MD        Dose:  200 mg   Take 200 mg by mouth 2 times daily   Refills:  0         Stop taking these medicines if you haven't already. Please contact your care team if you have questions.     ferrous sulfate 325 (65 Fe) MG tablet   Commonly known as:  IRON   Stopped by:  Jenelle Gay MD           norethindrone 0.35 MG per tablet   Commonly known as:  MICRONOR   Stopped by:  Jenelle Gay MD           senna-docusate 8.6-50 MG per tablet   Commonly known " as:  JOSHUA-COLACE   Stopped by:  Jenelle Gay MD                Where to get your medicines      Some of these will need a paper prescription and others can be bought over the counter.  Ask your nurse if you have questions.     You don't need a prescription for these medications     levonorgestrel 20 MCG/24HR IUD                Primary Care Provider Office Phone # Fax #    Cate Vale Mansfield [phone]       [address] [facility] [address]  [facility] [address]        Equal Access to Services     [facility]: Hadii aad ku hadasho Soomaali, waaxda luqadaha, qaybta kaalmada adeegyada, waxay idiin hayaan adesumi graham . So [facility] [phone].    ATENCIÓN: Si habla español, tiene a nelson disposición servicios gratuitos de asistencia lingüística. [facility] [phone].    We comply with applicable federal civil rights laws and [place] laws. We do not discriminate on the basis of race, color, national origin, age, disability, sex, sexual orientation, or gender identity.            Thank you!     Thank you for choosing [facility]  for your care. Our goal is always to provide you with excellent care. Hearing back from our patients is one way we can continue to improve our services. Please take a few minutes to complete the written survey that you may receive in the mail after your visit with us. Thank you!             Your Updated Medication List - Protect others around you: Learn how to safely use, store and throw away your medicines at www.disposemymeds.org.          This list is accurate as of 18 12:03 PM.  Always use your most recent med list.                   Brand Name Dispense Instructions for use Diagnosis    hydroxychloroquine 200 MG tablet    PLAQUENIL     Take 200 mg by mouth 2 times daily        ibuprofen 600 MG tablet    ADVIL/MOTRIN    30 tablet    Take 1 tablet (600 mg) by mouth every 6 hours as needed for moderate pain     (normal spontaneous  vaginal delivery)       levonorgestrel 20 MCG/24HR IUD    MIRENA (52 MG)    1 each    1 each (20 mcg) by Intrauterine route once for 1 dose    Encounter for IUD insertion       Prenatal Vitamins 28-0.8 MG Tabs      Take 1 tablet by mouth daily    Supervision of high-risk pregnancy of elderly multigravida       sertraline 25 MG tablet    ZOLOFT    90 tablet    Take 2 tablets (50 mg) by mouth daily Increase to 50 mg daily after one week if symptoms improved but not enough    Anxiety

## 2018-05-24 LAB
ANA PAT SER IF-IMP: ABNORMAL
ANA SER QL IF: POSITIVE
ANA TITR SER IF: ABNORMAL {TITER}
BACTERIA SPEC CULT: NO GROWTH
C3 SERPL-MCNC: 85 MG/DL (ref 76–169)
C4 SERPL-MCNC: 21 MG/DL (ref 15–50)
HCG UR QL: NEGATIVE
INTERNAL QC OK POCT: YES
Lab: NORMAL
SPECIMEN SOURCE: NORMAL

## 2018-05-25 LAB — DSDNA AB SER-ACNC: 5 IU/ML

## 2018-05-28 NOTE — PROGRESS NOTES
Besides positive NINOSKA (not new finding), the rest of your labs look good/stable. Please let me know if you need a course of prednisone taper for your joint pain.

## 2018-05-31 ENCOUNTER — TELEPHONE (OUTPATIENT)
Dept: OBGYN | Facility: CLINIC | Age: 38
End: 2018-05-31

## 2018-05-31 NOTE — TELEPHONE ENCOUNTER
Patient called to discuss light bleeding she is experiencing after IUD insertion one week ago. Pt states she had no bleeding for several days. 3 days after IUD insertion began spotting and bleeding has been as much as a light period this week. Pt denies pain/cramping, denies foul odor or abnormal discharge, denies fever or pelvic tenderness.     Informed pt with new progesterone IUD may experiencing spotting or light bleeding for several months. Advised to observe for and call if experiences heavy bleeding, pain, or s/sx infection which were discussed.    Patient expressed understanding and has no further questions at this time.

## 2018-06-01 ENCOUNTER — OFFICE VISIT (OUTPATIENT)
Dept: OBGYN | Facility: CLINIC | Age: 38
End: 2018-06-01
Attending: OBSTETRICS & GYNECOLOGY
Payer: COMMERCIAL

## 2018-06-01 VITALS
SYSTOLIC BLOOD PRESSURE: 105 MMHG | HEIGHT: 67 IN | BODY MASS INDEX: 28.25 KG/M2 | WEIGHT: 180 LBS | HEART RATE: 79 BPM | DIASTOLIC BLOOD PRESSURE: 66 MMHG

## 2018-06-01 DIAGNOSIS — Z30.431 CONTRACEPTIVE, SURVEILLANCE, INTRAUTERINE DEVICE: Primary | ICD-10-CM

## 2018-06-01 PROCEDURE — G0463 HOSPITAL OUTPT CLINIC VISIT: HCPCS | Mod: ZF

## 2018-06-01 NOTE — LETTER
"2018     RE: Heather Guillory  [address]  [place] [address]     Dear Colleague,    Thank you for referring your patient, Heather Guillory, to the [facility] at [facility]. Please see a copy of my visit note below.    SUBJECTIVE:  Heather Guillory is a 38 yr old female, , who presents to clinic today for an IUD check.    Heather had a Mirena IUD placed 2018. She is s/p vaginal delivery 2018.  Pt reports she had no bleeding for the first 3 days after placement. Then began spotting and has had bleeding as much as a light period for the last several days.  Yesterday pt had mild right-sided pelvic cramping and low back pain; this has since resolved.  She did not take anything to relieve the pain.  Pt denies vaginal foul odor, abnormal vaginal discharge, and fever.  Pt had intercourse 7 days after the IUD was placed; this was the first time she had intercourse since her delivery. She had already had the start of bleeding before that time.  UPT was negative on 2018. Pt is in a monogamous relationship and denies risk for STDs. She has tried to feel for her IUD strings, but was not able to feel them.  Heather is exclusively breastfeeding; breastfeeding every 3-4 hours during the day.      OBJECTIVE:  /66 (BP Location: Right arm, Patient Position: Chair)  Pulse 79  Ht 1.702 m (5' 7.01\")  Wt 81.6 kg (180 lb)  BMI 28.18 kg/m2  General: pleasant female in no acute distress  Pelvic Exam:  Vulva: No external lesions, normal hair distribution, no adenopathy  Vagina: Moist, pink, scant amount of bright red bleeding, well rugated, no lesions  Cervix: parous, smooth, pink, no visible lesions; IUD strings extend 3cm from the external cervical os (wrapped back on cervix at 11 o'clock position)  Uterus: Normal size, anteverted, non-tender, mobile  Ovaries: No mass, non-tender, mobile    ASSESSMENT & PLAN:  Heather is a 38 yr old " female who presents for an IUD string check 1 week after insertion due to intermittent cramping and light bleeding. IUD strings appear the expected length extending from the external cervical os. She has no pain on exam and denies risk for STDs.  Heather was provided with reassurance; discussed normal bleeding pattern with the Mirena IUD and that she may experience intermittent mild cramping for the first several weeks after placement. Provided counseling & recommendations for how to feel her IUD strings.  If pain becomes severe (not relieved with ibuprofen or tylenol), she develops fever, abnormal discharge, or heavy bleeding, pt was instructed to return to clinic.  Plan for pt to follow-up for routine string check in 3-5 weeks.      Leticia Kinney, DNP, APRN, WHNP

## 2018-06-01 NOTE — PROGRESS NOTES
"SUBJECTIVE:  Heather Guillory is a 38 yr old female, , who presents to clinic today for an IUD check.    Heather had a Mirena IUD placed 2018. She is s/p vaginal delivery 2018.  Pt reports she had no bleeding for the first 3 days after placement. Then began spotting and has had bleeding as much as a light period for the last several days.  Yesterday pt had mild right-sided pelvic cramping and low back pain; this has since resolved.  She did not take anything to relieve the pain.  Pt denies vaginal foul odor, abnormal vaginal discharge, and fever.  Pt had intercourse 7 days after the IUD was placed; this was the first time she had intercourse since her delivery. She had already had the start of bleeding before that time.  UPT was negative on 2018. Pt is in a monogamous relationship and denies risk for STDs. She has tried to feel for her IUD strings, but was not able to feel them.  Heather is exclusively breastfeeding; breastfeeding every 3-4 hours during the day.      OBJECTIVE:  /66 (BP Location: Right arm, Patient Position: Chair)  Pulse 79  Ht 1.702 m (5' 7.01\")  Wt 81.6 kg (180 lb)  BMI 28.18 kg/m2  General: pleasant female in no acute distress  Pelvic Exam:  Vulva: No external lesions, normal hair distribution, no adenopathy  Vagina: Moist, pink, scant amount of bright red bleeding, well rugated, no lesions  Cervix: parous, smooth, pink, no visible lesions; IUD strings extend 3cm from the external cervical os (wrapped back on cervix at 11 o'clock position)  Uterus: Normal size, anteverted, non-tender, mobile  Ovaries: No mass, non-tender, mobile    ASSESSMENT & PLAN:  Heather is a 38 yr old female who presents for an IUD string check 1 week after insertion due to intermittent cramping and light bleeding. IUD strings appear the expected length extending from the external cervical os. She has no pain on exam and denies risk for STDs.  Heather was provided with reassurance; " discussed normal bleeding pattern with the Mirena IUD and that she may experience intermittent mild cramping for the first several weeks after placement. Provided counseling & recommendations for how to feel her IUD strings.  If pain becomes severe (not relieved with ibuprofen or tylenol), she develops fever, abnormal discharge, or heavy bleeding, pt was instructed to return to clinic.  Plan for pt to follow-up for routine string check in 3-5 weeks.      Leticia Kinney, DNP, APRN, WHNP

## 2018-06-01 NOTE — MR AVS SNAPSHOT
After Visit Summary   6/1/2018    Heather Guillory    MRN: [id number]           Patient Information     Date Of Birth          1980        Visit Information        Provider Department      6/1/2018 9:30 AM Leticia Kinney APRN [facility]        Today's Diagnoses     Contraceptive, surveillance, intrauterine device    -  1       Follow-ups after your visit        Your next 10 appointments already scheduled     Jun 21, 2018  9:00 AM CDT   (Arrive by 8:45 AM)   Return Visit with Nasrin King MD   [facility] ([facility])    [address]  [address]  [facility] [address] [phone]            Jun 22, 2018  2:30 PM CDT   Return Visit with Cate Mansfield MD   [facility] ([facility])    [facility] [address]  [address] [facility] [address]  [address]  [facility] [id number] [phone]              Who to contact     Please call your clinic at [phone] to:    Ask questions about your health    Make or cancel appointments    Discuss your medicines    Learn about your test results    Speak to your doctor            Additional Information About Your Visit        [employer] Information     [employer] gives you secure access to your electronic health record. If you see a primary care provider, you can also send messages to your care team and make appointments. If you have questions, please call your primary care clinic.  If you do not have a primary care provider, please call [phone] and they will assist you.      [employer] is an electronic gateway that provides easy, online access to your medical records. With [employer], you can request a clinic appointment, read your test results, renew a prescription or communicate with your care team.     To access your existing account, please contact your [facility] or call [phone] for assistance.       "  Care EveryWhere ID     This is your Care EveryWhere ID. This could be used by other organizations to access your [facility] medical records  [id number]        Your Vitals Were     Pulse Height BMI (Body Mass Index)             79 1.702 m (5' 7.01\") 28.18 kg/m2          Blood Pressure from Last 3 Encounters:   06/01/18 105/66   05/18/18 102/69   04/06/18 107/75    Weight from Last 3 Encounters:   06/01/18 81.6 kg (180 lb)   05/23/18 81.6 kg (180 lb)   05/18/18 81.6 kg (180 lb)              Today, you had the following     No orders found for display       Primary Care Provider Office Phone # Fax #    Cate Mansfield [phone]       [address] [facility] [address]  [facility] [address]        Equal Access to Services     [facility]: Hadii juanjo galarza hadasho [place], waaxda luqadaha, qaybta kaalmada adeegyada, john graham . So [facility] [phone].    ATENCIÓN: Si habla español, tiene a nelson disposición servicios gratuitos de asistencia lingüística. Julieta al [phone].    We comply with applicable federal civil rights laws and [place] laws. We do not discriminate on the basis of race, color, national origin, age, disability, sex, sexual orientation, or gender identity.            Thank you!     Thank you for choosing [facility]  for your care. Our goal is always to provide you with excellent care. Hearing back from our patients is one way we can continue to improve our services. Please take a few minutes to complete the written survey that you may receive in the mail after your visit with us. Thank you!             Your Updated Medication List - Protect others around you: Learn how to safely use, store and throw away your medicines at www.disposemymeds.org.          This list is accurate as of 6/1/18 10:12 AM.  Always use your most recent med list.                   Brand Name Dispense Instructions for use Diagnosis    hydroxychloroquine 200 MG " tablet    PLAQUENIL     Take 200 mg by mouth 2 times daily        ibuprofen 600 MG tablet    ADVIL/MOTRIN    30 tablet    Take 1 tablet (600 mg) by mouth every 6 hours as needed for moderate pain     (normal spontaneous vaginal delivery)       levonorgestrel 20 MCG/24HR IUD    MIRENA (52 MG)    1 each    1 each (20 mcg) by Intrauterine route once for 1 dose    Encounter for IUD insertion       Prenatal Vitamins 28-0.8 MG Tabs      Take 1 tablet by mouth daily    Supervision of high-risk pregnancy of elderly multigravida       sertraline 25 MG tablet    ZOLOFT    90 tablet    Take 2 tablets (50 mg) by mouth daily Increase to 50 mg daily after one week if symptoms improved but not enough    Anxiety

## 2018-06-21 ENCOUNTER — TELEPHONE (OUTPATIENT)
Dept: RHEUMATOLOGY | Facility: CLINIC | Age: 38
End: 2018-06-21

## 2018-06-21 NOTE — TELEPHONE ENCOUNTER
[facility] Call Center    Phone Message    May a detailed message be left on voicemail: no    Reason for Call: Other: Pt of Dr. King, pt had to cancel this morning due to the flu.  Pt needs a sooner appt with Dr. King sooner than November, pt would like a call back     Action Taken: Message routed to:  [facility] ([facility] Rheum

## 2018-06-29 ENCOUNTER — OFFICE VISIT (OUTPATIENT)
Dept: OBGYN | Facility: CLINIC | Age: 38
End: 2018-06-29
Attending: OBSTETRICS & GYNECOLOGY
Payer: COMMERCIAL

## 2018-06-29 ENCOUNTER — OFFICE VISIT (OUTPATIENT)
Dept: RHEUMATOLOGY | Facility: CLINIC | Age: 38
End: 2018-06-29
Attending: INTERNAL MEDICINE
Payer: COMMERCIAL

## 2018-06-29 VITALS
BODY MASS INDEX: 28.09 KG/M2 | SYSTOLIC BLOOD PRESSURE: 105 MMHG | WEIGHT: 179 LBS | DIASTOLIC BLOOD PRESSURE: 70 MMHG | OXYGEN SATURATION: 98 % | TEMPERATURE: 97.9 F | HEIGHT: 67 IN | HEART RATE: 71 BPM

## 2018-06-29 VITALS — HEIGHT: 67 IN | HEART RATE: 73 BPM | DIASTOLIC BLOOD PRESSURE: 65 MMHG | SYSTOLIC BLOOD PRESSURE: 99 MMHG

## 2018-06-29 DIAGNOSIS — M35.9 UNDIFFERENTIATED CONNECTIVE TISSUE DISEASE (H): Primary | ICD-10-CM

## 2018-06-29 DIAGNOSIS — Z30.431 CONTRACEPTIVE, SURVEILLANCE, INTRAUTERINE DEVICE: Primary | ICD-10-CM

## 2018-06-29 PROCEDURE — G0463 HOSPITAL OUTPT CLINIC VISIT: HCPCS | Mod: ZF

## 2018-06-29 RX ORDER — HYDROXYCHLOROQUINE SULFATE 200 MG/1
200 TABLET, FILM COATED ORAL 2 TIMES DAILY
Qty: 60 TABLET | Refills: 5 | Status: SHIPPED | OUTPATIENT
Start: 2018-06-29 | End: 2019-02-22

## 2018-06-29 ASSESSMENT — PAIN SCALES - GENERAL: PAINLEVEL: NO PAIN (0)

## 2018-06-29 NOTE — PROGRESS NOTES
"IUD string check.  Placed 6 wks ago.  No issues. S/p IC w/o difficulty. Still having some bleeding.     Vitals:    06/29/18 1044   BP: 99/65   BP Location: Left arm   Patient Position: Chair   Pulse: 73   Height: 1.702 m (5' 7\")     Spec: blood in vagina  IUD strings at 2.5-3 cm from os    A/p: IUD in correct location  F/u PRN    Cate Mansfield MD, FACOG  [facility]  OB/GYN    6/29/2018  11:56 AM      "

## 2018-06-29 NOTE — MR AVS SNAPSHOT
After Visit Summary   6/29/2018    Heather Guillory    MRN: [id number]           Patient Information     Date Of Birth          1980        Visit Information        Provider Department      6/29/2018 11:30 AM Nasrin King MD [facility]        Today's Diagnoses     Undifferentiated connective tissue disease (H)    -  1      Care Instructions    Eye exam needs to get done    Return in 4-5 months          Follow-ups after your visit        Your next 10 appointments already scheduled     Oct 26, 2018  1:30 PM CDT   (Arrive by 1:15 PM)   Return Visit with Nasrin King MD   [facility] ([facility])    [address]  [address]  [facility] [address] [phone]              Who to contact     If you have questions or need follow up information about today's clinic visit or your schedule please contact [facility] RHEUMAT[facility] directly at [phone].  Normal or non-critical lab and imaging results will be communicated to you by MyChart, letter or phone within 4 business days after the clinic has received the results. If you do not hear from us within 7 days, please contact the clinic through [employer] or phone. If you have a critical or abnormal lab result, we will notify you by phone as soon as possible.  Submit refill requests through [employer] or call your pharmacy and they will forward the refill request to us. Please allow 3 business days for your refill to be completed.          Additional Information About Your Visit        MyChart Information     [employer] gives you secure access to your electronic health record. If you see a primary care provider, you can also send messages to your care team and make appointments. If you have questions, please call your primary care clinic.  If you do not have a primary care provider, please call [phone] and they will assist you.        Care EveryWhere ID     This is your Care EveryWhere ID. This  "could be used by other organizations to access your [facility] medical records  [id number]        Your Vitals Were     Pulse Temperature Height Pulse Oximetry BMI (Body Mass Index)       71 97.9  F (36.6  C) (Oral) 1.702 m (5' 7\") 98% 28.04 kg/m2        Blood Pressure from Last 3 Encounters:   06/29/18 105/70   06/29/18 99/65   06/01/18 105/66    Weight from Last 3 Encounters:   06/29/18 81.2 kg (179 lb)   06/01/18 81.6 kg (180 lb)   05/23/18 81.6 kg (180 lb)              Today, you had the following     No orders found for display         Where to get your medicines      These medications were sent to [facility] [address] [place] [facility] - [address]  [address] [address]     Phone:  [phone]     hydroxychloroquine 200 MG tablet          Primary Care Provider Office Phone # Fax #    Cate Mansfield [phone]       [address] [place] [address] [address]  [facility] [address]        Equal Access to Services     [facility]: Hadii aad ku hadasho Soomaali, waaxda luqadaha, qaybta kaalmada adeegyada, john reyna haybriin cecelia graham . So [facility] [phone].    ATENCIÓN: Si habla español, tiene a nelson disposición servicios gratuitos de asistencia lingüística. Llame al [phone].    We comply with applicable federal civil rights laws and [place] laws. We do not discriminate on the basis of race, color, national origin, age, disability, sex, sexual orientation, or gender identity.            Thank you!     Thank you for choosing [facility] RHEUMATOLOGY  for your care. Our goal is always to provide you with excellent care. Hearing back from our patients is one way we can continue to improve our services. Please take a few minutes to complete the written survey that you may receive in the mail after your visit with us. Thank you!             Your Updated Medication List - Protect others around you: Learn how to safely use, store and throw away your medicines at " www.disposemymeds.org.          This list is accurate as of 18 11:59 PM.  Always use your most recent med list.                   Brand Name Dispense Instructions for use Diagnosis    hydroxychloroquine 200 MG tablet    PLAQUENIL    60 tablet    Take 1 tablet (200 mg) by mouth 2 times daily    Undifferentiated connective tissue disease (H)       ibuprofen 600 MG tablet    ADVIL/MOTRIN    30 tablet    Take 1 tablet (600 mg) by mouth every 6 hours as needed for moderate pain     (normal spontaneous vaginal delivery)       levonorgestrel 20 MCG/24HR IUD    MIRENA (52 MG)    1 each    1 each (20 mcg) by Intrauterine route once for 1 dose    Encounter for IUD insertion       Prenatal Vitamins 28-0.8 MG Tabs      Take 1 tablet by mouth daily    Supervision of high-risk pregnancy of elderly multigravida       sertraline 25 MG tablet    ZOLOFT    90 tablet    Take 2 tablets (50 mg) by mouth daily Increase to 50 mg daily after one week if symptoms improved but not enough    Anxiety

## 2018-06-29 NOTE — PROGRESS NOTES
Rheumatology Visit     Heather Guillory MRN# [id number]   YOB: 1980 Age: 38 year old     Date of Last Visit: 3/2/2018  DOS: 2018       Assessment and Plan:   Undifferentiated connective tissue disease (UCTD) and pregnancy via IVF (s/p delivery 2018):    Heather is a 37 yo WF , a former patient of Dr. Brewer. She was diagnosed with MCTD in 2016, 6 wk postpartum based on fatigue, arthritis, mild Raynaud's, low positive NINOSKA 1.1 and low positive RNP Ab 1.6. She is on  mg qd since 2017 with great response.  UCTD continues to be most likely diagnosis not MCTD as she does not have classic features of MCTD, had very low titer of RNP Ab in the past, (negative on most recent check), and had a mild disease which never required prednisone.  All autoimmunity labs (2017) came back negative (except + NINOSKA) which is further reassuring for UCTD, including APS panel, repeat RNP and inflammatory markers, dsDNA and complement levels.      She had neg SSA/SSB antibodies in 2016, no concern for CHB. No need to re-check.  APS panel was neg.  She had neg anti-Sm, neg anti-DNA and neg centromere Ab in 2016.    Today:  Disease continues to be under good control on HCQ. She did not flare during pregnancy. She had about 2 wk increased arthralgia around  which responded to NSAIDs, labs were not suggestive of a flare. Today, is asymptomatic.    Recommend to continue  mg bid. But HCQ to be tapered at next visit if no flare of UCTD.    Was reminded to have eye exam on HCQ.    RTC in 4-5 months                Active Problem List:     Patient Active Problem List    Diagnosis Date Noted     Polyhydramnios 2018     Priority: Medium     Xerosis cutis 2017     Priority: Medium     Overview:       Xerosis of the hands which is moderately severe and associated with fissuring.       Family history of genetic disorder 2015     Priority: Medium     Patient's sister's son with Elijah's  disease       Female infertility 04/04/2014     Priority: Medium     IVF with first pregnancy.       Anxiety 04/04/2014     Priority: Medium     1/15/18: on 25mg Zoloft, discussed increasing to 50mg  Pt plans to increase    Plan 1-2 week pp mood check       Tension headache 03/03/2014     Priority: Medium     Family history of malignant neoplasm of breast 07/12/2013     Priority: Medium     Overview:   Paternal GM       Decreased libido 12/12/2012     Priority: Medium     Overview:   Due to Effexor.       Irritable colon 10/26/2009     Priority: Medium     Panic disorder without agoraphobia 02/06/2008     Priority: Medium     Overview:   Panic Disorder w/o Agoraphobia       Eating disorder 02/05/2004     Priority: Medium     History of, feels stable now.              History of Present Illness:   Heather Guillory presents for f/u for probable mixed connective tissue disease. Last seen in 1-17, when HCQ was continued.    Background: received diagnosis of mixed connective tissue given pain in hand and feet, stiffness, Raynaud's  Syndrome, fatigue, positive NINOSKA and anti-RNP in 2016. Ms. Guillory first experienced swelling and pain in her fingers and feet during her second pregnancy this past spring that prevented her from wearing rings on her fingers and provoked an evaluation for DVT, which was negative. Despite weight loss post partum, her pain, mild swelling, and stiffness in her digits persisted. She described the pain as a 7/10 when she wakes to feed her infant in the middle of the night and in the morning that decreases to a 2-3/10 over the course of the day. Stiffness lasts 30 mins to 1 hour in the morning. In general she feels tired all the time, but cannot determine if it is associated with that fact that she has two young children and is breastfeeding every 2 hours or if it is related to her joint symptoms. She was evaluated by an internist who found that she had a positive NINOSKA/ She saw Dr. Iniguez in 8-16 who  discovered +RNP. She started Plaquenil 200 mg BID.    Interval history 7/14/17  Heather Guillory is stable since her last visit in January, despite feeling more fatigued recently. She denied arthralgias/myalgias but reports having sore feet/toes at end of day and when she first wakes up. She states the fatigue worsened since she ran out of Zoloft 3.5 weeks ago; also reports irritability and no sleeping soundly during the night since that time too. Zoloft prescribed by her OBGYN.     She is currently receiving fertility treatment and plans to undergo embryo transfer on 7/24/17. She remains on HCQ and feels comfortable continuing this medication during pregnancy. She wonders if taking HCQ once daily (400) rather than 200 twice daily is acceptable.  She is using estrogen therapy in preparation for the embryo transfer, and relates that her gynecologist recommends that she remained on the supplement for approximately one month after the transfer.    She recently pinched a nerve in her neck and has numbness/tingling with burning sensation in her left arm that radiates down into her 1st-3rd digits. She was evaluated at [facility] orthopedics, received percocet for pain and told to have PT. If it did not improve in 6 weeks, she will return for another evaluation.     Lastly, she had dry, rough skin on lateral aspects of fingers. She states this often occurs in winter due to weather but it is new for her this summer. She is using a lot of moisturizers but it has not been helping.         9/28/17:   Heather is former patient of Dr. Brewer, is seeing me for 2nd opinion and is transferring care. She is currently pregnant and is concerned about her flare of MCTD during pregnancy, especially worried about flare being triggered by IVF.     She had her 1st pregnancy in 2/2015. It was via IVF. It was unexplained fertility. It was uneventful. Her son was born term. His 2nd son was born natuarally in 5/2016, term and healthy.       At 6  week post partum check up, she mentioned AM stiffness and stiffness of hands (new), feet were painful. Hands were swollen. Had fatigue, was breastfeeding att hat time.    No hair loss, skin rash, photosensitivity (but burns easily), oral/nasal ulcers, or sicca.    Has h/o Raynaud's since teen. Fingers turn white, toes turn purple, not painful. They feel cold not painful.    No myositis.     No serositis.    No seziures, headaches, psychosis.    Has sensitive stomach.    No fevers.     No h/o proteinuria.    She was diagnosed with MTCD in 6/2016. She started taking HCQ in 9/2016.  She started HCQ after she stopped breastfeeding. Had one flare up after stopping breastfeeding. She started  bid, last eye exam was this summer of 2017.     No prednisone.    Lack of sleep causes joint pain.     She did another round of IVF in 4/2017.  She was on OC x 10 wk . Embryo transfer was 7/14/2017. Now is off estrogen, progestron x 2 wk, now 12 wk pregnant.     No flare of her disease after IVF.    She was on ASA 81 mg qd till she was confirmed to be pregnant.    No preganncy loses. No thrombosis.    Feet feel sore and achy only with lack of sleep.     JOSEPH is 4/11/2018.    Interval History 12/14/17:  Heather has no major concerns or changes in symptoms at today's appointment.    Hasn't experienced any joint pains for about 9-12 months.  No stiffness in her muscles or joints.  She hasn't had any Raynaud's and notes that she is keeping her hands and feet adequately covered and warm.      She does note that she has been fatigued lately but attributes that to her 2 toddlers at home who aren't sleeping throughout the night.  Also notes easy bruising but thinks that it is chronic.  Denies any hair loss, mouth sores/ulcers, HA, F/chills, night sweats.    She is taking hydroxychloroquine and is tolerating it well.      No significant infections although she wanted to inform us about her UTI with ESBL bacteria which was diagnosed  when she was 8 weeks pregnant.  At that time, she was hospitalized and given IV antibiotics.  Ever since, she has experienced recurring yeast infections.  She tried diflucan x 3 without improvement.  Finally, she has changed her diet, cutting out sugars, which is improving her symptoms.      3/2/2018: Feeling very well with no flare ups or joint pain due to UCTD. Pregnancy is going well as well, she is due on 4/11/2018. She still has problem with vaginal yeast infection, is allergic to topical anti-fungal Tx, unresponsive to one time fluconazole tx, was offered to try 7 days of fluconazole but prefers to hold off as pregnancy is almost over and per her experience, vaginal yeast infection revolves after delivery.    Today: Doing well post delivery. Delivered a healthy boy on 4/5/2018 and is breastfeeding, not sure if she will have another child, has an IUD placed in 5/2018. On 5/23, contacted us with recurrence of arthralgia (hands, feet x 10-14days) which self-resolved, took some NSAIDs, today is feeling well with no complaints. Labs on 5/23 were not suggestive of a flare.          Review of Systems:   A comprehensive ROS was done. Positives are per HPI.          Past Medical History:     Past Medical History:   Diagnosis Date     Abnormal Pap smear     Over 10 years ago     Anxiety      E coli infection     ESBL     History of extended-spectrum beta-lactamase producing Escherichia coli infection      Hx of previous reproductive problem 12/2013    Infertility     Mixed connective tissue disease (H)     Dr. Steele- switching      Nephrolithiasis      Past Surgical History:   Procedure Laterality Date     BIOPSY  April 2014    Uterine polyp removed     DILATION AND CURETTAGE SUCTION       OPERATIVE HYSTEROSCOPY WITH MORCELLATOR  4/8/2014    Procedure: OPERATIVE HYSTEROSCOPY WITH MORCELLATOR;  Hysteroscopic Polypectomy;  Surgeon: Cate Mansfield MD;  Location: [facility] OR     Raynaud's syndrome since puberty. Her  main trigger is cold.  She had two uneventful pregnancies with vaginal births, though the conception of her first child required IVF.   she is underwent embryo transfer in July 2017, now pregnant.   cervical radiculopathy, 2017.       Social History:     Social History     Occupational History      [employer]     Social History Main Topics     Smoking status: Never Smoker     Smokeless tobacco: Never Used     Alcohol use No     Drug use: No     Sexual activity: Yes     Partners: Male     Birth control/ protection: IUD      Comment: Mirena            Family History:     Family History   Problem Relation Age of Onset     Diabetes Paternal Grandfather      C.A.D. Paternal Grandfather      Hypertension Paternal Grandfather      Other Cancer Paternal Grandfather      Lung     Breast Cancer Paternal Grandmother      Cancer Paternal Grandmother      lung ca     Osteoperosis Paternal Grandmother      Hypertension Paternal Grandmother      Cancer - colorectal Maternal Grandfather      Colon Cancer Maternal Grandfather      Cancer Maternal Grandmother      cervical ca     Neurologic Disorder Maternal Grandmother      alzheimers     Alzheimer Disease Maternal Grandmother      Cardiovascular Brother 28     cardiomyopathy     Genetic Disorder Sister 21     una's disease     HEART DISEASE Paternal Uncle 55     mitral valve replacement     Anxiety Disorder Sister      Anxiety Disorder Brother      Genetic Disorder Sister      Una's Disease     No history of AI disease       Allergies:     Allergies   Allergen Reactions     Kiwi Other (See Comments)     Throat itching     Benzoyl Peroxide Swelling     Duricef [Cefadroxil] Unknown     Monistat [Miconazole] Swelling     Sulfa Drugs Rash            Medications:     Current Outpatient Prescriptions   Medication Sig Dispense Refill     hydroxychloroquine (PLAQUENIL) 200 MG tablet Take 200 mg by mouth 2 times daily       ibuprofen (ADVIL/MOTRIN) 600 MG tablet  "Take 1 tablet (600 mg) by mouth every 6 hours as needed for moderate pain 30 tablet 1     levonorgestrel (MIRENA, 52 MG,) 20 MCG/24HR IUD 1 each (20 mcg) by Intrauterine route once for 1 dose 1 each 0     Prenatal Vit-Fe Fumarate-FA (PRENATAL VITAMINS) 28-0.8 MG TABS Take 1 tablet by mouth daily        sertraline (ZOLOFT) 25 MG tablet Take 2 tablets (50 mg) by mouth daily Increase to 50 mg daily after one week if symptoms improved but not enough 90 tablet 6            Physical Exam:   Blood pressure 105/70, pulse 71, temperature 97.9  F (36.6  C), temperature source Oral, height 1.702 m (5' 7\"), weight 81.2 kg (179 lb), SpO2 98 %, currently breastfeeding.  Wt Readings from Last 4 Encounters:   06/29/18 81.2 kg (179 lb)   06/01/18 81.6 kg (180 lb)   05/23/18 81.6 kg (180 lb)   05/18/18 81.6 kg (180 lb)     Constitutional: well-developed, appearing stated age; cooperative  Eyes: nl EOM, PERRLA, vision, conjunctiva, sclera  ENT: nl external ears, nose, hearing, lips, teeth, gums, throat  No mucous membrane lesions, normal saliva pool  Neck: no mass or thyroid enlargement  Resp: lungs clear to auscultation  CV: RRR, no murmurs, rubs or gallops, no edema  GI: no ABD tenderness  Lymph: no cervical, supraclavicular nodes  MS: no active synovitis or joint tenderness  Skin: no skin rash  Neuro: nl cranial nerves, strength  Psych: nl affect.         Data:     Results for orders placed or performed in visit on 05/23/18   hCG qual urine POCT [POC7]   Result Value Ref Range    HCG Qual Urine Negative neg    Internal QC OK Yes      Labs from outside laboratory reviewed from 8/12/16  RNP antibodies 1.6- positive    Negative for Parvo Virus B19, Lyme Disease  Negative SLE panel   Negative SSA, SSB  Negative Anti-Sury-1  Negative Centromere B antibodies  Negative  CCP  Recent Labs   Lab Test  07/22/16   1110  05/29/16   0651  05/27/16   1032   01/09/16   0724   WBC  8.1   --   15.4*   --   16.8*   RBC  4.86   --   4.25   --   4.75 "   HGB  14.1  11.2*  12.4   < >  14.2   HCT  42.1   --   37.6   --   40.1   MCV  87   --   89   --   84   RDW  13.0   --   14.0   --   13.7   PLT  228   --   184   --   202   ALBUMIN   --    --    --    --   3.3*   CRP  3.0   --    --    --    --    BUN   --    --    --    --   9    < > = values in this interval not displayed.      Recent Labs   Lab Test  07/22/16   1110   TSH  1.10     Hemoglobin   Date Value Ref Range Status   05/23/2018 12.8 11.7 - 15.7 g/dL Final   04/06/2018 9.3 (L) 11.7 - 15.7 g/dL Final   04/04/2018 10.6 (L) 11.7 - 15.7 g/dL Final     Urea Nitrogen   Date Value Ref Range Status   01/09/2016 9 7 - 30 mg/dL Final     Sed Rate   Date Value Ref Range Status   05/23/2018 6 0 - 20 mm/h Final   09/28/2017 8 0 - 20 mm/h Final   07/22/2016 9 0 - 20 mm/h Final     CRP Inflammation   Date Value Ref Range Status   05/23/2018 <2.9 0.0 - 8.0 mg/L Final   09/28/2017 <2.9 0.0 - 8.0 mg/L Final   10/24/2016 <2.9 0.0 - 8.0 mg/L Final     AST   Date Value Ref Range Status   05/23/2018 16 0 - 45 U/L Final   12/14/2017 14 0 - 45 U/L Final   09/28/2017 16 0 - 45 U/L Final     Albumin   Date Value Ref Range Status   05/23/2018 3.8 3.4 - 5.0 g/dL Final   12/14/2017 3.1 (L) 3.4 - 5.0 g/dL Final   01/09/2016 3.3 (L) 3.4 - 5.0 g/dL Final     Alkaline Phosphatase   Date Value Ref Range Status   01/09/2016 70 40 - 150 U/L Final     ALT   Date Value Ref Range Status   05/23/2018 17 0 - 50 U/L Final   12/14/2017 15 0 - 50 U/L Final   09/28/2017 21 0 - 50 U/L Final     Rheumatoid Factor   Date Value Ref Range Status   07/22/2016 <20 <20 IU/mL Final     RHEUM RESULTS Latest Ref Rng & Units 4/4/2018 4/6/2018 5/23/2018   COMPLEMENT C3 76 - 169 mg/dL - - 85   COMPLEMENT C4 15 - 50 mg/dL - - 21   DNA-DS <10 IU/mL - - 5   SED RATE 0 - 20 mm/h - - 6   CRP, INFLAMMATION 0.0 - 8.0 mg/L - - <2.9   RHEUMATOID FACTOR <20 IU/mL - - -   NINOSKA SCREEN BY EIA <1.0 - - -   AST 0 - 45 U/L - - 16   ALT 0 - 50 U/L - - 17   ALBUMIN 3.4 - 5.0 g/dL -  - 3.8   WBC 4.0 - 11.0 10e9/L 15.4(H) - 6.3   RBC 3.8 - 5.2 10e12/L 4.15 - 5.06   HGB 11.7 - 15.7 g/dL 10.6(L) 9.3(L) 12.8   HCT 35.0 - 47.0 % 33.6(L) - 41.9   MCV 78 - 100 fl 81 - 83   MCHC 31.5 - 36.5 g/dL 31.5 - 30.5(L)   RDW 10.0 - 15.0 % 13.9 - 15.9(H)    - 450 10e9/L 208 - 214   CREATININE 0.52 - 1.04 mg/dL - - 0.71   GFR ESTIMATE, IF BLACK >60 mL/min/1.7m2 - - >90   GFR ESTIMATE >60 mL/min/1.7m2 - - >90     Component      Latest Ref Rng & Units 5/23/2018   WBC      4.0 - 11.0 10e9/L 6.3   RBC Count      3.8 - 5.2 10e12/L 5.06   Hemoglobin      11.7 - 15.7 g/dL 12.8   Hematocrit      35.0 - 47.0 % 41.9   MCV      78 - 100 fl 83   MCH      26.5 - 33.0 pg 25.3 (L)   MCHC      31.5 - 36.5 g/dL 30.5 (L)   RDW      10.0 - 15.0 % 15.9 (H)   Platelet Count      150 - 450 10e9/L 214   Diff Method       Automated Method   % Neutrophils      % 52.1   % Lymphocytes      % 38.5   % Monocytes      % 6.2   % Eosinophils      % 2.4   % Basophils      % 0.6   % Immature Granulocytes      % 0.2   Nucleated RBCs      0 /100 0   Absolute Neutrophil      1.6 - 8.3 10e9/L 3.3   Absolute Lymphocytes      0.8 - 5.3 10e9/L 2.4   Absolute Monocytes      0.0 - 1.3 10e9/L 0.4   Absolute Eosinophils      0.0 - 0.7 10e9/L 0.2   Absolute Basophils      0.0 - 0.2 10e9/L 0.0   Abs Immature Granulocytes      0 - 0.4 10e9/L 0.0   Absolute Nucleated RBC       0.0   Color Urine       Yellow   Appearance Urine       Clear   Glucose Urine      NEG:Negative mg/dL Negative   Bilirubin Urine      NEG:Negative Negative   Ketones Urine      NEG:Negative mg/dL Negative   Specific Gravity Urine      1.003 - 1.035 1.014   Blood Urine      NEG:Negative Negative   pH Urine      5.0 - 7.0 pH 7.0   Protein Albumin Urine      NEG:Negative mg/dL Negative   Urobilinogen mg/dL      0.0 - 2.0 mg/dL Normal   Nitrite Urine      NEG:Negative Negative   Leukocyte Esterase Urine      NEG:Negative Moderate (A)   Source       Urine   WBC Urine      0 - 5 /HPF 6  (H)   RBC Urine      0 - 2 /HPF 1   Transitional Epi      0 - 1 /HPF <1   Creatinine      0.52 - 1.04 mg/dL 0.71   GFR Estimate      >60 mL/min/1.7m2 >90   GFR Estimate If Black      >60 mL/min/1.7m2 >90   NINOSKA interpretation      NEG:Negative Positive (A)   NINOSKA pattern 1       DENSE FINE SPECKLED   NINOSKA titer 1       1:160   Specimen Description       Unspecified Urine   Special Requests       Specimen received in preservative   Culture Micro       No growth   Complement C3      76 - 169 mg/dL 85   Complement C4      15 - 50 mg/dL 21   CRP Inflammation      0.0 - 8.0 mg/L <2.9   Sed Rate      0 - 20 mm/h 6   DNA-ds      <10 IU/mL 5   AST      0 - 45 U/L 16   ALT      0 - 50 U/L 17   Albumin      3.4 - 5.0 g/dL 3.8     Reviewed Rheumatology lab flowsheet

## 2018-06-29 NOTE — NURSING NOTE
Chief Complaint   Patient presents with     IUD     Mirena inserted 05/23/2018. Here for IUD check.       See SNOW Najera 6/29/2018

## 2018-06-29 NOTE — LETTER
"6/29/2018       RE: Heather Guillory  [id number]  [place] [address]     Dear Colleague,    Thank you for referring your patient, Heather Guillory, to the [facility] at [facility]. Please see a copy of my visit note below.    IUD string check.  Placed 6 wks ago.  No issues. S/p IC w/o difficulty. Still having some bleeding.     Vitals:    06/29/18 1044   BP: 99/65   BP Location: Left arm   Patient Position: Chair   Pulse: 73   Height: 1.702 m (5' 7\")     Spec: blood in vagina  IUD strings at 2.5-3 cm from os    A/p: IUD in correct location  F/u PRN    Cate Mansfield MD, FACOG  [facility]  OB/GYN    6/29/2018  11:56 AM      "

## 2018-06-29 NOTE — LETTER
2018      RE: Heather Guillory  [address]  [place] [address]       Rheumatology Visit     Heather Guillory MRN# [id number]   YOB: 1980 Age: 38 year old     Date of Last Visit: 3/2/2018  DOS: 2018       Assessment and Plan:   Undifferentiated connective tissue disease (UCTD) and pregnancy via IVF (s/p delivery 2018):    Heather is a 39 yo WF , a former patient of Dr. Brewer. She was diagnosed with MCTD in 2016, 6 wk postpartum based on fatigue, arthritis, mild Raynaud's, low positive NINOSKA 1.1 and low positive RNP Ab 1.6. She is on  mg qd since 2017 with great response.  UCTD continues to be most likely diagnosis not MCTD as she does not have classic features of MCTD, had very low titer of RNP Ab in the past, (negative on most recent check), and had a mild disease which never required prednisone.  All autoimmunity labs (2017) came back negative (except + NINOSKA) which is further reassuring for UCTD, including APS panel, repeat RNP and inflammatory markers, dsDNA and complement levels.      She had neg SSA/SSB antibodies in 2016, no concern for CHB. No need to re-check.  APS panel was neg.  She had neg anti-Sm, neg anti-DNA and neg centromere Ab in 2016.    Today:  Disease continues to be under good control on HCQ. She did not flare during pregnancy. She had about 2 wk increased arthralgia around  which responded to NSAIDs, labs were not suggestive of a flare. Today, is asymptomatic.    Recommend to continue  mg bid. But HCQ to be tapered at next visit if no flare of UCTD.    Was reminded to have eye exam on HCQ.    RTC in 4-5 months                Active Problem List:     Patient Active Problem List    Diagnosis Date Noted     Polyhydramnios 2018     Priority: Medium     Xerosis cutis 2017     Priority: Medium     Overview:       Xerosis of the hands which is moderately severe and associated with fissuring.       Family history of  genetic disorder 12/23/2015     Priority: Medium     Patient's sister's son with Elijah's disease       Female infertility 04/04/2014     Priority: Medium     IVF with first pregnancy.       Anxiety 04/04/2014     Priority: Medium     1/15/18: on 25mg Zoloft, discussed increasing to 50mg  Pt plans to increase    Plan 1-2 week pp mood check       Tension headache 03/03/2014     Priority: Medium     Family history of malignant neoplasm of breast 07/12/2013     Priority: Medium     Overview:   Paternal GM       Decreased libido 12/12/2012     Priority: Medium     Overview:   Due to Effexor.       Irritable colon 10/26/2009     Priority: Medium     Panic disorder without agoraphobia 02/06/2008     Priority: Medium     Overview:   Panic Disorder w/o Agoraphobia       Eating disorder 02/05/2004     Priority: Medium     History of, feels stable now.              History of Present Illness:   Heather Guillory presents for f/u for probable mixed connective tissue disease. Last seen in 1-17, when HCQ was continued.    Background: received diagnosis of mixed connective tissue given pain in hand and feet, stiffness, Raynaud's  Syndrome, fatigue, positive NINOSKA and anti-RNP in 2016. Ms. Guillory first experienced swelling and pain in her fingers and feet during her second pregnancy this past spring that prevented her from wearing rings on her fingers and provoked an evaluation for DVT, which was negative. Despite weight loss post partum, her pain, mild swelling, and stiffness in her digits persisted. She described the pain as a 7/10 when she wakes to feed her infant in the middle of the night and in the morning that decreases to a 2-3/10 over the course of the day. Stiffness lasts 30 mins to 1 hour in the morning. In general she feels tired all the time, but cannot determine if it is associated with that fact that she has two young children and is breastfeeding every 2 hours or if it is related to her joint symptoms. She was evaluated  by an internist who found that she had a positive NINOSKA/ She saw Dr. Iniguez in 8-16 who discovered +RNP. She started Plaquenil 200 mg BID.    Interval history 7/14/17  Heather Guillory is stable since her last visit in January, despite feeling more fatigued recently. She denied arthralgias/myalgias but reports having sore feet/toes at end of day and when she first wakes up. She states the fatigue worsened since she ran out of Zoloft 3.5 weeks ago; also reports irritability and no sleeping soundly during the night since that time too. Zoloft prescribed by her OBGYN.     She is currently receiving fertility treatment and plans to undergo embryo transfer on 7/24/17. She remains on HCQ and feels comfortable continuing this medication during pregnancy. She wonders if taking HCQ once daily (400) rather than 200 twice daily is acceptable.  She is using estrogen therapy in preparation for the embryo transfer, and relates that her gynecologist recommends that she remained on the supplement for approximately one month after the transfer.    She recently pinched a nerve in her neck and has numbness/tingling with burning sensation in her left arm that radiates down into her 1st-3rd digits. She was evaluated at [facility], received percocet for pain and told to have PT. If it did not improve in 6 weeks, she will return for another evaluation.     Lastly, she had dry, rough skin on lateral aspects of fingers. She states this often occurs in winter due to weather but it is new for her this summer. She is using a lot of moisturizers but it has not been helping.         9/28/17:   Heather is former patient of Dr. Brewer, is seeing me for 2nd opinion and is transferring care. She is currently pregnant and is concerned about her flare of MCTD during pregnancy, especially worried about flare being triggered by IVF.     She had her 1st pregnancy in 2/2015. It was via IVF. It was unexplained fertility. It was uneventful. Her son  was born term. His 2nd son was born natuarally in 5/2016, term and healthy.       At 6 week post partum check up, she mentioned AM stiffness and stiffness of hands (new), feet were painful. Hands were swollen. Had fatigue, was breastfeeding att hat time.    No hair loss, skin rash, photosensitivity (but burns easily), oral/nasal ulcers, or sicca.    Has h/o Raynaud's since teen. Fingers turn white, toes turn purple, not painful. They feel cold not painful.    No myositis.     No serositis.    No seziures, headaches, psychosis.    Has sensitive stomach.    No fevers.     No h/o proteinuria.    She was diagnosed with MTCD in 6/2016. She started taking HCQ in 9/2016.  She started HCQ after she stopped breastfeeding. Had one flare up after stopping breastfeeding. She started  bid, last eye exam was this summer of 2017.     No prednisone.    Lack of sleep causes joint pain.     She did another round of IVF in 4/2017.  She was on OC x 10 wk . Embryo transfer was 7/14/2017. Now is off estrogen, progestron x 2 wk, now 12 wk pregnant.     No flare of her disease after IVF.    She was on ASA 81 mg qd till she was confirmed to be pregnant.    No preganncy loses. No thrombosis.    Feet feel sore and achy only with lack of sleep.     JOSEPH is 4/11/2018.    Interval History 12/14/17:  Heather has no major concerns or changes in symptoms at today's appointment.    Hasn't experienced any joint pains for about 9-12 months.  No stiffness in her muscles or joints.  She hasn't had any Raynaud's and notes that she is keeping her hands and feet adequately covered and warm.      She does note that she has been fatigued lately but attributes that to her 2 toddlers at home who aren't sleeping throughout the night.  Also notes easy bruising but thinks that it is chronic.  Denies any hair loss, mouth sores/ulcers, HA, F/chills, night sweats.    She is taking hydroxychloroquine and is tolerating it well.      No significant infections  although she wanted to inform us about her UTI with ESBL bacteria which was diagnosed when she was 8 weeks pregnant.  At that time, she was hospitalized and given IV antibiotics.  Ever since, she has experienced recurring yeast infections.  She tried diflucan x 3 without improvement.  Finally, she has changed her diet, cutting out sugars, which is improving her symptoms.      3/2/2018: Feeling very well with no flare ups or joint pain due to UCTD. Pregnancy is going well as well, she is due on 4/11/2018. She still has problem with vaginal yeast infection, is allergic to topical anti-fungal Tx, unresponsive to one time fluconazole tx, was offered to try 7 days of fluconazole but prefers to hold off as pregnancy is almost over and per her experience, vaginal yeast infection revolves after delivery.    Today: Doing well post delivery. Delivered a healthy boy on 4/5/2018 and is breastfeeding, not sure if she will have another child, has an IUD placed in 5/2018. On 5/23, contacted us with recurrence of arthralgia (hands, feet x 10-14days) which self-resolved, took some NSAIDs, today is feeling well with no complaints. Labs on 5/23 were not suggestive of a flare.          Review of Systems:   A comprehensive ROS was done. Positives are per HPI.          Past Medical History:     Past Medical History:   Diagnosis Date     Abnormal Pap smear     Over 10 years ago     Anxiety      E coli infection     ESBL     History of extended-spectrum beta-lactamase producing Escherichia coli infection      Hx of previous reproductive problem 12/2013    Infertility     Mixed connective tissue disease (H)     Dr. Steele- switching      Nephrolithiasis      Past Surgical History:   Procedure Laterality Date     BIOPSY  April 2014    Uterine polyp removed     DILATION AND CURETTAGE SUCTION       OPERATIVE HYSTEROSCOPY WITH MORCELLATOR  4/8/2014    Procedure: OPERATIVE HYSTEROSCOPY WITH MORCELLATOR;  Hysteroscopic Polypectomy;  Surgeon:  Cate Mansfield MD;  Location: [facility] OR     Raynaud's syndrome since puberty. Her main trigger is cold.  She had two uneventful pregnancies with vaginal births, though the conception of her first child required IVF.   she is underwent embryo transfer in July 2017, now pregnant.   cervical radiculopathy, 2017.       Social History:     Social History     Occupational History      [employer]     Social History Main Topics     Smoking status: Never Smoker     Smokeless tobacco: Never Used     Alcohol use No     Drug use: No     Sexual activity: Yes     Partners: Male     Birth control/ protection: IUD      Comment: Mirena            Family History:     Family History   Problem Relation Age of Onset     Diabetes Paternal Grandfather      C.A.D. Paternal Grandfather      Hypertension Paternal Grandfather      Other Cancer Paternal Grandfather      Lung     Breast Cancer Paternal Grandmother      Cancer Paternal Grandmother      lung ca     Osteoperosis Paternal Grandmother      Hypertension Paternal Grandmother      Cancer - colorectal Maternal Grandfather      Colon Cancer Maternal Grandfather      Cancer Maternal Grandmother      cervical ca     Neurologic Disorder Maternal Grandmother      alzheimers     Alzheimer Disease Maternal Grandmother      Cardiovascular Brother 28     cardiomyopathy     Genetic Disorder Sister 21     una's disease     HEART DISEASE Paternal Uncle 55     mitral valve replacement     Anxiety Disorder Sister      Anxiety Disorder Brother      Genetic Disorder Sister      Una's Disease     No history of AI disease       Allergies:     Allergies   Allergen Reactions     Kiwi Other (See Comments)     Throat itching     Benzoyl Peroxide Swelling     Duricef [Cefadroxil] Unknown     Monistat [Miconazole] Swelling     Sulfa Drugs Rash            Medications:     Current Outpatient Prescriptions   Medication Sig Dispense Refill     hydroxychloroquine (PLAQUENIL) 200 MG  "tablet Take 200 mg by mouth 2 times daily       ibuprofen (ADVIL/MOTRIN) 600 MG tablet Take 1 tablet (600 mg) by mouth every 6 hours as needed for moderate pain 30 tablet 1     levonorgestrel (MIRENA, 52 MG,) 20 MCG/24HR IUD 1 each (20 mcg) by Intrauterine route once for 1 dose 1 each 0     Prenatal Vit-Fe Fumarate-FA (PRENATAL VITAMINS) 28-0.8 MG TABS Take 1 tablet by mouth daily        sertraline (ZOLOFT) 25 MG tablet Take 2 tablets (50 mg) by mouth daily Increase to 50 mg daily after one week if symptoms improved but not enough 90 tablet 6            Physical Exam:   Blood pressure 105/70, pulse 71, temperature 97.9  F (36.6  C), temperature source Oral, height 1.702 m (5' 7\"), weight 81.2 kg (179 lb), SpO2 98 %, currently breastfeeding.  Wt Readings from Last 4 Encounters:   06/29/18 81.2 kg (179 lb)   06/01/18 81.6 kg (180 lb)   05/23/18 81.6 kg (180 lb)   05/18/18 81.6 kg (180 lb)     Constitutional: well-developed, appearing stated age; cooperative  Eyes: nl EOM, PERRLA, vision, conjunctiva, sclera  ENT: nl external ears, nose, hearing, lips, teeth, gums, throat  No mucous membrane lesions, normal saliva pool  Neck: no mass or thyroid enlargement  Resp: lungs clear to auscultation  CV: RRR, no murmurs, rubs or gallops, no edema  GI: no ABD tenderness  Lymph: no cervical, supraclavicular nodes  MS: no active synovitis or joint tenderness  Skin: no skin rash  Neuro: nl cranial nerves, strength  Psych: nl affect.         Data:     Results for orders placed or performed in visit on 05/23/18   hCG qual urine POCT [POC7]   Result Value Ref Range    HCG Qual Urine Negative neg    Internal QC OK Yes      Labs from outside laboratory reviewed from 8/12/16  RNP antibodies 1.6- positive    Negative for Parvo Virus B19, Lyme Disease  Negative SLE panel   Negative SSA, SSB  Negative Anti-Sury-1  Negative Centromere B antibodies  Negative  CCP  Recent Labs   Lab Test  07/22/16   1110  05/29/16   0651  05/27/16   1032   " 01/09/16   0724   WBC  8.1   --   15.4*   --   16.8*   RBC  4.86   --   4.25   --   4.75   HGB  14.1  11.2*  12.4   < >  14.2   HCT  42.1   --   37.6   --   40.1   MCV  87   --   89   --   84   RDW  13.0   --   14.0   --   13.7   PLT  228   --   184   --   202   ALBUMIN   --    --    --    --   3.3*   CRP  3.0   --    --    --    --    BUN   --    --    --    --   9    < > = values in this interval not displayed.      Recent Labs   Lab Test  07/22/16   1110   TSH  1.10     Hemoglobin   Date Value Ref Range Status   05/23/2018 12.8 11.7 - 15.7 g/dL Final   04/06/2018 9.3 (L) 11.7 - 15.7 g/dL Final   04/04/2018 10.6 (L) 11.7 - 15.7 g/dL Final     Urea Nitrogen   Date Value Ref Range Status   01/09/2016 9 7 - 30 mg/dL Final     Sed Rate   Date Value Ref Range Status   05/23/2018 6 0 - 20 mm/h Final   09/28/2017 8 0 - 20 mm/h Final   07/22/2016 9 0 - 20 mm/h Final     CRP Inflammation   Date Value Ref Range Status   05/23/2018 <2.9 0.0 - 8.0 mg/L Final   09/28/2017 <2.9 0.0 - 8.0 mg/L Final   10/24/2016 <2.9 0.0 - 8.0 mg/L Final     AST   Date Value Ref Range Status   05/23/2018 16 0 - 45 U/L Final   12/14/2017 14 0 - 45 U/L Final   09/28/2017 16 0 - 45 U/L Final     Albumin   Date Value Ref Range Status   05/23/2018 3.8 3.4 - 5.0 g/dL Final   12/14/2017 3.1 (L) 3.4 - 5.0 g/dL Final   01/09/2016 3.3 (L) 3.4 - 5.0 g/dL Final     Alkaline Phosphatase   Date Value Ref Range Status   01/09/2016 70 40 - 150 U/L Final     ALT   Date Value Ref Range Status   05/23/2018 17 0 - 50 U/L Final   12/14/2017 15 0 - 50 U/L Final   09/28/2017 21 0 - 50 U/L Final     Rheumatoid Factor   Date Value Ref Range Status   07/22/2016 <20 <20 IU/mL Final     RHEUM RESULTS Latest Ref Rng & Units 4/4/2018 4/6/2018 5/23/2018   COMPLEMENT C3 76 - 169 mg/dL - - 85   COMPLEMENT C4 15 - 50 mg/dL - - 21   DNA-DS <10 IU/mL - - 5   SED RATE 0 - 20 mm/h - - 6   CRP, INFLAMMATION 0.0 - 8.0 mg/L - - <2.9   RHEUMATOID FACTOR <20 IU/mL - - -   NINOSKA SCREEN BY  EIA <1.0 - - -   AST 0 - 45 U/L - - 16   ALT 0 - 50 U/L - - 17   ALBUMIN 3.4 - 5.0 g/dL - - 3.8   WBC 4.0 - 11.0 10e9/L 15.4(H) - 6.3   RBC 3.8 - 5.2 10e12/L 4.15 - 5.06   HGB 11.7 - 15.7 g/dL 10.6(L) 9.3(L) 12.8   HCT 35.0 - 47.0 % 33.6(L) - 41.9   MCV 78 - 100 fl 81 - 83   MCHC 31.5 - 36.5 g/dL 31.5 - 30.5(L)   RDW 10.0 - 15.0 % 13.9 - 15.9(H)    - 450 10e9/L 208 - 214   CREATININE 0.52 - 1.04 mg/dL - - 0.71   GFR ESTIMATE, IF BLACK >60 mL/min/1.7m2 - - >90   GFR ESTIMATE >60 mL/min/1.7m2 - - >90     Component      Latest Ref Rng & Units 5/23/2018   WBC      4.0 - 11.0 10e9/L 6.3   RBC Count      3.8 - 5.2 10e12/L 5.06   Hemoglobin      11.7 - 15.7 g/dL 12.8   Hematocrit      35.0 - 47.0 % 41.9   MCV      78 - 100 fl 83   MCH      26.5 - 33.0 pg 25.3 (L)   MCHC      31.5 - 36.5 g/dL 30.5 (L)   RDW      10.0 - 15.0 % 15.9 (H)   Platelet Count      150 - 450 10e9/L 214   Diff Method       Automated Method   % Neutrophils      % 52.1   % Lymphocytes      % 38.5   % Monocytes      % 6.2   % Eosinophils      % 2.4   % Basophils      % 0.6   % Immature Granulocytes      % 0.2   Nucleated RBCs      0 /100 0   Absolute Neutrophil      1.6 - 8.3 10e9/L 3.3   Absolute Lymphocytes      0.8 - 5.3 10e9/L 2.4   Absolute Monocytes      0.0 - 1.3 10e9/L 0.4   Absolute Eosinophils      0.0 - 0.7 10e9/L 0.2   Absolute Basophils      0.0 - 0.2 10e9/L 0.0   Abs Immature Granulocytes      0 - 0.4 10e9/L 0.0   Absolute Nucleated RBC       0.0   Color Urine       Yellow   Appearance Urine       Clear   Glucose Urine      NEG:Negative mg/dL Negative   Bilirubin Urine      NEG:Negative Negative   Ketones Urine      NEG:Negative mg/dL Negative   Specific Gravity Urine      1.003 - 1.035 1.014   Blood Urine      NEG:Negative Negative   pH Urine      5.0 - 7.0 pH 7.0   Protein Albumin Urine      NEG:Negative mg/dL Negative   Urobilinogen mg/dL      0.0 - 2.0 mg/dL Normal   Nitrite Urine      NEG:Negative Negative   Leukocyte Esterase  Urine      NEG:Negative Moderate (A)   Source       Urine   WBC Urine      0 - 5 /HPF 6 (H)   RBC Urine      0 - 2 /HPF 1   Transitional Epi      0 - 1 /HPF <1   Creatinine      0.52 - 1.04 mg/dL 0.71   GFR Estimate      >60 mL/min/1.7m2 >90   GFR Estimate If Black      >60 mL/min/1.7m2 >90   NINOSKA interpretation      NEG:Negative Positive (A)   NINOSKA pattern 1       DENSE FINE SPECKLED   NINOSKA titer 1       1:160   Specimen Description       Unspecified Urine   Special Requests       Specimen received in preservative   Culture Micro       No growth   Complement C3      76 - 169 mg/dL 85   Complement C4      15 - 50 mg/dL 21   CRP Inflammation      0.0 - 8.0 mg/L <2.9   Sed Rate      0 - 20 mm/h 6   DNA-ds      <10 IU/mL 5   AST      0 - 45 U/L 16   ALT      0 - 50 U/L 17   Albumin      3.4 - 5.0 g/dL 3.8     Reviewed Rheumatology lab flowsheet    Nasrin King MD

## 2018-06-29 NOTE — MR AVS SNAPSHOT
"              After Visit Summary   6/29/2018    Heather Guillory    MRN: [id number]           Patient Information     Date Of Birth          1980        Visit Information        Provider Department      6/29/2018 10:45 AM Cate Mansfield MD [facility]        Today's Diagnoses     Contraceptive, surveillance, intrauterine device    -  1       Follow-ups after your visit        Your next 10 appointments already scheduled     Oct 26, 2018  1:30 PM CDT   (Arrive by 1:15 PM)   Return Visit with Nasrin King MD   [facility] ([facility] [place])    [address] [place] [address]  [address] [place] [address] [phone]              Who to contact     Please call your clinic at [phone] to:    Ask questions about your health    Make or cancel appointments    Discuss your medicines    Learn about your test results    Speak to your doctor            Additional Information About Your Visit        MyChart Information     [employer] gives you secure access to your electronic health record. If you see a primary care provider, you can also send messages to your care team and make appointments. If you have questions, please call your primary care clinic.  If you do not have a primary care provider, please call [phone] and they will assist you.      [employer] is an electronic gateway that provides easy, online access to your medical records. With [employer], you can request a clinic appointment, read your test results, renew a prescription or communicate with your care team.     To access your existing account, please contact your [facility] or call [phone] for assistance.        Care EveryWhere ID     This is your Care EveryWhere ID. This could be used by other organizations to access your [facility] medical records  [id number]        Your Vitals Were     Pulse Height Breastfeeding?             73 1.702 m (5' 7\") Yes          " Blood Pressure from Last 3 Encounters:   18 105/70   18 99/65   18 105/66    Weight from Last 3 Encounters:   18 81.2 kg (179 lb)   18 81.6 kg (180 lb)   18 81.6 kg (180 lb)              Today, you had the following     No orders found for display       Primary Care Provider Office Phone # Fax #    Cate Mansfield [phone]       [address] [facility] [address]  [facility] [address]        Equal Access to Services     [facility]: Hadii juanjo galarza hadasho [place], waaxda luqadaha, qaybta kaalmalexis simeon, john graham . So [facility] [phone].    ATENCIÓN: Si habla español, tiene a nelson disposición servicios gratuitos de asistencia lingüística. [facility] [phone].    We comply with applicable federal civil rights laws and [place] laws. We do not discriminate on the basis of race, color, national origin, age, disability, sex, sexual orientation, or gender identity.            Thank you!     Thank you for choosing [facility]  for your care. Our goal is always to provide you with excellent care. Hearing back from our patients is one way we can continue to improve our services. Please take a few minutes to complete the written survey that you may receive in the mail after your visit with us. Thank you!             Your Updated Medication List - Protect others around you: Learn how to safely use, store and throw away your medicines at www.disposemymeds.org.          This list is accurate as of 18 11:57 AM.  Always use your most recent med list.                   Brand Name Dispense Instructions for use Diagnosis    hydroxychloroquine 200 MG tablet    PLAQUENIL     Take 200 mg by mouth 2 times daily        ibuprofen 600 MG tablet    ADVIL/MOTRIN    30 tablet    Take 1 tablet (600 mg) by mouth every 6 hours as needed for moderate pain     (normal spontaneous vaginal delivery)       levonorgestrel 20 MCG/24HR  IUD    MIRENA (52 MG)    1 each    1 each (20 mcg) by Intrauterine route once for 1 dose    Encounter for IUD insertion       Prenatal Vitamins 28-0.8 MG Tabs      Take 1 tablet by mouth daily    Supervision of high-risk pregnancy of elderly multigravida       sertraline 25 MG tablet    ZOLOFT    90 tablet    Take 2 tablets (50 mg) by mouth daily Increase to 50 mg daily after one week if symptoms improved but not enough    Anxiety

## 2018-08-28 NOTE — TELEPHONE ENCOUNTER
Pt has been offered an appt for tomorrow.  Notified by clinic coordinator.    Dyana Rankin RN  Rheumatology Clinic     generalized

## 2018-10-18 ENCOUNTER — TELEPHONE (OUTPATIENT)
Dept: RHEUMATOLOGY | Facility: CLINIC | Age: 38
End: 2018-10-18

## 2018-10-18 NOTE — TELEPHONE ENCOUNTER
[facility] Call Center    Phone Message    May a detailed message be left on voicemail: yes    Reason for Call: Other: pt had to cancel 10.2018 appt as son is having surgery that same date. Dr GALAVIZ's first availl is 2.2019. The pt states she needs to be seen sooner and that Dr GALAVIZ sometimes fits her in at 8 am. Please contact the pt to schedule. Thanks.     Action Taken: Message routed to:  [facility] ([facility]

## 2018-10-18 NOTE — TELEPHONE ENCOUNTER
Replied to pt in My chart message that was sent on this matter today.  Will complete in My Chart encounter.    Dyana Rankin RN  Rheumatology Clinic

## 2018-10-29 DIAGNOSIS — M35.9 UNDIFFERENTIATED CONNECTIVE TISSUE DISEASE (H): Primary | ICD-10-CM

## 2019-01-01 NOTE — TELEPHONE ENCOUNTER
hydroxychloroquine (PLAQUENIL) 200 MG tablet  Last Written Prescription Date:  Unknown  Last Fill Quantity: unknown,   # refills: unknown  Last Office Visit :12/14/17  Future Office visit: 3/2/18    Eye exam:  7/17/17    Routing refill request to provider for review/approval because:  On med list as historical. 9/6/17 noted stopped by pt.   (2) cough or sneeze

## 2019-01-08 ENCOUNTER — MYC MEDICAL ADVICE (OUTPATIENT)
Dept: OBGYN | Facility: CLINIC | Age: 39
End: 2019-01-08

## 2019-01-08 DIAGNOSIS — B37.31 CANDIDIASIS OF VAGINA: Primary | ICD-10-CM

## 2019-01-10 RX ORDER — FLUCONAZOLE 150 MG/1
150 TABLET ORAL ONCE
Qty: 1 TABLET | Refills: 1 | Status: SHIPPED | OUTPATIENT
Start: 2019-01-10 | End: 2019-04-26

## 2019-01-10 NOTE — TELEPHONE ENCOUNTER
Pt sent [facility] message re: yeast symptoms. Ordered diflucan to pt preferred pharmacy per protocol.    Informed patient via [facility]

## 2019-02-01 ENCOUNTER — DOCUMENTATION ONLY (OUTPATIENT)
Dept: CARE COORDINATION | Facility: CLINIC | Age: 39
End: 2019-02-01

## 2019-02-15 ENCOUNTER — MEDICAL CORRESPONDENCE (OUTPATIENT)
Dept: HEALTH INFORMATION MANAGEMENT | Facility: CLINIC | Age: 39
End: 2019-02-15

## 2019-02-22 ENCOUNTER — OFFICE VISIT (OUTPATIENT)
Dept: RHEUMATOLOGY | Facility: CLINIC | Age: 39
End: 2019-02-22
Attending: INTERNAL MEDICINE
Payer: COMMERCIAL

## 2019-02-22 VITALS
DIASTOLIC BLOOD PRESSURE: 69 MMHG | SYSTOLIC BLOOD PRESSURE: 102 MMHG | TEMPERATURE: 98.1 F | HEIGHT: 67 IN | BODY MASS INDEX: 26.4 KG/M2 | WEIGHT: 168.2 LBS | HEART RATE: 99 BPM | OXYGEN SATURATION: 97 %

## 2019-02-22 DIAGNOSIS — M35.9 UNDIFFERENTIATED CONNECTIVE TISSUE DISEASE (H): ICD-10-CM

## 2019-02-22 LAB
ALBUMIN SERPL-MCNC: 4.1 G/DL (ref 3.4–5)
ALBUMIN UR-MCNC: NEGATIVE MG/DL
ALT SERPL W P-5'-P-CCNC: 19 U/L (ref 0–50)
APPEARANCE UR: CLEAR
AST SERPL W P-5'-P-CCNC: 17 U/L (ref 0–45)
BASOPHILS # BLD AUTO: 0.1 10E9/L (ref 0–0.2)
BASOPHILS NFR BLD AUTO: 1 %
BILIRUB UR QL STRIP: NEGATIVE
C3 SERPL-MCNC: 81 MG/DL (ref 76–169)
C4 SERPL-MCNC: 23 MG/DL (ref 15–50)
COLOR UR AUTO: YELLOW
CREAT SERPL-MCNC: 0.63 MG/DL (ref 0.52–1.04)
CRP SERPL-MCNC: <2.9 MG/L (ref 0–8)
DIFFERENTIAL METHOD BLD: ABNORMAL
EOSINOPHIL # BLD AUTO: 0.2 10E9/L (ref 0–0.7)
EOSINOPHIL NFR BLD AUTO: 2.6 %
ERYTHROCYTE [DISTWIDTH] IN BLOOD BY AUTOMATED COUNT: 12.6 % (ref 10–15)
ERYTHROCYTE [SEDIMENTATION RATE] IN BLOOD BY WESTERGREN METHOD: 6 MM/H (ref 0–20)
GFR SERPL CREATININE-BSD FRML MDRD: >90 ML/MIN/{1.73_M2}
GLUCOSE UR STRIP-MCNC: NEGATIVE MG/DL
HCT VFR BLD AUTO: 45.1 % (ref 35–47)
HGB BLD-MCNC: 14 G/DL (ref 11.7–15.7)
HGB UR QL STRIP: ABNORMAL
IMM GRANULOCYTES # BLD: 0 10E9/L (ref 0–0.4)
IMM GRANULOCYTES NFR BLD: 0.3 %
KETONES UR STRIP-MCNC: NEGATIVE MG/DL
LEUKOCYTE ESTERASE UR QL STRIP: NEGATIVE
LYMPHOCYTES # BLD AUTO: 2.1 10E9/L (ref 0.8–5.3)
LYMPHOCYTES NFR BLD AUTO: 36.1 %
MCH RBC QN AUTO: 27.6 PG (ref 26.5–33)
MCHC RBC AUTO-ENTMCNC: 31 G/DL (ref 31.5–36.5)
MCV RBC AUTO: 89 FL (ref 78–100)
MONOCYTES # BLD AUTO: 0.4 10E9/L (ref 0–1.3)
MONOCYTES NFR BLD AUTO: 6.4 %
MUCOUS THREADS #/AREA URNS LPF: PRESENT /LPF
NEUTROPHILS # BLD AUTO: 3.1 10E9/L (ref 1.6–8.3)
NEUTROPHILS NFR BLD AUTO: 53.6 %
NITRATE UR QL: NEGATIVE
NRBC # BLD AUTO: 0 10*3/UL
NRBC BLD AUTO-RTO: 0 /100
PH UR STRIP: 5 PH (ref 5–7)
PLATELET # BLD AUTO: 222 10E9/L (ref 150–450)
RBC # BLD AUTO: 5.07 10E12/L (ref 3.8–5.2)
RBC #/AREA URNS AUTO: 7 /HPF (ref 0–2)
SOURCE: ABNORMAL
SP GR UR STRIP: 1.02 (ref 1–1.03)
SQUAMOUS #/AREA URNS AUTO: 2 /HPF (ref 0–1)
UROBILINOGEN UR STRIP-MCNC: 0 MG/DL (ref 0–2)
WBC # BLD AUTO: 5.8 10E9/L (ref 4–11)
WBC #/AREA URNS AUTO: 1 /HPF (ref 0–5)

## 2019-02-22 PROCEDURE — 82565 ASSAY OF CREATININE: CPT | Performed by: INTERNAL MEDICINE

## 2019-02-22 PROCEDURE — 86160 COMPLEMENT ANTIGEN: CPT | Performed by: INTERNAL MEDICINE

## 2019-02-22 PROCEDURE — 84460 ALANINE AMINO (ALT) (SGPT): CPT | Performed by: INTERNAL MEDICINE

## 2019-02-22 PROCEDURE — 85652 RBC SED RATE AUTOMATED: CPT | Performed by: INTERNAL MEDICINE

## 2019-02-22 PROCEDURE — 81001 URINALYSIS AUTO W/SCOPE: CPT | Performed by: INTERNAL MEDICINE

## 2019-02-22 PROCEDURE — 85025 COMPLETE CBC W/AUTO DIFF WBC: CPT | Performed by: INTERNAL MEDICINE

## 2019-02-22 PROCEDURE — G0463 HOSPITAL OUTPT CLINIC VISIT: HCPCS | Mod: ZF

## 2019-02-22 PROCEDURE — 82040 ASSAY OF SERUM ALBUMIN: CPT | Performed by: INTERNAL MEDICINE

## 2019-02-22 PROCEDURE — 86140 C-REACTIVE PROTEIN: CPT | Performed by: INTERNAL MEDICINE

## 2019-02-22 PROCEDURE — 86225 DNA ANTIBODY NATIVE: CPT | Performed by: INTERNAL MEDICINE

## 2019-02-22 PROCEDURE — 84450 TRANSFERASE (AST) (SGOT): CPT | Performed by: INTERNAL MEDICINE

## 2019-02-22 PROCEDURE — 36415 COLL VENOUS BLD VENIPUNCTURE: CPT | Performed by: INTERNAL MEDICINE

## 2019-02-22 RX ORDER — HYDROXYCHLOROQUINE SULFATE 200 MG/1
TABLET, FILM COATED ORAL
Qty: 60 TABLET | Refills: 5 | Status: SHIPPED | OUTPATIENT
Start: 2019-02-22 | End: 2019-08-23

## 2019-02-22 ASSESSMENT — PAIN SCALES - GENERAL: PAINLEVEL: NO PAIN (0)

## 2019-02-22 ASSESSMENT — MIFFLIN-ST. JEOR: SCORE: 1485.58

## 2019-02-22 NOTE — LETTER
Patient:  Heather Guillory  :   1980  MRN:     [id number]        Ms.Jennifer Guillory  [address]  [place] [address]        2019    Dear ,    We are writing to inform you of your test results. There are few red cells in the urine, did you have your period at the time of urine collection? Otherwise, the rest of your labs look good which is good news.      Results for orders placed or performed in visit on 19   AST   Result Value Ref Range    AST 17 0 - 45 U/L   ALT   Result Value Ref Range    ALT 19 0 - 50 U/L   CBC with platelets differential   Result Value Ref Range    WBC 5.8 4.0 - 11.0 10e9/L    RBC Count 5.07 3.8 - 5.2 10e12/L    Hemoglobin 14.0 11.7 - 15.7 g/dL    Hematocrit 45.1 35.0 - 47.0 %    MCV 89 78 - 100 fl    MCH 27.6 26.5 - 33.0 pg    MCHC 31.0 (L) 31.5 - 36.5 g/dL    RDW 12.6 10.0 - 15.0 %    Platelet Count 222 150 - 450 10e9/L    Diff Method Automated Method     % Neutrophils 53.6 %    % Lymphocytes 36.1 %    % Monocytes 6.4 %    % Eosinophils 2.6 %    % Basophils 1.0 %    % Immature Granulocytes 0.3 %    Nucleated RBCs 0 0 /100    Absolute Neutrophil 3.1 1.6 - 8.3 10e9/L    Absolute Lymphocytes 2.1 0.8 - 5.3 10e9/L    Absolute Monocytes 0.4 0.0 - 1.3 10e9/L    Absolute Eosinophils 0.2 0.0 - 0.7 10e9/L    Absolute Basophils 0.1 0.0 - 0.2 10e9/L    Abs Immature Granulocytes 0.0 0 - 0.4 10e9/L    Absolute Nucleated RBC 0.0    Creatinine   Result Value Ref Range    Creatinine 0.63 0.52 - 1.04 mg/dL    GFR Estimate >90 >60 mL/min/[1.73_m2]    GFR Estimate If Black >90 >60 mL/min/[1.73_m2]   Albumin level   Result Value Ref Range    Albumin 4.1 3.4 - 5.0 g/dL   CRP inflammation   Result Value Ref Range    CRP Inflammation <2.9 0.0 - 8.0 mg/L   DNA double stranded antibodies   Result Value Ref Range    DNA-ds 3 <10 IU/mL   Erythrocyte sedimentation rate auto   Result Value Ref Range    Sed Rate 6 0 - 20 mm/h   Routine UA with Micro Reflex to Culture   Result  Value Ref Range    Color Urine Yellow     Appearance Urine Clear     Glucose Urine Negative NEG^Negative mg/dL    Bilirubin Urine Negative NEG^Negative    Ketones Urine Negative NEG^Negative mg/dL    Specific Gravity Urine 1.023 1.003 - 1.035    Blood Urine Small (A) NEG^Negative    pH Urine 5.0 5.0 - 7.0 pH    Protein Albumin Urine Negative NEG^Negative mg/dL    Urobilinogen mg/dL 0.0 0.0 - 2.0 mg/dL    Nitrite Urine Negative NEG^Negative    Leukocyte Esterase Urine Negative NEG^Negative    Source Midstream Urine     WBC Urine 1 0 - 5 /HPF    RBC Urine 7 (H) 0 - 2 /HPF    Squamous Epithelial /HPF Urine 2 (H) 0 - 1 /HPF    Mucous Urine Present (A) NEG^Negative /LPF   Complement C4   Result Value Ref Range    Complement C4 23 15 - 50 mg/dL   Complement C3   Result Value Ref Range    Complement C3 81 76 - 169 mg/dL       Nasrin King MD

## 2019-02-22 NOTE — PROGRESS NOTES
Rheumatology Visit     Heather Guillory MRN# [id number]   YOB: 1980 Age: 38 year old     Date of Last Visit: 2018  DOS: 2019       Assessment and Plan:   Undifferentiated connective tissue disease (UCTD) and pregnancy via IVF (s/p delivery 2018):    Heather is a 37 yo WF , a former patient of Dr. Brewer. She was diagnosed with MCTD in 2016, 6 wk postpartum based on fatigue, arthritis, mild Raynaud's, low positive NINOSKA 1.1 and low positive RNP Ab 1.6. She is on  mg qd since 2017 with great response.  UCTD continues to be most likely diagnosis not MCTD as she does not have classic features of MCTD, had very low titer of RNP Ab in the past, (negative on most recent check), and had a mild disease which never required prednisone.  All autoimmunity labs (2017) came back negative (except + NINOSKA) which is further reassuring for UCTD, including APS panel, repeat RNP and inflammatory markers, dsDNA and complement levels.      She had neg SSA/SSB antibodies in 2016, no concern for CHB. No need to re-check.  APS panel was neg.  She had neg anti-Sm, neg anti-DNA and neg centromere Ab in 2016.    Today:  Disease continues to be under good control on HCQ. She did not flare during pregnancy. She had about 2 wk increased arthralgia around  which responded to NSAIDs, labs were not suggestive of a flare. Today, is asymptomatic.    Recommend to taper plaquenil to 200 mg twice a day on Mon/Wed/Fri and then 1 tab for the rest of the week    Was reminded to have eye exam on HCQ.    Labs today    RTC in 6 months              Active Problem List:     Patient Active Problem List    Diagnosis Date Noted     Polyhydramnios 2018     Priority: Medium     Xerosis cutis 2017     Priority: Medium     Overview:       Xerosis of the hands which is moderately severe and associated with fissuring.       Family history of genetic disorder 2015     Priority: Medium     Patient's  sister's son with Elijah's disease       Female infertility 04/04/2014     Priority: Medium     IVF with first pregnancy.       Anxiety 04/04/2014     Priority: Medium     1/15/18: on 25mg Zoloft, discussed increasing to 50mg  Pt plans to increase    Plan 1-2 week pp mood check       Tension headache 03/03/2014     Priority: Medium     Family history of malignant neoplasm of breast 07/12/2013     Priority: Medium     Overview:   Paternal GM       Decreased libido 12/12/2012     Priority: Medium     Overview:   Due to Effexor.       Irritable colon 10/26/2009     Priority: Medium     Panic disorder without agoraphobia 02/06/2008     Priority: Medium     Overview:   Panic Disorder w/o Agoraphobia       Eating disorder 02/05/2004     Priority: Medium     History of, feels stable now.              History of Present Illness:   Heather Guillory presents for f/u for probable mixed connective tissue disease. Last seen in 1-17, when HCQ was continued.    Background: received diagnosis of mixed connective tissue given pain in hand and feet, stiffness, Raynaud's  Syndrome, fatigue, positive NINOSKA and anti-RNP in 2016. Ms. Guillory first experienced swelling and pain in her fingers and feet during her second pregnancy this past spring that prevented her from wearing rings on her fingers and provoked an evaluation for DVT, which was negative. Despite weight loss post partum, her pain, mild swelling, and stiffness in her digits persisted. She described the pain as a 7/10 when she wakes to feed her infant in the middle of the night and in the morning that decreases to a 2-3/10 over the course of the day. Stiffness lasts 30 mins to 1 hour in the morning. In general she feels tired all the time, but cannot determine if it is associated with that fact that she has two young children and is breastfeeding every 2 hours or if it is related to her joint symptoms. She was evaluated by an internist who found that she had a positive NINOSKA/ She saw  Dr. Iniguez in 8-16 who discovered +RNP. She started Plaquenil 200 mg BID.    Interval history 7/14/17  Heather Guillory is stable since her last visit in January, despite feeling more fatigued recently. She denied arthralgias/myalgias but reports having sore feet/toes at end of day and when she first wakes up. She states the fatigue worsened since she ran out of Zoloft 3.5 weeks ago; also reports irritability and no sleeping soundly during the night since that time too. Zoloft prescribed by her OBGYN.     She is currently receiving fertility treatment and plans to undergo embryo transfer on 7/24/17. She remains on HCQ and feels comfortable continuing this medication during pregnancy. She wonders if taking HCQ once daily (400) rather than 200 twice daily is acceptable.  She is using estrogen therapy in preparation for the embryo transfer, and relates that her gynecologist recommends that she remained on the supplement for approximately one month after the transfer.    She recently pinched a nerve in her neck and has numbness/tingling with burning sensation in her left arm that radiates down into her 1st-3rd digits. She was evaluated at [facility] orthopedics, received percocet for pain and told to have PT. If it did not improve in 6 weeks, she will return for another evaluation.     Lastly, she had dry, rough skin on lateral aspects of fingers. She states this often occurs in winter due to weather but it is new for her this summer. She is using a lot of moisturizers but it has not been helping.         9/28/17:   Heather is former patient of Dr. Brewer, is seeing me for 2nd opinion and is transferring care. She is currently pregnant and is concerned about her flare of MCTD during pregnancy, especially worried about flare being triggered by IVF.     She had her 1st pregnancy in 2/2015. It was via IVF. It was unexplained fertility. It was uneventful. Her son was born term. His 2nd son was born natuarally in 5/2016, term  and healthy.       At 6 week post partum check up, she mentioned AM stiffness and stiffness of hands (new), feet were painful. Hands were swollen. Had fatigue, was breastfeeding att hat time.    No hair loss, skin rash, photosensitivity (but burns easily), oral/nasal ulcers, or sicca.    Has h/o Raynaud's since teen. Fingers turn white, toes turn purple, not painful. They feel cold not painful.    No myositis.     No serositis.    No seziures, headaches, psychosis.    Has sensitive stomach.    No fevers.     No h/o proteinuria.    She was diagnosed with MTCD in 6/2016. She started taking HCQ in 9/2016.  She started HCQ after she stopped breastfeeding. Had one flare up after stopping breastfeeding. She started  bid, last eye exam was this summer of 2017.     No prednisone.    Lack of sleep causes joint pain.     She did another round of IVF in 4/2017.  She was on OC x 10 wk . Embryo transfer was 7/14/2017. Now is off estrogen, progestron x 2 wk, now 12 wk pregnant.     No flare of her disease after IVF.    She was on ASA 81 mg qd till she was confirmed to be pregnant.    No preganncy loses. No thrombosis.    Feet feel sore and achy only with lack of sleep.     JOSEPH is 4/11/2018.    Interval History 12/14/17:  Heather has no major concerns or changes in symptoms at today's appointment.    Hasn't experienced any joint pains for about 9-12 months.  No stiffness in her muscles or joints.  She hasn't had any Raynaud's and notes that she is keeping her hands and feet adequately covered and warm.      She does note that she has been fatigued lately but attributes that to her 2 toddlers at home who aren't sleeping throughout the night.  Also notes easy bruising but thinks that it is chronic.  Denies any hair loss, mouth sores/ulcers, HA, F/chills, night sweats.    She is taking hydroxychloroquine and is tolerating it well.      No significant infections although she wanted to inform us about her UTI with ESBL bacteria  which was diagnosed when she was 8 weeks pregnant.  At that time, she was hospitalized and given IV antibiotics.  Ever since, she has experienced recurring yeast infections.  She tried diflucan x 3 without improvement.  Finally, she has changed her diet, cutting out sugars, which is improving her symptoms.      3/2/2018: Feeling very well with no flare ups or joint pain due to UCTD. Pregnancy is going well as well, she is due on 4/11/2018. She still has problem with vaginal yeast infection, is allergic to topical anti-fungal Tx, unresponsive to one time fluconazole tx, was offered to try 7 days of fluconazole but prefers to hold off as pregnancy is almost over and per her experience, vaginal yeast infection revolves after delivery.    6/2018: Doing well post delivery. Delivered a healthy boy on 4/5/2018 and is breastfeeding, not sure if she will have another child, has an IUD placed in 5/2018. On 5/23, contacted us with recurrence of arthralgia (hands, feet x 10-14days) which self-resolved, took some NSAIDs, today is feeling well with no complaints. Labs on 5/23 were not suggestive of a flare.    Today: Feeling very well. No complaints.          Review of Systems:   A comprehensive ROS was done. Positives are per HPI.          Past Medical History:     Past Medical History:   Diagnosis Date     Abnormal Pap smear     Over 10 years ago     Anxiety      E coli infection     ESBL     History of extended-spectrum beta-lactamase producing Escherichia coli infection      Hx of previous reproductive problem 12/2013    Infertility     Mixed connective tissue disease (H)     Dr. Steele- switching      Nephrolithiasis      Past Surgical History:   Procedure Laterality Date     BIOPSY  April 2014    Uterine polyp removed     DILATION AND CURETTAGE SUCTION       OPERATIVE HYSTEROSCOPY WITH MORCELLATOR  4/8/2014    Procedure: OPERATIVE HYSTEROSCOPY WITH MORCELLATOR;  Hysteroscopic Polypectomy;  Surgeon: Cate Mansfield,  MD;  Location: [facility] OR     Raynaud's syndrome since puberty. Her main trigger is cold.  She had two uneventful pregnancies with vaginal births, though the conception of her first child required IVF.   she is underwent embryo transfer in July 2017, now pregnant.   cervical radiculopathy, 2017.       Social History:     Social History     Occupational History     Occupation:      Employer: [employer]   Tobacco Use     Smoking status: Never Smoker     Smokeless tobacco: Never Used   Substance and Sexual Activity     Alcohol use: No     Alcohol/week: 0.0 oz     Drug use: No     Sexual activity: Yes     Partners: Male     Birth control/protection: IUD     Comment: Mirena            Family History:     Family History   Problem Relation Age of Onset     Diabetes Paternal Grandfather      C.A.D. Paternal Grandfather      Hypertension Paternal Grandfather      Other Cancer Paternal Grandfather         Lung     Breast Cancer Paternal Grandmother      Cancer Paternal Grandmother         lung ca     Osteoporosis Paternal Grandmother      Hypertension Paternal Grandmother      Cancer - colorectal Maternal Grandfather      Colon Cancer Maternal Grandfather      Cancer Maternal Grandmother         cervical ca     Neurologic Disorder Maternal Grandmother         alzheimers     Alzheimer Disease Maternal Grandmother      Cardiovascular Brother 28        cardiomyopathy     Genetic Disorder Sister 21        una's disease     Heart Disease Paternal Uncle 55        mitral valve replacement     Anxiety Disorder Sister      Anxiety Disorder Brother      Genetic Disorder Sister         Una's Disease     No history of AI disease       Allergies:     Allergies   Allergen Reactions     Kiwi Other (See Comments)     Throat itching     Benzoyl Peroxide Swelling     Duricef [Cefadroxil] Unknown     Monistat [Miconazole] Swelling     Sulfa Drugs Rash            Medications:     Current Outpatient Medications   Medication  "Sig Dispense Refill     hydroxychloroquine (PLAQUENIL) 200 MG tablet Take 1 tablet (200 mg) by mouth 2 times daily 60 tablet 5     ibuprofen (ADVIL/MOTRIN) 600 MG tablet Take 1 tablet (600 mg) by mouth every 6 hours as needed for moderate pain 30 tablet 1     levonorgestrel (MIRENA, 52 MG,) 20 MCG/24HR IUD 1 each (20 mcg) by Intrauterine route once for 1 dose 1 each 0     Prenatal Vit-Fe Fumarate-FA (PRENATAL VITAMINS) 28-0.8 MG TABS Take 1 tablet by mouth daily        sertraline (ZOLOFT) 25 MG tablet Take 2 tablets (50 mg) by mouth daily Increase to 50 mg daily after one week if symptoms improved but not enough 90 tablet 6            Physical Exam:   Blood pressure 102/69, pulse 99, temperature 98.1  F (36.7  C), temperature source Oral, height 1.702 m (5' 7\"), weight 76.3 kg (168 lb 3.2 oz), SpO2 97 %, currently breastfeeding.  Wt Readings from Last 4 Encounters:   03/08/19 76.3 kg (168 lb 3.2 oz)   02/22/19 76.3 kg (168 lb 3.2 oz)   06/29/18 81.2 kg (179 lb)   06/01/18 81.6 kg (180 lb)     Constitutional: well-developed, appearing stated age; cooperative  Eyes: nl EOM, PERRLA, vision, conjunctiva, sclera  ENT: nl external ears, nose, hearing, lips, teeth, gums, throat  No mucous membrane lesions, normal saliva pool  Neck: no mass or thyroid enlargement  Resp: lungs clear to auscultation  CV: RRR, no murmurs, rubs or gallops, no edema  GI: no ABD tenderness  Lymph: no cervical, supraclavicular nodes  MS: no active synovitis or joint tenderness  Skin: no skin rash  Neuro: nl cranial nerves, strength  Psych: nl affect.         Data:     Results for orders placed or performed in visit on 05/23/18   hCG qual urine POCT [POC7]   Result Value Ref Range    HCG Qual Urine Negative neg    Internal QC OK Yes      Labs from outside laboratory reviewed from 8/12/16  RNP antibodies 1.6- positive    Negative for Parvo Virus B19, Lyme Disease  Negative SLE panel   Negative SSA, SSB  Negative Anti-Usry-1  Negative Centromere B " antibodies  Negative  CCP  Recent Labs   Lab Test  07/22/16   1110  05/29/16   0651  05/27/16   1032   01/09/16   0724   WBC  8.1   --   15.4*   --   16.8*   RBC  4.86   --   4.25   --   4.75   HGB  14.1  11.2*  12.4   < >  14.2   HCT  42.1   --   37.6   --   40.1   MCV  87   --   89   --   84   RDW  13.0   --   14.0   --   13.7   PLT  228   --   184   --   202   ALBUMIN   --    --    --    --   3.3*   CRP  3.0   --    --    --    --    BUN   --    --    --    --   9    < > = values in this interval not displayed.      Recent Labs   Lab Test  07/22/16   1110   TSH  1.10     Hemoglobin   Date Value Ref Range Status   05/23/2018 12.8 11.7 - 15.7 g/dL Final   04/06/2018 9.3 (L) 11.7 - 15.7 g/dL Final   04/04/2018 10.6 (L) 11.7 - 15.7 g/dL Final     Urea Nitrogen   Date Value Ref Range Status   01/09/2016 9 7 - 30 mg/dL Final     Sed Rate   Date Value Ref Range Status   05/23/2018 6 0 - 20 mm/h Final   09/28/2017 8 0 - 20 mm/h Final   07/22/2016 9 0 - 20 mm/h Final     CRP Inflammation   Date Value Ref Range Status   05/23/2018 <2.9 0.0 - 8.0 mg/L Final   09/28/2017 <2.9 0.0 - 8.0 mg/L Final   10/24/2016 <2.9 0.0 - 8.0 mg/L Final     AST   Date Value Ref Range Status   05/23/2018 16 0 - 45 U/L Final   12/14/2017 14 0 - 45 U/L Final   09/28/2017 16 0 - 45 U/L Final     Albumin   Date Value Ref Range Status   05/23/2018 3.8 3.4 - 5.0 g/dL Final   12/14/2017 3.1 (L) 3.4 - 5.0 g/dL Final   01/09/2016 3.3 (L) 3.4 - 5.0 g/dL Final     Alkaline Phosphatase   Date Value Ref Range Status   01/09/2016 70 40 - 150 U/L Final     ALT   Date Value Ref Range Status   05/23/2018 17 0 - 50 U/L Final   12/14/2017 15 0 - 50 U/L Final   09/28/2017 21 0 - 50 U/L Final     Rheumatoid Factor   Date Value Ref Range Status   07/22/2016 <20 <20 IU/mL Final     RHEUM RESULTS Latest Ref Rng & Units 4/4/2018 4/6/2018 5/23/2018   COMPLEMENT C3 76 - 169 mg/dL - - 85   COMPLEMENT C4 15 - 50 mg/dL - - 21   DNA-DS <10 IU/mL - - 5   SED RATE 0 - 20 mm/h  - - 6   CRP, INFLAMMATION 0.0 - 8.0 mg/L - - <2.9   RHEUMATOID FACTOR <20 IU/mL - - -   NINOSKA SCREEN BY EIA <1.0 - - -   AST 0 - 45 U/L - - 16   ALT 0 - 50 U/L - - 17   ALBUMIN 3.4 - 5.0 g/dL - - 3.8   WBC 4.0 - 11.0 10e9/L 15.4(H) - 6.3   RBC 3.8 - 5.2 10e12/L 4.15 - 5.06   HGB 11.7 - 15.7 g/dL 10.6(L) 9.3(L) 12.8   HCT 35.0 - 47.0 % 33.6(L) - 41.9   MCV 78 - 100 fl 81 - 83   MCHC 31.5 - 36.5 g/dL 31.5 - 30.5(L)   RDW 10.0 - 15.0 % 13.9 - 15.9(H)    - 450 10e9/L 208 - 214   CREATININE 0.52 - 1.04 mg/dL - - 0.71   GFR ESTIMATE, IF BLACK >60 mL/min/1.7m2 - - >90   GFR ESTIMATE >60 mL/min/1.7m2 - - >90     Component      Latest Ref Rng & Units 5/23/2018   WBC      4.0 - 11.0 10e9/L 6.3   RBC Count      3.8 - 5.2 10e12/L 5.06   Hemoglobin      11.7 - 15.7 g/dL 12.8   Hematocrit      35.0 - 47.0 % 41.9   MCV      78 - 100 fl 83   MCH      26.5 - 33.0 pg 25.3 (L)   MCHC      31.5 - 36.5 g/dL 30.5 (L)   RDW      10.0 - 15.0 % 15.9 (H)   Platelet Count      150 - 450 10e9/L 214   Diff Method       Automated Method   % Neutrophils      % 52.1   % Lymphocytes      % 38.5   % Monocytes      % 6.2   % Eosinophils      % 2.4   % Basophils      % 0.6   % Immature Granulocytes      % 0.2   Nucleated RBCs      0 /100 0   Absolute Neutrophil      1.6 - 8.3 10e9/L 3.3   Absolute Lymphocytes      0.8 - 5.3 10e9/L 2.4   Absolute Monocytes      0.0 - 1.3 10e9/L 0.4   Absolute Eosinophils      0.0 - 0.7 10e9/L 0.2   Absolute Basophils      0.0 - 0.2 10e9/L 0.0   Abs Immature Granulocytes      0 - 0.4 10e9/L 0.0   Absolute Nucleated RBC       0.0   Color Urine       Yellow   Appearance Urine       Clear   Glucose Urine      NEG:Negative mg/dL Negative   Bilirubin Urine      NEG:Negative Negative   Ketones Urine      NEG:Negative mg/dL Negative   Specific Gravity Urine      1.003 - 1.035 1.014   Blood Urine      NEG:Negative Negative   pH Urine      5.0 - 7.0 pH 7.0   Protein Albumin Urine      NEG:Negative mg/dL Negative    Urobilinogen mg/dL      0.0 - 2.0 mg/dL Normal   Nitrite Urine      NEG:Negative Negative   Leukocyte Esterase Urine      NEG:Negative Moderate (A)   Source       Urine   WBC Urine      0 - 5 /HPF 6 (H)   RBC Urine      0 - 2 /HPF 1   Transitional Epi      0 - 1 /HPF <1   Creatinine      0.52 - 1.04 mg/dL 0.71   GFR Estimate      >60 mL/min/1.7m2 >90   GFR Estimate If Black      >60 mL/min/1.7m2 >90   NINOSKA interpretation      NEG:Negative Positive (A)   NINOSKA pattern 1       DENSE FINE SPECKLED   NINOSKA titer 1       1:160   Specimen Description       Unspecified Urine   Special Requests       Specimen received in preservative   Culture Micro       No growth   Complement C3      76 - 169 mg/dL 85   Complement C4      15 - 50 mg/dL 21   CRP Inflammation      0.0 - 8.0 mg/L <2.9   Sed Rate      0 - 20 mm/h 6   DNA-ds      <10 IU/mL 5   AST      0 - 45 U/L 16   ALT      0 - 50 U/L 17   Albumin      3.4 - 5.0 g/dL 3.8     Reviewed Rheumatology lab flowsheet

## 2019-02-22 NOTE — NURSING NOTE
"Chief Complaint   Patient presents with     RECHECK     undifferentiated connective tissue disease     /69   Pulse 99   Temp 98.1  F (36.7  C) (Oral)   Ht 1.702 m (5' 7\")   Wt 76.3 kg (168 lb 3.2 oz)   SpO2 97%   BMI 26.34 kg/m    Sumi Gee CMA    "

## 2019-02-22 NOTE — PATIENT INSTRUCTIONS
Labs today      Taper plaquenil to 200 mg twice a day on Mon/Wed/Fri and then 1 tab for the rest of the week      Return in 6 months

## 2019-02-22 NOTE — LETTER
2019      RE: Heather Guillory  [address]  [place] [address]       Rheumatology Visit     Heather Guillory MRN# [id number]   YOB: 1980 Age: 38 year old     Date of Last Visit: 2018  DOS: 2019       Assessment and Plan:   Undifferentiated connective tissue disease (UCTD) and pregnancy via IVF (s/p delivery 2018):    Heather is a 37 yo WF , a former patient of Dr. Brewer. She was diagnosed with MCTD in 2016, 6 wk postpartum based on fatigue, arthritis, mild Raynaud's, low positive NINOSKA 1.1 and low positive RNP Ab 1.6. She is on  mg qd since 2017 with great response.  UCTD continues to be most likely diagnosis not MCTD as she does not have classic features of MCTD, had very low titer of RNP Ab in the past, (negative on most recent check), and had a mild disease which never required prednisone.  All autoimmunity labs (2017) came back negative (except + NINOSKA) which is further reassuring for UCTD, including APS panel, repeat RNP and inflammatory markers, dsDNA and complement levels.      She had neg SSA/SSB antibodies in 2016, no concern for CHB. No need to re-check.  APS panel was neg.  She had neg anti-Sm, neg anti-DNA and neg centromere Ab in 2016.    Today:  Disease continues to be under good control on HCQ. She did not flare during pregnancy. She had about 2 wk increased arthralgia around  which responded to NSAIDs, labs were not suggestive of a flare. Today, is asymptomatic.    Recommend to taper plaquenil to 200 mg twice a day on Mon/Wed/Fri and then 1 tab for the rest of the week    Was reminded to have eye exam on HCQ.    Labs today    RTC in 6 months              Active Problem List:     Patient Active Problem List    Diagnosis Date Noted     Polyhydramnios 2018     Priority: Medium     Xerosis cutis 2017     Priority: Medium     Overview:       Xerosis of the hands which is moderately severe and associated with fissuring.        Family history of genetic disorder 12/23/2015     Priority: Medium     Patient's sister's son with Elijah's disease       Female infertility 04/04/2014     Priority: Medium     IVF with first pregnancy.       Anxiety 04/04/2014     Priority: Medium     1/15/18: on 25mg Zoloft, discussed increasing to 50mg  Pt plans to increase    Plan 1-2 week pp mood check       Tension headache 03/03/2014     Priority: Medium     Family history of malignant neoplasm of breast 07/12/2013     Priority: Medium     Overview:   Paternal GM       Decreased libido 12/12/2012     Priority: Medium     Overview:   Due to Effexor.       Irritable colon 10/26/2009     Priority: Medium     Panic disorder without agoraphobia 02/06/2008     Priority: Medium     Overview:   Panic Disorder w/o Agoraphobia       Eating disorder 02/05/2004     Priority: Medium     History of, feels stable now.              History of Present Illness:   Heather Guillory presents for f/u for probable mixed connective tissue disease. Last seen in 1-17, when HCQ was continued.    Background: received diagnosis of mixed connective tissue given pain in hand and feet, stiffness, Raynaud's  Syndrome, fatigue, positive NINOSKA and anti-RNP in 2016. Ms. Guillory first experienced swelling and pain in her fingers and feet during her second pregnancy this past spring that prevented her from wearing rings on her fingers and provoked an evaluation for DVT, which was negative. Despite weight loss post partum, her pain, mild swelling, and stiffness in her digits persisted. She described the pain as a 7/10 when she wakes to feed her infant in the middle of the night and in the morning that decreases to a 2-3/10 over the course of the day. Stiffness lasts 30 mins to 1 hour in the morning. In general she feels tired all the time, but cannot determine if it is associated with that fact that she has two young children and is breastfeeding every 2 hours or if it is related to her joint symptoms.  She was evaluated by an internist who found that she had a positive NINOSKA/ She saw Dr. Iniguez in 8-16 who discovered +RNP. She started Plaquenil 200 mg BID.    Interval history 7/14/17  Heather Guillory is stable since her last visit in January, despite feeling more fatigued recently. She denied arthralgias/myalgias but reports having sore feet/toes at end of day and when she first wakes up. She states the fatigue worsened since she ran out of Zoloft 3.5 weeks ago; also reports irritability and no sleeping soundly during the night since that time too. Zoloft prescribed by her OBGYN.     She is currently receiving fertility treatment and plans to undergo embryo transfer on 7/24/17. She remains on HCQ and feels comfortable continuing this medication during pregnancy. She wonders if taking HCQ once daily (400) rather than 200 twice daily is acceptable.  She is using estrogen therapy in preparation for the embryo transfer, and relates that her gynecologist recommends that she remained on the supplement for approximately one month after the transfer.    She recently pinched a nerve in her neck and has numbness/tingling with burning sensation in her left arm that radiates down into her 1st-3rd digits. She was evaluated at [facility] orthopedics, received percocet for pain and told to have PT. If it did not improve in 6 weeks, she will return for another evaluation.     Lastly, she had dry, rough skin on lateral aspects of fingers. She states this often occurs in winter due to weather but it is new for her this summer. She is using a lot of moisturizers but it has not been helping.         9/28/17:   Heather is former patient of Dr. Brewer, is seeing me for 2nd opinion and is transferring care. She is currently pregnant and is concerned about her flare of MCTD during pregnancy, especially worried about flare being triggered by IVF.     She had her 1st pregnancy in 2/2015. It was via IVF. It was unexplained fertility. It was  uneventful. Her son was born term. His 2nd son was born natuarally in 5/2016, term and healthy.       At 6 week post partum check up, she mentioned AM stiffness and stiffness of hands (new), feet were painful. Hands were swollen. Had fatigue, was breastfeeding att hat time.    No hair loss, skin rash, photosensitivity (but burns easily), oral/nasal ulcers, or sicca.    Has h/o Raynaud's since teen. Fingers turn white, toes turn purple, not painful. They feel cold not painful.    No myositis.     No serositis.    No seziures, headaches, psychosis.    Has sensitive stomach.    No fevers.     No h/o proteinuria.    She was diagnosed with MTCD in 6/2016. She started taking HCQ in 9/2016.  She started HCQ after she stopped breastfeeding. Had one flare up after stopping breastfeeding. She started  bid, last eye exam was this summer of 2017.     No prednisone.    Lack of sleep causes joint pain.     She did another round of IVF in 4/2017.  She was on OC x 10 wk . Embryo transfer was 7/14/2017. Now is off estrogen, progestron x 2 wk, now 12 wk pregnant.     No flare of her disease after IVF.    She was on ASA 81 mg qd till she was confirmed to be pregnant.    No preganncy loses. No thrombosis.    Feet feel sore and achy only with lack of sleep.     JOSEPH is 4/11/2018.    Interval History 12/14/17:  Heather has no major concerns or changes in symptoms at today's appointment.    Hasn't experienced any joint pains for about 9-12 months.  No stiffness in her muscles or joints.  She hasn't had any Raynaud's and notes that she is keeping her hands and feet adequately covered and warm.      She does note that she has been fatigued lately but attributes that to her 2 toddlers at home who aren't sleeping throughout the night.  Also notes easy bruising but thinks that it is chronic.  Denies any hair loss, mouth sores/ulcers, HA, F/chills, night sweats.    She is taking hydroxychloroquine and is tolerating it well.      No  significant infections although she wanted to inform us about her UTI with ESBL bacteria which was diagnosed when she was 8 weeks pregnant.  At that time, she was hospitalized and given IV antibiotics.  Ever since, she has experienced recurring yeast infections.  She tried diflucan x 3 without improvement.  Finally, she has changed her diet, cutting out sugars, which is improving her symptoms.      3/2/2018: Feeling very well with no flare ups or joint pain due to UCTD. Pregnancy is going well as well, she is due on 4/11/2018. She still has problem with vaginal yeast infection, is allergic to topical anti-fungal Tx, unresponsive to one time fluconazole tx, was offered to try 7 days of fluconazole but prefers to hold off as pregnancy is almost over and per her experience, vaginal yeast infection revolves after delivery.    6/2018: Doing well post delivery. Delivered a healthy boy on 4/5/2018 and is breastfeeding, not sure if she will have another child, has an IUD placed in 5/2018. On 5/23, contacted us with recurrence of arthralgia (hands, feet x 10-14days) which self-resolved, took some NSAIDs, today is feeling well with no complaints. Labs on 5/23 were not suggestive of a flare.    Today: Feeling very well. No complaints.          Review of Systems:   A comprehensive ROS was done. Positives are per HPI.          Past Medical History:     Past Medical History:   Diagnosis Date     Abnormal Pap smear     Over 10 years ago     Anxiety      E coli infection     ESBL     History of extended-spectrum beta-lactamase producing Escherichia coli infection      Hx of previous reproductive problem 12/2013    Infertility     Mixed connective tissue disease (H)     Dr. Steele- switching      Nephrolithiasis      Past Surgical History:   Procedure Laterality Date     BIOPSY  April 2014    Uterine polyp removed     DILATION AND CURETTAGE SUCTION       OPERATIVE HYSTEROSCOPY WITH MORCELLATOR  4/8/2014    Procedure: OPERATIVE  HYSTEROSCOPY WITH MORCELLATOR;  Hysteroscopic Polypectomy;  Surgeon: Cate Mansfield MD;  Location: [facility] OR     Raynaud's syndrome since puberty. Her main trigger is cold.  She had two uneventful pregnancies with vaginal births, though the conception of her first child required IVF.   she is underwent embryo transfer in July 2017, now pregnant.   cervical radiculopathy, 2017.       Social History:     Social History     Occupational History     Occupation:      Employer: [employer]   Tobacco Use     Smoking status: Never Smoker     Smokeless tobacco: Never Used   Substance and Sexual Activity     Alcohol use: No     Alcohol/week: 0.0 oz     Drug use: No     Sexual activity: Yes     Partners: Male     Birth control/protection: IUD     Comment: Mirena            Family History:     Family History   Problem Relation Age of Onset     Diabetes Paternal Grandfather      C.A.D. Paternal Grandfather      Hypertension Paternal Grandfather      Other Cancer Paternal Grandfather         Lung     Breast Cancer Paternal Grandmother      Cancer Paternal Grandmother         lung ca     Osteoporosis Paternal Grandmother      Hypertension Paternal Grandmother      Cancer - colorectal Maternal Grandfather      Colon Cancer Maternal Grandfather      Cancer Maternal Grandmother         cervical ca     Neurologic Disorder Maternal Grandmother         alzheimers     Alzheimer Disease Maternal Grandmother      Cardiovascular Brother 28        cardiomyopathy     Genetic Disorder Sister 21        una's disease     Heart Disease Paternal Uncle 55        mitral valve replacement     Anxiety Disorder Sister      Anxiety Disorder Brother      Genetic Disorder Sister         Una's Disease     No history of AI disease       Allergies:     Allergies   Allergen Reactions     Kiwi Other (See Comments)     Throat itching     Benzoyl Peroxide Swelling     Duricef [Cefadroxil] Unknown     Monistat [Miconazole] Swelling  "    Sulfa Drugs Rash            Medications:     Current Outpatient Medications   Medication Sig Dispense Refill     hydroxychloroquine (PLAQUENIL) 200 MG tablet Take 1 tablet (200 mg) by mouth 2 times daily 60 tablet 5     ibuprofen (ADVIL/MOTRIN) 600 MG tablet Take 1 tablet (600 mg) by mouth every 6 hours as needed for moderate pain 30 tablet 1     levonorgestrel (MIRENA, 52 MG,) 20 MCG/24HR IUD 1 each (20 mcg) by Intrauterine route once for 1 dose 1 each 0     Prenatal Vit-Fe Fumarate-FA (PRENATAL VITAMINS) 28-0.8 MG TABS Take 1 tablet by mouth daily        sertraline (ZOLOFT) 25 MG tablet Take 2 tablets (50 mg) by mouth daily Increase to 50 mg daily after one week if symptoms improved but not enough 90 tablet 6            Physical Exam:   Blood pressure 102/69, pulse 99, temperature 98.1  F (36.7  C), temperature source Oral, height 1.702 m (5' 7\"), weight 76.3 kg (168 lb 3.2 oz), SpO2 97 %, currently breastfeeding.  Wt Readings from Last 4 Encounters:   03/08/19 76.3 kg (168 lb 3.2 oz)   02/22/19 76.3 kg (168 lb 3.2 oz)   06/29/18 81.2 kg (179 lb)   06/01/18 81.6 kg (180 lb)     Constitutional: well-developed, appearing stated age; cooperative  Eyes: nl EOM, PERRLA, vision, conjunctiva, sclera  ENT: nl external ears, nose, hearing, lips, teeth, gums, throat  No mucous membrane lesions, normal saliva pool  Neck: no mass or thyroid enlargement  Resp: lungs clear to auscultation  CV: RRR, no murmurs, rubs or gallops, no edema  GI: no ABD tenderness  Lymph: no cervical, supraclavicular nodes  MS: no active synovitis or joint tenderness  Skin: no skin rash  Neuro: nl cranial nerves, strength  Psych: nl affect.         Data:     Results for orders placed or performed in visit on 05/23/18   hCG qual urine POCT [POC7]   Result Value Ref Range    HCG Qual Urine Negative neg    Internal QC OK Yes      Labs from outside laboratory reviewed from 8/12/16  RNP antibodies 1.6- positive    Negative for Parvo Virus B19, Lyme " Disease  Negative SLE panel   Negative SSA, SSB  Negative Anti-Sury-1  Negative Centromere B antibodies  Negative  CCP  Recent Labs   Lab Test  07/22/16   1110  05/29/16   0651  05/27/16   1032   01/09/16   0724   WBC  8.1   --   15.4*   --   16.8*   RBC  4.86   --   4.25   --   4.75   HGB  14.1  11.2*  12.4   < >  14.2   HCT  42.1   --   37.6   --   40.1   MCV  87   --   89   --   84   RDW  13.0   --   14.0   --   13.7   PLT  228   --   184   --   202   ALBUMIN   --    --    --    --   3.3*   CRP  3.0   --    --    --    --    BUN   --    --    --    --   9    < > = values in this interval not displayed.      Recent Labs   Lab Test  07/22/16   1110   TSH  1.10     Hemoglobin   Date Value Ref Range Status   05/23/2018 12.8 11.7 - 15.7 g/dL Final   04/06/2018 9.3 (L) 11.7 - 15.7 g/dL Final   04/04/2018 10.6 (L) 11.7 - 15.7 g/dL Final     Urea Nitrogen   Date Value Ref Range Status   01/09/2016 9 7 - 30 mg/dL Final     Sed Rate   Date Value Ref Range Status   05/23/2018 6 0 - 20 mm/h Final   09/28/2017 8 0 - 20 mm/h Final   07/22/2016 9 0 - 20 mm/h Final     CRP Inflammation   Date Value Ref Range Status   05/23/2018 <2.9 0.0 - 8.0 mg/L Final   09/28/2017 <2.9 0.0 - 8.0 mg/L Final   10/24/2016 <2.9 0.0 - 8.0 mg/L Final     AST   Date Value Ref Range Status   05/23/2018 16 0 - 45 U/L Final   12/14/2017 14 0 - 45 U/L Final   09/28/2017 16 0 - 45 U/L Final     Albumin   Date Value Ref Range Status   05/23/2018 3.8 3.4 - 5.0 g/dL Final   12/14/2017 3.1 (L) 3.4 - 5.0 g/dL Final   01/09/2016 3.3 (L) 3.4 - 5.0 g/dL Final     Alkaline Phosphatase   Date Value Ref Range Status   01/09/2016 70 40 - 150 U/L Final     ALT   Date Value Ref Range Status   05/23/2018 17 0 - 50 U/L Final   12/14/2017 15 0 - 50 U/L Final   09/28/2017 21 0 - 50 U/L Final     Rheumatoid Factor   Date Value Ref Range Status   07/22/2016 <20 <20 IU/mL Final     RHEUM RESULTS Latest Ref Rng & Units 4/4/2018 4/6/2018 5/23/2018   COMPLEMENT C3 76 - 169 mg/dL -  - 85   COMPLEMENT C4 15 - 50 mg/dL - - 21   DNA-DS <10 IU/mL - - 5   SED RATE 0 - 20 mm/h - - 6   CRP, INFLAMMATION 0.0 - 8.0 mg/L - - <2.9   RHEUMATOID FACTOR <20 IU/mL - - -   NINOSKA SCREEN BY EIA <1.0 - - -   AST 0 - 45 U/L - - 16   ALT 0 - 50 U/L - - 17   ALBUMIN 3.4 - 5.0 g/dL - - 3.8   WBC 4.0 - 11.0 10e9/L 15.4(H) - 6.3   RBC 3.8 - 5.2 10e12/L 4.15 - 5.06   HGB 11.7 - 15.7 g/dL 10.6(L) 9.3(L) 12.8   HCT 35.0 - 47.0 % 33.6(L) - 41.9   MCV 78 - 100 fl 81 - 83   MCHC 31.5 - 36.5 g/dL 31.5 - 30.5(L)   RDW 10.0 - 15.0 % 13.9 - 15.9(H)    - 450 10e9/L 208 - 214   CREATININE 0.52 - 1.04 mg/dL - - 0.71   GFR ESTIMATE, IF BLACK >60 mL/min/1.7m2 - - >90   GFR ESTIMATE >60 mL/min/1.7m2 - - >90     Component      Latest Ref Rng & Units 5/23/2018   WBC      4.0 - 11.0 10e9/L 6.3   RBC Count      3.8 - 5.2 10e12/L 5.06   Hemoglobin      11.7 - 15.7 g/dL 12.8   Hematocrit      35.0 - 47.0 % 41.9   MCV      78 - 100 fl 83   MCH      26.5 - 33.0 pg 25.3 (L)   MCHC      31.5 - 36.5 g/dL 30.5 (L)   RDW      10.0 - 15.0 % 15.9 (H)   Platelet Count      150 - 450 10e9/L 214   Diff Method       Automated Method   % Neutrophils      % 52.1   % Lymphocytes      % 38.5   % Monocytes      % 6.2   % Eosinophils      % 2.4   % Basophils      % 0.6   % Immature Granulocytes      % 0.2   Nucleated RBCs      0 /100 0   Absolute Neutrophil      1.6 - 8.3 10e9/L 3.3   Absolute Lymphocytes      0.8 - 5.3 10e9/L 2.4   Absolute Monocytes      0.0 - 1.3 10e9/L 0.4   Absolute Eosinophils      0.0 - 0.7 10e9/L 0.2   Absolute Basophils      0.0 - 0.2 10e9/L 0.0   Abs Immature Granulocytes      0 - 0.4 10e9/L 0.0   Absolute Nucleated RBC       0.0   Color Urine       Yellow   Appearance Urine       Clear   Glucose Urine      NEG:Negative mg/dL Negative   Bilirubin Urine      NEG:Negative Negative   Ketones Urine      NEG:Negative mg/dL Negative   Specific Gravity Urine      1.003 - 1.035 1.014   Blood Urine      NEG:Negative Negative   pH  Urine      5.0 - 7.0 pH 7.0   Protein Albumin Urine      NEG:Negative mg/dL Negative   Urobilinogen mg/dL      0.0 - 2.0 mg/dL Normal   Nitrite Urine      NEG:Negative Negative   Leukocyte Esterase Urine      NEG:Negative Moderate (A)   Source       Urine   WBC Urine      0 - 5 /HPF 6 (H)   RBC Urine      0 - 2 /HPF 1   Transitional Epi      0 - 1 /HPF <1   Creatinine      0.52 - 1.04 mg/dL 0.71   GFR Estimate      >60 mL/min/1.7m2 >90   GFR Estimate If Black      >60 mL/min/1.7m2 >90   NINOSKA interpretation      NEG:Negative Positive (A)   NINOSKA pattern 1       DENSE FINE SPECKLED   NINOSKA titer 1       1:160   Specimen Description       Unspecified Urine   Special Requests       Specimen received in preservative   Culture Micro       No growth   Complement C3      76 - 169 mg/dL 85   Complement C4      15 - 50 mg/dL 21   CRP Inflammation      0.0 - 8.0 mg/L <2.9   Sed Rate      0 - 20 mm/h 6   DNA-ds      <10 IU/mL 5   AST      0 - 45 U/L 16   ALT      0 - 50 U/L 17   Albumin      3.4 - 5.0 g/dL 3.8     Reviewed Rheumatology lab flowsheet    Nasrin King MD

## 2019-02-22 NOTE — LETTER
2019       RE: Heather Guillory  [address]  [place] [address]     Dear Colleague,    Thank you for referring your patient, Heather Guillory, to the [facility] RHEUMATOLOGY at [facility]. Please see a copy of my visit note below.    Rheumatology Visit     Heather Guillory MRN# [id number]   YOB: 1980 Age: 38 year old     Date of Last Visit: 2018  DOS: 2019       Assessment and Plan:   Undifferentiated connective tissue disease (UCTD) and pregnancy via IVF (s/p delivery 2018):    Heather is a 39 yo WF , a former patient of Dr. Brewer. She was diagnosed with MCTD in 2016, 6 wk postpartum based on fatigue, arthritis, mild Raynaud's, low positive NINOSKA 1.1 and low positive RNP Ab 1.6. She is on  mg qd since 2017 with great response.  UCTD continues to be most likely diagnosis not MCTD as she does not have classic features of MCTD, had very low titer of RNP Ab in the past, (negative on most recent check), and had a mild disease which never required prednisone.  All autoimmunity labs (2017) came back negative (except + NINOSKA) which is further reassuring for UCTD, including APS panel, repeat RNP and inflammatory markers, dsDNA and complement levels.      She had neg SSA/SSB antibodies in 2016, no concern for CHB. No need to re-check.  APS panel was neg.  She had neg anti-Sm, neg anti-DNA and neg centromere Ab in 2016.    Today:  Disease continues to be under good control on HCQ. She did not flare during pregnancy. She had about 2 wk increased arthralgia around  which responded to NSAIDs, labs were not suggestive of a flare. Today, is asymptomatic.    Recommend to taper plaquenil to 200 mg twice a day on Mon/Wed/Fri and then 1 tab for the rest of the week    Was reminded to have eye exam on HCQ.    Labs today    RTC in 6 months              Active Problem List:     Patient Active Problem List    Diagnosis Date Noted      Polyhydramnios 04/04/2018     Priority: Medium     Xerosis cutis 07/21/2017     Priority: Medium     Overview:       Xerosis of the hands which is moderately severe and associated with fissuring.       Family history of genetic disorder 12/23/2015     Priority: Medium     Patient's sister's son with Elijah's disease       Female infertility 04/04/2014     Priority: Medium     IVF with first pregnancy.       Anxiety 04/04/2014     Priority: Medium     1/15/18: on 25mg Zoloft, discussed increasing to 50mg  Pt plans to increase    Plan 1-2 week pp mood check       Tension headache 03/03/2014     Priority: Medium     Family history of malignant neoplasm of breast 07/12/2013     Priority: Medium     Overview:   Paternal GM       Decreased libido 12/12/2012     Priority: Medium     Overview:   Due to Effexor.       Irritable colon 10/26/2009     Priority: Medium     Panic disorder without agoraphobia 02/06/2008     Priority: Medium     Overview:   Panic Disorder w/o Agoraphobia       Eating disorder 02/05/2004     Priority: Medium     History of, feels stable now.              History of Present Illness:   Heather Guillory presents for f/u for probable mixed connective tissue disease. Last seen in 1-17, when HCQ was continued.    Background: received diagnosis of mixed connective tissue given pain in hand and feet, stiffness, Raynaud's  Syndrome, fatigue, positive NINOSKA and anti-RNP in 2016. Ms. Guillory first experienced swelling and pain in her fingers and feet during her second pregnancy this past spring that prevented her from wearing rings on her fingers and provoked an evaluation for DVT, which was negative. Despite weight loss post partum, her pain, mild swelling, and stiffness in her digits persisted. She described the pain as a 7/10 when she wakes to feed her infant in the middle of the night and in the morning that decreases to a 2-3/10 over the course of the day. Stiffness lasts 30 mins to 1 hour in the morning. In  general she feels tired all the time, but cannot determine if it is associated with that fact that she has two young children and is breastfeeding every 2 hours or if it is related to her joint symptoms. She was evaluated by an internist who found that she had a positive NINOSKA/ She saw Dr. Iniguez in 8-16 who discovered +RNP. She started Plaquenil 200 mg BID.    Interval history 7/14/17  Heather Guillory is stable since her last visit in January, despite feeling more fatigued recently. She denied arthralgias/myalgias but reports having sore feet/toes at end of day and when she first wakes up. She states the fatigue worsened since she ran out of Zoloft 3.5 weeks ago; also reports irritability and no sleeping soundly during the night since that time too. Zoloft prescribed by her OBGYN.     She is currently receiving fertility treatment and plans to undergo embryo transfer on 7/24/17. She remains on HCQ and feels comfortable continuing this medication during pregnancy. She wonders if taking HCQ once daily (400) rather than 200 twice daily is acceptable.  She is using estrogen therapy in preparation for the embryo transfer, and relates that her gynecologist recommends that she remained on the supplement for approximately one month after the transfer.    She recently pinched a nerve in her neck and has numbness/tingling with burning sensation in her left arm that radiates down into her 1st-3rd digits. She was evaluated at [facility] orthopedics, received percocet for pain and told to have PT. If it did not improve in 6 weeks, she will return for another evaluation.     Lastly, she had dry, rough skin on lateral aspects of fingers. She states this often occurs in winter due to weather but it is new for her this summer. She is using a lot of moisturizers but it has not been helping.         9/28/17:   Heather is former patient of Dr. Brewer, is seeing me for 2nd opinion and is transferring care. She is currently pregnant and is  concerned about her flare of MCTD during pregnancy, especially worried about flare being triggered by IVF.     She had her 1st pregnancy in 2/2015. It was via IVF. It was unexplained fertility. It was uneventful. Her son was born term. His 2nd son was born natuarally in 5/2016, term and healthy.       At 6 week post partum check up, she mentioned AM stiffness and stiffness of hands (new), feet were painful. Hands were swollen. Had fatigue, was breastfeeding att hat time.    No hair loss, skin rash, photosensitivity (but burns easily), oral/nasal ulcers, or sicca.    Has h/o Raynaud's since teen. Fingers turn white, toes turn purple, not painful. They feel cold not painful.    No myositis.     No serositis.    No seziures, headaches, psychosis.    Has sensitive stomach.    No fevers.     No h/o proteinuria.    She was diagnosed with MTCD in 6/2016. She started taking HCQ in 9/2016.  She started HCQ after she stopped breastfeeding. Had one flare up after stopping breastfeeding. She started  bid, last eye exam was this summer of 2017.     No prednisone.    Lack of sleep causes joint pain.     She did another round of IVF in 4/2017.  She was on OC x 10 wk . Embryo transfer was 7/14/2017. Now is off estrogen, progestron x 2 wk, now 12 wk pregnant.     No flare of her disease after IVF.    She was on ASA 81 mg qd till she was confirmed to be pregnant.    No preganncy loses. No thrombosis.    Feet feel sore and achy only with lack of sleep.     JOSEPH is 4/11/2018.    Interval History 12/14/17:  Heather has no major concerns or changes in symptoms at today's appointment.    Hasn't experienced any joint pains for about 9-12 months.  No stiffness in her muscles or joints.  She hasn't had any Raynaud's and notes that she is keeping her hands and feet adequately covered and warm.      She does note that she has been fatigued lately but attributes that to her 2 toddlers at home who aren't sleeping throughout the  night.  Also notes easy bruising but thinks that it is chronic.  Denies any hair loss, mouth sores/ulcers, HA, F/chills, night sweats.    She is taking hydroxychloroquine and is tolerating it well.      No significant infections although she wanted to inform us about her UTI with ESBL bacteria which was diagnosed when she was 8 weeks pregnant.  At that time, she was hospitalized and given IV antibiotics.  Ever since, she has experienced recurring yeast infections.  She tried diflucan x 3 without improvement.  Finally, she has changed her diet, cutting out sugars, which is improving her symptoms.      3/2/2018: Feeling very well with no flare ups or joint pain due to UCTD. Pregnancy is going well as well, she is due on 4/11/2018. She still has problem with vaginal yeast infection, is allergic to topical anti-fungal Tx, unresponsive to one time fluconazole tx, was offered to try 7 days of fluconazole but prefers to hold off as pregnancy is almost over and per her experience, vaginal yeast infection revolves after delivery.    6/2018: Doing well post delivery. Delivered a healthy boy on 4/5/2018 and is breastfeeding, not sure if she will have another child, has an IUD placed in 5/2018. On 5/23, contacted us with recurrence of arthralgia (hands, feet x 10-14days) which self-resolved, took some NSAIDs, today is feeling well with no complaints. Labs on 5/23 were not suggestive of a flare.    Today: Feeling very well. No complaints.          Review of Systems:   A comprehensive ROS was done. Positives are per HPI.          Past Medical History:     Past Medical History:   Diagnosis Date     Abnormal Pap smear     Over 10 years ago     Anxiety      E coli infection     ESBL     History of extended-spectrum beta-lactamase producing Escherichia coli infection      Hx of previous reproductive problem 12/2013    Infertility     Mixed connective tissue disease (H)     Dr. Steele- switching      Nephrolithiasis      Past  Surgical History:   Procedure Laterality Date     BIOPSY  April 2014    Uterine polyp removed     DILATION AND CURETTAGE SUCTION       OPERATIVE HYSTEROSCOPY WITH MORCELLATOR  4/8/2014    Procedure: OPERATIVE HYSTEROSCOPY WITH MORCELLATOR;  Hysteroscopic Polypectomy;  Surgeon: Cate Mansfield MD;  Location: [facility] OR     Raynaud's syndrome since puberty. Her main trigger is cold.  She had two uneventful pregnancies with vaginal births, though the conception of her first child required IVF.   she is underwent embryo transfer in July 2017, now pregnant.   cervical radiculopathy, 2017.       Social History:     Social History     Occupational History     Occupation:      Employer: [employer]   Tobacco Use     Smoking status: Never Smoker     Smokeless tobacco: Never Used   Substance and Sexual Activity     Alcohol use: No     Alcohol/week: 0.0 oz     Drug use: No     Sexual activity: Yes     Partners: Male     Birth control/protection: IUD     Comment: Mirena            Family History:     Family History   Problem Relation Age of Onset     Diabetes Paternal Grandfather      C.A.D. Paternal Grandfather      Hypertension Paternal Grandfather      Other Cancer Paternal Grandfather         Lung     Breast Cancer Paternal Grandmother      Cancer Paternal Grandmother         lung ca     Osteoporosis Paternal Grandmother      Hypertension Paternal Grandmother      Cancer - colorectal Maternal Grandfather      Colon Cancer Maternal Grandfather      Cancer Maternal Grandmother         cervical ca     Neurologic Disorder Maternal Grandmother         alzheimers     Alzheimer Disease Maternal Grandmother      Cardiovascular Brother 28        cardiomyopathy     Genetic Disorder Sister 21        una's disease     Heart Disease Paternal Uncle 55        mitral valve replacement     Anxiety Disorder Sister      Anxiety Disorder Brother      Genetic Disorder Sister         Una's Disease     No history of  "AI disease       Allergies:     Allergies   Allergen Reactions     Kiwi Other (See Comments)     Throat itching     Benzoyl Peroxide Swelling     Duricef [Cefadroxil] Unknown     Monistat [Miconazole] Swelling     Sulfa Drugs Rash            Medications:     Current Outpatient Medications   Medication Sig Dispense Refill     hydroxychloroquine (PLAQUENIL) 200 MG tablet Take 1 tablet (200 mg) by mouth 2 times daily 60 tablet 5     ibuprofen (ADVIL/MOTRIN) 600 MG tablet Take 1 tablet (600 mg) by mouth every 6 hours as needed for moderate pain 30 tablet 1     levonorgestrel (MIRENA, 52 MG,) 20 MCG/24HR IUD 1 each (20 mcg) by Intrauterine route once for 1 dose 1 each 0     Prenatal Vit-Fe Fumarate-FA (PRENATAL VITAMINS) 28-0.8 MG TABS Take 1 tablet by mouth daily        sertraline (ZOLOFT) 25 MG tablet Take 2 tablets (50 mg) by mouth daily Increase to 50 mg daily after one week if symptoms improved but not enough 90 tablet 6            Physical Exam:   Blood pressure 102/69, pulse 99, temperature 98.1  F (36.7  C), temperature source Oral, height 1.702 m (5' 7\"), weight 76.3 kg (168 lb 3.2 oz), SpO2 97 %, currently breastfeeding.  Wt Readings from Last 4 Encounters:   03/08/19 76.3 kg (168 lb 3.2 oz)   02/22/19 76.3 kg (168 lb 3.2 oz)   06/29/18 81.2 kg (179 lb)   06/01/18 81.6 kg (180 lb)     Constitutional: well-developed, appearing stated age; cooperative  Eyes: nl EOM, PERRLA, vision, conjunctiva, sclera  ENT: nl external ears, nose, hearing, lips, teeth, gums, throat  No mucous membrane lesions, normal saliva pool  Neck: no mass or thyroid enlargement  Resp: lungs clear to auscultation  CV: RRR, no murmurs, rubs or gallops, no edema  GI: no ABD tenderness  Lymph: no cervical, supraclavicular nodes  MS: no active synovitis or joint tenderness  Skin: no skin rash  Neuro: nl cranial nerves, strength  Psych: nl affect.         Data:     Results for orders placed or performed in visit on 05/23/18   hCG qual urine POCT " [POC7]   Result Value Ref Range    HCG Qual Urine Negative neg    Internal QC OK Yes      Labs from outside laboratory reviewed from 8/12/16  RNP antibodies 1.6- positive    Negative for Parvo Virus B19, Lyme Disease  Negative SLE panel   Negative SSA, SSB  Negative Anti-Sury-1  Negative Centromere B antibodies  Negative  CCP  Recent Labs   Lab Test  07/22/16   1110  05/29/16   0651  05/27/16   1032   01/09/16   0724   WBC  8.1   --   15.4*   --   16.8*   RBC  4.86   --   4.25   --   4.75   HGB  14.1  11.2*  12.4   < >  14.2   HCT  42.1   --   37.6   --   40.1   MCV  87   --   89   --   84   RDW  13.0   --   14.0   --   13.7   PLT  228   --   184   --   202   ALBUMIN   --    --    --    --   3.3*   CRP  3.0   --    --    --    --    BUN   --    --    --    --   9    < > = values in this interval not displayed.      Recent Labs   Lab Test  07/22/16   1110   TSH  1.10     Hemoglobin   Date Value Ref Range Status   05/23/2018 12.8 11.7 - 15.7 g/dL Final   04/06/2018 9.3 (L) 11.7 - 15.7 g/dL Final   04/04/2018 10.6 (L) 11.7 - 15.7 g/dL Final     Urea Nitrogen   Date Value Ref Range Status   01/09/2016 9 7 - 30 mg/dL Final     Sed Rate   Date Value Ref Range Status   05/23/2018 6 0 - 20 mm/h Final   09/28/2017 8 0 - 20 mm/h Final   07/22/2016 9 0 - 20 mm/h Final     CRP Inflammation   Date Value Ref Range Status   05/23/2018 <2.9 0.0 - 8.0 mg/L Final   09/28/2017 <2.9 0.0 - 8.0 mg/L Final   10/24/2016 <2.9 0.0 - 8.0 mg/L Final     AST   Date Value Ref Range Status   05/23/2018 16 0 - 45 U/L Final   12/14/2017 14 0 - 45 U/L Final   09/28/2017 16 0 - 45 U/L Final     Albumin   Date Value Ref Range Status   05/23/2018 3.8 3.4 - 5.0 g/dL Final   12/14/2017 3.1 (L) 3.4 - 5.0 g/dL Final   01/09/2016 3.3 (L) 3.4 - 5.0 g/dL Final     Alkaline Phosphatase   Date Value Ref Range Status   01/09/2016 70 40 - 150 U/L Final     ALT   Date Value Ref Range Status   05/23/2018 17 0 - 50 U/L Final   12/14/2017 15 0 - 50 U/L Final    09/28/2017 21 0 - 50 U/L Final     Rheumatoid Factor   Date Value Ref Range Status   07/22/2016 <20 <20 IU/mL Final     RHEUM RESULTS Latest Ref Rng & Units 4/4/2018 4/6/2018 5/23/2018   COMPLEMENT C3 76 - 169 mg/dL - - 85   COMPLEMENT C4 15 - 50 mg/dL - - 21   DNA-DS <10 IU/mL - - 5   SED RATE 0 - 20 mm/h - - 6   CRP, INFLAMMATION 0.0 - 8.0 mg/L - - <2.9   RHEUMATOID FACTOR <20 IU/mL - - -   NINOSKA SCREEN BY EIA <1.0 - - -   AST 0 - 45 U/L - - 16   ALT 0 - 50 U/L - - 17   ALBUMIN 3.4 - 5.0 g/dL - - 3.8   WBC 4.0 - 11.0 10e9/L 15.4(H) - 6.3   RBC 3.8 - 5.2 10e12/L 4.15 - 5.06   HGB 11.7 - 15.7 g/dL 10.6(L) 9.3(L) 12.8   HCT 35.0 - 47.0 % 33.6(L) - 41.9   MCV 78 - 100 fl 81 - 83   MCHC 31.5 - 36.5 g/dL 31.5 - 30.5(L)   RDW 10.0 - 15.0 % 13.9 - 15.9(H)    - 450 10e9/L 208 - 214   CREATININE 0.52 - 1.04 mg/dL - - 0.71   GFR ESTIMATE, IF BLACK >60 mL/min/1.7m2 - - >90   GFR ESTIMATE >60 mL/min/1.7m2 - - >90     Component      Latest Ref Rng & Units 5/23/2018   WBC      4.0 - 11.0 10e9/L 6.3   RBC Count      3.8 - 5.2 10e12/L 5.06   Hemoglobin      11.7 - 15.7 g/dL 12.8   Hematocrit      35.0 - 47.0 % 41.9   MCV      78 - 100 fl 83   MCH      26.5 - 33.0 pg 25.3 (L)   MCHC      31.5 - 36.5 g/dL 30.5 (L)   RDW      10.0 - 15.0 % 15.9 (H)   Platelet Count      150 - 450 10e9/L 214   Diff Method       Automated Method   % Neutrophils      % 52.1   % Lymphocytes      % 38.5   % Monocytes      % 6.2   % Eosinophils      % 2.4   % Basophils      % 0.6   % Immature Granulocytes      % 0.2   Nucleated RBCs      0 /100 0   Absolute Neutrophil      1.6 - 8.3 10e9/L 3.3   Absolute Lymphocytes      0.8 - 5.3 10e9/L 2.4   Absolute Monocytes      0.0 - 1.3 10e9/L 0.4   Absolute Eosinophils      0.0 - 0.7 10e9/L 0.2   Absolute Basophils      0.0 - 0.2 10e9/L 0.0   Abs Immature Granulocytes      0 - 0.4 10e9/L 0.0   Absolute Nucleated RBC       0.0   Color Urine       Yellow   Appearance Urine       Clear   Glucose Urine       NEG:Negative mg/dL Negative   Bilirubin Urine      NEG:Negative Negative   Ketones Urine      NEG:Negative mg/dL Negative   Specific Gravity Urine      1.003 - 1.035 1.014   Blood Urine      NEG:Negative Negative   pH Urine      5.0 - 7.0 pH 7.0   Protein Albumin Urine      NEG:Negative mg/dL Negative   Urobilinogen mg/dL      0.0 - 2.0 mg/dL Normal   Nitrite Urine      NEG:Negative Negative   Leukocyte Esterase Urine      NEG:Negative Moderate (A)   Source       Urine   WBC Urine      0 - 5 /HPF 6 (H)   RBC Urine      0 - 2 /HPF 1   Transitional Epi      0 - 1 /HPF <1   Creatinine      0.52 - 1.04 mg/dL 0.71   GFR Estimate      >60 mL/min/1.7m2 >90   GFR Estimate If Black      >60 mL/min/1.7m2 >90   NINOSKA interpretation      NEG:Negative Positive (A)   NINOSKA pattern 1       DENSE FINE SPECKLED   NINOSKA titer 1       1:160   Specimen Description       Unspecified Urine   Special Requests       Specimen received in preservative   Culture Micro       No growth   Complement C3      76 - 169 mg/dL 85   Complement C4      15 - 50 mg/dL 21   CRP Inflammation      0.0 - 8.0 mg/L <2.9   Sed Rate      0 - 20 mm/h 6   DNA-ds      <10 IU/mL 5   AST      0 - 45 U/L 16   ALT      0 - 50 U/L 17   Albumin      3.4 - 5.0 g/dL 3.8     Reviewed Rheumatology lab flowsheet    Again, thank you for allowing me to participate in the care of your patient.      Sincerely,    Nasrin King MD

## 2019-02-25 LAB — DSDNA AB SER-ACNC: 3 IU/ML

## 2019-02-26 DIAGNOSIS — R82.90 ABNORMAL URINE: Primary | ICD-10-CM

## 2019-02-26 NOTE — RESULT ENCOUNTER NOTE
There are few red cells in the urine, did you have your period at the time of urine collection? Otherwise, the rest of your labs look good which is good news.

## 2019-03-04 ENCOUNTER — TELEPHONE (OUTPATIENT)
Dept: OBGYN | Facility: CLINIC | Age: 39
End: 2019-03-04

## 2019-03-04 DIAGNOSIS — Z30.431 IUD CHECK UP: Primary | ICD-10-CM

## 2019-03-04 NOTE — TELEPHONE ENCOUNTER
Received phone call from Heather regarding bleeding and cramping associated with Mirena IUD. She has had IUD since June 2018. States she experienced intermittent bleeding x3 months and has not had bleeding since. She states about 2 weeks ago she noted spotting that has progressed to heavier bleeding and notes some cramping pain in her back. Patient states she did try to check for strings but was not able to reach all the way back to cervix.    Discussed with Heather that irregular bleeding and cramping is very normal with Mirena IUD. However, because she has had difficulty with feeling IUD strings, it is appropriate to evaluate for proper placement. Patient is scheduled for ultrasound 3/8 with followup with CNM. Discussed bleeding precautions and reasons to seek further evaluation. Patient states understanding and agreement of plan.

## 2019-03-07 ASSESSMENT — ANXIETY QUESTIONNAIRES
7. FEELING AFRAID AS IF SOMETHING AWFUL MIGHT HAPPEN: NOT AT ALL
4. TROUBLE RELAXING: NOT AT ALL
3. WORRYING TOO MUCH ABOUT DIFFERENT THINGS: NOT AT ALL
GAD7 TOTAL SCORE: 0
2. NOT BEING ABLE TO STOP OR CONTROL WORRYING: NOT AT ALL
7. FEELING AFRAID AS IF SOMETHING AWFUL MIGHT HAPPEN: NOT AT ALL
GAD7 TOTAL SCORE: 0
6. BECOMING EASILY ANNOYED OR IRRITABLE: NOT AT ALL
5. BEING SO RESTLESS THAT IT IS HARD TO SIT STILL: NOT AT ALL
1. FEELING NERVOUS, ANXIOUS, OR ON EDGE: NOT AT ALL

## 2019-03-08 ENCOUNTER — OFFICE VISIT (OUTPATIENT)
Dept: OBGYN | Facility: CLINIC | Age: 39
End: 2019-03-08
Attending: ADVANCED PRACTICE MIDWIFE
Payer: COMMERCIAL

## 2019-03-08 ENCOUNTER — ANCILLARY PROCEDURE (OUTPATIENT)
Dept: ULTRASOUND IMAGING | Facility: CLINIC | Age: 39
End: 2019-03-08
Attending: ADVANCED PRACTICE MIDWIFE
Payer: COMMERCIAL

## 2019-03-08 VITALS
HEIGHT: 67 IN | BODY MASS INDEX: 26.4 KG/M2 | WEIGHT: 168.2 LBS | HEART RATE: 67 BPM | DIASTOLIC BLOOD PRESSURE: 63 MMHG | SYSTOLIC BLOOD PRESSURE: 90 MMHG

## 2019-03-08 DIAGNOSIS — Z30.431 SURVEILLANCE OF INTRAUTERINE CONTRACEPTIVE DEVICE: Primary | ICD-10-CM

## 2019-03-08 DIAGNOSIS — Z30.431 IUD CHECK UP: ICD-10-CM

## 2019-03-08 PROCEDURE — 76830 TRANSVAGINAL US NON-OB: CPT

## 2019-03-08 PROCEDURE — G0463 HOSPITAL OUTPT CLINIC VISIT: HCPCS

## 2019-03-08 ASSESSMENT — ANXIETY QUESTIONNAIRES: GAD7 TOTAL SCORE: 0

## 2019-03-08 ASSESSMENT — MIFFLIN-ST. JEOR: SCORE: 1485.58

## 2019-03-08 NOTE — LETTER
"3/8/2019       RE: Heather Guillory  [address]  [place] [address]     Dear Colleague,    Thank you for referring your patient, Heather Guillory, to the [facility] at [facility]. Please see a copy of my visit note below.    S: Heather Guillory is a 38 year old  here for review of ultrasound re: IUD position and IUD check.    Patient has a Mirena IUD that was placed on 2018 without difficulty. She has returned several times for IUD string check to confirm correct placement. Reports that she had intermittent bleeding for approximately 3 months and then no bleeding or spotting. Pt called on 3/4/19 to RN triage with reports that 2 weeks prior she noticed some spotting that turned into heavier period like bleeding and some cramping. It has now resolved. She states that she has had trouble finding her cervix or feeling the strings in the past and was not able to feel them when she tried.  Desired to confirm that IUD was in place.    She had an ultrasound today that showed IUD was in the correct position.   She is happy with this form of contraception and does not desire any change. States that her  and her are deciding if they want a 4th child in the next year or so but thinks they will decide against it.    Last pap 3/20/2015 NIL, negative HPV.      O: BP 90/63   Pulse 67   Ht 1.702 m (5' 7\")   Wt 76.3 kg (168 lb 3.2 oz)   BMI 26.34 kg/m      Constitutional:     Alert and oriented, well developed, in no acute distress.       Pelvic Exam:  Vulva: No external lesions, no adenopathy.  Vagina: Moist, pink, no abnormal discharge, well rugated, no lesions  Cervix: Multiparous, smooth, pink, no visible lesions.     Strings visible at external os. Approx 2.5 cm in length.              IUD strings palpated at external cervical os on bimanual exam.  Uterus: Normal size, anteverted, non-tender, mobile  Ovaries: No mass, non-tender, " mobile  Rectal exam: deferred     Ultrasound:     Uterine findings:              Presence: Visible Size: Normal 5.1 x 6.1x 3.8 cm.  Endometrium = 4.7 mm.     IUD appears to be in place              Cx length = 37.8 mm.                 Flexion:  Anteverted    Position: Midline          Margins: Smooth         Shape: Normal              Contour: Regular        Texture: Homogeneous          Cavity: Normal            Masses: Abnormal Left posterior myoma: 1.6 x 1.6 x 1.2cm     Pelvic findings:               Right Adnexa: Normal              Left Adnexa: Normal              Bladder:  Normal                                                           Cul - de - sac fluid: None     Ovarian follicles:              Right ovary:  3.2 x 1.9 x 1.7cm.                 Multiple follicles                 Size(s):  <1cm                 Left ovary:  3.9 x 3.5 x 2.4cm.                 1 cyst                  Size(s):  3.2 x 2.6 x 1.8cm        Comments:  IUD is present at the fundus. Small posterior myoma.     A:  (Z30.431) Surveillance of intrauterine contraceptive device  (primary encounter diagnosis)       P:   - Reviewed ultrasound findings of IUD in correct position. Also reviewed small posterior myoma noted on us- no follow up recommended unless symptoms occur.  - Reviewed that irregular bleeding and spotting as well as amenorrhea can be normal during time progesterone IUD is in place.  - Replace or remove at 6 years by 5/23/2024.  - Next pap with HPV cotesting due 3/2020.    RTC in 1 year for annual exam or prn.    Pt verbalized understanding and agreed with plan of care.      HARVEY Adamson, SUELLEN

## 2019-03-13 NOTE — PROGRESS NOTES
"S: Heather Guillory is a 38 year old  here for review of ultrasound re: IUD position and IUD check.    Patient has a Mirena IUD that was placed on 2018 without difficulty. She has returned several times for IUD string check to confirm correct placement. Reports that she had intermittent bleeding for approximately 3 months and then no bleeding or spotting. Pt called on 3/4/19 to RN triage with reports that 2 weeks prior she noticed some spotting that turned into heavier period like bleeding and some cramping. It has now resolved. She states that she has had trouble finding her cervix or feeling the strings in the past and was not able to feel them when she tried.  Desired to confirm that IUD was in place.    She had an ultrasound today that showed IUD was in the correct position.   She is happy with this form of contraception and does not desire any change. States that her  and her are deciding if they want a 4th child in the next year or so but thinks they will decide against it.    Last pap 3/20/2015 NIL, negative HPV.      O: BP 90/63   Pulse 67   Ht 1.702 m (5' 7\")   Wt 76.3 kg (168 lb 3.2 oz)   BMI 26.34 kg/m     Constitutional:     Alert and oriented, well developed, in no acute distress.       Pelvic Exam:  Vulva: No external lesions, no adenopathy.  Vagina: Moist, pink, no abnormal discharge, well rugated, no lesions  Cervix: Multiparous, smooth, pink, no visible lesions.     Strings visible at external os. Approx 2.5 cm in length.              IUD strings palpated at external cervical os on bimanual exam.  Uterus: Normal size, anteverted, non-tender, mobile  Ovaries: No mass, non-tender, mobile  Rectal exam: deferred     Ultrasound:     Uterine findings:              Presence: Visible Size: Normal 5.1 x 6.1x 3.8 cm.  Endometrium = 4.7 mm.     IUD appears to be in place              Cx length = 37.8 mm.                 Flexion:  Anteverted    Position: Midline          Margins: " Smooth         Shape: Normal              Contour: Regular        Texture: Homogeneous          Cavity: Normal            Masses: Abnormal Left posterior myoma: 1.6 x 1.6 x 1.2cm     Pelvic findings:               Right Adnexa: Normal              Left Adnexa: Normal              Bladder:  Normal                                                           Cul - de - sac fluid: None     Ovarian follicles:              Right ovary:  3.2 x 1.9 x 1.7cm.                 Multiple follicles                 Size(s):  <1cm                 Left ovary:  3.9 x 3.5 x 2.4cm.                 1 cyst                  Size(s):  3.2 x 2.6 x 1.8cm        Comments:  IUD is present at the fundus. Small posterior myoma.     A: (Z30.431) Surveillance of intrauterine contraceptive device  (primary encounter diagnosis)       P:   - Reviewed ultrasound findings of IUD in correct position. Also reviewed small posterior myoma noted on us- no follow up recommended unless symptoms occur.  - Reviewed that irregular bleeding and spotting as well as amenorrhea can be normal during time progesterone IUD is in place.  - Replace or remove at 6 years by 5/23/2024.  - Next pap with HPV cotesting due 3/2020.    RTC in 1 year for annual exam or prn.    Pt verbalized understanding and agreed with plan of care.      HARVEY Adamson, SUELLEN

## 2019-04-23 DIAGNOSIS — M35.9 UNDIFFERENTIATED CONNECTIVE TISSUE DISEASE (H): ICD-10-CM

## 2019-04-23 RX ORDER — HYDROXYCHLOROQUINE SULFATE 200 MG/1
TABLET, FILM COATED ORAL
Qty: 60 TABLET | Refills: 5 | OUTPATIENT
Start: 2019-04-23

## 2019-04-23 ASSESSMENT — ANXIETY QUESTIONNAIRES
7. FEELING AFRAID AS IF SOMETHING AWFUL MIGHT HAPPEN: NOT AT ALL
6. BECOMING EASILY ANNOYED OR IRRITABLE: NOT AT ALL
GAD7 TOTAL SCORE: 0
3. WORRYING TOO MUCH ABOUT DIFFERENT THINGS: NOT AT ALL
1. FEELING NERVOUS, ANXIOUS, OR ON EDGE: NOT AT ALL
2. NOT BEING ABLE TO STOP OR CONTROL WORRYING: NOT AT ALL
7. FEELING AFRAID AS IF SOMETHING AWFUL MIGHT HAPPEN: NOT AT ALL
4. TROUBLE RELAXING: NOT AT ALL
GAD7 TOTAL SCORE: 0
5. BEING SO RESTLESS THAT IT IS HARD TO SIT STILL: NOT AT ALL

## 2019-04-24 ASSESSMENT — ANXIETY QUESTIONNAIRES: GAD7 TOTAL SCORE: 0

## 2019-04-26 ENCOUNTER — OFFICE VISIT (OUTPATIENT)
Dept: INTERNAL MEDICINE | Facility: CLINIC | Age: 39
End: 2019-04-26
Attending: INTERNAL MEDICINE
Payer: COMMERCIAL

## 2019-04-26 VITALS
HEIGHT: 67 IN | BODY MASS INDEX: 26.01 KG/M2 | SYSTOLIC BLOOD PRESSURE: 93 MMHG | HEART RATE: 62 BPM | WEIGHT: 165.7 LBS | DIASTOLIC BLOOD PRESSURE: 64 MMHG

## 2019-04-26 DIAGNOSIS — F41.9 ANXIETY: ICD-10-CM

## 2019-04-26 DIAGNOSIS — Z13.220 SCREENING CHOLESTEROL LEVEL: ICD-10-CM

## 2019-04-26 DIAGNOSIS — Z00.00 ROUTINE HISTORY AND PHYSICAL EXAMINATION OF ADULT: Primary | ICD-10-CM

## 2019-04-26 DIAGNOSIS — Z13.1 SCREENING FOR DIABETES MELLITUS: ICD-10-CM

## 2019-04-26 DIAGNOSIS — R82.90 ABNORMAL URINE: ICD-10-CM

## 2019-04-26 LAB
ALBUMIN UR-MCNC: NEGATIVE MG/DL
APPEARANCE UR: CLEAR
BACTERIA #/AREA URNS HPF: ABNORMAL /HPF
BILIRUB UR QL STRIP: NEGATIVE
CHOLEST SERPL-MCNC: 160 MG/DL
COLOR UR AUTO: YELLOW
GLUCOSE SERPL-MCNC: 94 MG/DL (ref 70–99)
GLUCOSE UR STRIP-MCNC: NEGATIVE MG/DL
HDLC SERPL-MCNC: 51 MG/DL
HGB UR QL STRIP: ABNORMAL
KETONES UR STRIP-MCNC: NEGATIVE MG/DL
LDLC SERPL CALC-MCNC: 100 MG/DL
LEUKOCYTE ESTERASE UR QL STRIP: NEGATIVE
MUCOUS THREADS #/AREA URNS LPF: PRESENT /LPF
NITRATE UR QL: NEGATIVE
NONHDLC SERPL-MCNC: 109 MG/DL
PH UR STRIP: 5.5 PH (ref 5–7)
RBC #/AREA URNS AUTO: 1 /HPF (ref 0–2)
SOURCE: ABNORMAL
SP GR UR STRIP: 1.01 (ref 1–1.03)
SQUAMOUS #/AREA URNS AUTO: 5 /HPF (ref 0–1)
TRIGL SERPL-MCNC: 45 MG/DL
UROBILINOGEN UR STRIP-MCNC: NORMAL MG/DL (ref 0–2)
WBC #/AREA URNS AUTO: 1 /HPF (ref 0–5)

## 2019-04-26 PROCEDURE — 82947 ASSAY GLUCOSE BLOOD QUANT: CPT | Performed by: INTERNAL MEDICINE

## 2019-04-26 PROCEDURE — 36415 COLL VENOUS BLD VENIPUNCTURE: CPT | Performed by: INTERNAL MEDICINE

## 2019-04-26 PROCEDURE — 81001 URINALYSIS AUTO W/SCOPE: CPT | Performed by: INTERNAL MEDICINE

## 2019-04-26 PROCEDURE — G0463 HOSPITAL OUTPT CLINIC VISIT: HCPCS | Mod: ZF

## 2019-04-26 PROCEDURE — 80061 LIPID PANEL: CPT | Performed by: INTERNAL MEDICINE

## 2019-04-26 ASSESSMENT — PATIENT HEALTH QUESTIONNAIRE - PHQ9
SUM OF ALL RESPONSES TO PHQ QUESTIONS 1-9: 0
5. POOR APPETITE OR OVEREATING: NOT AT ALL

## 2019-04-26 ASSESSMENT — ENCOUNTER SYMPTOMS
SLEEP DISTURBANCES DUE TO BREATHING: 0
MUSCLE WEAKNESS: 0
BOWEL INCONTINENCE: 0
DISTURBANCES IN COORDINATION: 0
WHEEZING: 0
MEMORY LOSS: 0
DYSPNEA ON EXERTION: 0
CLAUDICATION: 0
DECREASED CONCENTRATION: 0
LEG SWELLING: 0
NAUSEA: 0
COUGH: 0
HEMATURIA: 0
TREMORS: 0
LOSS OF CONSCIOUSNESS: 0
EYE REDNESS: 0
SKIN CHANGES: 0
DIFFICULTY URINATING: 0
WEAKNESS: 0
SWOLLEN GLANDS: 0
EYE WATERING: 0
NERVOUS/ANXIOUS: 0
SHORTNESS OF BREATH: 0
DIARRHEA: 0
NUMBNESS: 0
WEIGHT LOSS: 0
SYNCOPE: 0
NECK MASS: 0
SPEECH CHANGE: 0
RECTAL BLEEDING: 0
LIGHT-HEADEDNESS: 0
COUGH DISTURBING SLEEP: 0
JAUNDICE: 0
HYPERTENSION: 0
TASTE DISTURBANCE: 0
DOUBLE VISION: 0
STIFFNESS: 0
PARALYSIS: 0
BRUISES/BLEEDS EASILY: 0
VOMITING: 0
POOR WOUND HEALING: 0
HEADACHES: 0
FATIGUE: 0
HOT FLASHES: 0
BLOOD IN STOOL: 0
MYALGIAS: 0
PANIC: 0
ARTHRALGIAS: 0
LEG PAIN: 0
RESPIRATORY PAIN: 0
SINUS CONGESTION: 0
SINUS PAIN: 0
PALPITATIONS: 0
POLYDIPSIA: 0
MUSCLE CRAMPS: 0
HOARSE VOICE: 0
EYE IRRITATION: 0
SMELL DISTURBANCE: 0
HALLUCINATIONS: 0
RECTAL PAIN: 0
INCREASED ENERGY: 0
SPUTUM PRODUCTION: 0
WEIGHT GAIN: 0
EYE PAIN: 0
HEARTBURN: 0
JOINT SWELLING: 0
FEVER: 0
DIZZINESS: 0
BREAST MASS: 0
CONSTIPATION: 0
FLANK PAIN: 0
HYPOTENSION: 0
BREAST PAIN: 0
SNORES LOUDLY: 0
POLYPHAGIA: 0
ORTHOPNEA: 0
BACK PAIN: 0
DEPRESSION: 0
NAIL CHANGES: 0
NECK PAIN: 0
EXERCISE INTOLERANCE: 0
HEMOPTYSIS: 0
DECREASED APPETITE: 0
ALTERED TEMPERATURE REGULATION: 0
BLOATING: 0
POSTURAL DYSPNEA: 0
TACHYCARDIA: 0
INSOMNIA: 0
DYSURIA: 0
TINGLING: 0
NIGHT SWEATS: 0
DECREASED LIBIDO: 0
ABDOMINAL PAIN: 0
SEIZURES: 0
TROUBLE SWALLOWING: 0
CHILLS: 0
EXTREMITY NUMBNESS: 0
SORE THROAT: 0

## 2019-04-26 ASSESSMENT — MIFFLIN-ST. JEOR: SCORE: 1474.24

## 2019-04-26 ASSESSMENT — ANXIETY QUESTIONNAIRES
7. FEELING AFRAID AS IF SOMETHING AWFUL MIGHT HAPPEN: NOT AT ALL
2. NOT BEING ABLE TO STOP OR CONTROL WORRYING: NOT AT ALL
GAD7 TOTAL SCORE: 0
6. BECOMING EASILY ANNOYED OR IRRITABLE: NOT AT ALL
5. BEING SO RESTLESS THAT IT IS HARD TO SIT STILL: NOT AT ALL
3. WORRYING TOO MUCH ABOUT DIFFERENT THINGS: NOT AT ALL
1. FEELING NERVOUS, ANXIOUS, OR ON EDGE: NOT AT ALL

## 2019-04-26 NOTE — LETTER
Patient:  Heather Guillory  :   1980  MRN:     [id number]        Ms.Jennifer Guillory  [address]  [place] [address]        2019    Dear ,    We are writing to inform you of your test results. Repeat urine test shows no blood which is good news.    Component      Latest Ref Rng & Units 2019   Color Urine       Yellow   Appearance Urine       Clear   Glucose Urine      NEG:Negative mg/dL Negative   Bilirubin Urine      NEG:Negative Negative   Ketones Urine      NEG:Negative mg/dL Negative   Specific Gravity Urine      1.003 - 1.035 1.015   Blood Urine      NEG:Negative Small (A)   pH Urine      5.0 - 7.0 pH 5.5   Protein Albumin Urine      NEG:Negative mg/dL Negative   Urobilinogen mg/dL      0.0 - 2.0 mg/dL Normal   Nitrite Urine      NEG:Negative Negative   Leukocyte Esterase Urine      NEG:Negative Negative   Source       Midstream Urine   WBC Urine      0 - 5 /HPF 1   RBC Urine      0 - 2 /HPF 1   Bacteria Urine      NEG:Negative /HPF Few (A)   Squamous Epithelial /HPF Urine      0 - 1 /HPF 5 (H)   Mucous Urine      NEG:Negative /LPF Present (A)       Nasrin King MD

## 2019-04-26 NOTE — LETTER
4/26/2019         Heather Guillory   [address] [place] [address]        Dear Ms. Guillory:    Heather-It was a pleasure to meet you today. Your cholesterol and blood sugar are normal    Results for orders placed or performed in visit on 04/26/19   Lipid panel reflex to direct LDL Fasting   Result Value Ref Range    Cholesterol 160 <200 mg/dL    Triglycerides 45 <150 mg/dL    HDL Cholesterol 51 >49 mg/dL    LDL Cholesterol Calculated 100 (H) <100 mg/dL    Non HDL Cholesterol 109 <130 mg/dL   Glucose   Result Value Ref Range    Glucose 94 70 - 99 mg/dL   Routine UA with Micro Reflex to Culture   Result Value Ref Range    Color Urine Yellow     Appearance Urine Clear     Glucose Urine Negative NEG^Negative mg/dL    Bilirubin Urine Negative NEG^Negative    Ketones Urine Negative NEG^Negative mg/dL    Specific Gravity Urine 1.015 1.003 - 1.035    Blood Urine Small (A) NEG^Negative    pH Urine 5.5 5.0 - 7.0 pH    Protein Albumin Urine Negative NEG^Negative mg/dL    Urobilinogen mg/dL Normal 0.0 - 2.0 mg/dL    Nitrite Urine Negative NEG^Negative    Leukocyte Esterase Urine Negative NEG^Negative    Source Midstream Urine     WBC Urine 1 0 - 5 /HPF    RBC Urine 1 0 - 2 /HPF    Bacteria Urine Few (A) NEG^Negative /HPF    Squamous Epithelial /HPF Urine 5 (H) 0 - 1 /HPF    Mucous Urine Present (A) NEG^Negative /LPF         Please note that test explanations are brief and do not reflect all diagnostic uses.  If you have any questions or concerns, please call the clinic at [phone].      Sincerely,      Tigist Lloyd sent on behalf of  Ijeoma Villarreal MD

## 2019-04-26 NOTE — LETTER
"  RE: Heather Guillory  [address]  [place] [address]     Dear Colleague,    Thank you for referring your patient, Heather Guillory, to the [facility]  at [facility]. Please see a copy of my visit note below.        HPI:       Heather Guillory is a 38 year old female who presents for the following  Patient presents with: Physical    Heather is here to establish primary care today. She has not had a PCP for a long time. She has a history of anxiety and has been on zoloft, but ran out and has been trying to manage without. She has been off of this for about 2 months. Had been taking it when her second child was born in May 2016.     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 Sees Dr. King. Has been on plaquenil and is slowly weaning off over the next year. Also doesn't want her to be completely weaned off if she does plan to have another pregnancy (they have one more embryo).     1st and 3rd child were with IVF; Sons: 4, 3, 1  Works for [employer] at [employer]    Problem, Medication and Allergy Lists were reviewed and are current.  Patient is a new patient to this clinic and so  I reviewed/updated the Past Medical History, the Family History and the Social History.               Physical Exam:   BP 93/64 (BP Location: Left arm, Patient Position: Chair)   Pulse 62   Ht 1.702 m (5' 7\")   Wt 75.2 kg (165 lb 11.2 oz)   Breastfeeding? No   BMI 25.95 kg/m     Body mass index is 25.95 kg/m .  Vitals were reviewed       GENERAL APPEARANCE: healthy, alert and no distress     EYES: EOMI, PERRL     HENT: ear canals and TM's normal and nose and mouth without ulcers or lesions     NECK: no adenopathy, no asymmetry, masses, or scars and thyroid normal to palpation     RESP: lungs clear to " auscultation - no rales, rhonchi or wheezes     CV: regular rates and rhythm, normal S1 S2, no S3 or S4 and no murmur, click or rub     ABDOMEN:  soft, nontender, no HSM or masses and bowel sounds normal     MS: extremities normal- no gross deformities noted, no evidence of inflammation in joints, FROM in all extremities.     SKIN: no suspicious lesions or rashes     NEURO: Normal strength and tone, sensory exam grossly normal, mentation intact and speech normal     PSYCH: mentation appears normal. and affect normal/bright     LYMPHATICS: No cervical adenopathy        Results:      Results from the last 24 hours  Results for orders placed or performed in visit on 04/26/19 (from the past 24 hour(s))   Routine UA with Micro Reflex to Culture   Result Value Ref Range    Color Urine Yellow     Appearance Urine Clear     Glucose Urine Negative NEG^Negative mg/dL    Bilirubin Urine Negative NEG^Negative    Ketones Urine Negative NEG^Negative mg/dL    Specific Gravity Urine 1.015 1.003 - 1.035    Blood Urine Small (A) NEG^Negative    pH Urine 5.5 5.0 - 7.0 pH    Protein Albumin Urine Negative NEG^Negative mg/dL    Urobilinogen mg/dL Normal 0.0 - 2.0 mg/dL    Nitrite Urine Negative NEG^Negative    Leukocyte Esterase Urine Negative NEG^Negative    Source Midstream Urine     WBC Urine 1 0 - 5 /HPF    RBC Urine 1 0 - 2 /HPF    Bacteria Urine Few (A) NEG^Negative /HPF    Squamous Epithelial /HPF Urine 5 (H) 0 - 1 /HPF    Mucous Urine Present (A) NEG^Negative /LPF   Lipid panel reflex to direct LDL Fasting   Result Value Ref Range    Cholesterol 160 <200 mg/dL    Triglycerides 45 <150 mg/dL    HDL Cholesterol 51 >49 mg/dL    LDL Cholesterol Calculated 100 (H) <100 mg/dL    Non HDL Cholesterol 109 <130 mg/dL   Glucose   Result Value Ref Range    Glucose 94 70 - 99 mg/dL     Assessment and Plan     Heather was seen today for physical.    Diagnoses and all orders for this visit:    Routine history and physical examination of  adult  Up to date on health maintenance    Screening cholesterol level  -     Lipid panel reflex to direct LDL Fasting    Screening for diabetes mellitus  -     Glucose    Anxiety  -     sertraline (ZOLOFT) 50 MG tablet; Take 1 tablet (50 mg) by mouth daily-will restart at 50 mg again so that she has this on board. Helps her to not get as irritable/frustrated    Abnormal urine  -     Routine UA with Micro Reflex to Culture    Options for treatment and follow-up care were reviewed with the patient. Heather Guillory engaged in the decision making process and verbalized understanding of the options discussed and agreed with the final plan.    Ijeoma Brewer MD  Apr 26, 2019

## 2019-04-26 NOTE — PROGRESS NOTES
HPI:       Heather Guillory is a 38 year old female who presents for the following  Patient presents with: Physical    Heather is here to establish primary care today. She has not had a PCP for a long time. She has a history of anxiety and has been on zoloft, but ran out and has been trying to manage without. She has been off of this for about 2 months. Had been taking it when her second child was born in May 2016.     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 Sees Dr. King. Has been on plaquenil and is slowly weaning off over the next year. Also doesn't want her to be completely weaned off if she does plan to have another pregnancy (they have one more embryo).     1st and 3rd child were with IVF; Sons: 4, 3, 1  Works for [employer] at [employer]    Problem, Medication and Allergy Lists were reviewed and are current.  Patient is a new patient to this clinic and so  I reviewed/updated the Past Medical History, the Family History and the Social History.          Review of Systems:   Review of Systems     Constitutional:  Negative for fever, chills, weight loss, weight gain, fatigue, decreased appetite, night sweats, recent stressors, height gain, height loss, post-operative complications, incisional pain, hallucinations, increased energy, hyperactivity and confused.   HENT:  Negative for ear pain, hearing loss, tinnitus, nosebleeds, trouble swallowing, hoarse voice, mouth sores, sore throat, ear discharge, tooth pain, gum tenderness, taste disturbance, smell disturbance, hearing aid, bleeding gums, dry mouth, sinus pain, sinus congestion and neck mass.    Eyes:  Negative for double vision, pain, redness, eye pain, decreased vision, eye watering, eye bulging, eye dryness, flashing lights, spots, floaters, strabismus, tunnel vision, jaundice and  eye irritation.   Respiratory:   Negative for cough, hemoptysis, sputum production, shortness of breath, wheezing, sleep disturbances due to breathing, snores loudly, respiratory pain, dyspnea on exertion, cough disturbing sleep and postural dyspnea.    Cardiovascular:  Negative for chest pain, dyspnea on exertion, palpitations, orthopnea, claudication, leg swelling, fingers/toes turn blue, hypertension, hypotension, syncope, history of heart murmur, chest pain on exertion, chest pain at rest, pacemaker, few scattered varicosities, leg pain, sleep disturbances due to breathing, tachycardia, light-headedness, exercise intolerance and edema.   Gastrointestinal:  Negative for heartburn, nausea, vomiting, abdominal pain, diarrhea, constipation, blood in stool, melena, rectal pain, bloating, hemorrhoids, bowel incontinence, jaundice, rectal bleeding, coffee ground emesis and change in stool.   Genitourinary:  Negative for bladder incontinence, dysuria, urgency, hematuria, flank pain, vaginal discharge, difficulty urinating, genital sores, dyspareunia, decreased libido, nocturia, voiding less frequently, arousal difficulty, abnormal vaginal bleeding, excessive menstruation, menstrual changes, hot flashes, vaginal dryness and postmenopausal bleeding.   Musculoskeletal:  Negative for myalgias, back pain, joint swelling, arthralgias, stiffness, muscle cramps, neck pain, bone pain, muscle weakness and fracture.   Skin:  Negative for nail changes, itching, poor wound healing, rash, hair changes, skin changes, acne, warts, poor wound healing, scarring, flaky skin, Raynaud's phenomenon, sensitivity to sunlight and skin thickening.   Neurological:  Negative for dizziness, tingling, tremors, speech change, seizures, loss of consciousness, weakness, light-headedness, numbness, headaches, disturbances in coordination, extremity numbness, memory loss, difficulty walking and paralysis.   Endo/Heme:  Negative for anemia, swollen glands  "and bruises/bleeds easily.   Psychiatric/Behavioral:  Negative for depression, hallucinations, memory loss, decreased concentration, mood swings and panic attacks.    Breast:  Negative for breast discharge, breast mass, breast pain and nipple retraction.   Endocrine:  Negative for altered temperature regulation, polyphagia, polydipsia, unwanted hair growth and change in facial hair.    I have personally reviewed and updated the ROS on the day of the visit.           Physical Exam:   BP 93/64 (BP Location: Left arm, Patient Position: Chair)   Pulse 62   Ht 1.702 m (5' 7\")   Wt 75.2 kg (165 lb 11.2 oz)   Breastfeeding? No   BMI 25.95 kg/m    Body mass index is 25.95 kg/m .  Vitals were reviewed       GENERAL APPEARANCE: healthy, alert and no distress     EYES: EOMI, PERRL     HENT: ear canals and TM's normal and nose and mouth without ulcers or lesions     NECK: no adenopathy, no asymmetry, masses, or scars and thyroid normal to palpation     RESP: lungs clear to auscultation - no rales, rhonchi or wheezes     CV: regular rates and rhythm, normal S1 S2, no S3 or S4 and no murmur, click or rub     ABDOMEN:  soft, nontender, no HSM or masses and bowel sounds normal     MS: extremities normal- no gross deformities noted, no evidence of inflammation in joints, FROM in all extremities.     SKIN: no suspicious lesions or rashes     NEURO: Normal strength and tone, sensory exam grossly normal, mentation intact and speech normal     PSYCH: mentation appears normal. and affect normal/bright     LYMPHATICS: No cervical adenopathy        Results:      Results from the last 24 hours  Results for orders placed or performed in visit on 04/26/19 (from the past 24 hour(s))   Routine UA with Micro Reflex to Culture   Result Value Ref Range    Color Urine Yellow     Appearance Urine Clear     Glucose Urine Negative NEG^Negative mg/dL    Bilirubin Urine Negative NEG^Negative    Ketones Urine Negative NEG^Negative mg/dL    Specific " Gravity Urine 1.015 1.003 - 1.035    Blood Urine Small (A) NEG^Negative    pH Urine 5.5 5.0 - 7.0 pH    Protein Albumin Urine Negative NEG^Negative mg/dL    Urobilinogen mg/dL Normal 0.0 - 2.0 mg/dL    Nitrite Urine Negative NEG^Negative    Leukocyte Esterase Urine Negative NEG^Negative    Source Midstream Urine     WBC Urine 1 0 - 5 /HPF    RBC Urine 1 0 - 2 /HPF    Bacteria Urine Few (A) NEG^Negative /HPF    Squamous Epithelial /HPF Urine 5 (H) 0 - 1 /HPF    Mucous Urine Present (A) NEG^Negative /LPF   Lipid panel reflex to direct LDL Fasting   Result Value Ref Range    Cholesterol 160 <200 mg/dL    Triglycerides 45 <150 mg/dL    HDL Cholesterol 51 >49 mg/dL    LDL Cholesterol Calculated 100 (H) <100 mg/dL    Non HDL Cholesterol 109 <130 mg/dL   Glucose   Result Value Ref Range    Glucose 94 70 - 99 mg/dL     Assessment and Plan     Heather was seen today for physical.    Diagnoses and all orders for this visit:    Routine history and physical examination of adult  Up to date on health maintenance    Screening cholesterol level  -     Lipid panel reflex to direct LDL Fasting    Screening for diabetes mellitus  -     Glucose    Anxiety  -     sertraline (ZOLOFT) 50 MG tablet; Take 1 tablet (50 mg) by mouth daily-will restart at 50 mg again so that she has this on board. Helps her to not get as irritable/frustrated    Abnormal urine  -     Routine UA with Micro Reflex to Culture        Options for treatment and follow-up care were reviewed with the patient. Heather Pastora engaged in the decision making process and verbalized understanding of the options discussed and agreed with the final plan.    Ijeoma Brewer MD  Apr 26, 2019

## 2019-05-13 ENCOUNTER — TELEPHONE (OUTPATIENT)
Dept: OBGYN | Facility: CLINIC | Age: 39
End: 2019-05-13

## 2019-05-13 NOTE — TELEPHONE ENCOUNTER
Heather calling with concerns of irregular spotting and vaginal bleeding she continues to have since getting IUD,Mirena. It will be a year 5/23/19. She reports no pain and recently had US to check placement which was in correct location so now ready to get it removed. Reports the bleeding is bothersome and annoying. She will call to schedule an appt to review other BC options.Pt indicated understanding and agreed with plan.

## 2019-06-03 ENCOUNTER — TELEPHONE (OUTPATIENT)
Dept: VASCULAR SURGERY | Facility: CLINIC | Age: 39
End: 2019-06-03

## 2019-06-03 DIAGNOSIS — I83.893 SYMPTOMATIC VARICOSE VEINS OF BOTH LOWER EXTREMITIES: Primary | ICD-10-CM

## 2019-06-03 NOTE — TELEPHONE ENCOUNTER
I called and spoke with Heather.  She has had varicose veins last few years left worse than right.  She notices behind her left knee down her calf and up her inner thigh, painful bulging veins that swell when she is on her feet more.  The pain is worse after her pregnancy.  She has worn compression.  Pt to have bilateral venous competency US and to see Dr Headley following.  Clinic coordinators to contact pt to schedule.  BENTLEY Holly RN, BSN  Interventional Radiology Nurse Coordinator   Phone:  [phone]

## 2019-06-12 NOTE — TELEPHONE ENCOUNTER
FUTURE VISIT INFORMATION      FUTURE VISIT INFORMATION:    Date: 7.24    Time: 120p    Location: Vascular  REFERRAL INFORMATION:    Referring provider:   Per pt    Referring providers clinic:       Reason for visit/diagnosis   Varicose Vein    RECORDS REQUESTED FROM:       Clinic name Comments Records Status Imaging Status   Internal 6.3.19 US  Imaging scheduled prior

## 2019-07-24 ENCOUNTER — OFFICE VISIT (OUTPATIENT)
Dept: VASCULAR SURGERY | Facility: CLINIC | Age: 39
End: 2019-07-24

## 2019-07-24 ENCOUNTER — PRE VISIT (OUTPATIENT)
Dept: VASCULAR SURGERY | Facility: CLINIC | Age: 39
End: 2019-07-24

## 2019-07-24 ENCOUNTER — ANCILLARY PROCEDURE (OUTPATIENT)
Dept: ULTRASOUND IMAGING | Facility: CLINIC | Age: 39
End: 2019-07-24
Attending: RADIOLOGY

## 2019-07-24 VITALS — HEART RATE: 70 BPM | DIASTOLIC BLOOD PRESSURE: 65 MMHG | OXYGEN SATURATION: 98 % | SYSTOLIC BLOOD PRESSURE: 101 MMHG

## 2019-07-24 DIAGNOSIS — I83.893 SYMPTOMATIC VARICOSE VEINS OF BOTH LOWER EXTREMITIES: ICD-10-CM

## 2019-07-24 DIAGNOSIS — I83.893 SYMPTOMATIC VARICOSE VEINS OF BOTH LOWER EXTREMITIES: Primary | ICD-10-CM

## 2019-07-24 ASSESSMENT — PAIN SCALES - GENERAL: PAINLEVEL: MILD PAIN (2)

## 2019-07-24 NOTE — LETTER
7/24/2019       RE: Heather Guillory  [address]  [place] [address]     Dear Colleague,    Thank you for referring your patient, Heather Guillory, to the [facility] at [facility]. Please see a copy of my visit note below.        INTERVENTIONAL RADIOLOGY CONSULTATION    Name: eHather Guillory  Age: 39 year old   Referring Physician: Dr. Phil Brewer   REASON FOR REFERRAL: Varicose veins    HPI: 39-year-old patient, presenting with varicose veins.  Over the last 4 years, the patient has had 3 kids, the varicose veins were first noticed during second pregnancy as bulging veins behind the left knee which got better after delivery.  During the third pregnancy, patient noticed the veins to be worse and they spread up towards the thigh and down towards the calf.  However after delivery, they did not resolve and increased.  Patient's youngest child is 16 months old.  It has been 16 months since the d it has been 16 months since the patient's last delivery.  Over the last 6 to 8 months, patient has noted discomfort which increases with activity which involves playing with her kids.  The discomfort propagates along the medial thigh and medial calf, and is sometimes throbbing sensation which is uncomfortable.  Patient has an active lifestyle, which involves being on her feet a lot.  The sensation the discomfort is more in the discomfort is more in the summers.  Patient does not have any history of leg swelling.  Elevation helps with the discomfort, and the patient feels much better in the morning.  Patient has tried compression stockings over the last 2 years, wearing it most days however has not helped patient much.  Patient denies any history of trauma/blood clot/clotting disorder.  Patient does have a history of autoimmune connective tissue disorder, which was noticed after she developed stiffness around the hands after delivery.  Patient is on Plaquenil for the  same.  Patient denies any surgeries.  Patient has only left leg symptoms, right leg does not bother her.  Patient recently had a mole resection from her shin, and has had delayed healing of the wound.    PAST MEDICAL HISTORY:   Past Medical History:   Diagnosis Date     Abnormal Pap smear     Over 10 years ago     Anxiety      E coli infection     ESBL     History of extended-spectrum beta-lactamase producing Escherichia coli infection      Hx of previous reproductive problem 12/2013    Infertility     Mixed connective tissue disease (H)     Dr. Steele- switching      Nephrolithiasis        PAST SURGICAL HISTORY:   Past Surgical History:   Procedure Laterality Date     BIOPSY  April 2014    Uterine polyp removed     DILATION AND CURETTAGE SUCTION       OPERATIVE HYSTEROSCOPY WITH MORCELLATOR  4/8/2014    Procedure: OPERATIVE HYSTEROSCOPY WITH MORCELLATOR;  Hysteroscopic Polypectomy;  Surgeon: Cate Mansfield MD;  Location:  OR       FAMILY HISTORY:   Family History   Problem Relation Age of Onset     Diabetes Paternal Grandfather      C.A.D. Paternal Grandfather      Hypertension Paternal Grandfather      Other Cancer Paternal Grandfather         Lung     Cancer Paternal Grandfather         lung     Lung Cancer Paternal Grandfather      Breast Cancer Paternal Grandmother      Osteoporosis Paternal Grandmother      Hypertension Paternal Grandmother      Cancer - colorectal Maternal Grandfather      Colon Cancer Maternal Grandfather      Cancer Maternal Grandmother         cervical ca     Neurologic Disorder Maternal Grandmother         alzheimers     Alzheimer Disease Maternal Grandmother      Cardiovascular Brother 28        cardiomyopathy     Genetic Disorder Sister 21        una's disease     Heart Disease Paternal Uncle 55        mitral valve replacement     Anxiety Disorder Sister      Anxiety Disorder Brother      Genetic Disorder Sister         Una's Disease     Anxiety Disorder Sister       Genetic Disorder Sister         Elijah's Disease     Anxiety Disorder Brother        SOCIAL HISTORY:   Social History     Tobacco Use     Smoking status: Never Smoker     Smokeless tobacco: Never Used   Substance Use Topics     Alcohol use: No     Alcohol/week: 0.0 oz       PROBLEM LIST:   Patient Active Problem List    Diagnosis Date Noted     Polyhydramnios 2018     Priority: Medium     Xerosis cutis 2017     Priority: Medium     Overview:       Xerosis of the hands which is moderately severe and associated with fissuring.       Family history of genetic disorder 2015     Priority: Medium     Patient's sister's son with Elijah's disease       Female infertility 2014     Priority: Medium     IVF with first pregnancy.       Anxiety 2014     Priority: Medium     1/15/18: on 25mg Zoloft, discussed increasing to 50mg  Pt plans to increase    Plan 1-2 week pp mood check       Tension headache 2014     Priority: Medium     Family history of malignant neoplasm of breast 2013     Priority: Medium     Overview:   Paternal GM       Decreased libido 2012     Priority: Medium     Overview:   Due to Effexor.       Irritable colon 10/26/2009     Priority: Medium     Panic disorder without agoraphobia 2008     Priority: Medium     Overview:   Panic Disorder w/o Agoraphobia       Eating disorder 2004     Priority: Medium     History of, feels stable now.         MEDICATIONS:   Prescription Medications as of 2019       Rx Number Disp Refills Start End Last Dispensed Date Next Fill Date Owning Pharmacy    hydroxychloroquine (PLAQUENIL) 200 MG tablet  60 tablet 5 2019    [facility] [place] [address]    Si mg twice a day on Mon/Wed/Fri and then 200 mg a day for the rest of the week    Class: E-Prescribe    metroNIDAZOLE (METROCREAM) 0.75 % external cream    2019    [facility] [place] [address]     Class: Historical    Route: Topical    Prenatal Vit-Fe Fumarate-FA (PRENATAL VITAMINS) 28-0.8 MG TABS    2013        Sig: Take 1 tablet by mouth daily     Class: Historical    Route: Oral    levonorgestrel (MIRENA, 52 MG,) 20 MCG/24HR IUD  1 each 0 2018       Si each (20 mcg) by Intrauterine route once for 1 dose    Class: No Print Out    Route: Intrauterine    sertraline (ZOLOFT) 50 MG tablet  90 tablet 3 2019    [id number] [place] [facility] [place] [facility] [address]    Sig: Take 1 tablet (50 mg) by mouth daily    Class: E-Prescribe    Route: Oral          ALLERGIES:   Kiwi; Benzoyl peroxide; Duricef [cefadroxil]; Monistat [miconazole]; and Sulfa drugs    ROS:  As above    Physical Examination:   VITALS:   /65 (BP Location: Left arm, Patient Position: Chair, Cuff Size: Adult Regular)   Pulse 70   SpO2 98%   Bilateral lower extremities:  Bilateral lower extremities are symmetrical.  Varicose veins left medial mid to low thigh and left mid to high calf along the GSV distribution.  Small scab in the left shin, at the site of the mole removal.  Mild pitting edema at the left pretibial location +1.    Labs:    BMP RESULTS:  Lab Results   Component Value Date     2016    POTASSIUM 3.8 2016    CHLORIDE 110 (H) 2016    CO2 20 2016    ANIONGAP 10 2016    GLC 94 2019    BUN 9 2016    CR 0.63 2019    GFRESTIMATED >90 2019    GFRESTBLACK >90 2019    PRICILA 8.4 (L) 2016        CBC RESULTS:  Lab Results   Component Value Date    WBC 5.8 2019    RBC 5.07 2019    HGB 14.0 2019    HCT 45.1 2019    MCV 89 2019    MCH 27.6 2019    MCHC 31.0 (L) 2019    RDW 12.6 2019     2019       INR/PTT:  No results found for: INR, PTT    Diagnostic studies: Venous incompetence study 2019:IMPRESSION:  1. Right leg:       A. No superficial or deep venous anastomosis  demonstrated.       B.  veins demonstrated but were competent.       C. No varicose veins demonstrated.     2. Left leg:       A. No superficial or deep venous anastomosis demonstrated.       B. Common femoral vein incompetent with Valsalva both above and  below the saphenofemoral junction.        C. Posterior accessory saphenous vein incompetent.       D. Great saphenous vein incompetent from the saphenofemoral  junction to the mid thigh varicose vein.       E. Large tangle of varicose veins extending from the medial mid  thigh to mid calf originates from the great saphenous vein in the mid  thigh.        F. Varicose veins in the anterior mid to distal calf.       G.  veins demonstrated but were competent.    Assessment: Patient with lifestyle limiting superficial venous incompetency of the left leg, CEAP classification C3, patient has tried compression stockings for 2 years without much benefit.  Imaging shows incompetent left great saphenous vein, with clinical examination showing varicose veins in the left GSV distribution involving the left medial thigh/medial calf and shin region. She also reports a small scab/wound on her low left shin which has been slow to heal after receiving excision of a mole several weeks ago.      Patient is asymptomatic on the right side, with normal ultrasound exam.  Patient would be a good candidate for endovenous laser ablation of the left great saphenous vein, microphlebectomy of the medial sided varicosities with sclerotherapy of the remaining varicosities.  The risks and benefits of the procedure were explained to the patient, patient's questions were answered.  Patient would like to go ahead with the procedure.    Plan: Patient to be scheduled for endovenous laser ablation, micro phlebectomy and sclerotherapy for left-sided varicose veins. Patient to continue to use compression stockings.     I saw and examined the patient with the fellow and agree with the  assessment and plan.    Jonathan Headley MD    CC  Patient Care Team:  Celi Barillas MD as PCP - General (Internal Medicine)  Minerva, April Ontiveros MD as MD (OB/Gyn)  Cate Mansfield MD as MD (OB/Gyn)  Evita More APRN CNARLINE as Certified Nurse Midwife (OB/Gyn)  Rachelle Anderson MD as MD (OB/Gyn)  Patel Osborn MD as Assigned PCP  Tj Britt MD (Ophthalmology)  Celi Barillas MD as MD (Internal Medicine)  CELI BARILLAS

## 2019-07-24 NOTE — PATIENT INSTRUCTIONS
You have been seen today for consultation for consultation for varicose veins by Dr Headley.  Your ultrasound completed today has been reviewed with you during this appointment.    -You have been given a prescription for both knee and thigh high compression stockings.  Please fill this prescription at a durable medical supply company.    -Wear stockings as tolerated for comfort, especially when you are standing or sitting for long periods of time.    -You have been given education materials on the proposed treatment of Endovenous laser ablation to your left great saphenous vein, microphlebectomy to your varicosities and sclerotherapy injections.      We will submit for prior authorization from your insurance company for treatment coverage and schedule your procedure late September early October per your preference.    Please don't hesitate to contact us with questions or concerns,     ACheyanne Holly RN, BSN  (covering for Jackie Madrigal ph. [phone])  Interventional Radiology Nurse Coordinator   Phone:  [phone]

## 2019-07-24 NOTE — NURSING NOTE
Vascular Rooming Note     Heather Guillory's goals for this visit include:   Chief Complaint   Patient presents with     Consult     Heather, is being seen today for a consult for varicose veins, possible bilateral more so in the left leg with pain,  noticed a couple years ago and is getting worse, as reported by patient.     Florence Anderson LPN

## 2019-07-24 NOTE — PROGRESS NOTES
INTERVENTIONAL RADIOLOGY CONSULTATION    Name: Heather Guillory  Age: 39 year old   Referring Physician: Dr. Phil Brewer   REASON FOR REFERRAL: Varicose veins    HPI: 39-year-old patient, presenting with varicose veins.  Over the last 4 years, the patient has had 3 kids, the varicose veins were first noticed during second pregnancy as bulging veins behind the left knee which got better after delivery.  During the third pregnancy, patient noticed the veins to be worse and they spread up towards the thigh and down towards the calf.  However after delivery, they did not resolve and increased.  Patient's youngest child is 16 months old.  It has been 16 months since the d it has been 16 months since the patient's last delivery.  Over the last 6 to 8 months, patient has noted discomfort which increases with activity which involves playing with her kids.  The discomfort propagates along the medial thigh and medial calf, and is sometimes throbbing sensation which is uncomfortable.  Patient has an active lifestyle, which involves being on her feet a lot.  The sensation the discomfort is more in the discomfort is more in the summers.  Patient does not have any history of leg swelling.  Elevation helps with the discomfort, and the patient feels much better in the morning.  Patient has tried compression stockings over the last 2 years, wearing it most days however has not helped patient much.  Patient denies any history of trauma/blood clot/clotting disorder.  Patient does have a history of autoimmune connective tissue disorder, which was noticed after she developed stiffness around the hands after delivery.  Patient is on Plaquenil for the same.  Patient denies any surgeries.  Patient has only left leg symptoms, right leg does not bother her.  Patient recently had a mole resection from her shin, and has had delayed healing of the wound.      PAST MEDICAL HISTORY:   Past Medical History:   Diagnosis Date     Abnormal Pap  smear     Over 10 years ago     Anxiety      E coli infection     ESBL     History of extended-spectrum beta-lactamase producing Escherichia coli infection      Hx of previous reproductive problem 12/2013    Infertility     Mixed connective tissue disease (H)     Dr. Steele- switching      Nephrolithiasis        PAST SURGICAL HISTORY:   Past Surgical History:   Procedure Laterality Date     BIOPSY  April 2014    Uterine polyp removed     DILATION AND CURETTAGE SUCTION       OPERATIVE HYSTEROSCOPY WITH MORCELLATOR  4/8/2014    Procedure: OPERATIVE HYSTEROSCOPY WITH MORCELLATOR;  Hysteroscopic Polypectomy;  Surgeon: Cate Mansfield MD;  Location:  OR       FAMILY HISTORY:   Family History   Problem Relation Age of Onset     Diabetes Paternal Grandfather      C.A.D. Paternal Grandfather      Hypertension Paternal Grandfather      Other Cancer Paternal Grandfather         Lung     Cancer Paternal Grandfather         lung     Lung Cancer Paternal Grandfather      Breast Cancer Paternal Grandmother      Osteoporosis Paternal Grandmother      Hypertension Paternal Grandmother      Cancer - colorectal Maternal Grandfather      Colon Cancer Maternal Grandfather      Cancer Maternal Grandmother         cervical ca     Neurologic Disorder Maternal Grandmother         alzheimers     Alzheimer Disease Maternal Grandmother      Cardiovascular Brother 28        cardiomyopathy     Genetic Disorder Sister 21        una's disease     Heart Disease Paternal Uncle 55        mitral valve replacement     Anxiety Disorder Sister      Anxiety Disorder Brother      Genetic Disorder Sister         Una's Disease     Anxiety Disorder Sister      Genetic Disorder Sister         Una's Disease     Anxiety Disorder Brother        SOCIAL HISTORY:   Social History     Tobacco Use     Smoking status: Never Smoker     Smokeless tobacco: Never Used   Substance Use Topics     Alcohol use: No     Alcohol/week: 0.0 oz       PROBLEM  LIST:   Patient Active Problem List    Diagnosis Date Noted     Polyhydramnios 2018     Priority: Medium     Xerosis cutis 2017     Priority: Medium     Overview:       Xerosis of the hands which is moderately severe and associated with fissuring.       Family history of genetic disorder 2015     Priority: Medium     Patient's sister's son with Elijah's disease       Female infertility 2014     Priority: Medium     IVF with first pregnancy.       Anxiety 2014     Priority: Medium     1/15/18: on 25mg Zoloft, discussed increasing to 50mg  Pt plans to increase    Plan 1-2 week pp mood check       Tension headache 2014     Priority: Medium     Family history of malignant neoplasm of breast 2013     Priority: Medium     Overview:   Paternal GM       Decreased libido 2012     Priority: Medium     Overview:   Due to Effexor.       Irritable colon 10/26/2009     Priority: Medium     Panic disorder without agoraphobia 2008     Priority: Medium     Overview:   Panic Disorder w/o Agoraphobia       Eating disorder 2004     Priority: Medium     History of, feels stable now.         MEDICATIONS:   Prescription Medications as of 2019       Rx Number Disp Refills Start End Last Dispensed Date Next Fill Date Owning Pharmacy    hydroxychloroquine (PLAQUENIL) 200 MG tablet  60 tablet 5 2019    [facility] [place] [address]    Si mg twice a day on Mon/Wed/Fri and then 200 mg a day for the rest of the week    Class: E-Prescribe    metroNIDAZOLE (METROCREAM) 0.75 % external cream    2019    [facility] [place] [address]    Class: Historical    Route: Topical    Prenatal Vit-Fe Fumarate-FA (PRENATAL VITAMINS) 28-0.8 MG TABS    2013        Sig: Take 1 tablet by mouth daily     Class: Historical    Route: Oral    levonorgestrel (MIRENA, 52 MG,) 20 MCG/24HR IUD  1 each 0 2018       Si  each (20 mcg) by Intrauterine route once for 1 dose    Class: No Print Out    Route: Intrauterine    sertraline (ZOLOFT) 50 MG tablet  90 tablet 3 4/26/2019    [facility] [place] [facility] [place], [place] - [address]    Sig: Take 1 tablet (50 mg) by mouth daily    Class: E-Prescribe    Route: Oral          ALLERGIES:   Kiwi; Benzoyl peroxide; Duricef [cefadroxil]; Monistat [miconazole]; and Sulfa drugs    ROS:  As above    Physical Examination:   VITALS:   /65 (BP Location: Left arm, Patient Position: Chair, Cuff Size: Adult Regular)   Pulse 70   SpO2 98%   Bilateral lower extremities:  Bilateral lower extremities are symmetrical.  Varicose veins left medial mid to low thigh and left mid to high calf along the GSV distribution.  Small scab in the left shin, at the site of the mole removal.  Mild pitting edema at the left pretibial location +1.    Labs:    BMP RESULTS:  Lab Results   Component Value Date     01/09/2016    POTASSIUM 3.8 01/09/2016    CHLORIDE 110 (H) 01/09/2016    CO2 20 01/09/2016    ANIONGAP 10 01/09/2016    GLC 94 04/26/2019    BUN 9 01/09/2016    CR 0.63 02/22/2019    GFRESTIMATED >90 02/22/2019    GFRESTBLACK >90 02/22/2019    PRICILA 8.4 (L) 01/09/2016        CBC RESULTS:  Lab Results   Component Value Date    WBC 5.8 02/22/2019    RBC 5.07 02/22/2019    HGB 14.0 02/22/2019    HCT 45.1 02/22/2019    MCV 89 02/22/2019    MCH 27.6 02/22/2019    MCHC 31.0 (L) 02/22/2019    RDW 12.6 02/22/2019     02/22/2019       INR/PTT:  No results found for: INR, PTT    Diagnostic studies: Venous incompetence study 7/24/2019:IMPRESSION:  1. Right leg:       A. No superficial or deep venous anastomosis demonstrated.       B.  veins demonstrated but were competent.       C. No varicose veins demonstrated.     2. Left leg:       A. No superficial or deep venous anastomosis demonstrated.       B. Common femoral vein incompetent with Valsalva both above and  below the saphenofemoral  junction.        C. Posterior accessory saphenous vein incompetent.       D. Great saphenous vein incompetent from the saphenofemoral  junction to the mid thigh varicose vein.       E. Large tangle of varicose veins extending from the medial mid  thigh to mid calf originates from the great saphenous vein in the mid  thigh.        F. Varicose veins in the anterior mid to distal calf.       G.  veins demonstrated but were competent.    Assessment: Patient with lifestyle limiting superficial venous incompetency of the left leg, CEAP classification C3, patient has tried compression stockings for 2 years without much benefit.  Imaging shows incompetent left great saphenous vein, with clinical examination showing varicose veins in the left GSV distribution involving the left medial thigh/medial calf and shin region. She also reports a small scab/wound on her low left shin which has been slow to heal after receiving excision of a mole several weeks ago.      Patient is asymptomatic on the right side, with normal ultrasound exam.  Patient would be a good candidate for endovenous laser ablation of the left great saphenous vein, microphlebectomy of the medial sided varicosities with sclerotherapy of the remaining varicosities.  The risks and benefits of the procedure were explained to the patient, patient's questions were answered.  Patient would like to go ahead with the procedure.    Plan: Patient to be scheduled for endovenous laser ablation, micro phlebectomy and sclerotherapy for left-sided varicose veins. Patient to continue to use compression stockings.     I saw and examined the patient with the fellow and agree with the assessment and plan.    CC  Patient Care Team:  Ijeoma Barillas MD as PCP - General (Internal Medicine)  April Palma MD as MD (OB/Gyn)  Cate Mansfield MD as MD (OB/Gyn)  Evita More APRN CNM as Certified Nurse Midwife (OB/Gyn)  Rachelle Anderson  MD Po as MD (OB/Gyn)  Patel Osborn MD as Assigned PCP  Tj Britt MD (Ophthalmology)  Celi David MD as MD (Internal Medicine)  CELI DAVID

## 2019-07-25 ENCOUNTER — TELEPHONE (OUTPATIENT)
Dept: VASCULAR SURGERY | Facility: CLINIC | Age: 39
End: 2019-07-25

## 2019-07-25 ENCOUNTER — TEAM CONFERENCE (OUTPATIENT)
Dept: VASCULAR SURGERY | Facility: CLINIC | Age: 39
End: 2019-07-25

## 2019-07-25 DIAGNOSIS — I83.893 SYMPTOMATIC VARICOSE VEINS OF BOTH LOWER EXTREMITIES: Primary | ICD-10-CM

## 2019-07-25 NOTE — TELEPHONE ENCOUNTER
Patients Name: Heather Guillory    Patients MRN: [id number]    Doctor s Name: Jonathan Headley    Procedure CPT codes:     CPT 71265: IR Endovenous ablation varicose veins ([id number])    CPT 03060- IR Stab Phelbectomy 10-20 stabs ([id number])    CPT 54964- IR Vein Sclerosing Multiple ([id number])  -x 3 treatments    Diagnosis Codes: I83.893  Symptomatic varicose veins of bilateral extremities     Bilat/R/L ? : LEFT    Have Conservative treatments been attempted?                Compression Stockings-3 month trial and documented failure:                 Ultrasound : Venous comp 7/25        Past treatment:    None       Scheduled: 10/14/2019

## 2019-07-25 NOTE — TELEPHONE ENCOUNTER
Called pt to f/u on scheduling her varicose vein treatment.     Inquired on some dates in which we talked about options as she's looking into Late Fall due to a trip.    Informed her the algorithm in how the appts will take place due to the treatments and expectations of compression stocking use for successful treatment.     *Will go ahead and schedule the appt. And then call her back.     Called pt back and had to leave a VM. Went through her appt details and informed her that I do see that she's active on [employer] as well.    Information will be projected on her [employer] appt list.     *called pt back. She is scheduled for Weds 10/17/2019.     She is to check into the [facility] waiting room at 9am for a 1030am appt.     She is then to check in next day for her microsite check and then 1 week f/u US.    Will send out letter today of her appt details.     She is to call me with any other questions.     Explained to her that our financial team will be doing a Prior Auth as it gets closer to her procedure date in which we will have an answer if she's approved or not.     She verbalized understanding.   Jackie ROBERTSON RN, BSN  Interventional Radiology Nurse Coordinator   Phone: [phone]

## 2019-07-26 DIAGNOSIS — I83.893 SYMPTOMATIC VARICOSE VEINS OF BOTH LOWER EXTREMITIES: Primary | ICD-10-CM

## 2019-08-23 ENCOUNTER — OFFICE VISIT (OUTPATIENT)
Dept: RHEUMATOLOGY | Facility: CLINIC | Age: 39
End: 2019-08-23
Attending: INTERNAL MEDICINE
Payer: COMMERCIAL

## 2019-08-23 VITALS
HEART RATE: 66 BPM | SYSTOLIC BLOOD PRESSURE: 107 MMHG | WEIGHT: 156.6 LBS | TEMPERATURE: 98.5 F | BODY MASS INDEX: 24.53 KG/M2 | OXYGEN SATURATION: 99 % | DIASTOLIC BLOOD PRESSURE: 71 MMHG

## 2019-08-23 DIAGNOSIS — M35.9 UNDIFFERENTIATED CONNECTIVE TISSUE DISEASE (H): Primary | ICD-10-CM

## 2019-08-23 RX ORDER — HYDROXYCHLOROQUINE SULFATE 200 MG/1
TABLET, FILM COATED ORAL
Qty: 60 TABLET | Refills: 5 | Status: SHIPPED | OUTPATIENT
Start: 2019-08-23 | End: 2020-01-20

## 2019-08-23 NOTE — PROGRESS NOTES
Rheumatology Visit     Heather Guillory MRN# [id number]   YOB: 1980 Age: 39 year old     Date of Last Visit: 2019  DOS: 2019       Assessment and Plan:   Undifferentiated connective tissue disease (UCTD) and pregnancy via IVF (s/p delivery 2018):    Heather is a 38 yo WF , a former patient of Dr. Brewer. She was diagnosed with MCTD in 2016, 6 wk postpartum based on fatigue, arthritis, mild Raynaud's, low positive NINOSKA 1.1 and low positive RNP Ab 1.6. She is on  mg qd since 2017 with great response.  UCTD continues to be most likely diagnosis not MCTD as she does not have classic features of MCTD, had very low titer of RNP Ab in the past, (negative on most recent check), and had a mild disease which never required prednisone.  All autoimmunity labs (2017) came back negative (except + NINOSKA) which is further reassuring for UCTD, including APS panel, repeat RNP and inflammatory markers, dsDNA and complement levels.      She had neg SSA/SSB antibodies in 2016, no concern for CHB. No need to re-check.  APS panel was neg.  She had neg anti-Sm, neg anti-DNA and neg centromere Ab in 2016.    Today:  Disease continues to be under good control on HCQ. She did not flare during pregnancy. She had about 2 wk increased arthralgia around 2018 which responded to NSAIDs, labs were not suggestive of a flare. In 2019, recommended to taper plaquenil to 200 mg twice a day on Mon/Wed/Fri and then 1 tab for the rest of the week.    Reports increased pain/stiffness in hands and feet, but has not had much rest recently (stress of son having broken arm) and would like to stay on current dose of HCQ and see what happens.    Was reminded to have eye exam on HCQ.    Labs today    RTC in 6 months      Orders Placed This Encounter   Procedures     ALT     Albumin level     AST     CBC with platelets differential     Creatinine     Complement C4     Complement C3     CRP inflammation     DNA  double stranded antibodies     Erythrocyte sedimentation rate auto     UA with Microscopic reflex to Culture     Protein  random urine with Creat Ratio     Creatinine random urine             Active Problem List:     Patient Active Problem List    Diagnosis Date Noted     Polyhydramnios 04/04/2018     Priority: Medium     Xerosis cutis 07/21/2017     Priority: Medium     Overview:       Xerosis of the hands which is moderately severe and associated with fissuring.       Family history of genetic disorder 12/23/2015     Priority: Medium     Patient's sister's son with Elijah's disease       Female infertility 04/04/2014     Priority: Medium     IVF with first pregnancy.       Anxiety 04/04/2014     Priority: Medium     1/15/18: on 25mg Zoloft, discussed increasing to 50mg  Pt plans to increase    Plan 1-2 week pp mood check       Tension headache 03/03/2014     Priority: Medium     Family history of malignant neoplasm of breast 07/12/2013     Priority: Medium     Overview:   Paternal GM       Decreased libido 12/12/2012     Priority: Medium     Overview:   Due to Effexor.       Irritable colon 10/26/2009     Priority: Medium     Panic disorder without agoraphobia 02/06/2008     Priority: Medium     Overview:   Panic Disorder w/o Agoraphobia       Eating disorder 02/05/2004     Priority: Medium     History of, feels stable now.              History of Present Illness:   Heather Guillory presents for f/u for probable mixed connective tissue disease. Last seen in 1-17, when HCQ was continued.    Background: received diagnosis of mixed connective tissue given pain in hand and feet, stiffness, Raynaud's  Syndrome, fatigue, positive NINOSKA and anti-RNP in 2016. Ms. Guillory first experienced swelling and pain in her fingers and feet during her second pregnancy this past spring that prevented her from wearing rings on her fingers and provoked an evaluation for DVT, which was negative. Despite weight loss post partum, her pain, mild  swelling, and stiffness in her digits persisted. She described the pain as a 7/10 when she wakes to feed her infant in the middle of the night and in the morning that decreases to a 2-3/10 over the course of the day. Stiffness lasts 30 mins to 1 hour in the morning. In general she feels tired all the time, but cannot determine if it is associated with that fact that she has two young children and is breastfeeding every 2 hours or if it is related to her joint symptoms. She was evaluated by an internist who found that she had a positive NINOSKA/ She saw Dr. Iniguez in 8-16 who discovered +RNP. She started Plaquenil 200 mg BID.    Interval history 7/14/17  Heather Guillory is stable since her last visit in January, despite feeling more fatigued recently. She denied arthralgias/myalgias but reports having sore feet/toes at end of day and when she first wakes up. She states the fatigue worsened since she ran out of Zoloft 3.5 weeks ago; also reports irritability and no sleeping soundly during the night since that time too. Zoloft prescribed by her OBGYN.     She is currently receiving fertility treatment and plans to undergo embryo transfer on 7/24/17. She remains on HCQ and feels comfortable continuing this medication during pregnancy. She wonders if taking HCQ once daily (400) rather than 200 twice daily is acceptable.  She is using estrogen therapy in preparation for the embryo transfer, and relates that her gynecologist recommends that she remained on the supplement for approximately one month after the transfer.    She recently pinched a nerve in her neck and has numbness/tingling with burning sensation in her left arm that radiates down into her 1st-3rd digits. She was evaluated at [facility] orthopedics, received percocet for pain and told to have PT. If it did not improve in 6 weeks, she will return for another evaluation.     Lastly, she had dry, rough skin on lateral aspects of fingers. She states this often occurs in  winter due to weather but it is new for her this summer. She is using a lot of moisturizers but it has not been helping.         9/28/17:   Heather is former patient of Dr. Brewer, is seeing me for 2nd opinion and is transferring care. She is currently pregnant and is concerned about her flare of MCTD during pregnancy, especially worried about flare being triggered by IVF.     She had her 1st pregnancy in 2/2015. It was via IVF. It was unexplained fertility. It was uneventful. Her son was born term. His 2nd son was born natuarally in 5/2016, term and healthy.       At 6 week post partum check up, she mentioned AM stiffness and stiffness of hands (new), feet were painful. Hands were swollen. Had fatigue, was breastfeeding att hat time.    No hair loss, skin rash, photosensitivity (but burns easily), oral/nasal ulcers, or sicca.    Has h/o Raynaud's since teen. Fingers turn white, toes turn purple, not painful. They feel cold not painful.    No myositis.     No serositis.    No seziures, headaches, psychosis.    Has sensitive stomach.    No fevers.     No h/o proteinuria.    She was diagnosed with MTCD in 6/2016. She started taking HCQ in 9/2016.  She started HCQ after she stopped breastfeeding. Had one flare up after stopping breastfeeding. She started  bid, last eye exam was this summer of 2017.     No prednisone.    Lack of sleep causes joint pain.     She did another round of IVF in 4/2017.  She was on OC x 10 wk . Embryo transfer was 7/14/2017. Now is off estrogen, progestron x 2 wk, now 12 wk pregnant.     No flare of her disease after IVF.    She was on ASA 81 mg qd till she was confirmed to be pregnant.    No preganncy loses. No thrombosis.    Feet feel sore and achy only with lack of sleep.     JOSEPH is 4/11/2018.    Interval History 12/14/17:  Heather has no major concerns or changes in symptoms at today's appointment.    Hasn't experienced any joint pains for about 9-12 months.  No stiffness in her  muscles or joints.  She hasn't had any Raynaud's and notes that she is keeping her hands and feet adequately covered and warm.      She does note that she has been fatigued lately but attributes that to her 2 toddlers at home who aren't sleeping throughout the night.  Also notes easy bruising but thinks that it is chronic.  Denies any hair loss, mouth sores/ulcers, HA, F/chills, night sweats.    She is taking hydroxychloroquine and is tolerating it well.      No significant infections although she wanted to inform us about her UTI with ESBL bacteria which was diagnosed when she was 8 weeks pregnant.  At that time, she was hospitalized and given IV antibiotics.  Ever since, she has experienced recurring yeast infections.  She tried diflucan x 3 without improvement.  Finally, she has changed her diet, cutting out sugars, which is improving her symptoms.      3/2/2018: Feeling very well with no flare ups or joint pain due to UCTD. Pregnancy is going well as well, she is due on 4/11/2018. She still has problem with vaginal yeast infection, is allergic to topical anti-fungal Tx, unresponsive to one time fluconazole tx, was offered to try 7 days of fluconazole but prefers to hold off as pregnancy is almost over and per her experience, vaginal yeast infection revolves after delivery.    6/2018: Doing well post delivery. Delivered a healthy boy on 4/5/2018 and is breastfeeding, not sure if she will have another child, has an IUD placed in 5/2018. On 5/23, contacted us with recurrence of arthralgia (hands, feet x 10-14days) which self-resolved, took some NSAIDs, today is feeling well with no complaints. Labs on 5/23 were not suggestive of a flare.    2/2019: Feeling very well. No complaints.    Today 8/23/2019: Reports intermittent soreness of hands and feet with stiffness, worsened with stress and lack of sleep. It's worse in AM and at night. No joint swelling, hair loss, oral ulcers, CP or SOB.          Review of Systems:    A comprehensive ROS was done. Positives are per HPI.          Past Medical History:     Past Medical History:   Diagnosis Date     Abnormal Pap smear     Over 10 years ago     Anxiety      E coli infection     ESBL     History of extended-spectrum beta-lactamase producing Escherichia coli infection      Hx of previous reproductive problem 12/2013    Infertility     Mixed connective tissue disease (H)     Dr. Steele- switching      Nephrolithiasis      Past Surgical History:   Procedure Laterality Date     BIOPSY  April 2014    Uterine polyp removed     DILATION AND CURETTAGE SUCTION       OPERATIVE HYSTEROSCOPY WITH MORCELLATOR  4/8/2014    Procedure: OPERATIVE HYSTEROSCOPY WITH MORCELLATOR;  Hysteroscopic Polypectomy;  Surgeon: Cate Mansfield MD;  Location: [facility] OR     Raynaud's syndrome since puberty. Her main trigger is cold.  She had two uneventful pregnancies with vaginal births, though the conception of her first child required IVF.   she is underwent embryo transfer in July 2017, now pregnant.   cervical radiculopathy, 2017.       Social History:     Social History     Occupational History     Occupation:      Employer: [employer]   Tobacco Use     Smoking status: Never Smoker     Smokeless tobacco: Never Used   Substance and Sexual Activity     Alcohol use: No     Alcohol/week: 0.0 oz     Drug use: No     Sexual activity: Yes     Partners: Male     Birth control/protection: IUD     Comment: Mirena            Family History:     Family History   Problem Relation Age of Onset     Diabetes Paternal Grandfather      C.A.D. Paternal Grandfather      Hypertension Paternal Grandfather      Other Cancer Paternal Grandfather         Lung     Cancer Paternal Grandfather         lung     Lung Cancer Paternal Grandfather      Breast Cancer Paternal Grandmother      Osteoporosis Paternal Grandmother      Hypertension Paternal Grandmother      Cancer - colorectal Maternal Grandfather       Colon Cancer Maternal Grandfather      Cancer Maternal Grandmother         cervical ca     Neurologic Disorder Maternal Grandmother         alzheimers     Alzheimer Disease Maternal Grandmother      Cardiovascular Brother 28        cardiomyopathy     Genetic Disorder Sister 21        una's disease     Heart Disease Paternal Uncle 55        mitral valve replacement     Anxiety Disorder Sister      Anxiety Disorder Brother      Genetic Disorder Sister         Una's Disease     Anxiety Disorder Sister      Genetic Disorder Sister         Una's Disease     Anxiety Disorder Brother      No history of AI disease       Allergies:     Allergies   Allergen Reactions     Kiwi Other (See Comments)     Throat itching     Benzoyl Peroxide Swelling     Duricef [Cefadroxil] Unknown     Monistat [Miconazole] Swelling     Sulfa Drugs Rash            Medications:     Current Outpatient Medications   Medication Sig Dispense Refill     hydroxychloroquine (PLAQUENIL) 200 MG tablet 200 mg twice a day on Mon/Wed/Fri and then 200 mg a day for the rest of the week 60 tablet 5     metroNIDAZOLE (METROCREAM) 0.75 % external cream        Prenatal Vit-Fe Fumarate-FA (PRENATAL VITAMINS) 28-0.8 MG TABS Take 1 tablet by mouth daily        levonorgestrel (MIRENA, 52 MG,) 20 MCG/24HR IUD 1 each (20 mcg) by Intrauterine route once for 1 dose 1 each 0     order for DME Please measure and distribute 1 pair of 20mmHg - 30mmHg knee high open or closed toe compression stockings. Jobst ultrasheer or equivalent. 2 each 6     order for DME Please measure and distribute 1 pair of 20mmHg - 30mmHg Thigh high open or closed toe compression stockings. Jobst ultrasheer or equivalent. 1 each 6     sertraline (ZOLOFT) 50 MG tablet Take 1 tablet (50 mg) by mouth daily (Patient not taking: Reported on 7/24/2019) 90 tablet 3            Physical Exam:   Blood pressure 107/71, pulse 66, temperature 98.5  F (36.9  C), temperature source Oral, weight 71 kg (156 lb  9.6 oz), SpO2 99 %, not currently breastfeeding.  Wt Readings from Last 4 Encounters:   08/23/19 71 kg (156 lb 9.6 oz)   04/26/19 75.2 kg (165 lb 11.2 oz)   03/08/19 76.3 kg (168 lb 3.2 oz)   02/22/19 76.3 kg (168 lb 3.2 oz)     Constitutional: well-developed, appearing stated age; cooperative  Eyes: nl EOM, PERRLA, vision, conjunctiva, sclera  ENT: nl external ears, nose, hearing, lips, teeth, gums, throat  No mucous membrane lesions, normal saliva pool  Neck: no mass or thyroid enlargement  Resp: lungs clear to auscultation  CV: RRR, no murmurs, rubs or gallops, no edema  GI: no ABD tenderness  Lymph: no cervical, supraclavicular nodes  MS: no active synovitis or joint tenderness  Skin: no skin rash  Neuro: nl cranial nerves, strength  Psych: nl affect.         Data:     Results for orders placed or performed in visit on 07/24/19    Low Ext Venous Competency Bilateral (vascular lab)    Narrative    Exam: US VENOUS COMPETENCY BILATERAL 7/24/2019 11:36 AM    Comparison study: Left lower extremity duplex venous ultrasound  2/8/2016    Clinical History: bilateral varicose veins; Symptomatic varicose veins  of both lower extremities    Technique: B-mode (grayscale) and Duplex Doppler ultrasound of the  lower extremity veins (superficial and deep), including incompetency  reflux time and compression for thrombus. Additional Duplex doppler  assessment of the PTA and AIMEE at the ankles. Superficial incompetency  exam performed upright, non-weight bearing with distal compression  using rapid inflation/deflation cuffs.    Ordering provider: NAPOLEON PATEL     Venous Competency Diagnostic Criteria Adopted 11/29/11.    Venous competency criteria defining abnormal reflux duration:  Femoral - popliteal reflux > 1.0 sec.  Deep femoral, deep calf veins, and superficial vein reflux > 0.5 sec.   vein reflux > 0.35 sec.    Supporting document: J Vasc Surg 2003; 38:793-8. Definition of reflux  in lower extremity  veins.    Findings:     Right ankle:   Posterior Tibial artery waveform: tri/biphasic  Dorsalis Pedis artery waveform: tri/biphasic    Left ankle:  Posterior Tibial artery waveform: tri/biphasic  Dorsalis Pedis artery waveform: tri/biphasic    Right Lower Extremity:     Duplex Evaluation for Deep Vein Thrombus (which includes CFV, FV,  popliteal vein, and posterior tibial veins): No deep venous thrombus  is demonstrated.    Common femoral vein: Competent    Deep femoral vein: Competent    Femoral vein:      proximal thigh: Competent      mid thigh: Competent      distal thigh: Competent    Popliteal vein: Competent    Posterior tibial veins at the ankle: Competent    Left lower extremity:    Duplex Evaluation for Deep Vein Thrombus (which includes CFV, FV,  popliteal vein, and posterior tibial veins): No deep venous thrombosis  demonstrated.     Common femoral vein: Incompetent with Valsalva    Deep femoral vein: Competent    Femoral vein:      proximal thigh: Competent      mid thigh: Competent      distal thigh: Competent    Popliteal vein: Competent    Posterior tibial veins at the ankle: Competent    Superficial Venous Incompetency Study:       Right Lower Extremity:     Duplex Evaluation for Superficial Vein Thrombus (which includes GSV,  SSV, AASV, and PASV): No deep venous thrombosis demonstrated.     Anterior accessory saphenous (AASV): Competent    Posterior accessory saphenous (PASV): Competent    Great saphenous vein (GSV)      sapheno-femoral junction: Competent      prox thigh: Competent      mid thigh: Competent      dist thigh: Competent       knee: Competent      prox calf: Competent      mid calf: Competent      ankle: Competent    Vein of Giacomini (V of G):      distal thigh: Competent      mid thigh: Not visualized      prox thigh: Not visualized    Small saphenous vein:      sapheno-popliteal junction: Competent      prox calf: Competent      mid calf: Competent    Diameters of superficial  veins:    SFJ: Diameter* 3.5 mm  GSV prox thigh*: Diameter 2.6 mm      GSV knee*: Diameter 0.7 mm    SSV SPJ*: Diameter 4.5 mm    Vein of Giacomini extends to the distal thigh and terminates in  branches.    No varicose veins demonstrated.     veins were noted but were competent.    Left lower extremity:    Duplex Evaluation for Superficial Vein Thrombus (which includes GSV,  SSV, AASV, and PASV): No superficial venous thrombosis demonstrated..    Anterior accessory saphenous (AASV): Competent    Posterior accessory saphenous (PASV): Incompetent    Great saphenous vein (GSV)      sapheno-femoral junction: Incompetent      prox thigh: Incompetent      mid thigh: Competent below the varicose vein.      dist thigh: Competent       knee: Competent      prox calf: Competent      mid calf: Competent      ankle: Competent    Vein of Giacomini (V of G): Not visualized.        Small saphenous vein:      sapheno-popliteal junction: Competent      prox calf: Competent      mid calf: Competent    Diameters of superficial veins:    PASV: Diameter 1.3 mm    SFJ: Diameter* 6.9 mm  GSV prox thigh*: Diameter 6.4 mm      GSV knee*: Diameter 1.9 mm    SSV SPJ*: Diameter 0.9 mm    Vein of Giacomini not visualized.     veins were noted but were patent.    A large tangle of varicose veins originate from the great saphenous  vein in the mid thigh, extending from the mid thigh to mid calf,  measures up to 9 mm in diameter.    Varicose veins in the anterior mid to distal calf measure 3 mm in  diameter.      Impression    IMPRESSION:  1. Right leg:       A. No superficial or deep venous anastomosis demonstrated.       B.  veins demonstrated but were competent.       C. No varicose veins demonstrated.    2. Left leg:       A. No superficial or deep venous anastomosis demonstrated.       B. Common femoral vein incompetent with Valsalva both above and  below the saphenofemoral junction.        C. Posterior  "accessory saphenous vein incompetent.       D. Great saphenous vein incompetent from the saphenofemoral  junction to the mid thigh varicose vein.       E. Large tangle of varicose veins extending from the medial mid  thigh to mid calf originates from the great saphenous vein in the mid  thigh.        F. Varicose veins in the anterior mid to distal calf.       G.  veins demonstrated but were competent.    Reference: \"Duplex Ultrasound in the Diagnosis of Lower-Extremity Deep  Venous Thrombosis\"- Brandy Fernandez MD, S; Jossue Weinstein MD  (Circulation. 2014;129:917-921. http://circ.ahajournals.org )    BRIGETTE RUSSO MD     Labs from outside laboratory reviewed from 8/12/16  RNP antibodies 1.6- positive    Negative for Parvo Virus B19, Lyme Disease  Negative SLE panel   Negative SSA, SSB  Negative Anti-Sury-1  Negative Centromere B antibodies  Negative  CCP  Recent Labs   Lab Test  07/22/16   1110  05/29/16   0651  05/27/16   1032   01/09/16   0724   WBC  8.1   --   15.4*   --   16.8*   RBC  4.86   --   4.25   --   4.75   HGB  14.1  11.2*  12.4   < >  14.2   HCT  42.1   --   37.6   --   40.1   MCV  87   --   89   --   84   RDW  13.0   --   14.0   --   13.7   PLT  228   --   184   --   202   ALBUMIN   --    --    --    --   3.3*   CRP  3.0   --    --    --    --    BUN   --    --    --    --   9    < > = values in this interval not displayed.      Recent Labs   Lab Test  07/22/16   1110   TSH  1.10     Hemoglobin   Date Value Ref Range Status   02/22/2019 14.0 11.7 - 15.7 g/dL Final   05/23/2018 12.8 11.7 - 15.7 g/dL Final   04/06/2018 9.3 (L) 11.7 - 15.7 g/dL Final     Urea Nitrogen   Date Value Ref Range Status   01/09/2016 9 7 - 30 mg/dL Final     Sed Rate   Date Value Ref Range Status   02/22/2019 6 0 - 20 mm/h Final   05/23/2018 6 0 - 20 mm/h Final   09/28/2017 8 0 - 20 mm/h Final     CRP Inflammation   Date Value Ref Range Status   02/22/2019 <2.9 0.0 - 8.0 mg/L Final   05/23/2018 <2.9 0.0 - 8.0 mg/L " Final   09/28/2017 <2.9 0.0 - 8.0 mg/L Final     AST   Date Value Ref Range Status   02/22/2019 17 0 - 45 U/L Final   05/23/2018 16 0 - 45 U/L Final   12/14/2017 14 0 - 45 U/L Final     Albumin   Date Value Ref Range Status   02/22/2019 4.1 3.4 - 5.0 g/dL Final   05/23/2018 3.8 3.4 - 5.0 g/dL Final   12/14/2017 3.1 (L) 3.4 - 5.0 g/dL Final     Alkaline Phosphatase   Date Value Ref Range Status   01/09/2016 70 40 - 150 U/L Final     ALT   Date Value Ref Range Status   02/22/2019 19 0 - 50 U/L Final   05/23/2018 17 0 - 50 U/L Final   12/14/2017 15 0 - 50 U/L Final     Rheumatoid Factor   Date Value Ref Range Status   07/22/2016 <20 <20 IU/mL Final     RHEUM RESULTS Latest Ref Rng & Units 4/6/2018 5/23/2018 2/22/2019   COMPLEMENT C3 76 - 169 mg/dL - 85 81   COMPLEMENT C4 15 - 50 mg/dL - 21 23   DNA-DS <10 IU/mL - 5 3   SED RATE 0 - 20 mm/h - 6 6   CRP, INFLAMMATION 0.0 - 8.0 mg/L - <2.9 <2.9   RHEUMATOID FACTOR <20 IU/mL - - -   NINOSKA SCREEN BY EIA <1.0 - - -   AST 0 - 45 U/L - 16 17   ALT 0 - 50 U/L - 17 19   ALBUMIN 3.4 - 5.0 g/dL - 3.8 4.1   WBC 4.0 - 11.0 10e9/L - 6.3 5.8   RBC 3.8 - 5.2 10e12/L - 5.06 5.07   HGB 11.7 - 15.7 g/dL 9.3(L) 12.8 14.0   HCT 35.0 - 47.0 % - 41.9 45.1   MCV 78 - 100 fl - 83 89   MCHC 31.5 - 36.5 g/dL - 30.5(L) 31.0(L)   RDW 10.0 - 15.0 % - 15.9(H) 12.6    - 450 10e9/L - 214 222   CREATININE 0.52 - 1.04 mg/dL - 0.71 0.63   GFR ESTIMATE, IF BLACK >60 mL/min/[1.73:m2] - >90 >90   GFR ESTIMATE >60 mL/min/[1.73:m2] - >90 >90     Component      Latest Ref Rng & Units 5/23/2018   WBC      4.0 - 11.0 10e9/L 6.3   RBC Count      3.8 - 5.2 10e12/L 5.06   Hemoglobin      11.7 - 15.7 g/dL 12.8   Hematocrit      35.0 - 47.0 % 41.9   MCV      78 - 100 fl 83   MCH      26.5 - 33.0 pg 25.3 (L)   MCHC      31.5 - 36.5 g/dL 30.5 (L)   RDW      10.0 - 15.0 % 15.9 (H)   Platelet Count      150 - 450 10e9/L 214   Diff Method       Automated Method   % Neutrophils      % 52.1   % Lymphocytes      % 38.5    % Monocytes      % 6.2   % Eosinophils      % 2.4   % Basophils      % 0.6   % Immature Granulocytes      % 0.2   Nucleated RBCs      0 /100 0   Absolute Neutrophil      1.6 - 8.3 10e9/L 3.3   Absolute Lymphocytes      0.8 - 5.3 10e9/L 2.4   Absolute Monocytes      0.0 - 1.3 10e9/L 0.4   Absolute Eosinophils      0.0 - 0.7 10e9/L 0.2   Absolute Basophils      0.0 - 0.2 10e9/L 0.0   Abs Immature Granulocytes      0 - 0.4 10e9/L 0.0   Absolute Nucleated RBC       0.0   Color Urine       Yellow   Appearance Urine       Clear   Glucose Urine      NEG:Negative mg/dL Negative   Bilirubin Urine      NEG:Negative Negative   Ketones Urine      NEG:Negative mg/dL Negative   Specific Gravity Urine      1.003 - 1.035 1.014   Blood Urine      NEG:Negative Negative   pH Urine      5.0 - 7.0 pH 7.0   Protein Albumin Urine      NEG:Negative mg/dL Negative   Urobilinogen mg/dL      0.0 - 2.0 mg/dL Normal   Nitrite Urine      NEG:Negative Negative   Leukocyte Esterase Urine      NEG:Negative Moderate (A)   Source       Urine   WBC Urine      0 - 5 /HPF 6 (H)   RBC Urine      0 - 2 /HPF 1   Transitional Epi      0 - 1 /HPF <1   Creatinine      0.52 - 1.04 mg/dL 0.71   GFR Estimate      >60 mL/min/1.7m2 >90   GFR Estimate If Black      >60 mL/min/1.7m2 >90   NINOSKA interpretation      NEG:Negative Positive (A)   NINOSKA pattern 1       DENSE FINE SPECKLED   NINOSKA titer 1       1:160   Specimen Description       Unspecified Urine   Special Requests       Specimen received in preservative   Culture Micro       No growth   Complement C3      76 - 169 mg/dL 85   Complement C4      15 - 50 mg/dL 21   CRP Inflammation      0.0 - 8.0 mg/L <2.9   Sed Rate      0 - 20 mm/h 6   DNA-ds      <10 IU/mL 5   AST      0 - 45 U/L 16   ALT      0 - 50 U/L 17   Albumin      3.4 - 5.0 g/dL 3.8     Reviewed Rheumatology lab flowsheet    Component      Latest Ref Rng & Units 2/22/2019   WBC      4.0 - 11.0 10e9/L 5.8   RBC Count      3.8 - 5.2 10e12/L 5.07    Hemoglobin      11.7 - 15.7 g/dL 14.0   Hematocrit      35.0 - 47.0 % 45.1   MCV      78 - 100 fl 89   MCH      26.5 - 33.0 pg 27.6   MCHC      31.5 - 36.5 g/dL 31.0 (L)   RDW      10.0 - 15.0 % 12.6   Platelet Count      150 - 450 10e9/L 222   Diff Method       Automated Method   % Neutrophils      % 53.6   % Lymphocytes      % 36.1   % Monocytes      % 6.4   % Eosinophils      % 2.6   % Basophils      % 1.0   % Immature Granulocytes      % 0.3   Nucleated RBCs      0 /100 0   Absolute Neutrophil      1.6 - 8.3 10e9/L 3.1   Absolute Lymphocytes      0.8 - 5.3 10e9/L 2.1   Absolute Monocytes      0.0 - 1.3 10e9/L 0.4   Absolute Eosinophils      0.0 - 0.7 10e9/L 0.2   Absolute Basophils      0.0 - 0.2 10e9/L 0.1   Abs Immature Granulocytes      0 - 0.4 10e9/L 0.0   Absolute Nucleated RBC       0.0   Color Urine       Yellow   Appearance Urine       Clear   Glucose Urine      NEG:Negative mg/dL Negative   Bilirubin Urine      NEG:Negative Negative   Ketones Urine      NEG:Negative mg/dL Negative   Specific Gravity Urine      1.003 - 1.035 1.023   Blood Urine      NEG:Negative Small (A)   pH Urine      5.0 - 7.0 pH 5.0   Protein Albumin Urine      NEG:Negative mg/dL Negative   Urobilinogen mg/dL      0.0 - 2.0 mg/dL 0.0   Nitrite Urine      NEG:Negative Negative   Leukocyte Esterase Urine      NEG:Negative Negative   Source       Midstream Urine   WBC Urine      0 - 5 /HPF 1   RBC Urine      0 - 2 /HPF 7 (H)   Squamous Epithelial /HPF Urine      0 - 1 /HPF 2 (H)   Mucous Urine      NEG:Negative /LPF Present (A)   Creatinine      0.52 - 1.04 mg/dL 0.63   GFR Estimate      >60 mL/min/1.73:m2 >90   GFR Estimate If Black      >60 mL/min/1.73:m2 >90   AST      0 - 45 U/L 17   ALT      0 - 50 U/L 19   Albumin      3.4 - 5.0 g/dL 4.1   CRP Inflammation      0.0 - 8.0 mg/L <2.9   DNA-ds      <10 IU/mL 3   Sed Rate      0 - 20 mm/h 6   Complement C4      15 - 50 mg/dL 23   Complement C3      76 - 169 mg/dL 81

## 2019-08-23 NOTE — LETTER
2019       RE: Heather Guillory  [address]  [place] [address]     Dear Colleague,    Thank you for referring your patient, Heather Guillory, to the [facility] RHEUMATOLOGY at [facility]. Please see a copy of my visit note below.    Chief Complaint   Patient presents with     RECHECK     6 month f/u     Blood pressure 107/71, pulse 66, temperature 98.5  F (36.9  C), temperature source Oral, weight 71 kg (156 lb 9.6 oz), SpO2 99 %, not currently breastfeeding.    Sita Diaz [facility]       Rheumatology Visit     Heather Guillory MRN# [id number]   YOB: 1980 Age: 39 year old     Date of Last Visit: 2019  DOS: 2019       Assessment and Plan:   Undifferentiated connective tissue disease (UCTD) and pregnancy via IVF (s/p delivery 2018):    Heather is a 38 yo WF , a former patient of Dr. Brewer. She was diagnosed with MCTD in 2016, 6 wk postpartum based on fatigue, arthritis, mild Raynaud's, low positive NINOSKA 1.1 and low positive RNP Ab 1.6. She is on  mg qd since 2017 with great response.  UCTD continues to be most likely diagnosis not MCTD as she does not have classic features of MCTD, had very low titer of RNP Ab in the past, (negative on most recent check), and had a mild disease which never required prednisone.  All autoimmunity labs (2017) came back negative (except + NINOSKA) which is further reassuring for UCTD, including APS panel, repeat RNP and inflammatory markers, dsDNA and complement levels.      She had neg SSA/SSB antibodies in 2016, no concern for CHB. No need to re-check.  APS panel was neg.  She had neg anti-Sm, neg anti-DNA and neg centromere Ab in 2016.    Today:  Disease continues to be under good control on HCQ. She did not flare during pregnancy. She had about 2 wk increased arthralgia around 2018 which responded to NSAIDs, labs were not suggestive of a flare. In 2019, recommended to taper plaquenil to  200 mg twice a day on Mon/Wed/Fri and then 1 tab for the rest of the week.    Reports increased pain/stiffness in hands and feet, but has not had much rest recently (stress of son having broken arm) and would like to stay on current dose of HCQ and see what happens.    Was reminded to have eye exam on HCQ.    Labs today    RTC in 6 months      Orders Placed This Encounter   Procedures     ALT     Albumin level     AST     CBC with platelets differential     Creatinine     Complement C4     Complement C3     CRP inflammation     DNA double stranded antibodies     Erythrocyte sedimentation rate auto     UA with Microscopic reflex to Culture     Protein  random urine with Creat Ratio     Creatinine random urine             Active Problem List:     Patient Active Problem List    Diagnosis Date Noted     Polyhydramnios 04/04/2018     Priority: Medium     Xerosis cutis 07/21/2017     Priority: Medium     Overview:       Xerosis of the hands which is moderately severe and associated with fissuring.       Family history of genetic disorder 12/23/2015     Priority: Medium     Patient's sister's son with Elijah's disease       Female infertility 04/04/2014     Priority: Medium     IVF with first pregnancy.       Anxiety 04/04/2014     Priority: Medium     1/15/18: on 25mg Zoloft, discussed increasing to 50mg  Pt plans to increase    Plan 1-2 week pp mood check       Tension headache 03/03/2014     Priority: Medium     Family history of malignant neoplasm of breast 07/12/2013     Priority: Medium     Overview:   Paternal GM       Decreased libido 12/12/2012     Priority: Medium     Overview:   Due to Effexor.       Irritable colon 10/26/2009     Priority: Medium     Panic disorder without agoraphobia 02/06/2008     Priority: Medium     Overview:   Panic Disorder w/o Agoraphobia       Eating disorder 02/05/2004     Priority: Medium     History of, feels stable now.              History of Present Illness:   Heather Guillory  presents for f/u for probable mixed connective tissue disease. Last seen in 1-17, when HCQ was continued.    Background: received diagnosis of mixed connective tissue given pain in hand and feet, stiffness, Raynaud's  Syndrome, fatigue, positive NINOSKA and anti-RNP in 2016. Ms. Guillory first experienced swelling and pain in her fingers and feet during her second pregnancy this past spring that prevented her from wearing rings on her fingers and provoked an evaluation for DVT, which was negative. Despite weight loss post partum, her pain, mild swelling, and stiffness in her digits persisted. She described the pain as a 7/10 when she wakes to feed her infant in the middle of the night and in the morning that decreases to a 2-3/10 over the course of the day. Stiffness lasts 30 mins to 1 hour in the morning. In general she feels tired all the time, but cannot determine if it is associated with that fact that she has two young children and is breastfeeding every 2 hours or if it is related to her joint symptoms. She was evaluated by an internist who found that she had a positive NINOSKA/ She saw Dr. Iniguez in 8-16 who discovered +RNP. She started Plaquenil 200 mg BID.    Interval history 7/14/17  Heather Guillory is stable since her last visit in January, despite feeling more fatigued recently. She denied arthralgias/myalgias but reports having sore feet/toes at end of day and when she first wakes up. She states the fatigue worsened since she ran out of Zoloft 3.5 weeks ago; also reports irritability and no sleeping soundly during the night since that time too. Zoloft prescribed by her OBGYN.     She is currently receiving fertility treatment and plans to undergo embryo transfer on 7/24/17. She remains on HCQ and feels comfortable continuing this medication during pregnancy. She wonders if taking HCQ once daily (400) rather than 200 twice daily is acceptable.  She is using estrogen therapy in preparation for the embryo transfer,  and relates that her gynecologist recommends that she remained on the supplement for approximately one month after the transfer.    She recently pinched a nerve in her neck and has numbness/tingling with burning sensation in her left arm that radiates down into her 1st-3rd digits. She was evaluated at [facility] orthopedics, received percocet for pain and told to have PT. If it did not improve in 6 weeks, she will return for another evaluation.     Lastly, she had dry, rough skin on lateral aspects of fingers. She states this often occurs in winter due to weather but it is new for her this summer. She is using a lot of moisturizers but it has not been helping.         9/28/17:   Heather is former patient of Dr. Brewer, is seeing me for 2nd opinion and is transferring care. She is currently pregnant and is concerned about her flare of MCTD during pregnancy, especially worried about flare being triggered by IVF.     She had her 1st pregnancy in 2/2015. It was via IVF. It was unexplained fertility. It was uneventful. Her son was born term. His 2nd son was born natuarally in 5/2016, term and healthy.       At 6 week post partum check up, she mentioned AM stiffness and stiffness of hands (new), feet were painful. Hands were swollen. Had fatigue, was breastfeeding att hat time.    No hair loss, skin rash, photosensitivity (but burns easily), oral/nasal ulcers, or sicca.    Has h/o Raynaud's since teen. Fingers turn white, toes turn purple, not painful. They feel cold not painful.    No myositis.     No serositis.    No seziures, headaches, psychosis.    Has sensitive stomach.    No fevers.     No h/o proteinuria.    She was diagnosed with MTCD in 6/2016. She started taking HCQ in 9/2016.  She started HCQ after she stopped breastfeeding. Had one flare up after stopping breastfeeding. She started  bid, last eye exam was this summer of 2017.     No prednisone.    Lack of sleep causes joint pain.     She did another round  of IVF in 4/2017.  She was on OC x 10 wk . Embryo transfer was 7/14/2017. Now is off estrogen, progestron x 2 wk, now 12 wk pregnant.     No flare of her disease after IVF.    She was on ASA 81 mg qd till she was confirmed to be pregnant.    No preganncy loses. No thrombosis.    Feet feel sore and achy only with lack of sleep.     JOSEPH is 4/11/2018.    Interval History 12/14/17:  Heather has no major concerns or changes in symptoms at today's appointment.    Hasn't experienced any joint pains for about 9-12 months.  No stiffness in her muscles or joints.  She hasn't had any Raynaud's and notes that she is keeping her hands and feet adequately covered and warm.      She does note that she has been fatigued lately but attributes that to her 2 toddlers at home who aren't sleeping throughout the night.  Also notes easy bruising but thinks that it is chronic.  Denies any hair loss, mouth sores/ulcers, HA, F/chills, night sweats.    She is taking hydroxychloroquine and is tolerating it well.      No significant infections although she wanted to inform us about her UTI with ESBL bacteria which was diagnosed when she was 8 weeks pregnant.  At that time, she was hospitalized and given IV antibiotics.  Ever since, she has experienced recurring yeast infections.  She tried diflucan x 3 without improvement.  Finally, she has changed her diet, cutting out sugars, which is improving her symptoms.      3/2/2018: Feeling very well with no flare ups or joint pain due to UCTD. Pregnancy is going well as well, she is due on 4/11/2018. She still has problem with vaginal yeast infection, is allergic to topical anti-fungal Tx, unresponsive to one time fluconazole tx, was offered to try 7 days of fluconazole but prefers to hold off as pregnancy is almost over and per her experience, vaginal yeast infection revolves after delivery.    6/2018: Doing well post delivery. Delivered a healthy boy on 4/5/2018 and is breastfeeding, not sure if  she will have another child, has an IUD placed in 5/2018. On 5/23, contacted us with recurrence of arthralgia (hands, feet x 10-14days) which self-resolved, took some NSAIDs, today is feeling well with no complaints. Labs on 5/23 were not suggestive of a flare.    2/2019: Feeling very well. No complaints.    Today 8/23/2019: Reports intermittent soreness of hands and feet with stiffness, worsened with stress and lack of sleep. It's worse in AM and at night. No joint swelling, hair loss, oral ulcers, CP or SOB.          Review of Systems:   A comprehensive ROS was done. Positives are per HPI.          Past Medical History:     Past Medical History:   Diagnosis Date     Abnormal Pap smear     Over 10 years ago     Anxiety      E coli infection     ESBL     History of extended-spectrum beta-lactamase producing Escherichia coli infection      Hx of previous reproductive problem 12/2013    Infertility     Mixed connective tissue disease (H)     Dr. Steele- switching      Nephrolithiasis      Past Surgical History:   Procedure Laterality Date     BIOPSY  April 2014    Uterine polyp removed     DILATION AND CURETTAGE SUCTION       OPERATIVE HYSTEROSCOPY WITH MORCELLATOR  4/8/2014    Procedure: OPERATIVE HYSTEROSCOPY WITH MORCELLATOR;  Hysteroscopic Polypectomy;  Surgeon: Cate Mansfield MD;  Location: [facility] OR     Raynaud's syndrome since puberty. Her main trigger is cold.  She had two uneventful pregnancies with vaginal births, though the conception of her first child required IVF.   she is underwent embryo transfer in July 2017, now pregnant.   cervical radiculopathy, 2017.       Social History:     Social History     Occupational History     Occupation:      Employer: [employer]   Tobacco Use     Smoking status: Never Smoker     Smokeless tobacco: Never Used   Substance and Sexual Activity     Alcohol use: No     Alcohol/week: 0.0 oz     Drug use: No     Sexual activity: Yes     Partners:  Male     Birth control/protection: IUD     Comment: Mirena            Family History:     Family History   Problem Relation Age of Onset     Diabetes Paternal Grandfather      C.A.D. Paternal Grandfather      Hypertension Paternal Grandfather      Other Cancer Paternal Grandfather         Lung     Cancer Paternal Grandfather         lung     Lung Cancer Paternal Grandfather      Breast Cancer Paternal Grandmother      Osteoporosis Paternal Grandmother      Hypertension Paternal Grandmother      Cancer - colorectal Maternal Grandfather      Colon Cancer Maternal Grandfather      Cancer Maternal Grandmother         cervical ca     Neurologic Disorder Maternal Grandmother         alzheimers     Alzheimer Disease Maternal Grandmother      Cardiovascular Brother 28        cardiomyopathy     Genetic Disorder Sister 21        una's disease     Heart Disease Paternal Uncle 55        mitral valve replacement     Anxiety Disorder Sister      Anxiety Disorder Brother      Genetic Disorder Sister         Una's Disease     Anxiety Disorder Sister      Genetic Disorder Sister         Una's Disease     Anxiety Disorder Brother      No history of AI disease       Allergies:     Allergies   Allergen Reactions     Kiwi Other (See Comments)     Throat itching     Benzoyl Peroxide Swelling     Duricef [Cefadroxil] Unknown     Monistat [Miconazole] Swelling     Sulfa Drugs Rash            Medications:     Current Outpatient Medications   Medication Sig Dispense Refill     hydroxychloroquine (PLAQUENIL) 200 MG tablet 200 mg twice a day on Mon/Wed/Fri and then 200 mg a day for the rest of the week 60 tablet 5     metroNIDAZOLE (METROCREAM) 0.75 % external cream        Prenatal Vit-Fe Fumarate-FA (PRENATAL VITAMINS) 28-0.8 MG TABS Take 1 tablet by mouth daily        levonorgestrel (MIRENA, 52 MG,) 20 MCG/24HR IUD 1 each (20 mcg) by Intrauterine route once for 1 dose 1 each 0     order for DME Please measure and distribute 1 pair  of 20mmHg - 30mmHg knee high open or closed toe compression stockings. Jobst ultrasheer or equivalent. 2 each 6     order for DME Please measure and distribute 1 pair of 20mmHg - 30mmHg Thigh high open or closed toe compression stockings. Jobst ultrasheer or equivalent. 1 each 6     sertraline (ZOLOFT) 50 MG tablet Take 1 tablet (50 mg) by mouth daily (Patient not taking: Reported on 7/24/2019) 90 tablet 3            Physical Exam:   Blood pressure 107/71, pulse 66, temperature 98.5  F (36.9  C), temperature source Oral, weight 71 kg (156 lb 9.6 oz), SpO2 99 %, not currently breastfeeding.  Wt Readings from Last 4 Encounters:   08/23/19 71 kg (156 lb 9.6 oz)   04/26/19 75.2 kg (165 lb 11.2 oz)   03/08/19 76.3 kg (168 lb 3.2 oz)   02/22/19 76.3 kg (168 lb 3.2 oz)     Constitutional: well-developed, appearing stated age; cooperative  Eyes: nl EOM, PERRLA, vision, conjunctiva, sclera  ENT: nl external ears, nose, hearing, lips, teeth, gums, throat  No mucous membrane lesions, normal saliva pool  Neck: no mass or thyroid enlargement  Resp: lungs clear to auscultation  CV: RRR, no murmurs, rubs or gallops, no edema  GI: no ABD tenderness  Lymph: no cervical, supraclavicular nodes  MS: no active synovitis or joint tenderness  Skin: no skin rash  Neuro: nl cranial nerves, strength  Psych: nl affect.         Data:     Results for orders placed or performed in visit on 07/24/19   US Low Ext Venous Competency Bilateral (vascular lab)    Narrative    Exam: US VENOUS COMPETENCY BILATERAL 7/24/2019 11:36 AM    Comparison study: Left lower extremity duplex venous ultrasound  2/8/2016    Clinical History: bilateral varicose veins; Symptomatic varicose veins  of both lower extremities    Technique: B-mode (grayscale) and Duplex Doppler ultrasound of the  lower extremity veins (superficial and deep), including incompetency  reflux time and compression for thrombus. Additional Duplex doppler  assessment of the PTA and AIMEE at the  ankles. Superficial incompetency  exam performed upright, non-weight bearing with distal compression  using rapid inflation/deflation cuffs.    Ordering provider: NAPOLEON PATEL     Venous Competency Diagnostic Criteria Adopted 11/29/11.    Venous competency criteria defining abnormal reflux duration:  Femoral - popliteal reflux > 1.0 sec.  Deep femoral, deep calf veins, and superficial vein reflux > 0.5 sec.   vein reflux > 0.35 sec.    Supporting document: J Vasc Surg 2003; 38:793-8. Definition of reflux  in lower extremity veins.    Findings:     Right ankle:   Posterior Tibial artery waveform: tri/biphasic  Dorsalis Pedis artery waveform: tri/biphasic    Left ankle:  Posterior Tibial artery waveform: tri/biphasic  Dorsalis Pedis artery waveform: tri/biphasic    Right Lower Extremity:     Duplex Evaluation for Deep Vein Thrombus (which includes CFV, FV,  popliteal vein, and posterior tibial veins): No deep venous thrombus  is demonstrated.    Common femoral vein: Competent    Deep femoral vein: Competent    Femoral vein:      proximal thigh: Competent      mid thigh: Competent      distal thigh: Competent    Popliteal vein: Competent    Posterior tibial veins at the ankle: Competent    Left lower extremity:    Duplex Evaluation for Deep Vein Thrombus (which includes CFV, FV,  popliteal vein, and posterior tibial veins): No deep venous thrombosis  demonstrated.     Common femoral vein: Incompetent with Valsalva    Deep femoral vein: Competent    Femoral vein:      proximal thigh: Competent      mid thigh: Competent      distal thigh: Competent    Popliteal vein: Competent    Posterior tibial veins at the ankle: Competent    Superficial Venous Incompetency Study:       Right Lower Extremity:     Duplex Evaluation for Superficial Vein Thrombus (which includes GSV,  SSV, AASV, and PASV): No deep venous thrombosis demonstrated.     Anterior accessory saphenous (AASV): Competent    Posterior accessory  saphenous (PASV): Competent    Great saphenous vein (GSV)      sapheno-femoral junction: Competent      prox thigh: Competent      mid thigh: Competent      dist thigh: Competent       knee: Competent      prox calf: Competent      mid calf: Competent      ankle: Competent    Vein of Giacomini (V of G):      distal thigh: Competent      mid thigh: Not visualized      prox thigh: Not visualized    Small saphenous vein:      sapheno-popliteal junction: Competent      prox calf: Competent      mid calf: Competent    Diameters of superficial veins:    SFJ: Diameter* 3.5 mm  GSV prox thigh*: Diameter 2.6 mm      GSV knee*: Diameter 0.7 mm    SSV SPJ*: Diameter 4.5 mm    Vein of Giacomini extends to the distal thigh and terminates in  branches.    No varicose veins demonstrated.     veins were noted but were competent.    Left lower extremity:    Duplex Evaluation for Superficial Vein Thrombus (which includes GSV,  SSV, AASV, and PASV): No superficial venous thrombosis demonstrated..    Anterior accessory saphenous (AASV): Competent    Posterior accessory saphenous (PASV): Incompetent    Great saphenous vein (GSV)      sapheno-femoral junction: Incompetent      prox thigh: Incompetent      mid thigh: Competent below the varicose vein.      dist thigh: Competent       knee: Competent      prox calf: Competent      mid calf: Competent      ankle: Competent    Vein of Giacomini (V of G): Not visualized.        Small saphenous vein:      sapheno-popliteal junction: Competent      prox calf: Competent      mid calf: Competent    Diameters of superficial veins:    PASV: Diameter 1.3 mm    SFJ: Diameter* 6.9 mm  GSV prox thigh*: Diameter 6.4 mm      GSV knee*: Diameter 1.9 mm    SSV SPJ*: Diameter 0.9 mm    Vein of Giacomini not visualized.     veins were noted but were patent.    A large tangle of varicose veins originate from the great saphenous  vein in the mid thigh, extending from the mid thigh to mid  "calf,  measures up to 9 mm in diameter.    Varicose veins in the anterior mid to distal calf measure 3 mm in  diameter.      Impression    IMPRESSION:  1. Right leg:       A. No superficial or deep venous anastomosis demonstrated.       B.  veins demonstrated but were competent.       C. No varicose veins demonstrated.    2. Left leg:       A. No superficial or deep venous anastomosis demonstrated.       B. Common femoral vein incompetent with Valsalva both above and  below the saphenofemoral junction.        C. Posterior accessory saphenous vein incompetent.       D. Great saphenous vein incompetent from the saphenofemoral  junction to the mid thigh varicose vein.       E. Large tangle of varicose veins extending from the medial mid  thigh to mid calf originates from the great saphenous vein in the mid  thigh.        F. Varicose veins in the anterior mid to distal calf.       G.  veins demonstrated but were competent.    Reference: \"Duplex Ultrasound in the Diagnosis of Lower-Extremity Deep  Venous Thrombosis\"- Brandy Fernandez MD, S; Jossue Weinstein MD  (Circulation. 2014;129:917-921. http://circ.ahajournals.org )    BRIGETTE RUSSO MD     Labs from outside laboratory reviewed from 8/12/16  RNP antibodies 1.6- positive    Negative for Parvo Virus B19, Lyme Disease  Negative SLE panel   Negative SSA, SSB  Negative Anti-Sury-1  Negative Centromere B antibodies  Negative  CCP  Recent Labs   Lab Test  07/22/16   1110  05/29/16   0651  05/27/16   1032   01/09/16   0724   WBC  8.1   --   15.4*   --   16.8*   RBC  4.86   --   4.25   --   4.75   HGB  14.1  11.2*  12.4   < >  14.2   HCT  42.1   --   37.6   --   40.1   MCV  87   --   89   --   84   RDW  13.0   --   14.0   --   13.7   PLT  228   --   184   --   202   ALBUMIN   --    --    --    --   3.3*   CRP  3.0   --    --    --    --    BUN   --    --    --    --   9    < > = values in this interval not displayed.      Recent Labs   Lab Test  " 07/22/16   1110   TSH  1.10     Hemoglobin   Date Value Ref Range Status   02/22/2019 14.0 11.7 - 15.7 g/dL Final   05/23/2018 12.8 11.7 - 15.7 g/dL Final   04/06/2018 9.3 (L) 11.7 - 15.7 g/dL Final     Urea Nitrogen   Date Value Ref Range Status   01/09/2016 9 7 - 30 mg/dL Final     Sed Rate   Date Value Ref Range Status   02/22/2019 6 0 - 20 mm/h Final   05/23/2018 6 0 - 20 mm/h Final   09/28/2017 8 0 - 20 mm/h Final     CRP Inflammation   Date Value Ref Range Status   02/22/2019 <2.9 0.0 - 8.0 mg/L Final   05/23/2018 <2.9 0.0 - 8.0 mg/L Final   09/28/2017 <2.9 0.0 - 8.0 mg/L Final     AST   Date Value Ref Range Status   02/22/2019 17 0 - 45 U/L Final   05/23/2018 16 0 - 45 U/L Final   12/14/2017 14 0 - 45 U/L Final     Albumin   Date Value Ref Range Status   02/22/2019 4.1 3.4 - 5.0 g/dL Final   05/23/2018 3.8 3.4 - 5.0 g/dL Final   12/14/2017 3.1 (L) 3.4 - 5.0 g/dL Final     Alkaline Phosphatase   Date Value Ref Range Status   01/09/2016 70 40 - 150 U/L Final     ALT   Date Value Ref Range Status   02/22/2019 19 0 - 50 U/L Final   05/23/2018 17 0 - 50 U/L Final   12/14/2017 15 0 - 50 U/L Final     Rheumatoid Factor   Date Value Ref Range Status   07/22/2016 <20 <20 IU/mL Final     RHEUM RESULTS Latest Ref Rng & Units 4/6/2018 5/23/2018 2/22/2019   COMPLEMENT C3 76 - 169 mg/dL - 85 81   COMPLEMENT C4 15 - 50 mg/dL - 21 23   DNA-DS <10 IU/mL - 5 3   SED RATE 0 - 20 mm/h - 6 6   CRP, INFLAMMATION 0.0 - 8.0 mg/L - <2.9 <2.9   RHEUMATOID FACTOR <20 IU/mL - - -   NINOSKA SCREEN BY EIA <1.0 - - -   AST 0 - 45 U/L - 16 17   ALT 0 - 50 U/L - 17 19   ALBUMIN 3.4 - 5.0 g/dL - 3.8 4.1   WBC 4.0 - 11.0 10e9/L - 6.3 5.8   RBC 3.8 - 5.2 10e12/L - 5.06 5.07   HGB 11.7 - 15.7 g/dL 9.3(L) 12.8 14.0   HCT 35.0 - 47.0 % - 41.9 45.1   MCV 78 - 100 fl - 83 89   MCHC 31.5 - 36.5 g/dL - 30.5(L) 31.0(L)   RDW 10.0 - 15.0 % - 15.9(H) 12.6    - 450 10e9/L - 214 222   CREATININE 0.52 - 1.04 mg/dL - 0.71 0.63   GFR ESTIMATE, IF BLACK >60  mL/min/[1.73:m2] - >90 >90   GFR ESTIMATE >60 mL/min/[1.73:m2] - >90 >90     Component      Latest Ref Rng & Units 5/23/2018   WBC      4.0 - 11.0 10e9/L 6.3   RBC Count      3.8 - 5.2 10e12/L 5.06   Hemoglobin      11.7 - 15.7 g/dL 12.8   Hematocrit      35.0 - 47.0 % 41.9   MCV      78 - 100 fl 83   MCH      26.5 - 33.0 pg 25.3 (L)   MCHC      31.5 - 36.5 g/dL 30.5 (L)   RDW      10.0 - 15.0 % 15.9 (H)   Platelet Count      150 - 450 10e9/L 214   Diff Method       Automated Method   % Neutrophils      % 52.1   % Lymphocytes      % 38.5   % Monocytes      % 6.2   % Eosinophils      % 2.4   % Basophils      % 0.6   % Immature Granulocytes      % 0.2   Nucleated RBCs      0 /100 0   Absolute Neutrophil      1.6 - 8.3 10e9/L 3.3   Absolute Lymphocytes      0.8 - 5.3 10e9/L 2.4   Absolute Monocytes      0.0 - 1.3 10e9/L 0.4   Absolute Eosinophils      0.0 - 0.7 10e9/L 0.2   Absolute Basophils      0.0 - 0.2 10e9/L 0.0   Abs Immature Granulocytes      0 - 0.4 10e9/L 0.0   Absolute Nucleated RBC       0.0   Color Urine       Yellow   Appearance Urine       Clear   Glucose Urine      NEG:Negative mg/dL Negative   Bilirubin Urine      NEG:Negative Negative   Ketones Urine      NEG:Negative mg/dL Negative   Specific Gravity Urine      1.003 - 1.035 1.014   Blood Urine      NEG:Negative Negative   pH Urine      5.0 - 7.0 pH 7.0   Protein Albumin Urine      NEG:Negative mg/dL Negative   Urobilinogen mg/dL      0.0 - 2.0 mg/dL Normal   Nitrite Urine      NEG:Negative Negative   Leukocyte Esterase Urine      NEG:Negative Moderate (A)   Source       Urine   WBC Urine      0 - 5 /HPF 6 (H)   RBC Urine      0 - 2 /HPF 1   Transitional Epi      0 - 1 /HPF <1   Creatinine      0.52 - 1.04 mg/dL 0.71   GFR Estimate      >60 mL/min/1.7m2 >90   GFR Estimate If Black      >60 mL/min/1.7m2 >90   NINOSKA interpretation      NEG:Negative Positive (A)   NINOSKA pattern 1       DENSE FINE SPECKLED   NINOSKA titer 1       1:160   Specimen Description        Unspecified Urine   Special Requests       Specimen received in preservative   Culture Micro       No growth   Complement C3      76 - 169 mg/dL 85   Complement C4      15 - 50 mg/dL 21   CRP Inflammation      0.0 - 8.0 mg/L <2.9   Sed Rate      0 - 20 mm/h 6   DNA-ds      <10 IU/mL 5   AST      0 - 45 U/L 16   ALT      0 - 50 U/L 17   Albumin      3.4 - 5.0 g/dL 3.8     Reviewed Rheumatology lab flowsheet    Component      Latest Ref Rng & Units 2/22/2019   WBC      4.0 - 11.0 10e9/L 5.8   RBC Count      3.8 - 5.2 10e12/L 5.07   Hemoglobin      11.7 - 15.7 g/dL 14.0   Hematocrit      35.0 - 47.0 % 45.1   MCV      78 - 100 fl 89   MCH      26.5 - 33.0 pg 27.6   MCHC      31.5 - 36.5 g/dL 31.0 (L)   RDW      10.0 - 15.0 % 12.6   Platelet Count      150 - 450 10e9/L 222   Diff Method       Automated Method   % Neutrophils      % 53.6   % Lymphocytes      % 36.1   % Monocytes      % 6.4   % Eosinophils      % 2.6   % Basophils      % 1.0   % Immature Granulocytes      % 0.3   Nucleated RBCs      0 /100 0   Absolute Neutrophil      1.6 - 8.3 10e9/L 3.1   Absolute Lymphocytes      0.8 - 5.3 10e9/L 2.1   Absolute Monocytes      0.0 - 1.3 10e9/L 0.4   Absolute Eosinophils      0.0 - 0.7 10e9/L 0.2   Absolute Basophils      0.0 - 0.2 10e9/L 0.1   Abs Immature Granulocytes      0 - 0.4 10e9/L 0.0   Absolute Nucleated RBC       0.0   Color Urine       Yellow   Appearance Urine       Clear   Glucose Urine      NEG:Negative mg/dL Negative   Bilirubin Urine      NEG:Negative Negative   Ketones Urine      NEG:Negative mg/dL Negative   Specific Gravity Urine      1.003 - 1.035 1.023   Blood Urine      NEG:Negative Small (A)   pH Urine      5.0 - 7.0 pH 5.0   Protein Albumin Urine      NEG:Negative mg/dL Negative   Urobilinogen mg/dL      0.0 - 2.0 mg/dL 0.0   Nitrite Urine      NEG:Negative Negative   Leukocyte Esterase Urine      NEG:Negative Negative   Source       Midstream Urine   WBC Urine      0 - 5 /HPF 1   RBC Urine       0 - 2 /HPF 7 (H)   Squamous Epithelial /HPF Urine      0 - 1 /HPF 2 (H)   Mucous Urine      NEG:Negative /LPF Present (A)   Creatinine      0.52 - 1.04 mg/dL 0.63   GFR Estimate      >60 mL/min/1.73:m2 >90   GFR Estimate If Black      >60 mL/min/1.73:m2 >90   AST      0 - 45 U/L 17   ALT      0 - 50 U/L 19   Albumin      3.4 - 5.0 g/dL 4.1   CRP Inflammation      0.0 - 8.0 mg/L <2.9   DNA-ds      <10 IU/mL 3   Sed Rate      0 - 20 mm/h 6   Complement C4      15 - 50 mg/dL 23   Complement C3      76 - 169 mg/dL 81       Again, thank you for allowing me to participate in the care of your patient.      Sincerely,    Nasrin King MD

## 2019-08-26 DIAGNOSIS — M35.9 UNDIFFERENTIATED CONNECTIVE TISSUE DISEASE (H): ICD-10-CM

## 2019-08-26 LAB
ALBUMIN SERPL-MCNC: 4.1 G/DL (ref 3.4–5)
ALBUMIN UR-MCNC: NEGATIVE MG/DL
ALT SERPL W P-5'-P-CCNC: 17 U/L (ref 0–50)
APPEARANCE UR: CLEAR
AST SERPL W P-5'-P-CCNC: 11 U/L (ref 0–45)
BASOPHILS # BLD AUTO: 0.1 10E9/L (ref 0–0.2)
BASOPHILS NFR BLD AUTO: 0.9 %
BILIRUB UR QL STRIP: NEGATIVE
C3 SERPL-MCNC: 64 MG/DL (ref 76–169)
C4 SERPL-MCNC: 16 MG/DL (ref 15–50)
COLOR UR AUTO: ABNORMAL
CREAT SERPL-MCNC: 0.54 MG/DL (ref 0.52–1.04)
CREAT UR-MCNC: 40 MG/DL
CRP SERPL-MCNC: <2.9 MG/L (ref 0–8)
DIFFERENTIAL METHOD BLD: NORMAL
EOSINOPHIL # BLD AUTO: 0.1 10E9/L (ref 0–0.7)
EOSINOPHIL NFR BLD AUTO: 2.2 %
ERYTHROCYTE [DISTWIDTH] IN BLOOD BY AUTOMATED COUNT: 12.2 % (ref 10–15)
ERYTHROCYTE [SEDIMENTATION RATE] IN BLOOD BY WESTERGREN METHOD: 4 MM/H (ref 0–20)
GFR SERPL CREATININE-BSD FRML MDRD: >90 ML/MIN/{1.73_M2}
GLUCOSE UR STRIP-MCNC: NEGATIVE MG/DL
HCT VFR BLD AUTO: 40.6 % (ref 35–47)
HGB BLD-MCNC: 13 G/DL (ref 11.7–15.7)
HGB UR QL STRIP: NEGATIVE
IMM GRANULOCYTES # BLD: 0 10E9/L (ref 0–0.4)
IMM GRANULOCYTES NFR BLD: 0.3 %
KETONES UR STRIP-MCNC: NEGATIVE MG/DL
LEUKOCYTE ESTERASE UR QL STRIP: NEGATIVE
LYMPHOCYTES # BLD AUTO: 2.4 10E9/L (ref 0.8–5.3)
LYMPHOCYTES NFR BLD AUTO: 38.3 %
MCH RBC QN AUTO: 28.4 PG (ref 26.5–33)
MCHC RBC AUTO-ENTMCNC: 32 G/DL (ref 31.5–36.5)
MCV RBC AUTO: 89 FL (ref 78–100)
MONOCYTES # BLD AUTO: 0.4 10E9/L (ref 0–1.3)
MONOCYTES NFR BLD AUTO: 6.5 %
NEUTROPHILS # BLD AUTO: 3.3 10E9/L (ref 1.6–8.3)
NEUTROPHILS NFR BLD AUTO: 51.8 %
NITRATE UR QL: NEGATIVE
NRBC # BLD AUTO: 0 10*3/UL
NRBC BLD AUTO-RTO: 0 /100
PH UR STRIP: 7 PH (ref 5–7)
PLATELET # BLD AUTO: 185 10E9/L (ref 150–450)
PROT UR-MCNC: 0.08 G/L
PROT/CREAT 24H UR: 0.2 G/G CR (ref 0–0.2)
RBC # BLD AUTO: 4.58 10E12/L (ref 3.8–5.2)
RBC #/AREA URNS AUTO: 2 /HPF (ref 0–2)
SOURCE: ABNORMAL
SP GR UR STRIP: 1.01 (ref 1–1.03)
SQUAMOUS #/AREA URNS AUTO: 2 /HPF (ref 0–1)
UROBILINOGEN UR STRIP-MCNC: 0 MG/DL (ref 0–2)
WBC # BLD AUTO: 6.3 10E9/L (ref 4–11)
WBC #/AREA URNS AUTO: <1 /HPF (ref 0–5)

## 2019-08-26 NOTE — LETTER
Patient:  Heather Guillory  :   1980  MRN:     [id number]        Ms.Jennifer Guillory  [address]  [place] [address]        September 3, 2019    Dear ,    We are writing to inform you of your test results. There is a small drop in your C3. If you still feel uncomfortable with your joints,  Recommend to increase plaquenil back up to 200 mg twice a day.      Results for orders placed or performed in visit on 19   Protein  random urine with Creat Ratio   Result Value Ref Range    Protein Random Urine 0.08 g/L    Protein Total Urine g/gr Creatinine 0.20 0 - 0.2 g/g Cr   UA with Microscopic reflex to Culture   Result Value Ref Range    Color Urine Straw     Appearance Urine Clear     Glucose Urine Negative NEG^Negative mg/dL    Bilirubin Urine Negative NEG^Negative    Ketones Urine Negative NEG^Negative mg/dL    Specific Gravity Urine 1.011 1.003 - 1.035    Blood Urine Negative NEG^Negative    pH Urine 7.0 5.0 - 7.0 pH    Protein Albumin Urine Negative NEG^Negative mg/dL    Urobilinogen mg/dL 0.0 0.0 - 2.0 mg/dL    Nitrite Urine Negative NEG^Negative    Leukocyte Esterase Urine Negative NEG^Negative    Source Midstream Urine     WBC Urine <1 0 - 5 /HPF    RBC Urine 2 0 - 2 /HPF    Squamous Epithelial /HPF Urine 2 (H) 0 - 1 /HPF   Erythrocyte sedimentation rate auto   Result Value Ref Range    Sed Rate 4 0 - 20 mm/h   DNA double stranded antibodies   Result Value Ref Range    DNA-ds 4 <10 IU/mL   CRP inflammation   Result Value Ref Range    CRP Inflammation <2.9 0.0 - 8.0 mg/L   Complement C3   Result Value Ref Range    Complement C3 64 (L) 76 - 169 mg/dL   Complement C4   Result Value Ref Range    Complement C4 16 15 - 50 mg/dL   Creatinine   Result Value Ref Range    Creatinine 0.54 0.52 - 1.04 mg/dL    GFR Estimate >90 >60 mL/min/[1.73_m2]    GFR Estimate If Black >90 >60 mL/min/[1.73_m2]   CBC with platelets differential   Result Value Ref Range    WBC 6.3 4.0 - 11.0 10e9/L    RBC Count  4.58 3.8 - 5.2 10e12/L    Hemoglobin 13.0 11.7 - 15.7 g/dL    Hematocrit 40.6 35.0 - 47.0 %    MCV 89 78 - 100 fl    MCH 28.4 26.5 - 33.0 pg    MCHC 32.0 31.5 - 36.5 g/dL    RDW 12.2 10.0 - 15.0 %    Platelet Count 185 150 - 450 10e9/L    Diff Method Automated Method     % Neutrophils 51.8 %    % Lymphocytes 38.3 %    % Monocytes 6.5 %    % Eosinophils 2.2 %    % Basophils 0.9 %    % Immature Granulocytes 0.3 %    Nucleated RBCs 0 0 /100    Absolute Neutrophil 3.3 1.6 - 8.3 10e9/L    Absolute Lymphocytes 2.4 0.8 - 5.3 10e9/L    Absolute Monocytes 0.4 0.0 - 1.3 10e9/L    Absolute Eosinophils 0.1 0.0 - 0.7 10e9/L    Absolute Basophils 0.1 0.0 - 0.2 10e9/L    Abs Immature Granulocytes 0.0 0 - 0.4 10e9/L    Absolute Nucleated RBC 0.0    AST   Result Value Ref Range    AST 11 0 - 45 U/L   Albumin level   Result Value Ref Range    Albumin 4.1 3.4 - 5.0 g/dL   ALT   Result Value Ref Range    ALT 17 0 - 50 U/L   Creatinine urine calculation only   Result Value Ref Range    Creatinine Urine 40 mg/dL       Nasrin King MD

## 2019-08-27 LAB — DSDNA AB SER-ACNC: 4 IU/ML

## 2019-09-03 ENCOUNTER — MYC MEDICAL ADVICE (OUTPATIENT)
Dept: RHEUMATOLOGY | Facility: CLINIC | Age: 39
End: 2019-09-03

## 2019-09-03 NOTE — RESULT ENCOUNTER NOTE
There is a small drop in your C3. If you still feel uncomfortable with your joints,  Recommend to increase plaquenil back up to 200 mg twice a day.

## 2019-09-10 ENCOUNTER — TELEPHONE (OUTPATIENT)
Dept: VASCULAR SURGERY | Facility: CLINIC | Age: 39
End: 2019-09-10

## 2019-09-10 NOTE — TELEPHONE ENCOUNTER
Calling to see if she can r/s her varicose vein treatment from 10/17. Her  is going to be out of town.     I did provider her other options in which we will see if she can get treated the week of 10/28.     Will call schedulers and get back to her.     Jackie ROBERTSON RN, BSN  Interventional Radiology Nurse Coordinator   Phone: [phone]

## 2019-09-12 ENCOUNTER — TELEPHONE (OUTPATIENT)
Dept: VASCULAR SURGERY | Facility: CLINIC | Age: 39
End: 2019-09-12

## 2019-09-12 NOTE — TELEPHONE ENCOUNTER
Called pt and left a msg on her phone informing her of her new appt details.     We had to move it off 10/14 due to her  is out of town. Informed her of new appt details which is for Mon 10/28 with a 630am check In.     Microsite next day with [employer] 10/29    And then 1 week follow up US on 11/4.    Informed her that this information should be on Mychart now and she can call me back should she have any other questions.     Jackie ROBERTSON RN, BSN  Interventional Radiology Nurse Coordinator   Phone: [phone]

## 2019-10-09 ENCOUNTER — OFFICE VISIT (OUTPATIENT)
Dept: INTERNAL MEDICINE | Facility: CLINIC | Age: 39
End: 2019-10-09
Payer: COMMERCIAL

## 2019-10-09 VITALS
WEIGHT: 162.3 LBS | HEART RATE: 72 BPM | DIASTOLIC BLOOD PRESSURE: 68 MMHG | HEIGHT: 67 IN | SYSTOLIC BLOOD PRESSURE: 98 MMHG | RESPIRATION RATE: 16 BRPM | OXYGEN SATURATION: 98 % | BODY MASS INDEX: 25.47 KG/M2

## 2019-10-09 DIAGNOSIS — Z01.818 PREOPERATIVE EXAMINATION: Primary | ICD-10-CM

## 2019-10-09 DIAGNOSIS — I83.892 SYMPTOMATIC VARICOSE VEINS, LEFT: ICD-10-CM

## 2019-10-09 DIAGNOSIS — M35.9 UNDIFFERENTIATED CONNECTIVE TISSUE DISEASE (H): ICD-10-CM

## 2019-10-09 PROBLEM — O40.9XX0 POLYHYDRAMNIOS: Status: RESOLVED | Noted: 2018-04-04 | Resolved: 2019-10-09

## 2019-10-09 PROBLEM — L85.3 XEROSIS CUTIS: Status: RESOLVED | Noted: 2017-07-21 | Resolved: 2019-10-09

## 2019-10-09 PROCEDURE — 99215 OFFICE O/P EST HI 40 MIN: CPT | Performed by: INTERNAL MEDICINE

## 2019-10-09 ASSESSMENT — MIFFLIN-ST. JEOR: SCORE: 1453.82

## 2019-10-09 NOTE — PATIENT INSTRUCTIONS
HOLD Plaquenil on the morning of surgery.    May take all other morning medications with a sip of water.    ---

## 2019-10-10 ENCOUNTER — TEAM CONFERENCE (OUTPATIENT)
Dept: VASCULAR SURGERY | Facility: CLINIC | Age: 39
End: 2019-10-10

## 2019-10-10 NOTE — PROGRESS NOTES
SUBJECTIVE:                                                      HPI: Heather Guillory is a pleasant 39 year old female who presents for preoperative evaluation.    Surgeon: Fang  Date: 10/28/19  Location: [facility]  Surgery: LEFT leg endovenous ablation, stab phlebectomy, and vein sclerosing (low risk)  Indication: symptomatic LEFT leg varicose veins    Past Medical History:   Diagnosis Date     JAVIER (generalized anxiety disorder)     has a Rx for Zoloft; not using currently     Rosacea      Undifferentiated connective tissue disease (H)      Personal or family history of excessive or prolonged bleeding? No  Personal or family history of blood clots? No  History of snoring/Sleep Apnea? No  History of blood transfusions? No    Clinical Predictors of Increased Risk: none    Past Surgical History:   Procedure Laterality Date     OPERATIVE HYSTEROSCOPY WITH MORCELLATOR  4/8/2014    Procedure: OPERATIVE HYSTEROSCOPY WITH MORCELLATOR;  Hysteroscopic Polypectomy;  Surgeon: Cate Mansfield MD;  Location: [facility] OR     Artificial assistive devices, such as pacemakers, artificial cardiac valves, or artificial joints? No    Allergies   Allergen Reactions     Benzoyl Peroxide Swelling     Duricef [Cefadroxil] Unknown     Monistat [Miconazole] Swelling     Sulfa Drugs Rash     Personal or family history of allergy to anesthesia? No  Allergy to local or topical analgesics? No  Allergy to latex? No  Allergy to adhesives/skin preparations (chlorhexadine)? No    Current Outpatient Medications   Medication Sig     hydroxychloroquine (PLAQUENIL) 200 MG tablet 200 mg twice a day on Mon/Wed/Fri and then 200 mg a day for the rest of the week     metroNIDAZOLE (METROCREAM) 0.75 % external cream      Prenatal Vit-Fe Fumarate-FA (PRENATAL VITAMINS) 28-0.8 MG TABS Take 1 tablet by mouth daily      levonorgestrel (MIRENA, 52 MG,) 20 MCG/24HR IUD 1 each (20 mcg) by Intrauterine route once for 1 dose     Over the counter medications? Other  "than above, no   Vitamin Supplements? Other than above, no  Herbal Supplements? No    Family History   Problem Relation Age of Onset     Hypertension Paternal Grandfather      Lung Cancer Paternal Grandfather         smoker     Diabetes Type 2  Paternal Grandfather      Breast Cancer Paternal Grandmother      Hypertension Paternal Grandmother      Colon Cancer Maternal Grandfather      Alzheimer Disease Maternal Grandmother      Cervical Cancer Maternal Grandmother      Arrhythmia Brother         details unknown; procedure in his 20s     Anxiety Disorder Brother      Elijah Disease Sister      Myocardial Infarction No family hx of      Cerebrovascular Disease No family hx of      Coronary Artery Disease Early Onset No family hx of      Ovarian Cancer No family hx of      Social History     Occupational History     Occupation:      Employer: [employer]   Tobacco Use     Smoking status: Never Smoker     Smokeless tobacco: Never Used   Substance and Sexual Activity     Alcohol use: No     Drug use: No     Sexual activity: Yes     Partners: Male     Birth control/protection: I.U.D.     Comment: Mirena placed in 2018     Comment: n/a   Social History Narrative    .    3 boys (4, 3, and 18 months as of 2019).    Exercises when able.      Functional capacity: Can do heavy work around the house such as scrubbing floors or lifting or moving heavy furniture (4-10 METs)    OBJECTIVE:                                                      BP 98/68   Pulse 72   Resp 16   Ht 1.702 m (5' 7\")   Wt 73.6 kg (162 lb 4.8 oz)   LMP  (LMP Unknown)   SpO2 98%   BMI 25.42 kg/m    Constitutional: well-appearing  Eyes: normal conjunctivae and lids; pupils equal, round, and reactive to light  Ears, Nose, Mouth, and Throat: normal ears and nose; tympanic membranes visualized and normal; normal lips, teeth, and gums; no oropharyngeal lesions or ulcers  Neck: supple and symmetric; no lymphadenopathy; no " thyromegaly or masses  Respiratory: normal respiratory effort; clear to auscultation bilaterally  Cardiovascular: regular rate and rhythm; pedal pulses palpable; no edema  Gastrointestinal: soft, non-tender, non-distended, and bowel sounds present; no organomegaly or masses  Musculoskeletal: normal gait and station  Psych: normal judgment and insight; normal mood and affect; recent and remote memory intact; oriented to time, place, and person    ASSESSMENT/PLAN:                                                      Heather Guillory is a pleasant 39 year old female who presents for a preoperative evaluation. She is at low clinical risk for a low risk procedure.    (Z01.818) Preoperative examination  (primary encounter diagnosis)  (I83.892) Symptomatic varicose veins, left  (M35.9) Undifferentiated connective tissue disease (H)  Plan:    - HOLD plaquenil on the morning of surgery.    - no additional evaluation or work-up indicated at this time.     RECOMMEND PROCEEDING WITH SURGERY: YES.    Thank you for referring Heather Guillory to me for consultation and allowing me to participate in her care.    The instructions on the AVS were discussed and explained to the patient. Patient expressed understanding of instructions.    (Chart documentation was completed, in part, with [employer] voice-recognition software. Even though reviewed, some grammatical, spelling, and word errors may remain.)    Catina Arreola MD   [address] [address]  T: [phone], F: [phone]

## 2019-10-16 ENCOUNTER — TELEPHONE (OUTPATIENT)
Dept: OBGYN | Facility: CLINIC | Age: 39
End: 2019-10-16

## 2019-10-16 NOTE — TELEPHONE ENCOUNTER
S: Heather calling with questions related to IUD.    B: Patient had Mirena placed just over a year ago. Placement checked with ultrasound in March 2019 and was in place.    A: Patient state she has had continued monthly bleeding since having IUD placed. This month noted no monthly bleed, just some spotting.    R: Discussed with Heather that with Mirena, randomness in bleeding is very normal. Discussed that some people continue to have light monthly menses and other people do not. She denies pain or other concern at this time. Discussed if she has concern for placement, develops pain, etc., she should call back to triage. States understanding.

## 2019-10-21 ENCOUNTER — TRANSFERRED RECORDS (OUTPATIENT)
Dept: HEALTH INFORMATION MANAGEMENT | Facility: CLINIC | Age: 39
End: 2019-10-21

## 2019-10-23 ENCOUNTER — DOCUMENTATION ONLY (OUTPATIENT)
Dept: RHEUMATOLOGY | Facility: CLINIC | Age: 39
End: 2019-10-23

## 2019-10-23 VITALS — BODY MASS INDEX: 25.41 KG/M2 | WEIGHT: 162.26 LBS

## 2019-10-24 ENCOUNTER — TELEPHONE (OUTPATIENT)
Dept: INTERVENTIONAL RADIOLOGY/VASCULAR | Facility: CLINIC | Age: 39
End: 2019-10-24

## 2019-10-28 ENCOUNTER — HOSPITAL ENCOUNTER (OUTPATIENT)
Facility: CLINIC | Age: 39
Discharge: HOME | End: 2019-10-28
Attending: RADIOLOGY | Admitting: RADIOLOGY
Payer: COMMERCIAL

## 2019-10-28 ENCOUNTER — APPOINTMENT (OUTPATIENT)
Dept: MEDSURG UNIT | Facility: CLINIC | Age: 39
End: 2019-10-28
Attending: RADIOLOGY
Payer: COMMERCIAL

## 2019-10-28 ENCOUNTER — APPOINTMENT (OUTPATIENT)
Dept: INTERVENTIONAL RADIOLOGY/VASCULAR | Facility: CLINIC | Age: 39
End: 2019-10-28
Attending: RADIOLOGY
Payer: COMMERCIAL

## 2019-10-28 VITALS
SYSTOLIC BLOOD PRESSURE: 104 MMHG | HEART RATE: 78 BPM | RESPIRATION RATE: 18 BRPM | DIASTOLIC BLOOD PRESSURE: 78 MMHG | OXYGEN SATURATION: 98 % | TEMPERATURE: 98.4 F

## 2019-10-28 DIAGNOSIS — I83.893 SYMPTOMATIC VARICOSE VEINS OF BOTH LOWER EXTREMITIES: ICD-10-CM

## 2019-10-28 PROCEDURE — 27211352 ZZ H KIT CR14

## 2019-10-28 PROCEDURE — 36471 NJX SCLRSNT MLT INCMPTNT VN: CPT | Mod: XS

## 2019-10-28 PROCEDURE — 27210908 ZZH NEEDLE CR4

## 2019-10-28 PROCEDURE — 27210732 ZZH ACCESSORY CR1

## 2019-10-28 PROCEDURE — 37799 UNLISTED PX VASCULAR SURGERY: CPT

## 2019-10-28 PROCEDURE — 25800030 ZZH RX IP 258 OP 636: Performed by: PHYSICIAN ASSISTANT

## 2019-10-28 PROCEDURE — 25000125 ZZHC RX 250: Performed by: PHYSICIAN ASSISTANT

## 2019-10-28 PROCEDURE — 40000166 ZZH STATISTIC PP CARE STAGE 1

## 2019-10-28 PROCEDURE — 25000132 ZZH RX MED GY IP 250 OP 250 PS 637: Performed by: STUDENT IN AN ORGANIZED HEALTH CARE EDUCATION/TRAINING PROGRAM

## 2019-10-28 PROCEDURE — 27211193 ZZ H WOUND GLUE CR1

## 2019-10-28 PROCEDURE — 36478 ENDOVENOUS LASER 1ST VEIN: CPT

## 2019-10-28 PROCEDURE — 25000125 ZZHC RX 250: Performed by: STUDENT IN AN ORGANIZED HEALTH CARE EDUCATION/TRAINING PROGRAM

## 2019-10-28 PROCEDURE — 25000132 ZZH RX MED GY IP 250 OP 250 PS 637: Performed by: PHYSICIAN ASSISTANT

## 2019-10-28 RX ORDER — OXYCODONE AND ACETAMINOPHEN 5; 325 MG/1; MG/1
1-2 TABLET ORAL EVERY 4 HOURS PRN
Qty: 10 TABLET | Refills: 0 | Status: SHIPPED | OUTPATIENT
Start: 2019-10-28 | End: 2019-11-12

## 2019-10-28 RX ORDER — DEXTROSE MONOHYDRATE 25 G/50ML
25-50 INJECTION, SOLUTION INTRAVENOUS
Status: DISCONTINUED | OUTPATIENT
Start: 2019-10-28 | End: 2019-10-28 | Stop reason: HOSPADM

## 2019-10-28 RX ORDER — IBUPROFEN 200 MG
200 TABLET ORAL EVERY 6 HOURS PRN
Status: DISCONTINUED | OUTPATIENT
Start: 2019-10-28 | End: 2019-10-28 | Stop reason: HOSPADM

## 2019-10-28 RX ORDER — NICOTINE POLACRILEX 4 MG
15-30 LOZENGE BUCCAL
Status: DISCONTINUED | OUTPATIENT
Start: 2019-10-28 | End: 2019-10-28 | Stop reason: HOSPADM

## 2019-10-28 RX ORDER — DIAZEPAM 5 MG
15 TABLET ORAL ONCE
Status: COMPLETED | OUTPATIENT
Start: 2019-10-28 | End: 2019-10-28

## 2019-10-28 RX ORDER — LIDOCAINE HYDROCHLORIDE 10 MG/ML
1-30 INJECTION, SOLUTION EPIDURAL; INFILTRATION; INTRACAUDAL; PERINEURAL
Status: COMPLETED | OUTPATIENT
Start: 2019-10-28 | End: 2019-10-28

## 2019-10-28 RX ORDER — OXYCODONE AND ACETAMINOPHEN 5; 325 MG/1; MG/1
1 TABLET ORAL EVERY 4 HOURS PRN
Status: DISCONTINUED | OUTPATIENT
Start: 2019-10-28 | End: 2019-10-28 | Stop reason: HOSPADM

## 2019-10-28 RX ORDER — ACETAMINOPHEN 325 MG/1
650 TABLET ORAL EVERY 4 HOURS PRN
Status: DISCONTINUED | OUTPATIENT
Start: 2019-10-28 | End: 2019-10-28 | Stop reason: HOSPADM

## 2019-10-28 RX ADMIN — IBUPROFEN 200 MG: 200 TABLET, FILM COATED ORAL at 11:37

## 2019-10-28 RX ADMIN — LIDOCAINE HYDROCHLORIDE 10 ML: 10 INJECTION, SOLUTION EPIDURAL; INFILTRATION; INTRACAUDAL; PERINEURAL at 10:42

## 2019-10-28 RX ADMIN — TETRADECYL HYDROGEN SULFATE (ESTER) 20 MG: 10 INJECTION, SOLUTION INTRAVENOUS at 10:41

## 2019-10-28 RX ADMIN — DIAZEPAM 15 MG: 5 TABLET ORAL at 07:39

## 2019-10-28 RX ADMIN — SODIUM BICARBONATE: 84 INJECTION, SOLUTION INTRAVENOUS at 10:41

## 2019-10-28 NOTE — IR NOTE
Patient Name: Heather Guillory  Medical Record Number: [id number]  Today's Date: 10/28/2019    Procedure: left great saphenous vein endovenous laser ablation and left lower extremity microphlebectomy with sclerotherapy  Proceduralist: Dr RON Headley MD, Dr RON Reid MD    Sedation Notes: Valium 15 mg PO prior to procedure     Procedure start time: 0850  Puncture time: 0902  Procedure end time: 1040    Report given to: Renetta MARIE 2A      Other Notes: Pt arrived to IR room 5 from . Consent reviewed, pt confirmed. Pt denies any questions or concerns regarding procedure. Pt positioned supine/reverse trendelenberg and monitored per protocol. Sites cleansed and dressed per protocol.  ACE wraps in place.Pt tolerated procedure without any noted complications. Pt transferred back to .

## 2019-10-28 NOTE — PROGRESS NOTES
10/28/19: Left GSV Abaltion, with sclerotherapy and microphlebectomy  King YAN, and JOVANNY Reid MD    Ablation:  70W; 50J/sec    224sec  1708J  Access @ level of knee  32cm treatment length  200mL of tumescence    Phlebectomy:  100mL of tumscence  6 elvia Crandall RT(R)(VI)

## 2019-10-28 NOTE — PROCEDURES
[facility], [place]    Procedure: IR Procedure Note  Date/Time: 10/28/2019 10:36 AM  Performed by: Yosvany Ried MD  Authorized by: Yosvany Reid MD   IR Fellow Physician: Yosvany Reid MD  Other(s) attending procedure: Jonathan Headley MD    UNIVERSAL PROTOCOL   Site Marked: Yes  Prior Images Obtained and Reviewed:  Yes  Required items: Required blood products, implants, devices and special equipment available    Patient identity confirmed:  Verbally with patient  NA - No sedation, light sedation, or local anesthesia  Confirmation Checklist:  Patient's identity using two indicators, relevant allergies, procedure was appropriate and matched the consent or emergent situation and correct equipment/implants were available  Time out: Immediately prior to the procedure a time out was called    Preparation: Patient was prepped and draped in usual sterile fashion       ANESTHESIA    Anesthesia: See MAR for details  Local Anesthetic:  Lidocaine 1% without epinephrine and lidocaine 1% with epinephrine      SEDATION    Patient Sedated: Yes    Sedation Type:  Anxiolysis  Sedation:  Diazepam  Vital signs: Vital signs monitored during sedation    See dictated procedure note for full details.  Findings: Incompetent left GSV, diminutive below the level of the knee. Multiple parallel GSV varicosities.     Specimens: none    Complications: None    Condition: Stable    Plan: - Recovery in 2A  - F/u tomorrow for wrap removal. Wound check and start stockings.   - 3 weeks of compression stockings during waking hours  - 3 week follow up appointment.     PROCEDURE   Patient Tolerance:  Patient tolerated the procedure well with no immediate complications  Describe Procedure: - left GSV EVLA  - Parallel GSV varicosities sclerotherapy and microphlebectomy  Time of Sedation in Minutes by Physician:  0

## 2019-10-28 NOTE — DISCHARGE INSTRUCTIONS
[facility]  Interventional Radiology  Discharge Instructions for Sclerotherapy, Micro-Phlebectomy and/or Endovenous Laser Ablation of Left Leg        AFTER YOU GO HOME:       Drink plenty of fluids.       Resume your regular diet, unless otherwise instructed by your Primary Physician.    IF YOU RECEIVED SEDATION:            DO NOT smoke for at least 24 hours       DO NOT drink alcoholic beverages the day of your procedure       DO NOT drive or operate machinery at home or at work for 24 hours.          DO NOT take a bath or shower for at least 12 hours following your procedure.       DO NOT make any important or legal decisions for 24 hours following your procedure.        No tub baths, hot tubs or swimming for one week.        No vigorous lower extremity exercise (i.e. Stair stepper, running, or biking) for one week.      Endovenous laser ablation (usually in combination with microphlebectomy or sclerotherapy)  After you go home:  Drink plenty of fluids  Resume regular diet  Resume normal activity, wear stockings and a 20 minute a day walk is encouraged.  Hot tubs, baths, use of leg weights and strenuous leg exercises should be avoided for 1 week.   Do not take a shower for at least 12 hours following your procedure  Wear compression stockings for 3 days straight, then wear during day time hours for remainder of 3 weeks.  You may remove briefly to shower and if you have your legs elevated.  For mild pain or discomfort Ibuprofen 600 mg oral every 8 hours as needed, for moderate to severe pain oxycodone 5 mg 1-2 tabs every 6 hours as needed  - F/u tomorrow for wrap removal. Wound check and start stockings.   - 3 weeks of compression stockings during waking hours  - 3 week follow up appointment.     Procedural Physician: Dr. Steward                           Date:October 28, 2019  Telephone Number:    [phone] Monday-Friday 8-4:30                                       [phone]    [facility]  RN                                       [phone] After 4:30 PM Monday-Friday, Weekends and Holidays, The hospital  will answer, ask for the Interventional Radiologist on call. Someone is available 24 hours /day

## 2019-10-28 NOTE — IP AVS SNAPSHOT
[facility], [place], Interventional Radiology  [address] [address]  Phone:  [phone]                                    After Visit Summary   10/28/2019    Heather Guillory    MRN: [id number]           After Visit Summary Signature Page    I have received my discharge instructions, and my questions have been answered. I have discussed any challenges I see with this plan with the nurse or doctor.    ..........................................................................................................................................  Patient/Patient Representative Signature      ..........................................................................................................................................  Patient Representative Print Name and Relationship to Patient    ..................................................               ................................................  Date                                   Time    ..........................................................................................................................................  Reviewed by Signature/Title    ...................................................              ..............................................  Date                                               Time          22EPIC Rev 08/18

## 2019-10-28 NOTE — IP AVS SNAPSHOT
MRN:[id number]                      After Visit Summary   10/28/2019    Heather Guillory    MRN: [id number]           Visit Information        Department      10/28/2019  6:33 AM [facility], [place], Interventional Radiology          Review of your medicines      UNREVIEWED medicines. Ask your doctor about these medicines       Dose / Directions   hydroxychloroquine 200 MG tablet  Commonly known as:  PLAQUENIL  Used for:  Undifferentiated connective tissue disease (H)      200 mg twice a day on Mon/Wed/Fri and then 200 mg a day for the rest of the week  Quantity:  60 tablet  Refills:  5     levonorgestrel 20 MCG/24HR IUD  Commonly known as:  MIRENA (52 MG)  Used for:  Encounter for IUD insertion      Dose:  1 each  1 each (20 mcg) by Intrauterine route once for 1 dose  Quantity:  1 each  Refills:  0     METROCREAM 0.75 % external cream  Generic drug:  metroNIDAZOLE      Refills:  0     Prenatal Vitamins 28-0.8 MG Tabs  Used for:  Supervision of high-risk pregnancy of elderly multigravida      Dose:  1 tablet  Take 1 tablet by mouth daily  Refills:  0        START taking       Dose / Directions   oxyCODONE-acetaminophen 5-325 MG tablet  Commonly known as:  PERCOCET  Used for:  Symptomatic varicose veins of both lower extremities      Dose:  1-2 tablet  Take 1-2 tablets by mouth every 4 hours as needed for pain (moderate to severe)  Quantity:  10 tablet  Refills:  0           Where to get your medicines      Some of these will need a paper prescription and others can be bought over the counter. Ask your nurse if you have questions.    Bring a paper prescription for each of these medications  oxyCODONE-acetaminophen 5-325 MG tablet           Prescriptions were sent or printed at these locations (1 Prescription)            Other Prescriptions                Printed at Department/Unit printer (1 of 1)         oxyCODONE-acetaminophen (PERCOCET) 5-325 MG tablet                Protect others around you: Learn how  to safely use, store and throw away your medicines at www.disposemymeds.org.       Follow-ups after your visit       Your next 10 appointments already scheduled    Oct 29, 2019  9:00 AM CDT  (Arrive by 8:45 AM)  New Patient Visit with HARVEY Howard Count includes the [facility] Interventional Radiology ([facility] [place]) [address] [facility] [address]  [address] [facility] [address]  [phone]      Oct 30, 2019  9:30 AM CDT  MyChart Short with Catina Arreola MD  [facility] ([facility]) [address] [address]  [phone]      Nov 04, 2019  9:00 AM CST  US LOWER EXTREMITY VENOUS DUPLEX LEFT with UCUSV2  [facility] US ([facility] [place]) [address] [address]  [phone]   How do I prepare for my exam? (Food and drink instructions)  No Food and Drink Restrictions.    How do I prepare for my exam? (Other instructions)  You do not need to do anything special to prepare for your exam.    What should I wear: Wear comfortable clothes.    How long does the exam take: Most ultrasounds take 30 to 60 minutes.    What should I bring: Bring a list of your medicines, including vitamins, minerals and over-the-counter drugs. It is safest to leave personal items at home.    Do I need a : No  is needed.    What do I need to tell my doctor: Tell your doctor about any allergies you may have.    What should I do after the exam: No restrictions, You may resume normal activities.    What is this test: An ultrasound uses sound waves to make pictures of the body. Sound waves do not cause pain. The only discomfort may be the pressure of the wand against your skin or full bladder.    Who should I call with questions: If you have any questions, please call the Imaging Department where you will have your exam. Directions, parking instructions, and other information is  available on our website, [email or web address].     Jan 31, 2020  9:30 AM CST  (Arrive by 9:15 AM)  Return Visit with Nasirn King MD  [facility] Rheumatology ([facility] [place]) [address] [facility] [address]  [address]  [facility] [address] [phone]         Care Instructions       Further instructions from your care team       [facility]  Interventional Radiology  Discharge Instructions for Sclerotherapy, Micro-Phlebectomy and/or Endovenous Laser Ablation of Left Leg        AFTER YOU GO HOME:       Drink plenty of fluids.       Resume your regular diet, unless otherwise instructed by your Primary Physician.    IF YOU RECEIVED SEDATION:            DO NOT smoke for at least 24 hours       DO NOT drink alcoholic beverages the day of your procedure       DO NOT drive or operate machinery at home or at work for 24 hours.          DO NOT take a bath or shower for at least 12 hours following your procedure.       DO NOT make any important or legal decisions for 24 hours following your procedure.        No tub baths, hot tubs or swimming for one week.        No vigorous lower extremity exercise (i.e. Stair stepper, running, or biking) for one week.      Endovenous laser ablation (usually in combination with microphlebectomy or sclerotherapy)  After you go home:  Drink plenty of fluids  Resume regular diet  Resume normal activity, wear stockings and a 20 minute a day walk is encouraged.  Hot tubs, baths, use of leg weights and strenuous leg exercises should be avoided for 1 week.   Do not take a shower for at least 12 hours following your procedure  Wear compression stockings for 3 days straight, then wear during day time hours for remainder of 3 weeks.  You may remove briefly to shower and if you have your legs elevated.  For mild pain or discomfort Ibuprofen 600 mg oral every 8 hours as needed, for moderate to severe pain oxycodone 5 mg 1-2 tabs every 6 hours as needed  - F/u  tomorrow for wrap removal. Wound check and start stockings.   - 3 weeks of compression stockings during waking hours  - 3 week follow up appointment.     Procedural Physician: Dr. Steward                           Date:October 28, 2019  Telephone Number:    [phone] Monday-Friday 8-4:30                                       [phone]    IR clinic RN                                       [phone] After 4:30 PM Monday-Friday, Weekends and Holidays, The hospital  will answer, ask for the Interventional Radiologist on call. Someone is available 24 hours /day                   Information about OPIOIDS    PRESCRIPTION OPIOIDS: WHAT YOU NEED TO KNOW   We gave you an opioid (narcotic) pain medicine. It is important to manage your pain, but opioids are not always the best choice. You should first try all the other options your care team gave you. Take this medicine for as short a time (and as few doses) as possible.    Some activities can increase your pain, such as bandage changes or therapy sessions. It may help to take your pain medicine 30 to 60 minutes before these activities. Reduce your stress by getting enough sleep, working on hobbies you enjoy and practicing relaxation or meditation. Talk to your care team about ways to manage your pain beyond prescription opioids.    These medicines have risks:    DO NOT drive when on new or higher doses of pain medicine. These medicines can affect your alertness and reaction times, and you could be arrested for driving under the influence (DUI). If you need to use opioids long-term, talk to your care team about driving.    DO NOT operate heavy machinery    DO NOT do any other dangerous activities while taking these medicines.    DO NOT drink any alcohol while taking these medicines.     If the opioid prescribed includes acetaminophen, DO NOT take with any other medicines that contain acetaminophen. Read all labels carefully. Look for the word  acetaminophen   or  Tylenol.  Ask your pharmacist if you have questions or are unsure.    You can get addicted to pain medicines, especially if you have a history of addiction (chemical, alcohol or substance dependence). Talk to your care team about ways to reduce this risk.    All opioids tend to cause constipation. Drink plenty of water and eat foods that have a lot of fiber, such as fruits, vegetables, prune juice, apple juice and high-fiber cereal. Take a laxative (Miralax, milk of magnesia, Colace, Senna) if you don t move your bowels at least every other day. Other side effects include upset stomach, sleepiness, dizziness, throwing up, tolerance (needing more of the medicine to have the same effect), physical dependence and slowed breathing.    Store your pills in a secure place, locked if possible. We will not replace any lost or stolen medicine. If you don t finish your medicine, please throw away (dispose) as directed by your pharmacist. The [place] Pollution Control Agency has more information about safe disposal: [email or web address]       Additional Information About Your Visit       [employer] Information    [employer] gives you secure access to your electronic health record. If you see a primary care provider, you can also send messages to your care team and make appointments. If you have questions, please call your primary care clinic.  If you do not have a primary care provider, please call [phone] and they will assist you.       Care EveryWhere ID    This is your Care EveryWhere ID. This could be used by other organizations to access your [place] medical records  [id number]       Your Vitals Were  Most recent update: 10/28/2019 11:10 AM    Blood Pressure   102/70    Pulse   70    Temperature   98.4  F (36.9  C) (Oral)    Respirations   18    Pulse Oximetry   98%          Primary Care Provider Office Phone # Fax #    Catina Arreola [phone]       Equal Access to Services    [facility]: Hadii aad ku hadjosebernice Latishamilton, wagisselda luqadaha, qamarielata fridabiancajohn vásquez. So [facility] [phone].    ATENCIÓN: Si habla español, tiene a nelson disposición servicios gratuitos de asistencia lingüística. Llame al [phone].    We comply with applicable federal civil rights laws and [place] laws. We do not discriminate on the basis of race, color, national origin, age, disability, sex, sexual orientation, or gender identity.           Thank you!    Thank you for choosing [facility] for your care. Our goal is always to provide you with excellent care. Hearing back from our patients is one way we can continue to improve our services. Please take a few minutes to complete the written survey that you may receive in the mail after you visit with us. Thank you!            Medication List      Medications          Morning Afternoon Evening Bedtime As Needed    oxyCODONE-acetaminophen 5-325 MG tablet  Also known as:  PERCOCET  INSTRUCTIONS:  Take 1-2 tablets by mouth every 4 hours as needed for pain (moderate to severe)                       ASK your doctor about these medications          Morning Afternoon Evening Bedtime As Needed    hydroxychloroquine 200 MG tablet  Also known as:  PLAQUENIL  INSTRUCTIONS:  200 mg twice a day on Mon/Wed/Fri and then 200 mg a day for the rest of the week  Doctor's comments:  On file for future refills                     levonorgestrel 20 MCG/24HR IUD  Also known as:  MIRENA (52 MG)  INSTRUCTIONS:  1 each (20 mcg) by Intrauterine route once for 1 dose                     METROCREAM 0.75 % external cream  Generic drug:  metroNIDAZOLE                     Prenatal Vitamins 28-0.8 MG Tabs  INSTRUCTIONS:  Take 1 tablet by mouth daily

## 2019-10-28 NOTE — PROGRESS NOTES
Pt arrived to 2A with RN from IR s/p Left Leg Laser Ablation and Microphlebectomy. VSS. Left leg elevated, ace wrap intact. Pt c/o mild left leg pain. Ibuprofen 200 mg po per MD orders with good relief. Patient tolerating regular diet. Patients  at bedside. Plan discharge at 1200 per MD orders. Discharge instructions reviewed with patient.

## 2019-10-29 ENCOUNTER — OFFICE VISIT (OUTPATIENT)
Dept: INTERVENTIONAL RADIOLOGY/VASCULAR | Facility: CLINIC | Age: 39
End: 2019-10-29

## 2019-10-29 DIAGNOSIS — Z48.01 ENCOUNTER FOR CHANGE OR REMOVAL OF SURGICAL WOUND DRESSING: Primary | ICD-10-CM

## 2019-10-29 PROBLEM — I83.812 VARICOSE VEINS OF LEFT LOWER EXTREMITY WITH PAIN: Status: ACTIVE | Noted: 2019-10-29

## 2019-10-29 NOTE — PROGRESS NOTES
HPI:  Heather is a 38 yo with lifestyle limiting superficial venous incompetency of the left leg, she has tried compression stockings for 2 years without much benefit.  Imaging shows incompetent left great saphenous vein, varicose veins in the left GSV distribution involving the left medial thigh/medial calf and shin region.  Procedures 10/28/2019:  1. Endovenous laser ablation left great saphenous vein.  2. Sotradecol sclerotherapy left truncal varicosities  3. Micro-phlebectomy left truncal varicosities. (6)  Heather did well overnight, taking only Advil for pain.  There was only a small amount of bleeding on the dressings.  The stab wounds did not bleed once the dressings were removed.  The leg was cleansed and dried.  Prmapore dressings were applied.  Thigh high compression stocking was put on.    ASSESSMENT:  Patient did well overnight following venous procedure    PLAN:  Reviewed instructions for wearing compression stockings for the next 3 days full time, then beyond then.  Changing primapore dressings with showering.  Dressings provided.  Has appt for follow-up ultrasound.  Return prin.    25 minutes was spent with the patient.  Dia Hernandez MS, APRN, CNS, CRN  Clinical Nurse Specialist  Interventional Radiology  [phone] (voice mail)  [phone] (pager)

## 2019-10-29 NOTE — LETTER
10/29/2019       RE: Heather Guillory  [address]  [place] [address]     Dear Colleague,    Thank you for referring your patient, Heather Guillory, to the [facility] INTERVENTIONAL RADIOLOGY at [facility]. Please see a copy of my visit note below.    HPI:  Heather is a 38 yo with lifestyle limiting superficial venous incompetency of the left leg, she has tried compression stockings for 2 years without much benefit.  Imaging shows incompetent left great saphenous vein, varicose veins in the left GSV distribution involving the left medial thigh/medial calf and shin region.  Procedures 10/28/2019:  1. Endovenous laser ablation left great saphenous vein.  2. Sotradecol sclerotherapy left truncal varicosities  3. Micro-phlebectomy left truncal varicosities. (6)  Heather did well overnight, taking only Advil for pain.  There was only a small amount of bleeding on the dressings.  The stab wounds did not bleed once the dressings were removed.  The leg was cleansed and dried.  Prmapore dressings were applied.  Thigh high compression stocking was put on.    ASSESSMENT:  Patient did well overnight following venous procedure    PLAN:  Reviewed instructions for wearing compression stockings for the next 3 days full time, then beyond then.  Changing primapore dressings with showering.  Dressings provided.  Has appt for follow-up ultrasound.  Return prin.    25 minutes was spent with the patient.  Dia Hernandez MS, APRN, CNS, CRN  Clinical Nurse Specialist  Interventional Radiology  [phone] (voice mail)  [phone] (pager)

## 2019-10-30 ENCOUNTER — TELEPHONE (OUTPATIENT)
Dept: VASCULAR SURGERY | Facility: CLINIC | Age: 39
End: 2019-10-30

## 2019-10-30 DIAGNOSIS — I83.893 SYMPTOMATIC VARICOSE VEINS OF BOTH LOWER EXTREMITIES: Primary | ICD-10-CM

## 2019-10-30 NOTE — TELEPHONE ENCOUNTER
Called pt and informed her that we are looking to schedule for her 3 week sclero check.    We did schedule for Weds 11/20.   She is to check into [facility] at 1015am for a 1030am start time.     She verbalized understanding.     We talked about compression stocking use as well as f/u should she have any other questions.     Jackie ROBERTSON RN, BSN  Interventional Radiology Nurse Coordinator   Phone: [phone]

## 2019-10-30 NOTE — TELEPHONE ENCOUNTER
Called and left a msg for pt regarding her treatment of left leg varicose vein.    Inquired on how she is doing as well as to remind her to wear her compression stockings.     Inquired if she is having pain and that we will need to schedule for her 3 wk sclero check.     Asked her to return my call.     Jackie ROBERTSON RN, BSN  Interventional Radiology Nurse Coordinator   Phone: [phone]

## 2019-11-04 ENCOUNTER — ANCILLARY PROCEDURE (OUTPATIENT)
Dept: ULTRASOUND IMAGING | Facility: CLINIC | Age: 39
End: 2019-11-04
Attending: RADIOLOGY

## 2019-11-04 DIAGNOSIS — I83.893 SYMPTOMATIC VARICOSE VEINS OF BOTH LOWER EXTREMITIES: ICD-10-CM

## 2019-11-12 ENCOUNTER — OFFICE VISIT (OUTPATIENT)
Dept: INTERNAL MEDICINE | Facility: CLINIC | Age: 39
End: 2019-11-12
Payer: COMMERCIAL

## 2019-11-12 ENCOUNTER — MYC MEDICAL ADVICE (OUTPATIENT)
Dept: INTERNAL MEDICINE | Facility: CLINIC | Age: 39
End: 2019-11-12

## 2019-11-12 VITALS
SYSTOLIC BLOOD PRESSURE: 96 MMHG | BODY MASS INDEX: 25.37 KG/M2 | RESPIRATION RATE: 16 BRPM | DIASTOLIC BLOOD PRESSURE: 68 MMHG | WEIGHT: 162 LBS | HEART RATE: 70 BPM | OXYGEN SATURATION: 99 %

## 2019-11-12 DIAGNOSIS — R22.32 MASS OF LEFT AXILLA: Primary | ICD-10-CM

## 2019-11-12 PROCEDURE — 99213 OFFICE O/P EST LOW 20 MIN: CPT | Performed by: INTERNAL MEDICINE

## 2019-11-13 NOTE — PROGRESS NOTES
SUBJECTIVE:                                                      HPI: Heather Guillory is a pleasant 39 year old female who presents a left axillary lump:    Just noticed it today. Asymptomatic.    No breast lumps or bumps.  No breast skin changes or nipple discharge.  No fevers or chills.  No anorexia, unintentional weight loss, or night sweats.     No personal history of breast or ovarian CA.    Paternal grandmother with post-menopausal breast CA.     The medication, allergy, and problem lists have been reviewed and updated as appropriate.     OBJECTIVE:                                                      BP 96/68   Pulse 70   Resp 16   Wt 73.5 kg (162 lb)   SpO2 99%   BMI 25.37 kg/m    Constitutional: well-appearing  Breasts: normal appearance; no masses or skin retraction; no nipple discharge or bleeding; no axillary lymphadenopathy  Integumentary: subcutaneous rice-shaped and -sized hard mass left axilla; non-tender; mobile; no overlying skin changes    ASSESSMENT/PLAN:                                                      (R22.32) Mass of left axilla  (primary encounter diagnosis)  Comment: too small to be a lymph node or cyst; 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   Plan:    - reassurance provided; no further evaluation or intervention needed at this time.   - if mass grows or changes or new symptoms develop, patient to contact MD.     The instructions on the AVS were discussed and explained to the patient. Patient expressed understanding of instructions.    (Chart documentation was completed, in part, with [employer] voice-recognition software. Even though reviewed, some grammatical, spelling, and word errors may remain.)    Catina Arreola MD   [address] [address]  T: [phone], F: [phone]

## 2019-11-18 ENCOUNTER — TELEPHONE (OUTPATIENT)
Dept: INTERVENTIONAL RADIOLOGY/VASCULAR | Facility: CLINIC | Age: 39
End: 2019-11-18

## 2019-11-20 ENCOUNTER — APPOINTMENT (OUTPATIENT)
Dept: INTERVENTIONAL RADIOLOGY/VASCULAR | Facility: CLINIC | Age: 39
End: 2019-11-20
Attending: RADIOLOGY
Payer: COMMERCIAL

## 2019-11-20 ENCOUNTER — HOSPITAL ENCOUNTER (OUTPATIENT)
Facility: CLINIC | Age: 39
Discharge: HOME | End: 2019-11-20
Attending: RADIOLOGY | Admitting: RADIOLOGY
Payer: COMMERCIAL

## 2019-11-20 DIAGNOSIS — I83.893 SYMPTOMATIC VARICOSE VEINS OF BOTH LOWER EXTREMITIES: ICD-10-CM

## 2019-11-20 DIAGNOSIS — I83.893 SYMPTOMATIC VARICOSE VEINS OF BOTH LOWER EXTREMITIES: Primary | ICD-10-CM

## 2019-11-20 PROCEDURE — 93971 EXTREMITY STUDY: CPT

## 2019-11-20 NOTE — PROGRESS NOTES
Patient Name: Heather Guillory  Medical Record Number: [id number]  Today's Date: 11/20/2019    Procedure: sclerotherapy   Proceduralist: Dr. Headley    Procedure start time: 1105  Procedure end time: 1107    Pt arrived to IR room 2 from [facility]. Consent reviewed. Pt denies any questions or concerns regarding procedure. Pt positioned supine  Sclero check no treatment needed per MD. Pt transferred back to [facility] discharged home.

## 2019-11-26 ENCOUNTER — TELEPHONE (OUTPATIENT)
Dept: VASCULAR SURGERY | Facility: CLINIC | Age: 39
End: 2019-11-26

## 2019-11-26 NOTE — TELEPHONE ENCOUNTER
She states that her legs are fine, they are feeling much better.     She states that she's doing well.     Informed her that I am glad to hear this.   She did not get treatment at her last appt, however Dr. Headley would like to see her back in 6 mos post laser ablation treatment.     I informed her that  We have scheduled for this in which this should also show up on mychart.     I've asked her to call and inform me should she have sx prior to us seeing her though. She states that she will.     Jackie ROBERTSON RN, BSN  Interventional Radiology Nurse Coordinator   Phone: [phone]

## 2019-12-03 ENCOUNTER — TELEPHONE (OUTPATIENT)
Dept: VASCULAR SURGERY | Facility: CLINIC | Age: 39
End: 2019-12-03

## 2019-12-03 NOTE — TELEPHONE ENCOUNTER
Called pt to f/u on her VM    She is wondering if she was to start family planning if it would cause her any harm to her treatment.    I informed her that I did consult with Dr. Headley in regards to family planning.     I informed her that we would recommend that as she gets farther along in her pregnancy to wear compression stockings throughout her pregnancy.      She agrees to plan.     Jackie ROBERTSON RN, BSN  Interventional Radiology Nurse Coordinator   Phone: [phone]

## 2019-12-04 ENCOUNTER — TELEPHONE (OUTPATIENT)
Dept: OBGYN | Facility: CLINIC | Age: 39
End: 2019-12-04

## 2019-12-04 ENCOUNTER — TELEPHONE (OUTPATIENT)
Dept: MATERNAL FETAL MEDICINE | Facility: CLINIC | Age: 39
End: 2019-12-04

## 2019-12-04 DIAGNOSIS — M35.1 MCTD (MIXED CONNECTIVE TISSUE DISEASE) (H): Primary | ICD-10-CM

## 2019-12-04 NOTE — TELEPHONE ENCOUNTER
December 4, 2019    Heather called with questions regarding her carrier screening that she had previously completed with our clinic in 2015. We discussed that at that time the expanded carrier screening panel consisted of 86 conditions. Currently the [employer] expanded panel that we offer includes around 176 conditions. We reviewed that our genetics do not change over time, but our technology and understanding of genetics continues to evolve. The couple is considering pursuing expanded carrier screening to encompass the additional conditions added to the panel since 2015 as they are considering another pregnancy. I let her know that this would require referral from her primary OB for a genetic counseling visit to discuss options further and informed consent. I directed her to [employer]'s website with information on the conditions on the panel. I also provided her with my contact information if she has any additional questions.     Vivian Bustos MS, [facility]  Maternal Fetal Medicine  [facility]  Ph: [phone]  [email or web address]

## 2019-12-04 NOTE — TELEPHONE ENCOUNTER
Received call from Heather stating she called [facility] and spoke with a genetic counselor and she would like additional genetic testing as she is considering a 4th pregnancy.  She was told she needs a referral from us for a genetic counseling visit.    Pended order to Dalila Shearer CNM.

## 2019-12-06 ENCOUNTER — MYC MEDICAL ADVICE (OUTPATIENT)
Dept: OBGYN | Facility: CLINIC | Age: 39
End: 2019-12-06

## 2019-12-06 DIAGNOSIS — Z31.69 ENCOUNTER FOR PRECONCEPTION CONSULTATION: Primary | ICD-10-CM

## 2019-12-06 NOTE — TELEPHONE ENCOUNTER
December 6, 2019    Heather called to ask if I would be able to send her her previous carrier screening report from 2015. Authorization for electronic communication was signed and her result was sent to her email with her permission.     Vivian Bustos MS, [facility]  Maternal Fetal Medicine  [facility]  Ph: [phone]  [email or web address]

## 2019-12-11 ENCOUNTER — TRANSCRIBE ORDERS (OUTPATIENT)
Dept: MATERNAL FETAL MEDICINE | Facility: CLINIC | Age: 39
End: 2019-12-11

## 2019-12-11 DIAGNOSIS — O26.90 PREGNANCY RELATED CONDITION, ANTEPARTUM: Primary | ICD-10-CM

## 2019-12-12 ENCOUNTER — PRE VISIT (OUTPATIENT)
Dept: MATERNAL FETAL MEDICINE | Facility: CLINIC | Age: 39
End: 2019-12-12

## 2019-12-16 ENCOUNTER — OFFICE VISIT (OUTPATIENT)
Dept: MATERNAL FETAL MEDICINE | Facility: CLINIC | Age: 39
End: 2019-12-16
Attending: OBSTETRICS & GYNECOLOGY
Payer: COMMERCIAL

## 2019-12-16 DIAGNOSIS — Z31.430 ENCOUNTER OF FEMALE FOR TESTING FOR GENETIC DISEASE CARRIER STATUS FOR PROCREATIVE MANAGEMENT: ICD-10-CM

## 2019-12-16 DIAGNOSIS — Z84.81 FAMILY HISTORY OF CARRIER OF GENETIC DISEASE: Primary | ICD-10-CM

## 2019-12-16 DIAGNOSIS — O26.90 PREGNANCY RELATED CONDITION, ANTEPARTUM: ICD-10-CM

## 2019-12-16 PROCEDURE — 96040 ZZH GENETIC COUNSELING, EACH 30 MINUTES: CPT | Mod: ZF | Performed by: GENETIC COUNSELOR, MS

## 2020-01-06 ENCOUNTER — TELEPHONE (OUTPATIENT)
Dept: RHEUMATOLOGY | Facility: CLINIC | Age: 40
End: 2020-01-06

## 2020-01-06 NOTE — TELEPHONE ENCOUNTER
[facility] Call Center    Phone Message    May a detailed message be left on voicemail: yes    Reason for Call: Other:     Patient rescheduled from 1/31 to 1/20 held slot.  Appointment did not overlap exactly as it should.  So this is an FYI to clinic that the appointment is scheduled.  Future Appointments   Date Time Provider Department Center   1/20/2020  1:30 PM Nasrin King MD [facility]   1/27/2020  9:00 AM Sherrie Coronado, PhD [facility]   4/8/2020 11:30 AM UC LAB [facility]   4/8/2020 12:00 PM UCUSV1 [facility]   4/8/2020  1:20 PM Jonathan Headley MD [facility]         Action Taken: Message routed to:  [facility] ([facility] clinic coord

## 2020-01-13 ENCOUNTER — TELEPHONE (OUTPATIENT)
Dept: MATERNAL FETAL MEDICINE | Facility: CLINIC | Age: 40
End: 2020-01-13

## 2020-01-13 NOTE — TELEPHONE ENCOUNTER
January 13, 2020  I spoke with Heather to discuss the couple's expanded carrier screening results. As previously discovered, Heather was found to be a carrier for one condition on the panel, described below. Heather 's partner, Chi was not found to be a carrier for any of the conditions on the panel. Overall, Heather and Chi were not found to be a carrier for the same condition.     For each condition, [employer] provides a reproductive risk. This risk is based on Heather's carrier status, Chi's carrier status, and the 1 in 4 (25%) chance of both parents passing on the allele with the genetic variant.      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  We reviewed that the couple's children will have a 50% chance of being an unaffected carrier of the Elijah Disease. Genetic counseling for the couple's children is recommended when they are of reproductive age.     We reviewed that Heather's family members are at risk to be carriers of the Elijah Disease.    Heather provided verbal permission for results to be released to her from [facility] via e-mail.     Heather inquired about additional cardiac screening for IVF pregnancies as she remembers having this in her previous pregnancy. We reviewed that fetal echocardiograms are typically recommended for In Vitro Fertilization (IVF) pregnancies. The average pregnancy has a 0.5-1.0% chance of a congenital heart defect. However, studies suggest an increased chance for a heart defect for IVF pregnancies, with estimates ranging from 1-3.3%. Fetal echocardiograms are typically performed at 20 to 24 weeks gestation.     Vivian Bustos MS, [facility]  Maternal Fetal Medicine  [facility]  Ph:  [phone]  [email or web address]

## 2020-01-17 ENCOUNTER — TELEPHONE (OUTPATIENT)
Dept: RHEUMATOLOGY | Facility: CLINIC | Age: 40
End: 2020-01-17

## 2020-01-17 ENCOUNTER — TELEPHONE (OUTPATIENT)
Dept: OBGYN | Facility: CLINIC | Age: 40
End: 2020-01-17

## 2020-01-17 NOTE — TELEPHONE ENCOUNTER
Spoke to patient , called with a reminder about there upcoming appointment , also instructed to bring medications or medication list.    Sita Diaz CMA

## 2020-01-17 NOTE — TELEPHONE ENCOUNTER
Received call from Heather stating she is working with fertility clinic and they are wanting her to switch from IUD to OCP.  She has appointment for IUD removal on 1/24/20 but feels like she is going to get her period (still gets with IUD).  Fertility clinic wants her to start OCP on day 3 of her menstrual cycle so she is wondering if she start her period and day 3 occurs before she has the IUD out, is it safe to take the OCP. The OCP prescribed is Apri.    Discussed with on-call midwife, Millie Ernandez, who advised it is fine to start OCP while IUD is in place for a few days. Cautioned that she could have bleeding with this change.    Spoke again with Heather and gave her message from midwife. Explained that she could have extended period or irregular bleeding with change in hormones.  She indicated understanding and agreed with plan.

## 2020-01-20 ENCOUNTER — OFFICE VISIT (OUTPATIENT)
Dept: RHEUMATOLOGY | Facility: CLINIC | Age: 40
End: 2020-01-20
Attending: INTERNAL MEDICINE
Payer: COMMERCIAL

## 2020-01-20 VITALS
SYSTOLIC BLOOD PRESSURE: 108 MMHG | DIASTOLIC BLOOD PRESSURE: 71 MMHG | HEART RATE: 62 BPM | WEIGHT: 158 LBS | OXYGEN SATURATION: 100 % | TEMPERATURE: 98.1 F | HEIGHT: 67 IN | BODY MASS INDEX: 24.8 KG/M2

## 2020-01-20 DIAGNOSIS — M35.9 UNDIFFERENTIATED CONNECTIVE TISSUE DISEASE (H): ICD-10-CM

## 2020-01-20 DIAGNOSIS — M35.9 UNDIFFERENTIATED CONNECTIVE TISSUE DISEASE (H): Primary | ICD-10-CM

## 2020-01-20 PROCEDURE — 36415 COLL VENOUS BLD VENIPUNCTURE: CPT | Performed by: INTERNAL MEDICINE

## 2020-01-20 PROCEDURE — 86146 BETA-2 GLYCOPROTEIN ANTIBODY: CPT | Performed by: INTERNAL MEDICINE

## 2020-01-20 PROCEDURE — 86376 MICROSOMAL ANTIBODY EACH: CPT | Performed by: INTERNAL MEDICINE

## 2020-01-20 PROCEDURE — 85730 THROMBOPLASTIN TIME PARTIAL: CPT | Performed by: INTERNAL MEDICINE

## 2020-01-20 PROCEDURE — 86235 NUCLEAR ANTIGEN ANTIBODY: CPT | Performed by: INTERNAL MEDICINE

## 2020-01-20 PROCEDURE — 86160 COMPLEMENT ANTIGEN: CPT | Performed by: INTERNAL MEDICINE

## 2020-01-20 PROCEDURE — 86147 CARDIOLIPIN ANTIBODY EA IG: CPT | Performed by: INTERNAL MEDICINE

## 2020-01-20 PROCEDURE — 85613 RUSSELL VIPER VENOM DILUTED: CPT | Performed by: INTERNAL MEDICINE

## 2020-01-20 PROCEDURE — 00000401 ZZHCL STATISTIC THROMBIN TIME NC: Performed by: INTERNAL MEDICINE

## 2020-01-20 PROCEDURE — G0463 HOSPITAL OUTPT CLINIC VISIT: HCPCS | Mod: ZF

## 2020-01-20 PROCEDURE — 00000167 ZZHCL STATISTIC INR NC: Performed by: INTERNAL MEDICINE

## 2020-01-20 RX ORDER — HYDROXYCHLOROQUINE SULFATE 200 MG/1
200 TABLET, FILM COATED ORAL 2 TIMES DAILY
Qty: 180 TABLET | Refills: 3 | Status: SHIPPED | OUTPATIENT
Start: 2020-01-20 | End: 2020-03-30

## 2020-01-20 ASSESSMENT — MIFFLIN-ST. JEOR: SCORE: 1434.31

## 2020-01-20 ASSESSMENT — PAIN SCALES - GENERAL: PAINLEVEL: NO PAIN (0)

## 2020-01-20 NOTE — LETTER
2020      RE: Heather Guillory  [address]  [place] [address]       Rheumatology Visit     Heather Guillory MRN# [id number]   YOB: 1980 Age: 39 year old     Date of Last Visit: 2019  DOS: 2020       Assessment and Plan:   Undifferentiated connective tissue disease (UCTD) and pregnancy via IVF (s/p delivery 2018):    Heather is a 40 yo WF , a former patient of Dr. Brewer. She was diagnosed with MCTD in 2016, 6 wk postpartum based on fatigue, arthritis, mild Raynaud's, low positive NINOSKA 1.1 and low positive RNP Ab 1.6. She is on  mg qd since 2017 with great response.  UCTD continues to be most likely diagnosis not MCTD as she does not have classic features of MCTD, had very low titer of RNP Ab in the past, (negative on most recent check), and had a mild disease which never required prednisone.  All autoimmunity labs (2017) came back negative (except + NINOSKA) which is further reassuring for UCTD, including APS panel, repeat RNP and inflammatory markers, dsDNA and complement levels.      She had neg SSA/SSB antibodies in 2016, no concern for CHB. No need to re-check.  APS panel was neg.  She had neg anti-Sm, neg anti-DNA and neg centromere Ab in 2016.    Today:  Disease continues to be under good control on HCQ. She did not flare during pregnancy. She had about 2 wk increased arthralgia around 2018 which responded to NSAIDs, labs were not suggestive of a flare. In 2019, recommended to taper plaquenil to 200 mg twice a day on Mon/Wed/Fri and then 1 tab for the rest of the week. Had increased pain/stiffness in hands and feet and C3 dropped. So increased HCQ back to 200 mg bid. Doing well with improved arthralgia. Is here to discuss plans for future pregnancy, she has 3 sons but has an embryo left from IVF cycle and would like to proceed with implantation as its is a girl. From rheumatology standpoint, ok to proceed with pregnancy. Will monitor closely  with checking labs and will continue HCQ as it is safe in pregnancy.    Was reminded to have eye exam on HCQ.    Labs today    Connective tissue monitoring labs in mid March then every 3 months after that    Return in 5-6 months    Orders Placed This Encounter   Procedures     Beta 2 Glycoprotein 1 Antibody IgM     Beta 2 Glycoprotein 1 Antibody IgG     Complement C3     Complement C4     CARDIOLIPIN AB (IGG, IGM)     Lupus Anticoagulant Panel     SSA Ro ROMEL Antibody IgG     SSB La ROMEL Antibody IgG     Thyroid peroxidase antibody             Active Problem List:     Patient Active Problem List    Diagnosis Date Noted     Varicose veins of left lower extremity with pain 10/29/2019     Priority: Medium     JAVIER (generalized anxiety disorder)      Priority: Medium     has a Rx for Zoloft; not using currently       Undifferentiated connective tissue disease (H)      Priority: Medium     Rosacea      Priority: Medium            History of Present Illness:   Heather Guillory presents for f/u for probable mixed connective tissue disease. Last seen in 1-17, when HCQ was continued.    Background: received diagnosis of mixed connective tissue given pain in hand and feet, stiffness, Raynaud's  Syndrome, fatigue, positive NINOSKA and anti-RNP in 2016. Ms. Guillory first experienced swelling and pain in her fingers and feet during her second pregnancy this past spring that prevented her from wearing rings on her fingers and provoked an evaluation for DVT, which was negative. Despite weight loss post partum, her pain, mild swelling, and stiffness in her digits persisted. She described the pain as a 7/10 when she wakes to feed her infant in the middle of the night and in the morning that decreases to a 2-3/10 over the course of the day. Stiffness lasts 30 mins to 1 hour in the morning. In general she feels tired all the time, but cannot determine if it is associated with that fact that she has two young children and is breastfeeding every 2  hours or if it is related to her joint symptoms. She was evaluated by an internist who found that she had a positive NINOSKA/ She saw Dr. Iniguez in 8-16 who discovered +RNP. She started Plaquenil 200 mg BID.    Interval history 7/14/17  Heather Guillory is stable since her last visit in January, despite feeling more fatigued recently. She denied arthralgias/myalgias but reports having sore feet/toes at end of day and when she first wakes up. She states the fatigue worsened since she ran out of Zoloft 3.5 weeks ago; also reports irritability and no sleeping soundly during the night since that time too. Zoloft prescribed by her OBGYN.     She is currently receiving fertility treatment and plans to undergo embryo transfer on 7/24/17. She remains on HCQ and feels comfortable continuing this medication during pregnancy. She wonders if taking HCQ once daily (400) rather than 200 twice daily is acceptable.  She is using estrogen therapy in preparation for the embryo transfer, and relates that her gynecologist recommends that she remained on the supplement for approximately one month after the transfer.    She recently pinched a nerve in her neck and has numbness/tingling with burning sensation in her left arm that radiates down into her 1st-3rd digits. She was evaluated at [facility] orthoped[facility], received percocet for pain and told to have PT. If it did not improve in 6 weeks, she will return for another evaluation.     Lastly, she had dry, rough skin on lateral aspects of fingers. She states this often occurs in winter due to weather but it is new for her this summer. She is using a lot of moisturizers but it has not been helping.         9/28/17:   Heather is former patient of Dr. Brewer, is seeing me for 2nd opinion and is transferring care. She is currently pregnant and is concerned about her flare of MCTD during pregnancy, especially worried about flare being triggered by IVF.     She had her 1st pregnancy in 2/2015. It was  via IVF. It was unexplained fertility. It was uneventful. Her son was born term. His 2nd son was born natuarally in 5/2016, term and healthy.       At 6 week post partum check up, she mentioned AM stiffness and stiffness of hands (new), feet were painful. Hands were swollen. Had fatigue, was breastfeeding att hat time.    No hair loss, skin rash, photosensitivity (but burns easily), oral/nasal ulcers, or sicca.    Has h/o Raynaud's since teen. Fingers turn white, toes turn purple, not painful. They feel cold not painful.    No myositis.     No serositis.    No seziures, headaches, psychosis.    Has sensitive stomach.    No fevers.     No h/o proteinuria.    She was diagnosed with MTCD in 6/2016. She started taking HCQ in 9/2016.  She started HCQ after she stopped breastfeeding. Had one flare up after stopping breastfeeding. She started  bid, last eye exam was this summer of 2017.     No prednisone.    Lack of sleep causes joint pain.     She did another round of IVF in 4/2017.  She was on OC x 10 wk . Embryo transfer was 7/14/2017. Now is off estrogen, progestron x 2 wk, now 12 wk pregnant.     No flare of her disease after IVF.    She was on ASA 81 mg qd till she was confirmed to be pregnant.    No preganncy loses. No thrombosis.    Feet feel sore and achy only with lack of sleep.     JOSEPH is 4/11/2018.    Interval History 12/14/17:  Heather has no major concerns or changes in symptoms at today's appointment.    Hasn't experienced any joint pains for about 9-12 months.  No stiffness in her muscles or joints.  She hasn't had any Raynaud's and notes that she is keeping her hands and feet adequately covered and warm.      She does note that she has been fatigued lately but attributes that to her 2 toddlers at home who aren't sleeping throughout the night.  Also notes easy bruising but thinks that it is chronic.  Denies any hair loss, mouth sores/ulcers, HA, F/chills, night sweats.    She is taking  hydroxychloroquine and is tolerating it well.      No significant infections although she wanted to inform us about her UTI with ESBL bacteria which was diagnosed when she was 8 weeks pregnant.  At that time, she was hospitalized and given IV antibiotics.  Ever since, she has experienced recurring yeast infections.  She tried diflucan x 3 without improvement.  Finally, she has changed her diet, cutting out sugars, which is improving her symptoms.      3/2/2018: Feeling very well with no flare ups or joint pain due to UCTD. Pregnancy is going well as well, she is due on 4/11/2018. She still has problem with vaginal yeast infection, is allergic to topical anti-fungal Tx, unresponsive to one time fluconazole tx, was offered to try 7 days of fluconazole but prefers to hold off as pregnancy is almost over and per her experience, vaginal yeast infection revolves after delivery.    6/2018: Doing well post delivery. Delivered a healthy boy on 4/5/2018 and is breastfeeding, not sure if she will have another child, has an IUD placed in 5/2018. On 5/23, contacted us with recurrence of arthralgia (hands, feet x 10-14days) which self-resolved, took some NSAIDs, today is feeling well with no complaints. Labs on 5/23 were not suggestive of a flare.    2/2019: Feeling very well. No complaints.     8/23/2019: Reports intermittent soreness of hands and feet with stiffness, worsened with stress and lack of sleep. It's worse in AM and at night. No joint swelling, hair loss, oral ulcers, CP or SOB.       Today 1/20/2020: Doing well. Wants to implant her left oevr embryo  From IVF cycle this coming spring, but worried about flare of her CTD.         Review of Systems:   A comprehensive ROS was done. Positives are per HPI.          Past Medical History:     Past Medical History:   Diagnosis Date     JAVIER (generalized anxiety disorder)     has a Rx for Zoloft; not using currently     Rosacea      Undifferentiated connective tissue disease  (H)      Past Surgical History:   Procedure Laterality Date     IR ENDOVENOUS ABLATION VARICOSE VEINS  10/28/2019     IR FOLLOW UP VISIT OUTPATIENT  11/20/2019     IR STAB PHLEBECTOMY 10 - 20 STABS  10/28/2019     IR VEIN SCLEROSING MULTIPLE  10/28/2019     OPERATIVE HYSTEROSCOPY WITH MORCELLATOR  4/8/2014    Procedure: OPERATIVE HYSTEROSCOPY WITH MORCELLATOR;  Hysteroscopic Polypectomy;  Surgeon: Cate Mansfield MD;  Location: [facility] OR     Raynaud's syndrome since puberty. Her main trigger is cold.  She had two uneventful pregnancies with vaginal births, though the conception of her first child required IVF.   she is underwent embryo transfer in July 2017, now pregnant.   cervical radiculopathy, 2017.       Social History:     Social History     Occupational History     Occupation:      Employer: [employer]   Tobacco Use     Smoking status: Never Smoker     Smokeless tobacco: Never Used   Substance and Sexual Activity     Alcohol use: No     Alcohol/week: 0.0 standard drinks     Drug use: No     Sexual activity: Yes     Partners: Male     Birth control/protection: I.U.D.     Comment: Mirena placed in 2018            Family History:     Family History   Problem Relation Age of Onset     Hypertension Paternal Grandfather      Lung Cancer Paternal Grandfather         smoker     Diabetes Type 2  Paternal Grandfather      Breast Cancer Paternal Grandmother      Hypertension Paternal Grandmother      Colon Cancer Maternal Grandfather      Alzheimer Disease Maternal Grandmother      Cervical Cancer Maternal Grandmother      Arrhythmia Brother         details unknown; procedure in his 20s     Anxiety Disorder Brother      Elijah Disease Sister      Myocardial Infarction No family hx of      Cerebrovascular Disease No family hx of      Coronary Artery Disease Early Onset No family hx of      Ovarian Cancer No family hx of      No history of AI disease       Allergies:     Allergies   Allergen  "Reactions     Benzoyl Peroxide Swelling     Duricef [Cefadroxil] Unknown     Monistat [Miconazole] Swelling     Sulfa Drugs Rash            Medications:     Current Outpatient Medications   Medication Sig Dispense Refill     hydroxychloroquine (PLAQUENIL) 200 MG tablet 200 mg twice a day on Mon/Wed/Fri and then 200 mg a day for the rest of the week (Patient taking differently: 2 times daily ) 60 tablet 5     metroNIDAZOLE (METROCREAM) 0.75 % external cream        Prenatal Vit-Fe Fumarate-FA (PRENATAL VITAMINS) 28-0.8 MG TABS Take 1 tablet by mouth daily        levonorgestrel (MIRENA, 52 MG,) 20 MCG/24HR IUD 1 each (20 mcg) by Intrauterine route once for 1 dose 1 each 0     sertraline (ZOLOFT) 50 MG tablet Take 50 mg by mouth daily  3            Physical Exam:   Blood pressure 108/71, pulse 62, temperature 98.1  F (36.7  C), temperature source Oral, height 1.702 m (5' 7\"), weight 71.7 kg (158 lb), SpO2 100 %, not currently breastfeeding.  Wt Readings from Last 4 Encounters:   01/20/20 71.7 kg (158 lb)   11/12/19 73.5 kg (162 lb)   10/23/19 73.6 kg (162 lb 4.1 oz)   10/09/19 73.6 kg (162 lb 4.8 oz)     Constitutional: well-developed, appearing stated age; cooperative  Eyes: nl EOM, PERRLA, vision, conjunctiva, sclera  ENT: nl external ears, nose, hearing, lips, teeth, gums, throat  No mucous membrane lesions, normal saliva pool  Neck: no mass or thyroid enlargement  Resp: lungs clear to auscultation  CV: RRR, no murmurs, rubs or gallops, no edema  GI: no ABD tenderness  Lymph: no cervical, supraclavicular nodes  MS: no active synovitis or joint tenderness  Skin: no skin rash  Neuro: nl cranial nerves, strength  Psych: nl affect.         Data:     Results for orders placed or performed in visit on 01/20/20   Thyroid peroxidase antibody     Status: None   Result Value Ref Range    Thyroid Peroxidase Antibody 31 <35 IU/mL   SSB La ROMEL Antibody IgG     Status: None   Result Value Ref Range    SSB (La) (ROMEL) Antibody, IgG " <0.2 0.0 - 0.9 AI   SSA Ro ROMEL Antibody IgG     Status: None   Result Value Ref Range    SSA (Ro) (ROMEL) Antibody, IgG <0.2 0.0 - 0.9 AI   Lupus Anticoagulant Panel     Status: None   Result Value Ref Range    Lupus Result Negative NEG^Negative   Complement C4     Status: None   Result Value Ref Range    Complement C4 21 13 - 39 mg/dL   Complement C3     Status: Abnormal   Result Value Ref Range    Complement C3 80 (L) 81 - 157 mg/dL   Beta 2 Glycoprotein 1 Antibody IgG     Status: None   Result Value Ref Range    Beta 2 Glycoprotein 1 Antibody IgG 1.2 <7 U/mL   Beta 2 Glycoprotein 1 Antibody IgM     Status: None   Result Value Ref Range    Beta 2 Glycoprotein 1 Antibody IgM <2.9 <7 U/mL   Cardiolipin Alissa IgG and IgM     Status: None   Result Value Ref Range    Cardiolipin Antibody IgG <1.6 0.0 - 19.9 GPL-U/mL    Cardiolipin Antibody IgM <0.2 0.0 - 19.9 MPL-U/mL     Labs from outside laboratory reviewed from 8/12/16  RNP antibodies 1.6- positive    Negative for Parvo Virus B19, Lyme Disease  Negative SLE panel   Negative SSA, SSB  Negative Anti-Sury-1  Negative Centromere B antibodies  Negative  CCP  Recent Labs   Lab Test  07/22/16   1110  05/29/16   0651  05/27/16   1032   01/09/16   0724   WBC  8.1   --   15.4*   --   16.8*   RBC  4.86   --   4.25   --   4.75   HGB  14.1  11.2*  12.4   < >  14.2   HCT  42.1   --   37.6   --   40.1   MCV  87   --   89   --   84   RDW  13.0   --   14.0   --   13.7   PLT  228   --   184   --   202   ALBUMIN   --    --    --    --   3.3*   CRP  3.0   --    --    --    --    BUN   --    --    --    --   9    < > = values in this interval not displayed.      Recent Labs   Lab Test  07/22/16   1110   TSH  1.10     Hemoglobin   Date Value Ref Range Status   12/16/2019 13.7 11.7 - 15.7 g/dL Final   08/26/2019 13.0 11.7 - 15.7 g/dL Final   02/22/2019 14.0 11.7 - 15.7 g/dL Final     Urea Nitrogen   Date Value Ref Range Status   01/09/2016 9 7 - 30 mg/dL Final     Sed Rate   Date Value Ref  Range Status   12/16/2019 4 0 - 20 mm/h Final   08/26/2019 4 0 - 20 mm/h Final   02/22/2019 6 0 - 20 mm/h Final     CRP Inflammation   Date Value Ref Range Status   12/16/2019 <2.9 0.0 - 8.0 mg/L Final   08/26/2019 <2.9 0.0 - 8.0 mg/L Final   02/22/2019 <2.9 0.0 - 8.0 mg/L Final     AST   Date Value Ref Range Status   12/16/2019 13 0 - 45 U/L Final   08/26/2019 11 0 - 45 U/L Final   02/22/2019 17 0 - 45 U/L Final     Albumin   Date Value Ref Range Status   12/16/2019 4.2 3.4 - 5.0 g/dL Final   08/26/2019 4.1 3.4 - 5.0 g/dL Final   02/22/2019 4.1 3.4 - 5.0 g/dL Final     Alkaline Phosphatase   Date Value Ref Range Status   01/09/2016 70 40 - 150 U/L Final     ALT   Date Value Ref Range Status   12/16/2019 18 0 - 50 U/L Final   08/26/2019 17 0 - 50 U/L Final   02/22/2019 19 0 - 50 U/L Final     Rheumatoid Factor   Date Value Ref Range Status   07/22/2016 <20 <20 IU/mL Final     RHEUM RESULTS Latest Ref Rng & Units 2/22/2019 8/26/2019 12/16/2019   COMPLEMENT C3 76 - 169 mg/dL 81 64(L) 68(L)   COMPLEMENT C4 15 - 50 mg/dL 23 16 17   DNA-DS <10 IU/mL 3 4 5   SED RATE 0 - 20 mm/h 6 4 4   CRP, INFLAMMATION 0.0 - 8.0 mg/L <2.9 <2.9 <2.9   RHEUMATOID FACTOR <20 IU/mL - - -   NINOSKA SCREEN BY EIA <1.0 - - -   AST 0 - 45 U/L 17 11 13   ALT 0 - 50 U/L 19 17 18   ALBUMIN 3.4 - 5.0 g/dL 4.1 4.1 4.2   WBC 4.0 - 11.0 10e9/L 5.8 6.3 5.8   RBC 3.8 - 5.2 10e12/L 5.07 4.58 4.78   HGB 11.7 - 15.7 g/dL 14.0 13.0 13.7   HCT 35.0 - 47.0 % 45.1 40.6 42.3   MCV 78 - 100 fl 89 89 89   MCHC 31.5 - 36.5 g/dL 31.0(L) 32.0 32.4   RDW 10.0 - 15.0 % 12.6 12.2 12.6    - 450 10e9/L 222 185 198   CREATININE 0.52 - 1.04 mg/dL 0.63 0.54 0.54   GFR ESTIMATE, IF BLACK >60 mL/min/[1.73:m2] >90 >90 >90   GFR ESTIMATE >60 mL/min/[1.73:m2] >90 >90 >90     Component      Latest Ref Rng & Units 5/23/2018   WBC      4.0 - 11.0 10e9/L 6.3   RBC Count      3.8 - 5.2 10e12/L 5.06   Hemoglobin      11.7 - 15.7 g/dL 12.8   Hematocrit      35.0 - 47.0 % 41.9    MCV      78 - 100 fl 83   MCH      26.5 - 33.0 pg 25.3 (L)   MCHC      31.5 - 36.5 g/dL 30.5 (L)   RDW      10.0 - 15.0 % 15.9 (H)   Platelet Count      150 - 450 10e9/L 214   Diff Method       Automated Method   % Neutrophils      % 52.1   % Lymphocytes      % 38.5   % Monocytes      % 6.2   % Eosinophils      % 2.4   % Basophils      % 0.6   % Immature Granulocytes      % 0.2   Nucleated RBCs      0 /100 0   Absolute Neutrophil      1.6 - 8.3 10e9/L 3.3   Absolute Lymphocytes      0.8 - 5.3 10e9/L 2.4   Absolute Monocytes      0.0 - 1.3 10e9/L 0.4   Absolute Eosinophils      0.0 - 0.7 10e9/L 0.2   Absolute Basophils      0.0 - 0.2 10e9/L 0.0   Abs Immature Granulocytes      0 - 0.4 10e9/L 0.0   Absolute Nucleated RBC       0.0   Color Urine       Yellow   Appearance Urine       Clear   Glucose Urine      NEG:Negative mg/dL Negative   Bilirubin Urine      NEG:Negative Negative   Ketones Urine      NEG:Negative mg/dL Negative   Specific Gravity Urine      1.003 - 1.035 1.014   Blood Urine      NEG:Negative Negative   pH Urine      5.0 - 7.0 pH 7.0   Protein Albumin Urine      NEG:Negative mg/dL Negative   Urobilinogen mg/dL      0.0 - 2.0 mg/dL Normal   Nitrite Urine      NEG:Negative Negative   Leukocyte Esterase Urine      NEG:Negative Moderate (A)   Source       Urine   WBC Urine      0 - 5 /HPF 6 (H)   RBC Urine      0 - 2 /HPF 1   Transitional Epi      0 - 1 /HPF <1   Creatinine      0.52 - 1.04 mg/dL 0.71   GFR Estimate      >60 mL/min/1.7m2 >90   GFR Estimate If Black      >60 mL/min/1.7m2 >90   NINOSKA interpretation      NEG:Negative Positive (A)   NINOSKA pattern 1       DENSE FINE SPECKLED   NINOSKA titer 1       1:160   Specimen Description       Unspecified Urine   Special Requests       Specimen received in preservative   Culture Micro       No growth   Complement C3      76 - 169 mg/dL 85   Complement C4      15 - 50 mg/dL 21   CRP Inflammation      0.0 - 8.0 mg/L <2.9   Sed Rate      0 - 20 mm/h 6   DNA-ds       <10 IU/mL 5   AST      0 - 45 U/L 16   ALT      0 - 50 U/L 17   Albumin      3.4 - 5.0 g/dL 3.8     Reviewed Rheumatology lab flowsheet    Component      Latest Ref Rng & Units 2/22/2019   WBC      4.0 - 11.0 10e9/L 5.8   RBC Count      3.8 - 5.2 10e12/L 5.07   Hemoglobin      11.7 - 15.7 g/dL 14.0   Hematocrit      35.0 - 47.0 % 45.1   MCV      78 - 100 fl 89   MCH      26.5 - 33.0 pg 27.6   MCHC      31.5 - 36.5 g/dL 31.0 (L)   RDW      10.0 - 15.0 % 12.6   Platelet Count      150 - 450 10e9/L 222   Diff Method       Automated Method   % Neutrophils      % 53.6   % Lymphocytes      % 36.1   % Monocytes      % 6.4   % Eosinophils      % 2.6   % Basophils      % 1.0   % Immature Granulocytes      % 0.3   Nucleated RBCs      0 /100 0   Absolute Neutrophil      1.6 - 8.3 10e9/L 3.1   Absolute Lymphocytes      0.8 - 5.3 10e9/L 2.1   Absolute Monocytes      0.0 - 1.3 10e9/L 0.4   Absolute Eosinophils      0.0 - 0.7 10e9/L 0.2   Absolute Basophils      0.0 - 0.2 10e9/L 0.1   Abs Immature Granulocytes      0 - 0.4 10e9/L 0.0   Absolute Nucleated RBC       0.0   Color Urine       Yellow   Appearance Urine       Clear   Glucose Urine      NEG:Negative mg/dL Negative   Bilirubin Urine      NEG:Negative Negative   Ketones Urine      NEG:Negative mg/dL Negative   Specific Gravity Urine      1.003 - 1.035 1.023   Blood Urine      NEG:Negative Small (A)   pH Urine      5.0 - 7.0 pH 5.0   Protein Albumin Urine      NEG:Negative mg/dL Negative   Urobilinogen mg/dL      0.0 - 2.0 mg/dL 0.0   Nitrite Urine      NEG:Negative Negative   Leukocyte Esterase Urine      NEG:Negative Negative   Source       Midstream Urine   WBC Urine      0 - 5 /HPF 1   RBC Urine      0 - 2 /HPF 7 (H)   Squamous Epithelial /HPF Urine      0 - 1 /HPF 2 (H)   Mucous Urine      NEG:Negative /LPF Present (A)   Creatinine      0.52 - 1.04 mg/dL 0.63   GFR Estimate      >60 mL/min/1.73:m2 >90   GFR Estimate If Black      >60 mL/min/1.73:m2 >90   AST      0 -  45 U/L 17   ALT      0 - 50 U/L 19   Albumin      3.4 - 5.0 g/dL 4.1   CRP Inflammation      0.0 - 8.0 mg/L <2.9   DNA-ds      <10 IU/mL 3   Sed Rate      0 - 20 mm/h 6   Complement C4      15 - 50 mg/dL 23   Complement C3      76 - 169 mg/dL 81     Component      Latest Ref Rng & Units 8/26/2019   WBC      4.0 - 11.0 10e9/L 6.3   RBC Count      3.8 - 5.2 10e12/L 4.58   Hemoglobin      11.7 - 15.7 g/dL 13.0   Hematocrit      35.0 - 47.0 % 40.6   MCV      78 - 100 fl 89   MCH      26.5 - 33.0 pg 28.4   MCHC      31.5 - 36.5 g/dL 32.0   RDW      10.0 - 15.0 % 12.2   Platelet Count      150 - 450 10e9/L 185   Diff Method       Automated Method   % Neutrophils      % 51.8   % Lymphocytes      % 38.3   % Monocytes      % 6.5   % Eosinophils      % 2.2   % Basophils      % 0.9   % Immature Granulocytes      % 0.3   Nucleated RBCs      0 /100 0   Absolute Neutrophil      1.6 - 8.3 10e9/L 3.3   Absolute Lymphocytes      0.8 - 5.3 10e9/L 2.4   Absolute Monocytes      0.0 - 1.3 10e9/L 0.4   Absolute Eosinophils      0.0 - 0.7 10e9/L 0.1   Absolute Basophils      0.0 - 0.2 10e9/L 0.1   Abs Immature Granulocytes      0 - 0.4 10e9/L 0.0   Absolute Nucleated RBC       0.0   Color Urine       Straw   Appearance Urine       Clear   Glucose Urine      NEG:Negative mg/dL Negative   Bilirubin Urine      NEG:Negative Negative   Ketones Urine      NEG:Negative mg/dL Negative   Specific Gravity Urine      1.003 - 1.035 1.011   Blood Urine      NEG:Negative Negative   pH Urine      5.0 - 7.0 pH 7.0   Protein Albumin Urine      NEG:Negative mg/dL Negative   Urobilinogen mg/dL      0.0 - 2.0 mg/dL 0.0   Nitrite Urine      NEG:Negative Negative   Leukocyte Esterase Urine      NEG:Negative Negative   Source       Midstream Urine   WBC Urine      0 - 5 /HPF <1   RBC Urine      0 - 2 /HPF 2   Squamous Epithelial /HPF Urine      0 - 1 /HPF 2 (H)   Mucous Urine      NEG:Negative /LPF    Creatinine      0.52 - 1.04 mg/dL 0.54   GFR Estimate       >60 mL/min/1.73:m2 >90   GFR Estimate If Black      >60 mL/min/1.73:m2 >90   Protein Random Urine      g/L 0.08   Protein Total Urine g/gr Creatinine      0 - 0.2 g/g Cr 0.20   Sed Rate      0 - 20 mm/h 4   DNA-ds      <10 IU/mL 4   CRP Inflammation      0.0 - 8.0 mg/L <2.9   Complement C3      76 - 169 mg/dL 64 (L)   Complement C4      15 - 50 mg/dL 16   AST      0 - 45 U/L 11   Albumin      3.4 - 5.0 g/dL 4.1   ALT      0 - 50 U/L 17   Creatinine Urine      mg/dL 40   Creatinine Urine Random      mg/dL    Lab Scanned Result            Component      Latest Ref Rng & Units 12/16/2019   WBC      4.0 - 11.0 10e9/L 5.8   RBC Count      3.8 - 5.2 10e12/L 4.78   Hemoglobin      11.7 - 15.7 g/dL 13.7   Hematocrit      35.0 - 47.0 % 42.3   MCV      78 - 100 fl 89   MCH      26.5 - 33.0 pg 28.7   MCHC      31.5 - 36.5 g/dL 32.4   RDW      10.0 - 15.0 % 12.6   Platelet Count      150 - 450 10e9/L 198   Diff Method       Automated Method   % Neutrophils      % 63.9   % Lymphocytes      % 28.1   % Monocytes      % 5.9   % Eosinophils      % 1.4   % Basophils      % 0.5   % Immature Granulocytes      % 0.2   Nucleated RBCs      0 /100 0   Absolute Neutrophil      1.6 - 8.3 10e9/L 3.7   Absolute Lymphocytes      0.8 - 5.3 10e9/L 1.6   Absolute Monocytes      0.0 - 1.3 10e9/L 0.3   Absolute Eosinophils      0.0 - 0.7 10e9/L 0.1   Absolute Basophils      0.0 - 0.2 10e9/L 0.0   Abs Immature Granulocytes      0 - 0.4 10e9/L 0.0   Absolute Nucleated RBC       0.0   Color Urine       Yellow   Appearance Urine       Clear   Glucose Urine      NEG:Negative mg/dL Negative   Bilirubin Urine      NEG:Negative Negative   Ketones Urine      NEG:Negative mg/dL Negative   Specific Gravity Urine      1.003 - 1.035 1.022   Blood Urine      NEG:Negative Trace (A)   pH Urine      5.0 - 7.0 pH 7.0   Protein Albumin Urine      NEG:Negative mg/dL 10 (A)   Urobilinogen mg/dL      0.0 - 2.0 mg/dL Normal   Nitrite Urine      NEG:Negative Negative    Leukocyte Esterase Urine      NEG:Negative Negative   Source       Midstream Urine   WBC Urine      0 - 5 /HPF 3   RBC Urine      0 - 2 /HPF 3 (H)   Squamous Epithelial /HPF Urine      0 - 1 /HPF 11 (H)   Mucous Urine      NEG:Negative /LPF Present (A)   Creatinine      0.52 - 1.04 mg/dL 0.54   GFR Estimate      >60 mL/min/1.73:m2 >90   GFR Estimate If Black      >60 mL/min/1.73:m2 >90   Protein Random Urine      g/L 0.29   Protein Total Urine g/gr Creatinine      0 - 0.2 g/g Cr 0.18   Sed Rate      0 - 20 mm/h 4   DNA-ds      <10 IU/mL 5   CRP Inflammation      0.0 - 8.0 mg/L <2.9   Complement C3      76 - 169 mg/dL 68 (L)   Complement C4      15 - 50 mg/dL 17   AST      0 - 45 U/L 13   Albumin      3.4 - 5.0 g/dL 4.2   ALT      0 - 50 U/L 18   Creatinine Urine      mg/dL    Creatinine Urine Random      mg/dL 157   Lab Scanned Result       [facility] FORES[facility] SCR-Scanned       Nasrin King MD

## 2020-01-20 NOTE — LETTER
Patient:  Heather Guillory  :   1980  MRN:     [id number]        Ms.Jennifer Guillory  [address]  [place] [address]        2020    Dear ,    We are writing to inform you of your test results. Labs all are normal except low C3 but even that has improved, this is great news!      Results for orders placed or performed in visit on 20   Thyroid peroxidase antibody     Status: None   Result Value Ref Range    Thyroid Peroxidase Antibody 31 <35 IU/mL   SSB La ROMEL Antibody IgG     Status: None   Result Value Ref Range    SSB (La) (ROMEL) Antibody, IgG <0.2 0.0 - 0.9 AI   SSA Ro ROMEL Antibody IgG     Status: None   Result Value Ref Range    SSA (Ro) (ROMEL) Antibody, IgG <0.2 0.0 - 0.9 AI   Lupus Anticoagulant Panel     Status: None   Result Value Ref Range    Lupus Result Negative NEG^Negative   Complement C4     Status: None   Result Value Ref Range    Complement C4 21 13 - 39 mg/dL   Complement C3     Status: Abnormal   Result Value Ref Range    Complement C3 80 (L) 81 - 157 mg/dL   Beta 2 Glycoprotein 1 Antibody IgG     Status: None   Result Value Ref Range    Beta 2 Glycoprotein 1 Antibody IgG 1.2 <7 U/mL   Beta 2 Glycoprotein 1 Antibody IgM     Status: None   Result Value Ref Range    Beta 2 Glycoprotein 1 Antibody IgM <2.9 <7 U/mL   Cardiolipin Alissa IgG and IgM     Status: None   Result Value Ref Range    Cardiolipin Antibody IgG <1.6 0.0 - 19.9 GPL-U/mL    Cardiolipin Antibody IgM <0.2 0.0 - 19.9 MPL-U/mL       Nasrin King MD

## 2020-01-20 NOTE — NURSING NOTE
"Chief Complaint   Patient presents with     RECHECK     Undifferentiated connective tissue disease      /71   Pulse 62   Temp 98.1  F (36.7  C) (Oral)   Ht 1.702 m (5' 7\")   Wt 71.7 kg (158 lb)   SpO2 100%   BMI 24.75 kg/m    Consuelo Velarde [facility]  1/20/2020 1:38 PM    "

## 2020-01-20 NOTE — PROGRESS NOTES
Rheumatology Visit     Heather Guillory MRN# [id number]   YOB: 1980 Age: 39 year old     Date of Last Visit: 2019  DOS: 2020       Assessment and Plan:   Undifferentiated connective tissue disease (UCTD) and pregnancy via IVF (s/p delivery 2018):    Heather is a 40 yo WF , a former patient of Dr. Brewer. She was diagnosed with MCTD in 2016, 6 wk postpartum based on fatigue, arthritis, mild Raynaud's, low positive NINOSKA 1.1 and low positive RNP Ab 1.6. She is on  mg qd since 2017 with great response.  UCTD continues to be most likely diagnosis not MCTD as she does not have classic features of MCTD, had very low titer of RNP Ab in the past, (negative on most recent check), and had a mild disease which never required prednisone.  All autoimmunity labs (2017) came back negative (except + NINOSKA) which is further reassuring for UCTD, including APS panel, repeat RNP and inflammatory markers, dsDNA and complement levels.      She had neg SSA/SSB antibodies in 2016, no concern for CHB. No need to re-check.  APS panel was neg.  She had neg anti-Sm, neg anti-DNA and neg centromere Ab in 2016.    Today:  Disease continues to be under good control on HCQ. She did not flare during pregnancy. She had about 2 wk increased arthralgia around 2018 which responded to NSAIDs, labs were not suggestive of a flare. In 2019, recommended to taper plaquenil to 200 mg twice a day on Mon/Wed/Fri and then 1 tab for the rest of the week. Had increased pain/stiffness in hands and feet and C3 dropped. So increased HCQ back to 200 mg bid. Doing well with improved arthralgia. Is here to discuss plans for future pregnancy, she has 3 sons but has an embryo left from IVF cycle and would like to proceed with implantation as its is a girl. From rheumatology standpoint, ok to proceed with pregnancy. Will monitor closely with checking labs and will continue HCQ as it is safe in pregnancy.    Was reminded  to have eye exam on HCQ.    Labs today    Connective tissue monitoring labs in mid March then every 3 months after that    Return in 5-6 months    Orders Placed This Encounter   Procedures     Beta 2 Glycoprotein 1 Antibody IgM     Beta 2 Glycoprotein 1 Antibody IgG     Complement C3     Complement C4     CARDIOLIPIN AB (IGG, IGM)     Lupus Anticoagulant Panel     SSA Ro ROMEL Antibody IgG     SSB La ROMEL Antibody IgG     Thyroid peroxidase antibody             Active Problem List:     Patient Active Problem List    Diagnosis Date Noted     Varicose veins of left lower extremity with pain 10/29/2019     Priority: Medium     JAVIER (generalized anxiety disorder)      Priority: Medium     has a Rx for Zoloft; not using currently       Undifferentiated connective tissue disease (H)      Priority: Medium     Rosacea      Priority: Medium            History of Present Illness:   Heather Guillory presents for f/u for probable mixed connective tissue disease. Last seen in 1-17, when HCQ was continued.    Background: received diagnosis of mixed connective tissue given pain in hand and feet, stiffness, Raynaud's  Syndrome, fatigue, positive NINOSKA and anti-RNP in 2016. Ms. Guillory first experienced swelling and pain in her fingers and feet during her second pregnancy this past spring that prevented her from wearing rings on her fingers and provoked an evaluation for DVT, which was negative. Despite weight loss post partum, her pain, mild swelling, and stiffness in her digits persisted. She described the pain as a 7/10 when she wakes to feed her infant in the middle of the night and in the morning that decreases to a 2-3/10 over the course of the day. Stiffness lasts 30 mins to 1 hour in the morning. In general she feels tired all the time, but cannot determine if it is associated with that fact that she has two young children and is breastfeeding every 2 hours or if it is related to her joint symptoms. She was evaluated by an internist  who found that she had a positive NINOSKA/ She saw Dr. Iniguez in 8-16 who discovered +RNP. She started Plaquenil 200 mg BID.    Interval history 7/14/17  Heather Guillory is stable since her last visit in January, despite feeling more fatigued recently. She denied arthralgias/myalgias but reports having sore feet/toes at end of day and when she first wakes up. She states the fatigue worsened since she ran out of Zoloft 3.5 weeks ago; also reports irritability and no sleeping soundly during the night since that time too. Zoloft prescribed by her OBGYN.     She is currently receiving fertility treatment and plans to undergo embryo transfer on 7/24/17. She remains on HCQ and feels comfortable continuing this medication during pregnancy. She wonders if taking HCQ once daily (400) rather than 200 twice daily is acceptable.  She is using estrogen therapy in preparation for the embryo transfer, and relates that her gynecologist recommends that she remained on the supplement for approximately one month after the transfer.    She recently pinched a nerve in her neck and has numbness/tingling with burning sensation in her left arm that radiates down into her 1st-3rd digits. She was evaluated at [facility] orthopedics, received percocet for pain and told to have PT. If it did not improve in 6 weeks, she will return for another evaluation.     Lastly, she had dry, rough skin on lateral aspects of fingers. She states this often occurs in winter due to weather but it is new for her this summer. She is using a lot of moisturizers but it has not been helping.         9/28/17:   Heather is former patient of Dr. Brewer, is seeing me for 2nd opinion and is transferring care. She is currently pregnant and is concerned about her flare of MCTD during pregnancy, especially worried about flare being triggered by IVF.     She had her 1st pregnancy in 2/2015. It was via IVF. It was unexplained fertility. It was uneventful. Her son was born term.  His 2nd son was born natuarally in 5/2016, term and healthy.       At 6 week post partum check up, she mentioned AM stiffness and stiffness of hands (new), feet were painful. Hands were swollen. Had fatigue, was breastfeeding att hat time.    No hair loss, skin rash, photosensitivity (but burns easily), oral/nasal ulcers, or sicca.    Has h/o Raynaud's since teen. Fingers turn white, toes turn purple, not painful. They feel cold not painful.    No myositis.     No serositis.    No seziures, headaches, psychosis.    Has sensitive stomach.    No fevers.     No h/o proteinuria.    She was diagnosed with MTCD in 6/2016. She started taking HCQ in 9/2016.  She started HCQ after she stopped breastfeeding. Had one flare up after stopping breastfeeding. She started  bid, last eye exam was this summer of 2017.     No prednisone.    Lack of sleep causes joint pain.     She did another round of IVF in 4/2017.  She was on OC x 10 wk . Embryo transfer was 7/14/2017. Now is off estrogen, progestron x 2 wk, now 12 wk pregnant.     No flare of her disease after IVF.    She was on ASA 81 mg qd till she was confirmed to be pregnant.    No preganncy loses. No thrombosis.    Feet feel sore and achy only with lack of sleep.     JOSEPH is 4/11/2018.    Interval History 12/14/17:  Heather has no major concerns or changes in symptoms at today's appointment.    Hasn't experienced any joint pains for about 9-12 months.  No stiffness in her muscles or joints.  She hasn't had any Raynaud's and notes that she is keeping her hands and feet adequately covered and warm.      She does note that she has been fatigued lately but attributes that to her 2 toddlers at home who aren't sleeping throughout the night.  Also notes easy bruising but thinks that it is chronic.  Denies any hair loss, mouth sores/ulcers, HA, F/chills, night sweats.    She is taking hydroxychloroquine and is tolerating it well.      No significant infections although she  wanted to inform us about her UTI with ESBL bacteria which was diagnosed when she was 8 weeks pregnant.  At that time, she was hospitalized and given IV antibiotics.  Ever since, she has experienced recurring yeast infections.  She tried diflucan x 3 without improvement.  Finally, she has changed her diet, cutting out sugars, which is improving her symptoms.      3/2/2018: Feeling very well with no flare ups or joint pain due to UCTD. Pregnancy is going well as well, she is due on 4/11/2018. She still has problem with vaginal yeast infection, is allergic to topical anti-fungal Tx, unresponsive to one time fluconazole tx, was offered to try 7 days of fluconazole but prefers to hold off as pregnancy is almost over and per her experience, vaginal yeast infection revolves after delivery.    6/2018: Doing well post delivery. Delivered a healthy boy on 4/5/2018 and is breastfeeding, not sure if she will have another child, has an IUD placed in 5/2018. On 5/23, contacted us with recurrence of arthralgia (hands, feet x 10-14days) which self-resolved, took some NSAIDs, today is feeling well with no complaints. Labs on 5/23 were not suggestive of a flare.    2/2019: Feeling very well. No complaints.     8/23/2019: Reports intermittent soreness of hands and feet with stiffness, worsened with stress and lack of sleep. It's worse in AM and at night. No joint swelling, hair loss, oral ulcers, CP or SOB.       Today 1/20/2020: Doing well. Wants to implant her left oevr embryo  From IVF cycle this coming spring, but worried about flare of her CTD.         Review of Systems:   A comprehensive ROS was done. Positives are per HPI.          Past Medical History:     Past Medical History:   Diagnosis Date     JAVIER (generalized anxiety disorder)     has a Rx for Zoloft; not using currently     Rosacea      Undifferentiated connective tissue disease (H)      Past Surgical History:   Procedure Laterality Date     IR ENDOVENOUS ABLATION  VARICOSE VEINS  10/28/2019     IR FOLLOW UP VISIT OUTPATIENT  11/20/2019     IR STAB PHLEBECTOMY 10 - 20 STABS  10/28/2019     IR VEIN SCLEROSING MULTIPLE  10/28/2019     OPERATIVE HYSTEROSCOPY WITH MORCELLATOR  4/8/2014    Procedure: OPERATIVE HYSTEROSCOPY WITH MORCELLATOR;  Hysteroscopic Polypectomy;  Surgeon: Cate Mansfield MD;  Location: [facility] OR     Raynaud's syndrome since puberty. Her main trigger is cold.  She had two uneventful pregnancies with vaginal births, though the conception of her first child required IVF.   she is underwent embryo transfer in July 2017, now pregnant.   cervical radiculopathy, 2017.       Social History:     Social History     Occupational History     Occupation:      Employer: [employer]   Tobacco Use     Smoking status: Never Smoker     Smokeless tobacco: Never Used   Substance and Sexual Activity     Alcohol use: No     Alcohol/week: 0.0 standard drinks     Drug use: No     Sexual activity: Yes     Partners: Male     Birth control/protection: I.U.D.     Comment: Mirena placed in 2018            Family History:     Family History   Problem Relation Age of Onset     Hypertension Paternal Grandfather      Lung Cancer Paternal Grandfather         smoker     Diabetes Type 2  Paternal Grandfather      Breast Cancer Paternal Grandmother      Hypertension Paternal Grandmother      Colon Cancer Maternal Grandfather      Alzheimer Disease Maternal Grandmother      Cervical Cancer Maternal Grandmother      Arrhythmia Brother         details unknown; procedure in his 20s     Anxiety Disorder Brother      Elijah Disease Sister      Myocardial Infarction No family hx of      Cerebrovascular Disease No family hx of      Coronary Artery Disease Early Onset No family hx of      Ovarian Cancer No family hx of      No history of AI disease       Allergies:     Allergies   Allergen Reactions     Benzoyl Peroxide Swelling     Duricef [Cefadroxil] Unknown     Monistat  "[Miconazole] Swelling     Sulfa Drugs Rash            Medications:     Current Outpatient Medications   Medication Sig Dispense Refill     hydroxychloroquine (PLAQUENIL) 200 MG tablet 200 mg twice a day on Mon/Wed/Fri and then 200 mg a day for the rest of the week (Patient taking differently: 2 times daily ) 60 tablet 5     metroNIDAZOLE (METROCREAM) 0.75 % external cream        Prenatal Vit-Fe Fumarate-FA (PRENATAL VITAMINS) 28-0.8 MG TABS Take 1 tablet by mouth daily        levonorgestrel (MIRENA, 52 MG,) 20 MCG/24HR IUD 1 each (20 mcg) by Intrauterine route once for 1 dose 1 each 0     sertraline (ZOLOFT) 50 MG tablet Take 50 mg by mouth daily  3            Physical Exam:   Blood pressure 108/71, pulse 62, temperature 98.1  F (36.7  C), temperature source Oral, height 1.702 m (5' 7\"), weight 71.7 kg (158 lb), SpO2 100 %, not currently breastfeeding.  Wt Readings from Last 4 Encounters:   01/20/20 71.7 kg (158 lb)   11/12/19 73.5 kg (162 lb)   10/23/19 73.6 kg (162 lb 4.1 oz)   10/09/19 73.6 kg (162 lb 4.8 oz)     Constitutional: well-developed, appearing stated age; cooperative  Eyes: nl EOM, PERRLA, vision, conjunctiva, sclera  ENT: nl external ears, nose, hearing, lips, teeth, gums, throat  No mucous membrane lesions, normal saliva pool  Neck: no mass or thyroid enlargement  Resp: lungs clear to auscultation  CV: RRR, no murmurs, rubs or gallops, no edema  GI: no ABD tenderness  Lymph: no cervical, supraclavicular nodes  MS: no active synovitis or joint tenderness  Skin: no skin rash  Neuro: nl cranial nerves, strength  Psych: nl affect.         Data:     Results for orders placed or performed in visit on 01/20/20   Thyroid peroxidase antibody     Status: None   Result Value Ref Range    Thyroid Peroxidase Antibody 31 <35 IU/mL   SSB La ROMEL Antibody IgG     Status: None   Result Value Ref Range    SSB (La) (ROMEL) Antibody, IgG <0.2 0.0 - 0.9 AI   SSA Ro ROMEL Antibody IgG     Status: None   Result Value Ref Range "    SSA (Ro) (ROMEL) Antibody, IgG <0.2 0.0 - 0.9 AI   Lupus Anticoagulant Panel     Status: None   Result Value Ref Range    Lupus Result Negative NEG^Negative   Complement C4     Status: None   Result Value Ref Range    Complement C4 21 13 - 39 mg/dL   Complement C3     Status: Abnormal   Result Value Ref Range    Complement C3 80 (L) 81 - 157 mg/dL   Beta 2 Glycoprotein 1 Antibody IgG     Status: None   Result Value Ref Range    Beta 2 Glycoprotein 1 Antibody IgG 1.2 <7 U/mL   Beta 2 Glycoprotein 1 Antibody IgM     Status: None   Result Value Ref Range    Beta 2 Glycoprotein 1 Antibody IgM <2.9 <7 U/mL   Cardiolipin Alissa IgG and IgM     Status: None   Result Value Ref Range    Cardiolipin Antibody IgG <1.6 0.0 - 19.9 GPL-U/mL    Cardiolipin Antibody IgM <0.2 0.0 - 19.9 MPL-U/mL     Labs from outside laboratory reviewed from 8/12/16  RNP antibodies 1.6- positive    Negative for Parvo Virus B19, Lyme Disease  Negative SLE panel   Negative SSA, SSB  Negative Anti-Sury-1  Negative Centromere B antibodies  Negative  CCP  Recent Labs   Lab Test  07/22/16   1110  05/29/16   0651  05/27/16   1032   01/09/16   0724   WBC  8.1   --   15.4*   --   16.8*   RBC  4.86   --   4.25   --   4.75   HGB  14.1  11.2*  12.4   < >  14.2   HCT  42.1   --   37.6   --   40.1   MCV  87   --   89   --   84   RDW  13.0   --   14.0   --   13.7   PLT  228   --   184   --   202   ALBUMIN   --    --    --    --   3.3*   CRP  3.0   --    --    --    --    BUN   --    --    --    --   9    < > = values in this interval not displayed.      Recent Labs   Lab Test  07/22/16   1110   TSH  1.10     Hemoglobin   Date Value Ref Range Status   12/16/2019 13.7 11.7 - 15.7 g/dL Final   08/26/2019 13.0 11.7 - 15.7 g/dL Final   02/22/2019 14.0 11.7 - 15.7 g/dL Final     Urea Nitrogen   Date Value Ref Range Status   01/09/2016 9 7 - 30 mg/dL Final     Sed Rate   Date Value Ref Range Status   12/16/2019 4 0 - 20 mm/h Final   08/26/2019 4 0 - 20 mm/h Final    02/22/2019 6 0 - 20 mm/h Final     CRP Inflammation   Date Value Ref Range Status   12/16/2019 <2.9 0.0 - 8.0 mg/L Final   08/26/2019 <2.9 0.0 - 8.0 mg/L Final   02/22/2019 <2.9 0.0 - 8.0 mg/L Final     AST   Date Value Ref Range Status   12/16/2019 13 0 - 45 U/L Final   08/26/2019 11 0 - 45 U/L Final   02/22/2019 17 0 - 45 U/L Final     Albumin   Date Value Ref Range Status   12/16/2019 4.2 3.4 - 5.0 g/dL Final   08/26/2019 4.1 3.4 - 5.0 g/dL Final   02/22/2019 4.1 3.4 - 5.0 g/dL Final     Alkaline Phosphatase   Date Value Ref Range Status   01/09/2016 70 40 - 150 U/L Final     ALT   Date Value Ref Range Status   12/16/2019 18 0 - 50 U/L Final   08/26/2019 17 0 - 50 U/L Final   02/22/2019 19 0 - 50 U/L Final     Rheumatoid Factor   Date Value Ref Range Status   07/22/2016 <20 <20 IU/mL Final     RHEUM RESULTS Latest Ref Rng & Units 2/22/2019 8/26/2019 12/16/2019   COMPLEMENT C3 76 - 169 mg/dL 81 64(L) 68(L)   COMPLEMENT C4 15 - 50 mg/dL 23 16 17   DNA-DS <10 IU/mL 3 4 5   SED RATE 0 - 20 mm/h 6 4 4   CRP, INFLAMMATION 0.0 - 8.0 mg/L <2.9 <2.9 <2.9   RHEUMATOID FACTOR <20 IU/mL - - -   NINOSKA SCREEN BY EIA <1.0 - - -   AST 0 - 45 U/L 17 11 13   ALT 0 - 50 U/L 19 17 18   ALBUMIN 3.4 - 5.0 g/dL 4.1 4.1 4.2   WBC 4.0 - 11.0 10e9/L 5.8 6.3 5.8   RBC 3.8 - 5.2 10e12/L 5.07 4.58 4.78   HGB 11.7 - 15.7 g/dL 14.0 13.0 13.7   HCT 35.0 - 47.0 % 45.1 40.6 42.3   MCV 78 - 100 fl 89 89 89   MCHC 31.5 - 36.5 g/dL 31.0(L) 32.0 32.4   RDW 10.0 - 15.0 % 12.6 12.2 12.6    - 450 10e9/L 222 185 198   CREATININE 0.52 - 1.04 mg/dL 0.63 0.54 0.54   GFR ESTIMATE, IF BLACK >60 mL/min/[1.73:m2] >90 >90 >90   GFR ESTIMATE >60 mL/min/[1.73:m2] >90 >90 >90     Component      Latest Ref Rng & Units 5/23/2018   WBC      4.0 - 11.0 10e9/L 6.3   RBC Count      3.8 - 5.2 10e12/L 5.06   Hemoglobin      11.7 - 15.7 g/dL 12.8   Hematocrit      35.0 - 47.0 % 41.9   MCV      78 - 100 fl 83   MCH      26.5 - 33.0 pg 25.3 (L)   MCHC      31.5 - 36.5  g/dL 30.5 (L)   RDW      10.0 - 15.0 % 15.9 (H)   Platelet Count      150 - 450 10e9/L 214   Diff Method       Automated Method   % Neutrophils      % 52.1   % Lymphocytes      % 38.5   % Monocytes      % 6.2   % Eosinophils      % 2.4   % Basophils      % 0.6   % Immature Granulocytes      % 0.2   Nucleated RBCs      0 /100 0   Absolute Neutrophil      1.6 - 8.3 10e9/L 3.3   Absolute Lymphocytes      0.8 - 5.3 10e9/L 2.4   Absolute Monocytes      0.0 - 1.3 10e9/L 0.4   Absolute Eosinophils      0.0 - 0.7 10e9/L 0.2   Absolute Basophils      0.0 - 0.2 10e9/L 0.0   Abs Immature Granulocytes      0 - 0.4 10e9/L 0.0   Absolute Nucleated RBC       0.0   Color Urine       Yellow   Appearance Urine       Clear   Glucose Urine      NEG:Negative mg/dL Negative   Bilirubin Urine      NEG:Negative Negative   Ketones Urine      NEG:Negative mg/dL Negative   Specific Gravity Urine      1.003 - 1.035 1.014   Blood Urine      NEG:Negative Negative   pH Urine      5.0 - 7.0 pH 7.0   Protein Albumin Urine      NEG:Negative mg/dL Negative   Urobilinogen mg/dL      0.0 - 2.0 mg/dL Normal   Nitrite Urine      NEG:Negative Negative   Leukocyte Esterase Urine      NEG:Negative Moderate (A)   Source       Urine   WBC Urine      0 - 5 /HPF 6 (H)   RBC Urine      0 - 2 /HPF 1   Transitional Epi      0 - 1 /HPF <1   Creatinine      0.52 - 1.04 mg/dL 0.71   GFR Estimate      >60 mL/min/1.7m2 >90   GFR Estimate If Black      >60 mL/min/1.7m2 >90   NINOSKA interpretation      NEG:Negative Positive (A)   NINOSKA pattern 1       DENSE FINE SPECKLED   NINOSKA titer 1       1:160   Specimen Description       Unspecified Urine   Special Requests       Specimen received in preservative   Culture Micro       No growth   Complement C3      76 - 169 mg/dL 85   Complement C4      15 - 50 mg/dL 21   CRP Inflammation      0.0 - 8.0 mg/L <2.9   Sed Rate      0 - 20 mm/h 6   DNA-ds      <10 IU/mL 5   AST      0 - 45 U/L 16   ALT      0 - 50 U/L 17   Albumin      3.4 -  5.0 g/dL 3.8     Reviewed Rheumatology lab flowsheet    Component      Latest Ref Rng & Units 2/22/2019   WBC      4.0 - 11.0 10e9/L 5.8   RBC Count      3.8 - 5.2 10e12/L 5.07   Hemoglobin      11.7 - 15.7 g/dL 14.0   Hematocrit      35.0 - 47.0 % 45.1   MCV      78 - 100 fl 89   MCH      26.5 - 33.0 pg 27.6   MCHC      31.5 - 36.5 g/dL 31.0 (L)   RDW      10.0 - 15.0 % 12.6   Platelet Count      150 - 450 10e9/L 222   Diff Method       Automated Method   % Neutrophils      % 53.6   % Lymphocytes      % 36.1   % Monocytes      % 6.4   % Eosinophils      % 2.6   % Basophils      % 1.0   % Immature Granulocytes      % 0.3   Nucleated RBCs      0 /100 0   Absolute Neutrophil      1.6 - 8.3 10e9/L 3.1   Absolute Lymphocytes      0.8 - 5.3 10e9/L 2.1   Absolute Monocytes      0.0 - 1.3 10e9/L 0.4   Absolute Eosinophils      0.0 - 0.7 10e9/L 0.2   Absolute Basophils      0.0 - 0.2 10e9/L 0.1   Abs Immature Granulocytes      0 - 0.4 10e9/L 0.0   Absolute Nucleated RBC       0.0   Color Urine       Yellow   Appearance Urine       Clear   Glucose Urine      NEG:Negative mg/dL Negative   Bilirubin Urine      NEG:Negative Negative   Ketones Urine      NEG:Negative mg/dL Negative   Specific Gravity Urine      1.003 - 1.035 1.023   Blood Urine      NEG:Negative Small (A)   pH Urine      5.0 - 7.0 pH 5.0   Protein Albumin Urine      NEG:Negative mg/dL Negative   Urobilinogen mg/dL      0.0 - 2.0 mg/dL 0.0   Nitrite Urine      NEG:Negative Negative   Leukocyte Esterase Urine      NEG:Negative Negative   Source       Midstream Urine   WBC Urine      0 - 5 /HPF 1   RBC Urine      0 - 2 /HPF 7 (H)   Squamous Epithelial /HPF Urine      0 - 1 /HPF 2 (H)   Mucous Urine      NEG:Negative /LPF Present (A)   Creatinine      0.52 - 1.04 mg/dL 0.63   GFR Estimate      >60 mL/min/1.73:m2 >90   GFR Estimate If Black      >60 mL/min/1.73:m2 >90   AST      0 - 45 U/L 17   ALT      0 - 50 U/L 19   Albumin      3.4 - 5.0 g/dL 4.1   CRP  Inflammation      0.0 - 8.0 mg/L <2.9   DNA-ds      <10 IU/mL 3   Sed Rate      0 - 20 mm/h 6   Complement C4      15 - 50 mg/dL 23   Complement C3      76 - 169 mg/dL 81     Component      Latest Ref Rng & Units 8/26/2019   WBC      4.0 - 11.0 10e9/L 6.3   RBC Count      3.8 - 5.2 10e12/L 4.58   Hemoglobin      11.7 - 15.7 g/dL 13.0   Hematocrit      35.0 - 47.0 % 40.6   MCV      78 - 100 fl 89   MCH      26.5 - 33.0 pg 28.4   MCHC      31.5 - 36.5 g/dL 32.0   RDW      10.0 - 15.0 % 12.2   Platelet Count      150 - 450 10e9/L 185   Diff Method       Automated Method   % Neutrophils      % 51.8   % Lymphocytes      % 38.3   % Monocytes      % 6.5   % Eosinophils      % 2.2   % Basophils      % 0.9   % Immature Granulocytes      % 0.3   Nucleated RBCs      0 /100 0   Absolute Neutrophil      1.6 - 8.3 10e9/L 3.3   Absolute Lymphocytes      0.8 - 5.3 10e9/L 2.4   Absolute Monocytes      0.0 - 1.3 10e9/L 0.4   Absolute Eosinophils      0.0 - 0.7 10e9/L 0.1   Absolute Basophils      0.0 - 0.2 10e9/L 0.1   Abs Immature Granulocytes      0 - 0.4 10e9/L 0.0   Absolute Nucleated RBC       0.0   Color Urine       Straw   Appearance Urine       Clear   Glucose Urine      NEG:Negative mg/dL Negative   Bilirubin Urine      NEG:Negative Negative   Ketones Urine      NEG:Negative mg/dL Negative   Specific Gravity Urine      1.003 - 1.035 1.011   Blood Urine      NEG:Negative Negative   pH Urine      5.0 - 7.0 pH 7.0   Protein Albumin Urine      NEG:Negative mg/dL Negative   Urobilinogen mg/dL      0.0 - 2.0 mg/dL 0.0   Nitrite Urine      NEG:Negative Negative   Leukocyte Esterase Urine      NEG:Negative Negative   Source       Midstream Urine   WBC Urine      0 - 5 /HPF <1   RBC Urine      0 - 2 /HPF 2   Squamous Epithelial /HPF Urine      0 - 1 /HPF 2 (H)   Mucous Urine      NEG:Negative /LPF    Creatinine      0.52 - 1.04 mg/dL 0.54   GFR Estimate      >60 mL/min/1.73:m2 >90   GFR Estimate If Black      >60 mL/min/1.73:m2 >90    Protein Random Urine      g/L 0.08   Protein Total Urine g/gr Creatinine      0 - 0.2 g/g Cr 0.20   Sed Rate      0 - 20 mm/h 4   DNA-ds      <10 IU/mL 4   CRP Inflammation      0.0 - 8.0 mg/L <2.9   Complement C3      76 - 169 mg/dL 64 (L)   Complement C4      15 - 50 mg/dL 16   AST      0 - 45 U/L 11   Albumin      3.4 - 5.0 g/dL 4.1   ALT      0 - 50 U/L 17   Creatinine Urine      mg/dL 40   Creatinine Urine Random      mg/dL    Lab Scanned Result            Component      Latest Ref Rng & Units 12/16/2019   WBC      4.0 - 11.0 10e9/L 5.8   RBC Count      3.8 - 5.2 10e12/L 4.78   Hemoglobin      11.7 - 15.7 g/dL 13.7   Hematocrit      35.0 - 47.0 % 42.3   MCV      78 - 100 fl 89   MCH      26.5 - 33.0 pg 28.7   MCHC      31.5 - 36.5 g/dL 32.4   RDW      10.0 - 15.0 % 12.6   Platelet Count      150 - 450 10e9/L 198   Diff Method       Automated Method   % Neutrophils      % 63.9   % Lymphocytes      % 28.1   % Monocytes      % 5.9   % Eosinophils      % 1.4   % Basophils      % 0.5   % Immature Granulocytes      % 0.2   Nucleated RBCs      0 /100 0   Absolute Neutrophil      1.6 - 8.3 10e9/L 3.7   Absolute Lymphocytes      0.8 - 5.3 10e9/L 1.6   Absolute Monocytes      0.0 - 1.3 10e9/L 0.3   Absolute Eosinophils      0.0 - 0.7 10e9/L 0.1   Absolute Basophils      0.0 - 0.2 10e9/L 0.0   Abs Immature Granulocytes      0 - 0.4 10e9/L 0.0   Absolute Nucleated RBC       0.0   Color Urine       Yellow   Appearance Urine       Clear   Glucose Urine      NEG:Negative mg/dL Negative   Bilirubin Urine      NEG:Negative Negative   Ketones Urine      NEG:Negative mg/dL Negative   Specific Gravity Urine      1.003 - 1.035 1.022   Blood Urine      NEG:Negative Trace (A)   pH Urine      5.0 - 7.0 pH 7.0   Protein Albumin Urine      NEG:Negative mg/dL 10 (A)   Urobilinogen mg/dL      0.0 - 2.0 mg/dL Normal   Nitrite Urine      NEG:Negative Negative   Leukocyte Esterase Urine      NEG:Negative Negative   Source       Midstream  Urine   WBC Urine      0 - 5 /HPF 3   RBC Urine      0 - 2 /HPF 3 (H)   Squamous Epithelial /HPF Urine      0 - 1 /HPF 11 (H)   Mucous Urine      NEG:Negative /LPF Present (A)   Creatinine      0.52 - 1.04 mg/dL 0.54   GFR Estimate      >60 mL/min/1.73:m2 >90   GFR Estimate If Black      >60 mL/min/1.73:m2 >90   Protein Random Urine      g/L 0.29   Protein Total Urine g/gr Creatinine      0 - 0.2 g/g Cr 0.18   Sed Rate      0 - 20 mm/h 4   DNA-ds      <10 IU/mL 5   CRP Inflammation      0.0 - 8.0 mg/L <2.9   Complement C3      76 - 169 mg/dL 68 (L)   Complement C4      15 - 50 mg/dL 17   AST      0 - 45 U/L 13   Albumin      3.4 - 5.0 g/dL 4.2   ALT      0 - 50 U/L 18   Creatinine Urine      mg/dL    Creatinine Urine Random      mg/dL 157   Lab Scanned Result       COUNSYL FORESIGHT SCR-Scanned

## 2020-01-20 NOTE — PATIENT INSTRUCTIONS
Labs today    Connective tissue monitoring labs in mid March then every 3 months after that    Return in 5-6 months

## 2020-01-21 LAB
B2 GLYCOPROT1 IGG SERPL IA-ACNC: 1.2 U/ML
B2 GLYCOPROT1 IGM SERPL IA-ACNC: <2.9 U/ML
C3 SERPL-MCNC: 80 MG/DL (ref 81–157)
C4 SERPL-MCNC: 21 MG/DL (ref 13–39)
THYROPEROXIDASE AB SERPL-ACNC: 31 IU/ML

## 2020-01-22 LAB
CARDIOLIPIN ANTIBODY IGG: <1.6 GPL-U/ML (ref 0–19.9)
CARDIOLIPIN ANTIBODY IGM: <0.2 MPL-U/ML (ref 0–19.9)
ENA SS-A IGG SER IA-ACNC: <0.2 AI (ref 0–0.9)
ENA SS-B IGG SER IA-ACNC: <0.2 AI (ref 0–0.9)
LA PPP-IMP: NEGATIVE

## 2020-01-27 ENCOUNTER — OFFICE VISIT (OUTPATIENT)
Dept: PSYCHOLOGY | Facility: CLINIC | Age: 40
End: 2020-01-27
Attending: PSYCHOLOGIST
Payer: COMMERCIAL

## 2020-01-27 DIAGNOSIS — F43.20 ADJUSTMENT DISORDER, UNSPECIFIED TYPE: Primary | ICD-10-CM

## 2020-01-27 PROCEDURE — 90791 PSYCH DIAGNOSTIC EVALUATION: CPT | Mod: ZP | Performed by: PSYCHOLOGIST

## 2020-01-28 NOTE — PROGRESS NOTES
"INITIAL PSYCHOLOGY INTERVIEW AND TREATMENT PLAN (seen for 50 min)  Referred by: Self  Identifying information. Ms Guillory is a 40yo  woman and the mother of 3 children, ages 6, 4, and 2yrs.  She was seen with her , Chi, at her request.  She is employed full time as an analyst for a [employer]; he is employed full time as a . Information is based on a single interview and review of the EMR.  information is estimated to re valid and reliable but incomplete.  Presenting problem.  Decision making associated with future pregnancy.  History of presenting problem.   AR#1.  Reproductive concerns.  The couple used IVF for their first and third children.  Following the birth of their youngest son, they chose to freeze 2 embryos.  Subsequently, the couple understood that one of these embryos was not viable and the results of testing the 2nd embryo was inconclusive. They were ambivalent about additional children and concluded they would not repeat IVF.  Therefore, they more-or-less concluded they would not have additional children. Recently they decided to stop paying for maintaining the frozen status of the two embryos.  However, they had not decided what they would do with their two embryos.  Ms Guillory then decided she would have the 2nd frozen embryo retested, found out it is a girl and it is, in fact, viable.  While they had thought decision-making about having a third child was completed, ie, not possible using current frozen embryos, suddenly/unexpectedly  they have the option to use one.  Now they are confronted with the decision to implant vs do not implant the embryo, opting for another pregnancy; if they choose not to implant, the decision about disposing of the embryos remains.  They recognize pros:cons about these decisions. Mr Guillory especially notes that their family life has been \"very difficult\", their oldest son had some developmental issues and requires significant attention. He also notes " that he believes the couple has both the financial and emotional resources to raise a 4th child.   IMPRESSION.  Realistic concerns for the couple regarding decision-making associated with reproductive health.   TN#2.  Health.  Ms Guillory was diagnosed with undifferentiated connective tissue disease (MCTD), 6 weeks postpartum, ie, following the birth of their 2nd child (MCTD)(6.20.2016).  She describes that this has been well managed with medication.  She primarily notices fatigue and joint pain (arthritis). The course of this condition is unpredictable.  She has checked with her rheumatologist about another pregnancy and was told to proceed if she would like to.  We did discuss that stress impacts health conditions in negative ways.  IMPRESSION. Chronic health condition - uncertain prognosis, uncertain impact on personal health but fatigue is a major component for her.  Additional family and personal history.  History incomplete.  Health.  Chronic health issue: Undifferentiated connective tissue disease (MCTD).  Fatigue.  Mental status.  Ms Guillory presents herself in a straight-forward, clear and self-disclosing manner.  She, and to a lesser extent, her  freely expressed a range of issues, concerns, information, and emotions influencing their decision-making.  Attention, concentration, thought processes, range of information, memory, judgment, orientation, and eye contact were satisfactory.  Affect: serious.  Mood: apprehensive. The overall impression was of two competent individuals facing important decisions, invested in making this a joint process, and committed to being as thoughtful and responsible as possible.  Counseling.  The majority of time was used to identify and review information/ideas/feelings associated with a 4th pregnancy and secondarily, egg disposal.  The focus was not on making a definitive decision about pregnancy or egg disposal but on appreciating the variety of associated issues,  concerns, and possible outcomes.  The couple were both engaged in this effort and concluded it had been useful to consider their perspectives together and 'out loud'.  Diagnosis    Axis I.  Adjustment disorder (F43.20).  JAVIER by history.  Axis II.  Deferred  Axis III. MCTD  Axis IV. Psychosocial stress:  MODERATE  Axis V.  GAF past yr;  95 GAF current, 95  PLAN.  RT PRN

## 2020-02-08 NOTE — TELEPHONE ENCOUNTER
Dos 2/5/20: call back 2/8/20-I left a message for the patient to return my call.     It does not seem to be related to her undifferentiated connective tissue disease, if she does not have PCP, I recommend her to be seen by OB or urgent care as this could be cold or infection. She has small children and might need to be ruled out for strep throat.

## 2020-02-11 NOTE — TELEPHONE ENCOUNTER
RECORDS RECEIVED FROM: [facility]- Dr. Nasrin King    DATE RECEIVED: 3/25/2020   NOTES STATUS DETAILS   OFFICE NOTE from referring provider Internal 2/10/2020 Referral    OFFICE NOTE from other specialist N.A    DISCHARGE SUMMARY from hospital N/A    OPERATIVE REPORT N/A    MEDICATION LIST Internal         ENDOSCOPY  N/A    COLONOSCOPY N/A    ERCP N/A    EUS N/A    STOOL TESTING Internal    PERTINENT LABS Internal    PATHOLOGY REPORTS (RELATED) N/A    IMAGING (CT, MRI, EGD) Internal US Pelvic: 3/8/19     REFERRAL INFORMATION    Date referral was placed: 3/25/2020   Date all records received: N/A   Date records were scanned into Epic: N/A   Date records were sent to Provider to review: N/A   Date and recommendation received from provider:  LETTER SENT  SCHEDULE APPOINTMENT   Date patient was contacted to schedule: 2/11/2020     *call Pt for any outside med recs    2/25/2020 12:47pm Called and spoke with Pt; Pt has not been seen for this issue. Alyce

## 2020-03-15 DIAGNOSIS — M35.9 UNDIFFERENTIATED CONNECTIVE TISSUE DISEASE (H): ICD-10-CM

## 2020-03-17 RX ORDER — HYDROXYCHLOROQUINE SULFATE 200 MG/1
TABLET, FILM COATED ORAL
Qty: 180 TABLET | Refills: 3 | OUTPATIENT
Start: 2020-03-17

## 2020-03-19 ENCOUNTER — TELEPHONE (OUTPATIENT)
Dept: RADIOLOGY | Facility: CLINIC | Age: 40
End: 2020-03-19

## 2020-03-19 NOTE — TELEPHONE ENCOUNTER
LM for patient regarding changed appointment from 4/8 to 5/20 Labs 2:00, US @ 2:30,  @ 3:20.  Itinerary sent, call back number was provided.  Florence Anderson LPN

## 2020-03-20 ENCOUNTER — TELEPHONE (OUTPATIENT)
Dept: GASTROENTEROLOGY | Facility: CLINIC | Age: 40
End: 2020-03-20

## 2020-03-25 ENCOUNTER — PRE VISIT (OUTPATIENT)
Dept: GASTROENTEROLOGY | Facility: CLINIC | Age: 40
End: 2020-03-25

## 2020-03-25 ENCOUNTER — VIRTUAL VISIT (OUTPATIENT)
Dept: GASTROENTEROLOGY | Facility: CLINIC | Age: 40
End: 2020-03-25
Attending: INTERNAL MEDICINE

## 2020-03-25 DIAGNOSIS — R14.0 ABDOMINAL BLOATING: ICD-10-CM

## 2020-03-25 DIAGNOSIS — R53.83 FATIGUE, UNSPECIFIED TYPE: ICD-10-CM

## 2020-03-25 DIAGNOSIS — R19.8 IRREGULAR BOWEL HABITS: Primary | ICD-10-CM

## 2020-03-25 DIAGNOSIS — K62.5 BRIGHT RED BLOOD PER RECTUM: ICD-10-CM

## 2020-03-25 NOTE — PATIENT INSTRUCTIONS
It was a pleasure taking care of you today.  I've included a brief summary of our discussion and care plan from today's visit below.  Please review this information with your primary care provider.  ______________________________________________________________________    My recommendations are summarized as follows:    -- labs when able- The order for this is in, can be completed at our lab. To schedule lab appointment here, use my chart or call [phone].     -- avoid aerophagia or swallowing extra air with gum, straws, hard candy, and carbonated beverages     -- Increased gas production is associate with certain foods - cruciferous veggies- brocalli, cabbage, beans. Try and limit these foods     -- Soluble fiber supplementation on a daily basis can help regulate the consistency of your stools. Metamucil, citrucel or benefiber are all examples. You can start with 1 teaspoon per day and if tolerated, may increase up to 2-3 teaspoons per day (slowly over the span of a couple of weeks). You may experience some bloating with initiation of fiber supplementation that will improve over the first month.  A good fiber trial to evaluate the effect is 3-6 months.    -- can try simethicone over the counter- this helps to break down size of gas bubbles     -- track symptoms including stool consistency, possible food triggers, bloating, and blood with bowel movements     -- will schedule colonoscopy to further evaluate bleeding    Return to GI Clinic in 6 weeks  to review your progress.    ______________________________________________________________________    Who do I call with any questions after my visit?  Please be in touch if there are any further questions that arise following today's visit.  There are multiple ways to contact your gastroenterology care team.        During business hours, you may reach a Gastroenterology nurse at [phone]      To schedule or reschedule an appointment, please call [phone].        You can always send a secure message through [employer].  [employer] messages are answered by your nurse or doctor typically within 24 hours.  Please allow extra time on weekends and holidays.        For urgent/emergent questions after business hours, you may reach the on-call GI Fellow by contacting the [facility]  at [phone].     How will I get the results of any tests ordered?    You will receive all of your results.  If you have signed up for [employer], any tests ordered at your visit will be available to you after your physician reviews them.  Typically this takes 1-2 weeks.  If there are urgent results that require a change in your care plan, your physician or nurse will call you to discuss the next steps.      What is [employer]?  [employer] is a secure way for you to access all of your healthcare records from the [facility].  It is a web based computer program, so you can sign on to it from any location.  It also allows you to send secure messages to your care team.  I recommend signing up for [employer] access if you have not already done so and are comfortable with using a computer.      How to I schedule a follow-up visit?  If you did not schedule a follow-up visit today, please call [phone] to schedule a follow-up office visit.        Sincerely,    Billie Jang PA-C  Division of Gastroenterology, Hepatology & Nutrition  [facility]

## 2020-03-25 NOTE — PROGRESS NOTES
GI TELEPHONE CLINIC VISIT    CC/REFERRING MD:  Nasrin King    Patient's visit was conducted via telephone visit vs. an in person visit due to Covid-19 pandemic. Patient agrees to telephone encounter today.     I spoke with Heather Guillory over the phone today    REASON FOR CONSULTATION: new patient. Abdominal discomfort, bowel irregularity, nausea     PHONE CALL CONTACT TIME: 10:07 - 10:45    ASSESSMENT/PLAN:  Heather Guillory is a 39 year old woman with a past medical history of undifferentiated connective tissue disease (on hcq) referred from Dr. King for evaluation of abdominal discomfort, bowel irregulairty, nausea.    1.  Irregular bowel movements, abdominal discomfort, bloating  Patient reports increased abdominal bloating and discomfort, irregular bowel movements and occasional straining with bowel movements.  Symptoms may be due to underlying constipation (irritable bowel syndrome with constipation, idiopathic constipation).  Also recently restarted low-dose Zoloft which may have GI side effects.  Would recommend regulating bowel movements with low-dose fiber supplementation and increasing to effect as outlined below.  Also discussed that she should avoid aerophagia, gas producing foods, and can use simethicone as needed for abdominal bloating.      Patient should track her bowel movements and will consider laxative pending stool pattern.  May benefit from meeting with our dietitian to discuss possible food triggers.    -- avoid aerophagia or swallowing extra air with gum, straws, hard candy, and carbonated beverages   -- Increased gas production is associate with certain foods - cruciferous veggies- brocalli, cabbage, beans. Try and limit these foods   -- Soluble fiber supplementation on a daily basis can help regulate the consistency of your stools. Metamucil, citrucel or benefiber are all examples. You can start with 1 teaspoon per day and if tolerated, may increase up to 2-3 teaspoons per day (slowly  "over the span of a couple of weeks). You may experience some bloating with initiation of fiber supplementation that will improve over the first month.  A good fiber trial to evaluate the effect is 3-6 months.  -- can try simethicone over the counter- this helps to break down size of gas bubbles   -- track symptoms including stool consistency, possible food triggers, bloating, and blood with bowel movements     2.  Blood with bowel movements  Patient reports blood with bowel movements primarily within stool starting about 6 weeks ago.  Does have history of external hemorrhoids though does not feel like this is a problem now.  Will treat GI symptoms as outlined above-would recommend monitoring blood with bowel movements.  We will also check CBC and iron studies today.  We will plan to evaluate with colonoscopy.  If symptoms worsen, she has evidence of iron deficiency anemia, she has weight loss or other new symptoms, will plan to do colonoscopy urgently but be done in the able in the setting of Covid-19 pandemic.  If symptoms improve with adequate regulation of GI symptoms, will consider deferring colonoscopy.  --Labs  --Colonoscopy    RTC 6 weeks     Thank you for this consultation.  It was a pleasure to participate in the care of this patient; please contact us with any further questions.       Billie Jang PA-C  Division of Gastroenterology, Hepatology & Nutrition  [facility]        HPI  Heather Guillory is a 39 year old woman with a past medical history of undifferentiated connective tissue disease (on hcq) referred from Dr. King for evaluation of abdominal discomfort, bowel irregulairty, nausea. She is new to [facility] and this is my first encounter with the patient.     Labs 12/16/19 with normal lfts, CRP, CBC    Her entire life has had \"irritable bowel\" where food seems to affect her and can cause symptoms. Last 3-4 months, GI symptoms have worsened. Bloating, distended, fatigued. Feels really " "\"sluggish\" and that her system is off. About a year ago, patient switched diet to more plant based foods which she followed for about six months. Has slowly introduced foods back and is wondering if symptoms may be due to food sensitivity.     Last 6 weeks has intermittent blood in the stool. Not happening with all bowel movements. This is mixed into stool, dark red on the stool. No blood when wiping, no melena. Personal history of external hemorrhoids, had hemorrhoidal bleeding in the past but states that this is not consistent with current blood.     Bowel pattern: will have a bm every couple of days. Sometimes feels like she has some constipation- will have to strain and have smaller bits. Consistency is typically firm- rarely will have loose stools. Will not have to strain on a normal day.Typically does feel empty with bowel movements.     Bloating and distension: feels bloated most days. Will last a couple of hours, seems to worsen in the evening. Increased flatus, no increased eructation. It can improve with passing gas or having a bowel movement. Has not tried medications. Eating can worsen bloating. Dairy seems to worsen symptoms, avoids garlic, onion. Pain when pressing on her abdomen (1 inch below belly button).     Rare nausea, no vomiting, no GERD.     Sister with internal hemorrhoids, mother with IBS type symptoms and colectomy with diverticulitis. Grandfather with colon cancer      ROS:    No fevers or chills  No weight loss  No blurry vision, double vision or change in vision  No sore throat  No lymphadenopathy  No headache, paraesthesias, or weakness in a limb  No shortness of breath or wheezing  No chest pain or pressure  + arthralgias or myalgias- with rheum condition (plaquinl)   No rashes or skin changes  No odynophagia or dysphagia  No hematochezia, melena  No dysuria, frequency or urgency  No hot/cold intolerance or polyria  + anxiety - takes zoloft, restarted in the past couple of weeks "     PROBLEM LIST  Patient Active Problem List    Diagnosis Date Noted     Varicose veins of left lower extremity with pain 10/29/2019     Priority: Medium     JAVIER (generalized anxiety disorder)      Priority: Medium     has a Rx for Zoloft; not using currently       Undifferentiated connective tissue disease (H)      Priority: Medium     Rosacea      Priority: Medium       PERTINENT PAST MEDICAL HISTORY:  Past Medical History:   Diagnosis Date     JAVIER (generalized anxiety disorder)     has a Rx for Zoloft; not using currently     Rosacea      Undifferentiated connective tissue disease (H)        PREVIOUS SURGERIES:  Past Surgical History:   Procedure Laterality Date     IR ENDOVENOUS ABLATION VARICOSE VEINS  10/28/2019     IR FOLLOW UP VISIT OUTPATIENT  11/20/2019     IR STAB PHLEBECTOMY 10 - 20 STABS  10/28/2019     IR VEIN SCLEROSING MULTIPLE  10/28/2019     OPERATIVE HYSTEROSCOPY WITH MORCELLATOR  4/8/2014    Procedure: OPERATIVE HYSTEROSCOPY WITH MORCELLATOR;  Hysteroscopic Polypectomy;  Surgeon: Cate Mansfield MD;  Location: [facility]       PREVIOUS ENDOSCOPY:  None     ALLERGIES:     Allergies   Allergen Reactions     Benzoyl Peroxide Swelling     Duricef [Cefadroxil] Unknown     Monistat [Miconazole] Swelling     Sulfa Drugs Rash       PERTINENT MEDICATIONS:    Current Outpatient Medications:      hydroxychloroquine (PLAQUENIL) 200 MG tablet, Take 1 tablet (200 mg) by mouth 2 times daily 200 mg twice a day on Mon/Wed/Fri and then 200 mg a day for the rest of the week, Disp: 180 tablet, Rfl: 3     levonorgestrel (MIRENA, 52 MG,) 20 MCG/24HR IUD, 1 each (20 mcg) by Intrauterine route once for 1 dose, Disp: 1 each, Rfl: 0     metroNIDAZOLE (METROCREAM) 0.75 % external cream, , Disp: , Rfl:      Prenatal Vit-Fe Fumarate-FA (PRENATAL VITAMINS) 28-0.8 MG TABS, Take 1 tablet by mouth daily , Disp: , Rfl:      sertraline (ZOLOFT) 50 MG tablet, Take 50 mg by mouth daily, Disp: , Rfl: 3    SOCIAL HISTORY:  No  alcohol or drug use   Social History     Socioeconomic History     Marital status:      Spouse name: Chi     Number of children: 2     Years of education: Not on file     Highest education level: Not on file   Occupational History     Occupation:      Employer: [employer]   Social Needs     Financial resource strain: Not on file     Food insecurity     Worry: Not on file     Inability: Not on file     Transportation needs     Medical: Not on file     Non-medical: Not on file   Tobacco Use     Smoking status: Never Smoker     Smokeless tobacco: Never Used   Substance and Sexual Activity     Alcohol use: No     Alcohol/week: 0.0 standard drinks     Drug use: No     Sexual activity: Yes     Partners: Male     Birth control/protection: I.U.D.     Comment: Mirena placed in 2018   Lifestyle     Physical activity     Days per week: Not on file     Minutes per session: Not on file     Stress: Not on file   Relationships     Social connections     Talks on phone: Not on file     Gets together: Not on file     Attends Latter day service: Not on file     Active member of club or organization: Not on file     Attends meetings of clubs or organizations: Not on file     Relationship status: Not on file     Intimate partner violence     Fear of current or ex partner: Not on file     Emotionally abused: Not on file     Physically abused: Not on file     Forced sexual activity: Not on file   Other Topics Concern      Service No     Blood Transfusions No     Caffeine Concern No     Occupational Exposure No     Hobby Hazards No     Sleep Concern No     Stress Concern No     Weight Concern No     Special Diet No     Back Care No     Exercise No     Bike Helmet Yes     Comment: n/a     Seat Belt Yes     Self-Exams No     Parent/sibling w/ CABG, MI or angioplasty before 65F 55M? No   Social History Narrative    .    3 boys (4, 3, and 18 months as of 2019).    Exercises when able.        FAMILY  HISTORY:  FH of CRC: maternal grandfather with CRC (late 70s early 80s), no polyps   FH of IBD: none   Family History   Problem Relation Age of Onset     Hypertension Paternal Grandfather      Lung Cancer Paternal Grandfather         smoker     Diabetes Type 2  Paternal Grandfather      Breast Cancer Paternal Grandmother      Hypertension Paternal Grandmother      Colon Cancer Maternal Grandfather      Alzheimer Disease Maternal Grandmother      Cervical Cancer Maternal Grandmother      Arrhythmia Brother         details unknown; procedure in his 20s     Anxiety Disorder Brother      Elijah Disease Sister      Myocardial Infarction No family hx of      Cerebrovascular Disease No family hx of      Coronary Artery Disease Early Onset No family hx of      Ovarian Cancer No family hx of        Past/family/social history reviewed and no changes    PHYSICAL EXAMINATION:  Not conducted as visit is telephone encounter     PERTINENT STUDIES:    Orders Only on 01/20/2020   Component Date Value Ref Range Status     Thyroid Peroxidase Antibody 01/20/2020 31  <35 IU/mL Final     SSB (La) (ROMEL) Antibody, IgG 01/20/2020 <0.2  0.0 - 0.9 AI Final     SSA (Ro) (ROMEL) Antibody, IgG 01/20/2020 <0.2  0.0 - 0.9 AI Final     Lupus Result 01/20/2020 Negative  NEG^Negative Final     Complement C4 01/20/2020 21  13 - 39 mg/dL Final     Complement C3 01/20/2020 80* 81 - 157 mg/dL Final     Beta 2 Glycoprotein 1 Antibody IgG 01/20/2020 1.2  <7 U/mL Final     Beta 2 Glycoprotein 1 Antibody IgM 01/20/2020 <2.9  <7 U/mL Final     Cardiolipin Antibody IgG 01/20/2020 <1.6  0.0 - 19.9 GPL-U/mL Final     Cardiolipin Antibody IgM 01/20/2020 <0.2  0.0 - 19.9 MPL-U/mL Final

## 2020-03-27 DIAGNOSIS — M35.9 UNDIFFERENTIATED CONNECTIVE TISSUE DISEASE (H): ICD-10-CM

## 2020-03-30 RX ORDER — HYDROXYCHLOROQUINE SULFATE 200 MG/1
200 TABLET, FILM COATED ORAL 2 TIMES DAILY
Qty: 60 TABLET | Refills: 5 | Status: SHIPPED | OUTPATIENT
Start: 2020-03-30 | End: 2020-11-20

## 2020-03-30 NOTE — TELEPHONE ENCOUNTER
HYDROXYCHLOROQUINE 200 MG TAB   Plaquenil      Last Written Prescription Date:  1/20/2020  Last Fill Quantity: 180,   # refills: 3  Last Office Visit: 1/20/2020  Future Office visit: 6/19/2020  Last Eye Exam:10/21/2019    Routing refill request to provider for review/approval because:  Pt requesting alternative pharmacy for Medication.      Drug not on the [facility], [facility] or  Health refill protocol or controlled substance    Renetta Corado RN  Central Triage Red Flags/Med Refills

## 2020-06-10 DIAGNOSIS — F33.0 MILD RECURRENT MAJOR DEPRESSION (H): Primary | ICD-10-CM

## 2020-06-19 ENCOUNTER — VIRTUAL VISIT (OUTPATIENT)
Dept: RHEUMATOLOGY | Facility: CLINIC | Age: 40
End: 2020-06-19
Attending: INTERNAL MEDICINE
Payer: COMMERCIAL

## 2020-06-19 DIAGNOSIS — M35.9 UNDIFFERENTIATED CONNECTIVE TISSUE DISEASE (H): Primary | ICD-10-CM

## 2020-06-19 ASSESSMENT — PAIN SCALES - GENERAL: PAINLEVEL: NO PAIN (0)

## 2020-06-19 NOTE — PROGRESS NOTES
Rheumatology Virtual Visit Note     Heather Guillory MRN# [id number]   YOB: 1980 Age: 40 year old     Date of Last Visit: 2020  DOS: 2020    Reason for visit: UCTD     (this is a video visit due to COVID-19 outbreak), patient agreed         Assessment and Plan:   Undifferentiated connective tissue disease (UCTD) and pregnancy via IVF (s/p delivery 2018):    Heather is a 40 yo WF , a former patient of Dr. Brewer. She was diagnosed with MCTD in 2016, 6 wk postpartum based on fatigue, arthritis, mild Raynaud's, low positive NINOSKA 1.1 and low positive RNP Ab 1.6. She is on  mg qd since 2017 with great response.  UCTD continues to be most likely diagnosis not MCTD as she does not have classic features of MCTD, had very low titer of RNP Ab in the past, (negative on most recent check), and had a mild disease which never required prednisone.  All autoimmunity labs (2017) came back negative (except + NINOSKA) which is further reassuring for UCTD, including APS panel, repeat RNP and inflammatory markers, dsDNA and complement levels.      She had neg SSA/SSB antibodies in 2016, no concern for CHB. No need to re-check.  APS panel was neg.  She had neg anti-Sm, neg anti-DNA and neg centromere Ab in 2016.    Today:  Disease continues to be under good control on HCQ. She did not flare during pregnancy. She had about 2 wk increased arthralgia around 2018 which responded to NSAIDs, labs were not suggestive of a flare. In 2019, recommended to taper plaquenil to 200 mg twice a day on Mon/Wed/Fri and then 1 tab for the rest of the week. Had increased pain/stiffness in hands and feet and C3 dropped. So increased HCQ back to 200 mg bid. Doing well with improved arthralgia. Is here to discuss plans for future pregnancy, she has 3 sons but has an embryo left from IVF cycle and last visit wanted to proceed with implantation as its is a girl, but today, reports her plan to be on hold for  now.     From rheumatology standpoint, ok to proceed with pregnancy. Will monitor closely with checking labs and will continue HCQ as it is safe in pregnancy.    She wants to establish care with PCP.    Today's plan:    Continue   mg bid    Was reminded to have eye exam on HCQ.    Labs in July or Aug    Refer to PCP     Return in 6 months        Video visit: 2:05-2:23 pm    Orders Placed This Encounter   Procedures     ALT     Albumin level     AST     CBC with platelets differential     Creatinine     Complement C4     Complement C3     CRP inflammation     DNA double stranded antibodies     Erythrocyte sedimentation rate auto     UA with Microscopic reflex to Culture     Protein  random urine with Creat Ratio     Creatinine random urine     INTERNAL MEDICINE REFERRAL     Nasrin King MD          Active Problem List:     Patient Active Problem List    Diagnosis Date Noted     Varicose veins of left lower extremity with pain 10/29/2019     Priority: Medium     JAVIER (generalized anxiety disorder)      Priority: Medium     has a Rx for Zoloft; not using currently       Undifferentiated connective tissue disease (H)      Priority: Medium     Rosacea      Priority: Medium            History of Present Illness:   Heather Guillory presents for f/u for probable mixed connective tissue disease. Last seen in 1-17, when HCQ was continued.    Background: received diagnosis of mixed connective tissue given pain in hand and feet, stiffness, Raynaud's  Syndrome, fatigue, positive NINOSKA and anti-RNP in 2016. Ms. Guillory first experienced swelling and pain in her fingers and feet during her second pregnancy this past spring that prevented her from wearing rings on her fingers and provoked an evaluation for DVT, which was negative. Despite weight loss post partum, her pain, mild swelling, and stiffness in her digits persisted. She described the pain as a 7/10 when she wakes to feed her infant in the middle of the night and in the  morning that decreases to a 2-3/10 over the course of the day. Stiffness lasts 30 mins to 1 hour in the morning. In general she feels tired all the time, but cannot determine if it is associated with that fact that she has two young children and is breastfeeding every 2 hours or if it is related to her joint symptoms. She was evaluated by an internist who found that she had a positive NINOSKA/ She saw Dr. Iniguez in 8-16 who discovered +RNP. She started Plaquenil 200 mg BID.    Interval history 7/14/17  Heather Guillory is stable since her last visit in January, despite feeling more fatigued recently. She denied arthralgias/myalgias but reports having sore feet/toes at end of day and when she first wakes up. She states the fatigue worsened since she ran out of Zoloft 3.5 weeks ago; also reports irritability and no sleeping soundly during the night since that time too. Zoloft prescribed by her OBGYN.     She is currently receiving fertility treatment and plans to undergo embryo transfer on 7/24/17. She remains on HCQ and feels comfortable continuing this medication during pregnancy. She wonders if taking HCQ once daily (400) rather than 200 twice daily is acceptable.  She is using estrogen therapy in preparation for the embryo transfer, and relates that her gynecologist recommends that she remained on the supplement for approximately one month after the transfer.    She recently pinched a nerve in her neck and has numbness/tingling with burning sensation in her left arm that radiates down into her 1st-3rd digits. She was evaluated at [facility] orthopedics, received percocet for pain and told to have PT. If it did not improve in 6 weeks, she will return for another evaluation.     Lastly, she had dry, rough skin on lateral aspects of fingers. She states this often occurs in winter due to weather but it is new for her this summer. She is using a lot of moisturizers but it has not been helping.         9/28/17:   Heather is  former patient of Dr. Brewer, is seeing me for 2nd opinion and is transferring care. She is currently pregnant and is concerned about her flare of MCTD during pregnancy, especially worried about flare being triggered by IVF.     She had her 1st pregnancy in 2/2015. It was via IVF. It was unexplained fertility. It was uneventful. Her son was born term. His 2nd son was born natuarally in 5/2016, term and healthy.       At 6 week post partum check up, she mentioned AM stiffness and stiffness of hands (new), feet were painful. Hands were swollen. Had fatigue, was breastfeeding att hat time.    No hair loss, skin rash, photosensitivity (but burns easily), oral/nasal ulcers, or sicca.    Has h/o Raynaud's since teen. Fingers turn white, toes turn purple, not painful. They feel cold not painful.    No myositis.     No serositis.    No seziures, headaches, psychosis.    Has sensitive stomach.    No fevers.     No h/o proteinuria.    She was diagnosed with MTCD in 6/2016. She started taking HCQ in 9/2016.  She started HCQ after she stopped breastfeeding. Had one flare up after stopping breastfeeding. She started  bid, last eye exam was this summer of 2017.     No prednisone.    Lack of sleep causes joint pain.     She did another round of IVF in 4/2017.  She was on OC x 10 wk . Embryo transfer was 7/14/2017. Now is off estrogen, progestron x 2 wk, now 12 wk pregnant.     No flare of her disease after IVF.    She was on ASA 81 mg qd till she was confirmed to be pregnant.    No preganncy loses. No thrombosis.    Feet feel sore and achy only with lack of sleep.     JOSEPH is 4/11/2018.    Interval History 12/14/17:  Heather has no major concerns or changes in symptoms at today's appointment.    Hasn't experienced any joint pains for about 9-12 months.  No stiffness in her muscles or joints.  She hasn't had any Raynaud's and notes that she is keeping her hands and feet adequately covered and warm.      She does note that  she has been fatigued lately but attributes that to her 2 toddlers at home who aren't sleeping throughout the night.  Also notes easy bruising but thinks that it is chronic.  Denies any hair loss, mouth sores/ulcers, HA, F/chills, night sweats.    She is taking hydroxychloroquine and is tolerating it well.      No significant infections although she wanted to inform us about her UTI with ESBL bacteria which was diagnosed when she was 8 weeks pregnant.  At that time, she was hospitalized and given IV antibiotics.  Ever since, she has experienced recurring yeast infections.  She tried diflucan x 3 without improvement.  Finally, she has changed her diet, cutting out sugars, which is improving her symptoms.      3/2/2018: Feeling very well with no flare ups or joint pain due to UCTD. Pregnancy is going well as well, she is due on 4/11/2018. She still has problem with vaginal yeast infection, is allergic to topical anti-fungal Tx, unresponsive to one time fluconazole tx, was offered to try 7 days of fluconazole but prefers to hold off as pregnancy is almost over and per her experience, vaginal yeast infection revolves after delivery.    6/2018: Doing well post delivery. Delivered a healthy boy on 4/5/2018 and is breastfeeding, not sure if she will have another child, has an IUD placed in 5/2018. On 5/23, contacted us with recurrence of arthralgia (hands, feet x 10-14days) which self-resolved, took some NSAIDs, today is feeling well with no complaints. Labs on 5/23 were not suggestive of a flare.    2/2019: Feeling very well. No complaints.     8/23/2019: Reports intermittent soreness of hands and feet with stiffness, worsened with stress and lack of sleep. It's worse in AM and at night. No joint swelling, hair loss, oral ulcers, CP or SOB.       1/20/2020: Doing well. Wants to implant her left oevr embryo from IVF cycle this coming spring, but worried about flare of her CTD.    Today 6/19/2020: Feeling very well. No  complaints. Wants to hold off proceeding with another pregnancy. Has no PCP, wants to be referred.         Review of Systems:   A comprehensive ROS was done. Positives are per HPI.          Past Medical History:     Past Medical History:   Diagnosis Date     JAVIER (generalized anxiety disorder)     has a Rx for Zoloft; not using currently     Rosacea      Undifferentiated connective tissue disease (H)      Past Surgical History:   Procedure Laterality Date     IR ENDOVENOUS ABLATION VARICOSE VEINS  10/28/2019     IR FOLLOW UP VISIT OUTPATIENT  11/20/2019     IR STAB PHLEBECTOMY 10 - 20 STABS  10/28/2019     IR VEIN SCLEROSING MULTIPLE  10/28/2019     OPERATIVE HYSTEROSCOPY WITH MORCELLATOR  4/8/2014    Procedure: OPERATIVE HYSTEROSCOPY WITH MORCELLATOR;  Hysteroscopic Polypectomy;  Surgeon: Cate Mansfield MD;  Location: [facility] OR     Raynaud's syndrome since puberty. Her main trigger is cold.  She had two uneventful pregnancies with vaginal births, though the conception of her first child required IVF.   she is underwent embryo transfer in July 2017, now pregnant.   cervical radiculopathy, 2017.       Social History:     Social History     Occupational History     Occupation:      Employer: [employer]   Tobacco Use     Smoking status: Never Smoker     Smokeless tobacco: Never Used   Substance and Sexual Activity     Alcohol use: No     Alcohol/week: 0.0 standard drinks     Drug use: No     Sexual activity: Yes     Partners: Male     Birth control/protection: I.U.D.     Comment: Mirena placed in 2018            Family History:     Family History   Problem Relation Age of Onset     Hypertension Paternal Grandfather      Lung Cancer Paternal Grandfather         smoker     Diabetes Type 2  Paternal Grandfather      Breast Cancer Paternal Grandmother      Hypertension Paternal Grandmother      Colon Cancer Maternal Grandfather      Alzheimer Disease Maternal Grandmother      Cervical Cancer  Maternal Grandmother      Arrhythmia Brother         details unknown; procedure in his 20s     Anxiety Disorder Brother      Elijah Disease Sister      Myocardial Infarction No family hx of      Cerebrovascular Disease No family hx of      Coronary Artery Disease Early Onset No family hx of      Ovarian Cancer No family hx of      No history of AI disease       Allergies:     Allergies   Allergen Reactions     Benzoyl Peroxide Swelling     Duricef [Cefadroxil] Unknown     Monistat [Miconazole] Swelling     Sulfa Drugs Rash            Medications:     Current Outpatient Medications   Medication Sig Dispense Refill     hydroxychloroquine (PLAQUENIL) 200 MG tablet Take 1 tablet (200 mg) by mouth 2 times daily 60 tablet 5     metroNIDAZOLE (METROCREAM) 0.75 % external cream        Prenatal Vit-Fe Fumarate-FA (PRENATAL VITAMINS) 28-0.8 MG TABS Take 1 tablet by mouth daily        sertraline (ZOLOFT) 50 MG tablet TAKE 1 TABLET BY MOUTH EVERY DAY 90 tablet 3     levonorgestrel (MIRENA, 52 MG,) 20 MCG/24HR IUD 1 each (20 mcg) by Intrauterine route once for 1 dose 1 each 0            Physical Exam:   Constitutional: well-developed, appearing stated age; cooperative  Eyes: nl EOM, conjunctiva, sclera  ENT: nl external ears, nose,  lips  Resp: breathing normally  MS: no active synovitis   Skin: no skin rash  Neuro: nl cranial nerves  Psych: nl affect.         Data:     No results found for any visits on 06/19/20.  Labs from outside laboratory reviewed from 8/12/16  RNP antibodies 1.6- positive    Negative for Parvo Virus B19, Lyme Disease  Negative SLE panel   Negative SSA, SSB  Negative Anti-Sury-1  Negative Centromere B antibodies  Negative  CCP  Recent Labs   Lab Test  07/22/16   1110  05/29/16   0651  05/27/16   1032   01/09/16   0724   WBC  8.1   --   15.4*   --   16.8*   RBC  4.86   --   4.25   --   4.75   HGB  14.1  11.2*  12.4   < >  14.2   HCT  42.1   --   37.6   --   40.1   MCV  87   --   89   --   84   RDW  13.0   --    14.0   --   13.7   PLT  228   --   184   --   202   ALBUMIN   --    --    --    --   3.3*   CRP  3.0   --    --    --    --    BUN   --    --    --    --   9    < > = values in this interval not displayed.      Recent Labs   Lab Test  07/22/16   1110   TSH  1.10     Hemoglobin   Date Value Ref Range Status   12/16/2019 13.7 11.7 - 15.7 g/dL Final   08/26/2019 13.0 11.7 - 15.7 g/dL Final   02/22/2019 14.0 11.7 - 15.7 g/dL Final     Urea Nitrogen   Date Value Ref Range Status   01/09/2016 9 7 - 30 mg/dL Final     Sed Rate   Date Value Ref Range Status   12/16/2019 4 0 - 20 mm/h Final   08/26/2019 4 0 - 20 mm/h Final   02/22/2019 6 0 - 20 mm/h Final     CRP Inflammation   Date Value Ref Range Status   12/16/2019 <2.9 0.0 - 8.0 mg/L Final   08/26/2019 <2.9 0.0 - 8.0 mg/L Final   02/22/2019 <2.9 0.0 - 8.0 mg/L Final     AST   Date Value Ref Range Status   12/16/2019 13 0 - 45 U/L Final   08/26/2019 11 0 - 45 U/L Final   02/22/2019 17 0 - 45 U/L Final     Albumin   Date Value Ref Range Status   12/16/2019 4.2 3.4 - 5.0 g/dL Final   08/26/2019 4.1 3.4 - 5.0 g/dL Final   02/22/2019 4.1 3.4 - 5.0 g/dL Final     Alkaline Phosphatase   Date Value Ref Range Status   01/09/2016 70 40 - 150 U/L Final     ALT   Date Value Ref Range Status   12/16/2019 18 0 - 50 U/L Final   08/26/2019 17 0 - 50 U/L Final   02/22/2019 19 0 - 50 U/L Final     Rheumatoid Factor   Date Value Ref Range Status   07/22/2016 <20 <20 IU/mL Final     RHEUM RESULTS Latest Ref Rng & Units 8/26/2019 12/16/2019 1/20/2020   COMPLEMENT C3 81 - 157 mg/dL 64(L) 68(L) 80(L)   COMPLEMENT C4 13 - 39 mg/dL 16 17 21   DNA-DS <10 IU/mL 4 5 -   SED RATE 0 - 20 mm/h 4 4 -   CRP, INFLAMMATION 0.0 - 8.0 mg/L <2.9 <2.9 -   RHEUMATOID FACTOR <20 IU/mL - - -   NINOSKA SCREEN BY EIA <1.0 - - -   AST 0 - 45 U/L 11 13 -   ALT 0 - 50 U/L 17 18 -   ALBUMIN 3.4 - 5.0 g/dL 4.1 4.2 -   WBC 4.0 - 11.0 10e9/L 6.3 5.8 -   RBC 3.8 - 5.2 10e12/L 4.58 4.78 -   HGB 11.7 - 15.7 g/dL 13.0 13.7 -    HCT 35.0 - 47.0 % 40.6 42.3 -   MCV 78 - 100 fl 89 89 -   MCHC 31.5 - 36.5 g/dL 32.0 32.4 -   RDW 10.0 - 15.0 % 12.2 12.6 -    - 450 10e9/L 185 198 -   CREATININE 0.52 - 1.04 mg/dL 0.54 0.54 -   GFR ESTIMATE, IF BLACK >60 mL/min/[1.73:m2] >90 >90 -   GFR ESTIMATE >60 mL/min/[1.73:m2] >90 >90 -     Component      Latest Ref Rng & Units 5/23/2018   WBC      4.0 - 11.0 10e9/L 6.3   RBC Count      3.8 - 5.2 10e12/L 5.06   Hemoglobin      11.7 - 15.7 g/dL 12.8   Hematocrit      35.0 - 47.0 % 41.9   MCV      78 - 100 fl 83   MCH      26.5 - 33.0 pg 25.3 (L)   MCHC      31.5 - 36.5 g/dL 30.5 (L)   RDW      10.0 - 15.0 % 15.9 (H)   Platelet Count      150 - 450 10e9/L 214   Diff Method       Automated Method   % Neutrophils      % 52.1   % Lymphocytes      % 38.5   % Monocytes      % 6.2   % Eosinophils      % 2.4   % Basophils      % 0.6   % Immature Granulocytes      % 0.2   Nucleated RBCs      0 /100 0   Absolute Neutrophil      1.6 - 8.3 10e9/L 3.3   Absolute Lymphocytes      0.8 - 5.3 10e9/L 2.4   Absolute Monocytes      0.0 - 1.3 10e9/L 0.4   Absolute Eosinophils      0.0 - 0.7 10e9/L 0.2   Absolute Basophils      0.0 - 0.2 10e9/L 0.0   Abs Immature Granulocytes      0 - 0.4 10e9/L 0.0   Absolute Nucleated RBC       0.0   Color Urine       Yellow   Appearance Urine       Clear   Glucose Urine      NEG:Negative mg/dL Negative   Bilirubin Urine      NEG:Negative Negative   Ketones Urine      NEG:Negative mg/dL Negative   Specific Gravity Urine      1.003 - 1.035 1.014   Blood Urine      NEG:Negative Negative   pH Urine      5.0 - 7.0 pH 7.0   Protein Albumin Urine      NEG:Negative mg/dL Negative   Urobilinogen mg/dL      0.0 - 2.0 mg/dL Normal   Nitrite Urine      NEG:Negative Negative   Leukocyte Esterase Urine      NEG:Negative Moderate (A)   Source       Urine   WBC Urine      0 - 5 /HPF 6 (H)   RBC Urine      0 - 2 /HPF 1   Transitional Epi      0 - 1 /HPF <1   Creatinine      0.52 - 1.04 mg/dL 0.71   GFR  Estimate      >60 mL/min/1.7m2 >90   GFR Estimate If Black      >60 mL/min/1.7m2 >90   NINOSKA interpretation      NEG:Negative Positive (A)   NINOSKA pattern 1       DENSE FINE SPECKLED   NINOSKA titer 1       1:160   Specimen Description       Unspecified Urine   Special Requests       Specimen received in preservative   Culture Micro       No growth   Complement C3      76 - 169 mg/dL 85   Complement C4      15 - 50 mg/dL 21   CRP Inflammation      0.0 - 8.0 mg/L <2.9   Sed Rate      0 - 20 mm/h 6   DNA-ds      <10 IU/mL 5   AST      0 - 45 U/L 16   ALT      0 - 50 U/L 17   Albumin      3.4 - 5.0 g/dL 3.8     Reviewed Rheumatology lab flowsheet    Component      Latest Ref Rng & Units 2/22/2019   WBC      4.0 - 11.0 10e9/L 5.8   RBC Count      3.8 - 5.2 10e12/L 5.07   Hemoglobin      11.7 - 15.7 g/dL 14.0   Hematocrit      35.0 - 47.0 % 45.1   MCV      78 - 100 fl 89   MCH      26.5 - 33.0 pg 27.6   MCHC      31.5 - 36.5 g/dL 31.0 (L)   RDW      10.0 - 15.0 % 12.6   Platelet Count      150 - 450 10e9/L 222   Diff Method       Automated Method   % Neutrophils      % 53.6   % Lymphocytes      % 36.1   % Monocytes      % 6.4   % Eosinophils      % 2.6   % Basophils      % 1.0   % Immature Granulocytes      % 0.3   Nucleated RBCs      0 /100 0   Absolute Neutrophil      1.6 - 8.3 10e9/L 3.1   Absolute Lymphocytes      0.8 - 5.3 10e9/L 2.1   Absolute Monocytes      0.0 - 1.3 10e9/L 0.4   Absolute Eosinophils      0.0 - 0.7 10e9/L 0.2   Absolute Basophils      0.0 - 0.2 10e9/L 0.1   Abs Immature Granulocytes      0 - 0.4 10e9/L 0.0   Absolute Nucleated RBC       0.0   Color Urine       Yellow   Appearance Urine       Clear   Glucose Urine      NEG:Negative mg/dL Negative   Bilirubin Urine      NEG:Negative Negative   Ketones Urine      NEG:Negative mg/dL Negative   Specific Gravity Urine      1.003 - 1.035 1.023   Blood Urine      NEG:Negative Small (A)   pH Urine      5.0 - 7.0 pH 5.0   Protein Albumin Urine      NEG:Negative  mg/dL Negative   Urobilinogen mg/dL      0.0 - 2.0 mg/dL 0.0   Nitrite Urine      NEG:Negative Negative   Leukocyte Esterase Urine      NEG:Negative Negative   Source       Midstream Urine   WBC Urine      0 - 5 /HPF 1   RBC Urine      0 - 2 /HPF 7 (H)   Squamous Epithelial /HPF Urine      0 - 1 /HPF 2 (H)   Mucous Urine      NEG:Negative /LPF Present (A)   Creatinine      0.52 - 1.04 mg/dL 0.63   GFR Estimate      >60 mL/min/1.73:m2 >90   GFR Estimate If Black      >60 mL/min/1.73:m2 >90   AST      0 - 45 U/L 17   ALT      0 - 50 U/L 19   Albumin      3.4 - 5.0 g/dL 4.1   CRP Inflammation      0.0 - 8.0 mg/L <2.9   DNA-ds      <10 IU/mL 3   Sed Rate      0 - 20 mm/h 6   Complement C4      15 - 50 mg/dL 23   Complement C3      76 - 169 mg/dL 81     Component      Latest Ref Rng & Units 8/26/2019   WBC      4.0 - 11.0 10e9/L 6.3   RBC Count      3.8 - 5.2 10e12/L 4.58   Hemoglobin      11.7 - 15.7 g/dL 13.0   Hematocrit      35.0 - 47.0 % 40.6   MCV      78 - 100 fl 89   MCH      26.5 - 33.0 pg 28.4   MCHC      31.5 - 36.5 g/dL 32.0   RDW      10.0 - 15.0 % 12.2   Platelet Count      150 - 450 10e9/L 185   Diff Method       Automated Method   % Neutrophils      % 51.8   % Lymphocytes      % 38.3   % Monocytes      % 6.5   % Eosinophils      % 2.2   % Basophils      % 0.9   % Immature Granulocytes      % 0.3   Nucleated RBCs      0 /100 0   Absolute Neutrophil      1.6 - 8.3 10e9/L 3.3   Absolute Lymphocytes      0.8 - 5.3 10e9/L 2.4   Absolute Monocytes      0.0 - 1.3 10e9/L 0.4   Absolute Eosinophils      0.0 - 0.7 10e9/L 0.1   Absolute Basophils      0.0 - 0.2 10e9/L 0.1   Abs Immature Granulocytes      0 - 0.4 10e9/L 0.0   Absolute Nucleated RBC       0.0   Color Urine       Straw   Appearance Urine       Clear   Glucose Urine      NEG:Negative mg/dL Negative   Bilirubin Urine      NEG:Negative Negative   Ketones Urine      NEG:Negative mg/dL Negative   Specific Gravity Urine      1.003 - 1.035 1.011   Blood  Urine      NEG:Negative Negative   pH Urine      5.0 - 7.0 pH 7.0   Protein Albumin Urine      NEG:Negative mg/dL Negative   Urobilinogen mg/dL      0.0 - 2.0 mg/dL 0.0   Nitrite Urine      NEG:Negative Negative   Leukocyte Esterase Urine      NEG:Negative Negative   Source       Midstream Urine   WBC Urine      0 - 5 /HPF <1   RBC Urine      0 - 2 /HPF 2   Squamous Epithelial /HPF Urine      0 - 1 /HPF 2 (H)   Mucous Urine      NEG:Negative /LPF    Creatinine      0.52 - 1.04 mg/dL 0.54   GFR Estimate      >60 mL/min/1.73:m2 >90   GFR Estimate If Black      >60 mL/min/1.73:m2 >90   Protein Random Urine      g/L 0.08   Protein Total Urine g/gr Creatinine      0 - 0.2 g/g Cr 0.20   Sed Rate      0 - 20 mm/h 4   DNA-ds      <10 IU/mL 4   CRP Inflammation      0.0 - 8.0 mg/L <2.9   Complement C3      76 - 169 mg/dL 64 (L)   Complement C4      15 - 50 mg/dL 16   AST      0 - 45 U/L 11   Albumin      3.4 - 5.0 g/dL 4.1   ALT      0 - 50 U/L 17   Creatinine Urine      mg/dL 40   Creatinine Urine Random      mg/dL    Lab Scanned Result            Component      Latest Ref Rng & Units 12/16/2019   WBC      4.0 - 11.0 10e9/L 5.8   RBC Count      3.8 - 5.2 10e12/L 4.78   Hemoglobin      11.7 - 15.7 g/dL 13.7   Hematocrit      35.0 - 47.0 % 42.3   MCV      78 - 100 fl 89   MCH      26.5 - 33.0 pg 28.7   MCHC      31.5 - 36.5 g/dL 32.4   RDW      10.0 - 15.0 % 12.6   Platelet Count      150 - 450 10e9/L 198   Diff Method       Automated Method   % Neutrophils      % 63.9   % Lymphocytes      % 28.1   % Monocytes      % 5.9   % Eosinophils      % 1.4   % Basophils      % 0.5   % Immature Granulocytes      % 0.2   Nucleated RBCs      0 /100 0   Absolute Neutrophil      1.6 - 8.3 10e9/L 3.7   Absolute Lymphocytes      0.8 - 5.3 10e9/L 1.6   Absolute Monocytes      0.0 - 1.3 10e9/L 0.3   Absolute Eosinophils      0.0 - 0.7 10e9/L 0.1   Absolute Basophils      0.0 - 0.2 10e9/L 0.0   Abs Immature Granulocytes      0 - 0.4 10e9/L 0.0    Absolute Nucleated RBC       0.0   Color Urine       Yellow   Appearance Urine       Clear   Glucose Urine      NEG:Negative mg/dL Negative   Bilirubin Urine      NEG:Negative Negative   Ketones Urine      NEG:Negative mg/dL Negative   Specific Gravity Urine      1.003 - 1.035 1.022   Blood Urine      NEG:Negative Trace (A)   pH Urine      5.0 - 7.0 pH 7.0   Protein Albumin Urine      NEG:Negative mg/dL 10 (A)   Urobilinogen mg/dL      0.0 - 2.0 mg/dL Normal   Nitrite Urine      NEG:Negative Negative   Leukocyte Esterase Urine      NEG:Negative Negative   Source       Midstream Urine   WBC Urine      0 - 5 /HPF 3   RBC Urine      0 - 2 /HPF 3 (H)   Squamous Epithelial /HPF Urine      0 - 1 /HPF 11 (H)   Mucous Urine      NEG:Negative /LPF Present (A)   Creatinine      0.52 - 1.04 mg/dL 0.54   GFR Estimate      >60 mL/min/1.73:m2 >90   GFR Estimate If Black      >60 mL/min/1.73:m2 >90   Protein Random Urine      g/L 0.29   Protein Total Urine g/gr Creatinine      0 - 0.2 g/g Cr 0.18   Sed Rate      0 - 20 mm/h 4   DNA-ds      <10 IU/mL 5   CRP Inflammation      0.0 - 8.0 mg/L <2.9   Complement C3      76 - 169 mg/dL 68 (L)   Complement C4      15 - 50 mg/dL 17   AST      0 - 45 U/L 13   Albumin      3.4 - 5.0 g/dL 4.2   ALT      0 - 50 U/L 18   Creatinine Urine      mg/dL    Creatinine Urine Random      mg/dL 157   Lab Scanned Result       [facility] SCR-Scanned     Component      Latest Ref Rng & Units 1/20/2020   Cardiolipin Antibody IgG      0.0 - 19.9 GPL-U/mL <1.6   Cardiolipin Antibody IgM      0.0 - 19.9 MPL-U/mL <0.2   Thyroid Peroxidase Antibody      <35 IU/mL 31   SSB (La) (ROMEL) Antibody, IgG      0.0 - 0.9 AI <0.2   SSA (Ro) (ROMEL) Antibody, IgG      0.0 - 0.9 AI <0.2   Lupus Result      NEG:Negative Negative   Complement C4      13 - 39 mg/dL 21   Complement C3      81 - 157 mg/dL 80 (L)   Beta 2 Glycoprotein 1 Antibody IgG      <7 U/mL 1.2   Beta 2 Glycoprotein 1 Antibody IgM      <7 U/mL <2.9

## 2020-06-19 NOTE — PROGRESS NOTES
"Heather Guillory is a 40 year old female who is being evaluated via a billable video visit.      The patient has been notified of following:     \"This video visit will be conducted via a call between you and your physician/provider. We have found that certain health care needs can be provided without the need for an in-person physical exam.  This service lets us provide the care you need with a video conversation.  If a prescription is necessary we can send it directly to your pharmacy.  If lab work is needed we can place an order for that and you can then stop by our lab to have the test done at a later time.    Video visits are billed at different rates depending on your insurance coverage.  Please reach out to your insurance provider with any questions.    If during the course of the call the physician/provider feels a video visit is not appropriate, you will not be charged for this service.\"    Patient has given verbal consent for Video visit? Yes    Will anyone else be joining your video visit? No        Video-Visit Details    Type of service:  Video Visit    Video Start Time: 2:05 PM  Video End Time: 2:23 PM    Originating Location (pt. Location): Home    Distant Location (provider location):   [facility]     Platform used for Video Visit: Lorenzo King MD      "

## 2020-06-19 NOTE — LETTER
"6/19/2020       RE: Heather Guillory  [address]  [place] [address]     Dear Colleague,    Thank you for referring your patient, Heather Guillory, to the [facility] RHEUMATOLOGY at [facility]. Please see a copy of my visit note below.    Heather Guillory is a 40 year old female who is being evaluated via a billable video visit.      The patient has been notified of following:     \"This video visit will be conducted via a call between you and your physician/provider. We have found that certain health care needs can be provided without the need for an in-person physical exam.  This service lets us provide the care you need with a video conversation.  If a prescription is necessary we can send it directly to your pharmacy.  If lab work is needed we can place an order for that and you can then stop by our lab to have the test done at a later time.    Video visits are billed at different rates depending on your insurance coverage.  Please reach out to your insurance provider with any questions.    If during the course of the call the physician/provider feels a video visit is not appropriate, you will not be charged for this service.\"    Patient has given verbal consent for Video visit? Yes    Will anyone else be joining your video visit? No        Video-Visit Details    Type of service:  Video Visit    Video Start Time: 2:05 PM  Video End Time: 2:23 PM    Originating Location (pt. Location): Home    Distant Location (provider location):  [facility] RHEUMATOLOGY     Platform used for Video Visit: Lorenzo King MD        Rheumatology Virtual Visit Note     Heather Guillory MRN# [id number]   YOB: 1980 Age: 40 year old     Date of Last Visit: 1/20/2020  DOS: 6/19/2020    Reason for visit: UCTD     (this is a video visit due to COVID-19 outbreak), patient agreed         Assessment and Plan:   Undifferentiated connective tissue disease (UCTD) and pregnancy via " IVF (s/p delivery 2018):    Heather is a 38 yo WF , a former patient of Dr. Brewer. She was diagnosed with MCTD in 2016, 6 wk postpartum based on fatigue, arthritis, mild Raynaud's, low positive NINOSKA 1.1 and low positive RNP Ab 1.6. She is on  mg qd since 2017 with great response.  UCTD continues to be most likely diagnosis not MCTD as she does not have classic features of MCTD, had very low titer of RNP Ab in the past, (negative on most recent check), and had a mild disease which never required prednisone.  All autoimmunity labs (2017) came back negative (except + NINOSKA) which is further reassuring for UCTD, including APS panel, repeat RNP and inflammatory markers, dsDNA and complement levels.      She had neg SSA/SSB antibodies in 2016, no concern for CHB. No need to re-check.  APS panel was neg.  She had neg anti-Sm, neg anti-DNA and neg centromere Ab in 2016.    Today:  Disease continues to be under good control on HCQ. She did not flare during pregnancy. She had about 2 wk increased arthralgia around 2018 which responded to NSAIDs, labs were not suggestive of a flare. In 2019, recommended to taper plaquenil to 200 mg twice a day on Mon/Wed/Fri and then 1 tab for the rest of the week. Had increased pain/stiffness in hands and feet and C3 dropped. So increased HCQ back to 200 mg bid. Doing well with improved arthralgia. Is here to discuss plans for future pregnancy, she has 3 sons but has an embryo left from IVF cycle and last visit wanted to proceed with implantation as its is a girl, but today, reports her plan to be on hold for now.     From rheumatology standpoint, ok to proceed with pregnancy. Will monitor closely with checking labs and will continue HCQ as it is safe in pregnancy.    She wants to establish care with PCP.    Today's plan:    Continue   mg bid    Was reminded to have eye exam on HCQ.    Labs in July or Aug    Refer to PCP     Return in   months        Video visit: 2:05-2:23 pm    Orders Placed This Encounter   Procedures     ALT     Albumin level     AST     CBC with platelets differential     Creatinine     Complement C4     Complement C3     CRP inflammation     DNA double stranded antibodies     Erythrocyte sedimentation rate auto     UA with Microscopic reflex to Culture     Protein  random urine with Creat Ratio     Creatinine random urine     INTERNAL MEDICINE REFERRAL     Nasrin King MD          Active Problem List:     Patient Active Problem List    Diagnosis Date Noted     Varicose veins of left lower extremity with pain 10/29/2019     Priority: Medium     JAVIER (generalized anxiety disorder)      Priority: Medium     has a Rx for Zoloft; not using currently       Undifferentiated connective tissue disease (H)      Priority: Medium     Rosacea      Priority: Medium            History of Present Illness:   Heather Guillory presents for f/u for probable mixed connective tissue disease. Last seen in 1-17, when HCQ was continued.    Background: received diagnosis of mixed connective tissue given pain in hand and feet, stiffness, Raynaud's  Syndrome, fatigue, positive NINOSKA and anti-RNP in 2016. Ms. Guillory first experienced swelling and pain in her fingers and feet during her second pregnancy this past spring that prevented her from wearing rings on her fingers and provoked an evaluation for DVT, which was negative. Despite weight loss post partum, her pain, mild swelling, and stiffness in her digits persisted. She described the pain as a 7/10 when she wakes to feed her infant in the middle of the night and in the morning that decreases to a 2-3/10 over the course of the day. Stiffness lasts 30 mins to 1 hour in the morning. In general she feels tired all the time, but cannot determine if it is associated with that fact that she has two young children and is breastfeeding every 2 hours or if it is related to her joint symptoms. She was evaluated by  an internist who found that she had a positive NINOSKA/ She saw Dr. Iniguez in 8-16 who discovered +RNP. She started Plaquenil 200 mg BID.    Interval history 7/14/17  Heather Guillory is stable since her last visit in January, despite feeling more fatigued recently. She denied arthralgias/myalgias but reports having sore feet/toes at end of day and when she first wakes up. She states the fatigue worsened since she ran out of Zoloft 3.5 weeks ago; also reports irritability and no sleeping soundly during the night since that time too. Zoloft prescribed by her OBGYN.     She is currently receiving fertility treatment and plans to undergo embryo transfer on 7/24/17. She remains on HCQ and feels comfortable continuing this medication during pregnancy. She wonders if taking HCQ once daily (400) rather than 200 twice daily is acceptable.  She is using estrogen therapy in preparation for the embryo transfer, and relates that her gynecologist recommends that she remained on the supplement for approximately one month after the transfer.    She recently pinched a nerve in her neck and has numbness/tingling with burning sensation in her left arm that radiates down into her 1st-3rd digits. She was evaluated at [facility] orthopedics, received percocet for pain and told to have PT. If it did not improve in 6 weeks, she will return for another evaluation.     Lastly, she had dry, rough skin on lateral aspects of fingers. She states this often occurs in winter due to weather but it is new for her this summer. She is using a lot of moisturizers but it has not been helping.         9/28/17:   Heather is former patient of Dr. Brewer, is seeing me for 2nd opinion and is transferring care. She is currently pregnant and is concerned about her flare of MCTD during pregnancy, especially worried about flare being triggered by IVF.     She had her 1st pregnancy in 2/2015. It was via IVF. It was unexplained fertility. It was uneventful. Her son was  born term. His 2nd son was born natuarally in 5/2016, term and healthy.       At 6 week post partum check up, she mentioned AM stiffness and stiffness of hands (new), feet were painful. Hands were swollen. Had fatigue, was breastfeeding att hat time.    No hair loss, skin rash, photosensitivity (but burns easily), oral/nasal ulcers, or sicca.    Has h/o Raynaud's since teen. Fingers turn white, toes turn purple, not painful. They feel cold not painful.    No myositis.     No serositis.    No seziures, headaches, psychosis.    Has sensitive stomach.    No fevers.     No h/o proteinuria.    She was diagnosed with MTCD in 6/2016. She started taking HCQ in 9/2016.  She started HCQ after she stopped breastfeeding. Had one flare up after stopping breastfeeding. She started  bid, last eye exam was this summer of 2017.     No prednisone.    Lack of sleep causes joint pain.     She did another round of IVF in 4/2017.  She was on OC x 10 wk . Embryo transfer was 7/14/2017. Now is off estrogen, progestron x 2 wk, now 12 wk pregnant.     No flare of her disease after IVF.    She was on ASA 81 mg qd till she was confirmed to be pregnant.    No preganncy loses. No thrombosis.    Feet feel sore and achy only with lack of sleep.     JOSEPH is 4/11/2018.    Interval History 12/14/17:  Heather has no major concerns or changes in symptoms at today's appointment.    Hasn't experienced any joint pains for about 9-12 months.  No stiffness in her muscles or joints.  She hasn't had any Raynaud's and notes that she is keeping her hands and feet adequately covered and warm.      She does note that she has been fatigued lately but attributes that to her 2 toddlers at home who aren't sleeping throughout the night.  Also notes easy bruising but thinks that it is chronic.  Denies any hair loss, mouth sores/ulcers, HA, F/chills, night sweats.    She is taking hydroxychloroquine and is tolerating it well.      No significant infections  although she wanted to inform us about her UTI with ESBL bacteria which was diagnosed when she was 8 weeks pregnant.  At that time, she was hospitalized and given IV antibiotics.  Ever since, she has experienced recurring yeast infections.  She tried diflucan x 3 without improvement.  Finally, she has changed her diet, cutting out sugars, which is improving her symptoms.      3/2/2018: Feeling very well with no flare ups or joint pain due to UCTD. Pregnancy is going well as well, she is due on 4/11/2018. She still has problem with vaginal yeast infection, is allergic to topical anti-fungal Tx, unresponsive to one time fluconazole tx, was offered to try 7 days of fluconazole but prefers to hold off as pregnancy is almost over and per her experience, vaginal yeast infection revolves after delivery.    6/2018: Doing well post delivery. Delivered a healthy boy on 4/5/2018 and is breastfeeding, not sure if she will have another child, has an IUD placed in 5/2018. On 5/23, contacted us with recurrence of arthralgia (hands, feet x 10-14days) which self-resolved, took some NSAIDs, today is feeling well with no complaints. Labs on 5/23 were not suggestive of a flare.    2/2019: Feeling very well. No complaints.     8/23/2019: Reports intermittent soreness of hands and feet with stiffness, worsened with stress and lack of sleep. It's worse in AM and at night. No joint swelling, hair loss, oral ulcers, CP or SOB.       1/20/2020: Doing well. Wants to implant her left oevr embryo from IVF cycle this coming spring, but worried about flare of her CTD.    Today 6/19/2020: Feeling very well. No complaints. Wants to hold off proceeding with another pregnancy. Has no PCP, wants to be referred.         Review of Systems:   A comprehensive ROS was done. Positives are per HPI.          Past Medical History:     Past Medical History:   Diagnosis Date     JAVIER (generalized anxiety disorder)     has a Rx for Zoloft; not using currently      Rosacea      Undifferentiated connective tissue disease (H)      Past Surgical History:   Procedure Laterality Date     IR ENDOVENOUS ABLATION VARICOSE VEINS  10/28/2019     IR FOLLOW UP VISIT OUTPATIENT  11/20/2019     IR STAB PHLEBECTOMY 10 - 20 STABS  10/28/2019     IR VEIN SCLEROSING MULTIPLE  10/28/2019     OPERATIVE HYSTEROSCOPY WITH MORCELLATOR  4/8/2014    Procedure: OPERATIVE HYSTEROSCOPY WITH MORCELLATOR;  Hysteroscopic Polypectomy;  Surgeon: Cate Mansfield MD;  Location: [facility] OR     Raynaud's syndrome since puberty. Her main trigger is cold.  She had two uneventful pregnancies with vaginal births, though the conception of her first child required IVF.   she is underwent embryo transfer in July 2017, now pregnant.   cervical radiculopathy, 2017.       Social History:     Social History     Occupational History     Occupation:      Employer: [employer]   Tobacco Use     Smoking status: Never Smoker     Smokeless tobacco: Never Used   Substance and Sexual Activity     Alcohol use: No     Alcohol/week: 0.0 standard drinks     Drug use: No     Sexual activity: Yes     Partners: Male     Birth control/protection: I.U.D.     Comment: Mirena placed in 2018            Family History:     Family History   Problem Relation Age of Onset     Hypertension Paternal Grandfather      Lung Cancer Paternal Grandfather         smoker     Diabetes Type 2  Paternal Grandfather      Breast Cancer Paternal Grandmother      Hypertension Paternal Grandmother      Colon Cancer Maternal Grandfather      Alzheimer Disease Maternal Grandmother      Cervical Cancer Maternal Grandmother      Arrhythmia Brother         details unknown; procedure in his 20s     Anxiety Disorder Brother      Elijah Disease Sister      Myocardial Infarction No family hx of      Cerebrovascular Disease No family hx of      Coronary Artery Disease Early Onset No family hx of      Ovarian Cancer No family hx of      No history of  AI disease       Allergies:     Allergies   Allergen Reactions     Benzoyl Peroxide Swelling     Duricef [Cefadroxil] Unknown     Monistat [Miconazole] Swelling     Sulfa Drugs Rash            Medications:     Current Outpatient Medications   Medication Sig Dispense Refill     hydroxychloroquine (PLAQUENIL) 200 MG tablet Take 1 tablet (200 mg) by mouth 2 times daily 60 tablet 5     metroNIDAZOLE (METROCREAM) 0.75 % external cream        Prenatal Vit-Fe Fumarate-FA (PRENATAL VITAMINS) 28-0.8 MG TABS Take 1 tablet by mouth daily        sertraline (ZOLOFT) 50 MG tablet TAKE 1 TABLET BY MOUTH EVERY DAY 90 tablet 3     levonorgestrel (MIRENA, 52 MG,) 20 MCG/24HR IUD 1 each (20 mcg) by Intrauterine route once for 1 dose 1 each 0            Physical Exam:   Constitutional: well-developed, appearing stated age; cooperative  Eyes: nl EOM, conjunctiva, sclera  ENT: nl external ears, nose,  lips  Resp: breathing normally  MS: no active synovitis   Skin: no skin rash  Neuro: nl cranial nerves  Psych: nl affect.         Data:     No results found for any visits on 06/19/20.  Labs from outside laboratory reviewed from 8/12/16  RNP antibodies 1.6- positive    Negative for Parvo Virus B19, Lyme Disease  Negative SLE panel   Negative SSA, SSB  Negative Anti-Sury-1  Negative Centromere B antibodies  Negative  CCP  Recent Labs   Lab Test  07/22/16   1110  05/29/16   0651  05/27/16   1032   01/09/16   0724   WBC  8.1   --   15.4*   --   16.8*   RBC  4.86   --   4.25   --   4.75   HGB  14.1  11.2*  12.4   < >  14.2   HCT  42.1   --   37.6   --   40.1   MCV  87   --   89   --   84   RDW  13.0   --   14.0   --   13.7   PLT  228   --   184   --   202   ALBUMIN   --    --    --    --   3.3*   CRP  3.0   --    --    --    --    BUN   --    --    --    --   9    < > = values in this interval not displayed.      Recent Labs   Lab Test  07/22/16   1110   TSH  1.10     Hemoglobin   Date Value Ref Range Status   12/16/2019 13.7 11.7 - 15.7 g/dL  Final   08/26/2019 13.0 11.7 - 15.7 g/dL Final   02/22/2019 14.0 11.7 - 15.7 g/dL Final     Urea Nitrogen   Date Value Ref Range Status   01/09/2016 9 7 - 30 mg/dL Final     Sed Rate   Date Value Ref Range Status   12/16/2019 4 0 - 20 mm/h Final   08/26/2019 4 0 - 20 mm/h Final   02/22/2019 6 0 - 20 mm/h Final     CRP Inflammation   Date Value Ref Range Status   12/16/2019 <2.9 0.0 - 8.0 mg/L Final   08/26/2019 <2.9 0.0 - 8.0 mg/L Final   02/22/2019 <2.9 0.0 - 8.0 mg/L Final     AST   Date Value Ref Range Status   12/16/2019 13 0 - 45 U/L Final   08/26/2019 11 0 - 45 U/L Final   02/22/2019 17 0 - 45 U/L Final     Albumin   Date Value Ref Range Status   12/16/2019 4.2 3.4 - 5.0 g/dL Final   08/26/2019 4.1 3.4 - 5.0 g/dL Final   02/22/2019 4.1 3.4 - 5.0 g/dL Final     Alkaline Phosphatase   Date Value Ref Range Status   01/09/2016 70 40 - 150 U/L Final     ALT   Date Value Ref Range Status   12/16/2019 18 0 - 50 U/L Final   08/26/2019 17 0 - 50 U/L Final   02/22/2019 19 0 - 50 U/L Final     Rheumatoid Factor   Date Value Ref Range Status   07/22/2016 <20 <20 IU/mL Final     RHEUM RESULTS Latest Ref Rng & Units 8/26/2019 12/16/2019 1/20/2020   COMPLEMENT C3 81 - 157 mg/dL 64(L) 68(L) 80(L)   COMPLEMENT C4 13 - 39 mg/dL 16 17 21   DNA-DS <10 IU/mL 4 5 -   SED RATE 0 - 20 mm/h 4 4 -   CRP, INFLAMMATION 0.0 - 8.0 mg/L <2.9 <2.9 -   RHEUMATOID FACTOR <20 IU/mL - - -   NINOSKA SCREEN BY EIA <1.0 - - -   AST 0 - 45 U/L 11 13 -   ALT 0 - 50 U/L 17 18 -   ALBUMIN 3.4 - 5.0 g/dL 4.1 4.2 -   WBC 4.0 - 11.0 10e9/L 6.3 5.8 -   RBC 3.8 - 5.2 10e12/L 4.58 4.78 -   HGB 11.7 - 15.7 g/dL 13.0 13.7 -   HCT 35.0 - 47.0 % 40.6 42.3 -   MCV 78 - 100 fl 89 89 -   MCHC 31.5 - 36.5 g/dL 32.0 32.4 -   RDW 10.0 - 15.0 % 12.2 12.6 -    - 450 10e9/L 185 198 -   CREATININE 0.52 - 1.04 mg/dL 0.54 0.54 -   GFR ESTIMATE, IF BLACK >60 mL/min/[1.73:m2] >90 >90 -   GFR ESTIMATE >60 mL/min/[1.73:m2] >90 >90 -     Component      Latest Ref Rng & Units  5/23/2018   WBC      4.0 - 11.0 10e9/L 6.3   RBC Count      3.8 - 5.2 10e12/L 5.06   Hemoglobin      11.7 - 15.7 g/dL 12.8   Hematocrit      35.0 - 47.0 % 41.9   MCV      78 - 100 fl 83   MCH      26.5 - 33.0 pg 25.3 (L)   MCHC      31.5 - 36.5 g/dL 30.5 (L)   RDW      10.0 - 15.0 % 15.9 (H)   Platelet Count      150 - 450 10e9/L 214   Diff Method       Automated Method   % Neutrophils      % 52.1   % Lymphocytes      % 38.5   % Monocytes      % 6.2   % Eosinophils      % 2.4   % Basophils      % 0.6   % Immature Granulocytes      % 0.2   Nucleated RBCs      0 /100 0   Absolute Neutrophil      1.6 - 8.3 10e9/L 3.3   Absolute Lymphocytes      0.8 - 5.3 10e9/L 2.4   Absolute Monocytes      0.0 - 1.3 10e9/L 0.4   Absolute Eosinophils      0.0 - 0.7 10e9/L 0.2   Absolute Basophils      0.0 - 0.2 10e9/L 0.0   Abs Immature Granulocytes      0 - 0.4 10e9/L 0.0   Absolute Nucleated RBC       0.0   Color Urine       Yellow   Appearance Urine       Clear   Glucose Urine      NEG:Negative mg/dL Negative   Bilirubin Urine      NEG:Negative Negative   Ketones Urine      NEG:Negative mg/dL Negative   Specific Gravity Urine      1.003 - 1.035 1.014   Blood Urine      NEG:Negative Negative   pH Urine      5.0 - 7.0 pH 7.0   Protein Albumin Urine      NEG:Negative mg/dL Negative   Urobilinogen mg/dL      0.0 - 2.0 mg/dL Normal   Nitrite Urine      NEG:Negative Negative   Leukocyte Esterase Urine      NEG:Negative Moderate (A)   Source       Urine   WBC Urine      0 - 5 /HPF 6 (H)   RBC Urine      0 - 2 /HPF 1   Transitional Epi      0 - 1 /HPF <1   Creatinine      0.52 - 1.04 mg/dL 0.71   GFR Estimate      >60 mL/min/1.7m2 >90   GFR Estimate If Black      >60 mL/min/1.7m2 >90   NINOSKA interpretation      NEG:Negative Positive (A)   NINOSKA pattern 1       DENSE FINE SPECKLED   NINOSKA titer 1       1:160   Specimen Description       Unspecified Urine   Special Requests       Specimen received in preservative   Culture Micro       No growth    Complement C3      76 - 169 mg/dL 85   Complement C4      15 - 50 mg/dL 21   CRP Inflammation      0.0 - 8.0 mg/L <2.9   Sed Rate      0 - 20 mm/h 6   DNA-ds      <10 IU/mL 5   AST      0 - 45 U/L 16   ALT      0 - 50 U/L 17   Albumin      3.4 - 5.0 g/dL 3.8     Reviewed Rheumatology lab flowsheet    Component      Latest Ref Rng & Units 2/22/2019   WBC      4.0 - 11.0 10e9/L 5.8   RBC Count      3.8 - 5.2 10e12/L 5.07   Hemoglobin      11.7 - 15.7 g/dL 14.0   Hematocrit      35.0 - 47.0 % 45.1   MCV      78 - 100 fl 89   MCH      26.5 - 33.0 pg 27.6   MCHC      31.5 - 36.5 g/dL 31.0 (L)   RDW      10.0 - 15.0 % 12.6   Platelet Count      150 - 450 10e9/L 222   Diff Method       Automated Method   % Neutrophils      % 53.6   % Lymphocytes      % 36.1   % Monocytes      % 6.4   % Eosinophils      % 2.6   % Basophils      % 1.0   % Immature Granulocytes      % 0.3   Nucleated RBCs      0 /100 0   Absolute Neutrophil      1.6 - 8.3 10e9/L 3.1   Absolute Lymphocytes      0.8 - 5.3 10e9/L 2.1   Absolute Monocytes      0.0 - 1.3 10e9/L 0.4   Absolute Eosinophils      0.0 - 0.7 10e9/L 0.2   Absolute Basophils      0.0 - 0.2 10e9/L 0.1   Abs Immature Granulocytes      0 - 0.4 10e9/L 0.0   Absolute Nucleated RBC       0.0   Color Urine       Yellow   Appearance Urine       Clear   Glucose Urine      NEG:Negative mg/dL Negative   Bilirubin Urine      NEG:Negative Negative   Ketones Urine      NEG:Negative mg/dL Negative   Specific Gravity Urine      1.003 - 1.035 1.023   Blood Urine      NEG:Negative Small (A)   pH Urine      5.0 - 7.0 pH 5.0   Protein Albumin Urine      NEG:Negative mg/dL Negative   Urobilinogen mg/dL      0.0 - 2.0 mg/dL 0.0   Nitrite Urine      NEG:Negative Negative   Leukocyte Esterase Urine      NEG:Negative Negative   Source       Midstream Urine   WBC Urine      0 - 5 /HPF 1   RBC Urine      0 - 2 /HPF 7 (H)   Squamous Epithelial /HPF Urine      0 - 1 /HPF 2 (H)   Mucous Urine      NEG:Negative /LPF  Present (A)   Creatinine      0.52 - 1.04 mg/dL 0.63   GFR Estimate      >60 mL/min/1.73:m2 >90   GFR Estimate If Black      >60 mL/min/1.73:m2 >90   AST      0 - 45 U/L 17   ALT      0 - 50 U/L 19   Albumin      3.4 - 5.0 g/dL 4.1   CRP Inflammation      0.0 - 8.0 mg/L <2.9   DNA-ds      <10 IU/mL 3   Sed Rate      0 - 20 mm/h 6   Complement C4      15 - 50 mg/dL 23   Complement C3      76 - 169 mg/dL 81     Component      Latest Ref Rng & Units 8/26/2019   WBC      4.0 - 11.0 10e9/L 6.3   RBC Count      3.8 - 5.2 10e12/L 4.58   Hemoglobin      11.7 - 15.7 g/dL 13.0   Hematocrit      35.0 - 47.0 % 40.6   MCV      78 - 100 fl 89   MCH      26.5 - 33.0 pg 28.4   MCHC      31.5 - 36.5 g/dL 32.0   RDW      10.0 - 15.0 % 12.2   Platelet Count      150 - 450 10e9/L 185   Diff Method       Automated Method   % Neutrophils      % 51.8   % Lymphocytes      % 38.3   % Monocytes      % 6.5   % Eosinophils      % 2.2   % Basophils      % 0.9   % Immature Granulocytes      % 0.3   Nucleated RBCs      0 /100 0   Absolute Neutrophil      1.6 - 8.3 10e9/L 3.3   Absolute Lymphocytes      0.8 - 5.3 10e9/L 2.4   Absolute Monocytes      0.0 - 1.3 10e9/L 0.4   Absolute Eosinophils      0.0 - 0.7 10e9/L 0.1   Absolute Basophils      0.0 - 0.2 10e9/L 0.1   Abs Immature Granulocytes      0 - 0.4 10e9/L 0.0   Absolute Nucleated RBC       0.0   Color Urine       Straw   Appearance Urine       Clear   Glucose Urine      NEG:Negative mg/dL Negative   Bilirubin Urine      NEG:Negative Negative   Ketones Urine      NEG:Negative mg/dL Negative   Specific Gravity Urine      1.003 - 1.035 1.011   Blood Urine      NEG:Negative Negative   pH Urine      5.0 - 7.0 pH 7.0   Protein Albumin Urine      NEG:Negative mg/dL Negative   Urobilinogen mg/dL      0.0 - 2.0 mg/dL 0.0   Nitrite Urine      NEG:Negative Negative   Leukocyte Esterase Urine      NEG:Negative Negative   Source       Midstream Urine   WBC Urine      0 - 5 /HPF <1   RBC Urine      0 - 2  /HPF 2   Squamous Epithelial /HPF Urine      0 - 1 /HPF 2 (H)   Mucous Urine      NEG:Negative /LPF    Creatinine      0.52 - 1.04 mg/dL 0.54   GFR Estimate      >60 mL/min/1.73:m2 >90   GFR Estimate If Black      >60 mL/min/1.73:m2 >90   Protein Random Urine      g/L 0.08   Protein Total Urine g/gr Creatinine      0 - 0.2 g/g Cr 0.20   Sed Rate      0 - 20 mm/h 4   DNA-ds      <10 IU/mL 4   CRP Inflammation      0.0 - 8.0 mg/L <2.9   Complement C3      76 - 169 mg/dL 64 (L)   Complement C4      15 - 50 mg/dL 16   AST      0 - 45 U/L 11   Albumin      3.4 - 5.0 g/dL 4.1   ALT      0 - 50 U/L 17   Creatinine Urine      mg/dL 40   Creatinine Urine Random      mg/dL    Lab Scanned Result            Component      Latest Ref Rng & Units 12/16/2019   WBC      4.0 - 11.0 10e9/L 5.8   RBC Count      3.8 - 5.2 10e12/L 4.78   Hemoglobin      11.7 - 15.7 g/dL 13.7   Hematocrit      35.0 - 47.0 % 42.3   MCV      78 - 100 fl 89   MCH      26.5 - 33.0 pg 28.7   MCHC      31.5 - 36.5 g/dL 32.4   RDW      10.0 - 15.0 % 12.6   Platelet Count      150 - 450 10e9/L 198   Diff Method       Automated Method   % Neutrophils      % 63.9   % Lymphocytes      % 28.1   % Monocytes      % 5.9   % Eosinophils      % 1.4   % Basophils      % 0.5   % Immature Granulocytes      % 0.2   Nucleated RBCs      0 /100 0   Absolute Neutrophil      1.6 - 8.3 10e9/L 3.7   Absolute Lymphocytes      0.8 - 5.3 10e9/L 1.6   Absolute Monocytes      0.0 - 1.3 10e9/L 0.3   Absolute Eosinophils      0.0 - 0.7 10e9/L 0.1   Absolute Basophils      0.0 - 0.2 10e9/L 0.0   Abs Immature Granulocytes      0 - 0.4 10e9/L 0.0   Absolute Nucleated RBC       0.0   Color Urine       Yellow   Appearance Urine       Clear   Glucose Urine      NEG:Negative mg/dL Negative   Bilirubin Urine      NEG:Negative Negative   Ketones Urine      NEG:Negative mg/dL Negative   Specific Gravity Urine      1.003 - 1.035 1.022   Blood Urine      NEG:Negative Trace (A)   pH Urine      5.0 -  7.0 pH 7.0   Protein Albumin Urine      NEG:Negative mg/dL 10 (A)   Urobilinogen mg/dL      0.0 - 2.0 mg/dL Normal   Nitrite Urine      NEG:Negative Negative   Leukocyte Esterase Urine      NEG:Negative Negative   Source       Midstream Urine   WBC Urine      0 - 5 /HPF 3   RBC Urine      0 - 2 /HPF 3 (H)   Squamous Epithelial /HPF Urine      0 - 1 /HPF 11 (H)   Mucous Urine      NEG:Negative /LPF Present (A)   Creatinine      0.52 - 1.04 mg/dL 0.54   GFR Estimate      >60 mL/min/1.73:m2 >90   GFR Estimate If Black      >60 mL/min/1.73:m2 >90   Protein Random Urine      g/L 0.29   Protein Total Urine g/gr Creatinine      0 - 0.2 g/g Cr 0.18   Sed Rate      0 - 20 mm/h 4   DNA-ds      <10 IU/mL 5   CRP Inflammation      0.0 - 8.0 mg/L <2.9   Complement C3      76 - 169 mg/dL 68 (L)   Complement C4      15 - 50 mg/dL 17   AST      0 - 45 U/L 13   Albumin      3.4 - 5.0 g/dL 4.2   ALT      0 - 50 U/L 18   Creatinine Urine      mg/dL    Creatinine Urine Random      mg/dL 157   Lab Scanned Result       COUNSYL FORESIGHT SCR-Scanned     Component      Latest Ref Rng & Units 1/20/2020   Cardiolipin Antibody IgG      0.0 - 19.9 GPL-U/mL <1.6   Cardiolipin Antibody IgM      0.0 - 19.9 MPL-U/mL <0.2   Thyroid Peroxidase Antibody      <35 IU/mL 31   SSB (La) (ROMEL) Antibody, IgG      0.0 - 0.9 AI <0.2   SSA (Ro) (ROMEL) Antibody, IgG      0.0 - 0.9 AI <0.2   Lupus Result      NEG:Negative Negative   Complement C4      13 - 39 mg/dL 21   Complement C3      81 - 157 mg/dL 80 (L)   Beta 2 Glycoprotein 1 Antibody IgG      <7 U/mL 1.2   Beta 2 Glycoprotein 1 Antibody IgM      <7 U/mL <2.9

## 2020-07-11 ENCOUNTER — VIRTUAL VISIT (OUTPATIENT)
Dept: FAMILY MEDICINE | Facility: OTHER | Age: 40
End: 2020-07-11

## 2020-07-11 NOTE — PROGRESS NOTES
"Date: 2020 11:17:09  Clinician: Sahra Jeffries  Clinician NPI: [id number]  Patient: Heather Guillory  Patient : 1980  Patient Address: [address] [place] [address]  Patient Phone: [phone]  Visit Protocol: URI  Patient Summary:  Heather is a 40 year old ( : 1980 ) female who initiated a Visit for COVID-19 (Coronavirus) evaluation and screening. When asked the question \"Please sign me up to receive news, health information and promotions. \", Heather responded \"No\".    Heather states her symptoms started 1-2 days ago.   Her symptoms consist of rhinitis, malaise, a headache, a sore throat, myalgia, facial pain or pressure, a cough, and nasal congestion.   Symptom details     Nasal secretions: The color of her mucus is clear.    Cough: Heather coughs every 5-10 minutes and her cough is not more bothersome at night. Phlegm comes into her throat when she coughs. She believes her cough is caused by post-nasal drip. The color of the phlegm is white.     Sore throat: Heather reports having mild throat pain (1-3 on a 10 point pain scale), does not have exudate on her tonsils, and can swallow liquids. The lymph nodes in her neck are not enlarged. A rash has not appeared on the skin since the sore throat started.     Facial pain or pressure: The facial pain or pressure does not feel worse when bending or leaning forward.     Headache: She states the headache is mild (1-3 on a 10 point pain scale).      Heather denies having wheezing, nausea, teeth pain, ageusia, diarrhea, vomiting, ear pain, chills, enlarged lymph nodes, anosmia, and fever. She also denies having recent facial or sinus surgery in the past 60 days and taking antibiotic medication in the past month. She is not experiencing dyspnea.   Precipitating events  Within the past week, Heather has not been exposed to someone with strep throat. She has not recently been exposed to someone with influenza. Heather has been " in close contact with the following high risk individuals: children under the age of 5, people with asthma, heart disease or diabetes, and adults 65 or older.   Pertinent COVID-19 (Coronavirus) information  In the past 14 days, Heather has not worked in a congregate living setting.   She does not work or volunteer as healthcare worker or a  and does not work or volunteer in a healthcare facility.   Heather also has not lived in a congregate living setting in the past 14 days. She does not live with a healthcare worker.   Heather has not had a close contact with a laboratory-confirmed COVID-19 patient within 14 days of symptom onset.   Pertinent medical history  Heather does not get yeast infections when she takes antibiotics.   Heather does not need a return to work/school note.   Weight: 160 lbs   Heather does not smoke or use smokeless tobacco.   She denies pregnancy and denies breastfeeding. She has menstruated in the past month.   Weight: 160 lbs    MEDICATIONS: hydroxychloroquine oral, Zoloft oral, Mirena intrauterine, ALLERGIES: Duricef, Sulfa (Sulfonamide Antibiotics), benzoyl peroxide, Monistat 1 Combo Pack  Clinician Response:  Dear Heather,   Your symptoms show that you may have coronavirus (COVID-19). This illness can cause fever, cough and trouble breathing. Many people get a mild case and get better on their own. Some people can get very sick.  What should I do?  We would like to test you for this virus.   1. Please call [phone] to schedule your visit. Explain that you were referred by [facility] to have a COVID-19 test. Be ready to share your [facility] visit ID number.  The following will serve as your written order for this COVID Test, ordered by me, for the indication of suspected COVID [Z20.828]: The test will be ordered in [employer], our electronic health record, after you are scheduled. It will show as ordered and authorized by Yon Mireles MD.  Order: COVID-19 (Coronavirus) PCR for  "SYMPTOMATIC testing from [facility].      2. When it's time for your COVID test:  Stay at least 6 feet away from others. (If someone will drive you to your test, stay in the backseat, as far away from the  as you can.)   Cover your mouth and nose with a mask, tissue or washcloth.  Go straight to the testing site. Don't make any stops on the way there or back.      3.Starting now: Stay home and away from others (self-isolate) until:   You've had no fever---and no medicine that reduces fever---for 3 full days (72 hours). And...   Your other symptoms have gotten better. For example, your cough or breathing has improved. And...   At least 10 days have passed since your symptoms started.       During this time, don't leave the house except for testing or medical care.   Stay in your own room, even for meals. Use your own bathroom if you can.   Stay away from others in your home. No hugging, kissing or shaking hands. No visitors.  Don't go to work, school or anywhere else.    Clean \"high touch\" surfaces often (doorknobs, counters, handles, etc.). Use a household cleaning spray or wipes. You'll find a full list of  on the EPA website: www.epa.gov/pesticide-registration/list-n-disinfectants-use-against-sars-cov-2.   Cover your mouth and nose with a mask, tissue or washcloth to avoid spreading germs.  Wash your hands and face often. Use soap and water.  Caregivers in these groups are at risk for severe illness due to COVID-19:  o People 65 years and older  o People who live in a nursing home or long-term care facility  o People with chronic disease (lung, heart, cancer, diabetes, kidney, liver, immunologic)  o People who have a weakened immune system, including those who:   Are in cancer treatment  Take medicine that weakens the immune system, such as corticosteroids  Had a bone marrow or organ transplant  Have an immune deficiency  Have poorly controlled HIV or AIDS  Are obese (body mass index of 40 or higher)  " Smoke regularly   o Caregivers should wear gloves while washing dishes, handling laundry and cleaning bedrooms and bathrooms.  o Use caution when washing and drying laundry: Don't shake dirty laundry, and use the warmest water setting that you can.  o For more tips, go to www.cdc.gov/coronavirus/2019-ncov/downloads/10Things.pdf.    4.Sign up for [employer]. We know it's scary to hear that you might have COVID-19. We want to track your symptoms to make sure you're okay over the next 2 weeks. Please look for an email from [employer]---this is a free, online program that we'll use to keep in touch. To sign up, follow the link in the email. Learn more at [email or web address]  How can I take care of myself?   Get lots of rest. Drink extra fluids (unless a doctor has told you not to).   Take Tylenol (acetaminophen) for fever or pain. If you have liver or kidney problems, ask your family doctor if it's okay to take Tylenol.   Adults can take either:    650 mg (two 325 mg pills) every 4 to 6 hours, or...   1,000 mg (two 500 mg pills) every 8 hours as needed.    Note: Don't take more than 3,000 mg in one day. Acetaminophen is found in many medicines (both prescribed and over-the-counter medicines). Read all labels to be sure you don't take too much.   For children, check the Tylenol bottle for the right dose. The dose is based on the child's age or weight.    If you have other health problems (like cancer, heart failure, an organ transplant or severe kidney disease): Call your specialty clinic if you don't feel better in the next 2 days.       Know when to call 911. Emergency warning signs include:    Trouble breathing or shortness of breath Pain or pressure in the chest that doesn't go away Feeling confused like you haven't felt before, or not being able to wake up Bluish-colored lips or face.  Where can I get more information?    [employer] [facility] -- About COVID-19: [email or web address]   CDC --  What to Do If You're Sick: www.cdc.gov/coronavirus/2019-ncov/about/steps-when-sick.html   CDC -- Ending Home Isolation: www.cdc.gov/coronavirus/2019-ncov/hcp/disposition-in-home-patients.html   [facility] -- Caring for Someone: www.cdc.gov/coronavirus/2019-ncov/if-you-are-sick/care-for-someone.html   [facility] -- Interim Guidance for Hospital Discharge to Home: www.[facility].[place]./diseases/coronavirus/hcp/hospdischarge.pdf   [facility] clinical trials (COVID-19 research studies): clinicalaffairs.[facility]-clinical-trials    Below are the COVID-19 hotlines at the [place] Department of Health ([facility]). Interpreters are available.    For health questions: Call 144-264-2025 or 1-356.560.5452 (7 a.m. to 7 p.m.) For questions about schools and childcare: Call 661-080-9371 or 1-533.282.8874 (7 a.m. to 7 p.m.)    Diagnosis: Cough  Diagnosis ICD: R05

## 2020-07-15 ASSESSMENT — ANXIETY QUESTIONNAIRES
GAD7 TOTAL SCORE: 0
3. WORRYING TOO MUCH ABOUT DIFFERENT THINGS: NOT AT ALL
7. FEELING AFRAID AS IF SOMETHING AWFUL MIGHT HAPPEN: NOT AT ALL
2. NOT BEING ABLE TO STOP OR CONTROL WORRYING: NOT AT ALL
4. TROUBLE RELAXING: NOT AT ALL
6. BECOMING EASILY ANNOYED OR IRRITABLE: NOT AT ALL
GAD7 TOTAL SCORE: 0
1. FEELING NERVOUS, ANXIOUS, OR ON EDGE: NOT AT ALL
7. FEELING AFRAID AS IF SOMETHING AWFUL MIGHT HAPPEN: NOT AT ALL
5. BEING SO RESTLESS THAT IT IS HARD TO SIT STILL: NOT AT ALL

## 2020-07-16 ENCOUNTER — OFFICE VISIT (OUTPATIENT)
Dept: OBGYN | Facility: CLINIC | Age: 40
End: 2020-07-16
Attending: ADVANCED PRACTICE MIDWIFE
Payer: COMMERCIAL

## 2020-07-16 VITALS
HEART RATE: 71 BPM | WEIGHT: 163.3 LBS | DIASTOLIC BLOOD PRESSURE: 68 MMHG | HEIGHT: 67 IN | BODY MASS INDEX: 25.63 KG/M2 | SYSTOLIC BLOOD PRESSURE: 106 MMHG

## 2020-07-16 DIAGNOSIS — Z00.00 VISIT FOR PREVENTIVE HEALTH EXAMINATION: Primary | ICD-10-CM

## 2020-07-16 DIAGNOSIS — R22.32 AXILLARY LUMP, LEFT: ICD-10-CM

## 2020-07-16 DIAGNOSIS — Z12.4 SCREENING FOR MALIGNANT NEOPLASM OF CERVIX: ICD-10-CM

## 2020-07-16 DIAGNOSIS — N64.4 NIPPLE TENDERNESS: ICD-10-CM

## 2020-07-16 DIAGNOSIS — Z01.419 ENCOUNTER FOR GYNECOLOGICAL EXAMINATION WITHOUT ABNORMAL FINDING: ICD-10-CM

## 2020-07-16 DIAGNOSIS — M35.9 UNDIFFERENTIATED CONNECTIVE TISSUE DISEASE (H): ICD-10-CM

## 2020-07-16 LAB
ALBUMIN SERPL-MCNC: 4 G/DL (ref 3.4–5)
ALBUMIN UR-MCNC: 30 MG/DL
ALT SERPL W P-5'-P-CCNC: 17 U/L (ref 0–50)
AMORPH CRY #/AREA URNS HPF: ABNORMAL /HPF
APPEARANCE UR: CLEAR
AST SERPL W P-5'-P-CCNC: 16 U/L (ref 0–45)
BASOPHILS # BLD AUTO: 0.1 10E9/L (ref 0–0.2)
BASOPHILS NFR BLD AUTO: 0.8 %
BILIRUB UR QL STRIP: NEGATIVE
COLOR UR AUTO: YELLOW
CREAT SERPL-MCNC: 0.66 MG/DL (ref 0.52–1.04)
CREAT UR-MCNC: 137 MG/DL
CRP SERPL-MCNC: 6.4 MG/L (ref 0–8)
DIFFERENTIAL METHOD BLD: NORMAL
EOSINOPHIL # BLD AUTO: 0.2 10E9/L (ref 0–0.7)
EOSINOPHIL NFR BLD AUTO: 2.5 %
ERYTHROCYTE [DISTWIDTH] IN BLOOD BY AUTOMATED COUNT: 12 % (ref 10–15)
ERYTHROCYTE [SEDIMENTATION RATE] IN BLOOD BY WESTERGREN METHOD: 7 MM/H (ref 0–20)
GFR SERPL CREATININE-BSD FRML MDRD: >90 ML/MIN/{1.73_M2}
GLUCOSE UR STRIP-MCNC: NEGATIVE MG/DL
HBA1C MFR BLD: 5.1 % (ref 0–5.6)
HCT VFR BLD AUTO: 42.3 % (ref 35–47)
HGB BLD-MCNC: 14 G/DL (ref 11.7–15.7)
HGB UR QL STRIP: NEGATIVE
IMM GRANULOCYTES # BLD: 0 10E9/L (ref 0–0.4)
IMM GRANULOCYTES NFR BLD: 0.3 %
KETONES UR STRIP-MCNC: NEGATIVE MG/DL
LEUKOCYTE ESTERASE UR QL STRIP: NEGATIVE
LYMPHOCYTES # BLD AUTO: 2.9 10E9/L (ref 0.8–5.3)
LYMPHOCYTES NFR BLD AUTO: 39.9 %
MCH RBC QN AUTO: 28.8 PG (ref 26.5–33)
MCHC RBC AUTO-ENTMCNC: 33.1 G/DL (ref 31.5–36.5)
MCV RBC AUTO: 87 FL (ref 78–100)
MONOCYTES # BLD AUTO: 0.4 10E9/L (ref 0–1.3)
MONOCYTES NFR BLD AUTO: 6.1 %
MUCOUS THREADS #/AREA URNS LPF: PRESENT /LPF
NEUTROPHILS # BLD AUTO: 3.6 10E9/L (ref 1.6–8.3)
NEUTROPHILS NFR BLD AUTO: 50.4 %
NITRATE UR QL: NEGATIVE
NRBC # BLD AUTO: 0 10*3/UL
NRBC BLD AUTO-RTO: 0 /100
PH UR STRIP: 7.5 PH (ref 5–7)
PLATELET # BLD AUTO: 219 10E9/L (ref 150–450)
PROT UR-MCNC: 0.38 G/L
PROT/CREAT 24H UR: 0.27 G/G CR (ref 0–0.2)
RBC # BLD AUTO: 4.86 10E12/L (ref 3.8–5.2)
RBC #/AREA URNS AUTO: 2 /HPF (ref 0–2)
SOURCE: ABNORMAL
SP GR UR STRIP: 1.02 (ref 1–1.03)
SQUAMOUS #/AREA URNS AUTO: 1 /HPF (ref 0–1)
TSH SERPL DL<=0.005 MIU/L-ACNC: 0.95 MU/L (ref 0.4–4)
UROBILINOGEN UR STRIP-MCNC: NORMAL MG/DL (ref 0–2)
WBC # BLD AUTO: 7.2 10E9/L (ref 4–11)
WBC #/AREA URNS AUTO: <1 /HPF (ref 0–5)

## 2020-07-16 PROCEDURE — 82040 ASSAY OF SERUM ALBUMIN: CPT | Performed by: ADVANCED PRACTICE MIDWIFE

## 2020-07-16 PROCEDURE — 84443 ASSAY THYROID STIM HORMONE: CPT | Performed by: ADVANCED PRACTICE MIDWIFE

## 2020-07-16 PROCEDURE — 84450 TRANSFERASE (AST) (SGOT): CPT | Performed by: ADVANCED PRACTICE MIDWIFE

## 2020-07-16 PROCEDURE — G0145 SCR C/V CYTO,THINLAYER,RESCR: HCPCS | Performed by: ADVANCED PRACTICE MIDWIFE

## 2020-07-16 PROCEDURE — 87624 HPV HI-RISK TYP POOLED RSLT: CPT | Performed by: ADVANCED PRACTICE MIDWIFE

## 2020-07-16 PROCEDURE — 81001 URINALYSIS AUTO W/SCOPE: CPT | Performed by: INTERNAL MEDICINE

## 2020-07-16 PROCEDURE — 85652 RBC SED RATE AUTOMATED: CPT | Performed by: INTERNAL MEDICINE

## 2020-07-16 PROCEDURE — G0463 HOSPITAL OUTPT CLINIC VISIT: HCPCS | Mod: 25,ZF

## 2020-07-16 PROCEDURE — 36415 COLL VENOUS BLD VENIPUNCTURE: CPT | Performed by: ADVANCED PRACTICE MIDWIFE

## 2020-07-16 PROCEDURE — 86160 COMPLEMENT ANTIGEN: CPT | Performed by: INTERNAL MEDICINE

## 2020-07-16 PROCEDURE — 86140 C-REACTIVE PROTEIN: CPT | Performed by: INTERNAL MEDICINE

## 2020-07-16 PROCEDURE — 85025 COMPLETE CBC W/AUTO DIFF WBC: CPT | Performed by: ADVANCED PRACTICE MIDWIFE

## 2020-07-16 PROCEDURE — 84156 ASSAY OF PROTEIN URINE: CPT | Performed by: INTERNAL MEDICINE

## 2020-07-16 PROCEDURE — 82306 VITAMIN D 25 HYDROXY: CPT | Performed by: ADVANCED PRACTICE MIDWIFE

## 2020-07-16 PROCEDURE — 86225 DNA ANTIBODY NATIVE: CPT | Performed by: INTERNAL MEDICINE

## 2020-07-16 PROCEDURE — 84460 ALANINE AMINO (ALT) (SGPT): CPT | Performed by: ADVANCED PRACTICE MIDWIFE

## 2020-07-16 PROCEDURE — 83036 HEMOGLOBIN GLYCOSYLATED A1C: CPT | Performed by: ADVANCED PRACTICE MIDWIFE

## 2020-07-16 PROCEDURE — 82565 ASSAY OF CREATININE: CPT | Performed by: ADVANCED PRACTICE MIDWIFE

## 2020-07-16 ASSESSMENT — PAIN SCALES - GENERAL: PAINLEVEL: NO PAIN (0)

## 2020-07-16 ASSESSMENT — ANXIETY QUESTIONNAIRES: GAD7 TOTAL SCORE: 0

## 2020-07-16 ASSESSMENT — MIFFLIN-ST. JEOR: SCORE: 1453.35

## 2020-07-16 ASSESSMENT — PATIENT HEALTH QUESTIONNAIRE - PHQ9: SUM OF ALL RESPONSES TO PHQ QUESTIONS 1-9: 0

## 2020-07-16 NOTE — LETTER
July 21, 2020      Heather Mendezdaniel  [address]  [place] [address]        Dear ,    We are writing to inform you of your test results.    C3 is back to normal, good news.    Resulted Orders   Protein  random urine with Creat Ratio   Result Value Ref Range    Protein Random Urine 0.38 g/L    Protein Total Urine g/gr Creatinine 0.27 (H) 0 - 0.2 g/g Cr   UA with Microscopic reflex to Culture   Result Value Ref Range    Color Urine Yellow     Appearance Urine Clear     Glucose Urine Negative NEG^Negative mg/dL    Bilirubin Urine Negative NEG^Negative    Ketones Urine Negative NEG^Negative mg/dL    Specific Gravity Urine 1.023 1.003 - 1.035    Blood Urine Negative NEG^Negative    pH Urine 7.5 (H) 5.0 - 7.0 pH    Protein Albumin Urine 30 (A) NEG^Negative mg/dL    Urobilinogen mg/dL Normal 0.0 - 2.0 mg/dL    Nitrite Urine Negative NEG^Negative    Leukocyte Esterase Urine Negative NEG^Negative    Source Midstream Urine     WBC Urine <1 0 - 5 /HPF    RBC Urine 2 0 - 2 /HPF    Squamous Epithelial /HPF Urine 1 0 - 1 /HPF    Mucous Urine Present (A) NEG^Negative /LPF    Amorphous Crystals Few (A) NEG^Negative /HPF   Erythrocyte sedimentation rate auto   Result Value Ref Range    Sed Rate 7 0 - 20 mm/h   DNA double stranded antibodies   Result Value Ref Range    DNA-ds 5 <10 IU/mL      Comment:      Negative   CRP inflammation   Result Value Ref Range    CRP Inflammation 6.4 0.0 - 8.0 mg/L   Complement C3   Result Value Ref Range    Complement C3 97 81 - 157 mg/dL   Complement C4   Result Value Ref Range    Complement C4 28 13 - 39 mg/dL   Creatinine   Result Value Ref Range    Creatinine 0.66 0.52 - 1.04 mg/dL    GFR Estimate >90 >60 mL/min/[1.73_m2]      Comment:      Non  GFR Calc  Starting 12/18/2018, serum creatinine based estimated GFR (eGFR) will be   calculated using the Chronic Kidney Disease Epidemiology Collaboration   (CKD-EPI) equation.      GFR Estimate If Black >90 >60  mL/min/[1.73_m2]      Comment:       GFR Calc  Starting 12/18/2018, serum creatinine based estimated GFR (eGFR) will be   calculated using the Chronic Kidney Disease Epidemiology Collaboration   (CKD-EPI) equation.     CBC with platelets differential   Result Value Ref Range    WBC 7.2 4.0 - 11.0 10e9/L    RBC Count 4.86 3.8 - 5.2 10e12/L    Hemoglobin 14.0 11.7 - 15.7 g/dL    Hematocrit 42.3 35.0 - 47.0 %    MCV 87 78 - 100 fl    MCH 28.8 26.5 - 33.0 pg    MCHC 33.1 31.5 - 36.5 g/dL    RDW 12.0 10.0 - 15.0 %    Platelet Count 219 150 - 450 10e9/L    Diff Method Automated Method     % Neutrophils 50.4 %    % Lymphocytes 39.9 %    % Monocytes 6.1 %    % Eosinophils 2.5 %    % Basophils 0.8 %    % Immature Granulocytes 0.3 %    Nucleated RBCs 0 0 /100    Absolute Neutrophil 3.6 1.6 - 8.3 10e9/L    Absolute Lymphocytes 2.9 0.8 - 5.3 10e9/L    Absolute Monocytes 0.4 0.0 - 1.3 10e9/L    Absolute Eosinophils 0.2 0.0 - 0.7 10e9/L    Absolute Basophils 0.1 0.0 - 0.2 10e9/L    Abs Immature Granulocytes 0.0 0 - 0.4 10e9/L    Absolute Nucleated RBC 0.0    AST   Result Value Ref Range    AST 16 0 - 45 U/L   Albumin level   Result Value Ref Range    Albumin 4.0 3.4 - 5.0 g/dL   ALT   Result Value Ref Range    ALT 17 0 - 50 U/L   Creatinine urine calculation only   Result Value Ref Range    Creatinine Urine 137 mg/dL       If you have any questions or concerns, please call the clinic at the number listed above.       Sincerely,        Nasrin King MD

## 2020-07-16 NOTE — LETTER
2020    RE: Heather Guillory  [address]  [place] [address]     Progress Note    SUBJECTIVE:  Heather Guillory is an 40 year old, , who requests an Annual Preventive Exam.     Concerns today include: Feeling well overall, however, concerns re: breast pain.    Pt reports that in the last month she has noticed intermittent breast pain. Describes as soreness and aching around the left nipple and surrounding tissue. No longer breastfeeding. No palpable mass or lump, changes in skin color or texture or discharge. Hx of breast cancer in paternal grandmother.     Currently sexually active with one male partner- . Declines STI testing. Has Mirena IUD that was placed 2018. Reports lighter menses, not occurring monthly- usually only light spotting for 6-7 days. Happy with this form of contraception.     Unsure if they are interested in another child. Have one embryo left- were consider transfer in March but then did not d/t COVID. Still considering.    Last pap 3/20/2015 NIL, negative HPV. Agreeable to pap with hpv cotesting today.    Has continued care with rheumatology for mixed connective tissue disease diagnosed in . On Plaquenil 2x/day. Labs q6 months. Standing orders are in place for labs- due today.     Also interested in HbgA1c, tsh with t4 reflex and vitamin D.   Had fasting lipid panel 2019.     Mood is good. Laid off 2 weeks ago but coping well. On 50mg zoloft- happy with dosing.    Menstrual History:  Menstrual History 2014 10/9/2015 2017   LAST MENSTRUAL PERIOD 2014 -   Menarche Age - - 14   Period Cycle (Days) - - 28   Period Duration (Days) - - 4   Method of Contraception - - None   Period Pattern - - Regular   Menstrual Flow - - Heavy;Light   Menstrual Control - - -   Dysmenorrhea - - None   PMS Symptoms - - -   Reviewed Today - - Yes       Last    Lab Results   Component Value Date    PAP NIL 2015     History of abnormal Pap smear: NO - age  30-65 PAP every 5 years with negative HPV co-testing recommended    Last   Lab Results   Component Value Date    HPV16 Negative 2015     Last   Lab Results   Component Value Date    HPV18 Negative 2015     Last   Lab Results   Component Value Date    HRHPV Negative 2015     Mammogram current: ordering    Last Colonoscopy: n/a      HISTORY:  hydroxychloroquine (PLAQUENIL) 200 MG tablet, Take 1 tablet (200 mg) by mouth 2 times daily  metroNIDAZOLE (METROCREAM) 0.75 % external cream,   Prenatal Vit-Fe Fumarate-FA (PRENATAL VITAMINS) 28-0.8 MG TABS, Take 1 tablet by mouth daily   levonorgestrel (MIRENA, 52 MG,) 20 MCG/24HR IUD, 1 each (20 mcg) by Intrauterine route once for 1 dose    No current facility-administered medications on file prior to visit.     Allergies   Allergen Reactions     Benzoyl Peroxide Swelling     Duricef [Cefadroxil] Unknown     Monistat [Miconazole] Swelling     Sulfa Drugs Rash     Immunization History   Administered Date(s) Administered     Influenza (IIV3) PF 10/03/2014     Influenza Vaccine IM > 6 months Valent IIV4 10/09/2015, 10/13/2017     TDAP Vaccine (Boostrix) 12/15/2014, 2016, 01/15/2018       OB History    Para Term  AB Living   4 3 3 0 1 3   SAB TAB Ectopic Multiple Live Births   0 0 0 0 3     Past Medical History:   Diagnosis Date     JAVIER (generalized anxiety disorder)     has a Rx for Zoloft; not using currently     Rosacea      Undifferentiated connective tissue disease (H)      Past Surgical History:   Procedure Laterality Date     IR ENDOVENOUS ABLATION VARICOSE VEINS  10/28/2019     IR FOLLOW UP VISIT OUTPATIENT  2019     IR STAB PHLEBECTOMY 10 - 20 STABS  10/28/2019     IR VEIN SCLEROSING MULTIPLE  10/28/2019     OPERATIVE HYSTEROSCOPY WITH MORCELLATOR  2014    Procedure: OPERATIVE HYSTEROSCOPY WITH MORCELLATOR;  Hysteroscopic Polypectomy;  Surgeon: Cate Mansfield MD;  Location:  OR     Family History   Problem  Relation Age of Onset     Hypertension Paternal Grandfather      Lung Cancer Paternal Grandfather         smoker     Diabetes Type 2  Paternal Grandfather      Breast Cancer Paternal Grandmother      Hypertension Paternal Grandmother      Colon Cancer Maternal Grandfather      Alzheimer Disease Maternal Grandmother      Cervical Cancer Maternal Grandmother      Arrhythmia Brother         details unknown; procedure in his 20s     Anxiety Disorder Brother      Elijah Disease Sister      Myocardial Infarction No family hx of      Cerebrovascular Disease No family hx of      Coronary Artery Disease Early Onset No family hx of      Ovarian Cancer No family hx of      Social History     Socioeconomic History     Marital status:      Spouse name: Chi     Number of children: 2     Years of education: None     Highest education level: None   Occupational History     Occupation:      Employer: [employer]   Social Needs     Financial resource strain: None     Food insecurity     Worry: None     Inability: None     Transportation needs     Medical: None     Non-medical: None   Tobacco Use     Smoking status: Never Smoker     Smokeless tobacco: Never Used   Substance and Sexual Activity     Alcohol use: No     Alcohol/week: 0.0 standard drinks     Drug use: No     Sexual activity: Yes     Partners: Male     Birth control/protection: I.U.D.     Comment: Mirena placed in 2018   Lifestyle     Physical activity     Days per week: None     Minutes per session: None     Stress: None   Relationships     Social connections     Talks on phone: None     Gets together: None     Attends Yarsanism service: None     Active member of club or organization: None     Attends meetings of clubs or organizations: None     Relationship status: None     Intimate partner violence     Fear of current or ex partner: None     Emotionally abused: None     Physically abused: None     Forced sexual activity: None   Other Topics  "Concern      Service No     Blood Transfusions No     Caffeine Concern No     Occupational Exposure No     Hobby Hazards No     Sleep Concern No     Stress Concern No     Weight Concern No     Special Diet No     Back Care No     Exercise No     Bike Helmet Yes     Comment: n/a     Seat Belt Yes     Self-Exams No     Parent/sibling w/ CABG, MI or angioplasty before 65F 55M? No   Social History Narrative    .    3 boys (4, 3, and 18 months as of 2019).    Exercises when able.         How much exercise per week? 3-4 x's     How much calcium per day? In foods and PNV       How much caffeine per day? 1 soda occasional    How much vitamin D per day? PNV    Do you/your family wear seatbelts?  Yes    Do you/your family use safety helmets? Yes    Do you/your family use sunscreen? Yes    Do you/your family keep firearms in the home? No    Do you/your family have a smoke detector(s)? Yes        July 16, 2020 Mague Chase LPN           ROS   ROS: 10 point ROS neg other than the symptoms noted above in the HPI.    PHQ9= 0, GAD7= 0     EXAM:  Blood pressure 106/68, pulse 71, height 1.702 m (5' 7\"), weight 74.1 kg (163 lb 4.8 oz), not currently breastfeeding. Body mass index is 25.58 kg/m .  General - pleasant female in no acute distress.  Skin - no suspicious lesions or rashes  EENT-  PERRLA, euthyroid with out palpable nodules  Neck - supple without lymphadenopathy.  Lungs - clear to auscultation bilaterally.  Heart - regular rate and rhythm without murmur.  Abdomen - soft, nontender, nondistended, no masses or organomegaly noted.  Musculoskeletal - no gross deformities.  Neurological - normal strength, sensation, and mental status.    Breast Exam:  Breast: Without visible skin changes. No dimpling or lesions seen.   Breasts supple,  nipple discharge noted bilaterally.   Left breast tenderness to palpation behind nipple and surrounding area extending in a 3cm diameter   No palpable mass or nodularity  BB sized " nodule palpated in the posterior axillary lymph nodes, soft, mobile    Pelvic Exam:  EG/BUS: Normal genital architecture without lesions, erythema or abnormal secretions Bartholin's, Urethra, [place]'s normal   Urethral meatus: normal without lesion  Urethra: no masses, tenderness, or scarring   Bladder: no masses or tenderness   Vagina: moist, pink, rugae with odorless and physiologic discharge  secretions  Cervix: Multiparous, and pink, moist, closed, without lesion or CMT  Mirena IUD strings visible approx 0.25cm inside internal os. Better visualized when cytobrush used.  Uterus: anteverted,  and small, smooth, firm, mobile w/o pain  Adnexa: Within normal limits and No masses, nodularity, tenderness  Rectum:anus normal       ASSESSMENT:  Encounter Diagnoses   Name Primary?     Visit for preventive health examination Yes     Screening for malignant neoplasm of cervix      Encounter for gynecological examination without abnormal finding      Nipple tenderness      Axillary lump, left      Undifferentiated connective tissue disease (H)         PLAN:   Orders Placed This Encounter   Procedures     Pelvic and Breast Exam Procedure []     Pap Smear Exam [] Do Not Remove     US Axillary Left     US Breast Left     Pap imaged thin layer screen with HPV - recommended age 30 - 65 years (select HPV order below)     HPV High Risk Types DNA Cervical     25- OH-Vitamin D     TSH with free T4 reflex     Hemoglobin A1c     Creatinine urine calculation only     BREAST CENTER REFERRAL       Additional teaching done at this visit regarding calcium (1200 mg per day), self breast exam, exercise, birth control, preconception, mental health and weight/diet.    - Mirena IUD to be removed or replaced by 5/23/2025.    - Pap with HPV cotesting obtained.    - Breast center referral placed and left breast imaging ordered.     - HbgA1c, TSH with T4 reflex and vitamin D ordered.  - Labs placed by Rheumatology to be completed.    Return  to clinic in one year.  Follow-up as needed.    HARVEY Ortez, RIANM

## 2020-07-16 NOTE — PATIENT INSTRUCTIONS
- Pap with HPV cotesting was obtained today. If NIL and negative HPV, next pap with cotesting will be due in 5 years.    - Ultrasound orders and a breast center referral have been placed. If you do not receive a call for scheduling, the phone number is : [phone]     - Vitamin D, Thyroid lab, Hemoglobin A1c have been ordered.  Please have these drawn today with your rheumatology labs.             PREVENTIVE HEALTH RECOMMENDATIONS:   Most women need a yearly breast and pelvic exam.    A PAP screen, a test done DURING a pelvic exam, is NO longer recommended yearly.    March 2013, screening guidelines recommended by ACOG for PAP screen are:    1) First pap at age 21.    2) Pap every 3 years until age 30.    3) After age 30, pap every 3 years or Pap with HR HPV screen every 5 years until age 65.  4) Women do NOT need a vaginal Pap screen after a hysterectomy (surgical removal of the uterus) when they have not had cancer.    Exceptions:  1) Yearly pap if HIV+ or immunosuppressed secondary to organ transplant  2) JEFRY II-III continue routine screening for 20 years.    I encourage you continue looking for opportunities to choose a healthy lifestyle:       * Choose to eat a heart healthy diet. Check out the FOOD PLATE guidelines at: http://www.choosemyplate.gov/ for helpful hints on weight and cholesterol management.  Balance your caloric intake with exercise to maintain a BMI in the 22 to 26 range. For bone health: Eat calcium-rich foods like yogurt, broccoli or take chewable calcium pills (500 to 600 mg) twice a day with food.       * Exercise for at least an average of 30 minutes a day, 5 days of the week. This will help you control your weight, release stress, and help prevent disease.      * Take a Vitamin D3 supplement daily fall through spring and during summer unless you [id number]' full body sun exposure to skin without sunscreen.      * DO wear sunscreen to prevent skin cancer after the first 15-30 minutes.       * Identify stressors in your life, find ways to release the stress, and, make time for yourself. PLEASE ask for help if mood changes last longer than two weeks.     * Limit alcohol to one drink per day.  No smoking.  Avoid second hand smoke. If you smoke, ask for help to stop.       *  If you are in a sexual relationship, talk with your partner about possible infection risks and take action to protect yourself from exposure to a sexual infection.    Please request an infection screen for STIs (sexually transmitted infections) if you are less than age 26 OR believe that you may be at risk.     Get a flu shot each year. Get a tetanus shot every 10 years. EVERYONE needs a pertussis (Whooping cough) booster.    See your dentist twice a year for an exam and preventive care cleaning.     Consider the following screen tests:    1) cholesterol test every 5 years.     2) yearly mammogram after age 40 unless you have identified risks.    3) colonoscopy every 10 years after age 50 unless you have identified risks.    4) diabetes blood test screening if you are at risk for diabetes.      Additional information that you may also find helpful:  The Internet now gives us access to LOTS of information -- some of it helpful, research documented and also plenty of harmful, anecdotal information that may not pertain to your situtaion. Consider visiting the following websites for accurate health information:    www.vitamindcouncil.org/ : Info and ongoing research re Vitamin D    www.fairview.org : Up to date and easily searchable information on multiple topics.    www.medlineplus.gov : medication info, interactive tutorials, watch real surgeries online    www.cdc.gov : public health info, travel advisories, epidemics (H1N1)    www.amol/std.org: current research re diagnosis, treatment and prevention of sexually contacted infections.    www.health.Counts include 234 beds at the [facility].[place].us : [place] dept of heatl, public health issues in [place],  N1N1    www.familydoctor.org : good info from the Academy of Family Physicians

## 2020-07-16 NOTE — PROGRESS NOTES
Progress Note    SUBJECTIVE:  Heather Guillory is an 40 year old, , who requests an Annual Preventive Exam.     Concerns today include: Feeling well overall, however, concerns re: breast pain.    Pt reports that in the last month she has noticed intermittent breast pain. Describes as soreness and aching around the left nipple and surrounding tissue. No longer breastfeeding. No palpable mass or lump, changes in skin color or texture or discharge. Hx of breast cancer in paternal grandmother.     Currently sexually active with one male partner- . Declines STI testing. Has Mirena IUD that was placed 2018. Reports lighter menses, not occurring monthly- usually only light spotting for 6-7 days. Happy with this form of contraception.     Unsure if they are interested in another child. Have one embryo left- were consider transfer in March but then did not d/t COVID. Still considering.    Last pap 3/20/2015 NIL, negative HPV. Agreeable to pap with hpv cotesting today.    Has continued care with rheumatology for mixed connective tissue disease diagnosed in . On Plaquenil 2x/day. Labs q6 months. Standing orders are in place for labs- due today.     Also interested in HbgA1c, tsh with t4 reflex and vitamin D.   Had fasting lipid panel 2019.     Mood is good. Laid off 2 weeks ago but coping well. On 50mg zoloft- happy with dosing.    Menstrual History:  Menstrual History 2014 10/9/2015 2017   LAST MENSTRUAL PERIOD 2014 -   Menarche Age - - 14   Period Cycle (Days) - - 28   Period Duration (Days) - - 4   Method of Contraception - - None   Period Pattern - - Regular   Menstrual Flow - - Heavy;Light   Menstrual Control - - -   Dysmenorrhea - - None   PMS Symptoms - - -   Reviewed Today - - Yes       Last    Lab Results   Component Value Date    PAP NIL 2015     History of abnormal Pap smear: NO - age 30-65 PAP every 5 years with negative HPV co-testing recommended    Last    Lab Results   Component Value Date    HPV16 Negative 2015     Last   Lab Results   Component Value Date    HPV18 Negative 2015     Last   Lab Results   Component Value Date    HRHPV Negative 2015       Mammogram current: ordering    Last Colonoscopy: n/a      HISTORY:  hydroxychloroquine (PLAQUENIL) 200 MG tablet, Take 1 tablet (200 mg) by mouth 2 times daily  metroNIDAZOLE (METROCREAM) 0.75 % external cream,   Prenatal Vit-Fe Fumarate-FA (PRENATAL VITAMINS) 28-0.8 MG TABS, Take 1 tablet by mouth daily   levonorgestrel (MIRENA, 52 MG,) 20 MCG/24HR IUD, 1 each (20 mcg) by Intrauterine route once for 1 dose    No current facility-administered medications on file prior to visit.     Allergies   Allergen Reactions     Benzoyl Peroxide Swelling     Duricef [Cefadroxil] Unknown     Monistat [Miconazole] Swelling     Sulfa Drugs Rash     Immunization History   Administered Date(s) Administered     Influenza (IIV3) PF 10/03/2014     Influenza Vaccine IM > 6 months Valent IIV4 10/09/2015, 10/13/2017     TDAP Vaccine (Boostrix) 12/15/2014, 2016, 01/15/2018       OB History    Para Term  AB Living   4 3 3 0 1 3   SAB TAB Ectopic Multiple Live Births   0 0 0 0 3     Past Medical History:   Diagnosis Date     JAVIER (generalized anxiety disorder)     has a Rx for Zoloft; not using currently     Rosacea      Undifferentiated connective tissue disease (H)      Past Surgical History:   Procedure Laterality Date     IR ENDOVENOUS ABLATION VARICOSE VEINS  10/28/2019     IR FOLLOW UP VISIT OUTPATIENT  2019     IR STAB PHLEBECTOMY 10 - 20 STABS  10/28/2019     IR VEIN SCLEROSING MULTIPLE  10/28/2019     OPERATIVE HYSTEROSCOPY WITH MORCELLATOR  2014    Procedure: OPERATIVE HYSTEROSCOPY WITH MORCELLATOR;  Hysteroscopic Polypectomy;  Surgeon: Cate Mansfield MD;  Location: [facility]     Family History   Problem Relation Age of Onset     Hypertension Paternal Grandfather      Lung Cancer  Paternal Grandfather         smoker     Diabetes Type 2  Paternal Grandfather      Breast Cancer Paternal Grandmother      Hypertension Paternal Grandmother      Colon Cancer Maternal Grandfather      Alzheimer Disease Maternal Grandmother      Cervical Cancer Maternal Grandmother      Arrhythmia Brother         details unknown; procedure in his 20s     Anxiety Disorder Brother      Elijah Disease Sister      Myocardial Infarction No family hx of      Cerebrovascular Disease No family hx of      Coronary Artery Disease Early Onset No family hx of      Ovarian Cancer No family hx of      Social History     Socioeconomic History     Marital status:      Spouse name: Chi     Number of children: 2     Years of education: None     Highest education level: None   Occupational History     Occupation:      Employer: [employer]   Social Needs     Financial resource strain: None     Food insecurity     Worry: None     Inability: None     Transportation needs     Medical: None     Non-medical: None   Tobacco Use     Smoking status: Never Smoker     Smokeless tobacco: Never Used   Substance and Sexual Activity     Alcohol use: No     Alcohol/week: 0.0 standard drinks     Drug use: No     Sexual activity: Yes     Partners: Male     Birth control/protection: I.U.D.     Comment: Mirena placed in 2018   Lifestyle     Physical activity     Days per week: None     Minutes per session: None     Stress: None   Relationships     Social connections     Talks on phone: None     Gets together: None     Attends Adventism service: None     Active member of club or organization: None     Attends meetings of clubs or organizations: None     Relationship status: None     Intimate partner violence     Fear of current or ex partner: None     Emotionally abused: None     Physically abused: None     Forced sexual activity: None   Other Topics Concern      Service No     Blood Transfusions No     Caffeine Concern  "No     Occupational Exposure No     Hobby Hazards No     Sleep Concern No     Stress Concern No     Weight Concern No     Special Diet No     Back Care No     Exercise No     Bike Helmet Yes     Comment: n/a     Seat Belt Yes     Self-Exams No     Parent/sibling w/ CABG, MI or angioplasty before 65F 55M? No   Social History Narrative    .    3 boys (4, 3, and 18 months as of 2019).    Exercises when able.         How much exercise per week? 3-4 x's     How much calcium per day? In foods and PNV       How much caffeine per day? 1 soda occasional    How much vitamin D per day? PNV    Do you/your family wear seatbelts?  Yes    Do you/your family use safety helmets? Yes    Do you/your family use sunscreen? Yes    Do you/your family keep firearms in the home? No    Do you/your family have a smoke detector(s)? Yes        July 16, 2020 Mague Chase LPN           ROS   ROS: 10 point ROS neg other than the symptoms noted above in the HPI.    PHQ9= 0, GAD7= 0     EXAM:  Blood pressure 106/68, pulse 71, height 1.702 m (5' 7\"), weight 74.1 kg (163 lb 4.8 oz), not currently breastfeeding. Body mass index is 25.58 kg/m .  General - pleasant female in no acute distress.  Skin - no suspicious lesions or rashes  EENT-  PERRLA, euthyroid with out palpable nodules  Neck - supple without lymphadenopathy.  Lungs - clear to auscultation bilaterally.  Heart - regular rate and rhythm without murmur.  Abdomen - soft, nontender, nondistended, no masses or organomegaly noted.  Musculoskeletal - no gross deformities.  Neurological - normal strength, sensation, and mental status.    Breast Exam:  Breast: Without visible skin changes. No dimpling or lesions seen.   Breasts supple,  nipple discharge noted bilaterally.   Left breast tenderness to palpation behind nipple and surrounding area extending in a 3cm diameter   No palpable mass or nodularity  BB sized nodule palpated in the posterior axillary lymph nodes, soft, mobile    Pelvic " Exam:  EG/BUS: Normal genital architecture without lesions, erythema or abnormal secretions Bartholin's, Urethra, [place]'s normal   Urethral meatus: normal without lesion  Urethra: no masses, tenderness, or scarring   Bladder: no masses or tenderness   Vagina: moist, pink, rugae with odorless and physiologic discharge  secretions  Cervix: Multiparous, and pink, moist, closed, without lesion or CMT  Mirena IUD strings visible approx 0.25cm inside internal os. Better visualized when cytobrush used.  Uterus: anteverted,  and small, smooth, firm, mobile w/o pain  Adnexa: Within normal limits and No masses, nodularity, tenderness  Rectum:anus normal       ASSESSMENT:  Encounter Diagnoses   Name Primary?     Visit for preventive health examination Yes     Screening for malignant neoplasm of cervix      Encounter for gynecological examination without abnormal finding      Nipple tenderness      Axillary lump, left      Undifferentiated connective tissue disease (H)         PLAN:   Orders Placed This Encounter   Procedures     Pelvic and Breast Exam Procedure []     Pap Smear Exam [] Do Not Remove     US Axillary Left     US Breast Left     Pap imaged thin layer screen with HPV - recommended age 30 - 65 years (select HPV order below)     HPV High Risk Types DNA Cervical     25- OH-Vitamin D     TSH with free T4 reflex     Hemoglobin A1c     Creatinine urine calculation only     BREAST CENTER REFERRAL       Additional teaching done at this visit regarding calcium (1200 mg per day), self breast exam, exercise, birth control, preconception, mental health and weight/diet.    - Mirena IUD to be removed or replaced by 5/23/2025.    - Pap with HPV cotesting obtained.    - Breast center referral placed and left breast imaging ordered.     - HbgA1c, TSH with T4 reflex and vitamin D ordered.  - Labs placed by Rheumatology to be completed.    Return to clinic in one year.  Follow-up as needed.    HARVEY Ortez, CNM

## 2020-07-16 NOTE — LETTER
"2020       RE: Heather Guillory  [address]  [place] [address]     Dear Colleague,    Thank you for referring your patient, Heather Guillory, to the [facility] at [facility]. Please see a copy of my visit note below.        Progress Note    SUBJECTIVE:  Heather Guillory is an 40 year old, , who requests an Annual Preventive Exam.   { if used. If NOT used, Delete.:808757}    Concerns today include: ***    Menstrual History:  Menstrual History 2014 10/9/2015 2017   LAST MENSTRUAL PERIOD 2014 -   Menarche Age - - 14   Period Cycle (Days) - - 28   Period Duration (Days) - - 4   Method of Contraception - - None   Period Pattern - - Regular   Menstrual Flow - - Heavy;Light   Menstrual Control - - -   Dysmenorrhea - - None   PMS Symptoms - - -   Reviewed Today - - Yes       Last    Lab Results   Component Value Date    PAP NIL 2015     History of abnormal Pap smear: {PAP HX:739877}    Last   Lab Results   Component Value Date    HPV16 Negative 2015     Last   Lab Results   Component Value Date    HPV18 Negative 2015     Last   Lab Results   Component Value Date    HRHPV Negative 2015       Mammogram current: {[facility]:685759::\"not applicable\"}  Last Mammogram:   No results found.     Last Colonoscopy:  No results found for this or any previous visit.      HISTORY:  hydroxychloroquine (PLAQUENIL) 200 MG tablet, Take 1 tablet (200 mg) by mouth 2 times daily  metroNIDAZOLE (METROCREAM) 0.75 % external cream,   Prenatal Vit-Fe Fumarate-FA (PRENATAL VITAMINS) 28-0.8 MG TABS, Take 1 tablet by mouth daily   sertraline (ZOLOFT) 50 MG tablet, TAKE 1 TABLET BY MOUTH EVERY DAY  levonorgestrel (MIRENA, 52 MG,) 20 MCG/24HR IUD, 1 each (20 mcg) by Intrauterine route once for 1 dose    No current facility-administered medications on file prior to visit.     Allergies   Allergen Reactions     Benzoyl " Peroxide Swelling     Duricef [Cefadroxil] Unknown     Monistat [Miconazole] Swelling     Sulfa Drugs Rash     Immunization History   Administered Date(s) Administered     Influenza (IIV3) PF 10/03/2014     Influenza Vaccine IM > 6 months Valent IIV4 10/09/2015, 10/13/2017     TDAP Vaccine (Boostrix) 12/15/2014, 2016, 01/15/2018       OB History    Para Term  AB Living   4 3 3 0 1 3   SAB TAB Ectopic Multiple Live Births   0 0 0 0 3     Past Medical History:   Diagnosis Date     JAVIER (generalized anxiety disorder)     has a Rx for Zoloft; not using currently     Rosacea      Undifferentiated connective tissue disease (H)      Past Surgical History:   Procedure Laterality Date     IR ENDOVENOUS ABLATION VARICOSE VEINS  10/28/2019     IR FOLLOW UP VISIT OUTPATIENT  2019     IR STAB PHLEBECTOMY 10 -  STABS  10/28/2019     IR VEIN SCLEROSING MULTIPLE  10/28/2019     OPERATIVE HYSTEROSCOPY WITH MORCELLATOR  2014    Procedure: OPERATIVE HYSTEROSCOPY WITH MORCELLATOR;  Hysteroscopic Polypectomy;  Surgeon: Cate Mansfield MD;  Location: [facility] OR     Family History   Problem Relation Age of Onset     Hypertension Paternal Grandfather      Lung Cancer Paternal Grandfather         smoker     Diabetes Type 2  Paternal Grandfather      Breast Cancer Paternal Grandmother      Hypertension Paternal Grandmother      Colon Cancer Maternal Grandfather      Alzheimer Disease Maternal Grandmother      Cervical Cancer Maternal Grandmother      Arrhythmia Brother         details unknown; procedure in his 20s     Anxiety Disorder Brother      Elijah Disease Sister      Myocardial Infarction No family hx of      Cerebrovascular Disease No family hx of      Coronary Artery Disease Early Onset No family hx of      Ovarian Cancer No family hx of      Social History     Socioeconomic History     Marital status:      Spouse name: Chi     Number of children: 2     Years of education: None     Highest  education level: None   Occupational History     Occupation:      Employer: [employer]   Social Needs     Financial resource strain: None     Food insecurity     Worry: None     Inability: None     Transportation needs     Medical: None     Non-medical: None   Tobacco Use     Smoking status: Never Smoker     Smokeless tobacco: Never Used   Substance and Sexual Activity     Alcohol use: No     Alcohol/week: 0.0 standard drinks     Drug use: No     Sexual activity: Yes     Partners: Male     Birth control/protection: I.U.D.     Comment: Mirena placed in 2018   Lifestyle     Physical activity     Days per week: None     Minutes per session: None     Stress: None   Relationships     Social connections     Talks on phone: None     Gets together: None     Attends Restorationism service: None     Active member of club or organization: None     Attends meetings of clubs or organizations: None     Relationship status: None     Intimate partner violence     Fear of current or ex partner: None     Emotionally abused: None     Physically abused: None     Forced sexual activity: None   Other Topics Concern      Service No     Blood Transfusions No     Caffeine Concern No     Occupational Exposure No     Hobby Hazards No     Sleep Concern No     Stress Concern No     Weight Concern No     Special Diet No     Back Care No     Exercise No     Bike Helmet Yes     Comment: n/a     Seat Belt Yes     Self-Exams No     Parent/sibling w/ CABG, MI or angioplasty before 65F 55M? No   Social History Narrative    .    3 boys (4, 3, and 18 months as of 2019).    Exercises when able.         How much exercise per week? 3-4 x's     How much calcium per day? In foods and PNV       How much caffeine per day? 1 soda occasional    How much vitamin D per day? PNV    Do you/your family wear seatbelts?  Yes    Do you/your family use safety helmets? Yes    Do you/your family use sunscreen? Yes    Do you/your family keep  "firearms in the home? No    Do you/your family have a smoke detector(s)? Yes        July 16, 2020 Mague Chase LPN           ROS  [unfilled]  PHQ-9 SCORE 7/22/2016 2/12/2018 4/26/2019   PHQ-9 Total Score - - -   PHQ-9 Total Score 0 0 0     JAVIER-7 SCORE 4/26/2019 12/3/2019 7/15/2020   Total Score - 0 (minimal anxiety) 0 (minimal anxiety)   Total Score 0 0 0         EXAM:  Blood pressure 106/68, pulse 71, height 1.702 m (5' 7\"), weight 74.1 kg (163 lb 4.8 oz), not currently breastfeeding. Body mass index is 25.58 kg/m .  General - pleasant female in no acute distress.  Skin - no suspicious lesions or rashes  EENT-  PERRLA, euthyroid with out palpable nodules  Neck - supple without lymphadenopathy.  Lungs - clear to auscultation bilaterally.  Heart - regular rate and rhythm without murmur.  Abdomen - soft, nontender, nondistended, no masses or organomegaly noted.  Musculoskeletal - no gross deformities.  Neurological - normal strength, sensation, and mental status.    Breast Exam:  Breast: Without visible skin changes. No dimpling or lesions seen.   Breasts supple, non-tender with palpation, no dominant mass, nodularity, or nipple discharge noted bilaterally. Axillary nodes negative.      Pelvic Exam:  EG/BUS: {[facility] USPEC VULVA EXAM :139205::\"Normal genital architecture without lesions, erythema or abnormal secretions\",\"Bartholin's, Urethra, [place]'s normal\"}   Urethral meatus: normal ***  Urethra: no masses, tenderness, or scarring ***  Bladder: no masses or tenderness ***  Vagina: {VAGINAL MUCOSA:424571::\"moist, pink, rugae\"} with {Color:598192::\"creamy\",\"white\",\"odorless\"}  secretions  Cervix: {CERVIX :614906::\"no lesions\"}  Uterus: {UTERUS :902438::\"anteverted, \"}  Adnexa: {ADNEXA :968862::\"Within normal limits\",\"No masses, nodularity, tenderness\"}  Rectum:{RECTAL NEGATIVES LIST:049667::\"anus normal\"}       ASSESSMENT:  Encounter Diagnoses   Name Primary?     Visit for preventive health examination Yes     Screening for " malignant neoplasm of cervix      Encounter for gynecological examination without abnormal finding         PLAN:   Orders Placed This Encounter   Procedures     Pelvic and Breast Exam Procedure []     Pap Smear Exam [] Do Not Remove     Pap imaged thin layer screen with HPV - recommended age 30 - 65 years (select HPV order below)     HPV High Risk Types DNA Cervical     No orders of the defined types were placed in this encounter.      Additional teaching done at this visit regarding {TEACHING DONE:747891}.    Return to clinic in one year.  Follow-up as needed.          Again, thank you for allowing me to participate in the care of your patient.      Sincerely,    Dalila Shearer CNM

## 2020-07-17 LAB
C3 SERPL-MCNC: 97 MG/DL (ref 81–157)
C4 SERPL-MCNC: 28 MG/DL (ref 13–39)
DEPRECATED CALCIDIOL+CALCIFEROL SERPL-MC: 48 UG/L (ref 20–75)
DSDNA AB SER-ACNC: 5 IU/ML

## 2020-07-20 ENCOUNTER — PRE VISIT (OUTPATIENT)
Dept: ONCOLOGY | Facility: CLINIC | Age: 40
End: 2020-07-20

## 2020-07-20 LAB
COPATH REPORT: NORMAL
PAP: NORMAL

## 2020-07-20 NOTE — TELEPHONE ENCOUNTER
ONCOLOGY INTAKE: Records Information      APPT INFORMATION:  Referring provider:  SUELLEN Shearer  Referring provider s clinic:  [facility]  Reason for visit/diagnosis:  Nipple tenderness  Has patient been notified of appointment date and time?: Yes    RECORDS INFORMATION:  Were the records received with the referral (via [employer])? NO    Has patient been seen for any external appt for this diagnosis? No    If yes, where? n/a    Has patient had any imaging or procedures outside of [place]  [place] for this condition? no      If Yes, where? n/a    ADDITIONAL INFORMATION:  none

## 2020-07-21 LAB
FINAL DIAGNOSIS: NORMAL
HPV HR 12 DNA CVX QL NAA+PROBE: NEGATIVE
HPV16 DNA SPEC QL NAA+PROBE: NEGATIVE
HPV18 DNA SPEC QL NAA+PROBE: NEGATIVE
SPECIMEN DESCRIPTION: NORMAL
SPECIMEN SOURCE CVX/VAG CYTO: NORMAL

## 2020-07-21 NOTE — TELEPHONE ENCOUNTER
RECORDS STATUS - BREAST    RECORDS REQUESTED FROM: [facility] - Internal Referral   DATE REQUESTED: 7/21/20   NOTES DETAILS STATUS   OFFICE NOTE from referring provider     OFFICE NOTE from medical oncologist     OFFICE NOTE from surgeon     OFFICE NOTE from radiation oncologist     DISCHARGE SUMMARY from hospital     DISCHARGE REPORT from the ER     OPERATIVE REPORT     MEDICATION LIST     CLINICAL TRIAL TREATMENTS TO DATE     LABS     PATHOLOGY REPORTS  (Tissue diagnosis, Stage, ER/WA percentage positive and intensity of staining, HER2 IHC, FISH, and all biopsies from breast and any distant metastasis)                     GENONOMIC TESTING     TYPE:   (Next Generation Sequencing, including Foundation One testing, and Oncotype score)     IMAGING (NEED IMAGES & REPORT)     CT SCANS     MRI     MAMMO Scheduled [facility] 7/23/20   ULTRASOUND Scheduled [facility] 7/23/20   PET     BONE SCAN     BRAIN MRI

## 2020-07-23 ENCOUNTER — ANCILLARY PROCEDURE (OUTPATIENT)
Dept: MAMMOGRAPHY | Facility: CLINIC | Age: 40
End: 2020-07-23
Attending: ADVANCED PRACTICE MIDWIFE

## 2020-07-23 DIAGNOSIS — R22.32 AXILLARY LUMP, LEFT: ICD-10-CM

## 2020-07-23 DIAGNOSIS — N64.4 NIPPLE TENDERNESS: ICD-10-CM

## 2020-07-31 ENCOUNTER — VIRTUAL VISIT (OUTPATIENT)
Dept: INTERNAL MEDICINE | Facility: CLINIC | Age: 40
End: 2020-07-31

## 2020-07-31 ENCOUNTER — ANCILLARY PROCEDURE (OUTPATIENT)
Dept: ULTRASOUND IMAGING | Facility: CLINIC | Age: 40
End: 2020-07-31
Attending: RADIOLOGY

## 2020-07-31 DIAGNOSIS — F33.0 MILD RECURRENT MAJOR DEPRESSION (H): ICD-10-CM

## 2020-07-31 DIAGNOSIS — I83.893 SYMPTOMATIC VARICOSE VEINS OF BOTH LOWER EXTREMITIES: ICD-10-CM

## 2020-07-31 NOTE — PROGRESS NOTES
"Video Visit Technology for this patient: Lorenzo not working, patient has smart device, please try [employer] Video with patient     Heather Guillory is a 40 year old female who is being evaluated via a billable video visit.      The patient has been notified of following:     \"This video visit will be conducted via a call between you and your physician/provider. We have found that certain health care needs can be provided without the need for an in-person physical exam.  This service lets us provide the care you need with a video conversation.  If a prescription is necessary we can send it directly to your pharmacy.  If lab work is needed we can place an order for that and you can then stop by our lab to have the test done at a later time.    Video visits are billed at different rates depending on your insurance coverage.  Please reach out to your insurance provider with any questions.    If during the course of the call the physician/provider feels a video visit is not appropriate, you will not be charged for this service.\"    Patient has given verbal consent for Video visit? Yes  How would you like to obtain your AVS? MyChart  If you are dropped from the video visit, the video invite should be resent to: Text to cell phone: [phone]   Will anyone else be joining your video visit? No      Subjective     Heather Guillory is a 40 year old female who presents today via video visit to establish care and for the following health issues.    She has a past medical history of JAVIER (generalized anxiety disorder), Rosacea, and Undifferentiated connective tissue disease (H).     Current Outpatient Medications:      hydroxychloroquine (PLAQUENIL) 200 MG tablet, Take 1 tablet (200 mg) by mouth 2 times daily, Disp: 60 tablet, Rfl: 5     metroNIDAZOLE (METROCREAM) 0.75 % external cream, , Disp: , Rfl:      Prenatal Vit-Fe Fumarate-FA (PRENATAL VITAMINS) 28-0.8 MG TABS, Take 1 tablet by mouth daily , Disp: , Rfl:      sertraline " (ZOLOFT) 50 MG tablet, TAKE 1 TABLET BY MOUTH EVERY DAY, Disp: 90 tablet, Rfl: 3     levonorgestrel (MIRENA, 52 MG,) 20 MCG/24HR IUD, 1 each (20 mcg) by Intrauterine route once for 1 dose, Disp: 1 each, Rfl: 0      HPI    Overall, her rheumatologic condition is stable.  She does endorse fatigue and increased mental stress, worsening depression.  However she is taking care of three young children at home.  Sleep is constantly disrupted.  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  That, together with the pandemic is taking a toll mentally.  She has one preserved embryo after IVF and is debating on whether or not to proceed with pregnancy.  She feels a sense of emotional attachment to the embryo and is working through that with her .      Meds, HCM Reviewed and updated as needed this visit by Provider. She is up to date in terms of pap smears.  We discussed starting mammography at age 42.          Review of Systems    ROS: 10 point ROS neg other than the symptoms noted above in the HPI.      Objective       Physical Exam     GENERAL: Healthy, alert and no distress  EYES: Eyes grossly normal to inspection.  No discharge or erythema, or obvious scleral/conjunctival abnormalities.  RESP: No audible wheeze, cough, or visible cyanosis.  No visible retractions or increased work of breathing.    SKIN: Mild central facial erythema is noted.  NEURO: Cranial nerves grossly intact.  Mentation and speech appropriate for age.  PSYCH: Mentation appears normal, affect normal/bright, judgement and insight intact, normal speech and appearance well-groomed.      Diagnostic Test Results:  Labs reviewed in Epic        Heather was seen today for establish care.  I advised that she increase sertraline from 25 to 50 mg to better assist with depression symptoms and I think she would benefit from health psychology  consultation.  Advised her to return to clinic in one year for a regular physical, sooner prn symptoms.    Diagnoses and all orders for this visit:    Mild recurrent major depression (H)  -     sertraline (ZOLOFT) 50 MG tablet; Take 1 tablet (50 mg) by mouth daily  -     MENTAL HEALTH REFERRAL  - Adult; Outpatient Treatment; Individual/Couples/Family/Group Therapy/Health Psychology; [facility]: Health Psychology - Erick Lovell [phone]; Patient call to schedule        Video-Visit Details    Type of service:  Video Visit duration 28 minutes    Originating Location (pt. Location): Home    Distant Location (provider location):  [facility]     Platform used for Video Visit: Schoology      --Brandy Andrews MD

## 2020-07-31 NOTE — NURSING NOTE
Chief Complaint   Patient presents with     Establish Care     Pt would like to establish care today      CECI Quinones at 8:12 AM sign on 7/31/2020

## 2020-08-05 ENCOUNTER — VIRTUAL VISIT (OUTPATIENT)
Dept: VASCULAR SURGERY | Facility: CLINIC | Age: 40
End: 2020-08-05

## 2020-08-05 DIAGNOSIS — I83.893 SYMPTOMATIC VARICOSE VEINS OF BOTH LOWER EXTREMITIES: Primary | ICD-10-CM

## 2020-08-05 ASSESSMENT — PAIN SCALES - GENERAL: PAINLEVEL: NO PAIN (0)

## 2020-08-05 NOTE — PROGRESS NOTES
"    Heather Guillory is a 40 year old female who is being evaluated via a billable telephone visit.      The patient has been notified of following:     \"This telephone visit will be conducted via a call between you and your physician/provider. We have found that certain health care needs can be provided without the need for a physical exam.  This service lets us provide the care you need with a short phone conversation.  If a prescription is necessary we can send it directly to your pharmacy.  If lab work is needed we can place an order for that and you can then stop by our lab to have the test done at a later time.    Telephone visits are billed at different rates depending on your insurance coverage. During this emergency period, for some insurers they may be billed the same as an in-person visit.  Please reach out to your insurance provider with any questions.    If during the course of the call the physician/provider feels a telephone visit is not appropriate, you will not be charged for this service.\"    Patient has given verbal consent for Telephone visit?  Yes    What phone number would you like to be contacted at? [phone]    Phone call duration: 10 minutes    Desmond Jamison, DO    [facility]    SUBJECTIVE: Heather is a 39 yo female with connective tissue disease and lifestyle limiting superficial venous incompetency of the left leg s/p EVLA left great saphenous vein, sclerotherapy and micro-phlebectomy of left truncal varicosities on 10/28/2019. History of using compression stockings for 2 years without much benefit.      She reports being satisfied with the results of her treatment and has recovered from residual discomfort. Her scar has faded. She only wears compression stocking when she is doing a workout and occasionally will have mild discomfort behind the knee with activity when not wearing them. No new complaints.    PAST MEDICAL HISTORY:   Past Medical History:   Diagnosis " Date     JAVIER (generalized anxiety disorder)     50mg zoloft     Rosacea      Undifferentiated connective tissue disease (H)        PAST SURGICAL HISTORY:   Past Surgical History:   Procedure Laterality Date     IR ENDOVENOUS ABLATION VARICOSE VEINS  10/28/2019     IR FOLLOW UP VISIT OUTPATIENT  11/20/2019     IR STAB PHLEBECTOMY 10 - 20 STABS  10/28/2019     IR VEIN SCLEROSING MULTIPLE  10/28/2019     OPERATIVE HYSTEROSCOPY WITH MORCELLATOR  4/8/2014    Procedure: OPERATIVE HYSTEROSCOPY WITH MORCELLATOR;  Hysteroscopic Polypectomy;  Surgeon: Cate Mansfield MD;  Location: [facility] OR       FAMILY HISTORY:   Family History   Problem Relation Age of Onset     Hypertension Paternal Grandfather      Lung Cancer Paternal Grandfather         smoker     Diabetes Type 2  Paternal Grandfather      Breast Cancer Paternal Grandmother      Hypertension Paternal Grandmother      Colon Cancer Maternal Grandfather      Alzheimer Disease Maternal Grandmother      Cervical Cancer Maternal Grandmother      Arrhythmia Brother         details unknown; procedure in his 20s     Anxiety Disorder Brother      Elijah Disease Sister      Myocardial Infarction No family hx of      Cerebrovascular Disease No family hx of      Coronary Artery Disease Early Onset No family hx of      Ovarian Cancer No family hx of        SOCIAL HISTORY:   Social History     Tobacco Use     Smoking status: Never Smoker     Smokeless tobacco: Never Used   Substance Use Topics     Alcohol use: No     Alcohol/week: 0.0 standard drinks       PROBLEM LIST:   Patient Active Problem List    Diagnosis Date Noted     Varicose veins of left lower extremity with pain 10/29/2019     Priority: Medium     JAVIER (generalized anxiety disorder)      Priority: Medium     has a Rx for Zoloft; not using currently       Undifferentiated connective tissue disease (H)      Priority: Medium     Rosacea      Priority: Medium       MEDICATIONS:   Prescription Medications as of 8/5/2020        Rx Number Disp Refills Start End Last Dispensed Date Next Fill Date Owning Pharmacy    hydroxychloroquine (PLAQUENIL) 200 MG tablet  60 tablet 5 3/30/2020    [facility] [place] - [address]    Sig: Take 1 tablet (200 mg) by mouth 2 times daily    Class: E-Prescribe    Notes to Pharmacy: PATIENT NOW TAKING 2 DAILY    Route: Oral    levonorgestrel (MIRENA, 52 MG,) 20 MCG/24HR IUD  1 each 0 2018       Si each (20 mcg) by Intrauterine route once for 1 dose    Class: No Print Out    Route: Intrauterine    Prenatal Vit-Fe Fumarate-FA (PRENATAL VITAMINS) 28-0.8 MG TABS    2013        Sig: Take 1 tablet by mouth daily     Class: Historical    Route: Oral    sertraline (ZOLOFT) 50 MG tablet  90 tablet 3 2020    [id number] [place] - [address] AT [facility] [address] [facility] [address]    Sig: Take 1 tablet (50 mg) by mouth daily    Class: E-Prescribe    Route: Oral          ALLERGIES:   Benzoyl peroxide; Duricef [cefadroxil]; Monistat [miconazole]; and Sulfa drugs    Labs: none     Diagnostic studies: US LE venous competency 20:    Impression:  Continued post ablation occlusion of the left greater saphenous vein  from the groin to the mid thigh, starting 1.9 cm from the SFJ.   Common femoral vein is patent.      Assessment/Plan:  Superficial venous incompetency of the left leg s/p EVLA left great saphenous vein, sclerotherapy and micro-phlebectomy of left truncal varicosities on 10/28/2019. Current ultrasound demonstrates post-ablation occlusion of the left GSV 1.9 cm from the junction.    - Recommend wearing compression when active or as desired. She can use knee length stockings and they don't necessarily have to be medical grade.  - Heather is happy with her results. She can follow up as needed going forward.    \Desmond Jamison, DO on 2020 at 2:15 PM    I interviewed the patient over the phone with the resident and agree  with the assessment and plan.     Total of approximately 15 minutes spent on the phone of which >50% spent on counseling.    CC  Patient Care Team:  Brandy Rouse MD as PCP - General (Internal Medicine)  Catina Arreola MD as Assigned PCP  Dalila Shearer CNM as Midwife (Midwives)  SELF, REFERRED

## 2020-08-05 NOTE — LETTER
8/5/2020       RE: Heather Guillory  [address]  [place] [address]     Dear Colleague,    Thank you for referring your patient, Heather Guillory, to the [facility] VASCULAR [facility]INIC at [facility]. Please see a copy of my visit note below.        Heather Guillory is a 40 year old female who is being evaluated via a billable telephone visit.      Phone call duration: 10 minutes    Desmond Jamison DO    INTERVENTIONAL RADIOLOGY CLINIC    SUBJECTIVE: Heather is a 41 yo female with connective tissue disease and lifestyle limiting superficial venous incompetency of the left leg s/p EVLA left great saphenous vein, sclerotherapy and micro-phlebectomy of left truncal varicosities on 10/28/2019. History of using compression stockings for 2 years without much benefit.      She reports being satisfied with the results of her treatment and has recovered from residual discomfort. Her scar has faded. She only wears compression stocking when she is doing a workout and occasionally will have mild discomfort behind the knee with activity when not wearing them. No new complaints.    PAST MEDICAL HISTORY:   Past Medical History:   Diagnosis Date     JAVIER (generalized anxiety disorder)     50mg zoloft     Rosacea      Undifferentiated connective tissue disease (H)        PAST SURGICAL HISTORY:   Past Surgical History:   Procedure Laterality Date     IR ENDOVENOUS ABLATION VARICOSE VEINS  10/28/2019     IR FOLLOW UP VISIT OUTPATIENT  11/20/2019     IR STAB PHLEBECTOMY 10 - 20 STABS  10/28/2019     IR VEIN SCLEROSING MULTIPLE  10/28/2019     OPERATIVE HYSTEROSCOPY WITH MORCELLATOR  4/8/2014    Procedure: OPERATIVE HYSTEROSCOPY WITH MORCELLATOR;  Hysteroscopic Polypectomy;  Surgeon: Cate Mansfield MD;  Location:  OR       FAMILY HISTORY:   Family History   Problem Relation Age of Onset     Hypertension Paternal Grandfather      Lung Cancer Paternal Grandfather         smoker     Diabetes  Type 2  Paternal Grandfather      Breast Cancer Paternal Grandmother      Hypertension Paternal Grandmother      Colon Cancer Maternal Grandfather      Alzheimer Disease Maternal Grandmother      Cervical Cancer Maternal Grandmother      Arrhythmia Brother         details unknown; procedure in his 20s     Anxiety Disorder Brother      Eiljah Disease Sister      Myocardial Infarction No family hx of      Cerebrovascular Disease No family hx of      Coronary Artery Disease Early Onset No family hx of      Ovarian Cancer No family hx of        SOCIAL HISTORY:   Social History     Tobacco Use     Smoking status: Never Smoker     Smokeless tobacco: Never Used   Substance Use Topics     Alcohol use: No     Alcohol/week: 0.0 standard drinks       PROBLEM LIST:   Patient Active Problem List    Diagnosis Date Noted     Varicose veins of left lower extremity with pain 10/29/2019     Priority: Medium     JAVIER (generalized anxiety disorder)      Priority: Medium     has a Rx for Zoloft; not using currently       Undifferentiated connective tissue disease (H)      Priority: Medium     Rosacea      Priority: Medium       MEDICATIONS:   Prescription Medications as of 2020       Rx Number Disp Refills Start End Last Dispensed Date Next Fill Date Owning Pharmacy    hydroxychloroquine (PLAQUENIL) 200 MG tablet  60 tablet 5 3/30/2020    [facility] [id number] [place] [facility] - [place], [place] - [email or web address]    Sig: Take 1 tablet (200 mg) by mouth 2 times daily    Class: E-Prescribe    Notes to Pharmacy: PATIENT NOW TAKING 2 DAILY    Route: Oral    levonorgestrel (MIRENA, 52 MG,) 20 MCG/24HR IUD  1 each 0 2018       Si each (20 mcg) by Intrauterine route once for 1 dose    Class: No Print Out    Route: Intrauterine    Prenatal Vit-Fe Fumarate-FA (PRENATAL VITAMINS) 28-0.8 MG TABS    2013        Sig: Take 1 tablet by mouth daily     Class: Historical    Route: Oral    sertraline (ZOLOFT) 50 MG tablet  90 tablet 3  7/31/2020    [id number] - [place] - [address] [facility] [address]    Sig: Take 1 tablet (50 mg) by mouth daily    Class: E-Prescribe    Route: Oral          ALLERGIES:   Benzoyl peroxide; Duricef [cefadroxil]; Monistat [miconazole]; and Sulfa drugs    Labs: none     Diagnostic studies: US LE venous competency 7/31/20:    Impression:  Continued post ablation occlusion of the left greater saphenous vein  from the groin to the mid thigh, starting 1.9 cm from the SFJ.   Common femoral vein is patent.      Assessment/Plan:  Superficial venous incompetency of the left leg s/p EVLA left great saphenous vein, sclerotherapy and micro-phlebectomy of left truncal varicosities on 10/28/2019. Current ultrasound demonstrates post-ablation occlusion of the left GSV 1.9 cm from the junction.    - Recommend wearing compression when active or as desired. She can use knee length stockings and they don't necessarily have to be medical grade.  - Heather is happy with her results. She can follow up as needed going forward.    \Desmond Jamison,  on 8/5/2020 at 2:15 PM    I interviewed the patient over the phone with the resident and agree with the assessment and plan.     Total of approximately 15 minutes spent on the phone of which >50% spent on counseling.    CC  Patient Care Team:  Brandy Rouse MD as PCP - General (Internal Medicine)  Catina Arreola MD as Assigned PCP  Dalila Shearer CNM as Midwife (Midwives)  SELF, REFERRED        Again, thank you for allowing me to participate in the care of your patient.      Sincerely,    Jonathan Headley MD

## 2020-08-05 NOTE — NURSING NOTE
Vascular Rooming Note     Heather Guillory's goals for this visit include:   Chief Complaint   Patient presents with     MIRZA Romreo, is participating in a telephone visit today for a follow up Left leg EVLA, feeling good, no concerns a t this time, as reported by patient.     Florence Anderson LPN

## 2020-09-08 ENCOUNTER — MYC MEDICAL ADVICE (OUTPATIENT)
Dept: GASTROENTEROLOGY | Facility: CLINIC | Age: 40
End: 2020-09-08

## 2020-09-08 DIAGNOSIS — R10.84 ABDOMINAL PAIN, GENERALIZED: ICD-10-CM

## 2020-09-08 DIAGNOSIS — K62.5 BRIGHT RED BLOOD PER RECTUM: Primary | ICD-10-CM

## 2020-09-08 DIAGNOSIS — R19.8 IRREGULAR BOWEL HABITS: ICD-10-CM

## 2020-09-14 ENCOUNTER — TELEPHONE (OUTPATIENT)
Dept: GASTROENTEROLOGY | Facility: CLINIC | Age: 40
End: 2020-09-14

## 2020-09-14 DIAGNOSIS — Z11.59 ENCOUNTER FOR SCREENING FOR OTHER VIRAL DISEASES: Primary | ICD-10-CM

## 2020-09-14 NOTE — TELEPHONE ENCOUNTER
Patient is scheduled for colonoscopy with Dr. Dumont    Spoke with: patient    Date of Procedure: 9-28-20    Location: asc    Sedation Type cs    Informed patient they will need an adult  yes    Informed Patient of COVID Test Requirement yes    Preferred Pharmacy for Pre Prescription [facility] on [address]    Confirmed Nurse will call to complete assessment yes    Additional comments: none

## 2020-09-21 ENCOUNTER — MYC MEDICAL ADVICE (OUTPATIENT)
Dept: GASTROENTEROLOGY | Facility: CLINIC | Age: 40
End: 2020-09-21

## 2020-09-21 DIAGNOSIS — K62.5 BRBPR (BRIGHT RED BLOOD PER RECTUM): Primary | ICD-10-CM

## 2020-09-21 RX ORDER — BISACODYL 5 MG/1
10 TABLET, DELAYED RELEASE ORAL DAILY
Qty: 4 TABLET | Refills: 0 | Status: SHIPPED | OUTPATIENT
Start: 2020-09-21 | End: 2020-09-23

## 2020-09-21 NOTE — NURSING NOTE
eRx Dulcolax et Golytely: [id number] - [place], [place] - [address] AT [facility] OF [address]     Maryanne Villalobos RN   [facility]

## 2020-10-09 ENCOUNTER — TELEPHONE (OUTPATIENT)
Dept: OBGYN | Facility: CLINIC | Age: 40
End: 2020-10-09

## 2020-10-09 NOTE — TELEPHONE ENCOUNTER
Call back to patient who states she will call back another time soon. Unrelated to her call/question to us: Just fell while hiking and thinks she broke her wrist. Advised to call us whenever is convenient and pt agrees

## 2020-10-09 NOTE — TELEPHONE ENCOUNTER
----- Message from Savana Brock RN sent at 10/9/2020 11:29 AM CDT -----  Regarding: nurse question  States that she has a nurse question.  [phone]

## 2020-10-18 DIAGNOSIS — Z11.59 ENCOUNTER FOR SCREENING FOR OTHER VIRAL DISEASES: ICD-10-CM

## 2020-10-18 PROCEDURE — U0003 INFECTIOUS AGENT DETECTION BY NUCLEIC ACID (DNA OR RNA); SEVERE ACUTE RESPIRATORY SYNDROME CORONAVIRUS 2 (SARS-COV-2) (CORONAVIRUS DISEASE [COVID-19]), AMPLIFIED PROBE TECHNIQUE, MAKING USE OF HIGH THROUGHPUT TECHNOLOGIES AS DESCRIBED BY CMS-2020-01-R: HCPCS | Performed by: INTERNAL MEDICINE

## 2020-10-19 LAB
SARS-COV-2 RNA SPEC QL NAA+PROBE: NOT DETECTED
SPECIMEN SOURCE: NORMAL

## 2020-10-21 ENCOUNTER — HOSPITAL ENCOUNTER (OUTPATIENT)
Facility: AMBULATORY SURGERY CENTER | Age: 40
Discharge: HOME | End: 2020-10-21
Attending: INTERNAL MEDICINE | Admitting: INTERNAL MEDICINE
Payer: COMMERCIAL

## 2020-10-21 VITALS
HEART RATE: 60 BPM | HEIGHT: 67 IN | DIASTOLIC BLOOD PRESSURE: 67 MMHG | WEIGHT: 163 LBS | OXYGEN SATURATION: 100 % | BODY MASS INDEX: 25.58 KG/M2 | SYSTOLIC BLOOD PRESSURE: 102 MMHG | TEMPERATURE: 97.2 F | RESPIRATION RATE: 16 BRPM

## 2020-10-21 LAB — COLONOSCOPY: NORMAL

## 2020-10-21 PROCEDURE — 45378 DIAGNOSTIC COLONOSCOPY: CPT

## 2020-10-21 PROCEDURE — 45378 DIAGNOSTIC COLONOSCOPY: CPT | Mod: GC | Performed by: INTERNAL MEDICINE

## 2020-10-21 RX ORDER — PROCHLORPERAZINE MALEATE 10 MG
10 TABLET ORAL EVERY 6 HOURS PRN
Status: DISCONTINUED | OUTPATIENT
Start: 2020-10-21 | End: 2020-10-22 | Stop reason: HOSPADM

## 2020-10-21 RX ORDER — ONDANSETRON 2 MG/ML
4 INJECTION INTRAMUSCULAR; INTRAVENOUS EVERY 6 HOURS PRN
Status: DISCONTINUED | OUTPATIENT
Start: 2020-10-21 | End: 2020-10-22 | Stop reason: HOSPADM

## 2020-10-21 RX ORDER — FENTANYL CITRATE 50 UG/ML
INJECTION, SOLUTION INTRAMUSCULAR; INTRAVENOUS PRN
Status: DISCONTINUED | OUTPATIENT
Start: 2020-10-21 | End: 2020-10-21 | Stop reason: HOSPADM

## 2020-10-21 RX ORDER — ONDANSETRON 2 MG/ML
4 INJECTION INTRAMUSCULAR; INTRAVENOUS
Status: DISCONTINUED | OUTPATIENT
Start: 2020-10-21 | End: 2020-10-21 | Stop reason: HOSPADM

## 2020-10-21 RX ORDER — LIDOCAINE 40 MG/G
CREAM TOPICAL
Status: DISCONTINUED | OUTPATIENT
Start: 2020-10-21 | End: 2020-10-21 | Stop reason: HOSPADM

## 2020-10-21 RX ORDER — ONDANSETRON 4 MG/1
4 TABLET, ORALLY DISINTEGRATING ORAL EVERY 6 HOURS PRN
Status: DISCONTINUED | OUTPATIENT
Start: 2020-10-21 | End: 2020-10-22 | Stop reason: HOSPADM

## 2020-10-21 RX ORDER — NALOXONE HYDROCHLORIDE 0.4 MG/ML
.1-.4 INJECTION, SOLUTION INTRAMUSCULAR; INTRAVENOUS; SUBCUTANEOUS
Status: DISCONTINUED | OUTPATIENT
Start: 2020-10-21 | End: 2020-10-22 | Stop reason: HOSPADM

## 2020-10-21 RX ORDER — FLUMAZENIL 0.1 MG/ML
0.2 INJECTION, SOLUTION INTRAVENOUS
Status: DISCONTINUED | OUTPATIENT
Start: 2020-10-21 | End: 2020-10-22 | Stop reason: HOSPADM

## 2020-10-21 ASSESSMENT — MIFFLIN-ST. JEOR: SCORE: 1451.99

## 2020-10-21 NOTE — DISCHARGE INSTRUCTIONS
Discharge Instructions after Colonoscopy      Activity and Diet  You were given medicine for pain. You may be dizzy or sleepy.  For 24 hours:    Do not drive or use heavy equipment.    Do not make important decisions.    Do not drink any alcohol.  You may return to your normal diet and medicines.    Discomfort    Air was placed in your colon during the exam in order to see it. Walking helps to pass the air.    You may take Tylenol (acetaminophen) for pain unless your doctor has told you not to.  Do not take aspirin or ibuprofen (Advil, Motrin, or other anti-inflammatory  drugs) for 2 days.    Follow-up  ____ We took small tissue samples or polyps to study. Your doctor will call you with the results  within two weeks.    When to call:    Call right away if you have:    Unusual pain in belly or chest pain not relieved with passing air.    More than 1 to 2 Tablespoons of bleeding from your rectum.    Fever above 100.6  F (37.5  C).    If you have severe pain, bleeding, or shortness of breath, go to an emergency room.    If you have questions, call:  Monday to Friday, 7 a.m. to 4:30 p.m.  Endoscopy: [phone] (We may have to call you back)    After hours  Hospital: [phone] (Ask for the GI fellow on call)

## 2020-10-21 NOTE — H&P
Heather Guillory  [id number]  female  40 year old      Reason for procedure/surgery: Rectal bleeding    Patient Active Problem List   Diagnosis     JAVIER (generalized anxiety disorder)     Undifferentiated connective tissue disease (H)     Rosacea     Varicose veins of left lower extremity with pain       Past Surgical History:    Past Surgical History:   Procedure Laterality Date     IR ENDOVENOUS ABLATION VARICOSE VEINS  10/28/2019     IR FOLLOW UP VISIT OUTPATIENT  11/20/2019     IR STAB PHLEBECTOMY 10 - 20 STABS  10/28/2019     IR VEIN SCLEROSING MULTIPLE  10/28/2019     OPERATIVE HYSTEROSCOPY WITH MORCELLATOR  4/8/2014    Procedure: OPERATIVE HYSTEROSCOPY WITH MORCELLATOR;  Hysteroscopic Polypectomy;  Surgeon: Cate Mansfield MD;  Location: [facility] OR       Past Medical History:   Past Medical History:   Diagnosis Date     JAVIER (generalized anxiety disorder)     50mg zoloft     Rosacea      Undifferentiated connective tissue disease (H)        Social History:   Social History     Tobacco Use     Smoking status: Never Smoker     Smokeless tobacco: Never Used   Substance Use Topics     Alcohol use: No     Alcohol/week: 0.0 standard drinks       Family History:   Family History   Problem Relation Age of Onset     Hypertension Paternal Grandfather      Lung Cancer Paternal Grandfather         smoker     Diabetes Type 2  Paternal Grandfather      Breast Cancer Paternal Grandmother      Hypertension Paternal Grandmother      Colon Cancer Maternal Grandfather      Alzheimer Disease Maternal Grandmother      Cervical Cancer Maternal Grandmother      Arrhythmia Brother         details unknown; procedure in his 20s     Anxiety Disorder Brother      Elijah Disease Sister      Myocardial Infarction No family hx of      Cerebrovascular Disease No family hx of      Coronary Artery Disease Early Onset No family hx of      Ovarian Cancer No family hx of        Allergies:   Allergies   Allergen Reactions     Benzoyl Peroxide  "Swelling     Duricef [Cefadroxil] Unknown     Monistat [Miconazole] Swelling     Sulfa Drugs Rash       Active Medications:   Current Outpatient Medications   Medication Sig Dispense Refill     hydroxychloroquine (PLAQUENIL) 200 MG tablet Take 1 tablet (200 mg) by mouth 2 times daily 60 tablet 5     metroNIDAZOLE (METROCREAM) 0.75 % external cream Apply topically 2 times daily       Prenatal Vit-Fe Fumarate-FA (PRENATAL VITAMINS) 28-0.8 MG TABS Take 1 tablet by mouth daily        sertraline (ZOLOFT) 50 MG tablet Take 1 tablet (50 mg) by mouth daily 90 tablet 3     levonorgestrel (MIRENA, 52 MG,) 20 MCG/24HR IUD 1 each (20 mcg) by Intrauterine route once for 1 dose 1 each 0       Systemic Review:   CONSTITUTIONAL: NEGATIVE for fever, chills, change in weight  ENT/MOUTH: NEGATIVE for ear, mouth and throat problems  RESP: NEGATIVE for significant cough or SOB  CV: NEGATIVE for chest pain, palpitations or peripheral edema    Physical Examination:   Vital Signs: /72   Pulse 64   Temp 98  F (36.7  C) (Oral)   Resp 16   Ht 1.702 m (5' 7\")   Wt 73.9 kg (163 lb)   SpO2 100%   BMI 25.53 kg/m    GENERAL: healthy, alert and no distress  NECK: no adenopathy, no asymmetry, masses, or scars  RESP: lungs clear to auscultation - no rales, rhonchi or wheezes  CV: regular rate and rhythm, normal S1 S2, no S3 or S4, no murmur, click or rub, no peripheral edema and peripheral pulses strong  ABDOMEN: soft, nontender, no hepatosplenomegaly, no masses and bowel sounds normal  MS: no gross musculoskeletal defects noted, no edema    ASA Classification: (I)  Normal healthy patient  Airway Exam: Mallampati Score: Class I (Complete visualization of soft palate)    Plan: Appropriate to proceed as scheduled.      Yang Pete MD  10/21/2020    PCP:  Brandy Rouse    "

## 2020-10-23 NOTE — TELEPHONE ENCOUNTER
RECORDS RECEIVED FROM: (R) Wrist / Xray done 2 weeks ago at [facility] / [facility] / [employer]   DATE RECEIVED: Oct 27, 2020     NOTES STATUS DETAILS   OFFICE NOTE from referring provider In process    OFFICE NOTE from other specialist N/A    DISCHARGE SUMMARY from hospital N/A    DISCHARGE REPORT from the ER N/A    OPERATIVE REPORT N/A    MEDICATION LIST Internal    IMPLANT RECORD/STICKER N/A    LABS     CBC/DIFF N/A    CULTURES N/A    INJECTIONS DONE IN RADIOLOGY N/A    MRI N/A    CT SCAN N/A    XRAYS (IMAGES & REPORTS) In process    TUMOR     PATHOLOGY  Slides & report N/A    10/26/20   7:54 PM   Received response from [facility], they have no records for this patient.   Called and unable to LVM for patient  Need to find out in clinic where images are, then I can have them either pushed or sent  Shannon Reis CMA    10/26/20   2:23 PM   3rd request sent, called and LVM  Shannon Reis CMA    10/26/20   8:59 AM   2nd request sent to [facility] C[facility]    10/23/20   9:14 AM   Request sent to [facility] for x-ray and records [phone]  Shannon Reis CMA

## 2020-10-27 ENCOUNTER — PRE VISIT (OUTPATIENT)
Dept: ORTHOPEDICS | Facility: CLINIC | Age: 40
End: 2020-10-27

## 2020-10-28 ENCOUNTER — OFFICE VISIT (OUTPATIENT)
Dept: FAMILY MEDICINE | Facility: CLINIC | Age: 40
End: 2020-10-28
Payer: COMMERCIAL

## 2020-10-28 ENCOUNTER — ANCILLARY PROCEDURE (OUTPATIENT)
Dept: CT IMAGING | Facility: CLINIC | Age: 40
End: 2020-10-28
Attending: NURSE PRACTITIONER
Payer: COMMERCIAL

## 2020-10-28 VITALS
DIASTOLIC BLOOD PRESSURE: 73 MMHG | TEMPERATURE: 98 F | HEIGHT: 68 IN | SYSTOLIC BLOOD PRESSURE: 108 MMHG | BODY MASS INDEX: 25.16 KG/M2 | WEIGHT: 166 LBS | HEART RATE: 69 BPM | RESPIRATION RATE: 16 BRPM | OXYGEN SATURATION: 99 %

## 2020-10-28 DIAGNOSIS — R42 DIZZINESS: ICD-10-CM

## 2020-10-28 DIAGNOSIS — S19.9XXA HEAD, FACE & NECK INJURY, INITIAL ENCOUNTER: ICD-10-CM

## 2020-10-28 DIAGNOSIS — S09.93XA HEAD, FACE & NECK INJURY, INITIAL ENCOUNTER: ICD-10-CM

## 2020-10-28 DIAGNOSIS — S09.90XA HEAD, FACE & NECK INJURY, INITIAL ENCOUNTER: Primary | ICD-10-CM

## 2020-10-28 DIAGNOSIS — S09.90XA HEAD, FACE & NECK INJURY, INITIAL ENCOUNTER: ICD-10-CM

## 2020-10-28 DIAGNOSIS — S19.9XXA HEAD, FACE & NECK INJURY, INITIAL ENCOUNTER: Primary | ICD-10-CM

## 2020-10-28 DIAGNOSIS — S09.93XA HEAD, FACE & NECK INJURY, INITIAL ENCOUNTER: Primary | ICD-10-CM

## 2020-10-28 LAB
ALBUMIN SERPL-MCNC: 4.3 G/DL (ref 3.4–5)
ALP SERPL-CCNC: 79 U/L (ref 40–150)
ALT SERPL W P-5'-P-CCNC: 17 U/L (ref 0–50)
ANION GAP SERPL CALCULATED.3IONS-SCNC: 4 MMOL/L (ref 3–14)
AST SERPL W P-5'-P-CCNC: 10 U/L (ref 0–45)
BASOPHILS # BLD AUTO: 0 10E9/L (ref 0–0.2)
BASOPHILS NFR BLD AUTO: 0.6 %
BILIRUB SERPL-MCNC: 0.5 MG/DL (ref 0.2–1.3)
BUN SERPL-MCNC: 7 MG/DL (ref 7–30)
CALCIUM SERPL-MCNC: 8.9 MG/DL (ref 8.5–10.1)
CHLORIDE SERPL-SCNC: 109 MMOL/L (ref 94–109)
CO2 SERPL-SCNC: 27 MMOL/L (ref 20–32)
CREAT SERPL-MCNC: 0.63 MG/DL (ref 0.52–1.04)
DIFFERENTIAL METHOD BLD: NORMAL
EOSINOPHIL # BLD AUTO: 0.1 10E9/L (ref 0–0.7)
EOSINOPHIL NFR BLD AUTO: 1.4 %
ERYTHROCYTE [DISTWIDTH] IN BLOOD BY AUTOMATED COUNT: 11.9 % (ref 10–15)
GFR SERPL CREATININE-BSD FRML MDRD: >90 ML/MIN/{1.73_M2}
GLUCOSE SERPL-MCNC: 88 MG/DL (ref 70–99)
HCT VFR BLD AUTO: 42.7 % (ref 35–47)
HGB BLD-MCNC: 14 G/DL (ref 11.7–15.7)
IMM GRANULOCYTES # BLD: 0 10E9/L (ref 0–0.4)
IMM GRANULOCYTES NFR BLD: 0.2 %
LYMPHOCYTES # BLD AUTO: 2.2 10E9/L (ref 0.8–5.3)
LYMPHOCYTES NFR BLD AUTO: 34.8 %
MCH RBC QN AUTO: 29.2 PG (ref 26.5–33)
MCHC RBC AUTO-ENTMCNC: 32.8 G/DL (ref 31.5–36.5)
MCV RBC AUTO: 89 FL (ref 78–100)
MONOCYTES # BLD AUTO: 0.3 10E9/L (ref 0–1.3)
MONOCYTES NFR BLD AUTO: 4 %
NEUTROPHILS # BLD AUTO: 3.7 10E9/L (ref 1.6–8.3)
NEUTROPHILS NFR BLD AUTO: 59 %
NRBC # BLD AUTO: 0 10*3/UL
NRBC BLD AUTO-RTO: 0 /100
PLATELET # BLD AUTO: 206 10E9/L (ref 150–450)
POTASSIUM SERPL-SCNC: 4.1 MMOL/L (ref 3.4–5.3)
PROT SERPL-MCNC: 7.6 G/DL (ref 6.8–8.8)
RBC # BLD AUTO: 4.8 10E12/L (ref 3.8–5.2)
SODIUM SERPL-SCNC: 140 MMOL/L (ref 133–144)
WBC # BLD AUTO: 6.3 10E9/L (ref 4–11)

## 2020-10-28 PROCEDURE — 85025 COMPLETE CBC W/AUTO DIFF WBC: CPT | Performed by: PATHOLOGY

## 2020-10-28 PROCEDURE — 80053 COMPREHEN METABOLIC PANEL: CPT | Performed by: PATHOLOGY

## 2020-10-28 PROCEDURE — 36415 COLL VENOUS BLD VENIPUNCTURE: CPT | Performed by: PATHOLOGY

## 2020-10-28 PROCEDURE — 70450 CT HEAD/BRAIN W/O DYE: CPT | Mod: GC | Performed by: RADIOLOGY

## 2020-10-28 PROCEDURE — 70480 CT ORBIT/EAR/FOSSA W/O DYE: CPT | Mod: GC | Performed by: RADIOLOGY

## 2020-10-28 ASSESSMENT — MIFFLIN-ST. JEOR: SCORE: 1481.47

## 2020-10-28 ASSESSMENT — ANXIETY QUESTIONNAIRES
IF YOU CHECKED OFF ANY PROBLEMS ON THIS QUESTIONNAIRE, HOW DIFFICULT HAVE THESE PROBLEMS MADE IT FOR YOU TO DO YOUR WORK, TAKE CARE OF THINGS AT HOME, OR GET ALONG WITH OTHER PEOPLE: NOT DIFFICULT AT ALL
GAD7 TOTAL SCORE: 0
2. NOT BEING ABLE TO STOP OR CONTROL WORRYING: NOT AT ALL
7. FEELING AFRAID AS IF SOMETHING AWFUL MIGHT HAPPEN: NOT AT ALL
1. FEELING NERVOUS, ANXIOUS, OR ON EDGE: NOT AT ALL
3. WORRYING TOO MUCH ABOUT DIFFERENT THINGS: NOT AT ALL
5. BEING SO RESTLESS THAT IT IS HARD TO SIT STILL: NOT AT ALL
6. BECOMING EASILY ANNOYED OR IRRITABLE: NOT AT ALL

## 2020-10-28 ASSESSMENT — PATIENT HEALTH QUESTIONNAIRE - PHQ9
5. POOR APPETITE OR OVEREATING: NOT AT ALL
SUM OF ALL RESPONSES TO PHQ QUESTIONS 1-9: 0

## 2020-10-28 NOTE — NURSING NOTE
"40 year old  Chief Complaint   Patient presents with     Headache     Pt. presents to the clinic today with a headache, dizziness after an injury to face about 10 days ago.        Blood pressure 108/73, pulse 69, temperature 98  F (36.7  C), temperature source Oral, resp. rate 16, height 1.727 m (5' 8\"), weight 75.3 kg (166 lb), last menstrual period 10/26/2020, SpO2 99 %, not currently breastfeeding. Body mass index is 25.24 kg/m .  BP completed using cuff size:    Patient Active Problem List   Diagnosis     JAVIER (generalized anxiety disorder)     Undifferentiated connective tissue disease (H)     Rosacea     Varicose veins of left lower extremity with pain       Wt Readings from Last 2 Encounters:   10/28/20 75.3 kg (166 lb)   10/21/20 73.9 kg (163 lb)     BP Readings from Last 3 Encounters:   10/28/20 108/73   10/21/20 102/67   07/16/20 106/68       Allergies   Allergen Reactions     Benzoyl Peroxide Swelling     Duricef [Cefadroxil] Unknown     Monistat [Miconazole] Swelling     Sulfa Drugs Rash       Current Outpatient Medications   Medication     hydroxychloroquine (PLAQUENIL) 200 MG tablet     metroNIDAZOLE (METROCREAM) 0.75 % external cream     Prenatal Vit-Fe Fumarate-FA (PRENATAL VITAMINS) 28-0.8 MG TABS     sertraline (ZOLOFT) 50 MG tablet     levonorgestrel (MIRENA, 52 MG,) 20 MCG/24HR IUD     No current facility-administered medications for this visit.        Social History     Tobacco Use     Smoking status: Never Smoker     Smokeless tobacco: Never Used   Substance Use Topics     Alcohol use: No     Alcohol/week: 0.0 standard drinks     Drug use: No       Honoring Choices - Health Care Directive Guide offered to patient at time of visit.    Health Maintenance Due   Topic Date Due     DEPRESSION ACTION PLAN  1982     SKIN CANCER SCREENING  1982     HEPATITIS C SCREENING  05/25/2000     INFLUENZA VACCINE (1) 09/01/2020     EYE EXAM  10/21/2020       Immunization History   Administered Date(s) " Administered     DTaP, Unspecified 06/02/2006     FLU 6-35 months 10/16/2008, 09/11/2009, 11/11/2010, 10/01/2011, 08/20/2012     Flu, Unspecified 11/30/2001, 10/13/2018     HPV Quadrivalent 10/16/2008, 12/18/2008, 04/20/2009     Influenza (H1N1) 01/09/2010     Influenza (IIV3) PF 10/03/2014     Influenza Vaccine IM > 6 months Valent IIV4 10/09/2015, 02/13/2017, 10/13/2017, 10/12/2019     Meningococcal (Menomune ) 08/30/2000     TDAP Vaccine (Boostrix) 12/15/2014, 03/11/2016, 01/15/2018       Lab Results   Component Value Date    PAP NIL 07/16/2020         Recent Labs   Lab Test 07/16/20  1132 12/16/19  0959 08/26/19  1055 04/26/19  0930 07/22/16  1110 07/22/16  1110 01/09/16  0724   A1C 5.1  --   --   --   --   --   --    LDL  --   --   --  100*  --   --   --    HDL  --   --   --  51  --   --   --    TRIG  --   --   --  45  --   --   --    ALT 17 18 17  --    < >  --  15   CR 0.66 0.54 0.54  --    < >  --  0.57   GFRESTIMATED >90 >90 >90  --    < >  --  >90  Non  GFR Calc     GFRESTBLACK >90 >90 >90  --    < >  --  >90   GFR Calc     ALBUMIN 4.0 4.2 4.1  --    < >  --  3.3*   POTASSIUM  --   --   --   --   --   --  3.8   TSH 0.95  --   --   --   --  1.10  --     < > = values in this interval not displayed.       PHQ-2 ( 1999 Pfizer) 10/28/2020 7/16/2020   Q1: Little interest or pleasure in doing things 0 0   Q2: Feeling down, depressed or hopeless 0 0   PHQ-2 Score 0 0   Q1: Little interest or pleasure in doing things - -   Q2: Feeling down, depressed or hopeless - -   PHQ-2 Score - -       PHQ-9 SCORE 2/12/2018 4/26/2019 7/16/2020 10/28/2020   PHQ-9 Total Score - - - -   PHQ-9 Total Score 0 0 0 0       JAVIER-7 SCORE 12/3/2019 7/15/2020 10/28/2020   Total Score 0 (minimal anxiety) 0 (minimal anxiety) -   Total Score 0 0 0       No flowsheet data found.        Christine Adan CMA  October 28, 2020 9:33 AM

## 2020-10-28 NOTE — PROGRESS NOTES
"Heather Guillory is a 40 year old female who presents today with a 10+ day history of face pain, headache and dizziness after an injury.  Heather describes it as just not feeling \"right.\"  She is photophobic, noise bothers her, she feels dizzy and unbalanced at times.  She does have some vision changes, it feels like things go out of focus.  When she was pushing a swing for her child, she felt as if she were moving with him, it was uncomfortable.    The injury was when a cell phone was in a front pocket of a shirt, she lifted the pocket area without realizing about the phone and it flew up and hit her in the right face and nose.  She explained it as if it were a projectile, it hit very hard causing great pain and her noise started gushing blood.  She had to lay on the floor after the injury, she did not go into a medical facility after the injury.  Her  was very concerned.      Heather had a COVIC test ~ one week ago before a procedure, it was negative.  She and her family stay very close to home and interact with few people.  Her children do go out of the house to school.    Review Of Systems  See PMH  ROS: 10 point ROS neg other than the symptoms noted above in the HPI.    Past Medical History:   Diagnosis Date     JAVIER (generalized anxiety disorder)     50mg zoloft     Rosacea      Undifferentiated connective tissue disease (H)      Past Surgical History:   Procedure Laterality Date     COLONOSCOPY N/A 10/21/2020    Procedure: COLONOSCOPY;  Surgeon: Yang Pete MD;  [facility]     IR ENDOVENOUS ABLATION VARICOSE VEINS  10/28/2019     IR FOLLOW UP VISIT OUTPATIENT  11/20/2019     IR STAB PHLEBECTOMY 10 - 20 STABS  10/28/2019     IR VEIN SCLEROSING MULTIPLE  10/28/2019     OPERATIVE HYSTEROSCOPY WITH MORCELLATOR  4/8/2014    Procedure: OPERATIVE HYSTEROSCOPY WITH MORCELLATOR;  Hysteroscopic Polypectomy;  Surgeon: Cate Mansfield MD;  Location:  OR     Social History "     Socioeconomic History     Marital status:      Spouse name: Chi     Number of children: 2     Years of education: Not on file     Highest education level: Not on file   Occupational History     Occupation:      Employer: [employer]     Comment: laid off 7/2020   Social Needs     Financial resource strain: Not on file     Food insecurity     Worry: Not on file     Inability: Not on file     Transportation needs     Medical: Not on file     Non-medical: Not on file   Tobacco Use     Smoking status: Never Smoker     Smokeless tobacco: Never Used   Substance and Sexual Activity     Alcohol use: No     Alcohol/week: 0.0 standard drinks     Drug use: No     Sexual activity: Yes     Partners: Male     Birth control/protection: I.U.D.     Comment: Mirena placed in 2018   Lifestyle     Physical activity     Days per week: Not on file     Minutes per session: Not on file     Stress: Not on file   Relationships     Social connections     Talks on phone: Not on file     Gets together: Not on file     Attends Synagogue service: Not on file     Active member of club or organization: Not on file     Attends meetings of clubs or organizations: Not on file     Relationship status: Not on file     Intimate partner violence     Fear of current or ex partner: Not on file     Emotionally abused: Not on file     Physically abused: Not on file     Forced sexual activity: Not on file   Other Topics Concern      Service No     Blood Transfusions No     Caffeine Concern No     Occupational Exposure No     Hobby Hazards No     Sleep Concern No     Stress Concern No     Weight Concern No     Special Diet No     Back Care No     Exercise No     Bike Helmet Yes     Comment: n/a     Seat Belt Yes     Self-Exams No     Parent/sibling w/ CABG, MI or angioplasty before 65F 55M? No   Social History Narrative    .    3 boys (4, 3, and 18 months as of 2019).    Exercises when able.         How much  "exercise per week? 3-4 x's     How much calcium per day? In foods and PNV       How much caffeine per day? 1 soda occasional    How much vitamin D per day? PNV    Do you/your family wear seatbelts?  Yes    Do you/your family use safety helmets? Yes    Do you/your family use sunscreen? Yes    Do you/your family keep firearms in the home? No    Do you/your family have a smoke detector(s)? Yes        July 16, 2020 Mague Chase LPN         Family History   Problem Relation Age of Onset     Hypertension Paternal Grandfather      Lung Cancer Paternal Grandfather         smoker     Diabetes Type 2  Paternal Grandfather      Breast Cancer Paternal Grandmother      Hypertension Paternal Grandmother      Colon Cancer Maternal Grandfather      Alzheimer Disease Maternal Grandmother      Cervical Cancer Maternal Grandmother      Arrhythmia Brother         details unknown; procedure in his 20s     Anxiety Disorder Brother      Elijah Disease Sister      Myocardial Infarction No family hx of      Cerebrovascular Disease No family hx of      Coronary Artery Disease Early Onset No family hx of      Ovarian Cancer No family hx of        /73   Pulse 69   Temp 98  F (36.7  C) (Oral)   Resp 16   Ht 1.727 m (5' 8\")   Wt 75.3 kg (166 lb)   LMP 10/26/2020 (Approximate)   SpO2 99%   BMI 25.24 kg/m      Exam:  Constitutional: healthy, alert, mild distress and pale  Head: Normocephalic. No masses, lesions, tenderness or abnormalities  Neck: Neck supple. No adenopathy. Thyroid symmetric, normal size,, Carotids without bruits.  ENT: I do note what appears to be very light/old bruising under right eye.  Pharynx normal. PEERL, visual gary normal.  CV:  Normal heart tone, rhythm.  PulM;  Lungs clear A and P.  : Deferred  Musculoskeletal: extremities normal- no gross deformities noted, gait normal and normal muscle tone  Skin: no suspicious lesions or rashes  Neurologic: Gait normal. Reflexes normal and symmetric. Sensation " grossly WNL.  Cranial nerves 2 -12 normal.  Rhomberg and pass pointing normal.    Psychiatric: mentation appears normal and affect normal/bright  Assessment/Plan:  1. Head, face & neck injury, initial encounter    - CT Orbits wo Contrast; Future  - CBC with platelets differential; Future  - Comprehensive metabolic panel; Future    2. Dizziness    - CT Orbits wo Contrast; Future  - CBC with platelets differential; Future  - Comprehensive metabolic panel; Future    WE discussed options and it would be of great comfort for Heather to know there is not a head injury per CT that might be causing her symptoms.  We will also check for dehydration or other lab reports that may explain.      I will follow up with Heather when we have her results.    Options for treatment and follow-up care were reviewed with the patient. Patient engaged in the decision making process and verbalized understanding of the options discussed and agreed with the final plan.

## 2020-10-29 ASSESSMENT — ANXIETY QUESTIONNAIRES: GAD7 TOTAL SCORE: 0

## 2020-10-29 NOTE — TELEPHONE ENCOUNTER
Patient called in for labs and CT results.  Normal results given and documentation sent in MyChart.

## 2020-11-10 ENCOUNTER — TRANSFERRED RECORDS (OUTPATIENT)
Dept: HEALTH INFORMATION MANAGEMENT | Facility: CLINIC | Age: 40
End: 2020-11-10

## 2020-11-16 ENCOUNTER — TELEPHONE (OUTPATIENT)
Dept: RHEUMATOLOGY | Facility: CLINIC | Age: 40
End: 2020-11-16

## 2020-11-20 ENCOUNTER — TELEPHONE (OUTPATIENT)
Dept: RHEUMATOLOGY | Facility: CLINIC | Age: 40
End: 2020-11-20

## 2020-11-20 DIAGNOSIS — M35.9 UNDIFFERENTIATED CONNECTIVE TISSUE DISEASE (H): ICD-10-CM

## 2020-11-20 NOTE — TELEPHONE ENCOUNTER
Medication: plaquenil 200 mg    Last Office Visit Date: 6/19/2020  Future Office Visit Date: 1/8/2021  Last Prescription Fill Date: 3/30/2020  Last Fill Quantity: #60, 5 refills    Plaquenil eye exam completed on 11/10/2020 at [facility] which showed no signs of plaquenil toxicity. These records have been scanned to chart.     Request sent to provider for review and signature.    Veena Brown CMA   11/20/2020 1:54 PM

## 2020-11-23 RX ORDER — HYDROXYCHLOROQUINE SULFATE 200 MG/1
200 TABLET, FILM COATED ORAL 2 TIMES DAILY
Qty: 180 TABLET | Refills: 3 | Status: SHIPPED | OUTPATIENT
Start: 2020-11-23 | End: 2021-11-01

## 2020-12-08 ENCOUNTER — VIRTUAL VISIT (OUTPATIENT)
Dept: OBGYN | Facility: CLINIC | Age: 40
End: 2020-12-08
Attending: INTERNAL MEDICINE
Payer: COMMERCIAL

## 2020-12-08 DIAGNOSIS — Z30.09 ENCOUNTER FOR COUNSELING REGARDING CONTRACEPTION: Primary | ICD-10-CM

## 2020-12-08 PROCEDURE — 99212 OFFICE O/P EST SF 10 MIN: CPT | Mod: 95 | Performed by: OBSTETRICS & GYNECOLOGY

## 2020-12-08 NOTE — LETTER
"12/8/2020       RE: Heather Guillory  [address]  [place] [address]     Dear Colleague,    Thank you for referring your patient, Heather Guillory, to the [facility] [place] at [facility]. Please see a copy of my visit note below.    Heather Guillory is a 40 year old female who is being evaluated via a billable telephone visit.      The patient has been notified of following:     \"This telephone visit will be conducted via a call between you and your physician/provider. We have found that certain health care needs can be provided without the need for a physical exam.  This service lets us provide the care you need with a short phone conversation.  If a prescription is necessary we can send it directly to your pharmacy.  If lab work is needed we can place an order for that and you can then stop by our lab to have the test done at a later time.    Telephone visits are billed at different rates depending on your insurance coverage. During this emergency period, for some insurers they may be billed the same as an in-person visit.  Please reach out to your insurance provider with any questions.    If during the course of the call the physician/provider feels a telephone visit is not appropriate, you will not be charged for this service.\"    Patient has given verbal consent for Telephone visit?  Yes    What phone number would you like to be contacted at? Home number    How would you like to obtain your AVS? [employer]    Phone call duration: 12 minutes    Questions re: tubal ligation. Currently has Mirena. Not super happy with the bleeding profile.  Has a single embryo left. We discussed and she understands if she would like to use the embryo, tubal would not effect her ability to get pregnant via IVF.     She is interested and would consider BTL vs bilateral salpingectomy. Wonders if possible to have tubes completely removed as she has heard it may " have benefits to decrease cancer risk.     Past Medical History:   Diagnosis Date     JAVIER (generalized anxiety disorder)     50mg zoloft     Rosacea      Undifferentiated connective tissue disease (H)      Past Surgical History:   Procedure Laterality Date     COLONOSCOPY N/A 10/21/2020    Procedure: COLONOSCOPY;  Surgeon: Yang Pete MD;  Location: [facility] OR     IR ENDOVENOUS ABLATION VARICOSE VEINS  10/28/2019     IR FOLLOW UP VISIT OUTPATIENT  11/20/2019     IR STAB PHLEBECTOMY 10 - 20 STABS  10/28/2019     IR VEIN SCLEROSING MULTIPLE  10/28/2019     OPERATIVE HYSTEROSCOPY WITH MORCELLATOR  4/8/2014    Procedure: OPERATIVE HYSTEROSCOPY WITH MORCELLATOR;  Hysteroscopic Polypectomy;  Surgeon: Cate Mansfield MD;  Location: [facility] OR     No intraabdominal surgery.     We discussed the above and pt plans to talk with her  re: future plans for possible surgery vs vasectomy.  She will let me know her desires after she makes a decision.  Recommend checking with her insurance re; coverage of salpingectomy.      Cate Mansfield MD, FACOG  (she/her/hers)    Department of Ob/Gyn/Women's Health  [facility]  [facility]  [address] [place] [address]  [email or web address]  p. [phone]  f. [phone]

## 2020-12-08 NOTE — PROGRESS NOTES
"Heather Guillory is a 40 year old female who is being evaluated via a billable telephone visit.      The patient has been notified of following:     \"This telephone visit will be conducted via a call between you and your physician/provider. We have found that certain health care needs can be provided without the need for a physical exam.  This service lets us provide the care you need with a short phone conversation.  If a prescription is necessary we can send it directly to your pharmacy.  If lab work is needed we can place an order for that and you can then stop by our lab to have the test done at a later time.    Telephone visits are billed at different rates depending on your insurance coverage. During this emergency period, for some insurers they may be billed the same as an in-person visit.  Please reach out to your insurance provider with any questions.    If during the course of the call the physician/provider feels a telephone visit is not appropriate, you will not be charged for this service.\"    Patient has given verbal consent for Telephone visit?  Yes    What phone number would you like to be contacted at? Home number    How would you like to obtain your AVS? MyChart    Phone call duration: 12 minutes    Questions re: tubal ligation. Currently has Mirena. Not super happy with the bleeding profile.  Has a single embryo left. We discussed and she understands if she would like to use the embryo, tubal would not effect her ability to get pregnant via IVF.     She is interested and would consider BTL vs bilateral salpingectomy. Wonders if possible to have tubes completely removed as she has heard it may have benefits to decrease cancer risk.     Past Medical History:   Diagnosis Date     JAVIER (generalized anxiety disorder)     50mg zoloft     Rosacea      Undifferentiated connective tissue disease (H)      Past Surgical History:   Procedure Laterality Date     COLONOSCOPY N/A 10/21/2020    Procedure: " COLONOSCOPY;  Surgeon: Yang Pete MD;  Location: [facility] OR     IR ENDOVENOUS ABLATION VARICOSE VEINS  10/28/2019     IR FOLLOW UP VISIT OUTPATIENT  11/20/2019     IR STAB PHLEBECTOMY 10 - 20 STABS  10/28/2019     IR VEIN SCLEROSING MULTIPLE  10/28/2019     OPERATIVE HYSTEROSCOPY WITH MORCELLATOR  4/8/2014    Procedure: OPERATIVE HYSTEROSCOPY WITH MORCELLATOR;  Hysteroscopic Polypectomy;  Surgeon: Cate Mansfield MD;  Location[facility]     No intraabdominal surgery.     We discussed the above and pt plans to talk with her  re: future plans for possible surgery vs vasectomy.  She will let me know her desires after she makes a decision.  Recommend checking with her insurance re; coverage of salpingectomy.      Cate Mansfield MD, FACOG  (she/her/hers)    Department of Ob/Gyn/Women's Health  [facility]  [facility]  [address] [place] [address]. [place] [address]  [email or web address]  p. [phone]  f. [phone]

## 2020-12-16 ENCOUNTER — PREP FOR PROCEDURE (OUTPATIENT)
Dept: OBGYN | Facility: CLINIC | Age: 40
End: 2020-12-16

## 2020-12-16 ENCOUNTER — TELEPHONE (OUTPATIENT)
Dept: OBGYN | Facility: CLINIC | Age: 40
End: 2020-12-16

## 2020-12-16 DIAGNOSIS — Z30.2 ENCOUNTER FOR STERILIZATION: Primary | ICD-10-CM

## 2020-12-16 NOTE — TELEPHONE ENCOUNTER
----- Message from Heather Gold RN sent at 12/15/2020  3:23 PM CST -----  No subject, wants a call back.

## 2020-12-16 NOTE — TELEPHONE ENCOUNTER
Message received from patient requesting a call back.    Called and spoke with Heather, she reports having UTI symptoms and states she is currently at an urgent care center getting care. She is concerned regarding frequent UTIs and curious about managing going forward. Encouraged her to call and schedule an appointment to discuss with her PCP. She verbalized understanding and agreement.

## 2020-12-16 NOTE — PROGRESS NOTES
Pre op orders placed.    Cate Mansfield MD, FACOG  (she/her/hers)    Department of Ob/Gyn/Women's Health  [facility]  [facility]  [address] [place] [address]. [place] [address]  [email or web address]  wilian. [phone]  f. [phone]  12/16/2020  11:07 AM

## 2020-12-30 ENCOUNTER — TELEPHONE (OUTPATIENT)
Dept: OBGYN | Facility: CLINIC | Age: 40
End: 2020-12-30

## 2020-12-30 ENCOUNTER — VIRTUAL VISIT (OUTPATIENT)
Dept: FAMILY MEDICINE | Facility: CLINIC | Age: 40
End: 2020-12-30
Payer: COMMERCIAL

## 2020-12-30 DIAGNOSIS — N39.0 RECURRENT UTI: ICD-10-CM

## 2020-12-30 DIAGNOSIS — R30.0 BURNING WITH URINATION: ICD-10-CM

## 2020-12-30 DIAGNOSIS — K62.5 BRIGHT RED BLOOD PER RECTUM: ICD-10-CM

## 2020-12-30 DIAGNOSIS — R35.0 URINARY FREQUENCY: ICD-10-CM

## 2020-12-30 DIAGNOSIS — R10.9 RIGHT FLANK PAIN: Primary | ICD-10-CM

## 2020-12-30 DIAGNOSIS — R53.83 FATIGUE, UNSPECIFIED TYPE: ICD-10-CM

## 2020-12-30 PROBLEM — Z30.2 ENCOUNTER FOR STERILIZATION: Status: ACTIVE | Noted: 2020-12-30

## 2020-12-30 LAB
ALBUMIN UR-MCNC: NEGATIVE MG/DL
APPEARANCE UR: CLEAR
BILIRUB UR QL STRIP: NEGATIVE
COLOR UR AUTO: YELLOW
ERYTHROCYTE [DISTWIDTH] IN BLOOD BY AUTOMATED COUNT: 12.2 % (ref 10–15)
FERRITIN SERPL-MCNC: 63 NG/ML (ref 12–150)
GLUCOSE UR STRIP-MCNC: NEGATIVE MG/DL
HCT VFR BLD AUTO: 41.7 % (ref 35–47)
HGB BLD-MCNC: 13.5 G/DL (ref 11.7–15.7)
HGB UR QL STRIP: NEGATIVE
IRON SATN MFR SERPL: 29 % (ref 15–46)
IRON SERPL-MCNC: 91 UG/DL (ref 35–180)
KETONES UR STRIP-MCNC: NEGATIVE MG/DL
LEUKOCYTE ESTERASE UR QL STRIP: ABNORMAL
MCH RBC QN AUTO: 28.8 PG (ref 26.5–33)
MCHC RBC AUTO-ENTMCNC: 32.4 G/DL (ref 31.5–36.5)
MCV RBC AUTO: 89 FL (ref 78–100)
MUCOUS THREADS #/AREA URNS LPF: PRESENT /LPF
NITRATE UR QL: NEGATIVE
PH UR STRIP: 6 PH (ref 5–7)
PLATELET # BLD AUTO: 195 10E9/L (ref 150–450)
RBC # BLD AUTO: 4.69 10E12/L (ref 3.8–5.2)
RBC #/AREA URNS AUTO: 19 /HPF (ref 0–2)
SOURCE: ABNORMAL
SP GR UR STRIP: 1.01 (ref 1–1.03)
SQUAMOUS #/AREA URNS AUTO: 1 /HPF (ref 0–1)
TIBC SERPL-MCNC: 312 UG/DL (ref 240–430)
UROBILINOGEN UR STRIP-MCNC: 0 MG/DL (ref 0–2)
WBC # BLD AUTO: 8.3 10E9/L (ref 4–11)
WBC #/AREA URNS AUTO: 71 /HPF (ref 0–5)

## 2020-12-30 PROCEDURE — 82728 ASSAY OF FERRITIN: CPT | Performed by: PATHOLOGY

## 2020-12-30 PROCEDURE — 85027 COMPLETE CBC AUTOMATED: CPT | Performed by: PATHOLOGY

## 2020-12-30 PROCEDURE — 87086 URINE CULTURE/COLONY COUNT: CPT | Mod: 90 | Performed by: PATHOLOGY

## 2020-12-30 PROCEDURE — 36415 COLL VENOUS BLD VENIPUNCTURE: CPT | Performed by: PATHOLOGY

## 2020-12-30 PROCEDURE — 83550 IRON BINDING TEST: CPT | Performed by: PATHOLOGY

## 2020-12-30 PROCEDURE — 99204 OFFICE O/P NEW MOD 45 MIN: CPT | Mod: 95 | Performed by: NURSE PRACTITIONER

## 2020-12-30 PROCEDURE — 83540 ASSAY OF IRON: CPT | Performed by: PATHOLOGY

## 2020-12-30 PROCEDURE — 81001 URINALYSIS AUTO W/SCOPE: CPT | Performed by: PATHOLOGY

## 2020-12-30 ASSESSMENT — PAIN SCALES - GENERAL: PAINLEVEL: MODERATE PAIN (4)

## 2020-12-30 NOTE — TELEPHONE ENCOUNTER
Confirmed surgery date, time and location, 2/26/21, arrival time at 5:45a.m with nothing to eat eight hours before scheduled surgery time, clear liquids up to two hours before scheduled surgery time, h&p required before surgery, COVID testing 96 hours prior to surgery, map and letter mailed out.     to complete the following fields:            CHECKLIST     Google Calendar : Yes     Resident notified: Not Applicable     Clinic schedule blocked:  Not Applicable    Patient notified:Yes      Pre op information sent: Yes     Given to patient over the phone.Yes    Comments:

## 2020-12-30 NOTE — PATIENT INSTRUCTIONS

## 2020-12-30 NOTE — TELEPHONE ENCOUNTER
Heather's son is having surgery one week after she is scheduled.  She is asking about restrictions, as he will want to be carried and held.  Reviewed recommendation of no lifting >20 pounds x 2 weeks.  She states she would like to reschedule.      Transferred to [place] to reschedule her surgery

## 2020-12-30 NOTE — TELEPHONE ENCOUNTER
----- Message from Haaw Nicholson sent at 12/30/2020  2:44 PM CST -----  Regarding: surgery  Patient has questions regarding surgery scheduled for 2/26/21. Please call.  Thanks.

## 2020-12-30 NOTE — NURSING NOTE
40 year old  Chief Complaint   Patient presents with     UTI     frequent and burning urination. Pt has low back pain.       Florence Wu, LORIN  December 30, 2020 10:21 AM

## 2020-12-30 NOTE — PROGRESS NOTES
"Heather Guillory is a 40 year old female who is being evaluated via a billable video visit.      The patient has been notified of following:     \"This video visit will be conducted via a call between you and your physician/provider. We have found that certain health care needs can be provided without the need for an in-person physical exam.  This service lets us provide the care you need with a video conversation.  If a prescription is necessary we can send it directly to your pharmacy.  If lab work is needed we can place an order for that and you can then stop by our lab to have the test done at a later time.    Video visits are billed at different rates depending on your insurance coverage.  Please reach out to your insurance provider with any questions.    If during the course of the call the physician/provider feels a video visit is not appropriate, you will not be charged for this service.\"    Patient has given verbal consent for Video visit? Yes  How would you like to obtain your AVS? MyChart  If you are dropped from the video visit, the video invite should be resent to: Other e-mail: [employer]  Will anyone else be joining your video visit? No      Subjective     Heather Guillory is a 40 year old female who presents today via video visit for the following health issues:    HPI     UTI symptoms: Seen at [facility] a few weeks ago, diagnosed with a UTI, and was treated with 5 days of Macrobid. She started on the 16th and completed the Macrobid on 12/20. Yesterday, she felt somewhat ill. She has a dull pain near her right flank area. She woke up during the night at 0100, she had urinary burning and urgency.   She does have a history of resistant UTI when pregnant with her third pregnancy. She reports that this ended up being ESBL. She did have another infection this past August of 2020. This was treated with Augmentin. She is concerned that she seems to be getting recurrent UTIs and that they are uncomfortable. " "She presents for discussion and evaluation.   Sees Rheumatology twice yearly with connective tissue autoimmune problem.     Video Start Time: 1038    Review of Systems   Constitutional, HEENT, cardiovascular, pulmonary, gi and gu systems are negative, except as otherwise noted.      Objective    Vitals - Patient Reported  Pain Score: Moderate Pain (4)    Physical Exam     GENERAL: Healthy, alert and no distress  EYES: Eyes grossly normal to inspection.  No discharge or erythema, or obvious scleral/conjunctival abnormalities.  RESP: No audible wheeze, cough, or visible cyanosis.  No visible retractions or increased work of breathing.    SKIN: Visible skin clear. No significant rash, abnormal pigmentation or lesions.  NEURO: Cranial nerves grossly intact.  Mentation and speech appropriate for age.  PSYCH: Mentation appears normal, affect normal/bright, judgement and insight intact, normal speech and appearance well-groomed.    UA/UC, CBC pending.     Assessment & Plan     Right flank pain      Burning with urination    - UA with Micro reflex to Culture  - CBC with platelets  - UROLOGY ADULT REFERRAL    Urinary frequency    - UA with Micro reflex to Culture  - CBC with platelets  - UROLOGY ADULT REFERRAL    Recurrent UTI    - CBC with platelets  - UROLOGY ADULT REFERRAL    BMI:   Estimated body mass index is 25.24 kg/m  as calculated from the following:    Height as of 10/28/20: 1.727 m (5' 8\").    Weight as of 10/28/20: 75.3 kg (166 lb).     Return if symptoms worsen or fail to improve.    Eva Alexis NP  [facility]    Video-Visit Details    Type of service:  Video Visit    Video End Time:1055    Originating Location (pt. Location): Home    Distant Location (provider location):  [facility]     Platform used for Video Visit: Lorenzo    First, have a UA/UC completed along with a CBC. I will notify you of results. I would " consider treating with Augmentin if UA is abnormal. Next, please schedule with Urology for their opinion on recurring UTI symptoms. Finally, please return for follow up in our clinic as needed. She verbalizes understanding of the plan.   HARVEY Benitez, CNP  Addendum 1345:  Component      Latest Ref Rng & Units 12/30/2020   Color Urine       Yellow   Appearance Urine       Clear   Glucose Urine      NEG:Negative mg/dL Negative   Bilirubin Urine      NEG:Negative Negative   Ketones Urine      NEG:Negative mg/dL Negative   Specific Gravity Urine      1.003 - 1.035 1.015   Blood Urine      NEG:Negative Negative   pH Urine      5.0 - 7.0 pH 6.0   Protein Albumin Urine      NEG:Negative mg/dL Negative   Urobilinogen mg/dL      0.0 - 2.0 mg/dL 0.0   Nitrite Urine      NEG:Negative Negative   Leukocyte Esterase Urine      NEG:Negative Trace (A)   Source       Midstream Urine   WBC Urine      0 - 5 /HPF 71 (H)   RBC Urine      0 - 2 /HPF 19 (H)   Squamous Epithelial /HPF Urine      0 - 1 /HPF 1   Mucous Urine      NEG:Negative /LPF Present (A)     Component      Latest Ref Rng & Units 12/30/2020   WBC      4.0 - 11.0 10e9/L 8.3   RBC Count      3.8 - 5.2 10e12/L 4.69   Hemoglobin      11.7 - 15.7 g/dL 13.5   Hematocrit      35.0 - 47.0 % 41.7   MCV      78 - 100 fl 89   MCH      26.5 - 33.0 pg 28.8   MCHC      31.5 - 36.5 g/dL 32.4   RDW      10.0 - 15.0 % 12.2   Platelet Count      150 - 450 10e9/L 195   Await culture results to develop plan.   HARVEY Benitez, CNP  Addendum 12/31/2020 1708:Culture report is not complete. Symptoms have improved today, however they are still present. Due to the holiday weekend, I am going to prescribe Augmentin for worsening symptoms. She took this in September 2020 for a UTI and handled it well, symptoms resolved. Will report final culture report on Monday, January 4, 2021. Heather did receive a call from Urology and will see them in a few weeks to discuss recurrent infections  that seem to be related to intercourse. She verbalizes understanding of the plan.   Eva Alexis APRN, CNP

## 2020-12-31 LAB
BACTERIA SPEC CULT: NORMAL
Lab: NORMAL
SPECIMEN SOURCE: NORMAL

## 2020-12-31 NOTE — ADDENDUM NOTE
Addended by: JOYCE WATERS on: 12/31/2020 05:22 PM     Modules accepted: Orders, Level of Service

## 2020-12-31 NOTE — TELEPHONE ENCOUNTER
MEDICAL RECORDS REQUEST   [facility]  Urology Clinic  [address] [place] [address]  PHONE: [phone]  Fax: [phone]        FUTURE VISIT INFORMATION                                                   Heather Guillory, : 1980 scheduled for future visit at [facility]    APPOINTMENT INFORMATION:    Date: 2021 11AM    Provider:  Anton REBOLLEDO    Reason for Visit/Diagnosis: Burning with urination     REFERRAL INFORMATION:    Referring provider:  N/A    Specialty: N/A    Referring providers clinic:      Clinic contact number:  N/A    RECORDS REQUESTED FOR VISIT                                                     NOTES  STATUS/DETAILS   OFFICE NOTE from referring provider  yes   OFFICE NOTE from other specialist  no   DISCHARGE SUMMARY from hospital  no   DISCHARGE REPORT from the ER  no   OPERATIVE REPORT  no   MEDICATION LIST  no   LABS     URINALYSIS (UA)  yes     PRE-VISIT CHECKLIST      Record collection complete Yes   Appointment appropriately scheduled           (right time/right provider) Yes   MyChart activation Yes   Questionnaire complete If no, please explain: In process      Completed by: Mechelle Madrigal

## 2021-01-08 ENCOUNTER — VIRTUAL VISIT (OUTPATIENT)
Dept: RHEUMATOLOGY | Facility: CLINIC | Age: 41
End: 2021-01-08
Attending: INTERNAL MEDICINE
Payer: COMMERCIAL

## 2021-01-08 DIAGNOSIS — M35.9 UNDIFFERENTIATED CONNECTIVE TISSUE DISEASE (H): Primary | ICD-10-CM

## 2021-01-08 PROCEDURE — 99213 OFFICE O/P EST LOW 20 MIN: CPT | Mod: 95 | Performed by: INTERNAL MEDICINE

## 2021-01-08 RX ORDER — LACTOBACILLUS RHAMNOSUS GG 10B CELL
1 CAPSULE ORAL 2 TIMES DAILY
COMMUNITY
End: 2021-09-14

## 2021-01-08 RX ORDER — CHOLECALCIFEROL (VITAMIN D3) 50 MCG
1 TABLET ORAL DAILY
COMMUNITY
End: 2022-01-28

## 2021-01-08 NOTE — PROGRESS NOTES
Heather is a 40 year old who is being evaluated via a billable video visit.      How would you like to obtain your AVS? Mail a copy  If the video visit is dropped, the invitation should be resent by: Send to e-mail at: [email or web address]  Will anyone else be joining your video visit? No    Video Start Time: 10:03 am    Video-Visit Details    Type of service:  Video Visit    Video End Time: 10:29 am    Originating Location (pt. Location): Home    Distant Location (provider location):  [facility] [place]     Platform used for Video Visit: [facility]     Rheumatology Virtual Visit Note     Heather Guillory MRN# [id number]   YOB: 1980 Age: 40 year old     Date of Last Visit: 2020  DOS: 2021    Reason for visit: Follow up of UCTD     (this is a video visit due to COVID-19 outbreak), patient agreed         Assessment and Plan:   Undifferentiated connective tissue disease (UCTD):    Heather is a 39 yo WF . She was diagnosed with MCTD in 2016, 6 wk postpartum based on fatigue, arthritis, mild Raynaud's, low positive NINOSKA 1.1 and low positive RNP Ab 1.6. She is on  mg qd since 2017 with great response.  UCTD continues to be most likely diagnosis not MCTD as she does not have classic features of MCTD, had very low titer of RNP Ab in the past, (negative on most recent check), and had a mild disease which never required prednisone.  All autoimmunity labs (2017) came back negative (except + NINOSKA, low C3) which is further reassuring for UCTD, including APS panel, repeat RNP and inflammatory markers, dsDNA.     She had neg SSA/SSB antibodies in 2016, no concern for CHB. No need to re-check.  APS panel was neg.  She had neg anti-Sm, neg anti-DNA and neg centromere Ab in 2016.    Today:  Disease continues to be under fair control on HCQ. In 2019, recommended to taper plaquenil to 200 mg twice a day on Mon/Wed/Fri and then 1 tab for the rest of the week. Had  increased pain/stiffness in hands and feet and C3 dropped. So increased HCQ back to 200 mg bid. C3 went back to nl in 7/2020. Reports increased joint pain/stiffness, has to take  mg bid; otherwise lower dose causes pain. Stress, not enough sleep and cold weather could be triggers for joint pain. Will not add another DMARD, recommend to focus on well-being, she agreed.        Today's plan:    Continue   mg bid (lower dose caused more joint pain)    Discussed to reduce stress, improve sleep, take breaks from taking care of children, take naps, keep hands/feet warm    Annual eye exam on HCQ.    Labs    Return in 6 months      Nasrin King MD          Active Problem List:     Patient Active Problem List    Diagnosis Date Noted     Encounter for sterilization 12/30/2020     Priority: Medium     Added automatically from request for surgery [id number]       Varicose veins of left lower extremity with pain 10/29/2019     Priority: Medium     JAVIER (generalized anxiety disorder)      Priority: Medium     has a Rx for Zoloft; not using currently       Undifferentiated connective tissue disease (H)      Priority: Medium     Rosacea      Priority: Medium            History of Present Illness:   Heather Guillory presents for f/u of UCTD.    Has fatigue. In winter, hands hand feet are achy and stiff especially in AM, more pain with taking less HCQ. Wears gloves, plans to use hand warmers.     Scheduled to have tubal ligation in coming month. Has not decided to implant her left over embryo from IVF cycle yet.    Had eye exam in 10/2020.         Review of Systems:   A comprehensive ROS was done. Positives are per HPI.          Past Medical History:     Past Medical History:   Diagnosis Date     JAVIRE (generalized anxiety disorder)     50mg zoloft     Rosacea      Undifferentiated connective tissue disease (H)      Past Surgical History:   Procedure Laterality Date     COLONOSCOPY N/A 10/21/2020    Procedure: COLONOSCOPY;   Surgeon: Yang Pete MD;  Location: [facility] OR     IR ENDOVENOUS ABLATION VARICOSE VEINS  10/28/2019     IR FOLLOW UP VISIT OUTPATIENT  11/20/2019     IR STAB PHLEBECTOMY 10 - 20 STABS  10/28/2019     IR VEIN SCLEROSING MULTIPLE  10/28/2019     OPERATIVE HYSTEROSCOPY WITH MORCELLATOR  4/8/2014    Procedure: OPERATIVE HYSTEROSCOPY WITH MORCELLATOR;  Hysteroscopic Polypectomy;  Surgeon: Cate Mansfield MD;  Location: [facility] OR     Raynaud's syndrome since puberty. Her main trigger is cold.  She had two uneventful pregnancies with vaginal births, though the conception of her first child required IVF.   she is underwent embryo transfer in July 2017, now pregnant.   cervical radiculopathy, 2017.       Social History:     Social History     Occupational History     Occupation:      Employer: [employer]     Comment: laid off 7/2020   Tobacco Use     Smoking status: Never Smoker     Smokeless tobacco: Never Used   Substance and Sexual Activity     Alcohol use: No     Alcohol/week: 0.0 standard drinks     Drug use: No     Sexual activity: Yes     Partners: Male     Birth control/protection: I.U.D.     Comment: Mirena placed in 2018            Family History:     Family History   Problem Relation Age of Onset     Hypertension Paternal Grandfather      Lung Cancer Paternal Grandfather         smoker     Diabetes Type 2  Paternal Grandfather      Breast Cancer Paternal Grandmother      Hypertension Paternal Grandmother      Colon Cancer Maternal Grandfather      Alzheimer Disease Maternal Grandmother      Cervical Cancer Maternal Grandmother      Arrhythmia Brother         details unknown; procedure in his 20s     Anxiety Disorder Brother      Elijah Disease Sister      Myocardial Infarction No family hx of      Cerebrovascular Disease No family hx of      Coronary Artery Disease Early Onset No family hx of      Ovarian Cancer No family hx of      No history of AI disease       Allergies:      Allergies   Allergen Reactions     Benzoyl Peroxide Swelling     Duricef [Cefadroxil] Unknown     Monistat [Miconazole] Swelling     Sulfa Drugs Rash            Medications:     Current Outpatient Medications   Medication Sig Dispense Refill     hydroxychloroquine (PLAQUENIL) 200 MG tablet Take 1 tablet (200 mg) by mouth 2 times daily 180 tablet 3     lactobacillus rhamnosus, GG, (CULTURELL) capsule Take 1 capsule by mouth 2 times daily       levonorgestrel (MIRENA, 52 MG,) 20 MCG/24HR IUD 1 each (20 mcg) by Intrauterine route once for 1 dose 1 each 0     metroNIDAZOLE (METROCREAM) 0.75 % external cream Apply topically 2 times daily       sertraline (ZOLOFT) 50 MG tablet Take 1 tablet (50 mg) by mouth daily 90 tablet 3     vitamin D3 (CHOLECALCIFEROL) 50 mcg (2000 units) tablet Take 1 tablet by mouth daily       nitroFURantoin macrocrystal (MACRODANTIN) 50 MG capsule Take 1 capsule (50 mg) PO as needed after sexual activity for UTI prevention. 30 capsule 3     Prenatal Vit-Fe Fumarate-FA (PRENATAL VITAMINS) 28-0.8 MG TABS Take 1 tablet by mouth daily               Physical Exam:   Constitutional: well-developed, pleasant, NAD  Eyes: nl EOM, conjunctiva, sclera  Resp: breathing normally  MS: no active synovitis   Skin: no skin rash  Neuro: nl cranial nerves  Psych: tearful (due to joint pain/stiffness)         Data:     Labs from outside laboratory reviewed from 8/12/16  RNP antibodies 1.6- positive    Negative for Parvo Virus B19, Lyme Disease  Negative SLE panel   Negative SSA, SSB  Negative Anti-Sury-1  Negative Centromere B antibodies  Negative  CCP      Component      Latest Ref Rng & Units 7/16/2020   WBC      4.0 - 11.0 10e9/L 7.2   RBC Count      3.8 - 5.2 10e12/L 4.86   Hemoglobin      11.7 - 15.7 g/dL 14.0   Hematocrit      35.0 - 47.0 % 42.3   MCV      78 - 100 fl 87   MCH      26.5 - 33.0 pg 28.8   MCHC      31.5 - 36.5 g/dL 33.1   RDW      10.0 - 15.0 % 12.0   Platelet Count      150 - 450 10e9/L 219    Diff Method       Automated Method   % Neutrophils      % 50.4   % Lymphocytes      % 39.9   % Monocytes      % 6.1   % Eosinophils      % 2.5   % Basophils      % 0.8   % Immature Granulocytes      % 0.3   Nucleated RBCs      0 /100 0   Absolute Neutrophil      1.6 - 8.3 10e9/L 3.6   Absolute Lymphocytes      0.8 - 5.3 10e9/L 2.9   Absolute Monocytes      0.0 - 1.3 10e9/L 0.4   Absolute Eosinophils      0.0 - 0.7 10e9/L 0.2   Absolute Basophils      0.0 - 0.2 10e9/L 0.1   Abs Immature Granulocytes      0 - 0.4 10e9/L 0.0   Absolute Nucleated RBC       0.0   Creatinine      0.52 - 1.04 mg/dL 0.66   GFR Estimate      >60 mL/min/1.73:m2 >90   GFR Estimate If Black      >60 mL/min/1.73:m2 >90   Sed Rate      0 - 20 mm/h 7   DNA-ds      <10 IU/mL 5   CRP Inflammation      0.0 - 8.0 mg/L 6.4   Complement C3      81 - 157 mg/dL 97   Complement C4      13 - 39 mg/dL 28   AST      0 - 45 U/L 16   Albumin      3.4 - 5.0 g/dL 4.0   ALT      0 - 50 U/L 17

## 2021-01-08 NOTE — LETTER
2021       RE: Heather Guillory  [address]  [place] [address]     Dear Colleague,    Thank you for referring your patient, Heather Guillory, to the [facility]HEUMATOLOGY [facility] [place] at [facility]. Please see a copy of my visit note below.    Heather is a 40 year old who is being evaluated via a billable video visit.      How would you like to obtain your AVS? Mail a copy  If the video visit is dropped, the invitation should be resent by: Send to e-mail at: [email or web address]  Will anyone else be joining your video visit? No    Video Start Time: 10:03 am    Video-Visit Details    Type of service:  Video Visit    Video End Time: 10:29 am    Originating Location (pt. Location): Home    Distant Location (provider location):  [facility]HEUMATOLOGY [facility] [place]     Platform used for Video Visit: [employer]     Rheumatology Virtual Visit Note     Heather Guillory MRN# [id number]   YOB: 1980 Age: 40 year old     Date of Last Visit: 2020  DOS: 2021    Reason for visit: Follow up of UCTD     (this is a video visit due to COVID-19 outbreak), patient agreed         Assessment and Plan:   Undifferentiated connective tissue disease (UCTD):    Heather is a 41 yo WF . She was diagnosed with MCTD in 2016, 6 wk postpartum based on fatigue, arthritis, mild Raynaud's, low positive NINOSKA 1.1 and low positive RNP Ab 1.6. She is on  mg qd since 2017 with great response.  UCTD continues to be most likely diagnosis not MCTD as she does not have classic features of MCTD, had very low titer of RNP Ab in the past, (negative on most recent check), and had a mild disease which never required prednisone.  All autoimmunity labs (2017) came back negative (except + NINOSKA, low C3) which is further reassuring for UCTD, including APS panel, repeat RNP and inflammatory markers, dsDNA.     She had neg SSA/SSB antibodies in  9/2016, no concern for CHB. No need to re-check.  APS panel was neg.  She had neg anti-Sm, neg anti-DNA and neg centromere Ab in 9/2016.    Today:  Disease continues to be under fair control on HCQ. In 5/2019, recommended to taper plaquenil to 200 mg twice a day on Mon/Wed/Fri and then 1 tab for the rest of the week. Had increased pain/stiffness in hands and feet and C3 dropped. So increased HCQ back to 200 mg bid. C3 went back to nl in 7/2020. Reports increased joint pain/stiffness, has to take  mg bid; otherwise lower dose causes pain. Stress, not enough sleep and cold weather could be triggers for joint pain. Will not add another DMARD, recommend to focus on well-being, she agreed.        Today's plan:    Continue   mg bid (lower dose caused more joint pain)    Discussed to reduce stress, improve sleep, take breaks from taking care of children, take naps, keep hands/feet warm    Annual eye exam on HCQ.    Labs    Return in 6 months      Nasrin King MD          Active Problem List:     Patient Active Problem List    Diagnosis Date Noted     Encounter for sterilization 12/30/2020     Priority: Medium     Added automatically from request for surgery [id number]       Varicose veins of left lower extremity with pain 10/29/2019     Priority: Medium     JAVIER (generalized anxiety disorder)      Priority: Medium     has a Rx for Zoloft; not using currently       Undifferentiated connective tissue disease (H)      Priority: Medium     Rosacea      Priority: Medium            History of Present Illness:   Heather Guillory presents for f/u of UCTD.    Has fatigue. In winter, hands hand feet are achy and stiff especially in AM, more pain with taking less HCQ. Wears gloves, plans to use hand warmers.     Scheduled to have tubal ligation in coming month. Has not decided to implant her left over embryo from IVF cycle yet.    Had eye exam in 10/2020.         Review of Systems:   A comprehensive ROS was done. Positives  are per HPI.          Past Medical History:     Past Medical History:   Diagnosis Date     JAVIER (generalized anxiety disorder)     50mg zoloft     Rosacea      Undifferentiated connective tissue disease (H)      Past Surgical History:   Procedure Laterality Date     COLONOSCOPY N/A 10/21/2020    Procedure: COLONOSCOPY;  Surgeon: Yang Pete MD;  Location: [facility] OR     IR ENDOVENOUS ABLATION VARICOSE VEINS  10/28/2019     IR FOLLOW UP VISIT OUTPATIENT  11/20/2019     IR STAB PHLEBECTOMY 10 - 20 STABS  10/28/2019     IR VEIN SCLEROSING MULTIPLE  10/28/2019     OPERATIVE HYSTEROSCOPY WITH MORCELLATOR  4/8/2014    Procedure: OPERATIVE HYSTEROSCOPY WITH MORCELLATOR;  Hysteroscopic Polypectomy;  Surgeon: Cate Mansfield MD;  Location: [facility] OR     Raynaud's syndrome since puberty. Her main trigger is cold.  She had two uneventful pregnancies with vaginal births, though the conception of her first child required IVF.   she is underwent embryo transfer in July 2017, now pregnant.   cervical radiculopathy, 2017.       Social History:     Social History     Occupational History     Occupation:      Employer: [employer]     Comment: laid off 7/2020   Tobacco Use     Smoking status: Never Smoker     Smokeless tobacco: Never Used   Substance and Sexual Activity     Alcohol use: No     Alcohol/week: 0.0 standard drinks     Drug use: No     Sexual activity: Yes     Partners: Male     Birth control/protection: I.U.D.     Comment: Mirena placed in 2018            Family History:     Family History   Problem Relation Age of Onset     Hypertension Paternal Grandfather      Lung Cancer Paternal Grandfather         smoker     Diabetes Type 2  Paternal Grandfather      Breast Cancer Paternal Grandmother      Hypertension Paternal Grandmother      Colon Cancer Maternal Grandfather      Alzheimer Disease Maternal Grandmother      Cervical Cancer Maternal Grandmother      Arrhythmia Brother         details  unknown; procedure in his 20s     Anxiety Disorder Brother      Elijah Disease Sister      Myocardial Infarction No family hx of      Cerebrovascular Disease No family hx of      Coronary Artery Disease Early Onset No family hx of      Ovarian Cancer No family hx of      No history of AI disease       Allergies:     Allergies   Allergen Reactions     Benzoyl Peroxide Swelling     Duricef [Cefadroxil] Unknown     Monistat [Miconazole] Swelling     Sulfa Drugs Rash            Medications:     Current Outpatient Medications   Medication Sig Dispense Refill     hydroxychloroquine (PLAQUENIL) 200 MG tablet Take 1 tablet (200 mg) by mouth 2 times daily 180 tablet 3     lactobacillus rhamnosus, GG, (CULTURELL) capsule Take 1 capsule by mouth 2 times daily       levonorgestrel (MIRENA, 52 MG,) 20 MCG/24HR IUD 1 each (20 mcg) by Intrauterine route once for 1 dose 1 each 0     metroNIDAZOLE (METROCREAM) 0.75 % external cream Apply topically 2 times daily       sertraline (ZOLOFT) 50 MG tablet Take 1 tablet (50 mg) by mouth daily 90 tablet 3     vitamin D3 (CHOLECALCIFEROL) 50 mcg (2000 units) tablet Take 1 tablet by mouth daily       nitroFURantoin macrocrystal (MACRODANTIN) 50 MG capsule Take 1 capsule (50 mg) PO as needed after sexual activity for UTI prevention. 30 capsule 3     Prenatal Vit-Fe Fumarate-FA (PRENATAL VITAMINS) 28-0.8 MG TABS Take 1 tablet by mouth daily               Physical Exam:   Constitutional: well-developed, pleasant, NAD  Eyes: nl EOM, conjunctiva, sclera  Resp: breathing normally  MS: no active synovitis   Skin: no skin rash  Neuro: nl cranial nerves  Psych: tearful (due to joint pain/stiffness)         Data:     Labs from outside laboratory reviewed from 8/12/16  RNP antibodies 1.6- positive    Negative for Parvo Virus B19, Lyme Disease  Negative SLE panel   Negative SSA, SSB  Negative Anti-Sury-1  Negative Centromere B antibodies  Negative  CCP      Component      Latest Ref Rng & Units 7/16/2020    WBC      4.0 - 11.0 10e9/L 7.2   RBC Count      3.8 - 5.2 10e12/L 4.86   Hemoglobin      11.7 - 15.7 g/dL 14.0   Hematocrit      35.0 - 47.0 % 42.3   MCV      78 - 100 fl 87   MCH      26.5 - 33.0 pg 28.8   MCHC      31.5 - 36.5 g/dL 33.1   RDW      10.0 - 15.0 % 12.0   Platelet Count      150 - 450 10e9/L 219   Diff Method       Automated Method   % Neutrophils      % 50.4   % Lymphocytes      % 39.9   % Monocytes      % 6.1   % Eosinophils      % 2.5   % Basophils      % 0.8   % Immature Granulocytes      % 0.3   Nucleated RBCs      0 /100 0   Absolute Neutrophil      1.6 - 8.3 10e9/L 3.6   Absolute Lymphocytes      0.8 - 5.3 10e9/L 2.9   Absolute Monocytes      0.0 - 1.3 10e9/L 0.4   Absolute Eosinophils      0.0 - 0.7 10e9/L 0.2   Absolute Basophils      0.0 - 0.2 10e9/L 0.1   Abs Immature Granulocytes      0 - 0.4 10e9/L 0.0   Absolute Nucleated RBC       0.0   Creatinine      0.52 - 1.04 mg/dL 0.66   GFR Estimate      >60 mL/min/1.73:m2 >90   GFR Estimate If Black      >60 mL/min/1.73:m2 >90   Sed Rate      0 - 20 mm/h 7   DNA-ds      <10 IU/mL 5   CRP Inflammation      0.0 - 8.0 mg/L 6.4   Complement C3      81 - 157 mg/dL 97   Complement C4      13 - 39 mg/dL 28   AST      0 - 45 U/L 16   Albumin      3.4 - 5.0 g/dL 4.0   ALT      0 - 50 U/L 17

## 2021-01-12 ENCOUNTER — TELEPHONE (OUTPATIENT)
Dept: UROLOGY | Facility: CLINIC | Age: 41
End: 2021-01-12

## 2021-01-12 NOTE — TELEPHONE ENCOUNTER
[facility] Call Center    Phone Message    May a detailed message be left on voicemail: yes     Reason for Call: Other: Heather returning a phone call letting us know that 10:30am on 1/20 works for her appointment. I tried to reschedule the appointment, but 10:30 did not come up as avaliable. Please give Heather a call back to confirm.     Action Taken: Message routed to:  [facility] ([facility] Urology    Travel Screening: Not Applicable

## 2021-01-12 NOTE — TELEPHONE ENCOUNTER
Left message. Virtual appointment with Conchis on 1/20 move to 10:30 AM. Ask patient to call back to confirm.    **hold slot. Remove slot when schedule.

## 2021-01-12 NOTE — TELEPHONE ENCOUNTER
Patient was notified that we changed her appointment time on 1/20/2021 to 10:30 am. Patient agreed with the plan    Hold was removed.    Monty Antoine MA

## 2021-01-13 ENCOUNTER — TELEPHONE (OUTPATIENT)
Dept: RHEUMATOLOGY | Facility: CLINIC | Age: 41
End: 2021-01-13

## 2021-01-13 NOTE — TELEPHONE ENCOUNTER
Labs were place and pt was notified via my chart several hours ago.    Dyana Rankin RN  Rheumatology Clinic

## 2021-01-13 NOTE — TELEPHONE ENCOUNTER
Pt states she will be at the [facility] location for an ultrasound tomorrow, 1/14/21.  Pt states she knows Dr. King usually has labs for her to complete, Pt had called to schedule lab but there were no lab orders on file and was unable to schedule.    Pt would like if lab orders can be placed so she can complete the labs when she come in for her ultrasound appointment tomorrow at 12:15pm    Please call the Pt back if lab orders are not able to be placed for any reason.  Ok to leave detailed message if call goes to a voicemail.

## 2021-01-14 ENCOUNTER — ANCILLARY PROCEDURE (OUTPATIENT)
Dept: ULTRASOUND IMAGING | Facility: CLINIC | Age: 41
End: 2021-01-14
Attending: NURSE PRACTITIONER
Payer: COMMERCIAL

## 2021-01-14 DIAGNOSIS — R10.9 RIGHT FLANK PAIN: ICD-10-CM

## 2021-01-14 DIAGNOSIS — N39.0 RECURRENT UTI: ICD-10-CM

## 2021-01-14 DIAGNOSIS — M35.9 UNDIFFERENTIATED CONNECTIVE TISSUE DISEASE (H): ICD-10-CM

## 2021-01-14 DIAGNOSIS — R35.0 URINARY FREQUENCY: ICD-10-CM

## 2021-01-14 DIAGNOSIS — R30.0 BURNING WITH URINATION: ICD-10-CM

## 2021-01-14 LAB
ALBUMIN SERPL-MCNC: 4 G/DL (ref 3.4–5)
ALBUMIN UR-MCNC: NEGATIVE MG/DL
ALT SERPL W P-5'-P-CCNC: 19 U/L (ref 0–50)
APPEARANCE UR: CLEAR
AST SERPL W P-5'-P-CCNC: 13 U/L (ref 0–45)
BASOPHILS # BLD AUTO: 0.1 10E9/L (ref 0–0.2)
BASOPHILS NFR BLD AUTO: 1 %
BILIRUB UR QL STRIP: NEGATIVE
COLOR UR AUTO: YELLOW
CREAT SERPL-MCNC: 0.66 MG/DL (ref 0.52–1.04)
CREAT UR-MCNC: 55 MG/DL
CRP SERPL-MCNC: <2.9 MG/L (ref 0–8)
DIFFERENTIAL METHOD BLD: NORMAL
EOSINOPHIL # BLD AUTO: 0.2 10E9/L (ref 0–0.7)
EOSINOPHIL NFR BLD AUTO: 2.5 %
ERYTHROCYTE [DISTWIDTH] IN BLOOD BY AUTOMATED COUNT: 12 % (ref 10–15)
ERYTHROCYTE [SEDIMENTATION RATE] IN BLOOD BY WESTERGREN METHOD: 4 MM/H (ref 0–20)
GFR SERPL CREATININE-BSD FRML MDRD: >90 ML/MIN/{1.73_M2}
GLUCOSE UR STRIP-MCNC: NEGATIVE MG/DL
HCT VFR BLD AUTO: 40.8 % (ref 35–47)
HGB BLD-MCNC: 13.3 G/DL (ref 11.7–15.7)
HGB UR QL STRIP: NEGATIVE
IMM GRANULOCYTES # BLD: 0 10E9/L (ref 0–0.4)
IMM GRANULOCYTES NFR BLD: 0.3 %
KETONES UR STRIP-MCNC: NEGATIVE MG/DL
LEUKOCYTE ESTERASE UR QL STRIP: NEGATIVE
LYMPHOCYTES # BLD AUTO: 2.5 10E9/L (ref 0.8–5.3)
LYMPHOCYTES NFR BLD AUTO: 42.2 %
MCH RBC QN AUTO: 29.3 PG (ref 26.5–33)
MCHC RBC AUTO-ENTMCNC: 32.6 G/DL (ref 31.5–36.5)
MCV RBC AUTO: 90 FL (ref 78–100)
MONOCYTES # BLD AUTO: 0.3 10E9/L (ref 0–1.3)
MONOCYTES NFR BLD AUTO: 5.4 %
NEUTROPHILS # BLD AUTO: 2.9 10E9/L (ref 1.6–8.3)
NEUTROPHILS NFR BLD AUTO: 48.6 %
NITRATE UR QL: NEGATIVE
NRBC # BLD AUTO: 0 10*3/UL
NRBC BLD AUTO-RTO: 0 /100
PH UR STRIP: 7 PH (ref 5–7)
PLATELET # BLD AUTO: 192 10E9/L (ref 150–450)
PROT UR-MCNC: 0.1 G/L
PROT/CREAT 24H UR: 0.19 G/G CR (ref 0–0.2)
RBC # BLD AUTO: 4.54 10E12/L (ref 3.8–5.2)
RBC #/AREA URNS AUTO: 2 /HPF (ref 0–2)
SOURCE: NORMAL
SP GR UR STRIP: 1.01 (ref 1–1.03)
SQUAMOUS #/AREA URNS AUTO: 1 /HPF (ref 0–1)
UROBILINOGEN UR STRIP-MCNC: 0 MG/DL (ref 0–2)
WBC # BLD AUTO: 5.9 10E9/L (ref 4–11)
WBC #/AREA URNS AUTO: <1 /HPF (ref 0–5)

## 2021-01-14 PROCEDURE — 85025 COMPLETE CBC W/AUTO DIFF WBC: CPT | Performed by: PATHOLOGY

## 2021-01-14 PROCEDURE — 82040 ASSAY OF SERUM ALBUMIN: CPT | Performed by: PATHOLOGY

## 2021-01-14 PROCEDURE — 36415 COLL VENOUS BLD VENIPUNCTURE: CPT | Performed by: PATHOLOGY

## 2021-01-14 PROCEDURE — 85652 RBC SED RATE AUTOMATED: CPT | Performed by: PATHOLOGY

## 2021-01-14 PROCEDURE — 84156 ASSAY OF PROTEIN URINE: CPT | Performed by: PATHOLOGY

## 2021-01-14 PROCEDURE — 86225 DNA ANTIBODY NATIVE: CPT | Mod: 90 | Performed by: PATHOLOGY

## 2021-01-14 PROCEDURE — 76770 US EXAM ABDO BACK WALL COMP: CPT | Mod: GC | Performed by: RADIOLOGY

## 2021-01-14 PROCEDURE — 84460 ALANINE AMINO (ALT) (SGPT): CPT | Performed by: PATHOLOGY

## 2021-01-14 PROCEDURE — 82565 ASSAY OF CREATININE: CPT | Performed by: PATHOLOGY

## 2021-01-14 PROCEDURE — 81001 URINALYSIS AUTO W/SCOPE: CPT | Performed by: PATHOLOGY

## 2021-01-14 PROCEDURE — 86160 COMPLEMENT ANTIGEN: CPT | Mod: 90 | Performed by: PATHOLOGY

## 2021-01-14 PROCEDURE — 86140 C-REACTIVE PROTEIN: CPT | Performed by: PATHOLOGY

## 2021-01-14 PROCEDURE — 84450 TRANSFERASE (AST) (SGOT): CPT | Performed by: PATHOLOGY

## 2021-01-14 NOTE — LETTER
January 17, 2021      Heather Guillory  [address]  [place] [address]        Dear MsTod,    We are writing to inform you of your test results.    There is a drop in C3 but it fluctuates and I am not worried. The rest of your labs look great.    Resulted Orders   Protein  random urine with Creat Ratio   Result Value Ref Range    Protein Random Urine 0.10 g/L    Protein Total Urine g/gr Creatinine 0.19 0 - 0.2 g/g Cr   CBC with platelets differential   Result Value Ref Range    WBC 5.9 4.0 - 11.0 10e9/L    RBC Count 4.54 3.8 - 5.2 10e12/L    Hemoglobin 13.3 11.7 - 15.7 g/dL    Hematocrit 40.8 35.0 - 47.0 %    MCV 90 78 - 100 fl    MCH 29.3 26.5 - 33.0 pg    MCHC 32.6 31.5 - 36.5 g/dL    RDW 12.0 10.0 - 15.0 %    Platelet Count 192 150 - 450 10e9/L    Diff Method Automated Method     % Neutrophils 48.6 %    % Lymphocytes 42.2 %    % Monocytes 5.4 %    % Eosinophils 2.5 %    % Basophils 1.0 %    % Immature Granulocytes 0.3 %    Nucleated RBCs 0 0 /100    Absolute Neutrophil 2.9 1.6 - 8.3 10e9/L    Absolute Lymphocytes 2.5 0.8 - 5.3 10e9/L    Absolute Monocytes 0.3 0.0 - 1.3 10e9/L    Absolute Eosinophils 0.2 0.0 - 0.7 10e9/L    Absolute Basophils 0.1 0.0 - 0.2 10e9/L    Abs Immature Granulocytes 0.0 0 - 0.4 10e9/L    Absolute Nucleated RBC 0.0    AST   Result Value Ref Range    AST 13 0 - 45 U/L   ALT   Result Value Ref Range    ALT 19 0 - 50 U/L   Albumin level   Result Value Ref Range    Albumin 4.0 3.4 - 5.0 g/dL   Creatinine   Result Value Ref Range    Creatinine 0.66 0.52 - 1.04 mg/dL    GFR Estimate >90 >60 mL/min/[1.73_m2]      Comment:      Non  GFR Calc  Starting 12/18/2018, serum creatinine based estimated GFR (eGFR) will be   calculated using the Chronic Kidney Disease Epidemiology Collaboration   (CKD-EPI) equation.      GFR Estimate If Black >90 >60 mL/min/[1.73_m2]      Comment:       GFR Calc  Starting 12/18/2018, serum creatinine based estimated GFR (eGFR)  will be   calculated using the Chronic Kidney Disease Epidemiology Collaboration   (CKD-EPI) equation.     CRP inflammation   Result Value Ref Range    CRP Inflammation <2.9 0.0 - 8.0 mg/L   Erythrocyte sedimentation rate auto   Result Value Ref Range    Sed Rate 4 0 - 20 mm/h   Complement C3   Result Value Ref Range    Complement C3 78 (L) 81 - 157 mg/dL   Complement C4   Result Value Ref Range    Complement C4 19 13 - 39 mg/dL   DNA double stranded antibodies   Result Value Ref Range    DNA-ds 5 <10 IU/mL      Comment:      Negative   UA with Microscopic reflex to Culture   Result Value Ref Range    Color Urine Yellow     Appearance Urine Clear     Glucose Urine Negative NEG^Negative mg/dL    Bilirubin Urine Negative NEG^Negative    Ketones Urine Negative NEG^Negative mg/dL    Specific Gravity Urine 1.010 1.003 - 1.035    Blood Urine Negative NEG^Negative    pH Urine 7.0 5.0 - 7.0 pH    Protein Albumin Urine Negative NEG^Negative mg/dL    Urobilinogen mg/dL 0.0 0.0 - 2.0 mg/dL    Nitrite Urine Negative NEG^Negative    Leukocyte Esterase Urine Negative NEG^Negative    Source Midstream Urine     WBC Urine <1 0 - 5 /HPF    RBC Urine 2 0 - 2 /HPF    Squamous Epithelial /HPF Urine 1 0 - 1 /HPF   Creatinine urine calculation only   Result Value Ref Range    Creatinine Urine 55 mg/dL       If you have any questions or concerns, please call the clinic at the number listed above.       Sincerely,      Nasrin King MD

## 2021-01-15 LAB
C3 SERPL-MCNC: 78 MG/DL (ref 81–157)
C4 SERPL-MCNC: 19 MG/DL (ref 13–39)
DSDNA AB SER-ACNC: 5 IU/ML

## 2021-01-18 ENCOUNTER — DOCUMENTATION ONLY (OUTPATIENT)
Dept: CARE COORDINATION | Facility: CLINIC | Age: 41
End: 2021-01-18

## 2021-01-20 ENCOUNTER — VIRTUAL VISIT (OUTPATIENT)
Dept: UROLOGY | Facility: CLINIC | Age: 41
End: 2021-01-20
Attending: NURSE PRACTITIONER
Payer: COMMERCIAL

## 2021-01-20 ENCOUNTER — PRE VISIT (OUTPATIENT)
Dept: UROLOGY | Facility: CLINIC | Age: 41
End: 2021-01-20

## 2021-01-20 ENCOUNTER — TELEPHONE (OUTPATIENT)
Dept: UROLOGY | Facility: CLINIC | Age: 41
End: 2021-01-20

## 2021-01-20 DIAGNOSIS — R35.0 URINARY FREQUENCY: ICD-10-CM

## 2021-01-20 DIAGNOSIS — R30.0 DYSURIA: ICD-10-CM

## 2021-01-20 DIAGNOSIS — R39.15 URINARY URGENCY: ICD-10-CM

## 2021-01-20 DIAGNOSIS — N39.0 RECURRENT UTI: Primary | ICD-10-CM

## 2021-01-20 DIAGNOSIS — R10.9 RIGHT FLANK PAIN: ICD-10-CM

## 2021-01-20 PROCEDURE — 87109 MYCOPLASMA: CPT | Mod: 59 | Performed by: PHYSICIAN ASSISTANT

## 2021-01-20 PROCEDURE — 99243 OFF/OP CNSLTJ NEW/EST LOW 30: CPT | Mod: 95 | Performed by: PHYSICIAN ASSISTANT

## 2021-01-20 PROCEDURE — 87109 MYCOPLASMA: CPT | Performed by: PHYSICIAN ASSISTANT

## 2021-01-20 RX ORDER — NITROFURANTOIN MACROCRYSTALS 50 MG/1
CAPSULE ORAL
Qty: 30 CAPSULE | Refills: 3 | Status: SHIPPED | OUTPATIENT
Start: 2021-01-20 | End: 2021-09-14

## 2021-01-20 NOTE — PATIENT INSTRUCTIONS
UROLOGY CLINIC VISIT PATIENT INSTRUCTIONS    Go to [facility] lab to provide a urine sample for ureaplasma and mycoplasma cultures.     Try over the counter Azo (phenazopyridine) to help with your current symptoms of frequency, urgency, and pain with urination. Follow the directions on the bottle.    Someone will contact you to schedule a cystoscopy appointment with Dr. Qureshi or Dr. Melissa next available [facility]. Keep this appointment if your symptoms do not improve or you continue to get UTI's. See below for more details about this procedure.    If your right-sided back pain gets worse, contact us via [employer] or by calling [phone] as we could consider ordering a CT scan to further evaluate for kidney stones.    A prescription for a preventative antibiotic (nitrofurantion, or Macrodantin) was sent to your pharmacy. Take 1 pill at the time of sexual activity to help prevent UTI's.     CYSTOSCOPY    What is a Cystoscopy?  This is a procedure done to check for problems inside the bladder.  Problems may include polyps (growths), tumors, inflammation (swelling and redness) and other concerns.    The doctor inserts a thin tube (called a cystoscope) into the bladder.  The tube is about the size of a pencil.  We will give you numbing medicine to reduce the pain or discomfort you may feel.    The tube allows the doctor to:  The doctor will be able to see inside the bladder by filling the bladder with water.  The water makes it easier to see any problems that may be present.    If needed, the doctor may use the tube to:  The doctor is able to take tissue samples (biopsies).  Samples are sent to the lab for testing.  The doctor can also burn off any small growths or tumors that are found.  This is call fulguration.    How should I get ready for the exam?  To prepare, stop taking any medications as instructed. Ask whether you should avoid eating or drinking anything after midnight before  the procedure. Follow any other instructions your doctor gives you.    If you are having this procedure done at the clinic, you will be there for up to an hour.  You will receive care before and after the procedure.    Please tell your doctor if:  - You have a history of urinary tract infections.  - You know that you have a tumor in your bladder.  - You have bleeding problems.  - You have any allergies.  - You are or may be pregnant.      What happens after the exam?  You may go back to your normal diet and activity as you feel ready, unless your doctor tells you not to.    For the next two days, you may notice:  - Some blood in your urine.  - Some burning when you urinate (use the toilet).  - An urge to urinate more often.  - Bladder spasms.    These are normal after the procedure. They should go away on their own after a day or two.      - You can help to relieve the above listed symptoms by:  - Drinking 6 to 8 large glasses of water each day (includes drinks at meals).  This will help clear the urine.  - Take warm baths to relieve pain and bladder spasms.  Do not add anything to the bath water.  - Your doctor may prescribe pain medicine.  You may also take Tylenol (acetaminophen) for pain.    When should I call my doctor?  - A fever over 100.0 F (38 C) for more than a day.  (Before you call the doctor, check your temperature under your tongue.)  - Chills.  - Failure to urinate: No urine comes out when you try to use the toilet.  (Try soaking in a bathtub full of warm water.  If still no urine, call your doctor.)  - A lot of blood in the urine or blood clots larger than a nickel.  - Pain in the back or abdomen (belly / stomach area).  - Pain or spasms that are not relieved by warm tub baths and pain medicine.  - Severe pain, burning or other problems while passing urine.  - Pain that gets worse after two days.        If you have any issues, questions or concerns in the meantime, do not hesitate to contact us at  [phone] or via [employer].     It was a pleasure meeting with you today.  Thank you for allowing me and my team the privilege of caring for you today.  YOU are the reason we are here, and I truly hope we provided you with the excellent service you deserve.  Please let us know if there is anything else we can do for you so that we can be sure you are leaving completely satisfied with your care experience.

## 2021-01-20 NOTE — TELEPHONE ENCOUNTER
Left voice message to call Urology to schedule Cystoscopy with Dr. Qureshi or Dr. Melissa next available per Conchis Walton virtual visit 1/20/21.

## 2021-01-20 NOTE — LETTER
1/20/2021       RE: Heather Guillory  [address]  [place] [address]     Dear Colleague,    Thank you for referring your patient, Heather Guillory, to the [facility]OLOG[facility] [place] at [facility]. Please see a copy of my visit note below.    Video Visit Technology for this patient: [employer] Video Visit- Patient was left in waiting room    Heather is a 40 year old who is being evaluated via a billable video visit.      How would you like to obtain your AVS? MyChart  If the video visit is dropped, the invitation should be resent by:   Will anyone else be joining your video visit? No        Name: Heather Guillory    MRN: [id number]   YOB: 1980                Assessment and Plan:   40 year old female with a h/o ESBL UTI during pregnancy a few years ago and recurrent UTI's since then. 3 culture-confirmed E. Coli (non-ESBL) in the last 6 months. [place] is a trigger. Also with a history of stones with questionable tiny stones in the right kidney on recent renal ultrasound. Continues to have UTI symptoms of frequency, urgency, dysuria, bladder spasms, and intermittent right flank pain despite negative urine culture on 12/30/2020 and negative UA on 1/14/2021.     Discussed that it would seem unlikely for the possible tiny stones in her right kidney to be serving as a source for these UTI's, though theoretically could have an infected stone serving as a nidus. Will hold off on further imaging at this time, though if right flank pain worsens or UTI's persist, consider follow up CT abdomen/pelvis stone protocol to further evaluate.    For her persistent irritative voiding symptoms in the absence of infection, discussed possible differentials including atypical infection, post-infectious cystitis or lingering inflammation resulting in spasms and OAB symptoms, versus other. We reviewed possible management options including further urine testing,  OTC Azo, or other prescription medications to help calm the bladder. Will plan for the following:  -Lab appointment for ureaplasma and mycoplasma urine cultures.   -Trial of OTC Azo while awaiting culture results.    For more long-term UTI prevention, discussed post-coital antibiotic prophylaxis since this is a major trigger for her UTI's. She is interested and would like a prescription. Also discussed further evaluation with cystoscopy. Will schedule an appointment since these tend to book out several weeks to 1+ months, and can cancel if symptoms improved by the time of the appointment.  -Rx for nitrofurantoin 50 mg PO x 1 after sexual activity.   -Continue vitamin C and probiotic which can also be helpful for UTI prevention.  -Schedule cystoscopy with Dr. Melissa or Dr. Qureshi next available.     Notify the urology clinic sooner with any worsening symptoms.    Conchis Walton PA-C  January 20, 2021          Chief Complaint:   Recurrent UTI's          History of Present Illness:   Heather Guillory is a very pleasant 40 year old female with PMH significant for undifferentiated connective tissue disorder (followed by rheumatology) seen today via virtual visit in consultation from Eva Alexis NP for evaluation of recurrent UTI's. Ms. Guillory reports that she started having problems with frequent UTI's after being diagnosed with ESBL UTI during her third pregnancy a few years ago. In the last 6 months, she has had 3 culture-confirmed infections (through [facility] so results not available to review). She reports these were non-ESBL E. Coli UTI's. First one treated with Augmentin, the next two with Macrobid, most recently on 12/14/2020. [place] is a noticeable trigger for her UTI's. Typical symptoms include frequency, urgency, dysuria, and some intermittent right flank pain. Generally, symptoms improve with antibiotics. However, after her most recent course of Macrobid, her symptoms recurred about 1 week later and  "have been persistent since then.  UA on 12/30/2020 showed trace LE, 71 WBC, 19 RBC. She was prescribed Augmentin for possible UTI but never took it as the final culture came back with <10K mixed  jamia. Another repeat UA on 1/14/2021 was completley negative, yet her symptoms persist.     She does report a history of kidney stones 12-13 years ago which passed on their own. Renal ultrasound on 1/14/2021 showed questionable small stones in the right kidney versus prominent fatty interfaces. She has not had any visible blood in her urine, though describes her symptoms as somewhat \"colicky.\" Last night she was up from 1-4 AM with frequency, urgency, bladder spasms, and voiding in small volumes.    At her baseline, she does not have any bothersome urinary symptoms and feels to empty her bladder well.     About a year ago she switched from a prenatal vitamin to a women's daily multivitamin. More recently, she switched to just taking vitamin C, vitamin D, and a probiotic. She wonders if any of these things could be contributing to her ongoing urinary symptoms.          Past Medical History:     Past Medical History:   Diagnosis Date     JAVIER (generalized anxiety disorder)     50mg zoloft     Rosacea      Undifferentiated connective tissue disease (H)             Past Surgical History:     Past Surgical History:   Procedure Laterality Date     COLONOSCOPY N/A 10/21/2020    Procedure: COLONOSCOPY;  Surgeon: Yang Pete MD;  [place] [facility] [place]     IR ENDOVENOUS ABLATION VARICOSE VEINS  10/28/2019     IR FOLLOW UP VISIT OUTPATIENT  11/20/2019     IR STAB PHLEBECTOMY 10 - 20 STABS  10/28/2019     IR VEIN SCLEROSING MULTIPLE  10/28/2019     OPERATIVE HYSTEROSCOPY WITH MORCELLATOR  4/8/2014    Procedure: OPERATIVE HYSTEROSCOPY WITH MORCELLATOR;  Hysteroscopic Polypectomy;  Surgeon: Cate Mansfield MD;  Location: [facility] OR            Social History:     Social History     Tobacco Use     Smoking status: Never Smoker "     Smokeless tobacco: Never Used   Substance Use Topics     Alcohol use: No     Alcohol/week: 0.0 standard drinks            Family History:     Family History   Problem Relation Age of Onset     Hypertension Paternal Grandfather      Lung Cancer Paternal Grandfather         smoker     Diabetes Type 2  Paternal Grandfather      Breast Cancer Paternal Grandmother      Hypertension Paternal Grandmother      Colon Cancer Maternal Grandfather      Alzheimer Disease Maternal Grandmother      Cervical Cancer Maternal Grandmother      Arrhythmia Brother         details unknown; procedure in his 20s     Anxiety Disorder Brother      Elijah Disease Sister      Myocardial Infarction No family hx of      Cerebrovascular Disease No family hx of      Coronary Artery Disease Early Onset No family hx of      Ovarian Cancer No family hx of               Allergies:     Allergies   Allergen Reactions     Benzoyl Peroxide Swelling     Duricef [Cefadroxil] Unknown     Monistat [Miconazole] Swelling     Sulfa Drugs Rash            Medications:     Current Outpatient Medications   Medication Sig     nitroFURantoin macrocrystal (MACRODANTIN) 50 MG capsule Take 1 capsule (50 mg) PO as needed after sexual activity for UTI prevention.     hydroxychloroquine (PLAQUENIL) 200 MG tablet Take 1 tablet (200 mg) by mouth 2 times daily     lactobacillus rhamnosus, GG, (CULTURELL) capsule Take 1 capsule by mouth 2 times daily     levonorgestrel (MIRENA, 52 MG,) 20 MCG/24HR IUD 1 each (20 mcg) by Intrauterine route once for 1 dose     metroNIDAZOLE (METROCREAM) 0.75 % external cream Apply topically 2 times daily     Prenatal Vit-Fe Fumarate-FA (PRENATAL VITAMINS) 28-0.8 MG TABS Take 1 tablet by mouth daily      sertraline (ZOLOFT) 50 MG tablet Take 1 tablet (50 mg) by mouth daily     vitamin D3 (CHOLECALCIFEROL) 50 mcg (2000 units) tablet Take 1 tablet by mouth daily     No current facility-administered medications for this visit.              Review  of Systems:    ROS: 14 point ROS neg other than the symptoms noted above in the HPI and PMH.          Physical Exam:   GENERAL: Healthy, alert and no distress  EYES: Eyes grossly normal to inspection.  No discharge or erythema, or obvious scleral/conjunctival abnormalities.  RESP: No audible wheeze, cough, or visible cyanosis.  No visible retractions or increased work of breathing.    SKIN: Visible skin clear. No significant rash, abnormal pigmentation or lesions.  NEURO: Cranial nerves grossly intact.  Mentation and speech appropriate for age.  PSYCH: Mentation appears normal, affect normal/bright, judgement and insight intact, normal speech and appearance well-groomed.      Labs:      Creatinine   Date Value Ref Range Status   01/14/2021 0.66 0.52 - 1.04 mg/dL Final       Color Urine (no units)   Date Value   01/14/2021 Yellow     Appearance Urine (no units)   Date Value   01/14/2021 Clear     Glucose Urine (mg/dL)   Date Value   01/14/2021 Negative     Bilirubin Urine (no units)   Date Value   01/14/2021 Negative     Ketones Urine (mg/dL)   Date Value   01/14/2021 Negative     Specific Gravity Urine (no units)   Date Value   01/14/2021 1.010     pH Urine (pH)   Date Value   01/14/2021 7.0     Protein Albumin Urine (mg/dL)   Date Value   01/14/2021 Negative     Urobilinogen Urine (EU/dL)   Date Value   08/31/2017 0.2     Nitrite Urine (no units)   Date Value   01/14/2021 Negative     Leukocyte Esterase Urine (no units)   Date Value   01/14/2021 Negative           Imaging:    US RENAL COMPLETE, 1/14/2021     FINDINGS:     Right kidney: Measures 12.4 cm in length. Parenchyma is of normal  thickness and echogenicity. No focal mass. No hydronephrosis. There  are several non-shadowing, echogenic foci within the renal sinus fat.  These may represent fatty interfaces versus tiny non-shadowing stones.     Left kidney: Measures 11.9 cm in length. Parenchyma is of normal  thickness and echogenicity. No focal mass. No  hydronephrosis.      Bladder: Within normal limits.                                                                      IMPRESSION:   No hydroureteronephrosis. Questionable prominent fatty interfaces  versus tiny non-shadowing right renal calculi. Otherwise, normal renal  ultrasound.              Video Start Time: 10:32 AM  Video-Visit Details    Type of service:  Video Visit    Video End Time:11:01 AM    Originating Location (pt. Location): Home    Distant Location (provider location):  [facility] [place]     Platform used for Video Visit: [employer]

## 2021-01-20 NOTE — NURSING NOTE
Chief Complaint   Patient presents with     Consult     dysuria, frequency       Patient Active Problem List   Diagnosis     JAVIER (generalized anxiety disorder)     Undifferentiated connective tissue disease (H)     Rosacea     Varicose veins of left lower extremity with pain     Encounter for sterilization       Allergies   Allergen Reactions     Benzoyl Peroxide Swelling     Duricef [Cefadroxil] Unknown     Monistat [Miconazole] Swelling     Sulfa Drugs Rash       Current Outpatient Medications   Medication Sig Dispense Refill     hydroxychloroquine (PLAQUENIL) 200 MG tablet Take 1 tablet (200 mg) by mouth 2 times daily 180 tablet 3     lactobacillus rhamnosus, GG, (CULTURELL) capsule Take 1 capsule by mouth 2 times daily       levonorgestrel (MIRENA, 52 MG,) 20 MCG/24HR IUD 1 each (20 mcg) by Intrauterine route once for 1 dose 1 each 0     metroNIDAZOLE (METROCREAM) 0.75 % external cream Apply topically 2 times daily       Prenatal Vit-Fe Fumarate-FA (PRENATAL VITAMINS) 28-0.8 MG TABS Take 1 tablet by mouth daily        sertraline (ZOLOFT) 50 MG tablet Take 1 tablet (50 mg) by mouth daily 90 tablet 3     vitamin D3 (CHOLECALCIFEROL) 50 mcg (2000 units) tablet Take 1 tablet by mouth daily         Social History     Tobacco Use     Smoking status: Never Smoker     Smokeless tobacco: Never Used   Substance Use Topics     Alcohol use: No     Alcohol/week: 0.0 standard drinks     Drug use: No       Gia Villarreal LPN  1/20/2021  10:27 AM

## 2021-01-20 NOTE — PROGRESS NOTES
Video Visit Technology for this patient: ShipEarly Video Visit- Patient was left in waiting room    Heather is a 40 year old who is being evaluated via a billable video visit.      How would you like to obtain your AVS? MyChart  If the video visit is dropped, the invitation should be resent by:   Will anyone else be joining your video visit? No        Name: Heather Guillory    MRN: [id number]   YOB: 1980                Assessment and Plan:   40 year old female with a h/o ESBL UTI during pregnancy a few years ago and recurrent UTI's since then. 3 culture-confirmed E. Coli (non-ESBL) in the last 6 months. [place] is a trigger. Also with a history of stones with questionable tiny stones in the right kidney on recent renal ultrasound. Continues to have UTI symptoms of frequency, urgency, dysuria, bladder spasms, and intermittent right flank pain despite negative urine culture on 12/30/2020 and negative UA on 1/14/2021.     Discussed that it would seem unlikely for the possible tiny stones in her right kidney to be serving as a source for these UTI's, though theoretically could have an infected stone serving as a nidus. Will hold off on further imaging at this time, though if right flank pain worsens or UTI's persist, consider follow up CT abdomen/pelvis stone protocol to further evaluate.    For her persistent irritative voiding symptoms in the absence of infection, discussed possible differentials including atypical infection, post-infectious cystitis or lingering inflammation resulting in spasms and OAB symptoms, versus other. We reviewed possible management options including further urine testing, OTC Azo, or other prescription medications to help calm the bladder. Will plan for the following:  -Lab appointment for ureaplasma and mycoplasma urine cultures.   -Trial of OTC Azo while awaiting culture results.    For more long-term UTI prevention, discussed post-coital antibiotic prophylaxis since this  is a major trigger for her UTI's. She is interested and would like a prescription. Also discussed further evaluation with cystoscopy. Will schedule an appointment since these tend to book out several weeks to 1+ months, and can cancel if symptoms improved by the time of the appointment.  -Rx for nitrofurantoin 50 mg PO x 1 after sexual activity.   -Continue vitamin C and probiotic which can also be helpful for UTI prevention.  -Schedule cystoscopy with Dr. Melissa or Dr. Qureshi next available.     Notify the urology clinic sooner with any worsening symptoms.    Conchis Walton PA-C  January 20, 2021          Chief Complaint:   Recurrent UTI's          History of Present Illness:   Heather Guillory is a very pleasant 40 year old female with PMH significant for undifferentiated connective tissue disorder (followed by rheumatology) seen today via virtual visit in consultation from Eva Alexis NP for evaluation of recurrent UTI's. Ms. Guillory reports that she started having problems with frequent UTI's after being diagnosed with ESBL UTI during her third pregnancy a few years ago. In the last 6 months, she has had 3 culture-confirmed infections (through [employer] so results not available to review). She reports these were non-ESBL E. Coli UTI's. First one treated with Augmentin, the next two with Macrobid, most recently on 12/14/2020. [place] is a noticeable trigger for her UTI's. Typical symptoms include frequency, urgency, dysuria, and some intermittent right flank pain. Generally, symptoms improve with antibiotics. However, after her most recent course of Macrobid, her symptoms recurred about 1 week later and have been persistent since then.  UA on 12/30/2020 showed trace LE, 71 WBC, 19 RBC. She was prescribed Augmentin for possible UTI but never took it as the final culture came back with <10K mixed  jamia. Another repeat UA on 1/14/2021 was completley negative, yet her symptoms persist.     She does report a  "history of kidney stones 12-13 years ago which passed on their own. Renal ultrasound on 1/14/2021 showed questionable small stones in the right kidney versus prominent fatty interfaces. She has not had any visible blood in her urine, though describes her symptoms as somewhat \"colicky.\" Last night she was up from 1-4 AM with frequency, urgency, bladder spasms, and voiding in small volumes.    At her baseline, she does not have any bothersome urinary symptoms and feels to empty her bladder well.     About a year ago she switched from a prenatal vitamin to a women's daily multivitamin. More recently, she switched to just taking vitamin C, vitamin D, and a probiotic. She wonders if any of these things could be contributing to her ongoing urinary symptoms.          Past Medical History:     Past Medical History:   Diagnosis Date     JAVIER (generalized anxiety disorder)     50mg zoloft     Rosacea      Undifferentiated connective tissue disease (H)             Past Surgical History:     Past Surgical History:   Procedure Laterality Date     COLONOSCOPY N/A 10/21/2020    Procedure: COLONOSCOPY;  Surgeon: Yang Pete MD;  Location: [facility] [place] [facility] OR     IR ENDOVENOUS ABLATION VARICOSE VEINS  10/28/2019     IR FOLLOW UP VISIT OUTPATIENT  11/20/2019     IR STAB PHLEBECTOMY 10 - 20 STABS  10/28/2019     IR VEIN SCLEROSING MULTIPLE  10/28/2019     OPERATIVE HYSTEROSCOPY WITH MORCELLATOR  4/8/2014    Procedure: OPERATIVE HYSTEROSCOPY WITH MORCELLATOR;  Hysteroscopic Polypectomy;  Surgeon: Cate Mansfield MD;  Location: [facility] OR            Social History:     Social History     Tobacco Use     Smoking status: Never Smoker     Smokeless tobacco: Never Used   Substance Use Topics     Alcohol use: No     Alcohol/week: 0.0 standard drinks            Family History:     Family History   Problem Relation Age of Onset     Hypertension Paternal Grandfather      Lung Cancer Paternal Grandfather         smoker     Diabetes Type 2  " Paternal Grandfather      Breast Cancer Paternal Grandmother      Hypertension Paternal Grandmother      Colon Cancer Maternal Grandfather      Alzheimer Disease Maternal Grandmother      Cervical Cancer Maternal Grandmother      Arrhythmia Brother         details unknown; procedure in his 20s     Anxiety Disorder Brother      Elijah Disease Sister      Myocardial Infarction No family hx of      Cerebrovascular Disease No family hx of      Coronary Artery Disease Early Onset No family hx of      Ovarian Cancer No family hx of               Allergies:     Allergies   Allergen Reactions     Benzoyl Peroxide Swelling     Duricef [Cefadroxil] Unknown     Monistat [Miconazole] Swelling     Sulfa Drugs Rash            Medications:     Current Outpatient Medications   Medication Sig     nitroFURantoin macrocrystal (MACRODANTIN) 50 MG capsule Take 1 capsule (50 mg) PO as needed after sexual activity for UTI prevention.     hydroxychloroquine (PLAQUENIL) 200 MG tablet Take 1 tablet (200 mg) by mouth 2 times daily     lactobacillus rhamnosus, GG, (CULTURELL) capsule Take 1 capsule by mouth 2 times daily     levonorgestrel (MIRENA, 52 MG,) 20 MCG/24HR IUD 1 each (20 mcg) by Intrauterine route once for 1 dose     metroNIDAZOLE (METROCREAM) 0.75 % external cream Apply topically 2 times daily     Prenatal Vit-Fe Fumarate-FA (PRENATAL VITAMINS) 28-0.8 MG TABS Take 1 tablet by mouth daily      sertraline (ZOLOFT) 50 MG tablet Take 1 tablet (50 mg) by mouth daily     vitamin D3 (CHOLECALCIFEROL) 50 mcg (2000 units) tablet Take 1 tablet by mouth daily     No current facility-administered medications for this visit.              Review of Systems:    ROS: 14 point ROS neg other than the symptoms noted above in the HPI and PMH.          Physical Exam:   GENERAL: Healthy, alert and no distress  EYES: Eyes grossly normal to inspection.  No discharge or erythema, or obvious scleral/conjunctival abnormalities.  RESP: No audible wheeze,  cough, or visible cyanosis.  No visible retractions or increased work of breathing.    SKIN: Visible skin clear. No significant rash, abnormal pigmentation or lesions.  NEURO: Cranial nerves grossly intact.  Mentation and speech appropriate for age.  PSYCH: Mentation appears normal, affect normal/bright, judgement and insight intact, normal speech and appearance well-groomed.      Labs:      Creatinine   Date Value Ref Range Status   01/14/2021 0.66 0.52 - 1.04 mg/dL Final       Color Urine (no units)   Date Value   01/14/2021 Yellow     Appearance Urine (no units)   Date Value   01/14/2021 Clear     Glucose Urine (mg/dL)   Date Value   01/14/2021 Negative     Bilirubin Urine (no units)   Date Value   01/14/2021 Negative     Ketones Urine (mg/dL)   Date Value   01/14/2021 Negative     Specific Gravity Urine (no units)   Date Value   01/14/2021 1.010     pH Urine (pH)   Date Value   01/14/2021 7.0     Protein Albumin Urine (mg/dL)   Date Value   01/14/2021 Negative     Urobilinogen Urine (EU/dL)   Date Value   08/31/2017 0.2     Nitrite Urine (no units)   Date Value   01/14/2021 Negative     Leukocyte Esterase Urine (no units)   Date Value   01/14/2021 Negative           Imaging:    US RENAL COMPLETE, 1/14/2021     FINDINGS:     Right kidney: Measures 12.4 cm in length. Parenchyma is of normal  thickness and echogenicity. No focal mass. No hydronephrosis. There  are several non-shadowing, echogenic foci within the renal sinus fat.  These may represent fatty interfaces versus tiny non-shadowing stones.     Left kidney: Measures 11.9 cm in length. Parenchyma is of normal  thickness and echogenicity. No focal mass. No hydronephrosis.      Bladder: Within normal limits.                                                                      IMPRESSION:   No hydroureteronephrosis. Questionable prominent fatty interfaces  versus tiny non-shadowing right renal calculi. Otherwise, normal renal  ultrasound.              Video  Start Time: 10:32 AM  Video-Visit Details    Type of service:  Video Visit    Video End Time:11:01 AM    Originating Location (pt. Location): Home    Distant Location (provider location):  [facility] UROLOGY [facility]     Platform used for Video Visit: [employer]

## 2021-01-21 ENCOUNTER — TELEPHONE (OUTPATIENT)
Dept: UROLOGY | Facility: CLINIC | Age: 41
End: 2021-01-21

## 2021-01-21 NOTE — TELEPHONE ENCOUNTER
[facility] Call Center    Phone Message    May a detailed message be left on voicemail: no     Reason for Call: Other: Pt called and requested to make an appt for a Cystoscopy with Dr. Melissa.  Please call at .     Action Taken: Message routed to:  [facility] ([facility] Urology    Travel Screening: Not Applicable

## 2021-01-21 NOTE — TELEPHONE ENCOUNTER
Hi all,    Please call patient to schedule a cystoscopy with Dr. Radha Melissa or Dr. Lauren Qureshi. Per Conchis Walton PA-C note from 1/20/2021.  Any scheduling questions please reach out to Ely Herrera CMA-clinic assistant for Dr. Melissa. Please reach to Gia Villarreal LPN -clinic nurse for Dr. Qureshi.    Thanks, Monty Antoine MA     Thanks, Monty Antoine MA

## 2021-01-22 NOTE — TELEPHONE ENCOUNTER
Call back to patient. Mailbox is full not able to leave message.    ** to schedule cystoscopy with Dr Qureshi or Dr Melissa per last visit with Conchis on 1/20

## 2021-01-27 LAB
BACTERIA SPEC CULT: ABNORMAL
BACTERIA SPEC CULT: NORMAL
Lab: ABNORMAL
Lab: NORMAL
SPECIMEN SOURCE: ABNORMAL
SPECIMEN SOURCE: NORMAL

## 2021-01-30 ENCOUNTER — ANCILLARY PROCEDURE (OUTPATIENT)
Dept: CT IMAGING | Facility: CLINIC | Age: 41
End: 2021-01-30
Attending: PHYSICIAN ASSISTANT
Payer: COMMERCIAL

## 2021-01-30 DIAGNOSIS — R10.9 RIGHT FLANK PAIN: ICD-10-CM

## 2021-01-30 DIAGNOSIS — N39.0 RECURRENT UTI: ICD-10-CM

## 2021-01-30 PROCEDURE — 74176 CT ABD & PELVIS W/O CONTRAST: CPT | Performed by: RADIOLOGY

## 2021-02-12 DIAGNOSIS — Z11.59 ENCOUNTER FOR SCREENING FOR OTHER VIRAL DISEASES: ICD-10-CM

## 2021-02-23 ENCOUNTER — TELEPHONE (OUTPATIENT)
Dept: OBGYN | Facility: CLINIC | Age: 41
End: 2021-02-23

## 2021-02-23 NOTE — TELEPHONE ENCOUNTER
lvm for patient to call surgery scheduling, pt would like to schedule surgery with Dr. Mansfield on 4/23/21.

## 2021-02-25 ENCOUNTER — TELEPHONE (OUTPATIENT)
Dept: OBGYN | Facility: CLINIC | Age: 41
End: 2021-02-25

## 2021-03-26 ENCOUNTER — TELEPHONE (OUTPATIENT)
Dept: OBGYN | Facility: CLINIC | Age: 41
End: 2021-03-26

## 2021-03-26 NOTE — TELEPHONE ENCOUNTER
Message received from patient with questions/concerns.     Attempted to reach patient, left voicemail message for patient to call back to discuss concerns.

## 2021-03-26 NOTE — TELEPHONE ENCOUNTER
----- Message from Veronica Parnell RN sent at 3/26/2021 11:07 AM CDT -----  Regarding: just missed a call

## 2021-03-26 NOTE — TELEPHONE ENCOUNTER
Rachelle reports very light brown spotting for the past several days.  She only notes this on the toilet paper, no need for panty liner.  Having IUD removed next month because she is having tubal ligation.  Thought she saw green tinge x 1, but low light, and hasn't seen again.     Will watch for now.  Discussed sxs of BV.  Will call back if increased symptoms.

## 2021-03-31 ENCOUNTER — OFFICE VISIT (OUTPATIENT)
Dept: FAMILY MEDICINE | Facility: CLINIC | Age: 41
End: 2021-03-31
Payer: COMMERCIAL

## 2021-03-31 VITALS
BODY MASS INDEX: 25.16 KG/M2 | DIASTOLIC BLOOD PRESSURE: 81 MMHG | WEIGHT: 166 LBS | OXYGEN SATURATION: 100 % | HEIGHT: 68 IN | HEART RATE: 65 BPM | RESPIRATION RATE: 16 BRPM | SYSTOLIC BLOOD PRESSURE: 117 MMHG

## 2021-03-31 DIAGNOSIS — N39.0 RECURRENT UTI: ICD-10-CM

## 2021-03-31 DIAGNOSIS — Z01.818 PRE-OP EXAM: ICD-10-CM

## 2021-03-31 DIAGNOSIS — Z30.49 ENCOUNTER FOR SURVEILLANCE OF OTHER CONTRACEPTIVE: Primary | ICD-10-CM

## 2021-03-31 DIAGNOSIS — R10.9 RIGHT FLANK PAIN: ICD-10-CM

## 2021-03-31 LAB
ALBUMIN UR-MCNC: NEGATIVE MG/DL
APPEARANCE UR: CLEAR
BILIRUB UR QL STRIP: NEGATIVE
COLOR UR AUTO: ABNORMAL
GLUCOSE UR STRIP-MCNC: NEGATIVE MG/DL
HGB BLD-MCNC: 13.7 G/DL (ref 11.7–15.7)
HGB UR QL STRIP: ABNORMAL
KETONES UR STRIP-MCNC: NEGATIVE MG/DL
LEUKOCYTE ESTERASE UR QL STRIP: NEGATIVE
NITRATE UR QL: NEGATIVE
PH UR STRIP: 6 PH (ref 5–7)
POTASSIUM SERPL-SCNC: 4.8 MMOL/L (ref 3.4–5.3)
RBC #/AREA URNS AUTO: 2 /HPF (ref 0–2)
SOURCE: ABNORMAL
SP GR UR STRIP: 1.01 (ref 1–1.03)
SQUAMOUS #/AREA URNS AUTO: 1 /HPF (ref 0–1)
UROBILINOGEN UR STRIP-MCNC: 0 MG/DL (ref 0–2)
WBC #/AREA URNS AUTO: <1 /HPF (ref 0–5)

## 2021-03-31 PROCEDURE — 84132 ASSAY OF SERUM POTASSIUM: CPT | Performed by: PATHOLOGY

## 2021-03-31 PROCEDURE — 99214 OFFICE O/P EST MOD 30 MIN: CPT | Performed by: NURSE PRACTITIONER

## 2021-03-31 PROCEDURE — 36415 COLL VENOUS BLD VENIPUNCTURE: CPT | Performed by: PATHOLOGY

## 2021-03-31 PROCEDURE — 81001 URINALYSIS AUTO W/SCOPE: CPT | Performed by: PATHOLOGY

## 2021-03-31 PROCEDURE — 85018 HEMOGLOBIN: CPT | Performed by: PATHOLOGY

## 2021-03-31 ASSESSMENT — MIFFLIN-ST. JEOR: SCORE: 1481.47

## 2021-03-31 ASSESSMENT — PAIN SCALES - GENERAL: PAINLEVEL: NO PAIN (0)

## 2021-03-31 NOTE — NURSING NOTE
Chief Complaint   Patient presents with     Pre-Op Exam     Patient comes in for a pre op exam.          Reji Klein MA on 3/31/2021 at 10:23 AM

## 2021-03-31 NOTE — PATIENT INSTRUCTIONS

## 2021-03-31 NOTE — LETTER
3/31/2021      RE: Heather Guillory  [address]  [place] [address]       [facility] [address]  5TH FLOOR  [facility] [address]  Phone: [phone]  Fax: [phone]  Primary Provider: Brandy Rouse  Pre-op Performing Provider: JOYCE WATERS    PREOPERATIVE EVALUATION:  Today's date: 3/31/2021    Heather Guillory is a 40 year old female who presents for a preoperative evaluation.    Surgical Information:  Surgery/Procedure: SALPINGECTOMY, LAPAROSCOPIC BILATERAL  Surgery Location: [facility]  Surgeon: Dr. Mnasfield  Surgery Date: 4/23/21  Time of Surgery: 12:30pm  Where patient plans to recover: At home with family  Fax number for surgical facility: Note does not need to be faxed, will be available electronically in Epic.    Type of Anesthesia Anticipated: General    Subjective     HPI related to upcoming procedure: 40 year old healthy woman desiring a permanent type of birth control.     Preop Questions 3/29/2021   1. Have you ever had a heart attack or stroke? No   2. Have you ever had surgery on your heart or blood vessels, such as a stent placement, a coronary artery bypass, or surgery on an artery in your head, neck, heart, or legs? YES - had some veins removed left leg after dilation from groin to calf.    3. Do you have chest pain with activity? No   4. Do you have a history of  heart failure? No   5. Do you currently have a cold, bronchitis or symptoms of other infection? No   6. Do you have a cough, shortness of breath, or wheezing? No   7. Do you or anyone in your family have previous history of blood clots? No   8. Do you or does anyone in your family have a serious bleeding problem such as prolonged bleeding following surgeries or cuts? No   9. Have you ever had problems with anemia or been told to take iron pills? No   10. Have you had any abnormal blood loss such as black, tarry or bloody stools, or abnormal vaginal  bleeding? No   11. Have you ever had a blood transfusion? No   12. Are you willing to have a blood transfusion if it is medically needed before, during, or after your surgery? Yes   13. Have you or any of your relatives ever had problems with anesthesia? UNKNOWN    14. Do you have sleep apnea, excessive snoring or daytime drowsiness? No   15. Do you have any artifical heart valves or other implanted medical devices like a pacemaker, defibrillator, or continuous glucose monitor? No   16. Do you have artificial joints? No   17. Are you allergic to latex? YES   18. Is there any chance that you may be pregnant? No       Health Care Directive:  Patient does not have a Health Care Directive or Living Will: Discussed advance care planning with patient; however, patient declined at this time.    Preoperative Review of :   reviewed - no record of controlled substances prescribed.      Status of Chronic Conditions:  See problem list for active medical problems.  Problems all longstanding and stable, except as noted/documented.  See ROS for pertinent symptoms related to these conditions.      Review of Systems  Constitutional, neuro, ENT, endocrine, pulmonary, cardiac, gastrointestinal, genitourinary, musculoskeletal, integument and psychiatric systems are negative, except as otherwise noted.    Patient Active Problem List    Diagnosis Date Noted     Encounter for sterilization 12/30/2020     Priority: Medium     Added automatically from request for surgery [id number]       Varicose veins of left lower extremity with pain 10/29/2019     Priority: Medium     JAVIER (generalized anxiety disorder)      Priority: Medium     has a Rx for Zoloft; not using currently       Undifferentiated connective tissue disease (H)      Priority: Medium     Rosacea      Priority: Medium      Past Medical History:   Diagnosis Date     JAVIER (generalized anxiety disorder)     50mg zoloft     Rosacea      Undifferentiated connective tissue disease (H)       Past Surgical History:   Procedure Laterality Date     COLONOSCOPY N/A 10/21/2020    Procedure: COLONOSCOPY;  Surgeon: Yang Pete MD;  Location: [facility] OR     IR ENDOVENOUS ABLATION VARICOSE VEINS  10/28/2019     IR FOLLOW UP VISIT OUTPATIENT  11/20/2019     IR STAB PHLEBECTOMY 10 - 20 STABS  10/28/2019     IR VEIN SCLEROSING MULTIPLE  10/28/2019     OPERATIVE HYSTEROSCOPY WITH MORCELLATOR  4/8/2014    Procedure: OPERATIVE HYSTEROSCOPY WITH MORCELLATOR;  Hysteroscopic Polypectomy;  Surgeon: Cate Mansfield MD;  Location: [facility] OR     Current Outpatient Medications   Medication Sig Dispense Refill     hydroxychloroquine (PLAQUENIL) 200 MG tablet Take 1 tablet (200 mg) by mouth 2 times daily 180 tablet 3     lactobacillus rhamnosus, GG, (CULTURELL) capsule Take 1 capsule by mouth 2 times daily       levonorgestrel (MIRENA, 52 MG,) 20 MCG/24HR IUD 1 each (20 mcg) by Intrauterine route once for 1 dose 1 each 0     metroNIDAZOLE (METROCREAM) 0.75 % external cream Apply topically 2 times daily       nitroFURantoin macrocrystal (MACRODANTIN) 50 MG capsule Take 1 capsule (50 mg) PO as needed after sexual activity for UTI prevention. 30 capsule 3     Prenatal Vit-Fe Fumarate-FA (PRENATAL VITAMINS) 28-0.8 MG TABS Take 1 tablet by mouth daily        sertraline (ZOLOFT) 50 MG tablet Take 1 tablet (50 mg) by mouth daily 90 tablet 3     vitamin D3 (CHOLECALCIFEROL) 50 mcg (2000 units) tablet Take 1 tablet by mouth daily         Allergies   Allergen Reactions     Benzoyl Peroxide Swelling     Duricef [Cefadroxil] Unknown     Monistat [Miconazole] Swelling     Sulfa Drugs Rash        Social History     Tobacco Use     Smoking status: Never Smoker     Smokeless tobacco: Never Used   Substance Use Topics     Alcohol use: No     Alcohol/week: 0.0 standard drinks     Family History   Problem Relation Age of Onset     Hypertension Paternal Grandfather      Lung Cancer Paternal Grandfather         smoker     Diabetes  "Type 2  Paternal Grandfather      Breast Cancer Paternal Grandmother      Hypertension Paternal Grandmother      Colon Cancer Maternal Grandfather      Alzheimer Disease Maternal Grandmother      Cervical Cancer Maternal Grandmother      Arrhythmia Brother         details unknown; procedure in his 20s     Anxiety Disorder Brother      Elijah Disease Sister      Myocardial Infarction No family hx of      Cerebrovascular Disease No family hx of      Coronary Artery Disease Early Onset No family hx of      Ovarian Cancer No family hx of      History   Drug Use No         Objective   /81 (BP Location: Right arm, Patient Position: Sitting, Cuff Size: Adult Regular)   Pulse 65   Resp 16   Ht 1.727 m (5' 8\")   Wt 75.3 kg (166 lb)   SpO2 100%   BMI 25.24 kg/m        Physical Exam     GENERAL APPEARANCE: healthy, alert and no distress     EYES: EOMI, PERRL     HENT: ear canals and TM's normal and nose and mouth without ulcers or lesions     NECK: no adenopathy, no asymmetry, masses, or scars and thyroid normal to palpation     RESP: lungs clear to auscultation - no rales, rhonchi or wheezes     CV: regular rates and rhythm, normal S1 S2, no S3 or S4 and no murmur, click or rub     ABDOMEN:  soft, nontender, no HSM or masses and bowel sounds normal     MS: extremities normal- no gross deformities noted, no evidence of inflammation in joints, FROM in all extremities.     SKIN: no suspicious lesions or rashes     NEURO: Normal strength and tone, sensory exam grossly normal, mentation intact and speech normal     PSYCH: mentation appears normal. and affect normal/bright     LYMPHATICS: No cervical adenopathy    Recent Labs   Lab Test 01/14/21  1228 12/30/20  1239 10/28/20  1250 07/16/20  1132   HGB 13.3 13.5 14.0 14.0    195 206 219   NA  --   --  140  --    POTASSIUM  --   --  4.1  --    CR 0.66  --  0.63 0.66   A1C  --   --   --  5.1        Diagnostics:  Labs pending at this time.  Results will be reviewed " when available.   No EKG required, no history of coronary heart disease, significant arrhythmia, peripheral arterial disease or other structural heart disease.    Revised Cardiac Risk Index (RCRI):  The patient has the following serious cardiovascular risks for perioperative complications:   - No serious cardiac risks = 0 points     RCRI Interpretation: 0 points: Class I (very low risk - 0.4% complication rate)      Assessment & Plan     The proposed surgical procedure is considered LOW risk.    Encounter for surveillance of other contraceptive      Pre-op exam    - Hemoglobin; Future  - Potassium; Future    Risks and Recommendations:  The patient has the following additional risks and recommendations for perioperative complications:   - No identified additional risk factors other than previously addressed    Medication Instructions:  Patient is to hold all scheduled medications on the day of surgery.    RECOMMENDATION:  APPROVAL GIVEN to proceed with proposed procedure, without further diagnostic evaluation.    Signed Electronically by: HARVEY Benitez CNP    Copy of this evaluation report is provided to requesting physician.      Eva Alexis NP

## 2021-03-31 NOTE — PROGRESS NOTES
[facility] NURSE PRACTITIONER'S CLINIC [address]  [address] [facility] [address]  Phone: [phone]  Fax: [phone]  Primary Provider: Brandy Rouse  Pre-op Performing Provider: JOYCE WATERS    PREOPERATIVE EVALUATION:  Today's date: 3/31/2021    Heather Guillory is a 40 year old female who presents for a preoperative evaluation.    Surgical Information:  Surgery/Procedure: SALPINGECTOMY, LAPAROSCOPIC BILATERAL  Surgery Location[facility]  Surgeon: Dr. Mansfield  Surgery Date: 4/23/21  Time of Surgery: 12:30pm  Where patient plans to recover: At home with family  Fax number for surgical facility: Note does not need to be faxed, will be available electronically in Epic.    Type of Anesthesia Anticipated: General    Subjective     HPI related to upcoming procedure: 40 year old healthy woman desiring a permanent type of birth control.     Preop Questions 3/29/2021   1. Have you ever had a heart attack or stroke? No   2. Have you ever had surgery on your heart or blood vessels, such as a stent placement, a coronary artery bypass, or surgery on an artery in your head, neck, heart, or legs? YES - had some veins removed left leg after dilation from groin to calf.    3. Do you have chest pain with activity? No   4. Do you have a history of  heart failure? No   5. Do you currently have a cold, bronchitis or symptoms of other infection? No   6. Do you have a cough, shortness of breath, or wheezing? No   7. Do you or anyone in your family have previous history of blood clots? No   8. Do you or does anyone in your family have a serious bleeding problem such as prolonged bleeding following surgeries or cuts? No   9. Have you ever had problems with anemia or been told to take iron pills? No   10. Have you had any abnormal blood loss such as black, tarry or bloody stools, or abnormal vaginal bleeding? No   11. Have you ever had a blood transfusion? No   12. Are you willing to  have a blood transfusion if it is medically needed before, during, or after your surgery? Yes   13. Have you or any of your relatives ever had problems with anesthesia? UNKNOWN    14. Do you have sleep apnea, excessive snoring or daytime drowsiness? No   15. Do you have any artifical heart valves or other implanted medical devices like a pacemaker, defibrillator, or continuous glucose monitor? No   16. Do you have artificial joints? No   17. Are you allergic to latex? YES   18. Is there any chance that you may be pregnant? No       Health Care Directive:  Patient does not have a Health Care Directive or Living Will: Discussed advance care planning with patient; however, patient declined at this time.    Preoperative Review of :   reviewed - no record of controlled substances prescribed.      Status of Chronic Conditions:  See problem list for active medical problems.  Problems all longstanding and stable, except as noted/documented.  See ROS for pertinent symptoms related to these conditions.      Review of Systems  Constitutional, neuro, ENT, endocrine, pulmonary, cardiac, gastrointestinal, genitourinary, musculoskeletal, integument and psychiatric systems are negative, except as otherwise noted.    Patient Active Problem List    Diagnosis Date Noted     Encounter for sterilization 12/30/2020     Priority: Medium     Added automatically from request for surgery [id number]       Varicose veins of left lower extremity with pain 10/29/2019     Priority: Medium     JAVIER (generalized anxiety disorder)      Priority: Medium     has a Rx for Zoloft; not using currently       Undifferentiated connective tissue disease (H)      Priority: Medium     Rosacea      Priority: Medium      Past Medical History:   Diagnosis Date     JAVIER (generalized anxiety disorder)     50mg zoloft     Rosacea      Undifferentiated connective tissue disease (H)      Past Surgical History:   Procedure Laterality Date     COLONOSCOPY N/A  10/21/2020    Procedure: COLONOSCOPY;  Surgeon: Yang Pete MD;  Location: [facility] OR     IR ENDOVENOUS ABLATION VARICOSE VEINS  10/28/2019     IR FOLLOW UP VISIT OUTPATIENT  11/20/2019     IR STAB PHLEBECTOMY 10 - 20 STABS  10/28/2019     IR VEIN SCLEROSING MULTIPLE  10/28/2019     OPERATIVE HYSTEROSCOPY WITH MORCELLATOR  4/8/2014    Procedure: OPERATIVE HYSTEROSCOPY WITH MORCELLATOR;  Hysteroscopic Polypectomy;  Surgeon: Cate Mansfield MD;  Location: [facility] OR     Current Outpatient Medications   Medication Sig Dispense Refill     hydroxychloroquine (PLAQUENIL) 200 MG tablet Take 1 tablet (200 mg) by mouth 2 times daily 180 tablet 3     lactobacillus rhamnosus, GG, (CULTURELL) capsule Take 1 capsule by mouth 2 times daily       levonorgestrel (MIRENA, 52 MG,) 20 MCG/24HR IUD 1 each (20 mcg) by Intrauterine route once for 1 dose 1 each 0     metroNIDAZOLE (METROCREAM) 0.75 % external cream Apply topically 2 times daily       nitroFURantoin macrocrystal (MACRODANTIN) 50 MG capsule Take 1 capsule (50 mg) PO as needed after sexual activity for UTI prevention. 30 capsule 3     Prenatal Vit-Fe Fumarate-FA (PRENATAL VITAMINS) 28-0.8 MG TABS Take 1 tablet by mouth daily        sertraline (ZOLOFT) 50 MG tablet Take 1 tablet (50 mg) by mouth daily 90 tablet 3     vitamin D3 (CHOLECALCIFEROL) 50 mcg (2000 units) tablet Take 1 tablet by mouth daily         Allergies   Allergen Reactions     Benzoyl Peroxide Swelling     Duricef [Cefadroxil] Unknown     Monistat [Miconazole] Swelling     Sulfa Drugs Rash        Social History     Tobacco Use     Smoking status: Never Smoker     Smokeless tobacco: Never Used   Substance Use Topics     Alcohol use: No     Alcohol/week: 0.0 standard drinks     Family History   Problem Relation Age of Onset     Hypertension Paternal Grandfather      Lung Cancer Paternal Grandfather         smoker     Diabetes Type 2  Paternal Grandfather      Breast Cancer Paternal Grandmother       "Hypertension Paternal Grandmother      Colon Cancer Maternal Grandfather      Alzheimer Disease Maternal Grandmother      Cervical Cancer Maternal Grandmother      Arrhythmia Brother         details unknown; procedure in his 20s     Anxiety Disorder Brother      Elijah Disease Sister      Myocardial Infarction No family hx of      Cerebrovascular Disease No family hx of      Coronary Artery Disease Early Onset No family hx of      Ovarian Cancer No family hx of      History   Drug Use No         Objective   /81 (BP Location: Right arm, Patient Position: Sitting, Cuff Size: Adult Regular)   Pulse 65   Resp 16   Ht 1.727 m (5' 8\")   Wt 75.3 kg (166 lb)   SpO2 100%   BMI 25.24 kg/m        Physical Exam     GENERAL APPEARANCE: healthy, alert and no distress     EYES: EOMI, PERRL     HENT: ear canals and TM's normal and nose and mouth without ulcers or lesions     NECK: no adenopathy, no asymmetry, masses, or scars and thyroid normal to palpation     RESP: lungs clear to auscultation - no rales, rhonchi or wheezes     CV: regular rates and rhythm, normal S1 S2, no S3 or S4 and no murmur, click or rub     ABDOMEN:  soft, nontender, no HSM or masses and bowel sounds normal     MS: extremities normal- no gross deformities noted, no evidence of inflammation in joints, FROM in all extremities.     SKIN: no suspicious lesions or rashes     NEURO: Normal strength and tone, sensory exam grossly normal, mentation intact and speech normal     PSYCH: mentation appears normal. and affect normal/bright     LYMPHATICS: No cervical adenopathy    Recent Labs   Lab Test 01/14/21  1228 12/30/20  1239 10/28/20  1250 07/16/20  1132   HGB 13.3 13.5 14.0 14.0    195 206 219   NA  --   --  140  --    POTASSIUM  --   --  4.1  --    CR 0.66  --  0.63 0.66   A1C  --   --   --  5.1        Diagnostics:  Labs pending at this time.  Results will be reviewed when available.   No EKG required, no history of coronary heart disease, " significant arrhythmia, peripheral arterial disease or other structural heart disease.    Revised Cardiac Risk Index (RCRI):  The patient has the following serious cardiovascular risks for perioperative complications:   - No serious cardiac risks = 0 points     RCRI Interpretation: 0 points: Class I (very low risk - 0.4% complication rate)      Assessment & Plan     The proposed surgical procedure is considered LOW risk.    Encounter for surveillance of other contraceptive      Pre-op exam    - Hemoglobin; Future  - Potassium; Future    Risks and Recommendations:  The patient has the following additional risks and recommendations for perioperative complications:   - No identified additional risk factors other than previously addressed    Medication Instructions:  Patient is to hold all scheduled medications on the day of surgery.    RECOMMENDATION:  APPROVAL GIVEN to proceed with proposed procedure, without further diagnostic evaluation.    Signed Electronically by: HARVEY Benitez CNP    Copy of this evaluation report is provided to requesting physician.

## 2021-03-31 NOTE — H&P (VIEW-ONLY)
[facility] NURSE PRACTITIONER'S CLINIC [address]  [address] [facility] [address]  Phone: [phone]  Fax: [phone]  Primary Provider: Brandy Rouse  Pre-op Performing Provider: JOYCE WATERS    PREOPERATIVE EVALUATION:  Today's date: 3/31/2021    Heather Guillory is a 40 year old female who presents for a preoperative evaluation.    Surgical Information:  Surgery/Procedure: SALPINGECTOMY, LAPAROSCOPIC BILATERAL  Surgery Location: [facility]  Surgeon: Dr. Mansfield  Surgery Date: 4/23/21  Time of Surgery: 12:30pm  Where patient plans to recover: At home with family  Fax number for surgical facility: Note does not need to be faxed, will be available electronically in Epic.    Type of Anesthesia Anticipated: General    Subjective     HPI related to upcoming procedure: 40 year old healthy woman desiring a permanent type of birth control.     Preop Questions 3/29/2021   1. Have you ever had a heart attack or stroke? No   2. Have you ever had surgery on your heart or blood vessels, such as a stent placement, a coronary artery bypass, or surgery on an artery in your head, neck, heart, or legs? YES - had some veins removed left leg after dilation from groin to calf.    3. Do you have chest pain with activity? No   4. Do you have a history of  heart failure? No   5. Do you currently have a cold, bronchitis or symptoms of other infection? No   6. Do you have a cough, shortness of breath, or wheezing? No   7. Do you or anyone in your family have previous history of blood clots? No   8. Do you or does anyone in your family have a serious bleeding problem such as prolonged bleeding following surgeries or cuts? No   9. Have you ever had problems with anemia or been told to take iron pills? No   10. Have you had any abnormal blood loss such as black, tarry or bloody stools, or abnormal vaginal bleeding? No   11. Have you ever had a blood transfusion? No   12. Are you willing to  have a blood transfusion if it is medically needed before, during, or after your surgery? Yes   13. Have you or any of your relatives ever had problems with anesthesia? UNKNOWN    14. Do you have sleep apnea, excessive snoring or daytime drowsiness? No   15. Do you have any artifical heart valves or other implanted medical devices like a pacemaker, defibrillator, or continuous glucose monitor? No   16. Do you have artificial joints? No   17. Are you allergic to latex? YES   18. Is there any chance that you may be pregnant? No       Health Care Directive:  Patient does not have a Health Care Directive or Living Will: Discussed advance care planning with patient; however, patient declined at this time.    Preoperative Review of :   reviewed - no record of controlled substances prescribed.      Status of Chronic Conditions:  See problem list for active medical problems.  Problems all longstanding and stable, except as noted/documented.  See ROS for pertinent symptoms related to these conditions.      Review of Systems  Constitutional, neuro, ENT, endocrine, pulmonary, cardiac, gastrointestinal, genitourinary, musculoskeletal, integument and psychiatric systems are negative, except as otherwise noted.    Patient Active Problem List    Diagnosis Date Noted     Encounter for sterilization 12/30/2020     Priority: Medium     Added automatically from request for surgery [id number]       Varicose veins of left lower extremity with pain 10/29/2019     Priority: Medium     JAVIER (generalized anxiety disorder)      Priority: Medium     has a Rx for Zoloft; not using currently       Undifferentiated connective tissue disease (H)      Priority: Medium     Rosacea      Priority: Medium      Past Medical History:   Diagnosis Date     JAVIER (generalized anxiety disorder)     50mg zoloft     Rosacea      Undifferentiated connective tissue disease (H)      Past Surgical History:   Procedure Laterality Date     COLONOSCOPY N/A  10/21/2020    Procedure: COLONOSCOPY;  Surgeon: Yang Pete MD;  Location: [facility] OR     IR ENDOVENOUS ABLATION VARICOSE VEINS  10/28/2019     IR FOLLOW UP VISIT OUTPATIENT  11/20/2019     IR STAB PHLEBECTOMY 10 - 20 STABS  10/28/2019     IR VEIN SCLEROSING MULTIPLE  10/28/2019     OPERATIVE HYSTEROSCOPY WITH MORCELLATOR  4/8/2014    Procedure: OPERATIVE HYSTEROSCOPY WITH MORCELLATOR;  Hysteroscopic Polypectomy;  Surgeon: Cate Mansfield MD;  Location: [facility]     Current Outpatient Medications   Medication Sig Dispense Refill     hydroxychloroquine (PLAQUENIL) 200 MG tablet Take 1 tablet (200 mg) by mouth 2 times daily 180 tablet 3     lactobacillus rhamnosus, GG, (CULTURELL) capsule Take 1 capsule by mouth 2 times daily       levonorgestrel (MIRENA, 52 MG,) 20 MCG/24HR IUD 1 each (20 mcg) by Intrauterine route once for 1 dose 1 each 0     metroNIDAZOLE (METROCREAM) 0.75 % external cream Apply topically 2 times daily       nitroFURantoin macrocrystal (MACRODANTIN) 50 MG capsule Take 1 capsule (50 mg) PO as needed after sexual activity for UTI prevention. 30 capsule 3     Prenatal Vit-Fe Fumarate-FA (PRENATAL VITAMINS) 28-0.8 MG TABS Take 1 tablet by mouth daily        sertraline (ZOLOFT) 50 MG tablet Take 1 tablet (50 mg) by mouth daily 90 tablet 3     vitamin D3 (CHOLECALCIFEROL) 50 mcg (2000 units) tablet Take 1 tablet by mouth daily         Allergies   Allergen Reactions     Benzoyl Peroxide Swelling     Duricef [Cefadroxil] Unknown     Monistat [Miconazole] Swelling     Sulfa Drugs Rash        Social History     Tobacco Use     Smoking status: Never Smoker     Smokeless tobacco: Never Used   Substance Use Topics     Alcohol use: No     Alcohol/week: 0.0 standard drinks     Family History   Problem Relation Age of Onset     Hypertension Paternal Grandfather      Lung Cancer Paternal Grandfather         smoker     Diabetes Type 2  Paternal Grandfather      Breast Cancer Paternal Grandmother       "Hypertension Paternal Grandmother      Colon Cancer Maternal Grandfather      Alzheimer Disease Maternal Grandmother      Cervical Cancer Maternal Grandmother      Arrhythmia Brother         details unknown; procedure in his 20s     Anxiety Disorder Brother      Elijah Disease Sister      Myocardial Infarction No family hx of      Cerebrovascular Disease No family hx of      Coronary Artery Disease Early Onset No family hx of      Ovarian Cancer No family hx of      History   Drug Use No         Objective   /81 (BP Location: Right arm, Patient Position: Sitting, Cuff Size: Adult Regular)   Pulse 65   Resp 16   Ht 1.727 m (5' 8\")   Wt 75.3 kg (166 lb)   SpO2 100%   BMI 25.24 kg/m        Physical Exam     GENERAL APPEARANCE: healthy, alert and no distress     EYES: EOMI, PERRL     HENT: ear canals and TM's normal and nose and mouth without ulcers or lesions     NECK: no adenopathy, no asymmetry, masses, or scars and thyroid normal to palpation     RESP: lungs clear to auscultation - no rales, rhonchi or wheezes     CV: regular rates and rhythm, normal S1 S2, no S3 or S4 and no murmur, click or rub     ABDOMEN:  soft, nontender, no HSM or masses and bowel sounds normal     MS: extremities normal- no gross deformities noted, no evidence of inflammation in joints, FROM in all extremities.     SKIN: no suspicious lesions or rashes     NEURO: Normal strength and tone, sensory exam grossly normal, mentation intact and speech normal     PSYCH: mentation appears normal. and affect normal/bright     LYMPHATICS: No cervical adenopathy    Recent Labs   Lab Test 01/14/21  1228 12/30/20  1239 10/28/20  1250 07/16/20  1132   HGB 13.3 13.5 14.0 14.0    195 206 219   NA  --   --  140  --    POTASSIUM  --   --  4.1  --    CR 0.66  --  0.63 0.66   A1C  --   --   --  5.1        Diagnostics:  Labs pending at this time.  Results will be reviewed when available.   No EKG required, no history of coronary heart disease, " significant arrhythmia, peripheral arterial disease or other structural heart disease.    Revised Cardiac Risk Index (RCRI):  The patient has the following serious cardiovascular risks for perioperative complications:   - No serious cardiac risks = 0 points     RCRI Interpretation: 0 points: Class I (very low risk - 0.4% complication rate)      Assessment & Plan     The proposed surgical procedure is considered LOW risk.    Encounter for surveillance of other contraceptive      Pre-op exam    - Hemoglobin; Future  - Potassium; Future    Risks and Recommendations:  The patient has the following additional risks and recommendations for perioperative complications:   - No identified additional risk factors other than previously addressed    Medication Instructions:  Patient is to hold all scheduled medications on the day of surgery.    RECOMMENDATION:  APPROVAL GIVEN to proceed with proposed procedure, without further diagnostic evaluation.    Signed Electronically by: HARVEY Benitez CNP    Copy of this evaluation report is provided to requesting physician.

## 2021-04-08 ENCOUNTER — PRE VISIT (OUTPATIENT)
Dept: UROLOGY | Facility: CLINIC | Age: 41
End: 2021-04-08

## 2021-04-08 NOTE — TELEPHONE ENCOUNTER
Reason for visit: cystoscopy      Relevant information: Faith    Records/imaging/labs/orders: referred by Conchis Walton    Pt called: no need for a call    At Rooming: collect a urine

## 2021-04-09 DIAGNOSIS — Z11.59 ENCOUNTER FOR SCREENING FOR OTHER VIRAL DISEASES: ICD-10-CM

## 2021-04-13 ENCOUNTER — TELEPHONE (OUTPATIENT)
Dept: UROLOGY | Facility: CLINIC | Age: 41
End: 2021-04-13

## 2021-04-13 NOTE — TELEPHONE ENCOUNTER
[facility] Call Center    Phone Message    May a detailed message be left on voicemail: yes     Reason for Call: Other: Pt would like a call back as she has an appt for cyst but started her monthly and would like to know if she can still be seen     Action Taken: Message routed to:  [facility] Urology    Travel Screening: Not Applicable

## 2021-04-14 ENCOUNTER — TELEPHONE (OUTPATIENT)
Dept: UROLOGY | Facility: CLINIC | Age: 41
End: 2021-04-14

## 2021-04-14 NOTE — TELEPHONE ENCOUNTER
THis is a transfer from [facility] Walton for cysto due to urinary tract infections I tried calling her unable to speak to here but are you ok doing cysto tomorrow Verna Parra LPN Staff Nurse

## 2021-04-14 NOTE — TELEPHONE ENCOUNTER
Patient called and has her menses and scheduled for cysto tomorrow and told her if she is uncomfortable, crampy and a lot of drainage should come reschedule.  Message sent to Belén Parra LPN Staff Nurse

## 2021-04-15 ENCOUNTER — OFFICE VISIT (OUTPATIENT)
Dept: UROLOGY | Facility: CLINIC | Age: 41
End: 2021-04-15
Payer: COMMERCIAL

## 2021-04-15 VITALS
BODY MASS INDEX: 25.11 KG/M2 | HEART RATE: 85 BPM | DIASTOLIC BLOOD PRESSURE: 74 MMHG | WEIGHT: 160 LBS | SYSTOLIC BLOOD PRESSURE: 105 MMHG | HEIGHT: 67 IN

## 2021-04-15 DIAGNOSIS — M79.18 MYALGIA OF PELVIC FLOOR: ICD-10-CM

## 2021-04-15 DIAGNOSIS — N39.0 RECURRENT UTI: ICD-10-CM

## 2021-04-15 DIAGNOSIS — R30.0 DYSURIA: ICD-10-CM

## 2021-04-15 DIAGNOSIS — N34.1 NONGONOCOCCAL URETHRITIS DUE TO UREAPLASMA UREALYTICUM: ICD-10-CM

## 2021-04-15 DIAGNOSIS — R35.0 URINARY FREQUENCY: ICD-10-CM

## 2021-04-15 DIAGNOSIS — N39.46 MIXED INCONTINENCE: ICD-10-CM

## 2021-04-15 DIAGNOSIS — M62.89 PELVIC FLOOR DYSFUNCTION: ICD-10-CM

## 2021-04-15 DIAGNOSIS — R39.15 URINARY URGENCY: ICD-10-CM

## 2021-04-15 DIAGNOSIS — R10.9 RIGHT FLANK PAIN: Primary | ICD-10-CM

## 2021-04-15 DIAGNOSIS — A49.3 NONGONOCOCCAL URETHRITIS DUE TO UREAPLASMA UREALYTICUM: ICD-10-CM

## 2021-04-15 PROCEDURE — 52000 CYSTOURETHROSCOPY: CPT | Performed by: UROLOGY

## 2021-04-15 RX ORDER — LIDOCAINE HYDROCHLORIDE 20 MG/ML
JELLY TOPICAL ONCE
Status: DISCONTINUED | OUTPATIENT
Start: 2021-04-15 | End: 2022-02-09

## 2021-04-15 ASSESSMENT — MIFFLIN-ST. JEOR: SCORE: 1438.39

## 2021-04-15 ASSESSMENT — PAIN SCALES - GENERAL: PAINLEVEL: NO PAIN (0)

## 2021-04-15 NOTE — LETTER
"4/15/2021       RE: Heather Guillory  [address]  [place] [address]     Dear Colleague,    Thank you for referring your patient, Heather Guillory, to the [facility] [place] [facility]. Please see a copy of my visit note below.    April 15, 2021    Return visit    Patient returns today for follow up She denies any changes in her health since last visit.    /74   Pulse 85   Ht 1.702 m (5' 7\")   Wt 72.6 kg (160 lb)   BMI 25.06 kg/m    She is comfortable, in no distress, non-labored breathing.  Abdomen is soft, non-tender, non-distended.  Normal external female genitalia.  Negative CST.  Pelvic exam is remarkable for diffuse myofascial tenderness of the pelvic floor.    Cystoscopy Note: After informed consent was obtained patient was prepped and draped in the standard fashion.  The flexible cystoscope was inserted into a normal appearing urethral meatus.  The urothelium was carefully examined and there were no tumors, masses, stones, foreign bodies, or other urothelial abnormalities noted.  Bilateral ureteral orifices were noted in the normal orthotopic position and both effluxed clear urine.  The cystoscope was retroflexed and the bladder neck was unremarkable.  The urethra was carefully examined upon removing the cystoscope and was unremarkable.  Patient tolerated the procedure without complications noted.      A/P: 40 year old F with history of UTI, ureaplasma, urinary urgency frequency, mixed incontinence, myofascial tenderness of the pelvic floor and pelvic floor dysfunction    We discussed how her pelvic floor symptoms are related to the physical exam findings and her pelvic floor myofascial dysfunction.  We discussed how the recommended treatment is dedicated pelvic floor therapy.  We discussed how the pelvic floor physical therapy works and patient is agreeable.  Referral was placed.      RTC 6 months, sooner if " needed    Mikki Melissa MD MPH   of Urology    CC  Patient Care Team:  Brandy Rouse MD as PCP - General (Internal Medicine)  Dalila Shearer CNM as Midwife (Midwives)  Eva Alexis NP as Nurse Practitioner (Family Practice)  Jonathan Headley MD as Assigned Heart and Vascular Provider  Nasrin King MD as Assigned Rheumatology Provider  Cate Mansfield MD as Assigned OBGYN Provider  Conchis Walton PA-C as Physician Assistant (Urology)  Tiana Avila, CYNTHIA as Specialty Care Coordinator (Urology)  Brandy Rouse MD as Assigned PCP  Conchis Walton PA-C as Assigned Surgical Provider  MIKKI MELISSA

## 2021-04-15 NOTE — PATIENT INSTRUCTIONS
"Websites with free information:    [employer] patient website: www.voicesforpfd.org    Total Control Program: www.totalcontrolprogram.com    Supplements to prevent UTI to consider  -Probiotics  -Cranberry (for these products let them know a doctor is recommending them)   Ellura: www.myellura.Sapheneia   Theracran HP by [employer] [facility] [address]  -d-mannose  -Vitamin C 500-1000mg twice a day    Please see one of the dedicated pelvic floor physical therapist ([facility] [phone])    Please return to see me in 6 months, sooner if needed    It was a pleasure meeting with you today.  Thank you for allowing me and my team the privilege of caring for you today.  YOU are the reason we are here, and I truly hope we provided you with the excellent service you deserve.  Please let us know if there is anything else we can do for you so that we can be sure you are leaving completely satisfied with your care experience.      AFTER YOUR CYSTOSCOPY        You have just completed a cystoscopy, or \"cysto\", which allowed your physician to learn more about your bladder (or to remove a stent placed after surgery). We suggest that you continue to avoid caffeine, fruit juice, and alcohol for the next 24 hours, however, you are encouraged to return to your normal activities.         A few things that are considered normal after your cystoscopy:     * Small amount of bleeding (or spotting) that clears within the next 24 hours     * Slight burning sensation with urination     * Sensation to of needing to avoid more frequently     * The feeling of \"air\" in your urine     * Mild discomfort that is relieved with Tylenol        Please contact our office promptly if you:     * Develop a fever above 101 degrees     * Are unable to urinate     * Develop bright red blood that does not stop     * Severe pain or swelling         Please contact our office with any concerns or questions @[phone]  "

## 2021-04-15 NOTE — NURSING NOTE
"Chief Complaint   Patient presents with     Cystoscopy       Blood pressure 105/74, pulse 85, height 1.702 m (5' 7\"), weight 72.6 kg (160 lb), not currently breastfeeding. Body mass index is 25.06 kg/m .    Patient Active Problem List   Diagnosis     JAVIER (generalized anxiety disorder)     Undifferentiated connective tissue disease (H)     Rosacea     Varicose veins of left lower extremity with pain     Encounter for sterilization       Allergies   Allergen Reactions     Benzoyl Peroxide Swelling     Duricef [Cefadroxil] Unknown     Monistat [Miconazole] Swelling     Sulfa Drugs Rash       Current Outpatient Medications   Medication Sig Dispense Refill     hydroxychloroquine (PLAQUENIL) 200 MG tablet Take 1 tablet (200 mg) by mouth 2 times daily 180 tablet 3     metroNIDAZOLE (METROCREAM) 0.75 % external cream Apply topically 2 times daily       nitroFURantoin macrocrystal (MACRODANTIN) 50 MG capsule Take 1 capsule (50 mg) PO as needed after sexual activity for UTI prevention. 30 capsule 3     sertraline (ZOLOFT) 50 MG tablet Take 1 tablet (50 mg) by mouth daily 90 tablet 3     vitamin D3 (CHOLECALCIFEROL) 50 mcg (2000 units) tablet Take 1 tablet by mouth daily       lactobacillus rhamnosus, GG, (CULTURELL) capsule Take 1 capsule by mouth 2 times daily       levonorgestrel (MIRENA, 52 MG,) 20 MCG/24HR IUD 1 each (20 mcg) by Intrauterine route once for 1 dose 1 each 0     Prenatal Vit-Fe Fumarate-FA (PRENATAL VITAMINS) 28-0.8 MG TABS Take 1 tablet by mouth daily          Social History     Tobacco Use     Smoking status: Never Smoker     Smokeless tobacco: Never Used   Substance Use Topics     Alcohol use: No     Alcohol/week: 0.0 standard drinks     Drug use: No       Invasive Procedure Safety Checklist:    Procedure: Cystoscopy    Action: Complete sections and checkboxes as appropriate.    Pre-procedure:  1. Patient ID Verified with 2 identifiers (Kavita and  or MRN) : YES    2. Procedure and site verified with " patient/designee (when able) : YES    3. Accurate consent documentation in medical record : YES    4. H&P (or appropriate assessment) documented in medical record : N/A  H&P must be up to 30 days prior to procedure an updated within 24 hours of                 Procedure as applicable.     5. Relevant diagnostic and radiology test results appropriately labeled and displayed as applicable : YES    6. Blood products, implants, devices, and/or special equipment available for the procedure as applicable : YES    7. Procedure site(s) marked with provider initials [Exclusions: none] : NO    8. Marking not required. Reason : Yes  Procedure does not require site marking    Time Out:     Time-Out performed immediately prior to starting procedure, including verbal and active participation of all team members addressing: YES    1. Correct patient identity.  2. Confirmed that the correct side and site are marked.  3. An accurate procedure to be done.  4. Agreement on the procedure to be done.  5. Correct patient position.  6. Relevant images and results are properly labeled and appropriately displayed.  7. The need to administer antibiotics or fluids for irrigation purposes during the procedure as applicable.  8. Safety precautions based on patient history or medication use.    During Procedure: Verification of correct person, site, and procedure occurs any time the responsibility for care of the patient is transferred to another member of the care team.    The following medication was given:     MEDICATION:  Lidocaine without epinephrine 2% jelly  ROUTE: urethral   SITE: urethral   DOSE: 10 mL  LOT #: [id number]  : [employer]  EXPIRATION DATE: 11-22  NDC#: [address]-7   Was there drug waste? No    Prior to med admin, verified patient identity using patient's name and date of birth.  Due to med administration, patient instructed to remain in clinic for 15 minutes  afterwards, and to report  any adverse reaction to me immediately.    Drug Amount Wasted:  None.  Vial/Syringe: Syringe      Leonardoia Florian  4/15/2021  10:28 AM

## 2021-04-15 NOTE — PROGRESS NOTES
"April 15, 2021    Return visit    Patient returns today for follow up She denies any changes in her health since last visit.    /74   Pulse 85   Ht 1.702 m (5' 7\")   Wt 72.6 kg (160 lb)   BMI 25.06 kg/m    She is comfortable, in no distress, non-labored breathing.  Abdomen is soft, non-tender, non-distended.  Normal external female genitalia.  Negative CST.  Pelvic exam is remarkable for diffuse myofascial tenderness of the pelvic floor.    Cystoscopy Note: After informed consent was obtained patient was prepped and draped in the standard fashion.  The flexible cystoscope was inserted into a normal appearing urethral meatus.  The urothelium was carefully examined and there were no tumors, masses, stones, foreign bodies, or other urothelial abnormalities noted.  Bilateral ureteral orifices were noted in the normal orthotopic position and both effluxed clear urine.  The cystoscope was retroflexed and the bladder neck was unremarkable.  The urethra was carefully examined upon removing the cystoscope and was unremarkable.  Patient tolerated the procedure without complications noted.      A/P: 40 year old F with history of UTI, ureaplasma, urinary urgency frequency, mixed incontinence, myofascial tenderness of the pelvic floor and pelvic floor dysfunction    We discussed how her pelvic floor symptoms are related to the physical exam findings and her pelvic floor myofascial dysfunction.  We discussed how the recommended treatment is dedicated pelvic floor therapy.  We discussed how the pelvic floor physical therapy works and patient is agreeable.  Referral was placed.      RTC 6 months, sooner if needed    Radha Melissa MD MPH   of Urology    CC  Patient Care Team:  Brandy Rouse MD as PCP - General (Internal Medicine)  Dalila Shearer CNM as Midwife (Midwives)  Eva Alexis NP as Nurse Practitioner (Family Practice)  Jonathan Headley MD as Assigned Heart and " Vascular Provider  Nasrin King MD as Assigned Rheumatology Provider  Cate Mansfield MD as Assigned OBGYN Provider  Conchis Walton PA-C as Physician Assistant (Urology)  Tiana Avila, CYNTHIA as Specialty Care Coordinator (Urology)  Brandy Rouse MD as Assigned PCP  Conchis Walton PA-C as Assigned Surgical Provider  MIKKI VIVAS

## 2021-04-17 ENCOUNTER — ANESTHESIA EVENT (OUTPATIENT)
Dept: SURGERY | Facility: CLINIC | Age: 41
End: 2021-04-17
Payer: COMMERCIAL

## 2021-04-20 DIAGNOSIS — Z11.59 ENCOUNTER FOR SCREENING FOR OTHER VIRAL DISEASES: ICD-10-CM

## 2021-04-20 LAB
LABORATORY COMMENT REPORT: NORMAL
SARS-COV-2 RNA RESP QL NAA+PROBE: NEGATIVE
SARS-COV-2 RNA RESP QL NAA+PROBE: NORMAL
SPECIMEN SOURCE: NORMAL
SPECIMEN SOURCE: NORMAL

## 2021-04-20 PROCEDURE — U0005 INFEC AGEN DETEC AMPLI PROBE: HCPCS | Performed by: OBSTETRICS & GYNECOLOGY

## 2021-04-20 PROCEDURE — U0003 INFECTIOUS AGENT DETECTION BY NUCLEIC ACID (DNA OR RNA); SEVERE ACUTE RESPIRATORY SYNDROME CORONAVIRUS 2 (SARS-COV-2) (CORONAVIRUS DISEASE [COVID-19]), AMPLIFIED PROBE TECHNIQUE, MAKING USE OF HIGH THROUGHPUT TECHNOLOGIES AS DESCRIBED BY CMS-2020-01-R: HCPCS | Performed by: OBSTETRICS & GYNECOLOGY

## 2021-04-23 ENCOUNTER — ANESTHESIA (OUTPATIENT)
Dept: SURGERY | Facility: CLINIC | Age: 41
End: 2021-04-23
Payer: COMMERCIAL

## 2021-04-23 ENCOUNTER — HOSPITAL ENCOUNTER (OUTPATIENT)
Facility: CLINIC | Age: 41
Discharge: HOME | End: 2021-04-23
Attending: OBSTETRICS & GYNECOLOGY | Admitting: OBSTETRICS & GYNECOLOGY
Payer: COMMERCIAL

## 2021-04-23 VITALS
WEIGHT: 170.86 LBS | SYSTOLIC BLOOD PRESSURE: 107 MMHG | RESPIRATION RATE: 18 BRPM | HEART RATE: 77 BPM | DIASTOLIC BLOOD PRESSURE: 64 MMHG | BODY MASS INDEX: 26.82 KG/M2 | HEIGHT: 67 IN | OXYGEN SATURATION: 99 % | TEMPERATURE: 98.4 F

## 2021-04-23 DIAGNOSIS — Z30.2 ENCOUNTER FOR STERILIZATION: ICD-10-CM

## 2021-04-23 LAB
ABO + RH BLD: NORMAL
ABO + RH BLD: NORMAL
B-HCG SERPL-ACNC: <1 IU/L (ref 0–5)
BLD GP AB SCN SERPL QL: NORMAL
BLOOD BANK CMNT PATIENT-IMP: NORMAL
ERYTHROCYTE [DISTWIDTH] IN BLOOD BY AUTOMATED COUNT: 11.9 % (ref 10–15)
GLUCOSE BLDC GLUCOMTR-MCNC: 96 MG/DL (ref 70–99)
HCT VFR BLD AUTO: 39 % (ref 35–47)
HGB BLD-MCNC: 13.1 G/DL (ref 11.7–15.7)
MCH RBC QN AUTO: 29.4 PG (ref 26.5–33)
MCHC RBC AUTO-ENTMCNC: 33.6 G/DL (ref 31.5–36.5)
MCV RBC AUTO: 88 FL (ref 78–100)
PLATELET # BLD AUTO: 195 10E9/L (ref 150–450)
RBC # BLD AUTO: 4.45 10E12/L (ref 3.8–5.2)
SPECIMEN EXP DATE BLD: NORMAL
WBC # BLD AUTO: 5.5 10E9/L (ref 4–11)

## 2021-04-23 PROCEDURE — 88304 TISSUE EXAM BY PATHOLOGIST: CPT | Mod: TC | Performed by: OBSTETRICS & GYNECOLOGY

## 2021-04-23 PROCEDURE — 36415 COLL VENOUS BLD VENIPUNCTURE: CPT | Performed by: OBSTETRICS & GYNECOLOGY

## 2021-04-23 PROCEDURE — 250N000009 HC RX 250: Performed by: NURSE ANESTHETIST, CERTIFIED REGISTERED

## 2021-04-23 PROCEDURE — 86901 BLOOD TYPING SEROLOGIC RH(D): CPT | Performed by: ANESTHESIOLOGY

## 2021-04-23 PROCEDURE — 250N000025 HC SEVOFLURANE, PER MIN: Performed by: OBSTETRICS & GYNECOLOGY

## 2021-04-23 PROCEDURE — 250N000011 HC RX IP 250 OP 636: Performed by: NURSE ANESTHETIST, CERTIFIED REGISTERED

## 2021-04-23 PROCEDURE — 360N000076 HC SURGERY LEVEL 3, PER MIN: Performed by: OBSTETRICS & GYNECOLOGY

## 2021-04-23 PROCEDURE — 999N000141 HC STATISTIC PRE-PROCEDURE NURSING ASSESSMENT: Performed by: OBSTETRICS & GYNECOLOGY

## 2021-04-23 PROCEDURE — 250N000011 HC RX IP 250 OP 636: Performed by: OBSTETRICS & GYNECOLOGY

## 2021-04-23 PROCEDURE — 250N000009 HC RX 250: Performed by: ANESTHESIOLOGY

## 2021-04-23 PROCEDURE — 250N000011 HC RX IP 250 OP 636: Performed by: ANESTHESIOLOGY

## 2021-04-23 PROCEDURE — 84702 CHORIONIC GONADOTROPIN TEST: CPT | Performed by: OBSTETRICS & GYNECOLOGY

## 2021-04-23 PROCEDURE — 86900 BLOOD TYPING SEROLOGIC ABO: CPT | Performed by: ANESTHESIOLOGY

## 2021-04-23 PROCEDURE — 82962 GLUCOSE BLOOD TEST: CPT

## 2021-04-23 PROCEDURE — 710N000010 HC RECOVERY PHASE 1, LEVEL 2, PER MIN: Performed by: OBSTETRICS & GYNECOLOGY

## 2021-04-23 PROCEDURE — 272N000001 HC OR GENERAL SUPPLY STERILE: Performed by: OBSTETRICS & GYNECOLOGY

## 2021-04-23 PROCEDURE — 88302 TISSUE EXAM BY PATHOLOGIST: CPT | Mod: 26 | Performed by: PATHOLOGY

## 2021-04-23 PROCEDURE — 36415 COLL VENOUS BLD VENIPUNCTURE: CPT | Performed by: ANESTHESIOLOGY

## 2021-04-23 PROCEDURE — 370N000017 HC ANESTHESIA TECHNICAL FEE, PER MIN: Performed by: OBSTETRICS & GYNECOLOGY

## 2021-04-23 PROCEDURE — 86850 RBC ANTIBODY SCREEN: CPT | Performed by: ANESTHESIOLOGY

## 2021-04-23 PROCEDURE — 258N000003 HC RX IP 258 OP 636: Performed by: NURSE ANESTHETIST, CERTIFIED REGISTERED

## 2021-04-23 PROCEDURE — 710N000012 HC RECOVERY PHASE 2, PER MINUTE: Performed by: OBSTETRICS & GYNECOLOGY

## 2021-04-23 PROCEDURE — 85027 COMPLETE CBC AUTOMATED: CPT | Performed by: OBSTETRICS & GYNECOLOGY

## 2021-04-23 RX ORDER — ONDANSETRON 4 MG/1
4 TABLET, ORALLY DISINTEGRATING ORAL EVERY 30 MIN PRN
Status: DISCONTINUED | OUTPATIENT
Start: 2021-04-23 | End: 2021-04-23 | Stop reason: HOSPADM

## 2021-04-23 RX ORDER — DEXAMETHASONE SODIUM PHOSPHATE 4 MG/ML
INJECTION, SOLUTION INTRA-ARTICULAR; INTRALESIONAL; INTRAMUSCULAR; INTRAVENOUS; SOFT TISSUE PRN
Status: DISCONTINUED | OUTPATIENT
Start: 2021-04-23 | End: 2021-04-23

## 2021-04-23 RX ORDER — NALOXONE HYDROCHLORIDE 0.4 MG/ML
0.4 INJECTION, SOLUTION INTRAMUSCULAR; INTRAVENOUS; SUBCUTANEOUS
Status: DISCONTINUED | OUTPATIENT
Start: 2021-04-23 | End: 2021-04-23 | Stop reason: HOSPADM

## 2021-04-23 RX ORDER — METOPROLOL TARTRATE 1 MG/ML
1-2 INJECTION, SOLUTION INTRAVENOUS EVERY 5 MIN PRN
Status: DISCONTINUED | OUTPATIENT
Start: 2021-04-23 | End: 2021-04-23 | Stop reason: HOSPADM

## 2021-04-23 RX ORDER — OXYCODONE HYDROCHLORIDE 5 MG/1
5 TABLET ORAL EVERY 6 HOURS PRN
Qty: 5 TABLET | Refills: 0 | Status: SHIPPED | OUTPATIENT
Start: 2021-04-23 | End: 2021-09-14

## 2021-04-23 RX ORDER — SODIUM CHLORIDE, SODIUM LACTATE, POTASSIUM CHLORIDE, CALCIUM CHLORIDE 600; 310; 30; 20 MG/100ML; MG/100ML; MG/100ML; MG/100ML
INJECTION, SOLUTION INTRAVENOUS CONTINUOUS
Status: DISCONTINUED | OUTPATIENT
Start: 2021-04-23 | End: 2021-04-23 | Stop reason: HOSPADM

## 2021-04-23 RX ORDER — HYDRALAZINE HYDROCHLORIDE 20 MG/ML
2.5-5 INJECTION INTRAMUSCULAR; INTRAVENOUS EVERY 10 MIN PRN
Status: DISCONTINUED | OUTPATIENT
Start: 2021-04-23 | End: 2021-04-23 | Stop reason: HOSPADM

## 2021-04-23 RX ORDER — PROPOFOL 10 MG/ML
INJECTION, EMULSION INTRAVENOUS PRN
Status: DISCONTINUED | OUTPATIENT
Start: 2021-04-23 | End: 2021-04-23

## 2021-04-23 RX ORDER — ACETAMINOPHEN 325 MG/1
975 TABLET ORAL EVERY 6 HOURS PRN
Qty: 50 TABLET | Refills: 0 | Status: SHIPPED | OUTPATIENT
Start: 2021-04-23 | End: 2021-09-14

## 2021-04-23 RX ORDER — BUPIVACAINE HYDROCHLORIDE 2.5 MG/ML
INJECTION, SOLUTION INFILTRATION; PERINEURAL PRN
Status: DISCONTINUED | OUTPATIENT
Start: 2021-04-23 | End: 2021-04-23 | Stop reason: HOSPADM

## 2021-04-23 RX ORDER — NALOXONE HYDROCHLORIDE 0.4 MG/ML
0.2 INJECTION, SOLUTION INTRAMUSCULAR; INTRAVENOUS; SUBCUTANEOUS
Status: DISCONTINUED | OUTPATIENT
Start: 2021-04-23 | End: 2021-04-23 | Stop reason: HOSPADM

## 2021-04-23 RX ORDER — SCOLOPAMINE TRANSDERMAL SYSTEM 1 MG/1
1 PATCH, EXTENDED RELEASE TRANSDERMAL ONCE
Status: DISCONTINUED | OUTPATIENT
Start: 2021-04-23 | End: 2021-04-23 | Stop reason: HOSPADM

## 2021-04-23 RX ORDER — ONDANSETRON 2 MG/ML
4 INJECTION INTRAMUSCULAR; INTRAVENOUS EVERY 30 MIN PRN
Status: DISCONTINUED | OUTPATIENT
Start: 2021-04-23 | End: 2021-04-23 | Stop reason: HOSPADM

## 2021-04-23 RX ORDER — ONDANSETRON 2 MG/ML
INJECTION INTRAMUSCULAR; INTRAVENOUS PRN
Status: DISCONTINUED | OUTPATIENT
Start: 2021-04-23 | End: 2021-04-23

## 2021-04-23 RX ORDER — SODIUM CHLORIDE, SODIUM LACTATE, POTASSIUM CHLORIDE, CALCIUM CHLORIDE 600; 310; 30; 20 MG/100ML; MG/100ML; MG/100ML; MG/100ML
INJECTION, SOLUTION INTRAVENOUS CONTINUOUS PRN
Status: DISCONTINUED | OUTPATIENT
Start: 2021-04-23 | End: 2021-04-23

## 2021-04-23 RX ORDER — FENTANYL CITRATE 50 UG/ML
25-50 INJECTION, SOLUTION INTRAMUSCULAR; INTRAVENOUS
Status: DISCONTINUED | OUTPATIENT
Start: 2021-04-23 | End: 2021-04-23 | Stop reason: HOSPADM

## 2021-04-23 RX ORDER — IBUPROFEN 800 MG/1
800 TABLET, FILM COATED ORAL EVERY 6 HOURS PRN
Qty: 30 TABLET | Refills: 0 | Status: SHIPPED | OUTPATIENT
Start: 2021-04-23 | End: 2021-09-14

## 2021-04-23 RX ORDER — KETOROLAC TROMETHAMINE 30 MG/ML
INJECTION, SOLUTION INTRAMUSCULAR; INTRAVENOUS PRN
Status: DISCONTINUED | OUTPATIENT
Start: 2021-04-23 | End: 2021-04-23

## 2021-04-23 RX ORDER — LIDOCAINE 40 MG/G
CREAM TOPICAL
Status: DISCONTINUED | OUTPATIENT
Start: 2021-04-23 | End: 2021-04-23 | Stop reason: HOSPADM

## 2021-04-23 RX ORDER — ACETAMINOPHEN 325 MG/1
975 TABLET ORAL ONCE
Status: DISCONTINUED | OUTPATIENT
Start: 2021-04-23 | End: 2021-04-23 | Stop reason: HOSPADM

## 2021-04-23 RX ORDER — FENTANYL CITRATE 50 UG/ML
INJECTION, SOLUTION INTRAMUSCULAR; INTRAVENOUS PRN
Status: DISCONTINUED | OUTPATIENT
Start: 2021-04-23 | End: 2021-04-23

## 2021-04-23 RX ORDER — PROPOFOL 10 MG/ML
INJECTION, EMULSION INTRAVENOUS CONTINUOUS PRN
Status: DISCONTINUED | OUTPATIENT
Start: 2021-04-23 | End: 2021-04-23

## 2021-04-23 RX ORDER — OXYCODONE HYDROCHLORIDE 5 MG/1
5 TABLET ORAL
Status: DISCONTINUED | OUTPATIENT
Start: 2021-04-23 | End: 2021-04-23 | Stop reason: HOSPADM

## 2021-04-23 RX ORDER — EPHEDRINE SULFATE 50 MG/ML
INJECTION, SOLUTION INTRAMUSCULAR; INTRAVENOUS; SUBCUTANEOUS PRN
Status: DISCONTINUED | OUTPATIENT
Start: 2021-04-23 | End: 2021-04-23

## 2021-04-23 RX ORDER — LIDOCAINE HYDROCHLORIDE 20 MG/ML
INJECTION, SOLUTION INFILTRATION; PERINEURAL PRN
Status: DISCONTINUED | OUTPATIENT
Start: 2021-04-23 | End: 2021-04-23

## 2021-04-23 RX ORDER — AMOXICILLIN 250 MG
1-2 CAPSULE ORAL 2 TIMES DAILY
Qty: 30 TABLET | Refills: 0 | Status: SHIPPED | OUTPATIENT
Start: 2021-04-23 | End: 2021-09-14

## 2021-04-23 RX ADMIN — ONDANSETRON 4 MG: 2 INJECTION INTRAMUSCULAR; INTRAVENOUS at 15:57

## 2021-04-23 RX ADMIN — KETOROLAC TROMETHAMINE 30 MG: 30 INJECTION, SOLUTION INTRAMUSCULAR at 13:51

## 2021-04-23 RX ADMIN — MIDAZOLAM 2 MG: 1 INJECTION INTRAMUSCULAR; INTRAVENOUS at 12:49

## 2021-04-23 RX ADMIN — LIDOCAINE HYDROCHLORIDE 80 MG: 20 INJECTION, SOLUTION INFILTRATION; PERINEURAL at 12:58

## 2021-04-23 RX ADMIN — FENTANYL CITRATE 25 MCG: 50 INJECTION INTRAMUSCULAR; INTRAVENOUS at 15:06

## 2021-04-23 RX ADMIN — PROPOFOL 25 MCG/KG/MIN: 10 INJECTION, EMULSION INTRAVENOUS at 13:03

## 2021-04-23 RX ADMIN — SUGAMMADEX 200 MG: 100 INJECTION, SOLUTION INTRAVENOUS at 14:00

## 2021-04-23 RX ADMIN — Medication 5 MG: at 13:34

## 2021-04-23 RX ADMIN — FENTANYL CITRATE 50 MCG: 50 INJECTION, SOLUTION INTRAMUSCULAR; INTRAVENOUS at 12:58

## 2021-04-23 RX ADMIN — ONDANSETRON 4 MG: 2 INJECTION INTRAMUSCULAR; INTRAVENOUS at 13:48

## 2021-04-23 RX ADMIN — DEXAMETHASONE SODIUM PHOSPHATE 6 MG: 4 INJECTION, SOLUTION INTRAMUSCULAR; INTRAVENOUS at 13:10

## 2021-04-23 RX ADMIN — ROCURONIUM BROMIDE 40 MG: 10 INJECTION INTRAVENOUS at 12:58

## 2021-04-23 RX ADMIN — SCOPALAMINE 1 PATCH: 1 PATCH, EXTENDED RELEASE TRANSDERMAL at 12:23

## 2021-04-23 RX ADMIN — FENTANYL CITRATE 50 MCG: 50 INJECTION, SOLUTION INTRAMUSCULAR; INTRAVENOUS at 13:17

## 2021-04-23 RX ADMIN — SODIUM CHLORIDE, POTASSIUM CHLORIDE, SODIUM LACTATE AND CALCIUM CHLORIDE: 600; 310; 30; 20 INJECTION, SOLUTION INTRAVENOUS at 12:49

## 2021-04-23 RX ADMIN — PROPOFOL 200 MG: 10 INJECTION, EMULSION INTRAVENOUS at 12:58

## 2021-04-23 ASSESSMENT — MIFFLIN-ST. JEOR: SCORE: 1487.63

## 2021-04-23 NOTE — OP NOTE
Gynecologic Operative Note   Name Heather Guillory  MRN [id number]   1980     Preoperative Diagnosis:   1. Desires sterilization  2. JAVIER  3. Rosacea  4. Undifferentiated connective tissue disease      Postoperative Diagnosis:   Same      Procedure:   1. Laparoscopic Bilateral Salpingectomy and Removal of IUD, Mirena     Surgeon: Cate Mansfield MD      Assist: Christo Farr MD, PGY-1     Anesthesia: GETA     Complications: None             Estimated blood loss:  5 mL  IVF: 900 mL crystalloid   Drains: None  Specimens:       ID Type Source Tests Collected by Time Destination   A :  Tissue Fallopian Tube, Bilateral SURGICAL PATHOLOGY EXAM Cate Mansfield MD 2021  1:44 PM           Findings: Normal appearing and freely mobile uterus. Normal appearing fallopian tubes and ovaries. Multiple simple appearing fallopian tube cysts bilaterally. Small 1 dm anterior fibroid near the fundus.  Abdominal survey unremarkable with normal appearing liver edge and anterior diaphragm.     Indications: Ms. Heather Guillory is a 40 year old year old female who presented for salpingectomy in the setting of infertility. The patient discussed her infetility with Dr. Mansfield and at that time notes that she had one remaining embryo for IVF and was curious about bilateral slapingectomy for increasing her change of succesful implantation and decreased ovarian cancer risk. At that time it was discussed that her bilateral salpingectomy would be unlikely to increase her odds of conceiving via IVF but that she would decrease malignancy risks with salpingectomy. A laparoscopy with salpingectomy was recommended. All risks, benefits and alternatives were discussed and written informed consent was obtained.      Procedure: The patient was taken to the operating room where she was placed in the dorsal lithotomy position with feet in yellow fin stirrups. General endotracheal anesthesia was administered. Patient safety time out was  then performed. An exam under anesthesia was performed. The patient was then prepped and draped in the usual sterile fashion. A speculum was inserted into the vagina, a single toothed tenaculum placed on the cervix at 12 o'clock, and a uterine manipulator inserted into the cervical os. The speculum was removed. Attention was then turned to the abdomen. An 11-blade scalpel was used to make a 5 mm vertical incision in the umbilicus and a Jess used to expand the incision. The Veress needle was inserted and water drop test was positive. CO2 gas was attached to the port. Pneumoperitoneum was achieved with good tympany of the abdomen. A 5 mm port was placed. Initial survey of the abdomen did not reveal any trauma. See above listed findngs. She was placed in the trendelenburg position.  Attention was turned to the LLQ of the abdomen where a site 2 cm medial and superior to the anterior superior iliac crest was noted to be free of major blood vessels and a 5 mm horizontal skin incision made. The incision was stretched with a Jess. A 5 mm port was placed under visualization within the abdomen. A 5 mm port was also placed in the RLQ of the abdomen with similar technique under visualization. Inspection of the pelvis with atraumatic graspers showed normal left & right ovaries and fallopian tubes. The left fallopian tube fimbria was grasped with an atraumatic grasper and lifted into view. The Ligasure was used to sequentially grasp, cauterize, and cut through the mesosalpinx up to the left cornua, and the entire tube was then transected and excised. The right fallopian tube was excised in a similar fashion. Both tubes were passed through the left port and handed off for pathology. Mesosalpinx was examined bilaterally and noted to be hemostatic A final visual sweep of the pelvis showed no further pathology. The ports were removed under direct visualization. The pneumoperitoneum was expelled. All skin incisions were closed with  4-0 Monocryl and covered with a sterile bandaid. The speculum was reinserted into the vagina and the uterine manipulator was removed. Good hemostasis of the cervix was noted.     Instrument, sponge, and needle counts were correct times 2. Dr. Mansfield was present and scrubbed for the entire procedure. The patient was extubated in the operating room and transferred to the PACU in stable condition.     Christo Farr MD  OBGYN PGY-1  April 23, 2021 3:58 PM    Staff:  I was scrubbed and present for entire case and agree w/ above note.    Cate Mansfield MD

## 2021-04-23 NOTE — ANESTHESIA POSTPROCEDURE EVALUATION
Patient: Heather Guillory    Procedure(s):  SALPINGECTOMY, LAPAROSCOPIC BILATERAL  removal of intauterine device    Diagnosis:Encounter for sterilization [Z30.2]  Diagnosis Additional Information: No value filed.    Anesthesia Type:  General    Note:  Disposition: Outpatient   Postop Pain Control: Uneventful            Sign Out: Well controlled pain   PONV: No   Neuro/Psych: Uneventful            Sign Out: Acceptable/Baseline neuro status   Airway/Respiratory: Uneventful            Sign Out: Acceptable/Baseline resp. status   CV/Hemodynamics: Uneventful            Sign Out: Acceptable CV status; No obvious hypovolemia; No obvious fluid overload   Other NRE: NONE   DID A NON-ROUTINE EVENT OCCUR? No           Last vitals:  Vitals:    04/23/21 1445 04/23/21 1500 04/23/21 1515   BP: (!) 89/47 97/64 106/61   Pulse: 68 67 71   Resp: 12     Temp:   36.9  C (98.4  F)   SpO2: 97% 99% 98%       Last vitals prior to Anesthesia Care Transfer:  CRNA VITALS  4/23/2021 1338 - 4/23/2021 1438      4/23/2021             Pulse:  81    SpO2:  100 %    Resp Rate (observed):  (!) 1          Electronically Signed By: Juliana Pabon MD  April 23, 2021  3:33 PM

## 2021-04-23 NOTE — BRIEF OP NOTE
[facility]    Brief Operative Note    Pre-operative diagnosis: Encounter for sterilization [Z30.2]  Post-operative diagnosis Same as pre-operative diagnosis    Procedure: Procedure(s):  SALPINGECTOMY, LAPAROSCOPIC BILATERAL  removal of intauterine device  Surgeon: Surgeon(s) and Role:     * Cate Mansfield MD - Primary     * Christo Farr MD - Resident - Assisting  Anesthesia: General   Estimated blood loss: 5 mL  IVF: 900 mL crystalloid   Drains: None  Specimens:   ID Type Source Tests Collected by Time Destination   A :  Tissue Fallopian Tube, Bilateral SURGICAL PATHOLOGY EXAM Cate Mansfield MD 4/23/2021  1:44 PM      Findings:  Speculum placed and Mirena IUD removed with ring forceps intacct through the cervical canal. Initial entry through the umbilicus with veres needle. Abdominal cavity inflated with CO2 with good tympany over the right upper quadrant. 5 mm port placed through umbilicus. Scope inserted revealing no trauma to the tissue beneath the initial entry site. Two other 5 mm ports placed in each lower quadrant of the abdomen. Normal appearing and freely mobile uterus. Normal appearing fallopian tubes and ovaries. Abdominal survey unremarkable.   Complications: None.  Implants:   Implant Name Type Inv. Item Serial No.  Lot No. LRB No. Used Action   IUD CONTRACEPTIVE DEVICE MIRENA [id number] Contraceptive Device IUD CONTRACEPTIVE DEVICE MIRENA [id number]  STEPHANE   1 Explanted       Christo Farr MD  OBGYN PGY-1  April 23, 2021 2:05 PM

## 2021-04-23 NOTE — ANESTHESIA PREPROCEDURE EVALUATION
Anesthesia Pre-Procedure Evaluation    Patient: Heather Guillory   MRN: [id number] : 1980        Preoperative Diagnosis: Encounter for sterilization [Z30.2]   Procedure : Procedure(s):  SALPINGECTOMY, LAPAROSCOPIC BILATERAL  Insert intrauterine device     Past Medical History:   Diagnosis Date     JAVIER (generalized anxiety disorder)     50mg zoloft     Rosacea      Undifferentiated connective tissue disease (H)       Past Surgical History:   Procedure Laterality Date     COLONOSCOPY N/A 10/21/2020    Procedure: COLONOSCOPY;  Surgeon: Yang Pete MD;  Location: [facility] OR     IR ENDOVENOUS ABLATION VARICOSE VEINS  10/28/2019     IR FOLLOW UP VISIT OUTPATIENT  2019     IR STAB PHLEBECTOMY 10 - 20 STABS  10/28/2019     IR VEIN SCLEROSING MULTIPLE  10/28/2019     OPERATIVE HYSTEROSCOPY WITH MORCELLATOR  2014    Procedure: OPERATIVE HYSTEROSCOPY WITH MORCELLATOR;  Hysteroscopic Polypectomy;  Surgeon: Cate Mansfield MD;  Location: [facility] OR      Allergies   Allergen Reactions     Benzoyl Peroxide Swelling     Duricef [Cefadroxil] Unknown     Monistat [Miconazole] Swelling     Sulfa Drugs Rash      Social History     Tobacco Use     Smoking status: Never Smoker     Smokeless tobacco: Never Used   Substance Use Topics     Alcohol use: No     Alcohol/week: 0.0 standard drinks      Wt Readings from Last 1 Encounters:   21 77.5 kg (170 lb 13.7 oz)        Anesthesia Evaluation   Pt has had prior anesthetic. Type: MAC.    No history of anesthetic complications       ROS/MED HX  ENT/Pulmonary:  - neg pulmonary ROS     Neurologic:  - neg neurologic ROS     Cardiovascular:    (-) EVANS and syncope   METS/Exercise Tolerance: >4 METS    Hematologic:  - neg hematologic  ROS     Musculoskeletal:  - neg musculoskeletal ROS     GI/Hepatic:  - neg GI/hepatic ROS     Renal/Genitourinary:  - neg Renal ROS     Endo:  - neg endo ROS     Psychiatric/Substance Use:     (+) psychiatric history anxiety      Infectious Disease:  - neg infectious disease ROS     Malignancy:  - neg malignancy ROS     Other:            Physical Exam    Airway   unable to assess     Mallampati: II   TM distance: > 3 FB   Neck ROM: full   Mouth opening: > 3 cm    Respiratory Devices and Support         Dental  no notable dental history         Cardiovascular    unable to assess         Pulmonary   pulmonary exam normal                OUTSIDE LABS:  CBC:   Lab Results   Component Value Date    WBC 5.5 04/23/2021    WBC 5.9 01/14/2021    HGB 13.1 04/23/2021    HGB 13.7 03/31/2021    HCT 39.0 04/23/2021    HCT 40.8 01/14/2021     04/23/2021     01/14/2021     BMP:   Lab Results   Component Value Date     10/28/2020     01/09/2016    POTASSIUM 4.8 03/31/2021    POTASSIUM 4.1 10/28/2020    CHLORIDE 109 10/28/2020    CHLORIDE 110 (H) 01/09/2016    CO2 27 10/28/2020    CO2 20 01/09/2016    BUN 7 10/28/2020    BUN 9 01/09/2016    CR 0.66 01/14/2021    CR 0.63 10/28/2020    GLC 88 10/28/2020    GLC 94 04/26/2019     COAGS: No results found for: PTT, INR, FIBR  POC:   Lab Results   Component Value Date    BGM 96 04/23/2021    HCG Negative 05/24/2018     HEPATIC:   Lab Results   Component Value Date    ALBUMIN 4.0 01/14/2021    PROTTOTAL 7.6 10/28/2020    ALT 19 01/14/2021    AST 13 01/14/2021    ALKPHOS 79 10/28/2020    BILITOTAL 0.5 10/28/2020     OTHER:   Lab Results   Component Value Date    A1C 5.1 07/16/2020    PRICILA 8.9 10/28/2020    TSH 0.95 07/16/2020    CRP <2.9 01/14/2021    SED 4 01/14/2021       Anesthesia Plan    ASA Status:  1      Anesthesia Type: General.   Induction: Intravenous, Propofol.           Consents    Anesthesia Plan(s) and associated risks, benefits, and realistic alternatives discussed. Questions answered and patient/representative(s) expressed understanding.     - Discussed with:  Patient      - Extended Intubation/Ventilatory Support Discussed: No.      - Patient is DNR/DNI Status: No    Use of  blood products discussed: Yes.     - Discussed with: Patient.     Postoperative Care    Pain management: IV analgesics, Oral pain medications, Multi-modal analgesia.   PONV prophylaxis: Background Propofol Infusion, Ondansetron (or other 5HT-3), Dexamethasone or Solumedrol, Scopolamine patch     Comments:                Juliana Pabon MD

## 2021-04-23 NOTE — DISCHARGE INSTRUCTIONS
[facility]   Adult Discharge Orders & Instructions     For 24 hours after surgery    1. Get plenty of rest.  A responsible adult must stay with you for at least 24 hours after you leave the hospital.   2. Do not drive or use heavy equipment.  If you have weakness or tingling, don't drive or use heavy equipment until this feeling goes away.  3. Do not drink alcohol.  4. Avoid strenuous or risky activities.  Ask for help when climbing stairs.   5. You may feel lightheaded.  IF so, sit for a few minutes before standing.  Have someone help you get up.   6. If you have nausea (feel sick to your stomach): Drink only clear liquids such as apple juice, ginger ale, broth or 7-Up.  Rest may also help.  Be sure to drink enough fluids.  Move to a regular diet as you feel able.  7. You may have a slight fever. Call the doctor if your fever is over 100 F (37.7 C) (taken under the tongue) or lasts longer than 24 hours.  8. You may have a dry mouth, a sore throat, muscle aches or trouble sleeping.  These should go away after 24 hours.  9. Do not make important or legal decisions.   Call your doctor for any of the followin.  Signs of infection (fever, growing tenderness at the surgery site, a large amount of drainage or bleeding, severe pain, foul-smelling drainage, redness, swelling).    2. It has been over 8 to 10 hours since surgery and you are still not able to urinate (pass water).    3.  Headache for over 24 hours.    To contact a doctor, call ________Dr. Mansfield @____[phone] or after hours call [phone] and ask for OB/GYN resident on call.        _Discharge Instructions:   Following a Laparoscopy    Comfort:    The amount of discomfort you can expect is very unpredictable.     If you have pain that cannot be controlled with non aspirin medication or with the prescription medication you may have received, you should notify your physician.     You May Experience:    Abdominal tenderness;  "abdominal cramps (like menstrual cramps).    Low back ache or discomfort radiating to your shoulders, chest, back or neck. This is a result of the gas used to inflate your abdomen during surgery. This gas is absorbed in 24 to 36 hours. The \"knee chest\" position will help relieve this discomfort.    Sore throat for a day or two resulting from the anesthesia tube used during surgery. You may use throat lozenges to help relieve this discomfort.    Black and blue marks on your abdomen.    Drainage:    You may expect a small amount of drainage from the incision on your abdomen and you may change the bandage when necessary.    You may also have a small amount of vaginal drainage for 3 to 4 days; this is normal and no cause for concern. If excessive bleeding occurs, notify your physician.    Do not douche, and use a pad rather than tampons. Do not resume intercourse for at least one week or until bleeding has ceased.    Home Activity:    The day of surgery spend a quiet day at home.    Increase activity as tolerated.    You may bathe or shower, do not soak in bath tub or scrub incisions.    You have no restrictions on your diet. Following surgery, drink plenty of fluids and eat a light meal.    The anesthesia may produce some nausea. If you feel nauseated, stay in bed, keep your head down and try drinking fluids such as Seven-Up, tea or soup.    Notify Physician at Once IF:    You have a fever over 100.4 degrees. A low grade fever (under 100 degrees) is usual after surgery.    You have severe pain.    You have a large amount of bleeding or drainage.    Important numbers  [facility] ([address]) [facility]: [phone]       "

## 2021-04-23 NOTE — ANESTHESIA CARE TRANSFER NOTE
Patient: Heather Guillory    Procedure(s):  SALPINGECTOMY, LAPAROSCOPIC BILATERAL  removal of intauterine device    Diagnosis: Encounter for sterilization [Z30.2]  Diagnosis Additional Information: No value filed.    Anesthesia Type:   General     Note:    Oropharynx: spontaneously breathing and oropharynx clear of all foreign objects  Level of Consciousness: awake and drowsy  Oxygen Supplementation: face mask  Level of Supplemental Oxygen (L/min / FiO2): 6  Independent Airway: airway patency satisfactory and stable  Dentition: dentition unchanged  Vital Signs Stable: post-procedure vital signs reviewed and stable  Report to RN Given: handoff report given            Vitals: (Last set prior to Anesthesia Care Transfer)  CRNA VITALS  4/23/2021 1338 - 4/23/2021 1416      4/23/2021             Pulse:  81    SpO2:  100 %    Resp Rate (observed):  (!) 1        Electronically Signed By: HARVEY Sanchez CRNA  April 23, 2021  2:16 PM

## 2021-04-27 ENCOUNTER — TELEPHONE (OUTPATIENT)
Dept: OBGYN | Facility: CLINIC | Age: 41
End: 2021-04-27

## 2021-04-27 DIAGNOSIS — F33.0 MILD RECURRENT MAJOR DEPRESSION (H): ICD-10-CM

## 2021-04-27 RX ORDER — SERTRALINE HYDROCHLORIDE 100 MG/1
100 TABLET, FILM COATED ORAL DAILY
Qty: 90 TABLET | Refills: 3 | Status: SHIPPED | OUTPATIENT
Start: 2021-04-27 | End: 2022-06-01

## 2021-04-27 NOTE — TELEPHONE ENCOUNTER
Called pt to discuss increase in Sertraline which she requested in the PACU. No answer today.  LM-Will send in Rx for 100 mg.  Pt instructed to call back to discuss further.     Cate Mansfield MD, FACOG  (she/her/hers)    Department of Ob/Gyn/Women's Health  [facility]  [facility]  [address] [place] [address]  [email or web address]  p. [phone]  f. [phone]

## 2021-04-28 ENCOUNTER — TELEPHONE (OUTPATIENT)
Dept: OBGYN | Facility: CLINIC | Age: 41
End: 2021-04-28

## 2021-04-28 NOTE — TELEPHONE ENCOUNTER
Message received that she is calling Dr. Mansfield back.    Called and left VM to call back if she still needs to speak with Dr. Mansfield.

## 2021-05-02 LAB — COPATH REPORT: NORMAL

## 2021-05-04 ENCOUNTER — OFFICE VISIT (OUTPATIENT)
Dept: OBGYN | Facility: CLINIC | Age: 41
End: 2021-05-04
Attending: OBSTETRICS & GYNECOLOGY
Payer: COMMERCIAL

## 2021-05-04 VITALS
SYSTOLIC BLOOD PRESSURE: 103 MMHG | HEART RATE: 66 BPM | DIASTOLIC BLOOD PRESSURE: 74 MMHG | BODY MASS INDEX: 26.56 KG/M2 | WEIGHT: 169.2 LBS | HEIGHT: 67 IN

## 2021-05-04 DIAGNOSIS — F41.9 ANXIETY: Primary | ICD-10-CM

## 2021-05-04 PROCEDURE — G0463 HOSPITAL OUTPT CLINIC VISIT: HCPCS

## 2021-05-04 PROCEDURE — 99024 POSTOP FOLLOW-UP VISIT: CPT | Performed by: OBSTETRICS & GYNECOLOGY

## 2021-05-04 ASSESSMENT — ANXIETY QUESTIONNAIRES
1. FEELING NERVOUS, ANXIOUS, OR ON EDGE: SEVERAL DAYS
3. WORRYING TOO MUCH ABOUT DIFFERENT THINGS: NOT AT ALL
5. BEING SO RESTLESS THAT IT IS HARD TO SIT STILL: NOT AT ALL
7. FEELING AFRAID AS IF SOMETHING AWFUL MIGHT HAPPEN: NOT AT ALL
2. NOT BEING ABLE TO STOP OR CONTROL WORRYING: SEVERAL DAYS
6. BECOMING EASILY ANNOYED OR IRRITABLE: NOT AT ALL
GAD7 TOTAL SCORE: 2

## 2021-05-04 ASSESSMENT — PATIENT HEALTH QUESTIONNAIRE - PHQ9: 5. POOR APPETITE OR OVEREATING: NOT AT ALL

## 2021-05-04 ASSESSMENT — MIFFLIN-ST. JEOR: SCORE: 1480.12

## 2021-05-04 NOTE — LETTER
"2021       RE: Rita Gaona  [address]  [place] [address]     Dear Colleague,    Thank you for referring your patient, Rita Gaona, to the [facility] [place] at [facility]. Please see a copy of my visit note below.    Gyn Clinic Postoperative Visit Note  2021    S: Rita Gaona is a 40 year old  here for post-operative visit following bilateral salpingectomy on 2021.   She reports feeling well.  minimal pain. Has started higher dose of Zoloft and has therapy planned for anxiety. Feels safe with self and family.     O:   /74 (BP Location: Left arm, Patient Position: Chair)   Pulse 66   Ht 1.702 m (5' 7\")   Wt 76.7 kg (169 lb 3.2 oz)   BMI 26.50 kg/m    Exam: incisions well healed.     Labs:   Hemoglobin   Date Value Ref Range Status   2021 13.1 11.7 - 15.7 g/dL Final   ]    Pathology:   Copath Report   Date Value Ref Range Status   2021   Final    Patient Name: RITA GAONA  MR#: [id number]  Specimen #: [id number]  Collected: 2021  Received: 2021  Reported: 2021 23:36  Ordering Phy(s): JAVIER BROWN    For improved result formatting, select 'View Enhanced Report Format' under   Linked Documents section.    SPECIMEN(S):  Fallopian tubes, bilateral    FINAL DIAGNOSIS:  Fallopian tubes, right and left, laparoscopic bilateral salpingectomy:  - Unremarkable bilateral fallopian tubes  - Fimbriated end and complete cross section of lumen identified for each   fallopian tube    I have personally reviewed all specimens and/or slides, including the   listed special stains, and used them  with my medical judgement to determine or confirm the final diagnosis.    Electronically signed out by:    Clara Quevedo M.D, [facility]    CLINICAL HISTORY:  The patient is a 40-year-old woman who desires permanent sterilization.    Procedure: Laparoscopic " "bilateral  salpingectomy; removal of intrauterine device.    GROSS:  The specimen is received in formalin with proper patient identification,   labeled \"fallopian tube, bilateral\".  It consists of two red-tan fimbriated patent fallopian tube measuring 7.5   cm and 8.5 cm in length and  averaging 0.4 cm in diameter.  Representative sections are submitted.    Summary of Sections:  A1 - smaller tube  A2 - larger tube (Dictated by: Alice Vidal 4/23/2021 04:45 PM)    MICROSCOPIC:  Microscopic examination is performed.    The technical component of this testing was completed at the [facility], with the professional component performed   at the [facility], [address] [place] [address],   [place] [address] ([phone])    CPT Codes:  A: 07692-AU7    COLLECTION SITE:  Client: [facility]  Location: [facility] (B)       ]    A: 40 year old female PO x 2 weeks s/p bilateral salpingectomy.  Doing well, no concerns.      P:   F/u prn  Annually for wellness exams.   F/u with me or primary care for Zoloft and anxiety f/u within 6 mo.    Cate Mansfield MD, FACOG  (she/her/hers)    Department of Ob/Gyn/Women's Health  [facility]  [facility]  [address] [place], [place] [address]. [place] [address]  [email or web address]  p. [phone]  f. [phone]  5/7/2021  12:56 PM      "

## 2021-05-05 ASSESSMENT — ANXIETY QUESTIONNAIRES: GAD7 TOTAL SCORE: 2

## 2021-05-07 NOTE — PROGRESS NOTES
"Gyn Clinic Postoperative Visit Note  2021    S: Heather Gaona is a 40 year old  here for post-operative visit following bilateral salpingectomy on 2021.   She reports feeling well.  minimal pain. Has started higher dose of Zoloft and has therapy planned for anxiety. Feels safe with self and family.     O:   /74 (BP Location: Left arm, Patient Position: Chair)   Pulse 66   Ht 1.702 m (5' 7\")   Wt 76.7 kg (169 lb 3.2 oz)   BMI 26.50 kg/m    Exam: incisions well healed.     Labs:   Hemoglobin   Date Value Ref Range Status   2021 13.1 11.7 - 15.7 g/dL Final   ]    Pathology:   Copath Report   Date Value Ref Range Status   2021   Final    Patient Name: HEATHER GAONA  MR#: [id number]  Specimen #: [id number]  Collected: 2021  Received: 2021  Reported: 2021 23:36  Ordering Phy(s): JAVIER BROWN    For improved result formatting, select 'View Enhanced Report Format' under   Linked Documents section.    SPECIMEN(S):  Fallopian tubes, bilateral    FINAL DIAGNOSIS:  Fallopian tubes, right and left, laparoscopic bilateral salpingectomy:  - Unremarkable bilateral fallopian tubes  - Fimbriated end and complete cross section of lumen identified for each   fallopian tube    I have personally reviewed all specimens and/or slides, including the   listed special stains, and used them  with my medical judgement to determine or confirm the final diagnosis.    Electronically signed out by:    Clara Quevedo M.D, [facility]    CLINICAL HISTORY:  The patient is a 40-year-old woman who desires permanent sterilization.    Procedure: Laparoscopic bilateral  salpingectomy; removal of intrauterine device.    GROSS:  The specimen is received in formalin with proper patient identification,   labeled \"fallopian tube, bilateral\".  It consists of two red-tan fimbriated patent fallopian tube measuring 7.5   cm and 8.5 cm in length and  averaging 0.4 cm in diameter.  Representative " sections are submitted.    Summary of Sections:  A1 - smaller tube  A2 - larger tube (Dictated by: Alice Vidal 4/23/2021 04:45 PM)    MICROSCOPIC:  Microscopic examination is performed.    The technical component of this testing was completed at the [facility], with the professional component performed   at the [facility], [address] [place] [address],   [place] [address] ([phone])    CPT Codes:  A: 49208-SJ8    COLLECTION SITE:  [facility]  Location: [facility] (B)       ]    A: 40 year old female PO x 2 weeks s/p bilateral salpingectomy.  Doing well, no concerns.      P:   F/u prn  Annually for wellness exams.   F/u with me or primary care for Zoloft and anxiety f/u within 6 mo.    Cate Mansfield MD, FACOG  (she/her/hers)    Department of Ob/Gyn/Women's Health  [facility]  [facility]  [address] [place] [address]. [place] [address]  [email or web address]  p. [phone]  f. [phone]  5/7/2021  12:56 PM

## 2021-05-13 ENCOUNTER — TELEPHONE (OUTPATIENT)
Dept: OBGYN | Facility: CLINIC | Age: 41
End: 2021-05-13

## 2021-05-13 NOTE — TELEPHONE ENCOUNTER
----- Message from Heather Gold RN sent at 5/13/2021 11:23 AM CDT -----  Regarding: bleeding after IUD placement  Pt states she had IUD placed a few weeks ago, wants to talk about heavy bleeding.

## 2021-05-13 NOTE — TELEPHONE ENCOUNTER
"Heather had TL and IUD removed on 4/23/21.  She had a little bleeding immediately after removal, then stopped.  Today is now day 3 of bleeding.  It is bright red, and heavier than she recalls her periods being, though she states that she has really not had \"normal\" periods for several years due to pregnancies and Mirena use.    She states that she is changing pads about every 1 1/2 hours, reports of last couple, one was saturated and one about half full.    Reviewed ED precautions.  To call back if her bleeding lasts longer than a week, or is not lightening.  "

## 2021-07-01 ENCOUNTER — THERAPY VISIT (OUTPATIENT)
Dept: PHYSICAL THERAPY | Facility: CLINIC | Age: 41
End: 2021-07-01
Payer: COMMERCIAL

## 2021-07-01 DIAGNOSIS — R30.0 DYSURIA: ICD-10-CM

## 2021-07-01 DIAGNOSIS — R35.0 URINARY FREQUENCY: ICD-10-CM

## 2021-07-01 DIAGNOSIS — N39.46 MIXED INCONTINENCE: ICD-10-CM

## 2021-07-01 DIAGNOSIS — M79.18 MYALGIA OF PELVIC FLOOR: ICD-10-CM

## 2021-07-01 DIAGNOSIS — M99.05 PELVIC SOMATIC DYSFUNCTION: ICD-10-CM

## 2021-07-01 DIAGNOSIS — R39.15 URINARY URGENCY: ICD-10-CM

## 2021-07-01 DIAGNOSIS — M62.89 PELVIC FLOOR DYSFUNCTION: ICD-10-CM

## 2021-07-01 PROCEDURE — 97140 MANUAL THERAPY 1/> REGIONS: CPT | Mod: GP | Performed by: PHYSICAL THERAPIST

## 2021-07-01 PROCEDURE — 97112 NEUROMUSCULAR REEDUCATION: CPT | Mod: GP | Performed by: PHYSICAL THERAPIST

## 2021-07-01 PROCEDURE — 97110 THERAPEUTIC EXERCISES: CPT | Mod: GP | Performed by: PHYSICAL THERAPIST

## 2021-07-01 PROCEDURE — 97161 PT EVAL LOW COMPLEX 20 MIN: CPT | Mod: GP | Performed by: PHYSICAL THERAPIST

## 2021-07-01 NOTE — PROGRESS NOTES
"Physical Therapy Initial Evaluation  Subjective:  HPI  SUBJECTIVE:  Patient reports onset of symptoms of stress urinary incontinence, will have mild urge incontinence.  Pt also has history of frequent UTI.  She does take post coital antibiotic for prevention.  In 2017, she reports had ESBL and was on IV antibiotics for 10 days.  Symptoms include stress incontinence.  Since onset symptoms have been getting better, worse or staying the same? Same.  Pt is  with ages 6,5,3.  Recalls after first delivery had some urinary stress incontinence.  She does complain of some R low back pain.  Has been cleared for kidney stones.   MD order dated 4/15/2021.  Pt goal: \"able to control bladder better, able to cough, run, laugh, etc without emptying.\"  Urination:  Do you leak on the way to the bathroom or with a strong urge to void? sometimes    Do you leak with cough,sneeze, jumping, running?Yes   Any other activities that cause leaking? Laughing  Do you have triggers that make you feel you can't wait to go to the bathroom? Yes   what are they:  Laughing, movement - jump, run, etc.  .  Type of pad and number used per day? Small light pad, 2 pads per day  When you leak what is the amount? medium    How long can you delay the need to urinate? Couple of hours, she can wait 2 hours between voids.   How many times do you get up to urinate at night? 0  Can you stop the flow of urine when on the toilet? Yes  Is the volume of urine passed usually: average. (8sec rule=  250ml with average bladder storing  400-600ml)    Do you strain to pass urine? No  Do you have a slow or hesitant urinary stream? No  Do you have difficulty initiating the urine stream? No    How many bladder infections have you had in last 12 months?6 approximately    Fluid intake(one glass is 8oz or one cup) 4 glasses/day, 1 caffinated glasses/day  0 alcohol glasses/day.    Bowel habits:  Frequency of bowel movements?1 times a day/week  Consistancy of stool? soft " formed  Do you ignore the urge to defecate? No  Do you strain to pass stool? No    Pelvic Pain:  When do you have pelvic pain? Ovulating?  Is initial penetration during intercourse painful? No  Is deeper penetration painful? No  Do you use lubricant? no      Given birth? Yes Any complications?no, # of vaginal delieveries?3, reports long labor x 3 days and pushed for 3 hours, was induced with all 3 kids  Are you sexually active?Yes  Have you ever been worried for your physical safety? No  Any abdominal or pelvic surgeries? Tubal removal in April 2020  Are you having any regular exercise?yes - walk, jog  Have you practiced the PF(kegel) exercises for 4 or more weeks?no                                   Objective:  System         Lumbar/SI Evaluation  ROM:    AROM Lumbar:   Flexion:          WNL  Ext:                    WNL   Side Bend:        Left:     Right:   Rotation:           Left:     Right:   Side Glide:        Left:  WNL    Right:  WNL          Lumbar Myotomes:  normal (mild weakness noted L hip abduction 4/5)                                                    Pelvic Dysfunction Evaluation:      Diagnostic Tests:              Cystoscopy:  Negative            Flexibility:    Tightness present at:Piriformis and Gluteals  quadriceps L > R  Abdominal Wall:  Abdominal wall pelvic: Valsalva noted with ab activation.    Diastasis Recti:  Negative      Pelvic Clock Exam:    Ischiocavernosis pain:  -  Bulbocavernosis pain:  -  Transverse Perineal:  -  Levator ANI:  +  Perineal Body:  -      External Assessment:  External assessment pelvic: bulge of perineum with cough.  Skin Condition:  Normal  Scars:  Well healed  Bearing Down/Coughing:  Normal    Introitus:  Normal  Muscle Contraction/Perineal Mobility:  Slight lift, no urogential triangle descent  Internal Assessment:      Contraction/Grade:  Fllicker muscles stretched (1)  Accessory Muscle use-Abdominals:  Minimal        SEMG Biofeedback:    Equipment:   MR20    Suraface electrode placement--Perianal:  Yes  Baseline EMG PM:  3.0 uV supine          Position:  Supine          Hip Evaluation          Functional Testing:          Quad:    Single leg squat:   Left:    Decreased hip/trunk flexion  Right:   Decreased hip/trunk flexion    Bilateral leg squat:  Anterior pelvic tilt  Decreased hip/trunk flexion                  General     ROS    Assessment/Plan:    Patient is a 41 year old female with pelvic complaints.    Patient has the following significant findings with corresponding treatment plan.                Diagnosis 1:  Pelvic floor dysfunction/mixed incontinence Pain -  manual therapy, self management, education and home program  Decreased ROM/flexibility - manual therapy, therapeutic exercise, therapeutic activity and home program  Decreased strength - therapeutic exercise, therapeutic activities and home program  Impaired muscle performance - biofeedback, neuro re-education and home program  Decreased function - therapeutic activities and home program  Impaired posture - neuro re-education, therapeutic activities and home program    Therapy Evaluation Codes:   1) History comprised of:   Personal factors that impact the plan of care:      None.    Comorbidity factors that impact the plan of care are:      None.     Medications impacting care: None.  2) Examination of Body Systems comprised of:   Body structures and functions that impact the plan of care:      Pelvis.   Activity limitations that impact the plan of care are:      Jumping and Stress incontinence.  3) Clinical presentation characteristics are:   Stable/Uncomplicated.  4) Decision-Making    Low complexity using standardized patient assessment instrument and/or measureable assessment of functional outcome.  Cumulative Therapy Evaluation is: Low complexity.    Previous and current functional limitations:  (See Goal Flow Sheet for this information)    Short term and Long term goals: (See Goal Flow  Sheet for this information)     Communication ability:  Patient appears to be able to clearly communicate and understand verbal and written communication and follow directions correctly.  Treatment Explanation - The following has been discussed with the patient:   RX ordered/plan of care  Anticipated outcomes  Possible risks and side effects  This patient would benefit from PT intervention to resume normal activities.   Rehab potential is excellent.    Frequency:  1 X week, once daily  Duration:  for 6 weeks  Discharge Plan:  Achieve all LTG.  Independent in home treatment program.  Reach maximal therapeutic benefit.    Please refer to the daily flowsheet for treatment today, total treatment time and time spent performing 1:1 timed codes.

## 2021-07-01 NOTE — PROGRESS NOTES
Physical Therapy Initial Evaluation  Subjective:    Patient Health History                     Surgeries include:  Other (Tubal).    Current medications: Zoloft, Plaquenil.    Current occupation is None.                                       Objective:  System    Physical Exam    General     ROS    Assessment/Plan:

## 2021-07-07 ENCOUNTER — VIRTUAL VISIT (OUTPATIENT)
Dept: RHEUMATOLOGY | Facility: CLINIC | Age: 41
End: 2021-07-07
Attending: INTERNAL MEDICINE
Payer: COMMERCIAL

## 2021-07-07 DIAGNOSIS — R53.83 OTHER FATIGUE: ICD-10-CM

## 2021-07-07 DIAGNOSIS — M35.9 UNDIFFERENTIATED CONNECTIVE TISSUE DISEASE (H): Primary | ICD-10-CM

## 2021-07-07 PROCEDURE — 99214 OFFICE O/P EST MOD 30 MIN: CPT | Mod: 95 | Performed by: INTERNAL MEDICINE

## 2021-07-07 ASSESSMENT — PAIN SCALES - GENERAL: PAINLEVEL: NO PAIN (0)

## 2021-07-07 NOTE — LETTER
2021       RE: Heather Guillory  [address]  [place] [address]     Dear Colleague,    Thank you for referring your patient, Heather Guillory, to the [facility] [place] at [facility]. Please see a copy of my visit note below.    Heather is a 41 year old who is being evaluated via a billable video visit.      How would you like to obtain your AVS? Mail a copy  If the video visit is dropped, the invitation should be resent by: Send to e-mail at: [email or web address]  Will anyone else be joining your video visit? No    Video Start Time: 1:30 pm    Video-Visit Details    Type of service:  Video Visit    Video End Time: 2 pm    Originating Location (pt. Location): Home    Distant Location (provider location):  [facility] [place]     Platform used for Video Visit: Safello     Rheumatology Virtual Visit Note     Heather Guillory MRN# [id number]   YOB: 1980 Age: 41 year old     Date of Last Visit: 2021  DOS: 2021    Reason for visit: Follow up of UCTD              Assessment and Plan:   Undifferentiated connective tissue disease (UCTD):    Heather is a 40 yo WF . She was diagnosed with MCTD in 2016, 6 wk postpartum based on fatigue, arthritis, mild Raynaud's, low positive NINOSKA 1.1 and low positive RNP Ab 1.6. She is on  mg qd since 2017 with great response.  UCTD continues to be most likely diagnosis not MCTD as she does not have classic features of MCTD, had very low titer of RNP Ab in the past, (negative on most recent check), and had a mild disease which never required prednisone.  All autoimmunity labs (2017) came back negative (except + NINOSKA, low C3) which is further reassuring for UCTD, including APS panel, repeat RNP and inflammatory markers, dsDNA.     She had neg SSA/SSB antibodies in 2016, no concern for CHB. No need to re-check.  APS panel was neg.  She  had neg anti-Sm, neg anti-DNA and neg centromere Ab in 9/2016.    Today:  Disease continues to be under fair control on HCQ. In 5/2019, recommended to taper plaquenil to 200 mg twice a day on Mon/Wed/Fri and then 1 tab for the rest of the week. Had increased pain/stiffness in hands and feet and C3 dropped. So increased HCQ back to 200 mg bid. C3 went back to nl in 7/2020, had low C3 in 1/2021 but it fluctuates. Reports increased joint pain/stiffness, has to take  mg bid; otherwise lower dose causes pain. Stress, not enough sleep and cold weather could be triggers for joint pain. Will not add another DMARD, recommend to focus on well-being, she agreed. Today, I am concerned about iron deficiency anemia from heavy menses, she is going to discuss with her OB to put the IUD back, labs were ordered.        Today's plan:    Continue  mg bid (lower dose caused more joint pain)    Annual eye exam on HCQ.    Labs       Return in 6 months (in person or video)    Orders Placed This Encounter   Procedures     ALT     Albumin level     AST     CBC with platelets differential     Creatinine     Complement C4     Complement C3     CRP inflammation     DNA double stranded antibodies     Erythrocyte sedimentation rate auto     UA with Microscopic reflex to Culture     Protein  random urine with Creat Ratio     Creatinine random urine     TSH with free T4 reflex     Ferritin     Iron and iron binding capacity     Vitamin D Deficiency       Nasrin King MD          Active Problem List:     Patient Active Problem List    Diagnosis Date Noted     Pelvic somatic dysfunction 07/01/2021     Priority: Medium     Pelvic floor dysfunction 04/15/2021     Priority: Medium     Myalgia of pelvic floor 04/15/2021     Priority: Medium     Urinary urgency 04/15/2021     Priority: Medium     Urinary frequency 04/15/2021     Priority: Medium     Mixed incontinence 04/15/2021     Priority: Medium     Encounter for sterilization  12/30/2020     Priority: Medium     Added automatically from request for surgery [id number]       Varicose veins of left lower extremity with pain 10/29/2019     Priority: Medium     JAVIER (generalized anxiety disorder)      Priority: Medium     has a Rx for Zoloft; not using currently       Undifferentiated connective tissue disease (H)      Priority: Medium     Rosacea      Priority: Medium            History of Present Illness:   Heather Guillory presents for f/u of UCTD.    Today 7/7/2021: Had IUD removed,  Had one fallopian tube removed in 4/2021. Since IUD is out, periods are heavier and she feels tired a lot, usually by early evening. She could fall asleep any time of the day, but more evening. Around cycles, feels tired all the time. Considers putting another IUD to reduce bleeding. Takes MVI. 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 Started walking. Legs and feel achy towards the end of the day, sometimes has hands and feet pain. No oral ulcers. No CP, hair loss. Fully vaccinated, pfizer, no SE.         Review of Systems:   A comprehensive ROS was done. Positives are per HPI.          Past Medical History:     Past Medical History:   Diagnosis Date     JAVIER (generalized anxiety disorder)     50mg zoloft     Rosacea      Undifferentiated connective tissue disease (H)      Past Surgical History:   Procedure Laterality Date     COLONOSCOPY N/A 10/21/2020    Procedure: COLONOSCOPY;  Surgeon: Yang Pete MD;  [place] [facility] [place]     IR ENDOVENOUS ABLATION VARICOSE VEINS  10/28/2019     IR FOLLOW UP VISIT OUTPATIENT  11/20/2019     IR STAB PHLEBECTOMY 10 - 20 STABS  10/28/2019     IR VEIN SCLEROSING MULTIPLE  10/28/2019     LAPAROSCOPIC SALPINGECTOMY Bilateral 4/23/2021    Procedure: SALPINGECTOMY, LAPAROSCOPIC BILATERAL;  Surgeon: Cate Mansfield MD;  [facility]     OPERATIVE HYSTEROSCOPY WITH MORCELLATOR  4/8/2014    Procedure: OPERATIVE HYSTEROSCOPY WITH MORCELLATOR;  Hysteroscopic Polypectomy;   Surgeon: Cate Mansfield MD;  [facility] OR     REMOVE INTRAUTERINE DEVICE N/A 4/23/2021    Procedure: removal of intrauterine device;  Surgeon: Cate Mansfield MD;  Location: [facility] OR     Raynaud's syndrome since puberty. Her main trigger is cold.  She had two uneventful pregnancies with vaginal births, though the conception of her first child required IVF.   she is underwent embryo transfer in July 2017, now pregnant.   cervical radiculopathy, 2017.       Social History:     Social History     Occupational History     Occupation:      Employer: [employer]     Comment: laid off 7/2020   Tobacco Use     Smoking status: Never Smoker     Smokeless tobacco: Never Used   Substance and Sexual Activity     Alcohol use: No     Alcohol/week: 0.0 standard drinks     Drug use: No     Sexual activity: Yes     Partners: Male     Birth control/protection: I.U.D.     Comment: Mirena placed in 2018            Family History:     Family History   Problem Relation Age of Onset     Hypertension Paternal Grandfather      Lung Cancer Paternal Grandfather         smoker     Diabetes Type 2  Paternal Grandfather      Breast Cancer Paternal Grandmother      Hypertension Paternal Grandmother      Colon Cancer Maternal Grandfather      Alzheimer Disease Maternal Grandmother      Cervical Cancer Maternal Grandmother      Arrhythmia Brother         details unknown; procedure in his 20s     Anxiety Disorder Brother      Elijah Disease Sister      Myocardial Infarction No family hx of      Cerebrovascular Disease No family hx of      Coronary Artery Disease Early Onset No family hx of      Ovarian Cancer No family hx of      No history of AI disease       Allergies:     Allergies   Allergen Reactions     Benzoyl Peroxide Swelling     Duricef [Cefadroxil] Unknown     Monistat [Miconazole] Swelling     Sulfa Drugs Rash            Medications:     Current Outpatient Medications   Medication Sig Dispense Refill      hydroxychloroquine (PLAQUENIL) 200 MG tablet Take 1 tablet (200 mg) by mouth 2 times daily 180 tablet 3     lactobacillus rhamnosus, GG, (CULTURELL) capsule Take 1 capsule by mouth 2 times daily       metroNIDAZOLE (METROCREAM) 0.75 % external cream Apply topically 2 times daily       sertraline (ZOLOFT) 100 MG tablet Take 1 tablet (100 mg) by mouth daily 90 tablet 3     vitamin D3 (CHOLECALCIFEROL) 50 mcg (2000 units) tablet Take 1 tablet by mouth daily       acetaminophen (TYLENOL) 325 MG tablet Take 3 tablets (975 mg) by mouth every 6 hours as needed for mild pain (Patient not taking: Reported on 7/7/2021) 50 tablet 0     ibuprofen (ADVIL/MOTRIN) 800 MG tablet Take 1 tablet (800 mg) by mouth every 6 hours as needed for other (mild and/or inflammatory pain) (Patient not taking: Reported on 7/7/2021) 30 tablet 0     levonorgestrel (MIRENA, 52 MG,) 20 MCG/24HR IUD 1 each (20 mcg) by Intrauterine route once for 1 dose 1 each 0     nitroFURantoin macrocrystal (MACRODANTIN) 50 MG capsule Take 1 capsule (50 mg) PO as needed after sexual activity for UTI prevention. (Patient not taking: Reported on 5/4/2021) 30 capsule 3     oxyCODONE (ROXICODONE) 5 MG tablet Take 1 tablet (5 mg) by mouth every 6 hours as needed for moderate to severe pain (Patient not taking: Reported on 5/4/2021) 5 tablet 0     senna-docusate (SENOKOT-S/PERICOLACE) 8.6-50 MG tablet Take 1-2 tablets by mouth 2 times daily (Patient not taking: Reported on 5/4/2021) 30 tablet 0            Physical Exam:   Constitutional: well-developed, pleasant, NAD  Eyes: nl EOM, conjunctiva, sclera  Resp: breathing normally  MS: no active synovitis   Skin: no skin rash  Neuro: nl cranial nerves  Psych: nl affect         Data:     Labs from outside laboratory reviewed from 8/12/16  RNP antibodies 1.6- positive    Negative for Parvo Virus B19, Lyme Disease  Negative SLE panel   Negative SSA, SSB  Negative Anti-Sury-1  Negative Centromere B antibodies  Negative   CCP  Component      Latest Ref Rng & Units 1/14/2021   WBC      4.0 - 11.0 10e9/L 5.9   RBC Count      3.8 - 5.2 10e12/L 4.54   Hemoglobin      11.7 - 15.7 g/dL 13.3   Hematocrit      35.0 - 47.0 % 40.8   MCV      78 - 100 fl 90   MCH      26.5 - 33.0 pg 29.3   MCHC      31.5 - 36.5 g/dL 32.6   RDW      10.0 - 15.0 % 12.0   Platelet Count      150 - 450 10e9/L 192   Diff Method       Automated Method   % Neutrophils      % 48.6   % Lymphocytes      % 42.2   % Monocytes      % 5.4   % Eosinophils      % 2.5   % Basophils      % 1.0   % Immature Granulocytes      % 0.3   Nucleated RBCs      0 /100 0   Absolute Neutrophil      1.6 - 8.3 10e9/L 2.9   Absolute Lymphocytes      0.8 - 5.3 10e9/L 2.5   Absolute Monocytes      0.0 - 1.3 10e9/L 0.3   Absolute Eosinophils      0.0 - 0.7 10e9/L 0.2   Absolute Basophils      0.0 - 0.2 10e9/L 0.1   Abs Immature Granulocytes      0 - 0.4 10e9/L 0.0   Absolute Nucleated RBC       0.0   Color Urine       Yellow   Appearance Urine       Clear   Glucose Urine      NEG:Negative mg/dL Negative   Bilirubin Urine      NEG:Negative Negative   Ketones Urine      NEG:Negative mg/dL Negative   Specific Gravity Urine      1.003 - 1.035 1.010   Blood Urine      NEG:Negative Negative   pH Urine      5.0 - 7.0 pH 7.0   Protein Albumin Urine      NEG:Negative mg/dL Negative   Urobilinogen mg/dL      0.0 - 2.0 mg/dL 0.0   Nitrite Urine      NEG:Negative Negative   Leukocyte Esterase Urine      NEG:Negative Negative   Source       Midstream Urine   WBC Urine      0 - 5 /HPF <1   RBC Urine      0 - 2 /HPF 2   Squamous Epithelial /HPF Urine      0 - 1 /HPF 1   Creatinine      0.52 - 1.04 mg/dL 0.66   GFR Estimate      >60 mL/min/1.73:m2 >90   GFR Estimate If Black      >60 mL/min/1.73:m2 >90   Protein Random Urine      g/L 0.10   Protein Total Urine g/gr Creatinine      0 - 0.2 g/g Cr 0.19   AST      0 - 45 U/L 13   ALT      0 - 50 U/L 19   Albumin      3.4 - 5.0 g/dL 4.0   CRP Inflammation      0.0 -  8.0 mg/L <2.9   Sed Rate      0 - 20 mm/h 4   Complement C3      81 - 157 mg/dL 78 (L)   Complement C4      13 - 39 mg/dL 19   DNA-ds      <10 IU/mL 5   Creatinine Urine      mg/dL 55

## 2021-07-07 NOTE — PROGRESS NOTES
Heather is a 41 year old who is being evaluated via a billable video visit.      How would you like to obtain your AVS? Mail a copy  If the video visit is dropped, the invitation should be resent by: Send to e-mail at: [email or web address]  Will anyone else be joining your video visit? No    Video Start Time: 1:30 pm    Video-Visit Details    Type of service:  Video Visit    Video End Time: 2 pm    Originating Location (pt. Location): Home    Distant Location (provider location):  [facility] [place]     Platform used for Video Visit: Moncai     Rheumatology Virtual Visit Note     Heather Guillory MRN# [id number]   YOB: 1980 Age: 41 year old     Date of Last Visit: 2021  DOS: 2021    Reason for visit: Follow up of UCTD              Assessment and Plan:   Undifferentiated connective tissue disease (UCTD):    Heather is a 42 yo WF . She was diagnosed with MCTD in 2016, 6 wk postpartum based on fatigue, arthritis, mild Raynaud's, low positive NINOSKA 1.1 and low positive RNP Ab 1.6. She is on  mg qd since 2017 with great response.  UCTD continues to be most likely diagnosis not MCTD as she does not have classic features of MCTD, had very low titer of RNP Ab in the past, (negative on most recent check), and had a mild disease which never required prednisone.  All autoimmunity labs (2017) came back negative (except + NINOSKA, low C3) which is further reassuring for UCTD, including APS panel, repeat RNP and inflammatory markers, dsDNA.     She had neg SSA/SSB antibodies in 2016, no concern for CHB. No need to re-check.  APS panel was neg.  She had neg anti-Sm, neg anti-DNA and neg centromere Ab in 2016.    Today:  Disease continues to be under fair control on HCQ. In 2019, recommended to taper plaquenil to 200 mg twice a day on Mon/Wed/Fri and then 1 tab for the rest of the week. Had increased pain/stiffness in hands and feet and C3 dropped. So increased HCQ  back to 200 mg bid. C3 went back to nl in 7/2020, had low C3 in 1/2021 but it fluctuates. Reports increased joint pain/stiffness, has to take  mg bid; otherwise lower dose causes pain. Stress, not enough sleep and cold weather could be triggers for joint pain. Will not add another DMARD, recommend to focus on well-being, she agreed. Today, I am concerned about iron deficiency anemia from heavy menses, she is going to discuss with her OB to put the IUD back, labs were ordered.        Today's plan:    Continue  mg bid (lower dose caused more joint pain)    Annual eye exam on HCQ.    Labs       Return in 6 months (in person or video)    Orders Placed This Encounter   Procedures     ALT     Albumin level     AST     CBC with platelets differential     Creatinine     Complement C4     Complement C3     CRP inflammation     DNA double stranded antibodies     Erythrocyte sedimentation rate auto     UA with Microscopic reflex to Culture     Protein  random urine with Creat Ratio     Creatinine random urine     TSH with free T4 reflex     Ferritin     Iron and iron binding capacity     Vitamin D Deficiency       Nasrin King MD          Active Problem List:     Patient Active Problem List    Diagnosis Date Noted     Pelvic somatic dysfunction 07/01/2021     Priority: Medium     Pelvic floor dysfunction 04/15/2021     Priority: Medium     Myalgia of pelvic floor 04/15/2021     Priority: Medium     Urinary urgency 04/15/2021     Priority: Medium     Urinary frequency 04/15/2021     Priority: Medium     Mixed incontinence 04/15/2021     Priority: Medium     Encounter for sterilization 12/30/2020     Priority: Medium     Added automatically from request for surgery [id number]       Varicose veins of left lower extremity with pain 10/29/2019     Priority: Medium     JAVIER (generalized anxiety disorder)      Priority: Medium     has a Rx for Zoloft; not using currently       Undifferentiated connective tissue disease  (H)      Priority: Medium     Rosacea      Priority: Medium            History of Present Illness:   Heather Guillory presents for f/u of UCTD.    Today 7/7/2021: Had IUD removed,  Had one fallopian tube removed in 4/2021. Since IUD is out, periods are heavier and she feels tired a lot, usually by early evening. She could fall asleep any time of the day, but more evening. Around cycles, feels tired all the time. Considers putting another IUD to reduce bleeding. Takes MVI. 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 Started walking. Legs and feel achy towards the end of the day, sometimes has hands and feet pain. No oral ulcers. No CP, hair loss. Fully vaccinated, pfizer, no SE.         Review of Systems:   A comprehensive ROS was done. Positives are per HPI.          Past Medical History:     Past Medical History:   Diagnosis Date     JAVIER (generalized anxiety disorder)     50mg zoloft     Rosacea      Undifferentiated connective tissue disease (H)      Past Surgical History:   Procedure Laterality Date     COLONOSCOPY N/A 10/21/2020    Procedure: COLONOSCOPY;  Surgeon: Yang Pete MD;  Location: [facility] OR     IR ENDOVENOUS ABLATION VARICOSE VEINS  10/28/2019     IR FOLLOW UP VISIT OUTPATIENT  11/20/2019     IR STAB PHLEBECTOMY 10 - 20 STABS  10/28/2019     IR VEIN SCLEROSING MULTIPLE  10/28/2019     LAPAROSCOPIC SALPINGECTOMY Bilateral 4/23/2021    Procedure: SALPINGECTOMY, LAPAROSCOPIC BILATERAL;  Surgeon: Cate Mansfield MD;  [facility]     OPERATIVE HYSTEROSCOPY WITH MORCELLATOR  4/8/2014    Procedure: OPERATIVE HYSTEROSCOPY WITH MORCELLATOR;  Hysteroscopic Polypectomy;  Surgeon: Cate Mansfield MD;  [facility]     REMOVE INTRAUTERINE DEVICE N/A 4/23/2021    Procedure: removal of intrauterine device;  Surgeon: Cate Mansfield MD;  [facility] OR     Raynaud's syndrome since puberty. Her main trigger is cold.  She had two uneventful pregnancies with vaginal births, though the  conception of her first child required IVF.   she is underwent embryo transfer in July 2017, now pregnant.   cervical radiculopathy, 2017.       Social History:     Social History     Occupational History     Occupation:      Employer: [employer]     Comment: laid off 7/2020   Tobacco Use     Smoking status: Never Smoker     Smokeless tobacco: Never Used   Substance and Sexual Activity     Alcohol use: No     Alcohol/week: 0.0 standard drinks     Drug use: No     Sexual activity: Yes     Partners: Male     Birth control/protection: I.U.D.     Comment: Mirena placed in 2018            Family History:     Family History   Problem Relation Age of Onset     Hypertension Paternal Grandfather      Lung Cancer Paternal Grandfather         smoker     Diabetes Type 2  Paternal Grandfather      Breast Cancer Paternal Grandmother      Hypertension Paternal Grandmother      Colon Cancer Maternal Grandfather      Alzheimer Disease Maternal Grandmother      Cervical Cancer Maternal Grandmother      Arrhythmia Brother         details unknown; procedure in his 20s     Anxiety Disorder Brother      Elijah Disease Sister      Myocardial Infarction No family hx of      Cerebrovascular Disease No family hx of      Coronary Artery Disease Early Onset No family hx of      Ovarian Cancer No family hx of      No history of AI disease       Allergies:     Allergies   Allergen Reactions     Benzoyl Peroxide Swelling     Duricef [Cefadroxil] Unknown     Monistat [Miconazole] Swelling     Sulfa Drugs Rash            Medications:     Current Outpatient Medications   Medication Sig Dispense Refill     hydroxychloroquine (PLAQUENIL) 200 MG tablet Take 1 tablet (200 mg) by mouth 2 times daily 180 tablet 3     lactobacillus rhamnosus, GG, (CULTURELL) capsule Take 1 capsule by mouth 2 times daily       metroNIDAZOLE (METROCREAM) 0.75 % external cream Apply topically 2 times daily       sertraline (ZOLOFT) 100 MG tablet Take 1  tablet (100 mg) by mouth daily 90 tablet 3     vitamin D3 (CHOLECALCIFEROL) 50 mcg (2000 units) tablet Take 1 tablet by mouth daily       acetaminophen (TYLENOL) 325 MG tablet Take 3 tablets (975 mg) by mouth every 6 hours as needed for mild pain (Patient not taking: Reported on 7/7/2021) 50 tablet 0     ibuprofen (ADVIL/MOTRIN) 800 MG tablet Take 1 tablet (800 mg) by mouth every 6 hours as needed for other (mild and/or inflammatory pain) (Patient not taking: Reported on 7/7/2021) 30 tablet 0     levonorgestrel (MIRENA, 52 MG,) 20 MCG/24HR IUD 1 each (20 mcg) by Intrauterine route once for 1 dose 1 each 0     nitroFURantoin macrocrystal (MACRODANTIN) 50 MG capsule Take 1 capsule (50 mg) PO as needed after sexual activity for UTI prevention. (Patient not taking: Reported on 5/4/2021) 30 capsule 3     oxyCODONE (ROXICODONE) 5 MG tablet Take 1 tablet (5 mg) by mouth every 6 hours as needed for moderate to severe pain (Patient not taking: Reported on 5/4/2021) 5 tablet 0     senna-docusate (SENOKOT-S/PERICOLACE) 8.6-50 MG tablet Take 1-2 tablets by mouth 2 times daily (Patient not taking: Reported on 5/4/2021) 30 tablet 0            Physical Exam:   Constitutional: well-developed, pleasant, NAD  Eyes: nl EOM, conjunctiva, sclera  Resp: breathing normally  MS: no active synovitis   Skin: no skin rash  Neuro: nl cranial nerves  Psych: nl affect         Data:     Labs from outside laboratory reviewed from 8/12/16  RNP antibodies 1.6- positive    Negative for Parvo Virus B19, Lyme Disease  Negative SLE panel   Negative SSA, SSB  Negative Anti-Sury-1  Negative Centromere B antibodies  Negative  CCP  Component      Latest Ref Rng & Units 1/14/2021   WBC      4.0 - 11.0 10e9/L 5.9   RBC Count      3.8 - 5.2 10e12/L 4.54   Hemoglobin      11.7 - 15.7 g/dL 13.3   Hematocrit      35.0 - 47.0 % 40.8   MCV      78 - 100 fl 90   MCH      26.5 - 33.0 pg 29.3   MCHC      31.5 - 36.5 g/dL 32.6   RDW      10.0 - 15.0 % 12.0   Platelet  Count      150 - 450 10e9/L 192   Diff Method       Automated Method   % Neutrophils      % 48.6   % Lymphocytes      % 42.2   % Monocytes      % 5.4   % Eosinophils      % 2.5   % Basophils      % 1.0   % Immature Granulocytes      % 0.3   Nucleated RBCs      0 /100 0   Absolute Neutrophil      1.6 - 8.3 10e9/L 2.9   Absolute Lymphocytes      0.8 - 5.3 10e9/L 2.5   Absolute Monocytes      0.0 - 1.3 10e9/L 0.3   Absolute Eosinophils      0.0 - 0.7 10e9/L 0.2   Absolute Basophils      0.0 - 0.2 10e9/L 0.1   Abs Immature Granulocytes      0 - 0.4 10e9/L 0.0   Absolute Nucleated RBC       0.0   Color Urine       Yellow   Appearance Urine       Clear   Glucose Urine      NEG:Negative mg/dL Negative   Bilirubin Urine      NEG:Negative Negative   Ketones Urine      NEG:Negative mg/dL Negative   Specific Gravity Urine      1.003 - 1.035 1.010   Blood Urine      NEG:Negative Negative   pH Urine      5.0 - 7.0 pH 7.0   Protein Albumin Urine      NEG:Negative mg/dL Negative   Urobilinogen mg/dL      0.0 - 2.0 mg/dL 0.0   Nitrite Urine      NEG:Negative Negative   Leukocyte Esterase Urine      NEG:Negative Negative   Source       Midstream Urine   WBC Urine      0 - 5 /HPF <1   RBC Urine      0 - 2 /HPF 2   Squamous Epithelial /HPF Urine      0 - 1 /HPF 1   Creatinine      0.52 - 1.04 mg/dL 0.66   GFR Estimate      >60 mL/min/1.73:m2 >90   GFR Estimate If Black      >60 mL/min/1.73:m2 >90   Protein Random Urine      g/L 0.10   Protein Total Urine g/gr Creatinine      0 - 0.2 g/g Cr 0.19   AST      0 - 45 U/L 13   ALT      0 - 50 U/L 19   Albumin      3.4 - 5.0 g/dL 4.0   CRP Inflammation      0.0 - 8.0 mg/L <2.9   Sed Rate      0 - 20 mm/h 4   Complement C3      81 - 157 mg/dL 78 (L)   Complement C4      13 - 39 mg/dL 19   DNA-ds      <10 IU/mL 5   Creatinine Urine      mg/dL 55

## 2021-07-08 ENCOUNTER — THERAPY VISIT (OUTPATIENT)
Dept: PHYSICAL THERAPY | Facility: CLINIC | Age: 41
End: 2021-07-08
Payer: COMMERCIAL

## 2021-07-08 DIAGNOSIS — R53.83 OTHER FATIGUE: ICD-10-CM

## 2021-07-08 DIAGNOSIS — M79.18 MYALGIA OF PELVIC FLOOR: ICD-10-CM

## 2021-07-08 DIAGNOSIS — N39.46 MIXED INCONTINENCE: ICD-10-CM

## 2021-07-08 DIAGNOSIS — M99.05 PELVIC SOMATIC DYSFUNCTION: ICD-10-CM

## 2021-07-08 DIAGNOSIS — M35.9 UNDIFFERENTIATED CONNECTIVE TISSUE DISEASE (H): ICD-10-CM

## 2021-07-08 LAB
ALBUMIN SERPL-MCNC: 4.1 G/DL (ref 3.4–5)
ALBUMIN UR-MCNC: ABNORMAL MG/DL
ALT SERPL W P-5'-P-CCNC: 21 U/L (ref 0–50)
APPEARANCE UR: CLEAR
AST SERPL W P-5'-P-CCNC: 17 U/L (ref 0–45)
BACTERIA #/AREA URNS HPF: ABNORMAL /HPF
BASOPHILS # BLD AUTO: 0 10E9/L (ref 0–0.2)
BASOPHILS NFR BLD AUTO: 0.5 %
BILIRUB UR QL STRIP: NEGATIVE
COLOR UR AUTO: YELLOW
CREAT SERPL-MCNC: 0.72 MG/DL (ref 0.52–1.04)
CREAT UR-MCNC: 201 MG/DL
CRP SERPL-MCNC: <2.9 MG/L (ref 0–8)
DEPRECATED CALCIDIOL+CALCIFEROL SERPL-MC: 51 UG/L (ref 20–75)
DIFFERENTIAL METHOD BLD: NORMAL
EOSINOPHIL # BLD AUTO: 0.2 10E9/L (ref 0–0.7)
EOSINOPHIL NFR BLD AUTO: 3.1 %
ERYTHROCYTE [DISTWIDTH] IN BLOOD BY AUTOMATED COUNT: 12.4 % (ref 10–15)
ERYTHROCYTE [SEDIMENTATION RATE] IN BLOOD BY WESTERGREN METHOD: 6 MM/H (ref 0–20)
FERRITIN SERPL-MCNC: 17 NG/ML (ref 12–150)
GFR SERPL CREATININE-BSD FRML MDRD: >90 ML/MIN/{1.73_M2}
GLUCOSE UR STRIP-MCNC: NEGATIVE MG/DL
HCT VFR BLD AUTO: 38.8 % (ref 35–47)
HGB BLD-MCNC: 13.3 G/DL (ref 11.7–15.7)
HGB UR QL STRIP: ABNORMAL
IRON SATN MFR SERPL: 16 % (ref 15–46)
IRON SERPL-MCNC: 54 UG/DL (ref 35–180)
KETONES UR STRIP-MCNC: NEGATIVE MG/DL
LEUKOCYTE ESTERASE UR QL STRIP: NEGATIVE
LYMPHOCYTES # BLD AUTO: 2 10E9/L (ref 0.8–5.3)
LYMPHOCYTES NFR BLD AUTO: 34.3 %
MCH RBC QN AUTO: 29.9 PG (ref 26.5–33)
MCHC RBC AUTO-ENTMCNC: 34.3 G/DL (ref 31.5–36.5)
MCV RBC AUTO: 87 FL (ref 78–100)
MONOCYTES # BLD AUTO: 0.4 10E9/L (ref 0–1.3)
MONOCYTES NFR BLD AUTO: 7.7 %
MUCOUS THREADS #/AREA URNS LPF: PRESENT /LPF
NEUTROPHILS # BLD AUTO: 3.1 10E9/L (ref 1.6–8.3)
NEUTROPHILS NFR BLD AUTO: 54.4 %
NITRATE UR QL: NEGATIVE
NON-SQ EPI CELLS #/AREA URNS LPF: ABNORMAL /LPF
PH UR STRIP: 7 PH (ref 5–7)
PLATELET # BLD AUTO: 207 10E9/L (ref 150–450)
PROT UR-MCNC: 0.38 G/L
PROT/CREAT 24H UR: 0.19 G/G CR (ref 0–0.2)
RBC # BLD AUTO: 4.45 10E12/L (ref 3.8–5.2)
RBC #/AREA URNS AUTO: ABNORMAL /HPF
SOURCE: ABNORMAL
SP GR UR STRIP: >1.03 (ref 1–1.03)
TIBC SERPL-MCNC: 336 UG/DL (ref 240–430)
TSH SERPL DL<=0.005 MIU/L-ACNC: 1.16 MU/L (ref 0.4–4)
UROBILINOGEN UR STRIP-ACNC: 0.2 EU/DL (ref 0.2–1)
WBC # BLD AUTO: 5.8 10E9/L (ref 4–11)
WBC #/AREA URNS AUTO: ABNORMAL /HPF

## 2021-07-08 PROCEDURE — 86160 COMPLEMENT ANTIGEN: CPT | Performed by: INTERNAL MEDICINE

## 2021-07-08 PROCEDURE — 84450 TRANSFERASE (AST) (SGOT): CPT | Performed by: INTERNAL MEDICINE

## 2021-07-08 PROCEDURE — 82565 ASSAY OF CREATININE: CPT | Performed by: INTERNAL MEDICINE

## 2021-07-08 PROCEDURE — 86225 DNA ANTIBODY NATIVE: CPT | Performed by: INTERNAL MEDICINE

## 2021-07-08 PROCEDURE — 85652 RBC SED RATE AUTOMATED: CPT | Performed by: INTERNAL MEDICINE

## 2021-07-08 PROCEDURE — 85025 COMPLETE CBC W/AUTO DIFF WBC: CPT | Performed by: INTERNAL MEDICINE

## 2021-07-08 PROCEDURE — 83550 IRON BINDING TEST: CPT | Performed by: INTERNAL MEDICINE

## 2021-07-08 PROCEDURE — 36415 COLL VENOUS BLD VENIPUNCTURE: CPT | Performed by: INTERNAL MEDICINE

## 2021-07-08 PROCEDURE — 83540 ASSAY OF IRON: CPT | Performed by: INTERNAL MEDICINE

## 2021-07-08 PROCEDURE — 97110 THERAPEUTIC EXERCISES: CPT | Mod: GP | Performed by: PHYSICAL THERAPIST

## 2021-07-08 PROCEDURE — 82306 VITAMIN D 25 HYDROXY: CPT | Performed by: INTERNAL MEDICINE

## 2021-07-08 PROCEDURE — 81001 URINALYSIS AUTO W/SCOPE: CPT | Performed by: INTERNAL MEDICINE

## 2021-07-08 PROCEDURE — 84156 ASSAY OF PROTEIN URINE: CPT | Performed by: INTERNAL MEDICINE

## 2021-07-08 PROCEDURE — 82040 ASSAY OF SERUM ALBUMIN: CPT | Performed by: INTERNAL MEDICINE

## 2021-07-08 PROCEDURE — 86140 C-REACTIVE PROTEIN: CPT | Performed by: INTERNAL MEDICINE

## 2021-07-08 PROCEDURE — 84443 ASSAY THYROID STIM HORMONE: CPT | Performed by: INTERNAL MEDICINE

## 2021-07-08 PROCEDURE — 84460 ALANINE AMINO (ALT) (SGPT): CPT | Performed by: INTERNAL MEDICINE

## 2021-07-08 PROCEDURE — 82728 ASSAY OF FERRITIN: CPT | Performed by: INTERNAL MEDICINE

## 2021-07-08 PROCEDURE — 97112 NEUROMUSCULAR REEDUCATION: CPT | Mod: GP | Performed by: PHYSICAL THERAPIST

## 2021-07-08 PROCEDURE — 97140 MANUAL THERAPY 1/> REGIONS: CPT | Mod: GP | Performed by: PHYSICAL THERAPIST

## 2021-07-09 LAB
C3 SERPL-MCNC: 98 MG/DL (ref 81–157)
C4 SERPL-MCNC: 23 MG/DL (ref 13–39)
DSDNA AB SER-ACNC: 5 IU/ML

## 2021-07-09 NOTE — RESULT ENCOUNTER NOTE
Did you have your period at the time of urine collection? Your iron is low, I recommend to start taking one slow iron a day.

## 2021-07-15 ENCOUNTER — THERAPY VISIT (OUTPATIENT)
Dept: PHYSICAL THERAPY | Facility: CLINIC | Age: 41
End: 2021-07-15
Payer: COMMERCIAL

## 2021-07-15 DIAGNOSIS — N39.46 MIXED INCONTINENCE: ICD-10-CM

## 2021-07-15 DIAGNOSIS — M99.05 PELVIC SOMATIC DYSFUNCTION: ICD-10-CM

## 2021-07-15 DIAGNOSIS — M79.18 MYALGIA OF PELVIC FLOOR: ICD-10-CM

## 2021-07-15 PROCEDURE — 97110 THERAPEUTIC EXERCISES: CPT | Mod: GP | Performed by: PHYSICAL THERAPIST

## 2021-07-15 PROCEDURE — 97140 MANUAL THERAPY 1/> REGIONS: CPT | Mod: GP | Performed by: PHYSICAL THERAPIST

## 2021-07-15 PROCEDURE — 97112 NEUROMUSCULAR REEDUCATION: CPT | Mod: GP | Performed by: PHYSICAL THERAPIST

## 2021-07-26 ENCOUNTER — TELEPHONE (OUTPATIENT)
Dept: RHEUMATOLOGY | Facility: CLINIC | Age: 41
End: 2021-07-26

## 2021-07-26 NOTE — TELEPHONE ENCOUNTER
[facility] Call Center    Phone Message    May a detailed message be left on voicemail: yes,  [phone]    Reason for Call: Symptoms or Concerns     If patient has red-flag symptoms, warm transfer to triage line    Current symptom or concern: Pt is having a lot of discomfort below rib cage and lower. Pt is also having some nausea     Symptoms have been present for:  1 week(s)    Has patient previously been seen for this? No    By Dr King    Date: last visit on 7/7/21    Are there any new or worsening symptoms? Yes: The pain is getting worse. Today is the worst it has been.       Action Taken: Message routed to:  [facility] ([facility]): Adult Rheum Triage, RN    Travel Screening: Not Applicable

## 2021-07-26 NOTE — TELEPHONE ENCOUNTER
Called and spoke with pt, for the past week or so, discomfort on the upper left abdomen under the rib cage that was causing nausea, and has slowly become more noticeable.  The nausea has been getting worse.  She feels it all the time now.  She can't ignore it now, pain is now 5-6/10.  More of constant pain, but not sharp. Is tender to the touch.  Does feel that it is under the rib cage. No trauma to area at all. No exercise that would cause this.  She really doesn't have another provider that she sees closely she would like to get Dr. King's opinion.      She says that it is pretty hard to function with the pain. She was even considering going into the ER or urgent care to be seen because of how it is interfering with day to day life.    Will send to Dr. King to review and advise.     Dyana Rankin RN  Rheumatology Clinic

## 2021-07-27 NOTE — TELEPHONE ENCOUNTER
Nasrin King MD Beard, Dyana, RN  Caller: Unspecified (Yesterday, 12:11 PM)  UC or PCP to be examined or evaluated.     Called and updated pt with Dr. King's recommendation.    Dyana Rankin RN  Rheumatology Clinic

## 2021-08-26 ENCOUNTER — TELEPHONE (OUTPATIENT)
Dept: OBGYN | Facility: CLINIC | Age: 41
End: 2021-08-26

## 2021-08-26 NOTE — TELEPHONE ENCOUNTER
Message received from patient reporting heavy bleeding with menstrual cycle. Pt had bilateral salpingectomy 04/23/2021, IUD removed at that time.     Called and spoke with patient. She reports bleeding with periods has been very heavy since removal of IUD in April. States cycle is every 23-26 days, 5-7 days long. States bleeding is very heavy, on day 2 is soaking through tampon every 45 minutes and through pads in addition. Would like to discuss options to help manage heavy bleeding.     Offered her appointment 9/14 at 1:45, pt accepted and confirmed availability. Reviewed bleeding precautions and encouraged pt to reach out with any question or concerns. Pt verbalized understanding and agreement.

## 2021-08-27 ENCOUNTER — OFFICE VISIT (OUTPATIENT)
Dept: INTERNAL MEDICINE | Facility: CLINIC | Age: 41
End: 2021-08-27
Payer: COMMERCIAL

## 2021-08-27 ENCOUNTER — LAB (OUTPATIENT)
Dept: LAB | Facility: CLINIC | Age: 41
End: 2021-08-27
Payer: COMMERCIAL

## 2021-08-27 VITALS
WEIGHT: 174 LBS | HEART RATE: 65 BPM | SYSTOLIC BLOOD PRESSURE: 101 MMHG | DIASTOLIC BLOOD PRESSURE: 67 MMHG | OXYGEN SATURATION: 100 % | BODY MASS INDEX: 27.25 KG/M2

## 2021-08-27 DIAGNOSIS — Z13.220 SCREENING FOR LIPID DISORDERS: ICD-10-CM

## 2021-08-27 DIAGNOSIS — Z13.21 ENCOUNTER FOR VITAMIN DEFICIENCY SCREENING: ICD-10-CM

## 2021-08-27 DIAGNOSIS — N92.0 MENORRHAGIA WITH REGULAR CYCLE: Primary | ICD-10-CM

## 2021-08-27 DIAGNOSIS — N92.0 MENORRHAGIA WITH REGULAR CYCLE: ICD-10-CM

## 2021-08-27 LAB
ALBUMIN SERPL-MCNC: 3.9 G/DL (ref 3.4–5)
ALP SERPL-CCNC: 79 U/L (ref 40–150)
ALT SERPL W P-5'-P-CCNC: 12 U/L (ref 0–50)
ANION GAP SERPL CALCULATED.3IONS-SCNC: 5 MMOL/L (ref 3–14)
AST SERPL W P-5'-P-CCNC: 12 U/L (ref 0–45)
BASOPHILS # BLD AUTO: 0.1 10E3/UL (ref 0–0.2)
BASOPHILS NFR BLD AUTO: 1 %
BILIRUB SERPL-MCNC: 0.2 MG/DL (ref 0.2–1.3)
BUN SERPL-MCNC: 11 MG/DL (ref 7–30)
CALCIUM SERPL-MCNC: 8.9 MG/DL (ref 8.5–10.1)
CHLORIDE BLD-SCNC: 113 MMOL/L (ref 94–109)
CHOLEST SERPL-MCNC: 182 MG/DL
CO2 SERPL-SCNC: 26 MMOL/L (ref 20–32)
CREAT SERPL-MCNC: 0.79 MG/DL (ref 0.52–1.04)
DEPRECATED CALCIDIOL+CALCIFEROL SERPL-MC: 45 UG/L (ref 20–75)
EOSINOPHIL # BLD AUTO: 0.2 10E3/UL (ref 0–0.7)
EOSINOPHIL NFR BLD AUTO: 3 %
ERYTHROCYTE [DISTWIDTH] IN BLOOD BY AUTOMATED COUNT: 11.9 % (ref 10–15)
FASTING STATUS PATIENT QL REPORTED: YES
GFR SERPL CREATININE-BSD FRML MDRD: >90 ML/MIN/1.73M2
GLUCOSE BLD-MCNC: 99 MG/DL (ref 70–99)
HCT VFR BLD AUTO: 38.3 % (ref 35–47)
HDLC SERPL-MCNC: 59 MG/DL
HGB BLD-MCNC: 12.3 G/DL (ref 11.7–15.7)
IMM GRANULOCYTES # BLD: 0 10E3/UL
IMM GRANULOCYTES NFR BLD: 0 %
LDLC SERPL CALC-MCNC: 113 MG/DL
LYMPHOCYTES # BLD AUTO: 2 10E3/UL (ref 0.8–5.3)
LYMPHOCYTES NFR BLD AUTO: 36 %
MCH RBC QN AUTO: 28.2 PG (ref 26.5–33)
MCHC RBC AUTO-ENTMCNC: 32.1 G/DL (ref 31.5–36.5)
MCV RBC AUTO: 88 FL (ref 78–100)
MONOCYTES # BLD AUTO: 0.4 10E3/UL (ref 0–1.3)
MONOCYTES NFR BLD AUTO: 6 %
NEUTROPHILS # BLD AUTO: 3 10E3/UL (ref 1.6–8.3)
NEUTROPHILS NFR BLD AUTO: 54 %
NONHDLC SERPL-MCNC: 123 MG/DL
NRBC # BLD AUTO: 0 10E3/UL
NRBC BLD AUTO-RTO: 0 /100
PLATELET # BLD AUTO: 218 10E3/UL (ref 150–450)
POTASSIUM BLD-SCNC: 4.4 MMOL/L (ref 3.4–5.3)
PROT SERPL-MCNC: 7.2 G/DL (ref 6.8–8.8)
RBC # BLD AUTO: 4.36 10E6/UL (ref 3.8–5.2)
SODIUM SERPL-SCNC: 144 MMOL/L (ref 133–144)
TRIGL SERPL-MCNC: 51 MG/DL
TSH SERPL DL<=0.005 MIU/L-ACNC: 1.05 MU/L (ref 0.4–4)
WBC # BLD AUTO: 5.6 10E3/UL (ref 4–11)

## 2021-08-27 PROCEDURE — 99214 OFFICE O/P EST MOD 30 MIN: CPT | Performed by: INTERNAL MEDICINE

## 2021-08-27 PROCEDURE — 36415 COLL VENOUS BLD VENIPUNCTURE: CPT | Performed by: PATHOLOGY

## 2021-08-27 PROCEDURE — 82306 VITAMIN D 25 HYDROXY: CPT | Mod: 90 | Performed by: PATHOLOGY

## 2021-08-27 PROCEDURE — 80061 LIPID PANEL: CPT | Performed by: PATHOLOGY

## 2021-08-27 PROCEDURE — 80050 GENERAL HEALTH PANEL: CPT | Performed by: PATHOLOGY

## 2021-08-27 ASSESSMENT — ANXIETY QUESTIONNAIRES
1. FEELING NERVOUS, ANXIOUS, OR ON EDGE: SEVERAL DAYS
7. FEELING AFRAID AS IF SOMETHING AWFUL MIGHT HAPPEN: NOT AT ALL
2. NOT BEING ABLE TO STOP OR CONTROL WORRYING: SEVERAL DAYS
IF YOU CHECKED OFF ANY PROBLEMS ON THIS QUESTIONNAIRE, HOW DIFFICULT HAVE THESE PROBLEMS MADE IT FOR YOU TO DO YOUR WORK, TAKE CARE OF THINGS AT HOME, OR GET ALONG WITH OTHER PEOPLE: NOT DIFFICULT AT ALL
GAD7 TOTAL SCORE: 2
3. WORRYING TOO MUCH ABOUT DIFFERENT THINGS: NOT AT ALL
5. BEING SO RESTLESS THAT IT IS HARD TO SIT STILL: NOT AT ALL
6. BECOMING EASILY ANNOYED OR IRRITABLE: NOT AT ALL

## 2021-08-27 ASSESSMENT — PATIENT HEALTH QUESTIONNAIRE - PHQ9
SUM OF ALL RESPONSES TO PHQ QUESTIONS 1-9: 2
5. POOR APPETITE OR OVEREATING: NOT AT ALL

## 2021-08-27 ASSESSMENT — PAIN SCALES - GENERAL: PAINLEVEL: NO PAIN (0)

## 2021-08-27 NOTE — NURSING NOTE
Chief Complaint   Patient presents with     Recheck Medication     pt here to follow up on abdominal issues, some have resolved       Carlin Calhoun CMA, EMT at 8:11 AM on 8/27/2021.

## 2021-08-28 ASSESSMENT — ANXIETY QUESTIONNAIRES: GAD7 TOTAL SCORE: 2

## 2021-09-09 ENCOUNTER — ANCILLARY PROCEDURE (OUTPATIENT)
Dept: ULTRASOUND IMAGING | Facility: CLINIC | Age: 41
End: 2021-09-09
Attending: INTERNAL MEDICINE
Payer: COMMERCIAL

## 2021-09-09 DIAGNOSIS — N92.0 MENORRHAGIA WITH REGULAR CYCLE: ICD-10-CM

## 2021-09-09 PROCEDURE — 76856 US EXAM PELVIC COMPLETE: CPT | Performed by: RADIOLOGY

## 2021-09-09 PROCEDURE — 76830 TRANSVAGINAL US NON-OB: CPT | Performed by: RADIOLOGY

## 2021-09-10 ENCOUNTER — TELEPHONE (OUTPATIENT)
Dept: OBGYN | Facility: CLINIC | Age: 41
End: 2021-09-10

## 2021-09-10 DIAGNOSIS — D25.0 SUBMUCOUS LEIOMYOMA OF UTERUS: Primary | ICD-10-CM

## 2021-09-10 NOTE — TELEPHONE ENCOUNTER
Telephone call    Discussed enlarging fibroid.  Pt with heavy bleeding and now mass symptoms.      Recommend EMB as well as pelvic MRI to better characterize.  Pt has appt on Tues with resident clinic which I will be supervising. Plan EMB then.  MRI ordered today.     Cate Mansfield MD, FACOG  (she/her/hers)    Department of Ob/Gyn/Women's Health  [facility]  [facility]  [address] [place] [address]. [place] [address]  [email or web address]  p. [phone]  f. [phone]  9/10/2021  4:11 PM

## 2021-09-14 ENCOUNTER — OFFICE VISIT (OUTPATIENT)
Dept: OBGYN | Facility: CLINIC | Age: 41
End: 2021-09-14
Attending: OBSTETRICS & GYNECOLOGY
Payer: COMMERCIAL

## 2021-09-14 VITALS
SYSTOLIC BLOOD PRESSURE: 109 MMHG | BODY MASS INDEX: 27.25 KG/M2 | DIASTOLIC BLOOD PRESSURE: 75 MMHG | HEART RATE: 69 BPM | WEIGHT: 174 LBS

## 2021-09-14 DIAGNOSIS — N92.0 MENORRHAGIA WITH REGULAR CYCLE: ICD-10-CM

## 2021-09-14 DIAGNOSIS — N93.9 ABNORMAL UTERINE BLEEDING (AUB): Primary | ICD-10-CM

## 2021-09-14 PROCEDURE — G0463 HOSPITAL OUTPT CLINIC VISIT: HCPCS

## 2021-09-14 PROCEDURE — 88305 TISSUE EXAM BY PATHOLOGIST: CPT | Mod: TC | Performed by: STUDENT IN AN ORGANIZED HEALTH CARE EDUCATION/TRAINING PROGRAM

## 2021-09-14 PROCEDURE — 58100 BIOPSY OF UTERUS LINING: CPT | Performed by: STUDENT IN AN ORGANIZED HEALTH CARE EDUCATION/TRAINING PROGRAM

## 2021-09-14 PROCEDURE — 88305 TISSUE EXAM BY PATHOLOGIST: CPT | Mod: 26 | Performed by: PATHOLOGY

## 2021-09-14 PROCEDURE — 58100 BIOPSY OF UTERUS LINING: CPT | Mod: GC | Performed by: OBSTETRICS & GYNECOLOGY

## 2021-09-14 RX ORDER — TRANEXAMIC ACID 650 MG/1
1300 TABLET ORAL 3 TIMES DAILY
Qty: 60 TABLET | Refills: 1 | Status: SHIPPED | OUTPATIENT
Start: 2021-09-14 | End: 2022-01-28

## 2021-09-14 ASSESSMENT — ANXIETY QUESTIONNAIRES
2. NOT BEING ABLE TO STOP OR CONTROL WORRYING: NOT AT ALL
GAD7 TOTAL SCORE: 0
5. BEING SO RESTLESS THAT IT IS HARD TO SIT STILL: NOT AT ALL
3. WORRYING TOO MUCH ABOUT DIFFERENT THINGS: NOT AT ALL
6. BECOMING EASILY ANNOYED OR IRRITABLE: NOT AT ALL
7. FEELING AFRAID AS IF SOMETHING AWFUL MIGHT HAPPEN: NOT AT ALL
1. FEELING NERVOUS, ANXIOUS, OR ON EDGE: NOT AT ALL

## 2021-09-14 ASSESSMENT — PATIENT HEALTH QUESTIONNAIRE - PHQ9
5. POOR APPETITE OR OVEREATING: NOT AT ALL
SUM OF ALL RESPONSES TO PHQ QUESTIONS 1-9: 1

## 2021-09-14 ASSESSMENT — PAIN SCALES - GENERAL: PAINLEVEL: NO PAIN (0)

## 2021-09-14 NOTE — LETTER
2021       RE: Heather Guillory  [address]  [place] [address]     Dear Colleague,    Thank you for referring your patient, Heather Guillory, to the [facility] [place] at [facility]. Please see a copy of my visit note below.    [facility]  2021    Reason For Visit: heavy bleeding    S: Heather Guillory is a 41 year old  here for heavy vaginal bleeding.    She had a lsc BS and IUD removal in 2021. Since then she has had very heavy bleeding. At its worst, she has to change her pads every 45min-1hr. She has regular periods every ~23 days. Has some related back pain. Sometimes feels warm and sweaty, doesn't think it is hot flashes.     Denies fevers, chills, headaches, chest pain, SOB, abdominal pain, n/v, constipation, diarrhea, difficulties with urination.     OBGYN Hx  Length of menstrual cycle: every 23 days  Heavy bleeding: yes  Pain with menses: back pain and cramping  LMP: Patient's last menstrual period was 2021.  Pap Smears: Last 2020 NILM, HPV neg  Contraception: s/p bilateral salpingectomy    O:   /75   Pulse 69   Wt 78.9 kg (174 lb)   LMP 2021   Breastfeeding No   BMI 27.25 kg/m    Exam:  Gen: alert, no acute distress  CV: Well perfused, regular rate  Resp: On room air, no increased work of breathing  : normal vulva and vagina. Normal appearing cervix without active bleeding.     EMB procedure.   The speculum was placed and the cervix visualized. The cervix was cleansed with betadine x3. Tenaculu placed on the anterior lip of the cervix. EMB pipelle passed easily through the cervix to the fundus, suction deployed and the pipelle was rotated with good tissue. This was repeated for a total of 3 passes. The tenaculum was removed, cervix hemostatic, and all instruments removed. The patient tolerated the procedure well    Pelvic US 2021  IMPRESSION:   1.  Interval increase  in size of large uterine fibroid which appears to be a mixture of intramural with a submucosal component. Mass effect on adjacent endometrial stripe.  2.  Borderline thickening of the endometrial stripe, difficult to measure due to mass effect from the fibroid.  3.  Ovaries within normal limits.    A/P: 41 year old year old  here for heavy menstrual bleeding    # heavy menstrual bleeding  US demonstrated large uterine fibroid that is increased in size from prior. EMB performed today. An MRI was previously ordered, given the phone number to schedule today. This will help with evaluation of the fibroid. Based on these results, will discuss management including possible surgical management. In the interim, will start oral TXA with her periods to reduce bleeding. All questions answered.   - lysteda 1300mg TID for 5d with her periods    Patient seen with Dr. Mansfield who was present for the procedure.     Chepe Melo MD  OB/GYN PGY-2  2021 11:02 PM     The Patient was seen in [facility]uity Clinic by CHEPE MELO.  I was present for EMB as noted above, I reviewed the history & exam. Assessment and plan were jointly made.    Cate Mansfield MD

## 2021-09-14 NOTE — PROGRESS NOTES
[facility]  2021    Reason For Visit: heavy bleeding    S: Heather Guillory is a 41 year old  here for heavy vaginal bleeding.    She had a lsc BS and IUD removal in 2021. Since then she has had very heavy bleeding. At its worst, she has to change her pads every 45min-1hr. She has regular periods every ~23 days. Has some related back pain. Sometimes feels warm and sweaty, doesn't think it is hot flashes.     Denies fevers, chills, headaches, chest pain, SOB, abdominal pain, n/v, constipation, diarrhea, difficulties with urination.     OBGYN Hx  Length of menstrual cycle: every 23 days  Heavy bleeding: yes  Pain with menses: back pain and cramping  LMP: Patient's last menstrual period was 2021.  Pap Smears: Last 2020 NILM, HPV neg  Contraception: s/p bilateral salpingectomy    O:   /75   Pulse 69   Wt 78.9 kg (174 lb)   LMP 2021   Breastfeeding No   BMI 27.25 kg/m    Exam:  Gen: alert, no acute distress  CV: Well perfused, regular rate  Resp: On room air, no increased work of breathing  : normal vulva and vagina. Normal appearing cervix without active bleeding.     EMB procedure.   The speculum was placed and the cervix visualized. The cervix was cleansed with betadine x3. Tenaculu placed on the anterior lip of the cervix. EMB pipelle passed easily through the cervix to the fundus, suction deployed and the pipelle was rotated with good tissue. This was repeated for a total of 3 passes. The tenaculum was removed, cervix hemostatic, and all instruments removed. The patient tolerated the procedure well    Pelvic US 2021  IMPRESSION:   1.  Interval increase in size of large uterine fibroid which appears to be a mixture of intramural with a submucosal component. Mass effect on adjacent endometrial stripe.  2.  Borderline thickening of the endometrial stripe, difficult to measure due to mass effect from the fibroid.  3.  Ovaries within normal limits.    A/P: 41 year old  year old  here for heavy menstrual bleeding    # heavy menstrual bleeding  US demonstrated large uterine fibroid that is increased in size from prior. EMB performed today. An MRI was previously ordered, given the phone number to schedule today. This will help with evaluation of the fibroid. Based on these results, will discuss management including possible surgical management. In the interim, will start oral TXA with her periods to reduce bleeding. All questions answered.   - lysteda 1300mg TID for 5d with her periods    Patient seen with Dr. Mansfield who was present for the procedure.     Chepe Melo MD  OB/GYN PGY-2  2021 11:02 PM     The Patient was seen in [facility] by CHEPE MELO.  I was present for EMB as noted above, I reviewed the history & exam. Assessment and plan were jointly made.    Cate Mansfield MD

## 2021-09-14 NOTE — NURSING NOTE
Chief Complaint   Patient presents with     Follow Up     C/O heavy bleeding with periods ,wants to discuss birth control options   Tigist Lloyd LPN

## 2021-09-15 ASSESSMENT — ANXIETY QUESTIONNAIRES: GAD7 TOTAL SCORE: 0

## 2021-09-16 LAB
PATH REPORT.COMMENTS IMP SPEC: NORMAL
PATH REPORT.COMMENTS IMP SPEC: NORMAL
PATH REPORT.FINAL DX SPEC: NORMAL
PATH REPORT.GROSS SPEC: NORMAL
PATH REPORT.MICROSCOPIC SPEC OTHER STN: NORMAL
PATH REPORT.RELEVANT HX SPEC: NORMAL
PHOTO IMAGE: NORMAL

## 2021-09-20 DIAGNOSIS — F41.9 ANXIETY: Primary | ICD-10-CM

## 2021-09-20 RX ORDER — LORAZEPAM 1 MG/1
TABLET ORAL
Qty: 1 TABLET | Refills: 0 | Status: SHIPPED | OUTPATIENT
Start: 2021-09-20 | End: 2022-01-28

## 2021-09-22 ENCOUNTER — HOSPITAL ENCOUNTER (OUTPATIENT)
Dept: MRI IMAGING | Facility: CLINIC | Age: 41
Discharge: HOME | End: 2021-09-22
Attending: OBSTETRICS & GYNECOLOGY | Admitting: OBSTETRICS & GYNECOLOGY
Payer: COMMERCIAL

## 2021-09-22 DIAGNOSIS — D25.0 SUBMUCOUS LEIOMYOMA OF UTERUS: ICD-10-CM

## 2021-09-22 PROCEDURE — 255N000002 HC RX 255 OP 636: Performed by: OBSTETRICS & GYNECOLOGY

## 2021-09-22 PROCEDURE — 72197 MRI PELVIS W/O & W/DYE: CPT

## 2021-09-22 PROCEDURE — 72197 MRI PELVIS W/O & W/DYE: CPT | Mod: 26 | Performed by: RADIOLOGY

## 2021-09-22 PROCEDURE — A9585 GADOBUTROL INJECTION: HCPCS | Performed by: OBSTETRICS & GYNECOLOGY

## 2021-09-22 RX ORDER — GADOBUTROL 604.72 MG/ML
0.1 INJECTION INTRAVENOUS ONCE
Status: COMPLETED | OUTPATIENT
Start: 2021-09-22 | End: 2021-09-22

## 2021-09-22 RX ADMIN — GADOBUTROL 7.5 ML: 604.72 INJECTION INTRAVENOUS at 18:28

## 2021-09-28 ENCOUNTER — TELEPHONE (OUTPATIENT)
Dept: OBGYN | Facility: CLINIC | Age: 41
End: 2021-09-28

## 2021-09-28 NOTE — TELEPHONE ENCOUNTER
LM for pt re: MRI findings.  Pt to call back to discuss any f/u moving forward.    Cate Mansfield MD, FACOG  (she/her/hers)    Department of Ob/Gyn/Women's Health  [facility]  [facility]  [address] [place] [address]. [place] [address]  [email or web address]  p. [phone]  f. [phone]

## 2021-09-29 ENCOUNTER — PRE VISIT (OUTPATIENT)
Dept: UROLOGY | Facility: CLINIC | Age: 41
End: 2021-09-29

## 2021-09-29 NOTE — TELEPHONE ENCOUNTER
Reason for visit: symptom check     Relevant information: PFD    Records/imaging/labs/orders: all records available    Pt called: no need for a call    At Rooming: standard

## 2021-10-03 ENCOUNTER — HEALTH MAINTENANCE LETTER (OUTPATIENT)
Age: 41
End: 2021-10-03

## 2021-10-08 ENCOUNTER — PREP FOR PROCEDURE (OUTPATIENT)
Dept: OBGYN | Facility: CLINIC | Age: 41
End: 2021-10-08

## 2021-10-08 DIAGNOSIS — D25.0 SUBMUCOUS LEIOMYOMA OF UTERUS: Primary | ICD-10-CM

## 2021-10-08 RX ORDER — ACETAMINOPHEN 325 MG/1
975 TABLET ORAL ONCE
Status: CANCELLED | OUTPATIENT
Start: 2021-10-08 | End: 2021-10-08

## 2021-10-08 RX ORDER — CLINDAMYCIN PHOSPHATE 900 MG/50ML
900 INJECTION, SOLUTION INTRAVENOUS SEE ADMIN INSTRUCTIONS
Status: CANCELLED | OUTPATIENT
Start: 2021-10-08

## 2021-10-08 RX ORDER — CLINDAMYCIN PHOSPHATE 900 MG/50ML
900 INJECTION, SOLUTION INTRAVENOUS
Status: CANCELLED | OUTPATIENT
Start: 2021-10-08

## 2021-10-08 RX ORDER — PHENAZOPYRIDINE HYDROCHLORIDE 100 MG/1
200 TABLET, FILM COATED ORAL ONCE
Status: CANCELLED | OUTPATIENT
Start: 2021-10-08 | End: 2021-10-08

## 2021-10-08 NOTE — PROGRESS NOTES
Pre op orders placed for Da myomectomy    Cate Mansfield MD, FACOG  (she/her/hers)    Department of Ob/Gyn/Women's Health  [employer] [facility]  [facility]  [address] [place] [address]. [place] [address]  [email or web address]  p. [phone]  f. [phone]

## 2021-10-11 ENCOUNTER — TELEPHONE (OUTPATIENT)
Dept: OBGYN | Facility: CLINIC | Age: 41
End: 2021-10-11

## 2021-10-12 ENCOUNTER — TELEPHONE (OUTPATIENT)
Dept: OBGYN | Facility: CLINIC | Age: 41
End: 2021-10-12

## 2021-10-12 NOTE — TELEPHONE ENCOUNTER
Patient informed that provider schedule for surgery has not been released for next year, will call patient ASAP to schedule procedure.

## 2021-10-26 ENCOUNTER — TELEPHONE (OUTPATIENT)
Dept: OBGYN | Facility: CLINIC | Age: 41
End: 2021-10-26

## 2021-10-26 DIAGNOSIS — Z11.52 ENCOUNTER FOR SCREENING FOR COVID-19: Primary | ICD-10-CM

## 2021-10-26 NOTE — TELEPHONE ENCOUNTER
josephine for patient to call surgery scheduling, first available for Dr. Mansfield 2/11 at 7:45a.m.

## 2021-10-26 NOTE — TELEPHONE ENCOUNTER
Confirmed surgery date, time and location, 2/11/22, arrival time at 5:45a.m with nothing to eat eight hours before scheduled surgery time, clear liquids up to two hours before, h&p within 30 days, patient made aware, COVID testing 96 hours prior, map and letter mailed out.       to complete the following fields:            CHECKLIST     Google Calendar : Yes     Resident notified:Not Applicable     Clinic schedule blocked:  Not Applicable    Patient notified:Yes      Pre op information sent: Yes     Given to patient over the phone.Yes    Comments:

## 2021-10-26 NOTE — TELEPHONE ENCOUNTER
----- Message from Hawa Nicholson sent at 10/26/2021 11:22 AM CDT -----  Regarding: COVID order  Please place COVID order, patient scheduled for surgery on 2/11/22 with Dr. Mansfield.  Thanks.

## 2021-11-01 ENCOUNTER — TRANSFERRED RECORDS (OUTPATIENT)
Dept: HEALTH INFORMATION MANAGEMENT | Facility: CLINIC | Age: 41
End: 2021-11-01
Payer: COMMERCIAL

## 2021-11-01 DIAGNOSIS — M35.9 UNDIFFERENTIATED CONNECTIVE TISSUE DISEASE (H): ICD-10-CM

## 2021-11-01 RX ORDER — HYDROXYCHLOROQUINE SULFATE 200 MG/1
200 TABLET, FILM COATED ORAL 2 TIMES DAILY
Qty: 180 TABLET | Refills: 0 | Status: SHIPPED | OUTPATIENT
Start: 2021-11-01 | End: 2022-01-19

## 2021-11-01 NOTE — TELEPHONE ENCOUNTER
LCV:7/7/2021  [facility] [place]  NCV: 1-7-22  Last Eye Exam: 11-10-20

## 2021-11-20 PROBLEM — M99.05 PELVIC SOMATIC DYSFUNCTION: Status: RESOLVED | Noted: 2021-07-01 | Resolved: 2021-11-20

## 2021-11-20 PROBLEM — M79.18 MYALGIA OF PELVIC FLOOR: Status: RESOLVED | Noted: 2021-04-15 | Resolved: 2021-11-20

## 2021-11-20 PROBLEM — N39.46 MIXED INCONTINENCE: Status: RESOLVED | Noted: 2021-04-15 | Resolved: 2021-11-20

## 2021-11-21 NOTE — PROGRESS NOTES
DISCHARGE REPORT    Heather did not return for further treatment after the last visit on 7/15/21.   Current status is unknown.  Please see information below for last known relevant information.  Patient seen for 3 visits.    SUBJECTIVE  Subjective changes noted by patient:  has not had any incidents of ANDREW this week.  she sneezed and was aware that her muscles pulled up automatically  .  Current pain level is 0/10.     Previous pain level was  0/10.   Changes in function: (See Goal flowsheet attached for changes in current functional level)  Adverse reaction to treatment or activity: None    OBJECTIVE  Changes noted in objective findings:   PFM 2/5 at end of session after cuing with DP breathing.  continues to have min tenderness to palpation of B LA     ASSESSMENT/PLAN  Diagnosis: mixed incontinence   Updated problem list and treatment plan:  Patient will continue to utilize HEP for any remaining deficits.   STG/LTGs have been met or progress has been made towards goals:  Please see goal flowsheet for most current information  Assessment of Progress: current status is unknown.    Last current status: Pt is progressing as expected   Self Management Plans:  HEP  I have re-evaluated this patient and find that the nature, scope, duration and intensity of the therapy is appropriate for the medical condition of the patient.  Heather continues to require the following intervention to meet STG and LTG's:  HEP.    Recommendations:  Discharge with current home program.  Patient to follow up with MD as needed.    Please refer to the daily flowsheet for treatment today, total treatment time and time spent performing 1:1 timed codes.   Has previously requested that the patient follow up with cardiology to discuss the need for this medicine. Will fill for one month.    Haim Galaviz III, MD, FAAFP  [place]  12/20/19 5:23 PM

## 2021-11-24 ENCOUNTER — PRE VISIT (OUTPATIENT)
Dept: UROLOGY | Facility: CLINIC | Age: 41
End: 2021-11-24
Payer: COMMERCIAL

## 2021-11-24 ENCOUNTER — TELEPHONE (OUTPATIENT)
Dept: RHEUMATOLOGY | Facility: CLINIC | Age: 41
End: 2021-11-24
Payer: COMMERCIAL

## 2021-11-24 VITALS — WEIGHT: 173.94 LBS | BODY MASS INDEX: 27.24 KG/M2

## 2021-11-24 NOTE — TELEPHONE ENCOUNTER
Plaquenil Eye Exam    Date of last eye exam specifically commenting on HCQ toxicity: 11/1/2021  Clinic: [facility] Phone Number: [phone]  Ophthalmologist: Penelope Yanez  Toxicity: NO  Records scanned to chart: Yes      Veena Brown CMA   11/24/2021 4:01 PM

## 2021-12-16 ENCOUNTER — PATIENT OUTREACH (OUTPATIENT)
Dept: PLASTIC SURGERY | Facility: CLINIC | Age: 41
End: 2021-12-16
Payer: COMMERCIAL

## 2021-12-16 NOTE — PATIENT INSTRUCTIONS
Returned pt's call today with request to see Dr Batista about tattoo removal. Pt updated that she has seen Dr Batista about this in the past but she was a little overwhelmed at the time and decided not to move forward with it. She is now wanting to talk about it again as she feels more ready. I warned her that a new pt appt with Dr Batista is scheduling out into summer and she is no hurry, wants to wait for Dr Batista instead of seeing someone else. I have her scheduled for appt.  Bud FREY RN

## 2022-01-07 ENCOUNTER — LAB (OUTPATIENT)
Dept: LAB | Facility: CLINIC | Age: 42
End: 2022-01-07
Payer: COMMERCIAL

## 2022-01-07 ENCOUNTER — OFFICE VISIT (OUTPATIENT)
Dept: RHEUMATOLOGY | Facility: CLINIC | Age: 42
End: 2022-01-07
Attending: INTERNAL MEDICINE
Payer: COMMERCIAL

## 2022-01-07 ENCOUNTER — OFFICE VISIT (OUTPATIENT)
Dept: INTERNAL MEDICINE | Facility: CLINIC | Age: 42
End: 2022-01-07
Payer: COMMERCIAL

## 2022-01-07 VITALS
TEMPERATURE: 98.2 F | SYSTOLIC BLOOD PRESSURE: 114 MMHG | WEIGHT: 173.2 LBS | BODY MASS INDEX: 27.13 KG/M2 | DIASTOLIC BLOOD PRESSURE: 76 MMHG | OXYGEN SATURATION: 99 % | HEART RATE: 69 BPM

## 2022-01-07 VITALS
BODY MASS INDEX: 27.11 KG/M2 | OXYGEN SATURATION: 100 % | WEIGHT: 172.7 LBS | HEART RATE: 73 BPM | HEIGHT: 67 IN | DIASTOLIC BLOOD PRESSURE: 75 MMHG | RESPIRATION RATE: 16 BRPM | SYSTOLIC BLOOD PRESSURE: 116 MMHG

## 2022-01-07 DIAGNOSIS — Z76.89 ENCOUNTER TO ESTABLISH CARE: ICD-10-CM

## 2022-01-07 DIAGNOSIS — M35.9 UNDIFFERENTIATED CONNECTIVE TISSUE DISEASE (H): Primary | ICD-10-CM

## 2022-01-07 DIAGNOSIS — F41.9 ANXIETY: ICD-10-CM

## 2022-01-07 DIAGNOSIS — F33.0 MILD RECURRENT MAJOR DEPRESSION (H): Primary | ICD-10-CM

## 2022-01-07 DIAGNOSIS — M35.9 UNDIFFERENTIATED CONNECTIVE TISSUE DISEASE (H): ICD-10-CM

## 2022-01-07 LAB
ALBUMIN SERPL-MCNC: 4.3 G/DL (ref 3.4–5)
ALBUMIN UR-MCNC: NEGATIVE MG/DL
ALT SERPL W P-5'-P-CCNC: 24 U/L (ref 0–50)
APPEARANCE UR: CLEAR
AST SERPL W P-5'-P-CCNC: 18 U/L (ref 0–45)
BASOPHILS # BLD AUTO: 0 10E3/UL (ref 0–0.2)
BASOPHILS NFR BLD AUTO: 1 %
BILIRUB UR QL STRIP: NEGATIVE
COLOR UR AUTO: ABNORMAL
CREAT SERPL-MCNC: 0.56 MG/DL (ref 0.52–1.04)
CREAT UR-MCNC: 34 MG/DL
CRP SERPL-MCNC: <2.9 MG/L (ref 0–8)
EOSINOPHIL # BLD AUTO: 0.1 10E3/UL (ref 0–0.7)
EOSINOPHIL NFR BLD AUTO: 2 %
ERYTHROCYTE [DISTWIDTH] IN BLOOD BY AUTOMATED COUNT: 13.1 % (ref 10–15)
ERYTHROCYTE [SEDIMENTATION RATE] IN BLOOD BY WESTERGREN METHOD: 8 MM/HR (ref 0–20)
FERRITIN SERPL-MCNC: 7 NG/ML (ref 12–150)
GFR SERPL CREATININE-BSD FRML MDRD: >90 ML/MIN/1.73M2
GLUCOSE UR STRIP-MCNC: NEGATIVE MG/DL
HCT VFR BLD AUTO: 38.5 % (ref 35–47)
HGB BLD-MCNC: 11.9 G/DL (ref 11.7–15.7)
HGB UR QL STRIP: NEGATIVE
IMM GRANULOCYTES # BLD: 0 10E3/UL
IMM GRANULOCYTES NFR BLD: 0 %
IRON SATN MFR SERPL: 7 % (ref 15–46)
IRON SERPL-MCNC: 28 UG/DL (ref 35–180)
KETONES UR STRIP-MCNC: NEGATIVE MG/DL
LEUKOCYTE ESTERASE UR QL STRIP: NEGATIVE
LYMPHOCYTES # BLD AUTO: 2.3 10E3/UL (ref 0.8–5.3)
LYMPHOCYTES NFR BLD AUTO: 36 %
MCH RBC QN AUTO: 26.2 PG (ref 26.5–33)
MCHC RBC AUTO-ENTMCNC: 30.9 G/DL (ref 31.5–36.5)
MCV RBC AUTO: 85 FL (ref 78–100)
MONOCYTES # BLD AUTO: 0.3 10E3/UL (ref 0–1.3)
MONOCYTES NFR BLD AUTO: 5 %
NEUTROPHILS # BLD AUTO: 3.6 10E3/UL (ref 1.6–8.3)
NEUTROPHILS NFR BLD AUTO: 56 %
NITRATE UR QL: NEGATIVE
NRBC # BLD AUTO: 0 10E3/UL
NRBC BLD AUTO-RTO: 0 /100
PH UR STRIP: 7 [PH] (ref 5–7)
PLATELET # BLD AUTO: 265 10E3/UL (ref 150–450)
RBC # BLD AUTO: 4.55 10E6/UL (ref 3.8–5.2)
RBC URINE: 1 /HPF
SP GR UR STRIP: 1.01 (ref 1–1.03)
SQUAMOUS EPITHELIAL: 3 /HPF
TIBC SERPL-MCNC: 424 UG/DL (ref 240–430)
UROBILINOGEN UR STRIP-MCNC: NORMAL MG/DL
WBC # BLD AUTO: 6.3 10E3/UL (ref 4–11)
WBC URINE: <1 /HPF

## 2022-01-07 PROCEDURE — 36415 COLL VENOUS BLD VENIPUNCTURE: CPT | Performed by: PATHOLOGY

## 2022-01-07 PROCEDURE — 99214 OFFICE O/P EST MOD 30 MIN: CPT | Mod: GC | Performed by: INTERNAL MEDICINE

## 2022-01-07 PROCEDURE — 99214 OFFICE O/P EST MOD 30 MIN: CPT | Performed by: NURSE PRACTITIONER

## 2022-01-07 RX ORDER — BUPROPION HYDROCHLORIDE 150 MG/1
150 TABLET ORAL EVERY MORNING
Qty: 60 TABLET | Refills: 1 | Status: SHIPPED | OUTPATIENT
Start: 2022-01-07 | End: 2022-03-03

## 2022-01-07 ASSESSMENT — MIFFLIN-ST. JEOR: SCORE: 1490.99

## 2022-01-07 ASSESSMENT — PAIN SCALES - GENERAL: PAINLEVEL: NO PAIN (0)

## 2022-01-07 NOTE — NURSING NOTE
Chief Complaint   Patient presents with     RECHECK     6 month f/u      Blood pressure 114/76, pulse 69, temperature 98.2  F (36.8  C), temperature source Oral, weight 78.6 kg (173 lb 3.2 oz), SpO2 99 %, not currently breastfeeding.    Babita Pelayo, CMA

## 2022-01-07 NOTE — PROGRESS NOTES
Rheumatology Progress Note    Heather Guillory MRN# [id number]   Age: 41 year old YOB: 1980     Last seen: 2021  DOS: 2022  Assessment & Plan:     Undifferentiated connective tissue disease (UCTD):     Heather is a 42 yo WF . She was diagnosed with MCTD in 2016, 6 wk postpartum based on fatigue, arthritis, mild Raynaud's, low positive NINOSKA 1.1 and low positive RNP Ab 1.6. She is on  mg qd since 2017 with great response.  UCTD continues to be most likely diagnosis not MCTD as she does not have classic features of MCTD, had very low titer of RNP Ab in the past, (negative on most recent check), and had a mild disease which never required prednisone.  All autoimmunity labs (2017) came back negative (except + NINOSKA, low C3) which is further reassuring for UCTD, including APS panel, repeat RNP and inflammatory markers, dsDNA.      She had neg SSA/SSB antibodies in 2016, no concern for CHB. No need to re-check.  APS panel was neg.  She had neg anti-Sm, neg anti-DNA and neg centromere Ab in 2016.    In 2019, recommended to taper plaquenil to 200 mg twice a day on Mon/Wed/Fri and then 1 tab for the rest of the week. Had increased pain/stiffness in hands and feet and C3 dropped. So increased HCQ back to 200 mg bid. C3 went back to nl in 2020, had low C3 in 2021 but it fluctuates.     Today:     The patient reports stable symtpoms aside from increased fatigue in the setting of ongoing heavy menstrual bleeding. She has a submucosal fibroid with myomectomy planned for February. She does have some chest discomfort but appears likely musculoskeletal. Will repeat hgb and iron testing today.    As her UCTD symptoms including arthralgia appear stable, discussed that she could continue current regimen of HCQ alternating 200 mg BID and daily dosing. If she were to develop new or worsening symptoms, would consider increasing back to 200 mg BID. Her last eye exam in 2021 was without  evidence of HCQ toxicity.     -Check cbc with diff, UA, Cr, complements, dsDNA CRP, ESR, AST/ALT, iron studies  -Continue  mg BID 3x week,  mg daily 4x week  -Continue yearly ophthalmology exam for HCQ monitoring    Patient seen and discussed with rheumatology staff, Dr. King .       Luz Hunt MD  Rheumatology Fellow      Attending Note: I saw and evaluated the patient with Dr. Hunt. I agree with the assessment and plan.    Nasrin King MD      Orders Placed This Encounter   Procedures     AST     ALT     Albumin level     Creatinine     CRP inflammation     Erythrocyte sedimentation rate auto     UA with Microscopic reflex to Culture     Complement C3     Complement C4     DNA double stranded antibodies     Creatinine random urine     Iron and iron binding capacity     Ferritin     CBC with platelets differential       Subjective     Ms. Guillory is a 40 yo with history of UCTD, JAVIER, uterine fibroid who presents for follow up.   Undifferentiated connective tissue disease (UCTD):    The patient was last seen for a virtual visit in 7/2021. She notes things are going okay. She has felt more fatigue recently and continues to have significant vaginal bleeding with menstruation. Since her las visit she was seen by OB. MRI showed uterine fibroid; she's scheduled for a myomectomy next month. She notes occasional SOB with stairs. Ms. Guillory has reported 4 weeks of tightness/pressure in her chest and upper back that's present all the time but worse when she does activity with her upper extremities and at night after she's done more activity. It is not worse with exertion. She gets some relief from using a massager but the discomfort doesn't resolve completely. She is taking iron supplements on some days. She denies dizziness or light headedness.     She's been having more tightness in her joints with the cold weather. The pain is worst in her hands and feet particularily in the morning. Her Raynaud's is  also more of a problem thsi time of year but she denies fingertip ulceration and has been avoiding going outside.     Currently, she is taking  mg BID or daily; she says she takes it twice a day about half the time.     Ms. Guillory denies hairloss, cough, oral ulcers, GI or urinary symptoms.     Last eye exam in 11/2021 without evidence of HCQ toxicity.      ROS     A 10 point ROS was performed with pertinent findings listed above.           Social History:     Social History     Socioeconomic History     Marital status:      Spouse name: Chi     Number of children: 2     Years of education: Not on file     Highest education level: Not on file   Occupational History     Occupation:      Employer: [employer]     Comment: laid off 7/2020   Tobacco Use     Smoking status: Never Smoker     Smokeless tobacco: Never Used   Substance and Sexual Activity     Alcohol use: No     Alcohol/week: 0.0 standard drinks     Drug use: No     Sexual activity: Yes     Partners: Male     Birth control/protection: I.U.D.     Comment: Mirena placed in 2018   Other Topics Concern      Service No     Blood Transfusions No     Caffeine Concern No     Occupational Exposure No     Hobby Hazards No     Sleep Concern No     Stress Concern No     Weight Concern No     Special Diet No     Back Care No     Exercise No     Bike Helmet Yes     Comment: n/a     Seat Belt Yes     Self-Exams No     Parent/sibling w/ CABG, MI or angioplasty before 65F 55M? No   Social History Narrative    .    3 boys (4, 3, and 18 months as of 2019).    Exercises when able.         How much exercise per week? 3-4 x's     How much calcium per day? In foods and PNV       How much caffeine per day? 1 soda occasional    How much vitamin D per day? PNV    Do you/your family wear seatbelts?  Yes    Do you/your family use safety helmets? Yes    Do you/your family use sunscreen? Yes    Do you/your family keep firearms in the home?  No    Do you/your family have a smoke detector(s)? Yes        July 16, 2020 Mague Chase LPN         Social Determinants of Health     Financial Resource Strain: Not on file   Food Insecurity: Not on file   Transportation Needs: Not on file   Physical Activity: Not on file   Stress: Not on file   Social Connections: Not on file   Intimate Partner Violence: Not on file   Housing Stability: Not on file             Family History:     Family History   Problem Relation Age of Onset     Hypertension Paternal Grandfather      Lung Cancer Paternal Grandfather         smoker     Diabetes Type 2  Paternal Grandfather      Breast Cancer Paternal Grandmother      Hypertension Paternal Grandmother      Colon Cancer Maternal Grandfather      Alzheimer Disease Maternal Grandmother      Cervical Cancer Maternal Grandmother      Arrhythmia Brother         details unknown; procedure in his 20s     Anxiety Disorder Brother      Elijah Disease Sister      Myocardial Infarction No family hx of      Cerebrovascular Disease No family hx of      Coronary Artery Disease Early Onset No family hx of      Ovarian Cancer No family hx of        Objective     PHYSICAL EXAM  /76   Pulse 69   Temp 98.2  F (36.8  C) (Oral)   Wt 78.6 kg (173 lb 3.2 oz)   SpO2 99%   BMI 27.13 kg/m    Wt Readings from Last 4 Encounters:   01/07/22 78.6 kg (173 lb 3.2 oz)   11/24/21 78.9 kg (173 lb 15.1 oz)   09/14/21 78.9 kg (174 lb)   08/27/21 78.9 kg (174 lb)       Gen: no acute distress, pleasant  HEENT: EOMI, no scleral injection or icterus  CV: RRR, no m/r/g, no cyanosis   RESP: No increased WOB on RA, no cough or wheeze noted, CTA BL  ABD: Soft, non-tender, non-distended  EXTR: no edema  SKIN: No rash, no lesions. Normal finger tip pulp without ulcerations  MSK: Shoulders, elbows, wrists, MCPs/PIPs/DIPs, knees with normal ROM without effusion or tenderness.   NEURO: grossly non focal  Psych: nl affect    DATA:    CBC:  Recent Labs   Lab Test  08/27/21  0850 07/08/21  0919 04/23/21  1118   WBC 5.6 5.8 5.5   RBC 4.36 4.45 4.45   HGB 12.3 13.3 13.1   HCT 38.3 38.8 39.0   MCV 88 87 88   MCH 28.2 29.9 29.4   MCHC 32.1 34.3 33.6   RDW 11.9 12.4 11.9    207 195       BMP:  Recent Labs   Lab Test 08/27/21  0850 07/08/21  0919 03/31/21  1130 01/14/21  1228 10/28/20  1250 08/26/19  1055 04/26/19  0930 08/09/16  0000 01/09/16  0724     --   --   --  140  --   --   --  140   POTASSIUM 4.4  --  4.8  --  4.1  --   --   --  3.8   CHLORIDE 113*  --   --   --  109  --   --   --  110*   CO2 26  --   --   --  27  --   --   --  20   ANIONGAP 5  --   --   --  4  --   --   --  10   GLC 99  --   --   --  88  --  94  --  89   BUN 11  --   --   --  7  --   --   --  9   CR 0.79 0.72  --  0.66 0.63   < >  --    < > 0.57   GFRESTIMATED >90 >90  --  >90 >90   < >  --    < > >90  Non  GFR Calc     PRICILA 8.9  --   --   --  8.9  --   --   --  8.4*    < > = values in this interval not displayed.       LFT:  Recent Labs   Lab Test 08/27/21  0850 07/08/21  0919 01/14/21  1228 10/28/20  1250 09/28/17  0954 01/09/16  0724   PROTTOTAL 7.2  --   --  7.6  --  7.1   ALBUMIN 3.9 4.1 4.0 4.3   < > 3.3*   BILITOTAL 0.2  --   --  0.5  --  0.4   ALKPHOS 79  --   --  79  --  70   AST 12 17 13 10   < > 12   ALT 12 21 19 17   < > 15    < > = values in this interval not displayed.       No results found for: CKTOTAL  TSH   Date Value Ref Range Status   08/27/2021 1.05 0.40 - 4.00 mU/L Final   07/08/2021 1.16 0.40 - 4.00 mU/L Final     No results found for: URIC    Inflammatory markers  Lab Results   Component Value Date    CRP <2.9 07/08/2021    CRP <2.9 01/14/2021    CRP 6.4 07/16/2020     Lab Results   Component Value Date    SED 6 07/08/2021    SED 4 01/14/2021    SED 7 07/16/2020     Ferritin   Date Value Ref Range Status   07/08/2021 17 12 - 150 ng/mL Final   12/30/2020 63 12 - 150 ng/mL Final       UA RESULTS:  Recent Labs   Lab Test 07/08/21  0949 03/31/21  1140  01/14/21  1233 09/28/17  0950 08/31/17  0937 08/14/17  1101   COLOR Yellow Straw Yellow   < > Yellow Yellow   APPEARANCE Clear Clear Clear   < > Slightly Cloudy Clear   URINEGLC Negative Negative Negative   < > Negative Negative   URINEBILI Negative Negative Negative   < > Negative Negative   URINEKETONE Negative Negative Negative   < > Negative Negative   SG >1.030 1.009 1.010   < > >1.030 1.010   UBLD Large* Small* Negative   < > Large* Trace*   URINEPH 7.0 6.0 7.0   < > 7.0 6.5   PROTEIN Trace* Negative Negative   < > 30* Negative   UROBILINOGEN 0.2  --   --   --  0.2 0.2   NITRITE Negative Negative Negative   < > Negative Negative   LEUKEST Negative Negative Negative   < > Small* Negative   RBCU 5-10* 2 2   < > 10-25* O - 2   WBCU 0 - 5 <1 <1   < > 10-25* O - 2    < > = values in this interval not displayed.         Autoimmunity labs:     Lab Results   Component Value Date    RHF <20 07/22/2016     No results found for: CCPIGG  No results found for: ANCA  Lab Results   Component Value Date    F2WWKSY 98 07/08/2021    T0BQYXL 78 (L) 01/14/2021    K4VSUVC 97 07/16/2020     Lab Results   Component Value Date    O5ERAQT 23 07/08/2021    J0RTMBM 19 01/14/2021    Z0BRITT 28 07/16/2020     Lab Results   Component Value Date    JACKLYN 1.1 (H) 07/22/2016     Lab Results   Component Value Date    DNA 5 07/08/2021    DNA 5 01/14/2021    DNA 5 07/16/2020     Lab Results   Component Value Date    RNPIGG 0.6 09/28/2017    SSAIGG <0.2 01/20/2020    SSBIGG <0.2 01/20/2020       IMAGING:    Nasrin King MD  Rheumatology Fellow         Addendum:  Imaging Results:     Results for orders placed or performed during the hospital encounter of 09/22/21   MR Pelvis (GYN) wo & w Contrast    Narrative    EXAMINATION: MR PELVIS (GYN) WO & W CONTRAST, 9/22/2021 7:21 PM    COMPARISON: CT abdomen and pelvis 1/30/2021 and pelvic ultrasound  9/19/2021    HISTORY: Uterine fibroid, symptomatic; Submucous leiomyoma of uterus    TECHNIQUE:  Multiplanar, multisequence imaging was obtained of the  pelvis without and with intravenous contrast. Contrast dose: 7.5 mL  Gadavist Iv    FINDINGS:   Uterus: The uterus is anteverted and demonstrates a submucosal fibroid  in the posterior wall measuring 4.2 x 4.2 x 5.8 cm. No additional  fibroids. The junctional zone is normal in thickness. The endometrial  stripe is normal, measuring up to 7 mm. The cervix is normal except  for small nabothian cysts.   1.1 cm cyst along the posterior vaginal wall above the level of the  pubic symphysis and a 0.6 cm cyst in the posterolateral wall below the  pubic symphysis(series 8, images 33 and 44).    Adnexa: Both ovaries are normal. No evidence of mass.    Lymph nodes: No pelvic or inguinal lymphadenopathy.    Gastrointestinal tract: The rectum and visualized sigmoid colon are  unremarkable.    Fluid: No free fluid in the pelvis.    Bones and soft tissues: No suspicious osseous lesions.       Impression    IMPRESSION:  1.  Submucosal uterine fibroid.  2.  Small perivaginal cysts are likely Bartholin gland cysts.    JOSUE KHAN MD         SYSTEM ID:  [id number]             Addendum:  Laboratory Investigations:   Laboratory investigations performed today for which results were available at the time of this note are listed below.  Pending labs will be reported in a separate letter.  No visits with results within 1 Day(s) from this visit.   Latest known visit with results is:   Office Visit on 09/14/2021   Component Date Value Ref Range Status     Case Report 09/14/2021    Final                    Value:Surgical Pathology Report                         Case: [id number]                                  Authorizing Provider:  Cate Mansfield MD Collected:           09/14/2021 02:27 PM          Ordering Location:     [facility]  Received:            09/15/2021 08:09 AM                                 [facility]                                                            Pathologist:           Sussy Leiva MD                                                             Specimen:    Endometrium                                                                                 Final Diagnosis 09/14/2021    Final                    Value:This result contains rich text formatting which cannot be displayed here.     Clinical Information 09/14/2021    Final                    Value:This result contains rich text formatting which cannot be displayed here.     Gross Description 09/14/2021    Final                    Value:This result contains rich text formatting which cannot be displayed here.     Microscopic Description 09/14/2021    Final                    Value:This result contains rich text formatting which cannot be displayed here.     Performing Labs 09/14/2021    Final                    Value:This result contains rich text formatting which cannot be displayed here.

## 2022-01-07 NOTE — NURSING NOTE
"Heather Guillory is a 41 year old female patient that presents today in clinic for the following:    Chief Complaint   Patient presents with     Establish Care     The patient's allergies and medications were reviewed as noted. A set of vitals were recorded as noted without incident: /75 (BP Location: Right arm, Patient Position: Sitting, Cuff Size: Adult Regular)   Pulse 73   Resp 16   Ht 1.702 m (5' 7\")   Wt 78.3 kg (172 lb 11.2 oz)   LMP 12/31/2021 (Approximate)   SpO2 100%   Breastfeeding No   BMI 27.05 kg/m  . The patient was asked if they had any of the following symptoms in the last forty-eight hours: (1) fever or chills, (2) cough, (3) shortness of breath or difficulty breathing, (4) fatigue, (5) muscle or body aches, (6) headache, (7) new loss of taste or smell, (8) sore throat, (9) congestion or runny nose, (10) nausea or vomiting, and (11) diarrhea. Heather Guillory denies having any of the following symptoms in the last forty-eight hours: (1) fever or chills, (2) cough, (3) shortness of breath or difficulty breathing, (4) fatigue, (5) muscle or body aches, (6) headache, (7) new loss of taste or smell, (8) sore throat, (9) congestion or runny nose, (10) nausea or vomiting, and (11) diarrhea. The patient does not have any other questions for the provider.    Enrique Espinal, EMT at 11:59 AM on 1/7/2022  "

## 2022-01-07 NOTE — LETTER
2022       RE: Heather Guillory  [address]  [place] [address]     Dear Colleague,    Thank you for referring your patient, Heather Guillory, to the [facility] [place] at [facility]. Please see a copy of my visit note below.      Rheumatology Progress Note    Heather Guillory MRN# [id number]   Age: 41 year old YOB: 1980     Last seen: 2021  DOS: 2022  Assessment & Plan:     Undifferentiated connective tissue disease (UCTD):     Heather is a 40 yo WF . She was diagnosed with MCTD in 2016, 6 wk postpartum based on fatigue, arthritis, mild Raynaud's, low positive NINOSKA 1.1 and low positive RNP Ab 1.6. She is on  mg qd since 2017 with great response.  UCTD continues to be most likely diagnosis not MCTD as she does not have classic features of MCTD, had very low titer of RNP Ab in the past, (negative on most recent check), and had a mild disease which never required prednisone.  All autoimmunity labs (2017) came back negative (except + NINOSKA, low C3) which is further reassuring for UCTD, including APS panel, repeat RNP and inflammatory markers, dsDNA.      She had neg SSA/SSB antibodies in 2016, no concern for CHB. No need to re-check.  APS panel was neg.  She had neg anti-Sm, neg anti-DNA and neg centromere Ab in 2016.    In 2019, recommended to taper plaquenil to 200 mg twice a day on Mon/Wed/Fri and then 1 tab for the rest of the week. Had increased pain/stiffness in hands and feet and C3 dropped. So increased HCQ back to 200 mg bid. C3 went back to nl in 2020, had low C3 in 2021 but it fluctuates.     Today:     The patient reports stable symtpoms aside from increased fatigue in the setting of ongoing heavy menstrual bleeding. She has a submucosal fibroid with myomectomy planned for February. She does have some chest discomfort but appears likely musculoskeletal. Will repeat hgb  and iron testing today.    As her UCTD symptoms including arthralgia appear stable, discussed that she could continue current regimen of HCQ alternating 200 mg BID and daily dosing. If she were to develop new or worsening symptoms, would consider increasing back to 200 mg BID. Her last eye exam in 11/2021 was without evidence of HCQ toxicity.     -Check cbc with diff, UA, Cr, complements, dsDNA CRP, ESR, AST/ALT, iron studies  -Continue  mg BID 3x week,  mg daily 4x week  -Continue yearly ophthalmology exam for HCQ monitoring    Patient seen and discussed with rheumatology staff, Dr. King .       Luz Hunt MD  Rheumatology Fellow      Attending Note: I saw and evaluated the patient with Dr. Hunt. I agree with the assessment and plan.    Nasrin King MD      Orders Placed This Encounter   Procedures     AST     ALT     Albumin level     Creatinine     CRP inflammation     Erythrocyte sedimentation rate auto     UA with Microscopic reflex to Culture     Complement C3     Complement C4     DNA double stranded antibodies     Creatinine random urine     Iron and iron binding capacity     Ferritin     CBC with platelets differential       Subjective     Ms. Guillory is a 42 yo with history of UCTD, JAVIER, uterine fibroid who presents for follow up.   Undifferentiated connective tissue disease (UCTD):    The patient was last seen for a virtual visit in 7/2021. She notes things are going okay. She has felt more fatigue recently and continues to have significant vaginal bleeding with menstruation. Since her las visit she was seen by OB. MRI showed uterine fibroid; she's scheduled for a myomectomy next month. She notes occasional SOB with stairs. Ms. Guillory has reported 4 weeks of tightness/pressure in her chest and upper back that's present all the time but worse when she does activity with her upper extremities and at night after she's done more activity. It is not worse with exertion. She gets some  relief from using a massager but the discomfort doesn't resolve completely. She is taking iron supplements on some days. She denies dizziness or light headedness.     She's been having more tightness in her joints with the cold weather. The pain is worst in her hands and feet particularily in the morning. Her Raynaud's is also more of a problem thsi time of year but she denies fingertip ulceration and has been avoiding going outside.     Currently, she is taking  mg BID or daily; she says she takes it twice a day about half the time.     Ms. Guillory denies hairloss, cough, oral ulcers, GI or urinary symptoms.     Last eye exam in 11/2021 without evidence of HCQ toxicity.      ROS     A 10 point ROS was performed with pertinent findings listed above.           Social History:     Social History     Socioeconomic History     Marital status:      Spouse name: Chi     Number of children: 2     Years of education: Not on file     Highest education level: Not on file   Occupational History     Occupation:      Employer: [employer]     Comment: laid off 7/2020   Tobacco Use     Smoking status: Never Smoker     Smokeless tobacco: Never Used   Substance and Sexual Activity     Alcohol use: No     Alcohol/week: 0.0 standard drinks     Drug use: No     Sexual activity: Yes     Partners: Male     Birth control/protection: I.U.D.     Comment: Mirena placed in 2018   Other Topics Concern      Service No     Blood Transfusions No     Caffeine Concern No     Occupational Exposure No     Hobby Hazards No     Sleep Concern No     Stress Concern No     Weight Concern No     Special Diet No     Back Care No     Exercise No     Bike Helmet Yes     Comment: n/a     Seat Belt Yes     Self-Exams No     Parent/sibling w/ CABG, MI or angioplasty before 65F 55M? No   Social History Narrative    .    3 boys (4, 3, and 18 months as of 2019).    Exercises when able.         How much exercise per  week? 3-4 x's     How much calcium per day? In foods and PNV       How much caffeine per day? 1 soda occasional    How much vitamin D per day? PNV    Do you/your family wear seatbelts?  Yes    Do you/your family use safety helmets? Yes    Do you/your family use sunscreen? Yes    Do you/your family keep firearms in the home? No    Do you/your family have a smoke detector(s)? Yes        July 16, 2020 Mague Chsae LPN         Social Determinants of Health     Financial Resource Strain: Not on file   Food Insecurity: Not on file   Transportation Needs: Not on file   Physical Activity: Not on file   Stress: Not on file   Social Connections: Not on file   Intimate Partner Violence: Not on file   Housing Stability: Not on file             Family History:     Family History   Problem Relation Age of Onset     Hypertension Paternal Grandfather      Lung Cancer Paternal Grandfather         smoker     Diabetes Type 2  Paternal Grandfather      Breast Cancer Paternal Grandmother      Hypertension Paternal Grandmother      Colon Cancer Maternal Grandfather      Alzheimer Disease Maternal Grandmother      Cervical Cancer Maternal Grandmother      Arrhythmia Brother         details unknown; procedure in his 20s     Anxiety Disorder Brother      Elijah Disease Sister      Myocardial Infarction No family hx of      Cerebrovascular Disease No family hx of      Coronary Artery Disease Early Onset No family hx of      Ovarian Cancer No family hx of        Objective     PHYSICAL EXAM  /76   Pulse 69   Temp 98.2  F (36.8  C) (Oral)   Wt 78.6 kg (173 lb 3.2 oz)   SpO2 99%   BMI 27.13 kg/m    Wt Readings from Last 4 Encounters:   01/07/22 78.6 kg (173 lb 3.2 oz)   11/24/21 78.9 kg (173 lb 15.1 oz)   09/14/21 78.9 kg (174 lb)   08/27/21 78.9 kg (174 lb)       Gen: no acute distress, pleasant  HEENT: EOMI, no scleral injection or icterus  CV: RRR, no m/r/g, no cyanosis   RESP: No increased WOB on RA, no cough or wheeze noted, CTA  BL  ABD: Soft, non-tender, non-distended  EXTR: no edema  SKIN: No rash, no lesions. Normal finger tip pulp without ulcerations  MSK: Shoulders, elbows, wrists, MCPs/PIPs/DIPs, knees with normal ROM without effusion or tenderness.   NEURO: grossly non focal  Psych: nl affect    DATA:    CBC:  Recent Labs   Lab Test 08/27/21  0850 07/08/21  0919 04/23/21  1118   WBC 5.6 5.8 5.5   RBC 4.36 4.45 4.45   HGB 12.3 13.3 13.1   HCT 38.3 38.8 39.0   MCV 88 87 88   MCH 28.2 29.9 29.4   MCHC 32.1 34.3 33.6   RDW 11.9 12.4 11.9    207 195       BMP:  Recent Labs   Lab Test 08/27/21  0850 07/08/21  0919 03/31/21  1130 01/14/21  1228 10/28/20  1250 08/26/19  1055 04/26/19  0930 08/09/16  0000 01/09/16  0724     --   --   --  140  --   --   --  140   POTASSIUM 4.4  --  4.8  --  4.1  --   --   --  3.8   CHLORIDE 113*  --   --   --  109  --   --   --  110*   CO2 26  --   --   --  27  --   --   --  20   ANIONGAP 5  --   --   --  4  --   --   --  10   GLC 99  --   --   --  88  --  94  --  89   BUN 11  --   --   --  7  --   --   --  9   CR 0.79 0.72  --  0.66 0.63   < >  --    < > 0.57   GFRESTIMATED >90 >90  --  >90 >90   < >  --    < > >90  Non  GFR Calc     PRICILA 8.9  --   --   --  8.9  --   --   --  8.4*    < > = values in this interval not displayed.       LFT:  Recent Labs   Lab Test 08/27/21  0850 07/08/21  0919 01/14/21  1228 10/28/20  1250 09/28/17  0954 01/09/16  0724   PROTTOTAL 7.2  --   --  7.6  --  7.1   ALBUMIN 3.9 4.1 4.0 4.3   < > 3.3*   BILITOTAL 0.2  --   --  0.5  --  0.4   ALKPHOS 79  --   --  79  --  70   AST 12 17 13 10   < > 12   ALT 12 21 19 17   < > 15    < > = values in this interval not displayed.       No results found for: CKTOTAL  TSH   Date Value Ref Range Status   08/27/2021 1.05 0.40 - 4.00 mU/L Final   07/08/2021 1.16 0.40 - 4.00 mU/L Final     No results found for: URIC    Inflammatory markers  Lab Results   Component Value Date    CRP <2.9 07/08/2021    CRP <2.9 01/14/2021     CRP 6.4 07/16/2020     Lab Results   Component Value Date    SED 6 07/08/2021    SED 4 01/14/2021    SED 7 07/16/2020     Ferritin   Date Value Ref Range Status   07/08/2021 17 12 - 150 ng/mL Final   12/30/2020 63 12 - 150 ng/mL Final       UA RESULTS:  Recent Labs   Lab Test 07/08/21  0949 03/31/21  1140 01/14/21  1233 09/28/17  0950 08/31/17  0937 08/14/17  1101   COLOR Yellow Straw Yellow   < > Yellow Yellow   APPEARANCE Clear Clear Clear   < > Slightly Cloudy Clear   URINEGLC Negative Negative Negative   < > Negative Negative   URINEBILI Negative Negative Negative   < > Negative Negative   URINEKETONE Negative Negative Negative   < > Negative Negative   SG >1.030 1.009 1.010   < > >1.030 1.010   UBLD Large* Small* Negative   < > Large* Trace*   URINEPH 7.0 6.0 7.0   < > 7.0 6.5   PROTEIN Trace* Negative Negative   < > 30* Negative   UROBILINOGEN 0.2  --   --   --  0.2 0.2   NITRITE Negative Negative Negative   < > Negative Negative   LEUKEST Negative Negative Negative   < > Small* Negative   RBCU 5-10* 2 2   < > 10-25* O - 2   WBCU 0 - 5 <1 <1   < > 10-25* O - 2    < > = values in this interval not displayed.         Autoimmunity labs:     Lab Results   Component Value Date    RHF <20 07/22/2016     No results found for: CCPIGG  No results found for: ANCA  Lab Results   Component Value Date    I8LDFLJ 98 07/08/2021    V0HEORU 78 (L) 01/14/2021    M6HKSGH 97 07/16/2020     Lab Results   Component Value Date    Y1QSOTA 23 07/08/2021    I4JVPZD 19 01/14/2021    O1PDNYP 28 07/16/2020     Lab Results   Component Value Date    JACKLYN 1.1 (H) 07/22/2016     Lab Results   Component Value Date    DNA 5 07/08/2021    DNA 5 01/14/2021    DNA 5 07/16/2020     Lab Results   Component Value Date    RNPIGG 0.6 09/28/2017    SSAIGG <0.2 01/20/2020    SSBIGG <0.2 01/20/2020       IMAGING:    Nasrin King MD  Rheumatology Fellow         Addendum:  Imaging Results:     Results for orders placed or performed during the  hospital encounter of 09/22/21   MR Pelvis (GYN) wo & w Contrast    Narrative    EXAMINATION: MR PELVIS (GYN) WO & W CONTRAST, 9/22/2021 7:21 PM    COMPARISON: CT abdomen and pelvis 1/30/2021 and pelvic ultrasound  9/19/2021    HISTORY: Uterine fibroid, symptomatic; Submucous leiomyoma of uterus    TECHNIQUE: Multiplanar, multisequence imaging was obtained of the  pelvis without and with intravenous contrast. Contrast dose: 7.5 mL  Gadavist Iv    FINDINGS:   Uterus: The uterus is anteverted and demonstrates a submucosal fibroid  in the posterior wall measuring 4.2 x 4.2 x 5.8 cm. No additional  fibroids. The junctional zone is normal in thickness. The endometrial  stripe is normal, measuring up to 7 mm. The cervix is normal except  for small nabothian cysts.   1.1 cm cyst along the posterior vaginal wall above the level of the  pubic symphysis and a 0.6 cm cyst in the posterolateral wall below the  pubic symphysis(series 8, images 33 and 44).    Adnexa: Both ovaries are normal. No evidence of mass.    Lymph nodes: No pelvic or inguinal lymphadenopathy.    Gastrointestinal tract: The rectum and visualized sigmoid colon are  unremarkable.    Fluid: No free fluid in the pelvis.    Bones and soft tissues: No suspicious osseous lesions.       Impression    IMPRESSION:  1.  Submucosal uterine fibroid.  2.  Small perivaginal cysts are likely Bartholin gland cysts.    JOSUE KHAN MD         SYSTEM ID:  [id number]             Addendum:  Laboratory Investigations:   Laboratory investigations performed today for which results were available at the time of this note are listed below.  Pending labs will be reported in a separate letter.  No visits with results within 1 Day(s) from this visit.   Latest known visit with results is:   Office Visit on 09/14/2021   Component Date Value Ref Range Status     Case Report 09/14/2021    Final                    Value:Surgical Pathology Report                         Case: [id number]                                   Authorizing Provider:  Cate Mansfield MD Collected:           09/14/2021 02:27 PM          Ordering Location:     [facility]  Received:            09/15/2021 08:09 AM                                 [facility]                                                           Pathologist:           Sussy Leiva MD                                                             Specimen:    Endometrium                                                                                 Final Diagnosis 09/14/2021    Final                    Value:This result contains rich text formatting which cannot be displayed here.     Clinical Information 09/14/2021    Final                    Value:This result contains rich text formatting which cannot be displayed here.     Gross Description 09/14/2021    Final                    Value:This result contains rich text formatting which cannot be displayed here.     Microscopic Description 09/14/2021    Final                    Value:This result contains rich text formatting which cannot be displayed here.     Performing Labs 09/14/2021    Final                    Value:This result contains rich text formatting which cannot be displayed here.

## 2022-01-07 NOTE — PROGRESS NOTES
Assessment & Plan     Mild recurrent major depression (H),   Anxiety  Generally feels mood is well controlled with Sertraline 100 mg but does note some decreased libido. Will add Wellbutrin 150 mg, reviewed potential side effects. Encouraged establishing with therapist for additional support and continue working on good self-care.  - buPROPion (WELLBUTRIN XL) 150 MG 24 hr tablet; Take 1 tablet (150 mg) by mouth every morning  - Adult Mental Health Referral; Future    Encounter to establish care  History reviewed and updated in chart.     Follow-up in ~2 weeks as scheduled for pre-op, review medication changes      HARVEY Ward CNP  [facility] INTERNAL MEDIC[facility] [place]    Tanner Romero is a 41 year old who presents for the following health issues     HPI     Here to re-establish care.  Follows with Dr. King in Rheumatology for undifferentiated connective tissue disease, mangaged with  mg BID 3x week,  mg daily 4x week. Initially diagnosed about 5 years ago, following back-to-back pregnancies. Had tried to stop HCQ once, but didn't go well, so has maintained since.   Follows with Dr. Mansfield in Gyn. Tubal ligation 4/2021, uterine fibroid removal scheduled in Feb, causing heavy bleeding. IUD removed at same time as tubal. Excessive bleeding, 3 days of heavy bleeding, 6 days total.   IVF for 1st and 3rd pregnancies, has one embryo left.  Iron supplement, ferritin level is low, has persistent fatigue. Nature made 1 tablet while on her cycle. Wasn't taking consistently.   Takes a multivitamin.   Has moments where she feels like she's totally wiped out, can't do as much.  Winter harder on joints in general  Mood--Sertraline 100 mg. When life is stressful, feels more anxious, helps feel more level. Wonders about decreased libido, wants to be left alone and go to bed. Not sure if related to fatigue or just lack of intimacy, doesn't want to impact her relationship. On  "and off since 2015, had significant anxiety since 2015 with IVF, was constantly afraid she would lose the baby. 6 months postpartum pregnant naturally. Recognized needed sertraline for 3rd pregnancy (now 3.5 yr). Didn't feel it was doing as much at 50 mg. Constant worry or rumination, can't turn her brain off. A lot of health related anxiety related to her children. Better control on it now compared to where she was, able to rationalize things better.   Oldest son age 6, recently diagnosed with adhd and has reduced growth rate.  Not working with a therapist currently, had been meeting with marital therapist, hasn't seen eye to eye on the remaining embryo, but this is all on hold until after her fibroid is removed.     Review of Systems   Constitutional, HEENT, cardiovascular, pulmonary, gi and gu systems are negative, except as otherwise noted.      Objective    /75 (BP Location: Right arm, Patient Position: Sitting, Cuff Size: Adult Regular)   Pulse 73   Resp 16   Ht 1.702 m (5' 7\")   Wt 78.3 kg (172 lb 11.2 oz)   LMP 12/31/2021 (Approximate)   SpO2 100%   Breastfeeding No   BMI 27.05 kg/m    Body mass index is 27.05 kg/m .  Physical Exam   GENERAL: healthy, alert and no distress  RESP: lungs clear to auscultation - no rales, rhonchi or wheezes  CV: regular rate and rhythm, normal S1 S2, no S3 or S4, no murmur, click or rub, no peripheral edema and peripheral pulses strong  ABDOMEN: soft, nontender, no hepatosplenomegaly, no masses and bowel sounds normal  PSYCH: mentation appears normal, affect normal/bright                "

## 2022-01-10 LAB
C3 SERPL-MCNC: 104 MG/DL (ref 81–157)
C4 SERPL-MCNC: 25 MG/DL (ref 13–39)
DSDNA AB SER-ACNC: 5.4 IU/ML

## 2022-01-11 ENCOUNTER — TELEPHONE (OUTPATIENT)
Dept: OBGYN | Facility: CLINIC | Age: 42
End: 2022-01-11

## 2022-01-11 NOTE — TELEPHONE ENCOUNTER
Heather states that over the past several months, she has been noticing increased cramping discomfort, bloating and an increase in cloudy vaginal discharge.  She is not noting any odor or vaginal irritation.      She is wondering if this is to be expected with the fibroid (scheduled for surgery 2/11), or if she needs eval.    Routed to MD to advise.    Jackie Zamora MD:  It could be related to her fibroid, but there is also a chance it could be a vaginal infection which would be better if it were treated prior to her myomectomy.  She should be seen in clinic for evaluation (can be with any provider, CNP, NP, Resident, Staff MD).  Thank you,   Jackie Zamora

## 2022-01-16 DIAGNOSIS — M35.9 UNDIFFERENTIATED CONNECTIVE TISSUE DISEASE (H): ICD-10-CM

## 2022-01-19 RX ORDER — HYDROXYCHLOROQUINE SULFATE 200 MG/1
TABLET, FILM COATED ORAL
Qty: 180 TABLET | Refills: 2 | Status: SHIPPED | OUTPATIENT
Start: 2022-01-19 | End: 2022-07-08

## 2022-01-19 NOTE — TELEPHONE ENCOUNTER
LCV: 1/7/2022  [facility] [place]  Last Eye exam:11-1-21  Creatinine   Date Value Ref Range Status   01/07/2022 0.56 0.52 - 1.04 mg/dL Final   07/08/2021 0.72 0.52 - 1.04 mg/dL Final

## 2022-01-28 ENCOUNTER — OFFICE VISIT (OUTPATIENT)
Dept: INTERNAL MEDICINE | Facility: CLINIC | Age: 42
End: 2022-01-28
Payer: COMMERCIAL

## 2022-01-28 ENCOUNTER — ANESTHESIA EVENT (OUTPATIENT)
Dept: SURGERY | Facility: CLINIC | Age: 42
End: 2022-01-28
Payer: COMMERCIAL

## 2022-01-28 VITALS
DIASTOLIC BLOOD PRESSURE: 69 MMHG | HEART RATE: 68 BPM | OXYGEN SATURATION: 99 % | SYSTOLIC BLOOD PRESSURE: 103 MMHG | BODY MASS INDEX: 25.94 KG/M2 | WEIGHT: 165.3 LBS | HEIGHT: 67 IN | RESPIRATION RATE: 16 BRPM

## 2022-01-28 DIAGNOSIS — F33.0 MILD RECURRENT MAJOR DEPRESSION (H): ICD-10-CM

## 2022-01-28 DIAGNOSIS — D25.0 SUBMUCOUS LEIOMYOMA OF UTERUS: ICD-10-CM

## 2022-01-28 DIAGNOSIS — F41.1 GAD (GENERALIZED ANXIETY DISORDER): ICD-10-CM

## 2022-01-28 DIAGNOSIS — N92.0 MENORRHAGIA WITH REGULAR CYCLE: ICD-10-CM

## 2022-01-28 DIAGNOSIS — Z01.818 PREOP GENERAL PHYSICAL EXAM: Primary | ICD-10-CM

## 2022-01-28 PROCEDURE — 93000 ELECTROCARDIOGRAM COMPLETE: CPT | Performed by: NURSE PRACTITIONER

## 2022-01-28 PROCEDURE — 99214 OFFICE O/P EST MOD 30 MIN: CPT | Mod: 25 | Performed by: NURSE PRACTITIONER

## 2022-01-28 RX ORDER — MULTIVIT WITH MINERALS/LUTEIN
500 TABLET ORAL DAILY
COMMUNITY
End: 2022-02-13

## 2022-01-28 RX ORDER — FERROUS SULFATE 325(65) MG
325 TABLET ORAL
COMMUNITY
End: 2022-02-13

## 2022-01-28 ASSESSMENT — MIFFLIN-ST. JEOR: SCORE: 1457.43

## 2022-01-28 NOTE — PATIENT INSTRUCTIONS
[facility] LAB: [phone]  Preparing for Your Surgery  Getting started  A nurse will call you to review your health history and instructions. They will give you an arrival time based on your scheduled surgery time. Please be ready to share:    Your doctor's clinic name and phone number    Your medical, surgical and anesthesia history    A list of allergies and sensitivities    A list of medicines, including herbal treatments and over-the-counter drugs    Whether the patient has a legal guardian (ask how to send us the papers in advance)  Please tell us if you're pregnant--or if there's any chance you might be pregnant. Some surgeries may injure a fetus (unborn baby), so they require a pregnancy test. Surgeries that are safe for a fetus don't always need a test, and you can choose whether to have one.   If you have a child who's having surgery, please ask for a copy of Preparing for Your Child's Surgery.    Preparing for surgery    Within 30 days of surgery: Have a pre-op exam (sometimes called an H&P, or History and Physical). This can be done at a clinic or pre-operative center.  ? If you're having a , you may not need this exam. Talk to your care team.    At your pre-op exam, talk to your care team about all medicines you take. If you need to stop any medicines before surgery, ask when to start taking them again.  ? We do this for your safety. Many medicines can make you bleed too much during surgery. Some change how well surgery (anesthesia) drugs work.    Call your insurance company to let them know you're having surgery. (If you don't have insurance, call [phone].)    Call your clinic if there's any change in your health. This includes signs of a cold or flu (sore throat, runny nose, cough, rash, fever). It also includes a scrape or scratch near the surgery site.    If you have questions on the day of surgery, call your hospital or surgery center.  COVID testing  You may need to be tested for COVID-19  before having surgery. If so, your surgical team will give you instructions for scheduling this test, separate from your preoperative history and physical.  Eating and drinking guidelines  For your safety: Unless your surgeon tells you otherwise, follow the guidelines below.    Eat and drink as usual until 8 hours before surgery. After that, no food or milk.    Drink clear liquids until 2 hours before surgery. These are liquids you can see through, like water, Gatorade and Propel Water. You may also have black coffee and tea (no cream or milk).    Nothing by mouth within 2 hours of surgery. This includes gum, candy and breath mints.    If you drink alcohol: Stop drinking it the night before surgery.    If your care team tells you to take medicine on the morning of surgery, it's okay to take it with a sip of water.  Preventing infection    Shower or bathe the night before and morning of your surgery. Follow the instructions your clinic gave you. (If no instructions, use regular soap.)    Don't shave or clip hair near your surgery site. We'll remove the hair if needed.    Don't smoke or vape the morning of surgery. You may chew nicotine gum up to 2 hours before surgery. A nicotine patch is okay.  ? Note: Some surgeries require you to completely quit smoking and nicotine. Check with your surgeon.    Your care team will make every effort to keep you safe from infection. We will:  ? Clean our hands often with soap and water (or an alcohol-based hand rub).  ? Clean the skin at your surgery site with a special soap that kills germs.  ? Give you a special gown to keep you warm. (Cold raises the risk of infection.)  ? Wear special hair covers, masks, gowns and gloves during surgery.  ? Give antibiotic medicine, if prescribed. Not all surgeries need antibiotics.  What to bring on the day of surgery    Photo ID and insurance card    Copy of your health care directive, if you have one    Glasses and hearing aides (bring  cases)  ? You can't wear contacts during surgery    Inhaler and eye drops, if you use them (tell us about these when you arrive)    CPAP machine or breathing device, if you use them    A few personal items, if spending the night    If you have . . .  ? A pacemaker, ICD (cardiac defibrillator) or other implant: Bring the ID card.  ? An implanted stimulator: Bring the remote control.  ? A legal guardian: Bring a copy of the certified (court-stamped) guardianship papers.  Please remove any jewelry, including body piercings. Leave jewelry and other valuables at home.  If you're going home the day of surgery    You must have a responsible adult drive you home. They should stay with you overnight as well.    If you don't have someone to stay with you, and you aren't safe to go home alone, we may keep you overnight. Insurance often won't pay for this.  After surgery  If it's hard to control your pain or you need more pain medicine, please call your surgeon's office.  Questions?   If you have any questions for your care team, list them here: _________________________________________________________________________________________________________________________________________________________________________ ____________________________________ ____________________________________ ____________________________________  For informational purposes only. Not to replace the advice of your health care provider. Copyright   2003, 2019 [facility]. All rights reserved. Clinically reviewed by Gena Rosado MD. [employer] [id number] - REV 07/21.

## 2022-01-28 NOTE — PROGRESS NOTES
[facility] INTERNAL MEDICINE [address] [address]  Phone: [phone]  Fax: [phone]  Primary Provider: Cyndi Zarate    PREOPERATIVE EVALUATION:  Today's date: 1/28/2022    Heather Guillory is a 41 year old female who presents for a preoperative evaluation.    Surgical Information:  Surgery/Procedure: MYOMECTOMY, UTERUS, ROBOT-ASSISTED, LAPAROSCOPIC  Surgery Location: [place] [address]  Surgeon: Dr. Cate Mansfield  Surgery Date: 2/11/2022  Time of Surgery: 0745 AM  Where patient plans to recover: At home with family  Fax number for surgical facility: Note does not need to be faxed, will be available electronically in Epic.    Type of Anesthesia Anticipated: General with block    Assessment & Plan     The proposed surgical procedure is considered INTERMEDIATE risk.    Preop general physical exam  Submucous leiomyoma of uterus  Menorrhagia with regular cycle  Patient is optimized for above procedure.   - Asymptomatic COVID-19 Virus (Coronavirus) by PCR; Future  - EKG Performed in Clinic w/ Provider Reading Fee    JAVIER (generalized anxiety disorder)  Mild recurrent major depression (H)  Feels Bupropion going well so far, will continue with 150 mg dose in addition to Sertraline 100 mg daily.    Risks and Recommendations:  The patient has the following additional risks and recommendations for perioperative complications:   - No identified additional risk factors other than previously addressed    Medication Instructions:  Patient is to take all scheduled medications on the day of surgery    RECOMMENDATION:  APPROVAL GIVEN to proceed with proposed procedure, without further diagnostic evaluation.    30 minutes spent on the date of the encounter doing chart review, history and exam, documentation and further activities per the note, independent of EKG interpretation        Subjective     HPI related to upcoming procedure: Heavy periods related to uterine  fibroid, menorrhagia contributing to ROBIN and fatigue. Doing iron supplement in the AM with Vit C, and prenatal with iron in evening. Plan to proceed with myomectomy to address the fibroids and associated menorrhagia.     Wellbutrin--thinks she likes it, feels like it's maybe taken the edge off a little bit, calmed thinking down a little.   Reports that her father had a stress test yesterday for calcium deposits. Grandfather with MI in his 50s. Her lipid panel in Aug 2021 was WNL.   She denies any chest pain or palpitations, walking more than high-impact cardio based on preference, jogs on treadmill for 15-20 minutes, hasn't been prioritizing physical health as much as she should be.  Brother had an ablation for SVT in his 20s.      Preop Questions 3/29/2021   1. Have you ever had a heart attack or stroke? No   2. Have you ever had surgery on your heart or blood vessels, such as a stent placement, a coronary artery bypass, or surgery on an artery in your head, neck, heart, or legs? YES - ablation of varicose veins 10/28/2019   3. Do you have chest pain with activity? No   4. Do you have a history of  heart failure? No   5. Do you currently have a cold, bronchitis or symptoms of other infection? No   6. Do you have a cough, shortness of breath, or wheezing? No   7. Do you or anyone in your family have previous history of blood clots? No   8. Do you or does anyone in your family have a serious bleeding problem such as prolonged bleeding following surgeries or cuts? No   9. Have you ever had problems with anemia or been told to take iron pills? No   10. Have you had any abnormal blood loss such as black, tarry or bloody stools, or abnormal vaginal bleeding? No   11. Have you ever had a blood transfusion? No   12. Are you willing to have a blood transfusion if it is medically needed before, during, or after your surgery? Yes   13. Have you or any of your relatives ever had problems with anesthesia? UNKNOWN - no personal  history of problems with anesthesia    14. Do you have sleep apnea, excessive snoring or daytime drowsiness? No   15. Do you have any artifical heart valves or other implanted medical devices like a pacemaker, defibrillator, or continuous glucose monitor? No   16. Do you have artificial joints? No   17. Are you allergic to latex? YES: allergies updated   18. Is there any chance that you may be pregnant? No     Health Care Directive:  Patient does not have a Health Care Directive or Living Will: Discussed advance care planning with patient; information given to patient to review.    Preoperative Review of :   reviewed - no record of controlled substances prescribed.      Review of Systems  Constitutional, neuro, ENT, endocrine, pulmonary, cardiac, gastrointestinal, genitourinary, musculoskeletal, integument and psychiatric systems are negative, except as otherwise noted.    Patient Active Problem List    Diagnosis Date Noted     Pelvic floor dysfunction 04/15/2021     Priority: Medium     Urinary urgency 04/15/2021     Priority: Medium     Urinary frequency 04/15/2021     Priority: Medium     Encounter for sterilization 12/30/2020     Priority: Medium     Added automatically from request for surgery [id number]       Varicose veins of left lower extremity with pain 10/29/2019     Priority: Medium     JAVIER (generalized anxiety disorder)      Priority: Medium     has a Rx for Zoloft; not using currently       Undifferentiated connective tissue disease (H)      Priority: Medium     Rosacea      Priority: Medium      Past Medical History:   Diagnosis Date     JAVIER (generalized anxiety disorder)     50mg zoloft     Rosacea      Undifferentiated connective tissue disease (H)      Past Surgical History:   Procedure Laterality Date     COLONOSCOPY N/A 10/21/2020    Procedure: COLONOSCOPY;  Surgeon: Yang Pete MD;  [place] [facility] [place]     IR ENDOVENOUS ABLATION VARICOSE VEINS  10/28/2019     IR FOLLOW UP VISIT  OUTPATIENT  11/20/2019     IR STAB PHLEBECTOMY 10 - 20 STABS  10/28/2019     IR VEIN SCLEROSING MULTIPLE  10/28/2019     LAPAROSCOPIC SALPINGECTOMY Bilateral 4/23/2021    Procedure: SALPINGECTOMY, LAPAROSCOPIC BILATERAL;  Surgeon: Cate Mansfield MD;  Location: [facility] OR     OPERATIVE HYSTEROSCOPY WITH MORCELLATOR  4/8/2014    Procedure: OPERATIVE HYSTEROSCOPY WITH MORCELLATOR;  Hysteroscopic Polypectomy;  Surgeon: Cate Mansfield MD;  Location: [facility] OR     REMOVE INTRAUTERINE DEVICE N/A 4/23/2021    Procedure: removal of intrauterine device;  Surgeon: Cate Mansfield MD;  Location: [facility] OR     Current Outpatient Medications   Medication Sig Dispense Refill     buPROPion (WELLBUTRIN XL) 150 MG 24 hr tablet Take 1 tablet (150 mg) by mouth every morning 60 tablet 1     ferrous sulfate (FEROSUL) 325 (65 Fe) MG tablet Take 325 mg by mouth daily (with breakfast)       hydroxychloroquine (PLAQUENIL) 200 MG tablet TAKE 1 TABLET BY MOUTH TWICE A  tablet 2     metroNIDAZOLE (METROCREAM) 0.75 % external cream Apply topically 2 times daily       Prenatal Vit-Fe Fumarate-FA (PRENATAL VITAMIN AND MINERAL PO)        sertraline (ZOLOFT) 100 MG tablet Take 1 tablet (100 mg) by mouth daily 90 tablet 3     vitamin C (ASCORBIC ACID) 1000 MG TABS Take 500 mg by mouth daily        LORazepam (ATIVAN) 1 MG tablet Take 1 tab 60 minutes prior to MRI.  Have a  after procedure. (Patient not taking: Reported on 1/7/2022) 1 tablet 0     tranexamic acid (LYSTEDA) 650 MG tablet Take 2 tablets (1,300 mg) by mouth 3 times daily Take for 5 days with your periods. (Patient not taking: Reported on 1/7/2022) 60 tablet 1     vitamin D3 (CHOLECALCIFEROL) 50 mcg (2000 units) tablet Take 1 tablet by mouth daily (Patient not taking: Reported on 1/28/2022)         Allergies   Allergen Reactions     Benzoyl Peroxide Swelling     Duricef [Cefadroxil] Unknown     Monistat [Miconazole] Swelling     Sulfa Drugs Rash        Social  "History     Tobacco Use     Smoking status: Never Smoker     Smokeless tobacco: Never Used   Substance Use Topics     Alcohol use: No     Alcohol/week: 0.0 standard drinks     Family History   Problem Relation Age of Onset     Elijah Disease Sister      Arrhythmia Brother         details unknown; procedure in his 20s     Anxiety Disorder Brother      Alzheimer Disease Maternal Grandmother      Cervical Cancer Maternal Grandmother      Colon Cancer Maternal Grandfather      Breast Cancer Paternal Grandmother      Hypertension Paternal Grandmother      Hypertension Paternal Grandfather      Lung Cancer Paternal Grandfather         smoker     Diabetes Type 2  Paternal Grandfather      Myocardial Infarction Paternal Grandfather      Myocardial Infarction Paternal Aunt 45     Cerebrovascular Disease No family hx of      Coronary Artery Disease Early Onset No family hx of      Ovarian Cancer No family hx of      History   Drug Use No         Objective     /69 (BP Location: Right arm, Patient Position: Sitting, Cuff Size: Adult Regular)   Pulse 68   Resp 16   Ht 1.702 m (5' 7\")   Wt 75 kg (165 lb 4.8 oz)   LMP 12/31/2021 (Approximate)   SpO2 99%   Breastfeeding No   BMI 25.89 kg/m      Physical Exam     GENERAL APPEARANCE: healthy, alert and no distress     EYES: EOMI, PERRL     HENT: ear canals and TM's normal and nose and mouth without ulcers or lesions     NECK: no adenopathy, no asymmetry, masses, or scars and thyroid normal to palpation     RESP: lungs clear to auscultation - no rales, rhonchi or wheezes     CV: regular rates and rhythm, normal S1 S2, no S3 or S4 and no murmur, click or rub     ABDOMEN:  soft, nontender, no HSM or masses and bowel sounds normal     MS: extremities normal- no gross deformities noted, no evidence of inflammation in joints, FROM in all extremities.     SKIN: no suspicious lesions or rashes     NEURO: Normal strength and tone, sensory exam grossly normal, mentation intact " and speech normal     PSYCH: mentation appears normal. and affect normal/bright     LYMPHATICS: No cervical adenopathy    Recent Labs   Lab Test 01/07/22  1130 08/27/21  0850 04/23/21  1118 03/31/21  1130 12/30/20  1239 10/28/20  1250 07/16/20  1132 07/16/20  1132   HGB 11.9 12.3   < > 13.7   < > 14.0  --  14.0    218   < >  --    < > 206  --  219   NA  --  144  --   --   --  140  --   --    POTASSIUM  --  4.4  --  4.8  --  4.1   < >  --    CR 0.56 0.79   < >  --    < > 0.63  --  0.66   A1C  --   --   --   --   --   --   --  5.1    < > = values in this interval not displayed.        Diagnostics:  No labs were ordered during this visit.   EKG required for [facility] arrhythmia and not completed in the last 90 days.   EKG: appears normal, NSR, normal axis, normal intervals, no acute ST/T changes c/w ischemia, and no LVH by voltage criteria    Revised Cardiac Risk Index (RCRI):  The patient has the following serious cardiovascular risks for perioperative complications:   - No serious cardiac risks = 0 points     RCRI Interpretation: 0 points: Class I (very low risk - 0.4% complication rate)           Signed Electronically by: HARVEY Ward CNP  Copy of this evaluation report is provided to requesting physician.

## 2022-01-28 NOTE — NURSING NOTE
"Heather Guillory is a 41 year old female patient that presents today in clinic for the following:    Chief Complaint   Patient presents with     Pre-Op Exam     The patient's allergies and medications were reviewed as noted. A set of vitals were recorded as noted without incident: /69 (BP Location: Right arm, Patient Position: Sitting, Cuff Size: Adult Regular)   Pulse 68   Resp 16   Ht 1.702 m (5' 7\")   Wt 75 kg (165 lb 4.8 oz)   LMP 12/31/2021 (Approximate)   SpO2 99%   Breastfeeding No   BMI 25.89 kg/m  . The patient was asked if they had any of the following symptoms in the last forty-eight hours: (1) fever or chills, (2) cough, (3) shortness of breath or difficulty breathing, (4) fatigue, (5) muscle or body aches, (6) headache, (7) new loss of taste or smell, (8) sore throat, (9) congestion or runny nose, (10) nausea or vomiting, and (11) diarrhea. Heather Guillory denies having any of the following symptoms in the last forty-eight hours: (1) fever or chills, (2) cough, (3) shortness of breath or difficulty breathing, (4) fatigue, (5) muscle or body aches, (6) headache, (7) new loss of taste or smell, (8) sore throat, (9) congestion or runny nose, (10) nausea or vomiting, and (11) diarrhea. The patient does not have any other questions for the provider.    Enrique Espinal, EMT at 9:35 AM on 1/28/2022  "

## 2022-01-28 NOTE — H&P (VIEW-ONLY)
[facility] INTERNAL MEDICINE [address] [address]  Phone: [phone]  Fax: [phone]  Primary Provider: Cyndi Zarate    PREOPERATIVE EVALUATION:  Today's date: 1/28/2022    Heather Guillory is a 41 year old female who presents for a preoperative evaluation.    Surgical Information:  Surgery/Procedure: MYOMECTOMY, UTERUS, ROBOT-ASSISTED, LAPAROSCOPIC  Surgery Location: [place] [address]  Surgeon: Dr. Cate Mansfield  Surgery Date: 2/11/2022  Time of Surgery: 0745 AM  Where patient plans to recover: At home with family  Fax number for surgical facility: Note does not need to be faxed, will be available electronically in Epic.    Type of Anesthesia Anticipated: General with block    Assessment & Plan     The proposed surgical procedure is considered INTERMEDIATE risk.    Preop general physical exam  Submucous leiomyoma of uterus  Menorrhagia with regular cycle  Patient is optimized for above procedure.   - Asymptomatic COVID-19 Virus (Coronavirus) by PCR; Future  - EKG Performed in Clinic w/ Provider Reading Fee    JAVIER (generalized anxiety disorder)  Mild recurrent major depression (H)  Feels Bupropion going well so far, will continue with 150 mg dose in addition to Sertraline 100 mg daily.    Risks and Recommendations:  The patient has the following additional risks and recommendations for perioperative complications:   - No identified additional risk factors other than previously addressed    Medication Instructions:  Patient is to take all scheduled medications on the day of surgery    RECOMMENDATION:  APPROVAL GIVEN to proceed with proposed procedure, without further diagnostic evaluation.    30 minutes spent on the date of the encounter doing chart review, history and exam, documentation and further activities per the note, independent of EKG interpretation        Subjective     HPI related to upcoming procedure: Heavy periods related to uterine  fibroid, menorrhagia contributing to ROBIN and fatigue. Doing iron supplement in the AM with Vit C, and prenatal with iron in evening. Plan to proceed with myomectomy to address the fibroids and associated menorrhagia.     Wellbutrin--thinks she likes it, feels like it's maybe taken the edge off a little bit, calmed thinking down a little.   Reports that her father had a stress test yesterday for calcium deposits. Grandfather with MI in his 50s. Her lipid panel in Aug 2021 was WNL.   She denies any chest pain or palpitations, walking more than high-impact cardio based on preference, jogs on treadmill for 15-20 minutes, hasn't been prioritizing physical health as much as she should be.  Brother had an ablation for SVT in his 20s.      Preop Questions 3/29/2021   1. Have you ever had a heart attack or stroke? No   2. Have you ever had surgery on your heart or blood vessels, such as a stent placement, a coronary artery bypass, or surgery on an artery in your head, neck, heart, or legs? YES - ablation of varicose veins 10/28/2019   3. Do you have chest pain with activity? No   4. Do you have a history of  heart failure? No   5. Do you currently have a cold, bronchitis or symptoms of other infection? No   6. Do you have a cough, shortness of breath, or wheezing? No   7. Do you or anyone in your family have previous history of blood clots? No   8. Do you or does anyone in your family have a serious bleeding problem such as prolonged bleeding following surgeries or cuts? No   9. Have you ever had problems with anemia or been told to take iron pills? No   10. Have you had any abnormal blood loss such as black, tarry or bloody stools, or abnormal vaginal bleeding? No   11. Have you ever had a blood transfusion? No   12. Are you willing to have a blood transfusion if it is medically needed before, during, or after your surgery? Yes   13. Have you or any of your relatives ever had problems with anesthesia? UNKNOWN - no personal  history of problems with anesthesia    14. Do you have sleep apnea, excessive snoring or daytime drowsiness? No   15. Do you have any artifical heart valves or other implanted medical devices like a pacemaker, defibrillator, or continuous glucose monitor? No   16. Do you have artificial joints? No   17. Are you allergic to latex? YES: allergies updated   18. Is there any chance that you may be pregnant? No     Health Care Directive:  Patient does not have a Health Care Directive or Living Will: Discussed advance care planning with patient; information given to patient to review.    Preoperative Review of :   reviewed - no record of controlled substances prescribed.      Review of Systems  Constitutional, neuro, ENT, endocrine, pulmonary, cardiac, gastrointestinal, genitourinary, musculoskeletal, integument and psychiatric systems are negative, except as otherwise noted.    Patient Active Problem List    Diagnosis Date Noted     Pelvic floor dysfunction 04/15/2021     Priority: Medium     Urinary urgency 04/15/2021     Priority: Medium     Urinary frequency 04/15/2021     Priority: Medium     Encounter for sterilization 12/30/2020     Priority: Medium     Added automatically from request for surgery [id number]       Varicose veins of left lower extremity with pain 10/29/2019     Priority: Medium     JAVIER (generalized anxiety disorder)      Priority: Medium     has a Rx for Zoloft; not using currently       Undifferentiated connective tissue disease (H)      Priority: Medium     Rosacea      Priority: Medium      Past Medical History:   Diagnosis Date     JAVIER (generalized anxiety disorder)     50mg zoloft     Rosacea      Undifferentiated connective tissue disease (H)      Past Surgical History:   Procedure Laterality Date     COLONOSCOPY N/A 10/21/2020    Procedure: COLONOSCOPY;  Surgeon: Yang Pete MD;  [place] [facility] [place]     IR ENDOVENOUS ABLATION VARICOSE VEINS  10/28/2019     IR FOLLOW UP VISIT  OUTPATIENT  11/20/2019     IR STAB PHLEBECTOMY 10 - 20 STABS  10/28/2019     IR VEIN SCLEROSING MULTIPLE  10/28/2019     LAPAROSCOPIC SALPINGECTOMY Bilateral 4/23/2021    Procedure: SALPINGECTOMY, LAPAROSCOPIC BILATERAL;  Surgeon: Cate Mansfield MD;  [facility]     OPERATIVE HYSTEROSCOPY WITH MORCELLATOR  4/8/2014    Procedure: OPERATIVE HYSTEROSCOPY WITH MORCELLATOR;  Hysteroscopic Polypectomy;  Surgeon: Cate Mansfield MD;  [facility]     REMOVE INTRAUTERINE DEVICE N/A 4/23/2021    Procedure: removal of intrauterine device;  Surgeon: Cate Mansfield MD;  [facility] OR     Current Outpatient Medications   Medication Sig Dispense Refill     buPROPion (WELLBUTRIN XL) 150 MG 24 hr tablet Take 1 tablet (150 mg) by mouth every morning 60 tablet 1     ferrous sulfate (FEROSUL) 325 (65 Fe) MG tablet Take 325 mg by mouth daily (with breakfast)       hydroxychloroquine (PLAQUENIL) 200 MG tablet TAKE 1 TABLET BY MOUTH TWICE A  tablet 2     metroNIDAZOLE (METROCREAM) 0.75 % external cream Apply topically 2 times daily       Prenatal Vit-Fe Fumarate-FA (PRENATAL VITAMIN AND MINERAL PO)        sertraline (ZOLOFT) 100 MG tablet Take 1 tablet (100 mg) by mouth daily 90 tablet 3     vitamin C (ASCORBIC ACID) 1000 MG TABS Take 500 mg by mouth daily        LORazepam (ATIVAN) 1 MG tablet Take 1 tab 60 minutes prior to MRI.  Have a  after procedure. (Patient not taking: Reported on 1/7/2022) 1 tablet 0     tranexamic acid (LYSTEDA) 650 MG tablet Take 2 tablets (1,300 mg) by mouth 3 times daily Take for 5 days with your periods. (Patient not taking: Reported on 1/7/2022) 60 tablet 1     vitamin D3 (CHOLECALCIFEROL) 50 mcg (2000 units) tablet Take 1 tablet by mouth daily (Patient not taking: Reported on 1/28/2022)         Allergies   Allergen Reactions     Benzoyl Peroxide Swelling     Duricef [Cefadroxil] Unknown     Monistat [Miconazole] Swelling     Sulfa Drugs Rash        Social  "History     Tobacco Use     Smoking status: Never Smoker     Smokeless tobacco: Never Used   Substance Use Topics     Alcohol use: No     Alcohol/week: 0.0 standard drinks     Family History   Problem Relation Age of Onset     Elijah Disease Sister      Arrhythmia Brother         details unknown; procedure in his 20s     Anxiety Disorder Brother      Alzheimer Disease Maternal Grandmother      Cervical Cancer Maternal Grandmother      Colon Cancer Maternal Grandfather      Breast Cancer Paternal Grandmother      Hypertension Paternal Grandmother      Hypertension Paternal Grandfather      Lung Cancer Paternal Grandfather         smoker     Diabetes Type 2  Paternal Grandfather      Myocardial Infarction Paternal Grandfather      Myocardial Infarction Paternal Aunt 45     Cerebrovascular Disease No family hx of      Coronary Artery Disease Early Onset No family hx of      Ovarian Cancer No family hx of      History   Drug Use No         Objective     /69 (BP Location: Right arm, Patient Position: Sitting, Cuff Size: Adult Regular)   Pulse 68   Resp 16   Ht 1.702 m (5' 7\")   Wt 75 kg (165 lb 4.8 oz)   LMP 12/31/2021 (Approximate)   SpO2 99%   Breastfeeding No   BMI 25.89 kg/m      Physical Exam     GENERAL APPEARANCE: healthy, alert and no distress     EYES: EOMI, PERRL     HENT: ear canals and TM's normal and nose and mouth without ulcers or lesions     NECK: no adenopathy, no asymmetry, masses, or scars and thyroid normal to palpation     RESP: lungs clear to auscultation - no rales, rhonchi or wheezes     CV: regular rates and rhythm, normal S1 S2, no S3 or S4 and no murmur, click or rub     ABDOMEN:  soft, nontender, no HSM or masses and bowel sounds normal     MS: extremities normal- no gross deformities noted, no evidence of inflammation in joints, FROM in all extremities.     SKIN: no suspicious lesions or rashes     NEURO: Normal strength and tone, sensory exam grossly normal, mentation intact " and speech normal     PSYCH: mentation appears normal. and affect normal/bright     LYMPHATICS: No cervical adenopathy    Recent Labs   Lab Test 01/07/22  1130 08/27/21  0850 04/23/21  1118 03/31/21  1130 12/30/20  1239 10/28/20  1250 07/16/20  1132 07/16/20  1132   HGB 11.9 12.3   < > 13.7   < > 14.0  --  14.0    218   < >  --    < > 206  --  219   NA  --  144  --   --   --  140  --   --    POTASSIUM  --  4.4  --  4.8  --  4.1   < >  --    CR 0.56 0.79   < >  --    < > 0.63  --  0.66   A1C  --   --   --   --   --   --   --  5.1    < > = values in this interval not displayed.        Diagnostics:  No labs were ordered during this visit.   EKG required for [facility] arrhythmia and not completed in the last 90 days.   EKG: appears normal, NSR, normal axis, normal intervals, no acute ST/T changes c/w ischemia, and no LVH by voltage criteria    Revised Cardiac Risk Index (RCRI):  The patient has the following serious cardiovascular risks for perioperative complications:   - No serious cardiac risks = 0 points     RCRI Interpretation: 0 points: Class I (very low risk - 0.4% complication rate)           Signed Electronically by: HARVEY Ward CNP  Copy of this evaluation report is provided to requesting physician.

## 2022-01-28 NOTE — LETTER
1/28/2022      RE: Heather Guillory  [address]  [place] [address]       Heather Guillory presents on 1/28/2022 for a pre-operative exam.    Patient Name: Heather Guillory  [facility]   Surgeon: Cate Mansfield MD  Type of Surgery or Procedure: MYOMECTOMY, UTERUS, ROBOT-ASSISTED, LAPROSCOPIC  Date and Time of Surgery or Procedure: 2/11/2022 at 7:45 AM  Have you or anyone in your family ever had problems with anesthesia or sedation? Roxy Espinal, EMT at 9:27 AM on 1/28/2022    [facility] INTERNAL MEDICINE [address]  4TH FLOOR  [facility] [address]  Phone: [phone]  Fax: [phone]  Primary Provider: Cyndi Zarate    PREOPERATIVE EVALUATION:  Today's date: 1/28/2022    Heather Guillory is a 41 year old female who presents for a preoperative evaluation.    Surgical Information:  Surgery/Procedure: MYOMECTOMY, UTERUS, ROBOT-ASSISTED, LAPAROSCOPIC  Surgery [facility]  Surgeon: Dr. Cate Mansfield  Surgery Date: 2/11/2022  Time of Surgery: 0745 AM  Where patient plans to recover: At home with family  Fax number for surgical facility: Note does not need to be faxed, will be available electronically in Epic.    Type of Anesthesia Anticipated: General with block    Assessment & Plan     The proposed surgical procedure is considered INTERMEDIATE risk.    Preop general physical exam  Submucous leiomyoma of uterus  Menorrhagia with regular cycle  Patient is optimized for above procedure.   - Asymptomatic COVID-19 Virus (Coronavirus) by PCR; Future  - EKG Performed in Clinic w/ Provider Reading Fee    JAVIER (generalized anxiety disorder)  Mild recurrent major depression (H)  Feels Bupropion going well so far, will continue with 150 mg dose in addition to Sertraline 100 mg daily.    Risks and Recommendations:  The patient has the following additional risks and recommendations for perioperative complications:   - No  identified additional risk factors other than previously addressed    Medication Instructions:  Patient is to take all scheduled medications on the day of surgery    RECOMMENDATION:  APPROVAL GIVEN to proceed with proposed procedure, without further diagnostic evaluation.    30 minutes spent on the date of the encounter doing chart review, history and exam, documentation and further activities per the note, independent of EKG interpretation        Subjective     HPI related to upcoming procedure: Heavy periods related to uterine fibroid, menorrhagia contributing to ROBIN and fatigue. Doing iron supplement in the AM with Vit C, and prenatal with iron in evening. Plan to proceed with myomectomy to address the fibroids and associated menorrhagia.     Wellbutrin--thinks she likes it, feels like it's maybe taken the edge off a little bit, calmed thinking down a little.   Reports that her father had a stress test yesterday for calcium deposits. Grandfather with MI in his 50s. Her lipid panel in Aug 2021 was WNL.   She denies any chest pain or palpitations, walking more than high-impact cardio based on preference, jogs on treadmill for 15-20 minutes, hasn't been prioritizing physical health as much as she should be.  Brother had an ablation for SVT in his 20s.      Preop Questions 3/29/2021   1. Have you ever had a heart attack or stroke? No   2. Have you ever had surgery on your heart or blood vessels, such as a stent placement, a coronary artery bypass, or surgery on an artery in your head, neck, heart, or legs? YES - ablation of varicose veins 10/28/2019   3. Do you have chest pain with activity? No   4. Do you have a history of  heart failure? No   5. Do you currently have a cold, bronchitis or symptoms of other infection? No   6. Do you have a cough, shortness of breath, or wheezing? No   7. Do you or anyone in your family have previous history of blood clots? No   8. Do you or does anyone in your family have a serious  bleeding problem such as prolonged bleeding following surgeries or cuts? No   9. Have you ever had problems with anemia or been told to take iron pills? No   10. Have you had any abnormal blood loss such as black, tarry or bloody stools, or abnormal vaginal bleeding? No   11. Have you ever had a blood transfusion? No   12. Are you willing to have a blood transfusion if it is medically needed before, during, or after your surgery? Yes   13. Have you or any of your relatives ever had problems with anesthesia? UNKNOWN - no personal history of problems with anesthesia    14. Do you have sleep apnea, excessive snoring or daytime drowsiness? No   15. Do you have any artifical heart valves or other implanted medical devices like a pacemaker, defibrillator, or continuous glucose monitor? No   16. Do you have artificial joints? No   17. Are you allergic to latex? YES: allergies updated   18. Is there any chance that you may be pregnant? No     Health Care Directive:  Patient does not have a Health Care Directive or Living Will: Discussed advance care planning with patient; information given to patient to review.    Preoperative Review of :   reviewed - no record of controlled substances prescribed.      Review of Systems  Constitutional, neuro, ENT, endocrine, pulmonary, cardiac, gastrointestinal, genitourinary, musculoskeletal, integument and psychiatric systems are negative, except as otherwise noted.    Patient Active Problem List    Diagnosis Date Noted     Pelvic floor dysfunction 04/15/2021     Priority: Medium     Urinary urgency 04/15/2021     Priority: Medium     Urinary frequency 04/15/2021     Priority: Medium     Encounter for sterilization 12/30/2020     Priority: Medium     Added automatically from request for surgery [id number]       Varicose veins of left lower extremity with pain 10/29/2019     Priority: Medium     JAVIER (generalized anxiety disorder)      Priority: Medium     has a Rx for Zoloft; not using  currently       Undifferentiated connective tissue disease (H)      Priority: Medium     Rosacea      Priority: Medium      Past Medical History:   Diagnosis Date     JAVIER (generalized anxiety disorder)     50mg zoloft     Rosacea      Undifferentiated connective tissue disease (H)      Past Surgical History:   Procedure Laterality Date     COLONOSCOPY N/A 10/21/2020    Procedure: COLONOSCOPY;  Surgeon: Yang Pete MD;  Location: [facility] OR     IR ENDOVENOUS ABLATION VARICOSE VEINS  10/28/2019     IR FOLLOW UP VISIT OUTPATIENT  11/20/2019     IR STAB PHLEBECTOMY 10 - 20 STABS  10/28/2019     IR VEIN SCLEROSING MULTIPLE  10/28/2019     LAPAROSCOPIC SALPINGECTOMY Bilateral 4/23/2021    Procedure: SALPINGECTOMY, LAPAROSCOPIC BILATERAL;  Surgeon: Cate Mansfield MD;  [facility]     OPERATIVE HYSTEROSCOPY WITH MORCELLATOR  4/8/2014    Procedure: OPERATIVE HYSTEROSCOPY WITH MORCELLATOR;  Hysteroscopic Polypectomy;  Surgeon: Cate Mansfield MD;  [facility]     REMOVE INTRAUTERINE DEVICE N/A 4/23/2021    Procedure: removal of intrauterine device;  Surgeon: Cate Mansfield MD;  [facility]     Current Outpatient Medications   Medication Sig Dispense Refill     buPROPion (WELLBUTRIN XL) 150 MG 24 hr tablet Take 1 tablet (150 mg) by mouth every morning 60 tablet 1     ferrous sulfate (FEROSUL) 325 (65 Fe) MG tablet Take 325 mg by mouth daily (with breakfast)       hydroxychloroquine (PLAQUENIL) 200 MG tablet TAKE 1 TABLET BY MOUTH TWICE A  tablet 2     metroNIDAZOLE (METROCREAM) 0.75 % external cream Apply topically 2 times daily       Prenatal Vit-Fe Fumarate-FA (PRENATAL VITAMIN AND MINERAL PO)        sertraline (ZOLOFT) 100 MG tablet Take 1 tablet (100 mg) by mouth daily 90 tablet 3     vitamin C (ASCORBIC ACID) 1000 MG TABS Take 500 mg by mouth daily        LORazepam (ATIVAN) 1 MG tablet Take 1 tab 60 minutes prior to MRI.  Have a  after procedure. (Patient not  "taking: Reported on 1/7/2022) 1 tablet 0     tranexamic acid (LYSTEDA) 650 MG tablet Take 2 tablets (1,300 mg) by mouth 3 times daily Take for 5 days with your periods. (Patient not taking: Reported on 1/7/2022) 60 tablet 1     vitamin D3 (CHOLECALCIFEROL) 50 mcg (2000 units) tablet Take 1 tablet by mouth daily (Patient not taking: Reported on 1/28/2022)         Allergies   Allergen Reactions     Benzoyl Peroxide Swelling     Duricef [Cefadroxil] Unknown     Monistat [Miconazole] Swelling     Sulfa Drugs Rash        Social History     Tobacco Use     Smoking status: Never Smoker     Smokeless tobacco: Never Used   Substance Use Topics     Alcohol use: No     Alcohol/week: 0.0 standard drinks     Family History   Problem Relation Age of Onset     Elijah Disease Sister      Arrhythmia Brother         details unknown; procedure in his 20s     Anxiety Disorder Brother      Alzheimer Disease Maternal Grandmother      Cervical Cancer Maternal Grandmother      Colon Cancer Maternal Grandfather      Breast Cancer Paternal Grandmother      Hypertension Paternal Grandmother      Hypertension Paternal Grandfather      Lung Cancer Paternal Grandfather         smoker     Diabetes Type 2  Paternal Grandfather      Myocardial Infarction Paternal Grandfather      Myocardial Infarction Paternal Aunt 45     Cerebrovascular Disease No family hx of      Coronary Artery Disease Early Onset No family hx of      Ovarian Cancer No family hx of      History   Drug Use No         Objective     /69 (BP Location: Right arm, Patient Position: Sitting, Cuff Size: Adult Regular)   Pulse 68   Resp 16   Ht 1.702 m (5' 7\")   Wt 75 kg (165 lb 4.8 oz)   LMP 12/31/2021 (Approximate)   SpO2 99%   Breastfeeding No   BMI 25.89 kg/m      Physical Exam     GENERAL APPEARANCE: healthy, alert and no distress     EYES: EOMI, PERRL     HENT: ear canals and TM's normal and nose and mouth without ulcers or lesions     NECK: no adenopathy, no " asymmetry, masses, or scars and thyroid normal to palpation     RESP: lungs clear to auscultation - no rales, rhonchi or wheezes     CV: regular rates and rhythm, normal S1 S2, no S3 or S4 and no murmur, click or rub     ABDOMEN:  soft, nontender, no HSM or masses and bowel sounds normal     MS: extremities normal- no gross deformities noted, no evidence of inflammation in joints, FROM in all extremities.     SKIN: no suspicious lesions or rashes     NEURO: Normal strength and tone, sensory exam grossly normal, mentation intact and speech normal     PSYCH: mentation appears normal. and affect normal/bright     LYMPHATICS: No cervical adenopathy    Recent Labs   Lab Test 01/07/22  1130 08/27/21  0850 04/23/21  1118 03/31/21  1130 12/30/20  1239 10/28/20  1250 07/16/20  1132 07/16/20  1132   HGB 11.9 12.3   < > 13.7   < > 14.0  --  14.0    218   < >  --    < > 206  --  219   NA  --  144  --   --   --  140  --   --    POTASSIUM  --  4.4  --  4.8  --  4.1   < >  --    CR 0.56 0.79   < >  --    < > 0.63  --  0.66   A1C  --   --   --   --   --   --   --  5.1    < > = values in this interval not displayed.        Diagnostics:  No labs were ordered during this visit.   EKG required for [facility] arrhythmia and not completed in the last 90 days.   EKG: appears normal, NSR, normal axis, normal intervals, no acute ST/T changes c/w ischemia, and no LVH by voltage criteria    Revised Cardiac Risk Index (RCRI):  The patient has the following serious cardiovascular risks for perioperative complications:   - No serious cardiac risks = 0 points     RCRI Interpretation: 0 points: Class I (very low risk - 0.4% complication rate)    Signed Electronically by: HARVEY Ward CNP  Copy of this evaluation report is provided to requesting physician.      HARVEY Ward CNP

## 2022-01-28 NOTE — PROGRESS NOTES
Heather Guillory presents on 1/28/2022 for a pre-operative exam.    Patient Name: Heather Guillory  Location of [facility]: [facility]   Surgeon: Cate Mansfield MD  Type of Surgery or Procedure: MYOMECTOMY, UTERUS, ROBOT-ASSISTED, LAPROSCOPIC  Date and Time of Surgery or Procedure: 2/11/2022 at 7:45 AM  Have you or anyone in your family ever had problems with anesthesia or sedation? Roxy Espinal, CECI at 9:27 AM on 1/28/2022

## 2022-01-30 LAB
ATRIAL RATE - MUSE: 66 BPM
DIASTOLIC BLOOD PRESSURE - MUSE: NORMAL MMHG
INTERPRETATION ECG - MUSE: NORMAL
P AXIS - MUSE: 41 DEGREES
PR INTERVAL - MUSE: 150 MS
QRS DURATION - MUSE: 82 MS
QT - MUSE: 420 MS
QTC - MUSE: 440 MS
R AXIS - MUSE: 77 DEGREES
SYSTOLIC BLOOD PRESSURE - MUSE: NORMAL MMHG
T AXIS - MUSE: 65 DEGREES
VENTRICULAR RATE- MUSE: 66 BPM

## 2022-02-09 ENCOUNTER — LAB (OUTPATIENT)
Dept: URGENT CARE | Facility: URGENT CARE | Age: 42
End: 2022-02-09
Attending: NURSE PRACTITIONER
Payer: COMMERCIAL

## 2022-02-09 DIAGNOSIS — Z01.818 PREOP GENERAL PHYSICAL EXAM: ICD-10-CM

## 2022-02-09 PROCEDURE — U0003 INFECTIOUS AGENT DETECTION BY NUCLEIC ACID (DNA OR RNA); SEVERE ACUTE RESPIRATORY SYNDROME CORONAVIRUS 2 (SARS-COV-2) (CORONAVIRUS DISEASE [COVID-19]), AMPLIFIED PROBE TECHNIQUE, MAKING USE OF HIGH THROUGHPUT TECHNOLOGIES AS DESCRIBED BY CMS-2020-01-R: HCPCS

## 2022-02-09 PROCEDURE — U0005 INFEC AGEN DETEC AMPLI PROBE: HCPCS

## 2022-02-10 LAB — SARS-COV-2 RNA RESP QL NAA+PROBE: NEGATIVE

## 2022-02-11 ENCOUNTER — ANESTHESIA (OUTPATIENT)
Dept: SURGERY | Facility: CLINIC | Age: 42
End: 2022-02-11
Payer: COMMERCIAL

## 2022-02-11 ENCOUNTER — HOSPITAL ENCOUNTER (OUTPATIENT)
Facility: CLINIC | Age: 42
Discharge: HOME | End: 2022-02-11
Attending: OBSTETRICS & GYNECOLOGY | Admitting: OBSTETRICS & GYNECOLOGY
Payer: COMMERCIAL

## 2022-02-11 VITALS
TEMPERATURE: 97.9 F | BODY MASS INDEX: 25.99 KG/M2 | WEIGHT: 165.57 LBS | OXYGEN SATURATION: 97 % | HEIGHT: 67 IN | RESPIRATION RATE: 19 BRPM | HEART RATE: 63 BPM | SYSTOLIC BLOOD PRESSURE: 98 MMHG | DIASTOLIC BLOOD PRESSURE: 60 MMHG

## 2022-02-11 DIAGNOSIS — Z98.890 S/P MYOMECTOMY: Primary | ICD-10-CM

## 2022-02-11 LAB
ABO/RH(D): NORMAL
ANTIBODY SCREEN: NEGATIVE
B-HCG SERPL-ACNC: <1 IU/L (ref 0–5)
BASOPHILS # BLD AUTO: 0 10E3/UL (ref 0–0.2)
BASOPHILS NFR BLD AUTO: 1 %
EOSINOPHIL # BLD AUTO: 0.1 10E3/UL (ref 0–0.7)
EOSINOPHIL NFR BLD AUTO: 2 %
ERYTHROCYTE [DISTWIDTH] IN BLOOD BY AUTOMATED COUNT: 13.7 % (ref 10–15)
GLUCOSE BLDC GLUCOMTR-MCNC: 96 MG/DL (ref 70–99)
HCT VFR BLD AUTO: 35 % (ref 35–47)
HGB BLD-MCNC: 11.1 G/DL (ref 11.7–15.7)
IMM GRANULOCYTES # BLD: 0 10E3/UL
IMM GRANULOCYTES NFR BLD: 0 %
LYMPHOCYTES # BLD AUTO: 2.2 10E3/UL (ref 0.8–5.3)
LYMPHOCYTES NFR BLD AUTO: 42 %
MCH RBC QN AUTO: 26.6 PG (ref 26.5–33)
MCHC RBC AUTO-ENTMCNC: 31.7 G/DL (ref 31.5–36.5)
MCV RBC AUTO: 84 FL (ref 78–100)
MONOCYTES # BLD AUTO: 0.4 10E3/UL (ref 0–1.3)
MONOCYTES NFR BLD AUTO: 8 %
NEUTROPHILS # BLD AUTO: 2.5 10E3/UL (ref 1.6–8.3)
NEUTROPHILS NFR BLD AUTO: 47 %
NRBC # BLD AUTO: 0 10E3/UL
NRBC BLD AUTO-RTO: 0 /100
PLATELET # BLD AUTO: 187 10E3/UL (ref 150–450)
RBC # BLD AUTO: 4.17 10E6/UL (ref 3.8–5.2)
SPECIMEN EXPIRATION DATE: NORMAL
WBC # BLD AUTO: 5.4 10E3/UL (ref 4–11)

## 2022-02-11 PROCEDURE — 250N000025 HC SEVOFLURANE, PER MIN: Performed by: OBSTETRICS & GYNECOLOGY

## 2022-02-11 PROCEDURE — 250N000009 HC RX 250: Performed by: ANESTHESIOLOGY

## 2022-02-11 PROCEDURE — 85025 COMPLETE CBC W/AUTO DIFF WBC: CPT | Performed by: OBSTETRICS & GYNECOLOGY

## 2022-02-11 PROCEDURE — 250N000013 HC RX MED GY IP 250 OP 250 PS 637: Performed by: STUDENT IN AN ORGANIZED HEALTH CARE EDUCATION/TRAINING PROGRAM

## 2022-02-11 PROCEDURE — 88305 TISSUE EXAM BY PATHOLOGIST: CPT | Mod: 26 | Performed by: PATHOLOGY

## 2022-02-11 PROCEDURE — 36415 COLL VENOUS BLD VENIPUNCTURE: CPT | Performed by: OBSTETRICS & GYNECOLOGY

## 2022-02-11 PROCEDURE — 360N000080 HC SURGERY LEVEL 7, PER MIN: Performed by: OBSTETRICS & GYNECOLOGY

## 2022-02-11 PROCEDURE — 250N000011 HC RX IP 250 OP 636: Performed by: OBSTETRICS & GYNECOLOGY

## 2022-02-11 PROCEDURE — 250N000011 HC RX IP 250 OP 636: Performed by: ANESTHESIOLOGY

## 2022-02-11 PROCEDURE — 258N000003 HC RX IP 258 OP 636: Performed by: OBSTETRICS & GYNECOLOGY

## 2022-02-11 PROCEDURE — 272N000001 HC OR GENERAL SUPPLY STERILE: Performed by: OBSTETRICS & GYNECOLOGY

## 2022-02-11 PROCEDURE — 710N000012 HC RECOVERY PHASE 2, PER MINUTE: Performed by: OBSTETRICS & GYNECOLOGY

## 2022-02-11 PROCEDURE — 86901 BLOOD TYPING SEROLOGIC RH(D): CPT | Performed by: OBSTETRICS & GYNECOLOGY

## 2022-02-11 PROCEDURE — 999N000141 HC STATISTIC PRE-PROCEDURE NURSING ASSESSMENT: Performed by: OBSTETRICS & GYNECOLOGY

## 2022-02-11 PROCEDURE — 88305 TISSUE EXAM BY PATHOLOGIST: CPT | Mod: TC | Performed by: OBSTETRICS & GYNECOLOGY

## 2022-02-11 PROCEDURE — 250N000009 HC RX 250: Performed by: NURSE ANESTHETIST, CERTIFIED REGISTERED

## 2022-02-11 PROCEDURE — 250N000011 HC RX IP 250 OP 636: Performed by: NURSE ANESTHETIST, CERTIFIED REGISTERED

## 2022-02-11 PROCEDURE — 710N000010 HC RECOVERY PHASE 1, LEVEL 2, PER MIN: Performed by: OBSTETRICS & GYNECOLOGY

## 2022-02-11 PROCEDURE — 370N000017 HC ANESTHESIA TECHNICAL FEE, PER MIN: Performed by: OBSTETRICS & GYNECOLOGY

## 2022-02-11 PROCEDURE — 250N000013 HC RX MED GY IP 250 OP 250 PS 637: Performed by: OBSTETRICS & GYNECOLOGY

## 2022-02-11 PROCEDURE — 82962 GLUCOSE BLOOD TEST: CPT

## 2022-02-11 PROCEDURE — 258N000003 HC RX IP 258 OP 636: Performed by: NURSE ANESTHETIST, CERTIFIED REGISTERED

## 2022-02-11 PROCEDURE — 84702 CHORIONIC GONADOTROPIN TEST: CPT | Performed by: OBSTETRICS & GYNECOLOGY

## 2022-02-11 RX ORDER — HYDROMORPHONE HYDROCHLORIDE 1 MG/ML
0.2 INJECTION, SOLUTION INTRAMUSCULAR; INTRAVENOUS; SUBCUTANEOUS EVERY 5 MIN PRN
Status: DISCONTINUED | OUTPATIENT
Start: 2022-02-11 | End: 2022-02-11 | Stop reason: HOSPADM

## 2022-02-11 RX ORDER — CLINDAMYCIN PHOSPHATE 900 MG/50ML
900 INJECTION, SOLUTION INTRAVENOUS
Status: DISCONTINUED | OUTPATIENT
Start: 2022-02-11 | End: 2022-02-11 | Stop reason: HOSPADM

## 2022-02-11 RX ORDER — IBUPROFEN 200 MG
800 TABLET ORAL ONCE
Status: DISCONTINUED | OUTPATIENT
Start: 2022-02-11 | End: 2022-02-11 | Stop reason: HOSPADM

## 2022-02-11 RX ORDER — PROPOFOL 10 MG/ML
INJECTION, EMULSION INTRAVENOUS PRN
Status: DISCONTINUED | OUTPATIENT
Start: 2022-02-11 | End: 2022-02-11

## 2022-02-11 RX ORDER — DIPHENHYDRAMINE HYDROCHLORIDE 50 MG/ML
25 INJECTION INTRAMUSCULAR; INTRAVENOUS EVERY 6 HOURS PRN
Status: DISCONTINUED | OUTPATIENT
Start: 2022-02-11 | End: 2022-02-11 | Stop reason: HOSPADM

## 2022-02-11 RX ORDER — AMOXICILLIN 250 MG
1-2 CAPSULE ORAL 2 TIMES DAILY
Qty: 30 TABLET | Refills: 0 | Status: SHIPPED | OUTPATIENT
Start: 2022-02-11 | End: 2022-02-13

## 2022-02-11 RX ORDER — PHENAZOPYRIDINE HYDROCHLORIDE 200 MG/1
200 TABLET, FILM COATED ORAL ONCE
Status: COMPLETED | OUTPATIENT
Start: 2022-02-11 | End: 2022-02-11

## 2022-02-11 RX ORDER — ACETAMINOPHEN 325 MG/1
975 TABLET ORAL EVERY 6 HOURS PRN
Qty: 50 TABLET | Refills: 0 | Status: SHIPPED | OUTPATIENT
Start: 2022-02-11 | End: 2022-06-01

## 2022-02-11 RX ORDER — PROPOFOL 10 MG/ML
INJECTION, EMULSION INTRAVENOUS CONTINUOUS PRN
Status: DISCONTINUED | OUTPATIENT
Start: 2022-02-11 | End: 2022-02-11

## 2022-02-11 RX ORDER — OXYCODONE HYDROCHLORIDE 5 MG/1
5 TABLET ORAL EVERY 6 HOURS PRN
Qty: 12 TABLET | Refills: 0 | Status: SHIPPED | OUTPATIENT
Start: 2022-02-11 | End: 2022-02-14

## 2022-02-11 RX ORDER — ONDANSETRON 2 MG/ML
4 INJECTION INTRAMUSCULAR; INTRAVENOUS EVERY 30 MIN PRN
Status: DISCONTINUED | OUTPATIENT
Start: 2022-02-11 | End: 2022-02-11 | Stop reason: HOSPADM

## 2022-02-11 RX ORDER — SODIUM CHLORIDE, SODIUM LACTATE, POTASSIUM CHLORIDE, CALCIUM CHLORIDE 600; 310; 30; 20 MG/100ML; MG/100ML; MG/100ML; MG/100ML
INJECTION, SOLUTION INTRAVENOUS CONTINUOUS
Status: DISCONTINUED | OUTPATIENT
Start: 2022-02-11 | End: 2022-02-11 | Stop reason: HOSPADM

## 2022-02-11 RX ORDER — NALOXONE HYDROCHLORIDE 0.4 MG/ML
0.4 INJECTION, SOLUTION INTRAMUSCULAR; INTRAVENOUS; SUBCUTANEOUS
Status: DISCONTINUED | OUTPATIENT
Start: 2022-02-11 | End: 2022-02-11 | Stop reason: HOSPADM

## 2022-02-11 RX ORDER — ONDANSETRON 2 MG/ML
INJECTION INTRAMUSCULAR; INTRAVENOUS PRN
Status: DISCONTINUED | OUTPATIENT
Start: 2022-02-11 | End: 2022-02-11

## 2022-02-11 RX ORDER — SODIUM CHLORIDE, SODIUM LACTATE, POTASSIUM CHLORIDE, CALCIUM CHLORIDE 600; 310; 30; 20 MG/100ML; MG/100ML; MG/100ML; MG/100ML
INJECTION, SOLUTION INTRAVENOUS CONTINUOUS PRN
Status: DISCONTINUED | OUTPATIENT
Start: 2022-02-11 | End: 2022-02-11

## 2022-02-11 RX ORDER — LIDOCAINE HYDROCHLORIDE 20 MG/ML
INJECTION, SOLUTION INFILTRATION; PERINEURAL PRN
Status: DISCONTINUED | OUTPATIENT
Start: 2022-02-11 | End: 2022-02-11

## 2022-02-11 RX ORDER — FENTANYL CITRATE 50 UG/ML
25-50 INJECTION, SOLUTION INTRAMUSCULAR; INTRAVENOUS
Status: DISCONTINUED | OUTPATIENT
Start: 2022-02-11 | End: 2022-02-11 | Stop reason: HOSPADM

## 2022-02-11 RX ORDER — EPHEDRINE SULFATE 50 MG/ML
INJECTION, SOLUTION INTRAMUSCULAR; INTRAVENOUS; SUBCUTANEOUS PRN
Status: DISCONTINUED | OUTPATIENT
Start: 2022-02-11 | End: 2022-02-11

## 2022-02-11 RX ORDER — IBUPROFEN 800 MG/1
800 TABLET, FILM COATED ORAL EVERY 6 HOURS PRN
Qty: 30 TABLET | Refills: 0 | Status: SHIPPED | OUTPATIENT
Start: 2022-02-11 | End: 2022-06-01

## 2022-02-11 RX ORDER — DEXAMETHASONE SODIUM PHOSPHATE 4 MG/ML
INJECTION, SOLUTION INTRA-ARTICULAR; INTRALESIONAL; INTRAMUSCULAR; INTRAVENOUS; SOFT TISSUE PRN
Status: DISCONTINUED | OUTPATIENT
Start: 2022-02-11 | End: 2022-02-11

## 2022-02-11 RX ORDER — BUPIVACAINE HYDROCHLORIDE 2.5 MG/ML
INJECTION, SOLUTION EPIDURAL; INFILTRATION; INTRACAUDAL
Status: COMPLETED | OUTPATIENT
Start: 2022-02-11 | End: 2022-02-11

## 2022-02-11 RX ORDER — FLUMAZENIL 0.1 MG/ML
0.2 INJECTION, SOLUTION INTRAVENOUS
Status: DISCONTINUED | OUTPATIENT
Start: 2022-02-11 | End: 2022-02-11 | Stop reason: HOSPADM

## 2022-02-11 RX ORDER — FENTANYL CITRATE 50 UG/ML
INJECTION, SOLUTION INTRAMUSCULAR; INTRAVENOUS PRN
Status: DISCONTINUED | OUTPATIENT
Start: 2022-02-11 | End: 2022-02-11

## 2022-02-11 RX ORDER — KETOROLAC TROMETHAMINE 30 MG/ML
INJECTION, SOLUTION INTRAMUSCULAR; INTRAVENOUS PRN
Status: DISCONTINUED | OUTPATIENT
Start: 2022-02-11 | End: 2022-02-11

## 2022-02-11 RX ORDER — DIPHENHYDRAMINE HCL 25 MG
25 CAPSULE ORAL EVERY 6 HOURS PRN
Status: DISCONTINUED | OUTPATIENT
Start: 2022-02-11 | End: 2022-02-11 | Stop reason: HOSPADM

## 2022-02-11 RX ORDER — ACETAMINOPHEN 325 MG/1
975 TABLET ORAL ONCE
Status: COMPLETED | OUTPATIENT
Start: 2022-02-11 | End: 2022-02-11

## 2022-02-11 RX ORDER — DEXAMETHASONE SODIUM PHOSPHATE 4 MG/ML
4 INJECTION, SOLUTION INTRA-ARTICULAR; INTRALESIONAL; INTRAMUSCULAR; INTRAVENOUS; SOFT TISSUE
Status: DISCONTINUED | OUTPATIENT
Start: 2022-02-11 | End: 2022-02-11 | Stop reason: HOSPADM

## 2022-02-11 RX ORDER — NALOXONE HYDROCHLORIDE 0.4 MG/ML
0.2 INJECTION, SOLUTION INTRAMUSCULAR; INTRAVENOUS; SUBCUTANEOUS
Status: DISCONTINUED | OUTPATIENT
Start: 2022-02-11 | End: 2022-02-11 | Stop reason: HOSPADM

## 2022-02-11 RX ORDER — FENTANYL CITRATE 50 UG/ML
25 INJECTION, SOLUTION INTRAMUSCULAR; INTRAVENOUS EVERY 5 MIN PRN
Status: DISCONTINUED | OUTPATIENT
Start: 2022-02-11 | End: 2022-02-11 | Stop reason: HOSPADM

## 2022-02-11 RX ORDER — LABETALOL HYDROCHLORIDE 5 MG/ML
10 INJECTION, SOLUTION INTRAVENOUS
Status: DISCONTINUED | OUTPATIENT
Start: 2022-02-11 | End: 2022-02-11 | Stop reason: HOSPADM

## 2022-02-11 RX ORDER — SCOLOPAMINE TRANSDERMAL SYSTEM 1 MG/1
1 PATCH, EXTENDED RELEASE TRANSDERMAL ONCE
Status: DISCONTINUED | OUTPATIENT
Start: 2022-02-11 | End: 2022-02-11 | Stop reason: HOSPADM

## 2022-02-11 RX ORDER — OXYCODONE HYDROCHLORIDE 5 MG/1
5 TABLET ORAL
Status: COMPLETED | OUTPATIENT
Start: 2022-02-11 | End: 2022-02-11

## 2022-02-11 RX ORDER — DEXMEDETOMIDINE HYDROCHLORIDE 4 UG/ML
INJECTION, SOLUTION INTRAVENOUS
Status: COMPLETED | OUTPATIENT
Start: 2022-02-11 | End: 2022-02-11

## 2022-02-11 RX ORDER — CLINDAMYCIN PHOSPHATE 900 MG/50ML
900 INJECTION, SOLUTION INTRAVENOUS SEE ADMIN INSTRUCTIONS
Status: DISCONTINUED | OUTPATIENT
Start: 2022-02-11 | End: 2022-02-11 | Stop reason: HOSPADM

## 2022-02-11 RX ORDER — DIMENHYDRINATE 50 MG/ML
25 INJECTION, SOLUTION INTRAMUSCULAR; INTRAVENOUS
Status: DISCONTINUED | OUTPATIENT
Start: 2022-02-11 | End: 2022-02-11 | Stop reason: HOSPADM

## 2022-02-11 RX ORDER — ONDANSETRON 4 MG/1
4 TABLET, ORALLY DISINTEGRATING ORAL EVERY 30 MIN PRN
Status: DISCONTINUED | OUTPATIENT
Start: 2022-02-11 | End: 2022-02-11 | Stop reason: HOSPADM

## 2022-02-11 RX ORDER — DEXAMETHASONE SODIUM PHOSPHATE 10 MG/ML
INJECTION, SOLUTION INTRAMUSCULAR; INTRAVENOUS
Status: COMPLETED | OUTPATIENT
Start: 2022-02-11 | End: 2022-02-11

## 2022-02-11 RX ADMIN — BUPIVACAINE HYDROCHLORIDE 50 ML: 2.5 INJECTION, SOLUTION EPIDURAL; INFILTRATION; INTRACAUDAL at 11:20

## 2022-02-11 RX ADMIN — DEXAMETHASONE SODIUM PHOSPHATE 8 MG: 4 INJECTION, SOLUTION INTRAMUSCULAR; INTRAVENOUS at 08:07

## 2022-02-11 RX ADMIN — ACETAMINOPHEN 975 MG: 325 TABLET, FILM COATED ORAL at 14:03

## 2022-02-11 RX ADMIN — SODIUM CHLORIDE, POTASSIUM CHLORIDE, SODIUM LACTATE AND CALCIUM CHLORIDE: 600; 310; 30; 20 INJECTION, SOLUTION INTRAVENOUS at 10:57

## 2022-02-11 RX ADMIN — FENTANYL CITRATE 50 MCG: 50 INJECTION, SOLUTION INTRAMUSCULAR; INTRAVENOUS at 07:50

## 2022-02-11 RX ADMIN — PROPOFOL 200 MG: 10 INJECTION, EMULSION INTRAVENOUS at 07:50

## 2022-02-11 RX ADMIN — PROPOFOL 75 MCG/KG/MIN: 10 INJECTION, EMULSION INTRAVENOUS at 07:59

## 2022-02-11 RX ADMIN — HYDROMORPHONE HYDROCHLORIDE 0.2 MG: 1 INJECTION, SOLUTION INTRAMUSCULAR; INTRAVENOUS; SUBCUTANEOUS at 13:06

## 2022-02-11 RX ADMIN — KETOROLAC TROMETHAMINE 30 MG: 30 INJECTION, SOLUTION INTRAMUSCULAR at 10:54

## 2022-02-11 RX ADMIN — OXYCODONE HYDROCHLORIDE 5 MG: 5 TABLET ORAL at 14:03

## 2022-02-11 RX ADMIN — FENTANYL CITRATE 50 MCG: 50 INJECTION, SOLUTION INTRAMUSCULAR; INTRAVENOUS at 08:22

## 2022-02-11 RX ADMIN — ACETAMINOPHEN 975 MG: 325 TABLET, FILM COATED ORAL at 06:31

## 2022-02-11 RX ADMIN — LIDOCAINE HYDROCHLORIDE 100 MG: 20 INJECTION, SOLUTION INFILTRATION; PERINEURAL at 07:50

## 2022-02-11 RX ADMIN — GENTAMICIN SULFATE 340 MG: 40 INJECTION, SOLUTION INTRAMUSCULAR; INTRAVENOUS at 07:59

## 2022-02-11 RX ADMIN — SODIUM CHLORIDE, POTASSIUM CHLORIDE, SODIUM LACTATE AND CALCIUM CHLORIDE: 600; 310; 30; 20 INJECTION, SOLUTION INTRAVENOUS at 07:50

## 2022-02-11 RX ADMIN — DEXMEDETOMIDINE 40 MCG: 100 INJECTION, SOLUTION, CONCENTRATE INTRAVENOUS at 11:20

## 2022-02-11 RX ADMIN — ROCURONIUM BROMIDE 10 MG: 50 INJECTION, SOLUTION INTRAVENOUS at 08:43

## 2022-02-11 RX ADMIN — PHENYLEPHRINE HYDROCHLORIDE 100 MCG: 10 INJECTION INTRAVENOUS at 08:18

## 2022-02-11 RX ADMIN — SCOPALAMINE 1 PATCH: 1 PATCH, EXTENDED RELEASE TRANSDERMAL at 07:05

## 2022-02-11 RX ADMIN — DEXAMETHASONE SODIUM PHOSPHATE 2 MG: 10 INJECTION, SOLUTION INTRAMUSCULAR; INTRAVENOUS at 11:20

## 2022-02-11 RX ADMIN — ROCURONIUM BROMIDE 50 MG: 50 INJECTION, SOLUTION INTRAVENOUS at 07:50

## 2022-02-11 RX ADMIN — ROCURONIUM BROMIDE 20 MG: 50 INJECTION, SOLUTION INTRAVENOUS at 09:46

## 2022-02-11 RX ADMIN — PHENYLEPHRINE HYDROCHLORIDE 100 MCG: 10 INJECTION INTRAVENOUS at 08:30

## 2022-02-11 RX ADMIN — Medication 5 MG: at 08:33

## 2022-02-11 RX ADMIN — MIDAZOLAM 2 MG: 1 INJECTION INTRAMUSCULAR; INTRAVENOUS at 07:45

## 2022-02-11 RX ADMIN — HYDROMORPHONE HYDROCHLORIDE 0.3 MG: 1 INJECTION, SOLUTION INTRAMUSCULAR; INTRAVENOUS; SUBCUTANEOUS at 10:15

## 2022-02-11 RX ADMIN — ONDANSETRON 4 MG: 2 INJECTION INTRAMUSCULAR; INTRAVENOUS at 10:47

## 2022-02-11 RX ADMIN — FENTANYL CITRATE 25 MCG: 50 INJECTION, SOLUTION INTRAMUSCULAR; INTRAVENOUS at 12:02

## 2022-02-11 RX ADMIN — Medication 5 MG: at 08:45

## 2022-02-11 RX ADMIN — HYDROMORPHONE HYDROCHLORIDE 0.2 MG: 1 INJECTION, SOLUTION INTRAMUSCULAR; INTRAVENOUS; SUBCUTANEOUS at 11:14

## 2022-02-11 RX ADMIN — ONDANSETRON 4 MG: 2 INJECTION INTRAMUSCULAR; INTRAVENOUS at 12:20

## 2022-02-11 RX ADMIN — PHENAZOPYRIDINE HYDROCHLORIDE 200 MG: 200 TABLET, FILM COATED ORAL at 06:31

## 2022-02-11 RX ADMIN — SUGAMMADEX 200 MG: 100 INJECTION, SOLUTION INTRAVENOUS at 11:10

## 2022-02-11 RX ADMIN — Medication 5 MG: at 08:37

## 2022-02-11 RX ADMIN — CLINDAMYCIN IN 5 PERCENT DEXTROSE 900 MG: 18 INJECTION, SOLUTION INTRAVENOUS at 07:09

## 2022-02-11 ASSESSMENT — ENCOUNTER SYMPTOMS
SEIZURES: 0
DYSRHYTHMIAS: 0

## 2022-02-11 ASSESSMENT — MIFFLIN-ST. JEOR: SCORE: 1458.75

## 2022-02-11 NOTE — ANESTHESIA PROCEDURE NOTES
Airway       Patient location during procedure: OR       Procedure Start/Stop Times: 2/11/2022 7:53 AM  Staff -        CRNA: Peri Shukla APRN CRNA       Performed By: CRNA  Consent for Airway        Urgency: elective  Indications and Patient Condition       Indications for airway management: taurus-procedural       Induction type:intravenous       Mask difficulty assessment: 1 - vent by mask    Final Airway Details       Final airway type: endotracheal airway       Successful airway: ETT - single  Endotracheal Airway Details        ETT size (mm): 7.0       Cuffed: yes       Successful intubation technique: direct laryngoscopy       DL Blade Type: MAC 3       Grade View of Cords: 1       Adjucts: stylet       Position: Right       Measured from: gums/teeth       Secured at (cm): 22       Bite block used: None    Post intubation assessment        Placement verified by: capnometry, equal breath sounds and chest rise        Number of attempts at approach: 1       Secured with: silk tape       Ease of procedure: easy       Dentition: Intact and Unchanged

## 2022-02-11 NOTE — ANESTHESIA CARE TRANSFER NOTE
Patient: Heather Guillory    Procedure: Procedure(s):  MYOMECTOMY, UTERUS, ROBOT-ASSISTED, LAPAROSCOPIC       Diagnosis: Submucous leiomyoma of uterus [D25.0]  Diagnosis Additional Information: No value filed.    Anesthesia Type:   General     Note:    Oropharynx: oropharynx clear of all foreign objects  Level of Consciousness: drowsy  Oxygen Supplementation: face mask    Independent Airway: airway patency satisfactory and stable  Dentition: dentition unchanged  Vital Signs Stable: post-procedure vital signs reviewed and stable  Report to RN Given: handoff report given  Patient transferred to: PACU    Handoff Report: Identifed the Patient, Identified the Reponsible Provider, Reviewed the pertinent medical history, Discussed the surgical course, Reviewed Intra-OP anesthesia mangement and issues during anesthesia, Set expectations for post-procedure period and Allowed opportunity for questions and acknowledgement of understanding      Vitals:  Vitals Value Taken Time   BP 96/57 02/11/22 1141   Temp     Pulse 62 02/11/22 1145   Resp 19 02/11/22 1145   SpO2 100 % 02/11/22 1145   Vitals shown include unvalidated device data.    Electronically Signed By: HARVEY Wong CRNA  February 11, 2022  11:46 AM

## 2022-02-11 NOTE — BRIEF OP NOTE
[facility]    Brief Operative Note    Pre-operative diagnosis: Submucous leiomyoma of uterus [D25.0]  Post-operative diagnosis Same as pre-operative diagnosis    Procedure: Procedure(s):  MYOMECTOMY, UTERUS, ROBOT-ASSISTED, LAPAROSCOPIC  Surgeon: Surgeon(s) and Role:     * Cate Mansfield MD - Primary     * Susanna Kelsey MD - Resident - Assisting  Anesthesia: Combined General with Block   Estimated Blood Loss: 95 ml  IVF: 1000 ml crystalloid   UOP : 250 ml pyridium stained urine      Drains: None  Specimens:   ID Type Source Tests Collected by Time Destination   1 : Uterine Fibroids Tissue Other SURGICAL PATHOLOGY EXAM Cate Mansfield MD 2/11/2022 10:42 AM      Findings:   Normal external genitalia. Large, mobile uterus. On laparoscopy, normal upper abdomen survey. Surgically absent fallopian tubes. Normal ovaries. Uterus enlarged with posterior submucous fibroid, off to left of midline. Myoma injected with 12 c dilute vasopressin prior to start of myomectomy. Incision made on posterior aspect of uterus from fundus to mid uterine body. One large, multilobulated fibroid removed, measuring about 4x7 cm in size. Endometrial cavity entered during dissection as uterine manipulator visualized. Sites hemostatic at end of case. .  Complications: None.  Implants: * No implants in log *

## 2022-02-11 NOTE — ANESTHESIA PREPROCEDURE EVALUATION
Anesthesia Pre-Procedure Evaluation    Patient: Heather Guillory   MRN: [id number] : 1980        Preoperative Diagnosis: Submucous leiomyoma of uterus [D25.0]    Procedure : Procedure(s):  MYOMECTOMY, UTERUS, ROBOT-ASSISTED, LAPAROSCOPIC          Past Medical History:   Diagnosis Date     JAVIER (generalized anxiety disorder)     50mg zoloft     Rosacea      Undifferentiated connective tissue disease (H)       Past Surgical History:   Procedure Laterality Date     COLONOSCOPY N/A 10/21/2020    Procedure: COLONOSCOPY;  Surgeon: Yang Pete MD;  Location: [facility] OR     IR ENDOVENOUS ABLATION VARICOSE VEINS  10/28/2019     IR FOLLOW UP VISIT OUTPATIENT  2019     IR STAB PHLEBECTOMY 10 -  STABS  10/28/2019     IR VEIN SCLEROSING MULTIPLE  10/28/2019     LAPAROSCOPIC SALPINGECTOMY Bilateral 2021    Procedure: SALPINGECTOMY, LAPAROSCOPIC BILATERAL;  Surgeon: Cate Mansfield MD;  Location: [facility] OR     OPERATIVE HYSTEROSCOPY WITH MORCELLATOR  2014    Procedure: OPERATIVE HYSTEROSCOPY WITH MORCELLATOR;  Hysteroscopic Polypectomy;  Surgeon: Cate Mansfield MD;  Location: [facility]     REMOVE INTRAUTERINE DEVICE N/A 2021    Procedure: removal of intrauterine device;  Surgeon: Cate Mansfield MD;  Location: [facility] OR      Allergies   Allergen Reactions     Latex      Benzoyl Peroxide Swelling     Duricef [Cefadroxil] Unknown     Monistat [Miconazole] Swelling     Sulfa Drugs Rash      Social History     Tobacco Use     Smoking status: Never Smoker     Smokeless tobacco: Never Used   Substance Use Topics     Alcohol use: No     Alcohol/week: 0.0 standard drinks      Wt Readings from Last 1 Encounters:   22 75.1 kg (165 lb 9.1 oz)        Anesthesia Evaluation   Pt has had prior anesthetic. Type: General.    History of anesthetic complications  - motion sickness and PONV.      ROS/MED HX  ENT/Pulmonary:  - neg pulmonary ROS     Neurologic:  - neg neurologic ROS  (-) no  seizures and no CVA   Cardiovascular:    (-) EVANS, arrhythmias and irregular heartbeat/palpitations   METS/Exercise Tolerance: >4 METS Comment: Active running around house with small children   Hematologic:       Musculoskeletal:       GI/Hepatic:     (+) GERD (occasional GERD acid stomach with stress nothing refluxing into mouth), Symptomatic,  (-) liver disease   Renal/Genitourinary:  - neg Renal ROS  (-) renal disease   Endo:  - neg endo ROS  (-) Type I DM, Type II DM and thyroid disease   Psychiatric/Substance Use:     (+) psychiatric history anxiety     Infectious Disease: Comment: ESBL      Malignancy:  - neg malignancy ROS     Other:            Physical Exam    Airway        Mallampati: II   TM distance: > 3 FB   Neck ROM: full   Mouth opening: < 3 cm    Respiratory Devices and Support         Dental  no notable dental history         Cardiovascular   cardiovascular exam normal       Rhythm and rate: regular and normal     Pulmonary   pulmonary exam normal        breath sounds clear to auscultation           OUTSIDE LABS:  CBC:   Lab Results   Component Value Date    WBC 5.4 02/11/2022    WBC 6.3 01/07/2022    HGB 11.1 (L) 02/11/2022    HGB 11.9 01/07/2022    HCT 35.0 02/11/2022    HCT 38.5 01/07/2022     02/11/2022     01/07/2022     BMP:   Lab Results   Component Value Date     08/27/2021     10/28/2020    POTASSIUM 4.4 08/27/2021    POTASSIUM 4.8 03/31/2021    CHLORIDE 113 (H) 08/27/2021    CHLORIDE 109 10/28/2020    CO2 26 08/27/2021    CO2 27 10/28/2020    BUN 11 08/27/2021    BUN 7 10/28/2020    CR 0.56 01/07/2022    CR 0.79 08/27/2021    GLC 96 02/11/2022    GLC 99 08/27/2021     COAGS: No results found for: PTT, INR, FIBR  POC:   Lab Results   Component Value Date    BGM 96 04/23/2021    HCG Negative 05/24/2018     HEPATIC:   Lab Results   Component Value Date    ALBUMIN 4.3 01/07/2022    PROTTOTAL 7.2 08/27/2021    ALT 24 01/07/2022    AST 18 01/07/2022    ALKPHOS 79  08/27/2021    BILITOTAL 0.2 08/27/2021     OTHER:   Lab Results   Component Value Date    A1C 5.1 07/16/2020    PRICILA 8.9 08/27/2021    TSH 1.05 08/27/2021    CRP <2.9 01/07/2022    SED 8 01/07/2022       Anesthesia Plan    ASA Status:  1   NPO Status:  NPO Appropriate    Anesthesia Type: General.     - Airway: ETT   Induction: Intravenous.           Consents    Anesthesia Plan(s) and associated risks, benefits, and realistic alternatives discussed. Questions answered and patient/representative(s) expressed understanding.    - Discussed:     - Discussed with:  Patient      - Extended Intubation/Ventilatory Support Discussed: No.      - Patient is DNR/DNI Status: No    Use of blood products discussed: Yes.     - Discussed with: Patient.     - Consented: consented to blood products            Reason for refusal: other.     Postoperative Care    Pain management: Oral pain medications, Peripheral nerve block (Single Shot).   PONV prophylaxis: Ondansetron (or other 5HT-3), Dexamethasone or Solumedrol, Scopolamine patch, Background Propofol Infusion     Comments:                Juliana Pabon MD

## 2022-02-11 NOTE — DISCHARGE INSTRUCTIONS
Same-Day Surgery   Adult Discharge Orders & Instructions     For 24 hours after surgery:  1. Get plenty of rest.  A responsible adult must stay with you for at least 24 hours after you leave the hospital.   2. Pain medication can slow your reflexes. Do not drive or use heavy equipment.  If you have weakness or tingling, don't drive or use heavy equipment until this feeling goes away.  3. Mixing alcohol and pain medication can cause dizziness and slow your breathing. It can even be fatal. Do not drink alcohol while taking pain medication.  4. Avoid strenuous or risky activities.  Ask for help when climbing stairs.   5. You may feel lightheaded.  If so, sit for a few minutes before standing.  Have someone help you get up.   6. If you have nausea (feel sick to your stomach), drink only clear liquids such as apple juice, ginger ale, broth or 7-Up.  Rest may also help.  Be sure to drink enough fluids.  Move to a regular diet as you feel able. Take pain medications with a small amount of solid food, such as toast or crackers, to avoid nausea.   7. A slight fever is normal. Call the doctor if your fever is over 100 F (37.7 C) (taken under the tongue) or lasts longer than 24 hours.  8. You may have a dry mouth, muscle aches, trouble sleeping or a sore throat.  These symptoms should go away after 24 hours.  9. Do not make important or legal decisions.   Pain Management:      1. Take pain medication (if prescribed) for pain as directed by your physician.        2. WARNING: If the pain medication you have been prescribed contains Tylenol  (acetaminophen), DO NOT take additional doses of Tylenol (acetaminophen).     Call your doctor for any of the followin.  Signs of infection (fever, growing tenderness at the surgery site, severe pain, a large amount of drainage or bleeding, foul-smelling drainage, redness, swelling).    2.  It has been over 8 to 10 hours since surgery and you are still not able to urinate (pee).    3.   "Headache for over 24 hours.    4.  Numbness, tingling or weakness the day after surgery (if you had spinal anesthesia).  To contact a doctor, call Dr. Mansfield, OB/GYN, [phone] or:      [phone] and ask for the Resident On Call for: OB/GYN (answered 24 hours a day)      Emergency Department:  [facility]: [phone]  [facility]: [phone]    Discharge Instructions:   Following a Laparoscopy    Comfort:    The amount of discomfort you can expect is very unpredictable.     If you have pain that cannot be controlled with non aspirin medication or with the prescription medication you may have received, you should notify your physician.     You May Experience:    Abdominal tenderness; abdominal cramps (like menstrual cramps).    Low back ache or discomfort radiating to your shoulders, chest, back or neck. This is a result of the gas used to inflate your abdomen during surgery. This gas is absorbed in 24 to 36 hours. The \"knee chest\" position will help relieve this discomfort.    Sore throat for a day or two resulting from the anesthesia tube used during surgery. You may use throat lozenges to help relieve this discomfort.    Black and blue marks on your abdomen.    Drainage:    You may expect a small amount of drainage from the incision on your abdomen and you may change the bandage when necessary.    You may also have a small amount of vaginal drainage for 3 to 4 days; this is normal and no cause for concern. If excessive bleeding occurs, notify your physician.    Do not douche, and use a pad rather than tampons. Do not resume intercourse for at least one week or until bleeding has ceased.    Home Activity:    The day of surgery spend a quiet day at home.    Increase activity as tolerated.    You may bathe or shower, do not soak in bath tub or scrub incisions.    You have no restrictions on your diet. Following surgery, drink plenty of fluids and eat a light " meal.    The anesthesia may produce some nausea. If you feel nauseated, stay in bed, keep your head down and try drinking fluids such as Seven-Up, tea or soup.    Notify Physician at Once IF:    You have a fever over 100.4 degrees. A low grade fever (under 100 degrees) is usual after surgery.    You have severe pain.    You have a large amount of bleeding or drainage.    Important numbers  [facility] ([address]) - [place]: [phone]

## 2022-02-11 NOTE — ANESTHESIA POSTPROCEDURE EVALUATION
"Patient: Heather Guillory    Procedure: Procedure(s):  MYOMECTOMY, UTERUS, ROBOT-ASSISTED, LAPAROSCOPIC       Diagnosis:Submucous leiomyoma of uterus [D25.0]  Diagnosis Additional Information: No value filed.    Anesthesia Type:  General    Note:  Disposition: Inpatient   Postop Pain Control: Uneventful            Sign Out: Well controlled pain   PONV: No   Neuro/Psych: Uneventful            Sign Out: Acceptable/Baseline neuro status   Airway/Respiratory: Uneventful            Sign Out: Acceptable/Baseline resp. status   CV/Hemodynamics: Uneventful            Sign Out: Acceptable CV status; No obvious hypovolemia; No obvious fluid overload   Other NRE: NONE   DID A NON-ROUTINE EVENT OCCUR? No           Last vitals:  Vitals Value Taken Time   BP 92/57 02/11/22 1315   Temp 36.6  C (97.9  F) 02/11/22 1315   Pulse 62 02/11/22 1321   Resp 16 02/11/22 1321   SpO2 96 % 02/11/22 1321   Vitals shown include unvalidated device data.    Patient Vitals for the past 24 hrs:   BP Temp Temp src Pulse Resp SpO2 Height Weight   02/11/22 1345 97/61 -- -- 69 20 96 % -- --   02/11/22 1330 98/62 -- -- 62 18 96 % -- --   02/11/22 1315 92/57 36.6  C (97.9  F) Oral 58 18 93 % -- --   02/11/22 1300 90/59 -- -- 60 16 96 % -- --   02/11/22 1245 (!) 89/58 -- -- 58 18 96 % -- --   02/11/22 1230 93/63 -- -- 59 15 97 % -- --   02/11/22 1215 95/61 -- -- 55 13 94 % -- --   02/11/22 1200 (!) 85/47 -- -- 73 12 98 % -- --   02/11/22 1145 94/55 -- -- 62 19 100 % -- --   02/11/22 1141 96/57 36.4  C (97.5  F) Axillary 67 20 98 % -- --   02/11/22 0603 105/73 36.9  C (98.5  F) Oral 77 16 99 % 1.702 m (5' 7.01\") 75.1 kg (165 lb 9.1 oz)         Electronically Signed By: Juliana Pabon MD  February 11, 2022  1:51 PM  "

## 2022-02-11 NOTE — ANESTHESIA PROCEDURE NOTES
Other Procedure Note    Pre-Procedure   Staff -        Anesthesiologist:  Hernan Benson MD       Performed By: anesthesiologist       Location: OR       Procedure Start/Stop Times: 2/11/2022 11:12 AM       Pre-Anesthestic Checklist: patient identified, IV checked, site marked, risks and benefits discussed, informed consent, monitors and equipment checked, pre-op evaluation, at physician/surgeon's request and post-op pain management  Timeout:       Correct Patient: Yes        Correct Procedure: Yes        Correct Site: Yes        Correct Position: Yes        Correct Laterality: Yes        Site Marked: Yes  Procedure Documentation  Procedure: Other       Diagnosis: LAPAROSCOPY       Laterality: bilateral       Patient Position: supine       Skin prep: Chloraprep       Needle Type: short bevel       Needle Gauge: 21.        Needle Length (Inches): 4        Ultrasound guided       1. Ultrasound was used to identify targeted nerve, plexus, vascular marker, or fascial plane and place a needle adjacent to it in real-time.       2. Ultrasound was used to visualize the spread of anesthetic in close proximity to the above referenced structure.       3. A permanent image is entered into the patient's record.       4. The visualized anatomic structures appeared normal.    Assessment/Narrative         The placement was negative for: blood aspirated and site bleeding     Bolus given via needle..        Secured via.        Insertion/Infusion Method: Single Shot       Complications: none    Medication(s) Administered   Bupivacaine 0.25% PF (Infiltration), 50 mL  Dexamethasone 10 mg/mL PF (Perineural), 2 mg  Dexmedetomidine 4 mcg/mL (Perineural), 40 mcg  Medication Administration Time: 2/11/2022 11:20 AM     Comments:  Bilateral Quadratus Lumborum plane block

## 2022-02-13 ENCOUNTER — APPOINTMENT (OUTPATIENT)
Dept: ULTRASOUND IMAGING | Facility: CLINIC | Age: 42
DRG: 392 | End: 2022-02-13
Attending: STUDENT IN AN ORGANIZED HEALTH CARE EDUCATION/TRAINING PROGRAM
Payer: COMMERCIAL

## 2022-02-13 ENCOUNTER — HOSPITAL ENCOUNTER (INPATIENT)
Facility: CLINIC | Age: 42
LOS: 1 days | Discharge: HOME | DRG: 392 | End: 2022-02-14
Attending: STUDENT IN AN ORGANIZED HEALTH CARE EDUCATION/TRAINING PROGRAM | Admitting: STUDENT IN AN ORGANIZED HEALTH CARE EDUCATION/TRAINING PROGRAM
Payer: COMMERCIAL

## 2022-02-13 DIAGNOSIS — Z20.822 LAB TEST NEGATIVE FOR COVID-19 VIRUS: ICD-10-CM

## 2022-02-13 DIAGNOSIS — R11.2 NAUSEA AND VOMITING, INTRACTABILITY OF VOMITING NOT SPECIFIED, UNSPECIFIED VOMITING TYPE: Primary | ICD-10-CM

## 2022-02-13 DIAGNOSIS — R79.89 ABNORMAL LFTS: ICD-10-CM

## 2022-02-13 LAB
ALBUMIN SERPL-MCNC: 3.2 G/DL (ref 3.4–5)
ALBUMIN SERPL-MCNC: 3.5 G/DL (ref 3.4–5)
ALBUMIN UR-MCNC: 20 MG/DL
ALP SERPL-CCNC: 96 U/L (ref 40–150)
ALP SERPL-CCNC: 96 U/L (ref 40–150)
ALT SERPL W P-5'-P-CCNC: 468 U/L (ref 0–50)
ALT SERPL W P-5'-P-CCNC: 472 U/L (ref 0–50)
ANION GAP SERPL CALCULATED.3IONS-SCNC: 3 MMOL/L (ref 3–14)
APAP SERPL-MCNC: 3 MG/L (ref 10–30)
APPEARANCE UR: CLEAR
AST SERPL W P-5'-P-CCNC: 518 U/L (ref 0–45)
AST SERPL W P-5'-P-CCNC: 638 U/L (ref 0–45)
BASOPHILS # BLD AUTO: 0 10E3/UL (ref 0–0.2)
BASOPHILS NFR BLD AUTO: 0 %
BILIRUB DIRECT SERPL-MCNC: 0.2 MG/DL (ref 0–0.2)
BILIRUB SERPL-MCNC: 0.4 MG/DL (ref 0.2–1.3)
BILIRUB SERPL-MCNC: 0.5 MG/DL (ref 0.2–1.3)
BILIRUB UR QL STRIP: NEGATIVE
BUN SERPL-MCNC: 6 MG/DL (ref 7–30)
CALCIUM SERPL-MCNC: 8.6 MG/DL (ref 8.5–10.1)
CHLORIDE BLD-SCNC: 113 MMOL/L (ref 94–109)
CO2 SERPL-SCNC: 23 MMOL/L (ref 20–32)
COLOR UR AUTO: YELLOW
CREAT SERPL-MCNC: 0.6 MG/DL (ref 0.52–1.04)
EOSINOPHIL # BLD AUTO: 0 10E3/UL (ref 0–0.7)
EOSINOPHIL NFR BLD AUTO: 0 %
ERYTHROCYTE [DISTWIDTH] IN BLOOD BY AUTOMATED COUNT: 13.9 % (ref 10–15)
GFR SERPL CREATININE-BSD FRML MDRD: >90 ML/MIN/1.73M2
GLUCOSE BLD-MCNC: 159 MG/DL (ref 70–99)
GLUCOSE UR STRIP-MCNC: NEGATIVE MG/DL
HCT VFR BLD AUTO: 37.5 % (ref 35–47)
HGB BLD-MCNC: 11.7 G/DL (ref 11.7–15.7)
HGB UR QL STRIP: ABNORMAL
IMM GRANULOCYTES # BLD: 0 10E3/UL
IMM GRANULOCYTES NFR BLD: 0 %
INR PPP: 1.16 (ref 0.86–1.14)
IRON SATN MFR SERPL: 4 % (ref 15–46)
IRON SERPL-MCNC: 10 UG/DL (ref 35–180)
KETONES UR STRIP-MCNC: ABNORMAL MG/DL
LEUKOCYTE ESTERASE UR QL STRIP: NEGATIVE
LIPASE SERPL-CCNC: 82 U/L (ref 73–393)
LYMPHOCYTES # BLD AUTO: 0.3 10E3/UL (ref 0.8–5.3)
LYMPHOCYTES NFR BLD AUTO: 3 %
MAGNESIUM SERPL-MCNC: 1.7 MG/DL (ref 1.6–2.3)
MCH RBC QN AUTO: 26.7 PG (ref 26.5–33)
MCHC RBC AUTO-ENTMCNC: 31.2 G/DL (ref 31.5–36.5)
MCV RBC AUTO: 86 FL (ref 78–100)
MONOCYTES # BLD AUTO: 0.2 10E3/UL (ref 0–1.3)
MONOCYTES NFR BLD AUTO: 2 %
MUCOUS THREADS #/AREA URNS LPF: PRESENT /LPF
NEUTROPHILS # BLD AUTO: 9.3 10E3/UL (ref 1.6–8.3)
NEUTROPHILS NFR BLD AUTO: 95 %
NITRATE UR QL: NEGATIVE
NRBC # BLD AUTO: 0 10E3/UL
NRBC BLD AUTO-RTO: 0 /100
PH UR STRIP: 7 [PH] (ref 5–7)
PLATELET # BLD AUTO: 178 10E3/UL (ref 150–450)
POTASSIUM BLD-SCNC: 3.9 MMOL/L (ref 3.4–5.3)
POTASSIUM BLD-SCNC: 3.9 MMOL/L (ref 3.4–5.3)
PROT SERPL-MCNC: 6.2 G/DL (ref 6.8–8.8)
PROT SERPL-MCNC: 6.7 G/DL (ref 6.8–8.8)
RBC # BLD AUTO: 4.38 10E6/UL (ref 3.8–5.2)
RBC URINE: 10 /HPF
SARS-COV-2 RNA RESP QL NAA+PROBE: NEGATIVE
SODIUM SERPL-SCNC: 139 MMOL/L (ref 133–144)
SP GR UR STRIP: 1.02 (ref 1–1.03)
SQUAMOUS EPITHELIAL: 3 /HPF
TIBC SERPL-MCNC: 273 UG/DL (ref 240–430)
TRANSITIONAL EPI: <1 /HPF
TSH SERPL DL<=0.005 MIU/L-ACNC: 0.43 MU/L (ref 0.4–4)
UROBILINOGEN UR STRIP-MCNC: NORMAL MG/DL
WBC # BLD AUTO: 9.9 10E3/UL (ref 4–11)
WBC URINE: 3 /HPF

## 2022-02-13 PROCEDURE — 86706 HEP B SURFACE ANTIBODY: CPT | Performed by: STUDENT IN AN ORGANIZED HEALTH CARE EDUCATION/TRAINING PROGRAM

## 2022-02-13 PROCEDURE — 80143 DRUG ASSAY ACETAMINOPHEN: CPT | Performed by: STUDENT IN AN ORGANIZED HEALTH CARE EDUCATION/TRAINING PROGRAM

## 2022-02-13 PROCEDURE — 85025 COMPLETE CBC W/AUTO DIFF WBC: CPT | Performed by: STUDENT IN AN ORGANIZED HEALTH CARE EDUCATION/TRAINING PROGRAM

## 2022-02-13 PROCEDURE — 87340 HEPATITIS B SURFACE AG IA: CPT | Performed by: STUDENT IN AN ORGANIZED HEALTH CARE EDUCATION/TRAINING PROGRAM

## 2022-02-13 PROCEDURE — 81001 URINALYSIS AUTO W/SCOPE: CPT | Performed by: STUDENT IN AN ORGANIZED HEALTH CARE EDUCATION/TRAINING PROGRAM

## 2022-02-13 PROCEDURE — 99285 EMERGENCY DEPT VISIT HI MDM: CPT | Mod: 25 | Performed by: STUDENT IN AN ORGANIZED HEALTH CARE EDUCATION/TRAINING PROGRAM

## 2022-02-13 PROCEDURE — 99284 EMERGENCY DEPT VISIT MOD MDM: CPT | Performed by: STUDENT IN AN ORGANIZED HEALTH CARE EDUCATION/TRAINING PROGRAM

## 2022-02-13 PROCEDURE — 120N000002 HC R&B MED SURG/OB UMMC

## 2022-02-13 PROCEDURE — 83735 ASSAY OF MAGNESIUM: CPT | Performed by: STUDENT IN AN ORGANIZED HEALTH CARE EDUCATION/TRAINING PROGRAM

## 2022-02-13 PROCEDURE — 86803 HEPATITIS C AB TEST: CPT | Performed by: STUDENT IN AN ORGANIZED HEALTH CARE EDUCATION/TRAINING PROGRAM

## 2022-02-13 PROCEDURE — 85610 PROTHROMBIN TIME: CPT | Performed by: STUDENT IN AN ORGANIZED HEALTH CARE EDUCATION/TRAINING PROGRAM

## 2022-02-13 PROCEDURE — C9803 HOPD COVID-19 SPEC COLLECT: HCPCS | Performed by: STUDENT IN AN ORGANIZED HEALTH CARE EDUCATION/TRAINING PROGRAM

## 2022-02-13 PROCEDURE — 99207 PR MOONLIGHTING INDICATOR: CPT | Performed by: STUDENT IN AN ORGANIZED HEALTH CARE EDUCATION/TRAINING PROGRAM

## 2022-02-13 PROCEDURE — 96361 HYDRATE IV INFUSION ADD-ON: CPT | Performed by: STUDENT IN AN ORGANIZED HEALTH CARE EDUCATION/TRAINING PROGRAM

## 2022-02-13 PROCEDURE — 250N000011 HC RX IP 250 OP 636: Performed by: STUDENT IN AN ORGANIZED HEALTH CARE EDUCATION/TRAINING PROGRAM

## 2022-02-13 PROCEDURE — 76705 ECHO EXAM OF ABDOMEN: CPT

## 2022-02-13 PROCEDURE — 258N000003 HC RX IP 258 OP 636

## 2022-02-13 PROCEDURE — 82248 BILIRUBIN DIRECT: CPT | Performed by: STUDENT IN AN ORGANIZED HEALTH CARE EDUCATION/TRAINING PROGRAM

## 2022-02-13 PROCEDURE — 83690 ASSAY OF LIPASE: CPT | Performed by: STUDENT IN AN ORGANIZED HEALTH CARE EDUCATION/TRAINING PROGRAM

## 2022-02-13 PROCEDURE — 258N000003 HC RX IP 258 OP 636: Performed by: STUDENT IN AN ORGANIZED HEALTH CARE EDUCATION/TRAINING PROGRAM

## 2022-02-13 PROCEDURE — 96375 TX/PRO/DX INJ NEW DRUG ADDON: CPT | Performed by: STUDENT IN AN ORGANIZED HEALTH CARE EDUCATION/TRAINING PROGRAM

## 2022-02-13 PROCEDURE — 83550 IRON BINDING TEST: CPT | Performed by: STUDENT IN AN ORGANIZED HEALTH CARE EDUCATION/TRAINING PROGRAM

## 2022-02-13 PROCEDURE — 99222 1ST HOSP IP/OBS MODERATE 55: CPT | Mod: AI | Performed by: STUDENT IN AN ORGANIZED HEALTH CARE EDUCATION/TRAINING PROGRAM

## 2022-02-13 PROCEDURE — 36415 COLL VENOUS BLD VENIPUNCTURE: CPT | Performed by: STUDENT IN AN ORGANIZED HEALTH CARE EDUCATION/TRAINING PROGRAM

## 2022-02-13 PROCEDURE — U0003 INFECTIOUS AGENT DETECTION BY NUCLEIC ACID (DNA OR RNA); SEVERE ACUTE RESPIRATORY SYNDROME CORONAVIRUS 2 (SARS-COV-2) (CORONAVIRUS DISEASE [COVID-19]), AMPLIFIED PROBE TECHNIQUE, MAKING USE OF HIGH THROUGHPUT TECHNOLOGIES AS DESCRIBED BY CMS-2020-01-R: HCPCS | Performed by: STUDENT IN AN ORGANIZED HEALTH CARE EDUCATION/TRAINING PROGRAM

## 2022-02-13 PROCEDURE — 86704 HEP B CORE ANTIBODY TOTAL: CPT | Performed by: STUDENT IN AN ORGANIZED HEALTH CARE EDUCATION/TRAINING PROGRAM

## 2022-02-13 PROCEDURE — 86708 HEPATITIS A ANTIBODY: CPT | Performed by: STUDENT IN AN ORGANIZED HEALTH CARE EDUCATION/TRAINING PROGRAM

## 2022-02-13 PROCEDURE — 96374 THER/PROPH/DIAG INJ IV PUSH: CPT | Performed by: STUDENT IN AN ORGANIZED HEALTH CARE EDUCATION/TRAINING PROGRAM

## 2022-02-13 PROCEDURE — 84443 ASSAY THYROID STIM HORMONE: CPT | Performed by: STUDENT IN AN ORGANIZED HEALTH CARE EDUCATION/TRAINING PROGRAM

## 2022-02-13 PROCEDURE — 96376 TX/PRO/DX INJ SAME DRUG ADON: CPT | Performed by: STUDENT IN AN ORGANIZED HEALTH CARE EDUCATION/TRAINING PROGRAM

## 2022-02-13 PROCEDURE — 84132 ASSAY OF SERUM POTASSIUM: CPT | Performed by: STUDENT IN AN ORGANIZED HEALTH CARE EDUCATION/TRAINING PROGRAM

## 2022-02-13 RX ORDER — NALOXONE HYDROCHLORIDE 0.4 MG/ML
0.4 INJECTION, SOLUTION INTRAMUSCULAR; INTRAVENOUS; SUBCUTANEOUS
Status: DISCONTINUED | OUTPATIENT
Start: 2022-02-13 | End: 2022-02-14 | Stop reason: HOSPADM

## 2022-02-13 RX ORDER — ONDANSETRON 2 MG/ML
4 INJECTION INTRAMUSCULAR; INTRAVENOUS ONCE
Status: COMPLETED | OUTPATIENT
Start: 2022-02-13 | End: 2022-02-13

## 2022-02-13 RX ORDER — HYDROMORPHONE HCL IN WATER/PF 6 MG/30 ML
0.5 PATIENT CONTROLLED ANALGESIA SYRINGE INTRAVENOUS ONCE
Status: COMPLETED | OUTPATIENT
Start: 2022-02-13 | End: 2022-02-13

## 2022-02-13 RX ORDER — SODIUM CHLORIDE 9 MG/ML
INJECTION, SOLUTION INTRAVENOUS
Status: COMPLETED
Start: 2022-02-13 | End: 2022-02-13

## 2022-02-13 RX ORDER — ONDANSETRON 2 MG/ML
4 INJECTION INTRAMUSCULAR; INTRAVENOUS EVERY 6 HOURS PRN
Status: DISCONTINUED | OUTPATIENT
Start: 2022-02-13 | End: 2022-02-14 | Stop reason: HOSPADM

## 2022-02-13 RX ORDER — LIDOCAINE 40 MG/G
CREAM TOPICAL
Status: DISCONTINUED | OUTPATIENT
Start: 2022-02-13 | End: 2022-02-14 | Stop reason: HOSPADM

## 2022-02-13 RX ORDER — NALOXONE HYDROCHLORIDE 0.4 MG/ML
0.2 INJECTION, SOLUTION INTRAMUSCULAR; INTRAVENOUS; SUBCUTANEOUS
Status: DISCONTINUED | OUTPATIENT
Start: 2022-02-13 | End: 2022-02-14 | Stop reason: HOSPADM

## 2022-02-13 RX ORDER — ONDANSETRON 4 MG/1
4 TABLET, ORALLY DISINTEGRATING ORAL EVERY 6 HOURS PRN
Status: DISCONTINUED | OUTPATIENT
Start: 2022-02-13 | End: 2022-02-14 | Stop reason: HOSPADM

## 2022-02-13 RX ORDER — OXYCODONE HYDROCHLORIDE 5 MG/1
5 TABLET ORAL EVERY 6 HOURS PRN
Status: DISCONTINUED | OUTPATIENT
Start: 2022-02-13 | End: 2022-02-14 | Stop reason: HOSPADM

## 2022-02-13 RX ORDER — SODIUM CHLORIDE, SODIUM LACTATE, POTASSIUM CHLORIDE, CALCIUM CHLORIDE 600; 310; 30; 20 MG/100ML; MG/100ML; MG/100ML; MG/100ML
INJECTION, SOLUTION INTRAVENOUS CONTINUOUS
Status: DISCONTINUED | OUTPATIENT
Start: 2022-02-13 | End: 2022-02-14

## 2022-02-13 RX ORDER — POLYETHYLENE GLYCOL 3350 17 G/17G
17 POWDER, FOR SOLUTION ORAL DAILY
Status: DISCONTINUED | OUTPATIENT
Start: 2022-02-13 | End: 2022-02-14 | Stop reason: HOSPADM

## 2022-02-13 RX ORDER — PROCHLORPERAZINE 25 MG
25 SUPPOSITORY, RECTAL RECTAL EVERY 12 HOURS PRN
Status: DISCONTINUED | OUTPATIENT
Start: 2022-02-13 | End: 2022-02-14 | Stop reason: HOSPADM

## 2022-02-13 RX ORDER — HYDROMORPHONE HCL IN WATER/PF 6 MG/30 ML
0.2 PATIENT CONTROLLED ANALGESIA SYRINGE INTRAVENOUS
Status: DISCONTINUED | OUTPATIENT
Start: 2022-02-13 | End: 2022-02-14 | Stop reason: HOSPADM

## 2022-02-13 RX ORDER — PROCHLORPERAZINE MALEATE 5 MG
10 TABLET ORAL EVERY 6 HOURS PRN
Status: DISCONTINUED | OUTPATIENT
Start: 2022-02-13 | End: 2022-02-14 | Stop reason: HOSPADM

## 2022-02-13 RX ORDER — HYDROXYCHLOROQUINE SULFATE 200 MG/1
200 TABLET, FILM COATED ORAL 2 TIMES DAILY
Status: DISCONTINUED | OUTPATIENT
Start: 2022-02-13 | End: 2022-02-14 | Stop reason: HOSPADM

## 2022-02-13 RX ORDER — BUPROPION HYDROCHLORIDE 150 MG/1
150 TABLET ORAL EVERY MORNING
Status: DISCONTINUED | OUTPATIENT
Start: 2022-02-14 | End: 2022-02-14 | Stop reason: HOSPADM

## 2022-02-13 RX ORDER — SERTRALINE HYDROCHLORIDE 100 MG/1
100 TABLET, FILM COATED ORAL DAILY
Status: DISCONTINUED | OUTPATIENT
Start: 2022-02-13 | End: 2022-02-14 | Stop reason: HOSPADM

## 2022-02-13 RX ADMIN — ONDANSETRON 4 MG: 2 INJECTION INTRAMUSCULAR; INTRAVENOUS at 09:47

## 2022-02-13 RX ADMIN — HYDROMORPHONE HYDROCHLORIDE 0.5 MG: 0.2 INJECTION, SOLUTION INTRAMUSCULAR; INTRAVENOUS; SUBCUTANEOUS at 09:47

## 2022-02-13 RX ADMIN — PROCHLORPERAZINE EDISYLATE 10 MG: 5 INJECTION INTRAMUSCULAR; INTRAVENOUS at 18:36

## 2022-02-13 RX ADMIN — SODIUM CHLORIDE 1000 ML: 9 INJECTION, SOLUTION INTRAVENOUS at 18:00

## 2022-02-13 RX ADMIN — ONDANSETRON 4 MG: 2 INJECTION INTRAMUSCULAR; INTRAVENOUS at 13:37

## 2022-02-13 RX ADMIN — ONDANSETRON 4 MG: 2 INJECTION INTRAMUSCULAR; INTRAVENOUS at 16:27

## 2022-02-13 RX ADMIN — HYDROMORPHONE HYDROCHLORIDE 0.2 MG: 0.2 INJECTION, SOLUTION INTRAMUSCULAR; INTRAVENOUS; SUBCUTANEOUS at 18:36

## 2022-02-13 RX ADMIN — Medication 1000 ML: at 18:00

## 2022-02-13 RX ADMIN — SODIUM CHLORIDE 1000 ML: 9 INJECTION, SOLUTION INTRAVENOUS at 09:47

## 2022-02-13 RX ADMIN — HYDROMORPHONE HYDROCHLORIDE 0.5 MG: 0.2 INJECTION, SOLUTION INTRAMUSCULAR; INTRAVENOUS; SUBCUTANEOUS at 16:23

## 2022-02-13 ASSESSMENT — ACTIVITIES OF DAILY LIVING (ADL)
ADLS_ACUITY_SCORE: 3

## 2022-02-13 ASSESSMENT — ENCOUNTER SYMPTOMS
ABDOMINAL PAIN: 1
DIARRHEA: 1
NAUSEA: 1
VOMITING: 1
FEVER: 0

## 2022-02-13 NOTE — CONSULTS
Gynecology Consult Note    Name: Heather Guillory    MRN: [id number]   YOB: 1980         We were asked to see Heather Guillory at the request of ED        Chief Complaint:   Nausea, vomiting, diarrhea, abdominal pain    History is obtained from the patient          History of Present Illness:   Heather Guillory is a 41 year old  female who is POD#2 from da bill assisted myomectomy. She started having diarrhea last night, then vomiting since 3 AM. Has vomited 3-4 times since this early morning. Has not been able to tolerate oral meds at home. Has not been taking narcotic but endorses having plenty oxycodone at home. She denies headaches, vision changes, chest pain or SOB. She says her son had GI symptoms earlier this week, Monday. The zofran she has gotten in the ED has helped improve her nausea.             Impression and Plan:        Heather Guillory is a 41 year old  POD#2 from RA-myomectomy. Her procedure was uncomplicated with EBL of 95 ccs. Today she is presenting with nausea, vomiting, diarrhea and abdominal pain. Her symptoms started with diarrhea. Her abdominal pain is increased in setting of not being able to tolerate oral pain meds d/t nausea. She endorses feeling less nauseous after zofran.     Her vitals are reassuring today, no tachycardia and no hypotension. She is afebrile. Her Hgb is excellent at 11.7. Her WBC is normal. Her abdominal exam is appropriate for after surgery and overall benign. No concerns for intraabdominal bleeding or other postoperative complication at this time.     Agree with fluids, antiemetics and symptomatic support. Please discharge home w/ zofran so she can tolerate her PO pain meds (consider holding tylenol d/t elevated liver enzymes). Will defer rest of workup and cares to ED provider as they see appropriate.       Thank you for allowing us to be involved in the care of your patient. Please do not hesitate to give us a call with further  questions.       Patient care plan discussed under supervision of Dr. Jordan.    Pari Romero MD  Obstetrics and Gynecology PGY-3    The patient was reviewed with Dr. Romero.  I agree with the above assessment and plan of care.    Molly Jordan MD, FACOG               Past Medical History:     Past Medical History:   Diagnosis Date     JAVIER (generalized anxiety disorder)     50mg zoloft     Rosacea      Undifferentiated connective tissue disease (H)             Past Surgical History:     Past Surgical History:   Procedure Laterality Date     COLONOSCOPY N/A 10/21/2020    Procedure: COLONOSCOPY;  Surgeon: Yang Pete MD;  Location: [facility] OR     IR ENDOVENOUS ABLATION VARICOSE VEINS  10/28/2019     IR FOLLOW UP VISIT OUTPATIENT  11/20/2019     IR STAB PHLEBECTOMY 10 - 20 STABS  10/28/2019     IR VEIN SCLEROSING MULTIPLE  10/28/2019     LAPAROSCOPIC SALPINGECTOMY Bilateral 4/23/2021    Procedure: SALPINGECTOMY, LAPAROSCOPIC BILATERAL;  Surgeon: Cate Mansfield MD;  [facility]     OPERATIVE HYSTEROSCOPY WITH MORCELLATOR  4/8/2014    Procedure: OPERATIVE HYSTEROSCOPY WITH MORCELLATOR;  Hysteroscopic Polypectomy;  Surgeon: Cate Mansfield MD;  [facility]     REMOVE INTRAUTERINE DEVICE N/A 4/23/2021    Procedure: removal of intrauterine device;  Surgeon: Cate Mansfield MD;  Location: [facility] OR            Allergies:     Allergies   Allergen Reactions     Latex      Benzoyl Peroxide Swelling     Duricef [Cefadroxil] Unknown     Monistat [Miconazole] Swelling     Sulfa Drugs Rash            Medications:     No current facility-administered medications for this encounter.     Current Outpatient Medications   Medication Sig     acetaminophen (TYLENOL) 325 MG tablet Take 3 tablets (975 mg) by mouth every 6 hours as needed for mild pain     buPROPion (WELLBUTRIN XL) 150 MG 24 hr tablet Take 1 tablet (150 mg) by mouth every morning     ferrous sulfate (FEROSUL) 325 (65 Fe) MG tablet Take  325 mg by mouth daily (with breakfast)     hydroxychloroquine (PLAQUENIL) 200 MG tablet TAKE 1 TABLET BY MOUTH TWICE A DAY     ibuprofen (ADVIL/MOTRIN) 800 MG tablet Take 1 tablet (800 mg) by mouth every 6 hours as needed for other (mild and/or inflammatory pain)     metroNIDAZOLE (METROCREAM) 0.75 % external cream Apply topically 2 times daily     oxyCODONE (ROXICODONE) 5 MG tablet Take 1 tablet (5 mg) by mouth every 6 hours as needed for pain     Prenatal Vit-Fe Fumarate-FA (PRENATAL VITAMIN AND MINERAL PO)      senna-docusate (SENOKOT-S/PERICOLACE) 8.6-50 MG tablet Take 1-2 tablets by mouth 2 times daily     sertraline (ZOLOFT) 100 MG tablet Take 1 tablet (100 mg) by mouth daily     vitamin C (ASCORBIC ACID) 1000 MG TABS Take 500 mg by mouth daily              Review of Systems:      ROS: 10 point ROS negative other than those noted above in the HPI.         Physical Exam:     Vitals:    02/13/22 0920 02/13/22 1000 02/13/22 1015 02/13/22 1030   BP:  (!) 118/90  (!) 125/99   Pulse:  65  67   Resp:       Temp:       TempSrc:       SpO2:  97% 100% 99%   Weight: 72.6 kg (160 lb)        General: NAD, appears weak  Resp: no respiratory distress  CV: heart rate regular  Abdomen: soft, non distended, appropriately tender, no rebound tenderness or peritoneal signs, no rigidity, no voluntary guarding     Labs/Imaging     Results for orders placed or performed during the hospital encounter of 02/13/22 (from the past 24 hour(s))   CBC with platelets differential    Narrative    The following orders were created for panel order CBC with platelets differential.  Procedure                               Abnormality         Status                     ---------                               -----------         ------                     CBC with platelets and d...[[id number]]  Abnormal            Final result                 Please view results for these tests on the individual orders.   Comprehensive metabolic panel   Result Value  Ref Range    Sodium 139 133 - 144 mmol/L    Potassium 3.9 3.4 - 5.3 mmol/L    Chloride 113 (H) 94 - 109 mmol/L    Carbon Dioxide (CO2) 23 20 - 32 mmol/L    Anion Gap 3 3 - 14 mmol/L    Urea Nitrogen 6 (L) 7 - 30 mg/dL    Creatinine 0.60 0.52 - 1.04 mg/dL    Calcium 8.6 8.5 - 10.1 mg/dL    Glucose 159 (H) 70 - 99 mg/dL    Alkaline Phosphatase 96 40 - 150 U/L     (HH) 0 - 45 U/L     (H) 0 - 50 U/L    Protein Total 6.7 (L) 6.8 - 8.8 g/dL    Albumin 3.5 3.4 - 5.0 g/dL    Bilirubin Total 0.5 0.2 - 1.3 mg/dL    GFR Estimate >90 >60 mL/min/1.73m2   CBC with platelets and differential   Result Value Ref Range    WBC Count 9.9 4.0 - 11.0 10e3/uL    RBC Count 4.38 3.80 - 5.20 10e6/uL    Hemoglobin 11.7 11.7 - 15.7 g/dL    Hematocrit 37.5 35.0 - 47.0 %    MCV 86 78 - 100 fL    MCH 26.7 26.5 - 33.0 pg    MCHC 31.2 (L) 31.5 - 36.5 g/dL    RDW 13.9 10.0 - 15.0 %    Platelet Count 178 150 - 450 10e3/uL    % Neutrophils 95 %    % Lymphocytes 3 %    % Monocytes 2 %    % Eosinophils 0 %    % Basophils 0 %    % Immature Granulocytes 0 %    NRBCs per 100 WBC 0 <1 /100    Absolute Neutrophils 9.3 (H) 1.6 - 8.3 10e3/uL    Absolute Lymphocytes 0.3 (L) 0.8 - 5.3 10e3/uL    Absolute Monocytes 0.2 0.0 - 1.3 10e3/uL    Absolute Eosinophils 0.0 0.0 - 0.7 10e3/uL    Absolute Basophils 0.0 0.0 - 0.2 10e3/uL    Absolute Immature Granulocytes 0.0 <=0.4 10e3/uL    Absolute NRBCs 0.0 10e3/uL   Lipase   Result Value Ref Range    Lipase 82 73 - 393 U/L

## 2022-02-13 NOTE — ED TRIAGE NOTES
Pt had fibroids removed on Friday and has been improving post surgery but then through out the night has started having increasing N/V.

## 2022-02-13 NOTE — PHARMACY-ADMISSION MEDICATION HISTORY
Admission Medication History Completed by Pharmacy    See [facility] Admission Navigator for allergy information, preferred outpatient pharmacy, prior to admission medications and immunization status.     Medication History Sources:     Patient, dispense report     Changes made to PTA medication list (reason):    Added: None    Deleted:   o Ferrous sulfate 325 mg tablet: take 325 mg po daily with breakfast, per patient  o Senna docusate 8.6-50 mg tablet: take 1-2 tablets po bid, per patient   o Vitamin C 1000 mg tabs: take 500 mg po daily, per patient was taking this with her iron supplement that she is discontinuing     Changed: None    Additional Information:    Patient reports she is weaning off of the Hydroxychloroquine and some days she will take it twice a day and other days she will take it once a day.     Prior to Admission medications    Medication Sig Last Dose Taking? Auth Provider   acetaminophen (TYLENOL) 325 MG tablet Take 3 tablets (975 mg) by mouth every 6 hours as needed for mild pain Unknown at Unknown time Yes Susanna Kelsey MD   buPROPion (WELLBUTRIN XL) 150 MG 24 hr tablet Take 1 tablet (150 mg) by mouth every morning 2/12/2022 at Unknown time Yes Cyndi Zarate APRN CNP   hydroxychloroquine (PLAQUENIL) 200 MG tablet TAKE 1 TABLET BY MOUTH TWICE A DAY 2/12/2022 at Unknown time Yes Nasrin King MD   ibuprofen (ADVIL/MOTRIN) 800 MG tablet Take 1 tablet (800 mg) by mouth every 6 hours as needed for other (mild and/or inflammatory pain) 2/12/2022 at Unknown time Yes Susanna Kelsey MD   metroNIDAZOLE (METROCREAM) 0.75 % external cream Apply topically 2 times daily Unknown at Unknown time Yes Reported, Patient   oxyCODONE (ROXICODONE) 5 MG tablet Take 1 tablet (5 mg) by mouth every 6 hours as needed for pain 2/12/2022 at Unknown time Yes Cate Mansfield MD   sertraline (ZOLOFT) 100 MG tablet Take 1 tablet (100 mg) by mouth daily 2/12/2022 at Unknown time Yes Donal  Cate Collazo MD   Prenatal Vit-Fe Fumarate-FA (PRENATAL VITAMIN AND MINERAL PO) Take 1 tablet by mouth daily    Reported, Patient     Date completed: 02/13/22    Medication history completed by: Norma De

## 2022-02-13 NOTE — ED PROVIDER NOTES
[facility] EMERGENCY DEPARTMENT ([facility])  2/13/22    History     Chief Complaint   Patient presents with     Post-op Problem     Patient had uterine fibroid surgery, patient has pain control issues , nausea & vomiting     HPI  Heather Guillory is a 41 year old female with PMH significant for uterine fibroid s/p myomectomy (2/11/2022), undifferentiated connective tissue disease, and JAVIER who presents to the Emergency Department for evaluation of nausea, vomiting, diarrhea, and abdominal pain.  Patient had fibroid surgery 2 days ago and last night began having several episodes of diarrhea.  She reports vomiting after the diarrhea started and now worsening abdominal pain.  She denies any pain or drainage from the surgical site.  She denies fever.  She does indicate that her son was sick earlier this week with 2 days of vomiting which has since resolved.  Patient does report some vaginal bleeding yesterday, but denies discharge.    Past Medical History  Past Medical History:   Diagnosis Date     JAVIER (generalized anxiety disorder)     50mg zoloft     Rosacea      Undifferentiated connective tissue disease (H)      Past Surgical History:   Procedure Laterality Date     COLONOSCOPY N/A 10/21/2020    Procedure: COLONOSCOPY;  Surgeon: Yang Pete MD;  Location: [facility] OR     IR ENDOVENOUS ABLATION VARICOSE VEINS  10/28/2019     IR FOLLOW UP VISIT OUTPATIENT  11/20/2019     IR STAB PHLEBECTOMY 10 - 20 STABS  10/28/2019     IR VEIN SCLEROSING MULTIPLE  10/28/2019     LAPAROSCOPIC SALPINGECTOMY Bilateral 4/23/2021    Procedure: SALPINGECTOMY, LAPAROSCOPIC BILATERAL;  Surgeon: Cate Mansfield MD;  Location: [facility] OR     OPERATIVE HYSTEROSCOPY WITH MORCELLATOR  4/8/2014    Procedure: OPERATIVE HYSTEROSCOPY WITH MORCELLATOR;  Hysteroscopic Polypectomy;  Surgeon: Cate Masnfield MD;  Location: [facility] OR     REMOVE INTRAUTERINE DEVICE N/A 4/23/2021    Procedure: removal of intrauterine device;  Surgeon:  Cate Mansfield MD;  Location: [facility] OR     acetaminophen (TYLENOL) 325 MG tablet  buPROPion (WELLBUTRIN XL) 150 MG 24 hr tablet  hydroxychloroquine (PLAQUENIL) 200 MG tablet  ibuprofen (ADVIL/MOTRIN) 800 MG tablet  metroNIDAZOLE (METROCREAM) 0.75 % external cream  ondansetron (ZOFRAN-ODT) 4 MG ODT tab  oxyCODONE (ROXICODONE) 5 MG tablet  sertraline (ZOLOFT) 100 MG tablet  Prenatal Vit-Fe Fumarate-FA (PRENATAL VITAMIN AND MINERAL PO)      Allergies   Allergen Reactions     Latex      Benzoyl Peroxide Swelling     Duricef [Cefadroxil] Unknown     Monistat [Miconazole] Swelling     Sulfa Drugs Rash     Family History  Family History   Problem Relation Age of Onset     Elijah Disease Sister      Arrhythmia Brother         details unknown; procedure in his 20s     Anxiety Disorder Brother      Alzheimer Disease Maternal Grandmother      Cervical Cancer Maternal Grandmother      Colon Cancer Maternal Grandfather      Breast Cancer Paternal Grandmother      Hypertension Paternal Grandmother      Hypertension Paternal Grandfather      Lung Cancer Paternal Grandfather         smoker     Diabetes Type 2  Paternal Grandfather      Myocardial Infarction Paternal Grandfather      Myocardial Infarction Paternal Aunt 45     Cerebrovascular Disease No family hx of      Coronary Artery Disease Early Onset No family hx of      Ovarian Cancer No family hx of      Social History   Social History     Tobacco Use     Smoking status: Never Smoker     Smokeless tobacco: Never Used   Substance Use Topics     Alcohol use: No     Alcohol/week: 0.0 standard drinks     Drug use: No      Past medical history, past surgical history, medications, allergies, family history, and social history were reviewed with the patient. No additional pertinent items.       Review of Systems   Constitutional: Negative for fever.   Gastrointestinal: Positive for abdominal pain, diarrhea, nausea and vomiting.   Genitourinary: Positive for vaginal bleeding.  Negative for vaginal discharge.   All other systems reviewed and are negative.    A complete review of systems was performed with pertinent positives and negatives noted in the HPI, and all other systems negative.    Physical Exam   BP: 127/86  Pulse: 89  Temp: 97.8  F (36.6  C)  Resp: 16  Weight: 72.6 kg (160 lb)  SpO2: 98 %  Physical Exam  Constitutional:       General: She is not in acute distress.     Appearance: She is not diaphoretic.   HENT:      Head: Atraumatic.      Mouth/Throat:      Pharynx: No oropharyngeal exudate.   Eyes:      General: No scleral icterus.     Pupils: Pupils are equal, round, and reactive to light.   Cardiovascular:      Heart sounds: Normal heart sounds.   Pulmonary:      Effort: No respiratory distress.      Breath sounds: Normal breath sounds.   Abdominal:      General: Bowel sounds are normal.      Palpations: Abdomen is soft.      Tenderness: There is no abdominal tenderness.   Musculoskeletal:         General: No tenderness.   Skin:     General: Skin is warm.      Findings: No rash.       ED Course      Procedures   9:15 AM  The patient was seen and examined by Kristy Boothe MD in Room ED05.        The medical record was reviewed and interpreted.  Current labs reviewed and interpreted.  Previous labs reviewed and interpreted.  Current images reviewed and interpreted: No evidence of acute biliary pathology.              Results for orders placed or performed during the hospital encounter of 02/13/22   Abdomen US, limited (RUQ only)     Status: None    Narrative    EXAM: ULTRASOUND ABDOMEN LIMITED  LOCATION: [facility]  DATE/TIME: 02/13/2022, 11:03 AM    INDICATION: Abdominal pain, nausea and vomiting, elevated LFTs.  COMPARISON: None.  TECHNIQUE: Limited abdominal ultrasound.    FINDINGS:    GALLBLADDER: Normal. No gallstones, wall thickening, or pericholecystic fluid. Negative sonographic Lugo's sign.    BILE DUCTS: No biliary  dilatation. The common duct measures 2 mm.    LIVER: Normal parenchyma with smooth contour. No focal mass.    RIGHT KIDNEY: No hydronephrosis.    PANCREAS: The visualized portions are normal.    No ascites.      Impression    IMPRESSION:  1.  No acute process demonstrated.     Comprehensive metabolic panel     Status: Abnormal   Result Value Ref Range    Sodium 139 133 - 144 mmol/L    Potassium 3.9 3.4 - 5.3 mmol/L    Chloride 113 (H) 94 - 109 mmol/L    Carbon Dioxide (CO2) 23 20 - 32 mmol/L    Anion Gap 3 3 - 14 mmol/L    Urea Nitrogen 6 (L) 7 - 30 mg/dL    Creatinine 0.60 0.52 - 1.04 mg/dL    Calcium 8.6 8.5 - 10.1 mg/dL    Glucose 159 (H) 70 - 99 mg/dL    Alkaline Phosphatase 96 40 - 150 U/L     (HH) 0 - 45 U/L     (H) 0 - 50 U/L    Protein Total 6.7 (L) 6.8 - 8.8 g/dL    Albumin 3.5 3.4 - 5.0 g/dL    Bilirubin Total 0.5 0.2 - 1.3 mg/dL    GFR Estimate >90 >60 mL/min/1.73m2   UA with Microscopic reflex to Culture     Status: Abnormal    Specimen: Urine, Midstream   Result Value Ref Range    Color Urine Yellow Colorless, Straw, Light Yellow, Yellow    Appearance Urine Clear Clear    Glucose Urine Negative Negative mg/dL    Bilirubin Urine Negative Negative    Ketones Urine Trace (A) Negative mg/dL    Specific Gravity Urine 1.018 1.003 - 1.035    Blood Urine Small (A) Negative    pH Urine 7.0 5.0 - 7.0    Protein Albumin Urine 20  (A) Negative mg/dL    Urobilinogen Urine Normal Normal, 2.0 mg/dL    Nitrite Urine Negative Negative    Leukocyte Esterase Urine Negative Negative    Mucus Urine Present (A) None Seen /LPF    RBC Urine 10 (H) <=2 /HPF    WBC Urine 3 <=5 /HPF    Squamous Epithelials Urine 3 (H) <=1 /HPF    Transitional Epithelials Urine <1 <=1 /HPF    Narrative    Urine Culture not indicated   CBC with platelets and differential     Status: Abnormal   Result Value Ref Range    WBC Count 9.9 4.0 - 11.0 10e3/uL    RBC Count 4.38 3.80 - 5.20 10e6/uL    Hemoglobin 11.7 11.7 - 15.7 g/dL     Hematocrit 37.5 35.0 - 47.0 %    MCV 86 78 - 100 fL    MCH 26.7 26.5 - 33.0 pg    MCHC 31.2 (L) 31.5 - 36.5 g/dL    RDW 13.9 10.0 - 15.0 %    Platelet Count 178 150 - 450 10e3/uL    % Neutrophils 95 %    % Lymphocytes 3 %    % Monocytes 2 %    % Eosinophils 0 %    % Basophils 0 %    % Immature Granulocytes 0 %    NRBCs per 100 WBC 0 <1 /100    Absolute Neutrophils 9.3 (H) 1.6 - 8.3 10e3/uL    Absolute Lymphocytes 0.3 (L) 0.8 - 5.3 10e3/uL    Absolute Monocytes 0.2 0.0 - 1.3 10e3/uL    Absolute Eosinophils 0.0 0.0 - 0.7 10e3/uL    Absolute Basophils 0.0 0.0 - 0.2 10e3/uL    Absolute Immature Granulocytes 0.0 <=0.4 10e3/uL    Absolute NRBCs 0.0 10e3/uL   Lipase     Status: Normal   Result Value Ref Range    Lipase 82 73 - 393 U/L   Hepatitis A Antibody IgG     Status: Abnormal   Result Value Ref Range    Hepatitis A Antibody IgG Reactive (A) Nonreactive    Narrative    This assay cannot be used for the diagnosis of acute HAV infection.   Hepatitis B surface antigen     Status: Normal   Result Value Ref Range    Hepatitis B Surface Antigen Nonreactive Nonreactive   INR     Status: Abnormal   Result Value Ref Range    INR 1.16 (H) 0.86 - 1.14   Hepatic panel     Status: Abnormal   Result Value Ref Range    Bilirubin Total 0.4 0.2 - 1.3 mg/dL    Bilirubin Direct 0.2 0.0 - 0.2 mg/dL    Protein Total 6.2 (L) 6.8 - 8.8 g/dL    Albumin 3.2 (L) 3.4 - 5.0 g/dL    Alkaline Phosphatase 96 40 - 150 U/L     (HH) 0 - 45 U/L     (H) 0 - 50 U/L   Asymptomatic COVID-19 Virus (Coronavirus) by PCR Nasopharyngeal     Status: Normal    Specimen: Nasopharyngeal; Swab   Result Value Ref Range    SARS CoV2 PCR Negative Negative    Narrative    Testing was performed using the johanna  SARS-CoV-2 & Influenza A/B Assay on the johanna  Jessica  System.  This test should be ordered for the detection of SARS-COV-2 in individuals who meet SARS-CoV-2 clinical and/or epidemiological criteria. Test performance is unknown in asymptomatic  patients.  This test is for in vitro diagnostic use under the FDA EUA for laboratories certified under CLIA to perform moderate and/or high complexity testing. This test has not been FDA cleared or approved.  A negative test does not rule out the presence of PCR inhibitors in the specimen or target RNA in concentration below the limit of detection for the assay. The possibility of a false negative should be considered if the patient's recent exposure or clinical presentation suggests COVID-19.  [facility] are certified under the Clinical Laboratory Improvement Amendments of 1988 (CLIA-88) as qualified to perform moderate and/or high complexity laboratory testing.   Acetaminophen level     Status: Abnormal   Result Value Ref Range    Acetaminophen 3 (L) 10 - 30 mg/L   Potassium     Status: Normal   Result Value Ref Range    Potassium 3.9 3.4 - 5.3 mmol/L   Magnesium     Status: Normal   Result Value Ref Range    Magnesium 1.7 1.6 - 2.3 mg/dL   Hepatitis C antibody     Status: Normal   Result Value Ref Range    Hepatitis C Antibody Nonreactive Nonreactive    Narrative    Assay performance characteristics have not been established for newborns, infants, and children.   Hepatitis B core antibody     Status: Normal   Result Value Ref Range    Hepatitis B Core Antibody Total Nonreactive Nonreactive   Hepatitis B Surface Antibody     Status: Normal   Result Value Ref Range    Hepatitis B Surface Antibody 139.98 >=12.00 m[IU]/mL   Iron and iron binding capacity     Status: Abnormal   Result Value Ref Range    Iron 10 (L) 35 - 180 ug/dL    Iron Binding Capacity 273 240 - 430 ug/dL    Iron Sat Index 4 (L) 15 - 46 %   TSH with free T4 reflex     Status: Normal   Result Value Ref Range    TSH 0.43 0.40 - 4.00 mU/L   Hepatic panel     Status: Abnormal   Result Value Ref Range    Bilirubin Total 0.3 0.2 - 1.3 mg/dL    Bilirubin Direct 0.1 0.0 - 0.2 mg/dL    Protein Total 5.8 (L) 6.8 - 8.8 g/dL    Albumin 3.0  (L) 3.4 - 5.0 g/dL    Alkaline Phosphatase 91 40 - 150 U/L     (H) 0 - 45 U/L     (H) 0 - 50 U/L   Basic metabolic panel     Status: Abnormal   Result Value Ref Range    Sodium 142 133 - 144 mmol/L    Potassium 4.0 3.4 - 5.3 mmol/L    Chloride 114 (H) 94 - 109 mmol/L    Carbon Dioxide (CO2) 25 20 - 32 mmol/L    Anion Gap 3 3 - 14 mmol/L    Urea Nitrogen 5 (L) 7 - 30 mg/dL    Creatinine 0.76 0.52 - 1.04 mg/dL    Calcium 8.1 (L) 8.5 - 10.1 mg/dL    Glucose 88 70 - 99 mg/dL    GFR Estimate >90 >60 mL/min/1.73m2   INR     Status: Normal   Result Value Ref Range    INR 1.11 0.86 - 1.14   CBC with platelets and differential     Status: Abnormal   Result Value Ref Range    WBC Count 5.4 4.0 - 11.0 10e3/uL    RBC Count 3.70 (L) 3.80 - 5.20 10e6/uL    Hemoglobin 9.8 (L) 11.7 - 15.7 g/dL    Hematocrit 32.1 (L) 35.0 - 47.0 %    MCV 87 78 - 100 fL    MCH 26.5 26.5 - 33.0 pg    MCHC 30.5 (L) 31.5 - 36.5 g/dL    RDW 13.9 10.0 - 15.0 %    Platelet Count 144 (L) 150 - 450 10e3/uL    % Neutrophils 57 %    % Lymphocytes 31 %    % Monocytes 7 %    % Eosinophils 4 %    % Basophils 1 %    % Immature Granulocytes 0 %    NRBCs per 100 WBC 0 <1 /100    Absolute Neutrophils 3.1 1.6 - 8.3 10e3/uL    Absolute Lymphocytes 1.7 0.8 - 5.3 10e3/uL    Absolute Monocytes 0.4 0.0 - 1.3 10e3/uL    Absolute Eosinophils 0.2 0.0 - 0.7 10e3/uL    Absolute Basophils 0.0 0.0 - 0.2 10e3/uL    Absolute Immature Granulocytes 0.0 <=0.4 10e3/uL    Absolute NRBCs 0.0 10e3/uL   Comprehensive metabolic panel     Status: Abnormal   Result Value Ref Range    Sodium 142 133 - 144 mmol/L    Potassium 3.8 3.4 - 5.3 mmol/L    Chloride 114 (H) 94 - 109 mmol/L    Carbon Dioxide (CO2) 22 20 - 32 mmol/L    Anion Gap 6 3 - 14 mmol/L    Urea Nitrogen 5 (L) 7 - 30 mg/dL    Creatinine 0.68 0.52 - 1.04 mg/dL    Calcium 8.4 (L) 8.5 - 10.1 mg/dL    Glucose 104 (H) 70 - 99 mg/dL    Alkaline Phosphatase 98 40 - 150 U/L     (H) 0 - 45 U/L     (H) 0 - 50 U/L     Protein Total 6.3 (L) 6.8 - 8.8 g/dL    Albumin 3.1 (L) 3.4 - 5.0 g/dL    Bilirubin Total 0.4 0.2 - 1.3 mg/dL    GFR Estimate >90 >60 mL/min/1.73m2   Extra Purple Top Tube     Status: None   Result Value Ref Range    Hold Specimen [facility]    CBC with platelets differential     Status: Abnormal    Narrative    The following orders were created for panel order CBC with platelets differential.  Procedure                               Abnormality         Status                     ---------                               -----------         ------                     CBC with platelets and d...[[id number]]  Abnormal            Final result                 Please view results for these tests on the individual orders.   CBC with platelets differential     Status: Abnormal    Narrative    The following orders were created for panel order CBC with platelets differential.  Procedure                               Abnormality         Status                     ---------                               -----------         ------                     CBC with platelets and d...[[id number]]  Abnormal            Final result                 Please view results for these tests on the individual orders.   Extra Tube     Status: None    Narrative    The following orders were created for panel order Extra Tube.  Procedure                               Abnormality         Status                     ---------                               -----------         ------                     Extra Purple Top Tube[[id number]]                            Final result                 Please view results for these tests on the individual orders.     Medications   0.9% sodium chloride BOLUS (0 mLs Intravenous Stopped 2/13/22 9011)   HYDROmorphone (DILAUDID) injection 0.5 mg (0.5 mg Intravenous Given 2/13/22 0947)   ondansetron (ZOFRAN) injection 4 mg (4 mg Intravenous Given 2/13/22 0947)   ondansetron (ZOFRAN) injection 4 mg (4 mg Intravenous Given 2/13/22  1337)   HYDROmorphone (DILAUDID) injection 0.5 mg (0.5 mg Intravenous Given 2/13/22 1623)   ondansetron (ZOFRAN) injection 4 mg (4 mg Intravenous Given 2/13/22 1627)   0.9% sodium chloride BOLUS (0 mLs Intravenous ED Infusing on Admission/transfer 2/13/22 1801)        Assessments & Plan (with Medical Decision Making)   MDM:    41 year old F who presents for central abdominal pain associated with nausea after myomectomy.  Basic labs drawn and OB/GYN consulted, pain and symptoms improved markedly after IV fluids, Dilaudid, Zofran.  Labs returned with markedly elevated AST and ALT, on review of her previous blood work both of these tests were less than 20 in January of this year.  These labs were repeated and remained persistently abnormal so right upper quadrant ultrasound performed with no evidence of acute biliary pathology.  She was sent home with Tylenol prescription, however reportedly has taken only 4 doses of 975 g Tylenol over the past 2 days since her surgery.  No other Tylenol-containing products.  Kidney function, platelets, INR mostly normal, therefore not consistent with chronic Tylenol overdose, Tylenol level unremarkable.  I had a shared decision-making conversation with the patient, about observation admission for laboratory trending and further work-up versus outpatient management, she would like to be admitted for observation at this time.  OB/GYN has signed off from a post surgical perspective.    Admitted for medicine obs.  I have discussed all test results and the plan of care with the patient and her sister bedside, they are agreeable for admission and further work-up.    I have reviewed the nursing notes. I have reviewed the findings, diagnosis, plan and need for follow up with the patient.    Discharge Medication List as of 2/14/2022 12:20 PM      START taking these medications    Details   ondansetron (ZOFRAN-ODT) 4 MG ODT tab Take 1 tablet (4 mg) by mouth every 6 hours as needed for nausea or  vomiting, Disp-12 tablet, R-1, E-Prescribe             Final diagnoses:   Nausea and vomiting, intractability of vomiting not specified, unspecified vomiting type   Abnormal LFTs     I, Saman Cruz, am serving as a trained medical scribe to document services personally performed by Kristy Boothe MD, based on the provider's statements to me.     I, Kristy Boothe MD, was physically present and have reviewed and verified the accuracy of this note documented by Saman Cruz.    --  Kristy Boothe MD  [facility] EMERGENCY DEPARTMENT  2/13/2022

## 2022-02-13 NOTE — H&P
[facility]    History and Physical - Hospitalist Service, GOLD TEAM        Date of Admission:  2/13/2022    Assessment & Plan      Heather Guillory is a 41 year old female admitted on 2/13/2022. She for n/v/d found to have acute hepatitis.    #acute hepatitis, hepatocellular pattern  #nausea, vomiting, diarrhea  #elevated INR  Her AST and ALT were wnl on 1/7/2022. Ddx hepa/b/c, other infectious etiology (COVID, EBV, CMV), autoimmune, drug induced, idiopathic. US without acute abnormalities and no fatty liver. Patient has not started any new medications or supplements. She has been taking acetaminophen as prescribed. Will check an acetaminophen level to make sure she hasn't accidentally been taking too much. She received pre-op prophy abx of gentamicin and clindamycin. Clindamycin has been reported to cause transient elevations in LFTs, gentamicin does not cause liver injury. Other medications administered in OR on 2/11/22: acetaminophen, bupivacaine, dexamethasone, precedex, ephedrine, fentanyl, dilaudid, ketorolac, lidocaine, versed, zofran, oxycodone, phenazopyridine, phenylephrine, propofol, rocuronium, scopolamine, sugammadex, vasopressin.   -trend LFTs and INR  -acetaminophen level pending  -Hep A, B, C, iron and TBIC, NINOSKA, Anti-smooth musc, cerulopasmin, [id number], TSH pending  -antiemetics PRN    #POD2 da bill assisted myomectomy  #uterine fibroids  Patient was seen and discussed with gynecology which  -oxycodone 5mg q6h PRN for pain  -IV dilaudid if unable to tolerate PO  -wound care    CHRONIC PROBLEMS:    #depression  #anxiety  -continue PTA bupropion XL 150mg    #undifferentiated connective tissue disorder  -continue PTA hydroxycholorquine 200mg BID       Diet:   ADAT  DVT Prophylaxis: Low Risk/Ambulatory with no VTE prophylaxis indicated, mechanical if not ambulating sufficiantly  Saldana Catheter: Not present  Central Lines: None  Cardiac Monitoring:  "None  Code Status:   full code    Clinically Significant Risk Factors Present on Admission              # Coagulation Defect: INR = 1.16 (Ref range: 0.86 - 1.14) and/or PTT = N/A on admission, will monitor for bleeding    # Overweight: Estimated body mass index is 25.05 kg/m  as calculated from the following:    Height as of 2/11/22: 1.702 m (5' 7.01\").    Weight as of this encounter: 72.6 kg (160 lb).      Disposition Plan   Expected Discharge:  2 days pending liver injury workup and improvement of GI symptoms   Anticipated discharge location:  Awaiting care coordination huddle  Delays:     home       The patient's care was discussed with the Patient and friend.    Gia Rodriguez MD  Hospitalist Service, [facility]  Securely message with the Vocera Web Console (learn more here)  Text page via [facility] Paging/Directory   Please see signed in provider for up to date coverage information      ______________________________________________________________________    Chief Complaint   Nausea, vomiting, diarrhea    History is obtained from the patient    History of Present Illness   Heather Guillory is a 41 year old female with PMHx of depression, JAVIER, undifferentiated connective tissue disease, s/p myomectomy (2/11/2022) who began having diarrhea last night followed by nausea and vomiting. She has been unable to keep any food down. Her son had gastroenteritis a couple days ago. She denies any fever or chills. No bloody stool or emesis. No urinary complaints. No recent changes in her medications. No recent supplements. She had an uncomplicated myomectomy performed robot assisted on 2/11/22. She has soreness from the procedure but nothing unusual. In the ED, patient is afebrile, HR 67-89, -127/86-96, 98% on RA. Her labs were notable for  to 518 and  to 472. Alk phos and bilirubin wnl. INR was elevated to 1.16. No leukocytosis but she does have " mild decrease in lymphocytes to 0.3 and increase in neutrophils to 9.3. No acute process on abdominal ultrasound.    Review of Systems    The 10 point Review of Systems is negative other than noted in the HPI or here.     Past Medical History    I have reviewed this patient's medical history and updated it with pertinent information if needed.   Past Medical History:   Diagnosis Date     JAVIER (generalized anxiety disorder)     50mg zoloft     Rosacea      Undifferentiated connective tissue disease (H)        Past Surgical History   I have reviewed this patient's surgical history and updated it with pertinent information if needed.  Past Surgical History:   Procedure Laterality Date     COLONOSCOPY N/A 10/21/2020    Procedure: COLONOSCOPY;  Surgeon: Yang Pete MD;  Location: [facility] OR     IR ENDOVENOUS ABLATION VARICOSE VEINS  10/28/2019     IR FOLLOW UP VISIT OUTPATIENT  11/20/2019     IR STAB PHLEBECTOMY 10 - 20 STABS  10/28/2019     IR VEIN SCLEROSING MULTIPLE  10/28/2019     LAPAROSCOPIC SALPINGECTOMY Bilateral 4/23/2021    Procedure: SALPINGECTOMY, LAPAROSCOPIC BILATERAL;  Surgeon: Cate Mansfield MD;  Location: [facility] OR     OPERATIVE HYSTEROSCOPY WITH MORCELLATOR  4/8/2014    Procedure: OPERATIVE HYSTEROSCOPY WITH MORCELLATOR;  Hysteroscopic Polypectomy;  Surgeon: Cate Mansfield MD;  Location: [facility] OR     REMOVE INTRAUTERINE DEVICE N/A 4/23/2021    Procedure: removal of intrauterine device;  Surgeon: Cate Mansfield MD;  Location: [facility] OR       Social History   I have reviewed this patient's social history and updated it with pertinent information if needed.  Social History     Tobacco Use     Smoking status: Never Smoker     Smokeless tobacco: Never Used   Substance Use Topics     Alcohol use: No     Alcohol/week: 0.0 standard drinks     Drug use: No       Family History   I have reviewed this patient's family history and updated it with pertinent information if needed.  Family  History   Problem Relation Age of Onset     Elijah Disease Sister      Arrhythmia Brother         details unknown; procedure in his 20s     Anxiety Disorder Brother      Alzheimer Disease Maternal Grandmother      Cervical Cancer Maternal Grandmother      Colon Cancer Maternal Grandfather      Breast Cancer Paternal Grandmother      Hypertension Paternal Grandmother      Hypertension Paternal Grandfather      Lung Cancer Paternal Grandfather         smoker     Diabetes Type 2  Paternal Grandfather      Myocardial Infarction Paternal Grandfather      Myocardial Infarction Paternal Aunt 45     Cerebrovascular Disease No family hx of      Coronary Artery Disease Early Onset No family hx of      Ovarian Cancer No family hx of        Prior to Admission Medications   Prior to Admission Medications   Prescriptions Last Dose Informant Patient Reported? Taking?   Prenatal Vit-Fe Fumarate-FA (PRENATAL VITAMIN AND MINERAL PO)   Yes No   acetaminophen (TYLENOL) 325 MG tablet   No No   Sig: Take 3 tablets (975 mg) by mouth every 6 hours as needed for mild pain   buPROPion (WELLBUTRIN XL) 150 MG 24 hr tablet   No No   Sig: Take 1 tablet (150 mg) by mouth every morning   ferrous sulfate (FEROSUL) 325 (65 Fe) MG tablet   Yes No   Sig: Take 325 mg by mouth daily (with breakfast)   hydroxychloroquine (PLAQUENIL) 200 MG tablet   No No   Sig: TAKE 1 TABLET BY MOUTH TWICE A DAY   ibuprofen (ADVIL/MOTRIN) 800 MG tablet   No No   Sig: Take 1 tablet (800 mg) by mouth every 6 hours as needed for other (mild and/or inflammatory pain)   metroNIDAZOLE (METROCREAM) 0.75 % external cream   Yes No   Sig: Apply topically 2 times daily   oxyCODONE (ROXICODONE) 5 MG tablet   No No   Sig: Take 1 tablet (5 mg) by mouth every 6 hours as needed for pain   senna-docusate (SENOKOT-S/PERICOLACE) 8.6-50 MG tablet   No No   Sig: Take 1-2 tablets by mouth 2 times daily   sertraline (ZOLOFT) 100 MG tablet   No No   Sig: Take 1 tablet (100 mg) by mouth daily    vitamin C (ASCORBIC ACID) 1000 MG TABS   Yes No   Sig: Take 500 mg by mouth daily       Facility-Administered Medications: None     Allergies   Allergies   Allergen Reactions     Latex      Benzoyl Peroxide Swelling     Duricef [Cefadroxil] Unknown     Monistat [Miconazole] Swelling     Sulfa Drugs Rash       Physical Exam   Vital Signs: Temp: 97.8  F (36.6  C) Temp src: Oral BP: (!) 125/99 Pulse: 67   Resp: 16 SpO2: 99 %      Weight: 160 lbs 0 oz    General Appearance: pleasant woman resting comfortably in bed  Eyes: EOMI, no scleral icterus  HEENT: MMM, NC/AT  Respiratory: CTAB, no crackles or wheezes, no increased WOB  Cardiovascular: RRR, no m/c/r, no peripheral edema  GI: soft, nondistended, tender to palpation in all quadrants, no rebound tenderness, no guarding  Musculoskeletal: moves x 4  Neurologic: alert and oriented, no focal neurologic deficits  Psychiatric: appropriate mood and affect    Data   Data reviewed today: I reviewed all medications, new labs and imaging results over the last 24 hours.     Recent Labs   Lab 02/13/22  1343 02/13/22  0934 02/11/22  0639 02/11/22  0610   WBC  --  9.9 5.4  --    HGB  --  11.7 11.1*  --    MCV  --  86 84  --    PLT  --  178 187  --    INR 1.16*  --   --   --    NA  --  139  --   --    POTASSIUM  --  3.9  --   --    CHLORIDE  --  113*  --   --    CO2  --  23  --   --    BUN  --  6*  --   --    CR  --  0.60  --   --    ANIONGAP  --  3  --   --    PRICILA  --  8.6  --   --    GLC  --  159*  --  96   ALBUMIN 3.2* 3.5  --   --    PROTTOTAL 6.2* 6.7*  --   --    BILITOTAL 0.4 0.5  --   --    ALKPHOS 96 96  --   --    * 468*  --   --    * 638*  --   --    LIPASE  --  82  --   --      Recent Results (from the past 24 hour(s))   Abdomen US, limited (RUQ only)    Narrative    EXAM: ULTRASOUND ABDOMEN LIMITED  LOCATION: [facility]  DATE/TIME: 02/13/2022, 11:03 AM    INDICATION: Abdominal pain, nausea and vomiting,  elevated LFTs.  COMPARISON: None.  TECHNIQUE: Limited abdominal ultrasound.    FINDINGS:    GALLBLADDER: Normal. No gallstones, wall thickening, or pericholecystic fluid. Negative sonographic Lugo's sign.    BILE DUCTS: No biliary dilatation. The common duct measures 2 mm.    LIVER: Normal parenchyma with smooth contour. No focal mass.    RIGHT KIDNEY: No hydronephrosis.    PANCREAS: The visualized portions are normal.    No ascites.      Impression    IMPRESSION:  1.  No acute process demonstrated.

## 2022-02-14 VITALS
TEMPERATURE: 97.9 F | DIASTOLIC BLOOD PRESSURE: 79 MMHG | HEART RATE: 71 BPM | SYSTOLIC BLOOD PRESSURE: 122 MMHG | BODY MASS INDEX: 25.05 KG/M2 | WEIGHT: 160 LBS | OXYGEN SATURATION: 99 % | RESPIRATION RATE: 16 BRPM

## 2022-02-14 LAB
ALBUMIN SERPL-MCNC: 3 G/DL (ref 3.4–5)
ALBUMIN SERPL-MCNC: 3.1 G/DL (ref 3.4–5)
ALP SERPL-CCNC: 91 U/L (ref 40–150)
ALP SERPL-CCNC: 98 U/L (ref 40–150)
ALT SERPL W P-5'-P-CCNC: 345 U/L (ref 0–50)
ALT SERPL W P-5'-P-CCNC: 355 U/L (ref 0–50)
ANION GAP SERPL CALCULATED.3IONS-SCNC: 3 MMOL/L (ref 3–14)
ANION GAP SERPL CALCULATED.3IONS-SCNC: 6 MMOL/L (ref 3–14)
AST SERPL W P-5'-P-CCNC: 246 U/L (ref 0–45)
AST SERPL W P-5'-P-CCNC: 250 U/L (ref 0–45)
BASOPHILS # BLD AUTO: 0 10E3/UL (ref 0–0.2)
BASOPHILS NFR BLD AUTO: 1 %
BILIRUB DIRECT SERPL-MCNC: 0.1 MG/DL (ref 0–0.2)
BILIRUB SERPL-MCNC: 0.3 MG/DL (ref 0.2–1.3)
BILIRUB SERPL-MCNC: 0.4 MG/DL (ref 0.2–1.3)
BUN SERPL-MCNC: 5 MG/DL (ref 7–30)
BUN SERPL-MCNC: 5 MG/DL (ref 7–30)
CALCIUM SERPL-MCNC: 8.1 MG/DL (ref 8.5–10.1)
CALCIUM SERPL-MCNC: 8.4 MG/DL (ref 8.5–10.1)
CHLORIDE BLD-SCNC: 114 MMOL/L (ref 94–109)
CHLORIDE BLD-SCNC: 114 MMOL/L (ref 94–109)
CO2 SERPL-SCNC: 22 MMOL/L (ref 20–32)
CO2 SERPL-SCNC: 25 MMOL/L (ref 20–32)
CREAT SERPL-MCNC: 0.68 MG/DL (ref 0.52–1.04)
CREAT SERPL-MCNC: 0.76 MG/DL (ref 0.52–1.04)
EOSINOPHIL # BLD AUTO: 0.2 10E3/UL (ref 0–0.7)
EOSINOPHIL NFR BLD AUTO: 4 %
ERYTHROCYTE [DISTWIDTH] IN BLOOD BY AUTOMATED COUNT: 13.9 % (ref 10–15)
GFR SERPL CREATININE-BSD FRML MDRD: >90 ML/MIN/1.73M2
GFR SERPL CREATININE-BSD FRML MDRD: >90 ML/MIN/1.73M2
GLUCOSE BLD-MCNC: 104 MG/DL (ref 70–99)
GLUCOSE BLD-MCNC: 88 MG/DL (ref 70–99)
HAV IGG SER QL IA: REACTIVE
HBV CORE AB SERPL QL IA: NONREACTIVE
HBV SURFACE AB SERPL IA-ACNC: 139.98 M[IU]/ML
HBV SURFACE AG SERPL QL IA: NONREACTIVE
HCT VFR BLD AUTO: 32.1 % (ref 35–47)
HCV AB SERPL QL IA: NONREACTIVE
HGB BLD-MCNC: 9.8 G/DL (ref 11.7–15.7)
HOLD SPECIMEN: NORMAL
IMM GRANULOCYTES # BLD: 0 10E3/UL
IMM GRANULOCYTES NFR BLD: 0 %
INR PPP: 1.11 (ref 0.86–1.14)
LYMPHOCYTES # BLD AUTO: 1.7 10E3/UL (ref 0.8–5.3)
LYMPHOCYTES NFR BLD AUTO: 31 %
MCH RBC QN AUTO: 26.5 PG (ref 26.5–33)
MCHC RBC AUTO-ENTMCNC: 30.5 G/DL (ref 31.5–36.5)
MCV RBC AUTO: 87 FL (ref 78–100)
MONOCYTES # BLD AUTO: 0.4 10E3/UL (ref 0–1.3)
MONOCYTES NFR BLD AUTO: 7 %
NEUTROPHILS # BLD AUTO: 3.1 10E3/UL (ref 1.6–8.3)
NEUTROPHILS NFR BLD AUTO: 57 %
NRBC # BLD AUTO: 0 10E3/UL
NRBC BLD AUTO-RTO: 0 /100
PLATELET # BLD AUTO: 144 10E3/UL (ref 150–450)
POTASSIUM BLD-SCNC: 3.8 MMOL/L (ref 3.4–5.3)
POTASSIUM BLD-SCNC: 4 MMOL/L (ref 3.4–5.3)
PROT SERPL-MCNC: 5.8 G/DL (ref 6.8–8.8)
PROT SERPL-MCNC: 6.3 G/DL (ref 6.8–8.8)
RBC # BLD AUTO: 3.7 10E6/UL (ref 3.8–5.2)
SODIUM SERPL-SCNC: 142 MMOL/L (ref 133–144)
SODIUM SERPL-SCNC: 142 MMOL/L (ref 133–144)
WBC # BLD AUTO: 5.4 10E3/UL (ref 4–11)

## 2022-02-14 PROCEDURE — 258N000003 HC RX IP 258 OP 636: Performed by: STUDENT IN AN ORGANIZED HEALTH CARE EDUCATION/TRAINING PROGRAM

## 2022-02-14 PROCEDURE — 36415 COLL VENOUS BLD VENIPUNCTURE: CPT | Performed by: STUDENT IN AN ORGANIZED HEALTH CARE EDUCATION/TRAINING PROGRAM

## 2022-02-14 PROCEDURE — 80053 COMPREHEN METABOLIC PANEL: CPT | Performed by: STUDENT IN AN ORGANIZED HEALTH CARE EDUCATION/TRAINING PROGRAM

## 2022-02-14 PROCEDURE — 85610 PROTHROMBIN TIME: CPT | Performed by: STUDENT IN AN ORGANIZED HEALTH CARE EDUCATION/TRAINING PROGRAM

## 2022-02-14 PROCEDURE — 250N000013 HC RX MED GY IP 250 OP 250 PS 637: Performed by: STUDENT IN AN ORGANIZED HEALTH CARE EDUCATION/TRAINING PROGRAM

## 2022-02-14 PROCEDURE — 82248 BILIRUBIN DIRECT: CPT | Performed by: STUDENT IN AN ORGANIZED HEALTH CARE EDUCATION/TRAINING PROGRAM

## 2022-02-14 PROCEDURE — 99238 HOSP IP/OBS DSCHRG MGMT 30/<: CPT | Performed by: INTERNAL MEDICINE

## 2022-02-14 PROCEDURE — 85025 COMPLETE CBC W/AUTO DIFF WBC: CPT | Performed by: STUDENT IN AN ORGANIZED HEALTH CARE EDUCATION/TRAINING PROGRAM

## 2022-02-14 PROCEDURE — 86015 ACTIN ANTIBODY EACH: CPT | Performed by: STUDENT IN AN ORGANIZED HEALTH CARE EDUCATION/TRAINING PROGRAM

## 2022-02-14 PROCEDURE — 36415 COLL VENOUS BLD VENIPUNCTURE: CPT | Performed by: INTERNAL MEDICINE

## 2022-02-14 PROCEDURE — 86038 ANTINUCLEAR ANTIBODIES: CPT | Performed by: STUDENT IN AN ORGANIZED HEALTH CARE EDUCATION/TRAINING PROGRAM

## 2022-02-14 PROCEDURE — 84450 TRANSFERASE (AST) (SGOT): CPT | Performed by: INTERNAL MEDICINE

## 2022-02-14 RX ORDER — ONDANSETRON 4 MG/1
4 TABLET, ORALLY DISINTEGRATING ORAL EVERY 6 HOURS PRN
Qty: 12 TABLET | Refills: 1 | Status: SHIPPED | OUTPATIENT
Start: 2022-02-14 | End: 2022-02-23

## 2022-02-14 RX ADMIN — BUPROPION HYDROCHLORIDE 150 MG: 150 TABLET, EXTENDED RELEASE ORAL at 08:13

## 2022-02-14 RX ADMIN — SODIUM CHLORIDE, POTASSIUM CHLORIDE, SODIUM LACTATE AND CALCIUM CHLORIDE: 600; 310; 30; 20 INJECTION, SOLUTION INTRAVENOUS at 02:10

## 2022-02-14 RX ADMIN — SERTRALINE HYDROCHLORIDE 100 MG: 100 TABLET, FILM COATED ORAL at 08:13

## 2022-02-14 RX ADMIN — HYDROXYCHLOROQUINE SULFATE 200 MG: 200 TABLET, FILM COATED ORAL at 08:13

## 2022-02-14 ASSESSMENT — ACTIVITIES OF DAILY LIVING (ADL)
HEARING_DIFFICULTY_OR_DEAF: NO
ADLS_ACUITY_SCORE: 3
DIFFICULTY_COMMUNICATING: NO
WEAR_GLASSES_OR_BLIND: NO
PATIENT_/_FAMILY_COMMUNICATION_STYLE: SPOKEN LANGUAGE (ENGLISH OR BILINGUAL)
ADLS_ACUITY_SCORE: 3
DRESSING/BATHING_DIFFICULTY: NO
ADLS_ACUITY_SCORE: 3
ADLS_ACUITY_SCORE: 3
DIFFICULTY_EATING/SWALLOWING: NO
ADLS_ACUITY_SCORE: 3
FALL_HISTORY_WITHIN_LAST_SIX_MONTHS: NO
WALKING_OR_CLIMBING_STAIRS_DIFFICULTY: NO
ADLS_ACUITY_SCORE: 3
DOING_ERRANDS_INDEPENDENTLY_DIFFICULTY: NO
ADLS_ACUITY_SCORE: 3
TOILETING_ISSUES: NO
ADLS_ACUITY_SCORE: 3
CONCENTRATING,_REMEMBERING_OR_MAKING_DECISIONS_DIFFICULTY: NO
ADLS_ACUITY_SCORE: 3
ADLS_ACUITY_SCORE: 3

## 2022-02-14 NOTE — PROGRESS NOTES
"[facility]  Gynecology Progress Note  S:  Nausea is controlled now, especially while laying in bed. Incisional soreness was provoked by nausea and vomiting, which is now controlled. Denied other concerns.    O:  Patient Vitals for the past 24 hrs:   BP Temp Temp src Pulse Resp SpO2 Weight   02/14/22 0205 105/68 99  F (37.2  C) Oral 80 16 -- --   02/13/22 1818 114/71 99.3  F (37.4  C) Oral 72 16 100 % --   02/13/22 1609 (!) 121/96 99.8  F (37.7  C) Oral 71 -- 98 % --   02/13/22 1030 (!) 125/99 -- -- 67 -- 99 % --   02/13/22 1015 -- -- -- -- -- 100 % --   02/13/22 1000 (!) 118/90 -- -- 65 -- 97 % --   02/13/22 0920 -- -- -- -- -- -- 72.6 kg (160 lb)   02/13/22 0917 127/86 97.8  F (36.6  C) Oral 89 16 98 % --     Gen: Resting comfortably, NAD  CV: Well perfused  Pulm: normal respiratory efforts on room air  Abd: Soft, appropriately ttp, non-distended  Inc: Horizontal infraumbilical incision, LLQ and RLQ laparoscopic port sites are all healing well without erythema, drainage,       A/P:  Ms. Heather Guillory is a 41 year old on POD#3 s/p \"robotic assisted laparoscopic myomectomy\" and HD#2 for nausea/vomiting + diarrhea.    Postoperative care  - Pain: pain is well controlled and management plan deferred to primary team. Currently she is on PO oxycodone 5mg q6hr PRN, IV dilaudid 0.2mg q2hr PRN. From our perspective, low concerns for intraabdominal bleeding or other contraindications for NSAIDs. Thus, could consider adding on ibuprofen once pt's GI symptoms resolved.   - FEN/GI: currently on regular diet and maintenance fluid LR 100mL/hr. Management deferred to primary team  - CV: BP & pulse within normal limits  - Pulm: No active issues  - Renal: voiding spontaneously   - Heme: No clinical concerns for intraabdominal bleeding: appropriate abdominal exam, no tachycardia, no hypotension. Hgb drop is likely from both hemodilution from IVF and intraop blood loss.  - PPX: SCDs in place, encourage " IS    Nausea/vomiting + diarrhea  - Like from viral gastroenteritis.   - AST/ALT are down trending today. Acetaminophen level nl. RUQ US is nl. Appreiciate IM primary team's management and lab work: Hepatitis A Antibody IgG, hepatitis B surface antigen and antibody, hepatitis B core antibody, hepatitis C antibody; Alpha 1 Antitrypsin, ceruloplasmin, F Actin EIA with reflex, Anti Nuclear Alissa IgG by IFA with Reflex.    Gynecology team will continue to round on her if she is hospitalized.     Jessica Lopez MD  [facility]BG[facility] PGY-4  Gyn resident pager (weekday 6AM-6PM): [phone]  OB G3 pager (weeknight 6PM-6AM, weekend): [phone]  02/14/2022 6:53 AM

## 2022-02-14 NOTE — DISCHARGE SUMMARY
Service Date: 02/14/2022  Discharge Date: 02/14/2022    REASON FOR ADMISSION:  Heather Guillory is a 41-year-old female with a past medical history of uterine fibroids, status post myomectomy on 02/11/2022, undifferentiated connective tissue disorder, who was admitted to the hospital through the emergency room for the evaluation of nausea, vomiting, diarrhea and elevated liver function tests.  The patient had surgery 2 days ago without apparent complications.  On the night of 02/12, she noted several episodes of diarrhea followed by abdominal pain primarily located over her incision as well as frequent episodes of emesis.  She presented to the ER and was found to have significant elevation of her transaminases, with AST of 638, ALT of 468.  Bilirubin was normal.  INR was mildly elevated at 1.16.  Abdominal ultrasound was obtained and revealed no structural abnormality of the biliary tract.  The pancreas appeared normal.  Lipase was normal.  The patient was given intravenous fluids, but continued to have severe nausea and was admitted for further monitoring.  The etiology of her elevated transaminases is not clear.  Transaminases had been normal several weeks previously.  A child has been mildly ill with a gastrointestinal illness.  The patient has not been taking acetaminophen excessively as was borne out by acetaminophen level less than 2.  The patient had received preoperative prophylactic antibiotics with gentamicin and clindamycin.  Other medications administered in the OR included acetaminophen, bupivacaine, dexamethasone, Precedex, ephedrine, fentanyl, Dilaudid, ketorolac, lidocaine, Versed, Zofran, oxycodone, phenazopyridine, phenylephrine, propofol, rocuronium, scopolamine, vasopressin, sugammadex.    HOSPITAL COURSE:  The patient was admitted for IV hydration for management of her GI symptoms and for monitoring of her liver function tests.  The patient's course was unremarkable.  She felt considerably  improved after admission. By the morning after admission, she felt much improved.  She was able to tolerate a regular diet without nausea.  She had mild incisional discomfort.  She had no further diarrhea.  Liver function tests were repeated in the morning after admission and revealed improved transaminases with ALT of 345, AST of 250.  Bilirubin remained normal at 0.3.  The patient felt well enough to discharge to home by the morning after admission.  Hepatic ultrasound, the improving transaminases and patient's rapidly improving symptoms were all very reassuring.  It seems most likely that her picture was secondary to a toxicity from 1 of her medications that she received during the surgery, though the exact nature of this certainly is not clear.  Viral hepatitis will be highly unlikely given her rapid improvement.  I would add that the patient did have hepatitis serology which was unremarkable.  COVID-19 screen was negative.     PHYSICAL EXAMINATION:  VITAL SIGNS:  On the day of discharge, the patient was afebrile.  Vital signs were stable.  LUNGS:  Clear.  HEART:  Regular rate and rhythm.  ABDOMEN:  Soft.  There is mild pain over her incision.  There is no right upper quadrant pain.    Heather will discharge to home.  Liver function test will be repeated on .  Heather will contact her primary care and/or her gynecologist to review these labs.  Given the unclear etiology of her acute transient liver injury, she should review medications to be used with further surgery, with Anesthesia.    Gabriel French MD        D: 2022   T: 2022   MT: [id number]    Name:     HEATHER GAONA  MRN:      [id number]        Account:      [id number]   :      1980           Service Date: 2022                                  Discharge Date: 2022     Document: [id number]    cc:  Cyndi Zarate, APRN, CNP

## 2022-02-14 NOTE — PLAN OF CARE
VS: VSS  Temp: 97.9  F (36.6  C) Temp src: Axillary BP: 122/79 Pulse: 71   Resp: 16 SpO2: 99 % O2 Device: None (Room air)     O2: >90% on RA, denies SOB or CP   Output: Voiding without difficulty in BR   Last BM: LBM 2/13 per pt, diarrhea prior to hospitalization. None since admission. +flatus, BS active   Activity: Up ad alvarez, ind   Skin: Skin intact ex for abdominal incisions- BERNABE covered with glue. Incisions are WDL.    Pain: Minimal pain to abdomen, declined medications when offered   CMS: CMS intact, denies N/T. DP +2 bilaterally.    Dressing: None   Diet: Regular diet, advanced this AM from clear liquid. Tolerated a piece of toast without N/V. No antiemetics needed overnight or this shift. Adequate intake of PO fluids.    LDA: PIV removed for discharge   Equipment: Personal belongings at bedside   Plan: Discharge home with assist from sister  Discharged with PO zofran PRN   Additional Info: Pt anxious to get home to kids at home     DISCHARGE SUMMARY    Pt discharging to: Home  Transportation: Sister  AVS given and discussed: YES  Stoplight Tool given and discussed: N/A  Medications given: PO zofran   Belongings returned: YES  Comments: Left unit 5 ortho at 1300, sister will transport home

## 2022-02-14 NOTE — PLAN OF CARE
Pt A&O x's 4. VSS. Afebrile. 02 sats in the 90s on RA. Lungs clear. Denies SOB, CP and nausea. Tolerating po fluid. Bowel sound active in all quadrants. No BM but passing gas. Voiding adequate amount into the toilet. Pain well managed, no nausea, vomiting, or diarrhea. CMS intact, denies N/T. Abd incision open to air and no sign of infection.  PIV patent and infusing. Pt slept between care and is able to make needs known, call light with in reach. Will continue to monitor.

## 2022-02-14 NOTE — PLAN OF CARE
Patient arrived to the unit at 1830 from PACU. A&O X4. Accompanied per sister. Oriented to room and call light use.    VS: /71 (BP Location: Left arm)   Pulse 72   Temp 99.3  F (37.4  C) (Oral)   Resp 16   Wt 72.6 kg (160 lb)   LMP 02/01/2022 (Approximate)   SpO2 100%   Breastfeeding No   BMI 25.05 kg/m       O2: Sats >90% on RA.   Output: Voiding spontaneously without difficulty.     Last BM: LBM 2/13. Diarrhea per pt report.   Activity: Independent in room.   Skin: Intact except abdominal incision.   Pain: Pain managed with prn IV dilaudid. Pt wants to wait to take PO pain meds due to nausea.   CMS: Intact.   Dressing: None.   Diet: On clear liquid diet and tolerated well. No N/V per pt report.   LDA: PIV infusing LR at 100 ml/hr into right forearm.   Equipment: IV pole, patient belongings.   Plan: TBD. Will continue to monitor.   Additional Info:

## 2022-02-15 LAB
ANA PAT SER IF-IMP: ABNORMAL
ANA SER QL IF: POSITIVE
ANA TITR SER IF: ABNORMAL {TITER}
PATH REPORT.COMMENTS IMP SPEC: NORMAL
PATH REPORT.COMMENTS IMP SPEC: NORMAL
PATH REPORT.FINAL DX SPEC: NORMAL
PATH REPORT.GROSS SPEC: NORMAL
PATH REPORT.MICROSCOPIC SPEC OTHER STN: NORMAL
PATH REPORT.RELEVANT HX SPEC: NORMAL
PHOTO IMAGE: NORMAL
SMA IGG SER-ACNC: 19 UNITS

## 2022-02-16 ENCOUNTER — TELEPHONE (OUTPATIENT)
Dept: INTERNAL MEDICINE | Facility: CLINIC | Age: 42
End: 2022-02-16
Payer: COMMERCIAL

## 2022-02-16 DIAGNOSIS — N92.0 MENORRHAGIA WITH REGULAR CYCLE: Primary | ICD-10-CM

## 2022-02-16 NOTE — TELEPHONE ENCOUNTER
Lab ordered, lab appt scheduled  Jaclyn Cruz, EMT at 12:14 PM on 2/16/2022.  [facility]  [facility]  [place]  [phone]

## 2022-02-17 ENCOUNTER — APPOINTMENT (OUTPATIENT)
Dept: CT IMAGING | Facility: CLINIC | Age: 42
End: 2022-02-17
Attending: EMERGENCY MEDICINE
Payer: COMMERCIAL

## 2022-02-17 ENCOUNTER — HOSPITAL ENCOUNTER (EMERGENCY)
Facility: CLINIC | Age: 42
Discharge: HOME | End: 2022-02-17
Attending: EMERGENCY MEDICINE | Admitting: EMERGENCY MEDICINE
Payer: COMMERCIAL

## 2022-02-17 VITALS
HEART RATE: 86 BPM | BODY MASS INDEX: 25.05 KG/M2 | RESPIRATION RATE: 16 BRPM | OXYGEN SATURATION: 90 % | SYSTOLIC BLOOD PRESSURE: 104 MMHG | WEIGHT: 160 LBS | DIASTOLIC BLOOD PRESSURE: 76 MMHG | TEMPERATURE: 98.2 F

## 2022-02-17 DIAGNOSIS — R19.7 NAUSEA VOMITING AND DIARRHEA: ICD-10-CM

## 2022-02-17 DIAGNOSIS — R11.2 NAUSEA VOMITING AND DIARRHEA: ICD-10-CM

## 2022-02-17 PROBLEM — O09.529 SUPERVISION OF HIGH-RISK PREGNANCY OF ELDERLY MULTIGRAVIDA: Status: RESOLVED | Noted: 2017-09-06 | Resolved: 2018-05-23

## 2022-02-17 LAB
ALBUMIN SERPL-MCNC: 4.1 G/DL (ref 3.4–5)
ALP SERPL-CCNC: 113 U/L (ref 40–150)
ALT SERPL W P-5'-P-CCNC: 150 U/L (ref 0–50)
ANION GAP SERPL CALCULATED.3IONS-SCNC: 6 MMOL/L (ref 3–14)
AST SERPL W P-5'-P-CCNC: 31 U/L (ref 0–45)
BASOPHILS # BLD AUTO: 0 10E3/UL (ref 0–0.2)
BASOPHILS NFR BLD AUTO: 0 %
BILIRUB SERPL-MCNC: 0.7 MG/DL (ref 0.2–1.3)
BUN SERPL-MCNC: 13 MG/DL (ref 7–30)
C DIFF TOX B STL QL: NEGATIVE
CALCIUM SERPL-MCNC: 9.5 MG/DL (ref 8.5–10.1)
CHLORIDE BLD-SCNC: 109 MMOL/L (ref 94–109)
CO2 SERPL-SCNC: 24 MMOL/L (ref 20–32)
CREAT SERPL-MCNC: 0.68 MG/DL (ref 0.52–1.04)
EOSINOPHIL # BLD AUTO: 0.1 10E3/UL (ref 0–0.7)
EOSINOPHIL NFR BLD AUTO: 2 %
ERYTHROCYTE [DISTWIDTH] IN BLOOD BY AUTOMATED COUNT: 13.3 % (ref 10–15)
GFR SERPL CREATININE-BSD FRML MDRD: >90 ML/MIN/1.73M2
GLUCOSE BLD-MCNC: 118 MG/DL (ref 70–99)
HCG SERPL QL: NEGATIVE
HCT VFR BLD AUTO: 40.9 % (ref 35–47)
HGB BLD-MCNC: 13.2 G/DL (ref 11.7–15.7)
IMM GRANULOCYTES # BLD: 0 10E3/UL
IMM GRANULOCYTES NFR BLD: 1 %
INR PPP: 1 (ref 0.86–1.14)
LIPASE SERPL-CCNC: 96 U/L (ref 73–393)
LYMPHOCYTES # BLD AUTO: 0.6 10E3/UL (ref 0.8–5.3)
LYMPHOCYTES NFR BLD AUTO: 9 %
MCH RBC QN AUTO: 26.5 PG (ref 26.5–33)
MCHC RBC AUTO-ENTMCNC: 32.3 G/DL (ref 31.5–36.5)
MCV RBC AUTO: 82 FL (ref 78–100)
MONOCYTES # BLD AUTO: 0.3 10E3/UL (ref 0–1.3)
MONOCYTES NFR BLD AUTO: 4 %
NEUTROPHILS # BLD AUTO: 4.9 10E3/UL (ref 1.6–8.3)
NEUTROPHILS NFR BLD AUTO: 84 %
NRBC # BLD AUTO: 0 10E3/UL
NRBC BLD AUTO-RTO: 0 /100
PLATELET # BLD AUTO: 226 10E3/UL (ref 150–450)
POTASSIUM BLD-SCNC: 3.5 MMOL/L (ref 3.4–5.3)
PROT SERPL-MCNC: 8.3 G/DL (ref 6.8–8.8)
RBC # BLD AUTO: 4.98 10E6/UL (ref 3.8–5.2)
SARS-COV-2 RNA RESP QL NAA+PROBE: NEGATIVE
SODIUM SERPL-SCNC: 139 MMOL/L (ref 133–144)
WBC # BLD AUTO: 5.9 10E3/UL (ref 4–11)

## 2022-02-17 PROCEDURE — C9803 HOPD COVID-19 SPEC COLLECT: HCPCS | Performed by: EMERGENCY MEDICINE

## 2022-02-17 PROCEDURE — 87493 C DIFF AMPLIFIED PROBE: CPT | Performed by: EMERGENCY MEDICINE

## 2022-02-17 PROCEDURE — 250N000011 HC RX IP 250 OP 636: Performed by: EMERGENCY MEDICINE

## 2022-02-17 PROCEDURE — 258N000003 HC RX IP 258 OP 636: Performed by: FAMILY MEDICINE

## 2022-02-17 PROCEDURE — 99285 EMERGENCY DEPT VISIT HI MDM: CPT | Mod: 25 | Performed by: EMERGENCY MEDICINE

## 2022-02-17 PROCEDURE — 80053 COMPREHEN METABOLIC PANEL: CPT | Performed by: EMERGENCY MEDICINE

## 2022-02-17 PROCEDURE — U0005 INFEC AGEN DETEC AMPLI PROBE: HCPCS | Performed by: EMERGENCY MEDICINE

## 2022-02-17 PROCEDURE — 85041 AUTOMATED RBC COUNT: CPT | Performed by: EMERGENCY MEDICINE

## 2022-02-17 PROCEDURE — 74177 CT ABD & PELVIS W/CONTRAST: CPT

## 2022-02-17 PROCEDURE — 36415 COLL VENOUS BLD VENIPUNCTURE: CPT | Performed by: EMERGENCY MEDICINE

## 2022-02-17 PROCEDURE — 258N000003 HC RX IP 258 OP 636: Performed by: EMERGENCY MEDICINE

## 2022-02-17 PROCEDURE — 96375 TX/PRO/DX INJ NEW DRUG ADDON: CPT | Performed by: EMERGENCY MEDICINE

## 2022-02-17 PROCEDURE — 250N000009 HC RX 250: Performed by: FAMILY MEDICINE

## 2022-02-17 PROCEDURE — 96374 THER/PROPH/DIAG INJ IV PUSH: CPT | Mod: 59 | Performed by: EMERGENCY MEDICINE

## 2022-02-17 PROCEDURE — 250N000011 HC RX IP 250 OP 636: Performed by: FAMILY MEDICINE

## 2022-02-17 PROCEDURE — 96361 HYDRATE IV INFUSION ADD-ON: CPT | Performed by: EMERGENCY MEDICINE

## 2022-02-17 PROCEDURE — 83690 ASSAY OF LIPASE: CPT | Performed by: EMERGENCY MEDICINE

## 2022-02-17 PROCEDURE — 85610 PROTHROMBIN TIME: CPT | Performed by: EMERGENCY MEDICINE

## 2022-02-17 PROCEDURE — 84703 CHORIONIC GONADOTROPIN ASSAY: CPT | Performed by: EMERGENCY MEDICINE

## 2022-02-17 PROCEDURE — 99285 EMERGENCY DEPT VISIT HI MDM: CPT | Performed by: EMERGENCY MEDICINE

## 2022-02-17 RX ORDER — SODIUM CHLORIDE 9 MG/ML
INJECTION, SOLUTION INTRAVENOUS CONTINUOUS
Status: DISCONTINUED | OUTPATIENT
Start: 2022-02-17 | End: 2022-02-17 | Stop reason: HOSPADM

## 2022-02-17 RX ORDER — PROCHLORPERAZINE MALEATE 10 MG
10 TABLET ORAL EVERY 8 HOURS PRN
Qty: 6 TABLET | Refills: 0 | Status: SHIPPED | OUTPATIENT
Start: 2022-02-17 | End: 2022-02-23

## 2022-02-17 RX ORDER — MORPHINE SULFATE 4 MG/ML
4 INJECTION, SOLUTION INTRAMUSCULAR; INTRAVENOUS ONCE
Status: COMPLETED | OUTPATIENT
Start: 2022-02-17 | End: 2022-02-17

## 2022-02-17 RX ORDER — ONDANSETRON 2 MG/ML
4 INJECTION INTRAMUSCULAR; INTRAVENOUS ONCE
Status: COMPLETED | OUTPATIENT
Start: 2022-02-17 | End: 2022-02-17

## 2022-02-17 RX ORDER — IOPAMIDOL 755 MG/ML
100 INJECTION, SOLUTION INTRAVASCULAR ONCE
Status: COMPLETED | OUTPATIENT
Start: 2022-02-17 | End: 2022-02-17

## 2022-02-17 RX ADMIN — SODIUM CHLORIDE: 9 INJECTION, SOLUTION INTRAVENOUS at 08:35

## 2022-02-17 RX ADMIN — SODIUM CHLORIDE 60 ML: 9 INJECTION, SOLUTION INTRAVENOUS at 08:09

## 2022-02-17 RX ADMIN — SODIUM CHLORIDE 1000 ML: 9 INJECTION, SOLUTION INTRAVENOUS at 10:19

## 2022-02-17 RX ADMIN — MORPHINE SULFATE 4 MG: 4 INJECTION INTRAVENOUS at 07:01

## 2022-02-17 RX ADMIN — ONDANSETRON 4 MG: 2 INJECTION INTRAMUSCULAR; INTRAVENOUS at 07:01

## 2022-02-17 RX ADMIN — IOPAMIDOL 79 ML: 755 INJECTION, SOLUTION INTRAVENOUS at 08:09

## 2022-02-17 RX ADMIN — SODIUM CHLORIDE 1000 ML: 9 INJECTION, SOLUTION INTRAVENOUS at 07:00

## 2022-02-17 NOTE — ED TRIAGE NOTES
Pt. had uterine fibroid removed on Friday last week.  On Saturday started with nausea, vomiting and diarrhea.  Came back in earlier Sunday morning and  liver enzymes were elevated and she was admitted overnight and given fluids.  On Monday went home feeling good.  Then last night started with LLQ pain, nausea, vomiting and diarrhea.  
Statement Selected

## 2022-02-17 NOTE — ED PROVIDER NOTES
ED Provider Note  [facility]      History     Chief Complaint   Patient presents with     Nausea, Vomiting, & Diarrhea     HPI  Heather Guillory is a 41 year old female who has a past medical history of uterine fibroids status post myomectomy on February 11, connective tissue disorder presenting with nausea, vomiting and diarrhea left lower quadrant pain.  Had a recent hospitalization on the 13th of this month for similar symptoms.  She is found to have elevated LFTs of unclear significance, presumably from medications during myomectomy on the 11th of this month.  Child had similar symptoms a few days ago.  Patient denies chest pain, shortness of breath, fevers.  This started shortly after taking stool softeners.  She denies black or bloody stools, no blood or black in vomit. She received antibiotics during procedure. No dysuria/hematuria. Has some vaginal bleeding, improving since procedure.    Past Medical History  Past Medical History:   Diagnosis Date     JAVIER (generalized anxiety disorder)     50mg zoloft     Rosacea      Undifferentiated connective tissue disease (H)      Past Surgical History:   Procedure Laterality Date     COLONOSCOPY N/A 10/21/2020    Procedure: COLONOSCOPY;  Surgeon: Yang Pete MD;  [facility] [place]     [facility]    Procedure: MYOMECTOMY, UTERUS, ROBOT-ASSISTED, LAPAROSCOPIC;  Surgeon: Cate Mansfield MD;  Location: [facility] OR     IR ENDOVENOUS ABLATION VARICOSE VEINS  10/28/2019     IR FOLLOW UP VISIT OUTPATIENT  11/20/2019     IR STAB PHLEBECTOMY 10 - 20 STABS  10/28/2019     IR VEIN SCLEROSING MULTIPLE  10/28/2019     LAPAROSCOPIC SALPINGECTOMY Bilateral 4/23/2021    Procedure: SALPINGECTOMY, LAPAROSCOPIC BILATERAL;  Surgeon: Cate Mansfield MD;  Location: [facility] OR     OPERATIVE HYSTEROSCOPY WITH MORCELLATOR  4/8/2014    Procedure: OPERATIVE HYSTEROSCOPY WITH MORCELLATOR;  Hysteroscopic Polypectomy;  Surgeon:  Cate Mansfield MD;  Location: [facility] OR     REMOVE INTRAUTERINE DEVICE N/A 4/23/2021    Procedure: removal of intrauterine device;  Surgeon: Cate Mansfield MD;  Location: [facility] OR     acetaminophen (TYLENOL) 325 MG tablet  buPROPion (WELLBUTRIN XL) 150 MG 24 hr tablet  hydroxychloroquine (PLAQUENIL) 200 MG tablet  ibuprofen (ADVIL/MOTRIN) 800 MG tablet  metroNIDAZOLE (METROCREAM) 0.75 % external cream  ondansetron (ZOFRAN-ODT) 4 MG ODT tab  Prenatal Vit-Fe Fumarate-FA (PRENATAL VITAMIN AND MINERAL PO)  sertraline (ZOLOFT) 100 MG tablet      Allergies   Allergen Reactions     Latex      Benzoyl Peroxide Swelling     Duricef [Cefadroxil] Unknown     Monistat [Miconazole] Swelling     Sulfa Drugs Rash     Family History  Family History   Problem Relation Age of Onset     Elijah Disease Sister      Arrhythmia Brother         details unknown; procedure in his 20s     Anxiety Disorder Brother      Alzheimer Disease Maternal Grandmother      Cervical Cancer Maternal Grandmother      Colon Cancer Maternal Grandfather      Breast Cancer Paternal Grandmother      Hypertension Paternal Grandmother      Hypertension Paternal Grandfather      Lung Cancer Paternal Grandfather         smoker     Diabetes Type 2  Paternal Grandfather      Myocardial Infarction Paternal Grandfather      Myocardial Infarction Paternal Aunt 45     Cerebrovascular Disease No family hx of      Coronary Artery Disease Early Onset No family hx of      Ovarian Cancer No family hx of      Social History   Social History     Tobacco Use     Smoking status: Never Smoker     Smokeless tobacco: Never Used   Substance Use Topics     Alcohol use: No     Alcohol/week: 0.0 standard drinks     Drug use: No      Past medical history, past surgical history, medications, allergies, family history, and social history were reviewed with the patient. No additional pertinent items.       Review of Systems  A complete review of systems was performed with  pertinent positives and negatives noted in the HPI, and all other systems negative.    Physical Exam   BP: 104/76  Pulse: 86  Resp: 16  Weight: 72.6 kg (160 lb)  SpO2: 100 %  Physical Exam  Physical Exam   Constitutional: oriented to person, place, and time. appears well-developed and well-nourished.   HENT:   Head: Normocephalic and atraumatic.   Neck: Normal range of motion.   Pulmonary/Chest: Effort normal. No respiratory distress.   Cardiac: No murmurs, rubs, gallops. RRR.  Abdominal: Abdomen soft, TTP in the LLQ with guarding, nondistended.   MSK: Long bones without deformity or evidence of trauma  Neurological: alert and oriented to person, place, and time.   Skin: Skin is warm and dry.   Psychiatric:  normal mood and affect.  behavior is normal. Thought content normal.     ED Course      Procedures              No results found for any visits on 02/17/22.  Medications   0.9% sodium chloride BOLUS (has no administration in time range)     Followed by   sodium chloride 0.9% infusion (has no administration in time range)   morphine (PF) injection 4 mg (has no administration in time range)   ondansetron (ZOFRAN) injection 4 mg (has no administration in time range)        Assessments & Plan (with Medical Decision Making)   MDM  Patient presenting with worsening abdominal pain, nausea vomiting diarrhea after stool softener.  She does have quite a bit of left lower quadrant tenderness with guarding, will CT with concern for diverticulitis.  Differential includes obstruction, appendicitis, perforation, Covid, influenza, gastroenteritis, C. difficile among others.  Labs and CT ordered.  She will be sent oncoming physician who will follow up on orders.    I have reviewed the nursing notes. I have reviewed the findings, diagnosis, plan and need for follow up with the patient.    New Prescriptions    No medications on file       Final diagnoses:   Nausea vomiting and diarrhea       --  Rudy Cruz  [facility]  [facility] EMERGENCY DEPARTMENT  2/17/2022     Rudy Cruz MD  02/17/22 0642

## 2022-02-17 NOTE — DISCHARGE INSTRUCTIONS
Thank you for choosing [facility].     Please closely monitor for further symptoms. Return to the Emergency Department if you develop any new or worsening signs or symptoms.    If you received any opiate pain medications or sedatives during your visit, please do not drive for at least 8 hours.     Labs, cultures or final xray interpretations may still need to be reviewed.  We will call you if your plan of care needs to be changed.    Please follow up with your primary care physician or clinic.

## 2022-02-17 NOTE — ED PROVIDER NOTES
Emergency Department Patient Sign-out       Brief HPI:  This is a 41 year old female signed out to me by Dr. Cruz .  See initial ED Provider note for details of the presentation.            Significant Events prior to my assuming care: 41-year-old woman with a history of undifferentiated connective tissue disease, and recent surgical procedure (status post myomectomy on 2/11/2022) who initially presented 4 days ago with nausea, vomiting, diarrhea, and elevated LFTs after her recent surgery.  Hepatitis serology negative during that visit, patient admitted for IV fluids and symptomatic treatment.  There was improvement in her transaminitis and liver ultrasound was negative.  The patient was improved she was ultimately discharged home, but now presented again earlier this morning with recurrent nausea, vomiting, diarrhea and now increased left lower quadrant pain.  Was seen, examined on the prior shift labs and CT were ordered and she is now signed out to me at shift change with those studies pending.      Exam:   Patient Vitals for the past 24 hrs:   BP Pulse Resp SpO2 Weight   02/17/22 0613 104/76 86 16 100 % 72.6 kg (160 lb)       EXAM:  HEENT: Normal.  Oropharynx clear and moist.  Neck: Supple, trachea midline, normal voice  Chest:  No respiratory distress, speaks in complete sentences, chest wall nontender, lungs clear in all fields  CV: Regular rate and rhythm, no murmur, normal pulse, no jugular venous distention  Abdomen: Nondistended, bowel sounds slightly diminished.  She has recent surgical incisions which appear to be healing appropriately with no sign of infection.  There is diffuse tenderness which is far away the greatest in the left lower quadrant with some guarding.  Extremities: No edema or tenderness        ED RESULTS:   Results for orders placed or performed during the hospital encounter of 02/17/22 (from the past 24 hour(s))   CBC with platelets differential     Status: None (In process)     Collection Time: 02/17/22  6:57 AM    Narrative    The following orders were created for panel order CBC with platelets differential.  Procedure                               Abnormality         Status                     ---------                               -----------         ------                     CBC with platelets and d...[[id number]]                      In process                   Please view results for these tests on the individual orders.       ED MEDICATIONS:   Medications   0.9% sodium chloride BOLUS (1,000 mLs Intravenous New Bag 2/17/22 0700)     Followed by   sodium chloride 0.9% infusion (has no administration in time range)   morphine (PF) injection 4 mg (4 mg Intravenous Given 2/17/22 0701)   ondansetron (ZOFRAN) injection 4 mg (4 mg Intravenous Given 2/17/22 0701)         Impression:    ICD-10-CM    1. Nausea vomiting and diarrhea  R11.2     R19.7        Plan:    Pending studies include labs, CT abdomen pelvis are pending at the time of signout.    Patient's labs show that her recent transaminitis appears to be continuing to improve, no significant metabolic abnormality.  Her C. difficile testing is negative.  Covid is negative.  CT scan does not show any acute pathology that would be likely to be causative of her symptoms today are to indicate life-threatening process.  Given that she has a child at home with a similar illness, and her work-up shows resolving transaminitis, normal electrolytes, and no concerning findings on CT, her symptoms seem most consistent with an infectious gastroenteritis which is likely self-limited.  Patient offered further observation in the ED or the observation unit but was feeling better, tolerated oral challenge, and was desiring to try managing at home.  Was given a prescription for Compazine in addition to the Zofran which she already has.  We discussed the indications for emergency department return and follow-up.  Stable for discharge.      Sagar Rodriguez,  Sagar Smith MD  02/17/22 0104

## 2022-02-23 ENCOUNTER — OFFICE VISIT (OUTPATIENT)
Dept: INTERNAL MEDICINE | Facility: CLINIC | Age: 42
End: 2022-02-23
Payer: COMMERCIAL

## 2022-02-23 VITALS
DIASTOLIC BLOOD PRESSURE: 73 MMHG | SYSTOLIC BLOOD PRESSURE: 112 MMHG | BODY MASS INDEX: 25.27 KG/M2 | HEIGHT: 67 IN | OXYGEN SATURATION: 99 % | HEART RATE: 71 BPM | WEIGHT: 161 LBS

## 2022-02-23 DIAGNOSIS — Z09 HOSPITAL DISCHARGE FOLLOW-UP: Primary | ICD-10-CM

## 2022-02-23 PROCEDURE — 99213 OFFICE O/P EST LOW 20 MIN: CPT | Performed by: NURSE PRACTITIONER

## 2022-02-24 NOTE — PROGRESS NOTES
Assessment & Plan     Hospital discharge follow-up  Overall recovering well from recent surgery and subsequent elevated transaminases. She does note some discomfort in LUQ, which may be musculoskeletal related to frequent vomiting--abd/pelvis CT scan on 2/17/22 showed no acute findings, post-surgical changes of myomectomy and scattered free peritoneal air.  Reviewed conservative management with heat/cold application, PO intake as tolerated, Tylenol/Ibuprofen prn. Red flags if pain acutely worsens or unable to be managed with OTC pain regimen, N/V, fever, or unable to pass gas or BM. She will continue to monitor. Follow-up with Dr. Mansfield in OB/Gyn. She agrees with the plan.  - Comprehensive metabolic panel; Future  - Hemoglobin; Future     Return if symptoms worsen or fail to improve.    HARVEY Ward [facility] INTERNAL MEDICINE BRENT Romero is a 41 year old who presents for the following health issues     HPI     Laparoscopic myomectomy on 2/11/22, initially doing well, however admitted to [facility] on 2/14/22 for acute elevation in LFTs found when she presented to the ED for nausea/vomiting & diarrhea. Etiology for levated transaminases was not clear, suspect related to preop/intraoperative medications (gentamicin, clindamycin given in preop; in OR: acetaminophen, bupivacaine, dexamethasone, Precedex, ephedrine, fentanyl, Dilaudid, ketorolac, lidocaine, Versed, Zofran, oxycodone, phenazopyridine, phenylephrine, propofol, rocuronium, scopolamine, vasopressin, sugammadex). Hepatology serology was unremarkable. While admitted she was treated with IV hydration; LFTs trended down, hepatic US reassuring. Symptoms improving by time of discharge. She returned to the ED 2 days later for N/V; she reports her family was sick with a GI illness with similar symptoms; abdominal CT did not show acute findings.  Has been able to start recovering.    Eating and drinking okay  "now. Bowel movements every couple days, not needing to strain. Does note some LUQ discomfort. Overall feeling good, just sore. No fevers. Using tylenol and ibuprofen for pain control. Initrially used oxycodone, but wanted to avoid this after hospital, pain management has been tolerable.  Has had difficulty scheduling a post-op visit with Gyn. Wonders about surgical site care/skin adhesive.  On period now (regular schedule), has been manageable, significant difference compared to last month. Feels better, not as drained as she had been.     Review of Systems   Constitutional, HEENT, cardiovascular, pulmonary, gi and gu systems are negative, except as otherwise noted.      Objective    /73 (BP Location: Right arm, Patient Position: Sitting, Cuff Size: Adult Regular)   Pulse 71   Ht 1.702 m (5' 7\")   Wt 73 kg (161 lb)   LMP 02/01/2022 (Approximate)   SpO2 99%   BMI 25.22 kg/m    Body mass index is 25.22 kg/m .  Physical Exam   GENERAL: healthy, alert and no distress  EYES: Eyes grossly normal to inspection, and conjunctivae and sclerae normal  HENT: nose and mouth without ulcers or lesions  NECK: no adenopathy, no asymmetry, masses, or scars and thyroid normal to palpation  RESP: lungs clear to auscultation - no rales, rhonchi or wheezes  CV: regular rate and rhythm, normal S1 S2, no S3 or S4, no murmur, click or rub, no peripheral edema and peripheral pulses strong  ABDOMEN: soft, non-distended, mild tenderness over LUQ, no hepatosplenomegaly, no masses and bowel sounds normal  MS: no gross musculoskeletal defects noted, no edema  SKIN: no suspicious lesions or rashes, no jaundice, 4 laparoscopic sites on abdomen are well-approximated, skin adhesive intact, no erythema or drainage  PSYCH: mentation appears normal, affect normal/bright                "

## 2022-02-24 NOTE — NURSING NOTE
Heather Guillory is a 41 year old female patient that presents today in clinic for the following:    Chief Complaint   Patient presents with     Hospital F/U     The patient's allergies and medications were reviewed as noted. A set of vitals were recorded as noted without incident. The patient does not have any other questions for the provider.    Zafar Herrera, EMT at 6:27 PM on 2/23/2022

## 2022-02-25 ENCOUNTER — LAB (OUTPATIENT)
Dept: LAB | Facility: CLINIC | Age: 42
End: 2022-02-25
Payer: COMMERCIAL

## 2022-02-25 DIAGNOSIS — Z09 HOSPITAL DISCHARGE FOLLOW-UP: ICD-10-CM

## 2022-02-25 DIAGNOSIS — R11.2 NAUSEA AND VOMITING, INTRACTABILITY OF VOMITING NOT SPECIFIED, UNSPECIFIED VOMITING TYPE: ICD-10-CM

## 2022-02-25 DIAGNOSIS — N92.0 MENORRHAGIA WITH REGULAR CYCLE: ICD-10-CM

## 2022-02-25 LAB
ALBUMIN SERPL-MCNC: 4.5 G/DL (ref 3.4–5)
ALP SERPL-CCNC: 103 U/L (ref 40–150)
ALT SERPL W P-5'-P-CCNC: 48 U/L (ref 0–50)
ANION GAP SERPL CALCULATED.3IONS-SCNC: 5 MMOL/L (ref 3–14)
AST SERPL W P-5'-P-CCNC: 21 U/L (ref 0–45)
BASOPHILS # BLD AUTO: 0.1 10E3/UL (ref 0–0.2)
BASOPHILS NFR BLD AUTO: 1 %
BILIRUB SERPL-MCNC: 0.4 MG/DL (ref 0.2–1.3)
BUN SERPL-MCNC: 6 MG/DL (ref 7–30)
CALCIUM SERPL-MCNC: 9.5 MG/DL (ref 8.5–10.1)
CHLORIDE BLD-SCNC: 108 MMOL/L (ref 94–109)
CO2 SERPL-SCNC: 27 MMOL/L (ref 20–32)
CREAT SERPL-MCNC: 0.71 MG/DL (ref 0.52–1.04)
EOSINOPHIL # BLD AUTO: 0.2 10E3/UL (ref 0–0.7)
EOSINOPHIL NFR BLD AUTO: 2 %
ERYTHROCYTE [DISTWIDTH] IN BLOOD BY AUTOMATED COUNT: 13.2 % (ref 10–15)
GFR SERPL CREATININE-BSD FRML MDRD: >90 ML/MIN/1.73M2
GLUCOSE BLD-MCNC: 99 MG/DL (ref 70–99)
HCT VFR BLD AUTO: 39.5 % (ref 35–47)
HGB BLD-MCNC: 12.4 G/DL (ref 11.7–15.7)
IMM GRANULOCYTES # BLD: 0 10E3/UL
IMM GRANULOCYTES NFR BLD: 0 %
LYMPHOCYTES # BLD AUTO: 2.3 10E3/UL (ref 0.8–5.3)
LYMPHOCYTES NFR BLD AUTO: 35 %
MCH RBC QN AUTO: 26.1 PG (ref 26.5–33)
MCHC RBC AUTO-ENTMCNC: 31.4 G/DL (ref 31.5–36.5)
MCV RBC AUTO: 83 FL (ref 78–100)
MONOCYTES # BLD AUTO: 0.3 10E3/UL (ref 0–1.3)
MONOCYTES NFR BLD AUTO: 4 %
NEUTROPHILS # BLD AUTO: 3.9 10E3/UL (ref 1.6–8.3)
NEUTROPHILS NFR BLD AUTO: 58 %
NRBC # BLD AUTO: 0 10E3/UL
NRBC BLD AUTO-RTO: 0 /100
PLATELET # BLD AUTO: 345 10E3/UL (ref 150–450)
POTASSIUM BLD-SCNC: 4 MMOL/L (ref 3.4–5.3)
PROT SERPL-MCNC: 8.1 G/DL (ref 6.8–8.8)
RBC # BLD AUTO: 4.75 10E6/UL (ref 3.8–5.2)
SODIUM SERPL-SCNC: 140 MMOL/L (ref 133–144)
WBC # BLD AUTO: 6.8 10E3/UL (ref 4–11)

## 2022-02-25 PROCEDURE — 36415 COLL VENOUS BLD VENIPUNCTURE: CPT | Performed by: PATHOLOGY

## 2022-02-25 PROCEDURE — 85025 COMPLETE CBC W/AUTO DIFF WBC: CPT | Performed by: PATHOLOGY

## 2022-02-25 PROCEDURE — 80053 COMPREHEN METABOLIC PANEL: CPT | Performed by: PATHOLOGY

## 2022-03-01 DIAGNOSIS — F33.0 MILD RECURRENT MAJOR DEPRESSION (H): ICD-10-CM

## 2022-03-01 DIAGNOSIS — F41.9 ANXIETY: ICD-10-CM

## 2022-03-02 ENCOUNTER — TELEPHONE (OUTPATIENT)
Dept: OBGYN | Facility: CLINIC | Age: 42
End: 2022-03-02
Payer: COMMERCIAL

## 2022-03-02 NOTE — TELEPHONE ENCOUNTER
Message received from patient regarding questions about laparoscopic incision sites.    Called and spoke with patient. She had laparoscopic myomectomy on 2/11. States lap sites were closed with Dermabond and are healed. Is wondering if she can swim next week on vacation.     Advised that as long as incision sites are healed and she has no open areas or s/s of infection that swimming is okay. Pt denies s/s of infection and states incision sites appear to be well healed. Encouraged her to reach out with any other questions or concerns. Pt verbalized understanding and agreement.

## 2022-03-03 RX ORDER — BUPROPION HYDROCHLORIDE 150 MG/1
150 TABLET ORAL EVERY MORNING
Qty: 90 TABLET | Refills: 1 | Status: SHIPPED | OUTPATIENT
Start: 2022-03-03 | End: 2022-06-01

## 2022-03-03 NOTE — TELEPHONE ENCOUNTER
BUPROPION HCL  MG TABLET  Last Written Prescription Date:   1/7/2022  Last Fill Quantity: 60,   # refills: 1  Last Office Visit :  2/23/2022  Future Office visit:  None    Routing refill request to provider for review/approval because:  Only a 4 months supply sent to pharm for Pt care.  Would Provider like 90 days with refills sent to pharm for Pt care.     Please review       Renetta Corado RN  Central Triage Red Flags/Med Refills

## 2022-03-08 ENCOUNTER — TELEPHONE (OUTPATIENT)
Dept: OBGYN | Facility: CLINIC | Age: 42
End: 2022-03-08
Payer: COMMERCIAL

## 2022-03-08 NOTE — TELEPHONE ENCOUNTER
"Heather states that she has a small bulge to the right of her umbilicus.  This area is soft, about 1\" across, does not appear red/discolored and is not hot to the touch.  It is slightly tender if she presses on it, but otherwise not bothersome.     She will monitor for now.  To call back if increased pain, redness or warmth at site.  "

## 2022-03-08 NOTE — TELEPHONE ENCOUNTER
----- Message from Arleth Monsalve RN sent at 3/7/2022  2:12 PM CST -----  Regarding: no details

## 2022-03-18 ENCOUNTER — TELEPHONE (OUTPATIENT)
Dept: OBGYN | Facility: CLINIC | Age: 42
End: 2022-03-18
Payer: COMMERCIAL

## 2022-03-18 NOTE — TELEPHONE ENCOUNTER
----- Message from Skyla Forman RN sent at 3/18/2022  8:56 AM CDT -----  Regarding: requesting call - no details

## 2022-03-28 ENCOUNTER — OFFICE VISIT (OUTPATIENT)
Dept: OBGYN | Facility: CLINIC | Age: 42
End: 2022-03-28
Attending: OBSTETRICS & GYNECOLOGY
Payer: COMMERCIAL

## 2022-03-28 VITALS
SYSTOLIC BLOOD PRESSURE: 106 MMHG | WEIGHT: 162 LBS | DIASTOLIC BLOOD PRESSURE: 73 MMHG | HEART RATE: 75 BPM | BODY MASS INDEX: 25.37 KG/M2

## 2022-03-28 DIAGNOSIS — Z98.890 STATUS POST MYOMECTOMY: Primary | ICD-10-CM

## 2022-03-28 PROCEDURE — G0463 HOSPITAL OUTPT CLINIC VISIT: HCPCS

## 2022-03-28 PROCEDURE — 99024 POSTOP FOLLOW-UP VISIT: CPT | Performed by: OBSTETRICS & GYNECOLOGY

## 2022-03-28 ASSESSMENT — PAIN SCALES - GENERAL: PAINLEVEL: NO PAIN (0)

## 2022-03-28 NOTE — LETTER
3/28/2022       RE: Heather Guillory  [address]  [place] [address]     Dear Colleague,    Thank you for referring your patient, Heather Guilolry, to the [facility] [place] [facility]. Please see a copy of my visit note below.    Chief Complaint   Patient presents with     Surgical Followup     Myomectomy 2022   Tigist Lloyd LPN    Gyn Clinic Postoperative Visit Note  3/28/2022    S: Heather Guillory is a 41 year old  here for post-operative visit following myomectomy on 22. She reports feeling well. Menstrual bleeding has significantly decreased, denies any pain, N/V, headache associated with periods. Menses are regular, every 25-28 days, lasting 5 days and of mild to moderate flow in the middle 2-3 days.    O:   /73   Pulse 75   Wt 73.5 kg (162 lb)   LMP 2022   Breastfeeding No   BMI 25.37 kg/m    Exam:   CV: RRR, no m/r/g, nml s1 s2  Resp: Bilat lungs CTA  Abd: Healing omega periumbilical incision, healing laparoscopic wounds on abdomen. No erythema, swelling, color changes, tenderness.     Labs:   Hemoglobin   Date Value Ref Range Status   2022 12.4 11.7 - 15.7 g/dL Final   2021 13.3 11.7 - 15.7 g/dL Final   ]    Pathology:      Final Diagnosis   Uterus, myomectomy:  -Leiomyoma(s)   -Negative for malignancy   Electronically signed by Sussy Leiva MD on 2/15/2022 at  5:11 PM   Clinical Information         A: 41 year old female POD#45 s/p robotic myomectomy Doing well, no concerns. Pt states she has had two normal periods since with much less bleeding.    P:   Pt is recovering well from procedure. No concerns for infection. Please follow up if you notice any color changes, new onset of swelling or tenderness, fever, nausea, or vomiting.     Sagar Aguirre, MS3    The above patient was seen and evaluated with the medical student who acted as my scribe for the above  note. Agree with note, changes made as appropriate.    Cate Mansfield MD

## 2022-03-28 NOTE — PROGRESS NOTES
Gyn Clinic Postoperative Visit Note  3/28/2022    S: Heather Guillory is a 41 year old  here for post-operative visit following myomectomy on 22. She reports feeling well. Menstrual bleeding has significantly decreased, denies any pain, N/V, headache associated with periods. Menses are regular, every 25-28 days, lasting 5 days and of mild to moderate flow in the middle 2-3 days.    O:   /73   Pulse 75   Wt 73.5 kg (162 lb)   LMP 2022   Breastfeeding No   BMI 25.37 kg/m    Exam:   CV: RRR, no m/r/g, nml s1 s2  Resp: Bilat lungs CTA  Abd: Healing omega periumbilical incision, healing laparoscopic wounds on abdomen. No erythema, swelling, color changes, tenderness.     Labs:   Hemoglobin   Date Value Ref Range Status   2022 12.4 11.7 - 15.7 g/dL Final   2021 13.3 11.7 - 15.7 g/dL Final   ]    Pathology:      Final Diagnosis   Uterus, myomectomy:  -Leiomyoma(s)   -Negative for malignancy   Electronically signed by Sussy Leiva MD on 2/15/2022 at  5:11 PM   Clinical Information         A: 41 year old female POD#45 s/p robotic myomectomy Doing well, no concerns. Pt states she has had two normal periods since with much less bleeding.    P:   Pt is recovering well from procedure. No concerns for infection. Please follow up if you notice any color changes, new onset of swelling or tenderness, fever, nausea, or vomiting.     Sagar Aguirre, MS3    The above patient was seen and evaluated with the medical student who acted as my scribe for the above note. Agree with note, changes made as appropriate.    Cate Mansfield MD

## 2022-03-28 NOTE — PROGRESS NOTES
Chief Complaint   Patient presents with     Surgical Followup     Myomectomy 2/11/2022   Tigist Lloyd LPN

## 2022-04-07 ENCOUNTER — MYC MEDICAL ADVICE (OUTPATIENT)
Dept: INTERNAL MEDICINE | Facility: CLINIC | Age: 42
End: 2022-04-07
Payer: COMMERCIAL

## 2022-05-05 ENCOUNTER — TELEPHONE (OUTPATIENT)
Dept: BEHAVIORAL HEALTH | Facility: CLINIC | Age: 42
End: 2022-05-05

## 2022-05-06 ENCOUNTER — PRE VISIT (OUTPATIENT)
Dept: UROLOGY | Facility: CLINIC | Age: 42
End: 2022-05-06
Payer: COMMERCIAL

## 2022-05-06 NOTE — TELEPHONE ENCOUNTER
Reason for visit: symptom check             Relevant information: PFD     Records/imaging/labs/orders: all records available     Pt called: no need for a call     At Rooming: jad Herrera CMA  5/6/2022  8:21 AM

## 2022-05-09 ENCOUNTER — VIRTUAL VISIT (OUTPATIENT)
Dept: PSYCHOLOGY | Facility: CLINIC | Age: 42
End: 2022-05-09
Attending: NURSE PRACTITIONER
Payer: COMMERCIAL

## 2022-05-09 DIAGNOSIS — F43.22 ADJUSTMENT DISORDER WITH ANXIETY: Primary | ICD-10-CM

## 2022-05-09 PROCEDURE — 90791 PSYCH DIAGNOSTIC EVALUATION: CPT | Mod: 95

## 2022-05-09 ASSESSMENT — COLUMBIA-SUICIDE SEVERITY RATING SCALE - C-SSRS
5. HAVE YOU STARTED TO WORK OUT OR WORKED OUT THE DETAILS OF HOW TO KILL YOURSELF? DO YOU INTEND TO CARRY OUT THIS PLAN?: NO
2. HAVE YOU ACTUALLY HAD ANY THOUGHTS OF KILLING YOURSELF IN THE PAST MONTH?: NO
1. IN THE PAST MONTH, HAVE YOU WISHED YOU WERE DEAD OR WISHED YOU COULD GO TO SLEEP AND NOT WAKE UP?: NO
6. HAVE YOU EVER DONE ANYTHING, STARTED TO DO ANYTHING, OR PREPARED TO DO ANYTHING TO END YOUR LIFE?: NO
3. HAVE YOU BEEN THINKING ABOUT HOW YOU MIGHT KILL YOURSELF?: NO
4. HAVE YOU HAD THESE THOUGHTS AND HAD SOME INTENTION OF ACTING ON THEM?: NO
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
## 2022-05-26 ENCOUNTER — ANCILLARY PROCEDURE (OUTPATIENT)
Dept: MAMMOGRAPHY | Facility: CLINIC | Age: 42
End: 2022-05-26
Attending: NURSE PRACTITIONER
Payer: COMMERCIAL

## 2022-05-26 DIAGNOSIS — Z12.31 VISIT FOR SCREENING MAMMOGRAM: ICD-10-CM

## 2022-05-26 PROCEDURE — 77067 SCR MAMMO BI INCL CAD: CPT | Mod: GC | Performed by: STUDENT IN AN ORGANIZED HEALTH CARE EDUCATION/TRAINING PROGRAM

## 2022-05-26 PROCEDURE — 77063 BREAST TOMOSYNTHESIS BI: CPT | Mod: GC | Performed by: STUDENT IN AN ORGANIZED HEALTH CARE EDUCATION/TRAINING PROGRAM

## 2022-05-27 ENCOUNTER — VIRTUAL VISIT (OUTPATIENT)
Dept: PSYCHOLOGY | Facility: CLINIC | Age: 42
End: 2022-05-27
Payer: COMMERCIAL

## 2022-05-27 DIAGNOSIS — F43.22 ADJUSTMENT DISORDER WITH ANXIETY: Primary | ICD-10-CM

## 2022-05-27 PROCEDURE — 90834 PSYTX W PT 45 MINUTES: CPT | Mod: 95

## 2022-06-01 ENCOUNTER — OFFICE VISIT (OUTPATIENT)
Dept: INTERNAL MEDICINE | Facility: CLINIC | Age: 42
End: 2022-06-01
Payer: COMMERCIAL

## 2022-06-01 VITALS
SYSTOLIC BLOOD PRESSURE: 111 MMHG | HEART RATE: 64 BPM | TEMPERATURE: 98 F | OXYGEN SATURATION: 100 % | DIASTOLIC BLOOD PRESSURE: 76 MMHG | WEIGHT: 165 LBS | HEIGHT: 67 IN | RESPIRATION RATE: 12 BRPM | BODY MASS INDEX: 25.9 KG/M2

## 2022-06-01 DIAGNOSIS — Z00.00 ROUTINE HISTORY AND PHYSICAL EXAMINATION OF ADULT: Primary | ICD-10-CM

## 2022-06-01 DIAGNOSIS — M26.609 TMJ (TEMPOROMANDIBULAR JOINT SYNDROME): ICD-10-CM

## 2022-06-01 DIAGNOSIS — R68.82 LOW LIBIDO: ICD-10-CM

## 2022-06-01 PROCEDURE — 99396 PREV VISIT EST AGE 40-64: CPT | Performed by: NURSE PRACTITIONER

## 2022-06-01 ASSESSMENT — ANXIETY QUESTIONNAIRES
3. WORRYING TOO MUCH ABOUT DIFFERENT THINGS: NOT AT ALL
1. FEELING NERVOUS, ANXIOUS, OR ON EDGE: NOT AT ALL
6. BECOMING EASILY ANNOYED OR IRRITABLE: NOT AT ALL
5. BEING SO RESTLESS THAT IT IS HARD TO SIT STILL: NOT AT ALL
2. NOT BEING ABLE TO STOP OR CONTROL WORRYING: NOT AT ALL
7. FEELING AFRAID AS IF SOMETHING AWFUL MIGHT HAPPEN: NOT AT ALL
GAD7 TOTAL SCORE: 0
GAD7 TOTAL SCORE: 0

## 2022-06-01 ASSESSMENT — PATIENT HEALTH QUESTIONNAIRE - PHQ9
5. POOR APPETITE OR OVEREATING: NOT AT ALL
SUM OF ALL RESPONSES TO PHQ QUESTIONS 1-9: 0

## 2022-06-01 ASSESSMENT — PAIN SCALES - GENERAL: PAINLEVEL: NO PAIN (0)

## 2022-06-01 NOTE — PROGRESS NOTES
SUBJECTIVE:  Heather Guillory is a 42 year old female with pmh of   Patient Active Problem List   Diagnosis     JAVIER (generalized anxiety disorder)     Undifferentiated connective tissue disease (H)     Rosacea     Varicose veins of left lower extremity with pain     Encounter for sterilization     Pelvic floor dysfunction     Urinary urgency     Urinary frequency     Abnormal LFTs     who comes in for preventive care examination today.   She has the following concerns:    Low libido--suspected related to medication, stopped Zoloft and Wellbutrin. Hasn't improved her libido yet, off for 3.5-4 weeks. Feels less numb.  Feels like mood is doing okay, wonders about natural routes. Working with a therapist.  At the end of the day, wants to relax, doesn't feel like having sex. Denies pain with intercourse. Feels guilt about low libido and if  feels rejected. Doesn't want this to impact their relationship.  Wants to incorporate more healthy habits in general for self-care/wellness.  Reports jaw-clenching/tightness at night, causing TMJ pain, has tried mouthguard in the past with no improvement.    Menses:  No LMP recorded.  Menstrual cycles are regular, medium-light low, lasting 4 days. Not feeling as tired with these as previously.    PAP HX:   Last   Lab Results   Component Value Date    PAP NIL 07/16/2020     History of abnormal --in early 20s, had LEEP, no abnormal paps since then. Neg HPV 7/2020.    Breast:  Patient performs self breast exams  Yes , mammogram last week, follow-up scheduled.  Breast concerns No, wonders about L nipple (diagnostic mammo and US scheduled on 6/6/22).   US Breast Left    Result Date: 7/24/2020  Narrative: Examinations: MA DIAGNOSTIC BILATERAL W/ EDUARDA, US BREAST LEFT LIMITED 1-3 QUADRANTS, 7/23/2020 1:32 PM and CAD History/Family History: Patient tenderness in the left periareolar region for 6 to 8 weeks. Physician palpated left axillary lump. Family history for breast cancer in  great-grandmother, diagnosed at age 80. The patient is of Ashkenazi Yazidi descent. Nipple tenderness; Axillary lump, left Breast Density: Scattered fibroglandular densities Technique: Standard mammographic views were performed with tomosynthesis and 2D reconstruction. Comparisons: None, baseline Findings:   Mammogram: No suspicious mammographic findings in either breast. Ultrasound: Targeted, real-time ultrasound evaluation was performed by the technologist and radiologist. In the area of concern in the left periareolar region, there are dilated ducts without intraductal mass. Incidentally visualized are cluster of microcysts in the left breast at 2:00. No suspicious sonographic findings. In the left axilla in the area of concern, there is a skin lesion measuring 5 x 5 mm. A stalk is visualized during real-time imaging.     Sexual Hx:  Sexually active  yes, single partner, contraception - not needed (salpingectomy)  Partner(s) are  male   IS NOT interested in STD testing    Pregnancy:   G  3 and P  3  9-y.o with some behavior issues. Has been going to therapy with him, has been helpful for all of them.    Medications and allergies reviewed by me today.     Family History   Problem Relation Age of Onset     Elijah Disease Sister      Arrhythmia Brother         details unknown; procedure in his 20s     Anxiety Disorder Brother      Alzheimer Disease Maternal Grandmother      Cervical Cancer Maternal Grandmother      Colon Cancer Maternal Grandfather      Breast Cancer Paternal Grandmother      Hypertension Paternal Grandmother      Hypertension Paternal Grandfather      Lung Cancer Paternal Grandfather         smoker     Diabetes Type 2  Paternal Grandfather      Myocardial Infarction Paternal Grandfather      Myocardial Infarction Paternal Aunt 45     Cerebrovascular Disease No family hx of      Coronary Artery Disease Early Onset No family hx of      Ovarian Cancer No family hx of        Social History     Tobacco  "Use     Smoking status: Never Smoker     Smokeless tobacco: Never Used   Substance Use Topics     Alcohol use: No     Alcohol/week: 0.0 standard drinks     Drug use: No       Immunization History   Administered Date(s) Administered     COVID-19,NIDIA,Pfizer (12+ Yrs) 03/15/2021, 04/05/2021, 12/15/2021     DTaP, Unspecified 06/02/2006     FLU 6-35 months 10/16/2008, 09/11/2009, 11/11/2010, 10/01/2011, 08/20/2012     Flu, Unspecified 11/30/2001, 10/13/2018     HPV Quadrivalent 10/16/2008, 12/18/2008, 04/20/2009     Influenza (H1N1) 01/09/2010     Influenza (IIV3) PF 10/03/2014     Influenza Vaccine IM > 6 months Valent IIV4 (Alfuria,Fluzone) 10/09/2015, 02/13/2017, 10/13/2017, 10/12/2019, 11/27/2020     Meningococcal (Menomune ) 08/30/2000     TDAP Vaccine (Boostrix) 12/15/2014, 03/11/2016, 01/15/2018         Review Of Systems  Constitutional: no fevers, chills, night sweats or unintentional weight change   Eyes: no vision change, diplopia or red eyes   Ears, Nose, Mouth, Throat: no tinnitus or hearing change, no epistaxis or nasal discharge, no oral lesions, throat clear   Cardiovascular: no chest pain, palpitations, or pain with walking, no orthopnea or PND   Respiratory: no dyspnea, cough, shortness of breath or wheezing   GI: no nausea, vomiting, diarrhea or constipation, no abdominal pain   : no change in urine, no dysuria or hematuria   Musculoskeletal: no joint or muscle pain or swelling   Integumentary: no concerning lesions or moles   Neuro: no loss of strength or sensation, no numbness or tingling, no tremor, no dizziness, no headache   Allergy: no environmental allergies   Psych: see HPI      OBJECTIVE:    /76   Pulse 64   Temp 98  F (36.7  C) (Oral)   Resp 12   Ht 1.702 m (5' 7\")   Wt 74.8 kg (165 lb)   SpO2 100%   BMI 25.84 kg/m     Wt Readings from Last 1 Encounters:   06/01/22 74.8 kg (165 lb)       Constitutional: no distress, comfortable, pleasant   Eyes: anicteric, normal extra-ocular " movements   Ears, Nose and Throat: tympanic membranes clear, nose clear and free of lesions, throat clear, neck supple with full range of motion, no thyromegaly.   Cardiovascular: regular rate and rhythm, normal S1 and S2, no murmurs, rubs or gallops, peripheral pulses full and symmetric   Respiratory: clear to auscultation, no wheezes or crackles, normal breath sounds   Gastrointestinal: positive bowel sounds, nontender, no hepatosplenomegaly, no masses   Genitourinary: normal external genitalia,  no enlargement of the [place] or [place] glands, urethra normal, perineum normal and free of lesions, no masses or cervical motion tenderness, adnexa without enlargement or lesions  Musculoskeletal: full range of motion, no edema   Skin: no concerning lesions, no jaundice   Breast: no lumps, no nipple discharge  Neurological: cranial nerves intact, normal strength and sensation, reflexes at patella and biceps normal, normal gait, no tremor   Psychological: appropriate mood   Lymphatic: no cervical lymphadenopathy    ASSESSMENT/PLAN:  Pt is a 42 year old female here for preventive examination    1. Routine history and physical examination of adult  Encouraged continue working on healthy lifestyle with regular physical activity, nutritious diet, stress management and safety. Continue working with therapist.     2. Low libido  Stopped her selective serotonin reuptake inhibitor, has not experienced any notable improvement in libido. Referral provided for relationship & sex therapy.   - Adult Mental Health  Referral; Future    3. TMJ (temporomandibular joint syndrome)  Jaw clenching contributing to tightness in the neck and associated headaches; would benefit from acupuncture. Referral provided.   - Acupuncture Referral; Future    FOLLOW UP: If worsening mood, otherwise 6-12 months, sooner prn for any changes or concerns    GYN TESTING:  Breast examination performed.Yes   Pelvic examination performed Yes    Pap   No  If normal PAP results pap every 5 years.--Due 07/2025  STD testing No     PREVENTATIVE TESTING:  Breast cancer screening   Scheduled  Colorectal screening not indicated    Osteoporosis screening not indicated.    Immunizations reviewed and updated in EPIC    Labs ordered None  Counseling was provided in the following areas:  regular exercise  healthy diet/nutrition  contraception  self breast exam     HARVEY Ward CNP

## 2022-06-01 NOTE — NURSING NOTE
Chief Complaint   Patient presents with     Physical     Patient comes in for a physical exam.         Reji Klein MA on 6/1/2022 at 6:17 PM

## 2022-06-06 ENCOUNTER — ANCILLARY PROCEDURE (OUTPATIENT)
Dept: MAMMOGRAPHY | Facility: CLINIC | Age: 42
End: 2022-06-06
Attending: NURSE PRACTITIONER
Payer: COMMERCIAL

## 2022-06-06 DIAGNOSIS — R92.8 ABNORMAL MAMMOGRAM OF LEFT BREAST: ICD-10-CM

## 2022-06-06 PROCEDURE — 77065 DX MAMMO INCL CAD UNI: CPT | Mod: LT | Performed by: RADIOLOGY

## 2022-06-06 PROCEDURE — G0279 TOMOSYNTHESIS, MAMMO: HCPCS | Performed by: RADIOLOGY

## 2022-06-21 ENCOUNTER — VIRTUAL VISIT (OUTPATIENT)
Dept: PSYCHOLOGY | Facility: CLINIC | Age: 42
End: 2022-06-21
Payer: COMMERCIAL

## 2022-06-21 DIAGNOSIS — F41.8 OTHER SPECIFIED ANXIETY DISORDERS: Primary | ICD-10-CM

## 2022-06-21 DIAGNOSIS — F50.89 OTHER SPECIFIED EATING DISORDER: ICD-10-CM

## 2022-06-21 DIAGNOSIS — F52.22 FEMALE SEXUAL INTEREST/AROUSAL DISORDER, ACQUIRED, GENERALIZED, MODERATE: ICD-10-CM

## 2022-06-21 PROCEDURE — 90791 PSYCH DIAGNOSTIC EVALUATION: CPT | Mod: 95 | Performed by: MARRIAGE & FAMILY THERAPIST

## 2022-06-21 PROCEDURE — 99207 PR NO BILLABLE SERVICE THIS VISIT: CPT | Performed by: MARRIAGE & FAMILY THERAPIST

## 2022-06-21 NOTE — PROGRESS NOTES
"[facility]  Provider Name:  Elias Conlkin [facility], [facility]         PATIENT'S NAME: Heather Guillory  PREFERRED NAME: Heather  PRONOUNS: she/her  MRN: [id number]  : 1980 , Age: 42 year old  DATE OF SERVICE: 22  START TIME: 1400  END TIME: 1458  PREFERRED PHONE: [phone] (home)    May we leave a program related message: Yes  SERVICE MODALITY:  Video Visit:      Provider verified identity through the following two step process.  Patient provided:  Patient     Telemedicine Visit: The patient's condition can be safely assessed and treated via synchronous audio and visual telemedicine encounter.      Reason for Telemedicine Visit: Services only offered telehealth    Originating Site (Patient Location): Patient's home    Distant Site (Provider Location): Provider Remote Setting- Home Office    Consent:  The patient/guardian has verbally consented to: the potential risks and benefits of telemedicine (video visit) versus in person care; bill my insurance or make self-payment for services provided; and responsibility for payment of non-covered services.     Patient would like the video invitation sent by:  My Chart    Mode of Communication:  Video Conference via [place] [place]    As the provider I attest to compliance with applicable laws and regulations related to telemedicine.    UNIVERSAL ADULT Mental Health DIAGNOSTIC ASSESSMENT    Reviewed confidentiality, informed consent, and relevant [facility] policies.    Identifying Information:  Patient is a 42 year old year old,   .  The pronoun use throughout this assessment reflects the patient's chosen pronoun.  Patient was referred for an assessment by primary care provider .  Patient attended the session alone.    Chief Complaint:   The reason for seeking services at this time is: \" we had 3 kids in 4 years and getting pregnant was a struggle, my  would say that we have never had a healthy sex life that has been less of a " "task and is something that is more enjoyable, its starting to affect us emotionally \"   The problem(s) began years ago,  9.5 years, about 3/4 of that time have had the problem, started about time was pregnant with eldest. Patient has not attempted to resolve these concerns in the past.    Social/Family History:  Patient reported they grew up in [place], [place].  They were raised by biological parents.  Parents stayed ..   Patient reported that their childhood was supportive, normal, happy.  Patient described their current relationships with family of origin as close with parents, is close with siblings as well, is the middle of 3 kids.      The patient describes their cultural background as Advent, mother converted, Scientology on moms side, interfaith household, family values were bigger than Buddhism values, .  Cultural influences and impact on patient's life structure, values, norms, and healthcare: na.  Contextual influences on patient's health include: Individual Factors . and Health- Seeking Factors ..    These factors will be addressed in the Preliminary Treatment plan.  Patient identified their preferred language to be English. Patient reported they do not  need the assistance of an  or other support involved in therapy.     Patient reported had no significant delays in developmental tasks.   Patient's highest education level was college graduate. Patient identified the following learning problems: none reported.  Modifications will not be used to assist communication in therapy. .    Patient reports they are  able to understand written materials.    Patient reported the following relationship history  to  Chi since 2013, together for about a year before marriage, in an on and off relationship for about 11 years before -it started when a teenager, was unhealthy emotionally-sex relationship was prioritized because partner was unfaithful-had mentality that if I " had sex with him he wouldn't go elsewhere; I feel like my sex drive was higher.  Patient's current relationship status is  for 9.5 years.   Patient identified their sexual orientation as heterosexual.  Patient reported having three child(sarah). Patient identified partner, mother and siblings as part of their support system.  Patient identified the quality of these relationships as stable and meaningful.     Patient's current living/housing situation involves staying in own home/apartment.  They live with  and 3 kids and they report that housing is stable.     Patient is currently SAHM.  Patient reports their finances are obtained through spouse.  Patient does not identify finances as a current stressor.      Patient reported that they have not been involved with the legal system.   Patient denies being on probation / parole / under the jurisdiction of the court.      Current Significant Relationship History: pt reports that she has been with  Chi since November 2011, got  in feb 2013, met on [email or web address]; dated for about a year, got engaged feb 6, got  11 days after engagement; got  so that grandma could be present, had a small wedding, had a wonderful time.  Had a tough time getting pregnant, 1st and 3rd were IVF, 2nd was a surprise, was nervous with pregnancy-so any physical activity like sex was avoided, 6 months post partem and got pregnant with 2nd, was emotionally driven and worried, felt like she had no desire for sex, felt less attractive, more introverted, has been conscious of how little they have sex, feels like has emotional obligation, struggles to initiate, feels bad about it, then has sex with , never passion driven-had permanent yeast infection during 3rd pregnancy, every time had sex after that got a UTI-was seeing urologist-it was treatment resistant yeast infection, finally got that cleared up since last fall    Relationship and Sexual Therapy (REST)  Program-Specific Information   1. Sexual behaviors/Functioning    Sexual Frequency: currently having sex a few times every few months, average 1x a month if not less than that-I am initiating because  feels like he does not want to be turned down, does not want to put pressure on me, reports masturbation is infrequent, uses it as a form of relaxation rather than pleasure, reports it feels whimsical     Sexual Desire/Interest/Arousal:  Patient denies concerns related to sexual interest, she reports she has so many things going on she is tired, but she is interested in having sex. This includes partnered activities, fantasies, masturbation. Patient denies concerns related to sexual arousal/lubrication. This includes partnered activities, fantasies, masturbation.    Erections:   NA          Orgasm:   Patient reports they reach orgasm 100% of time in partnered activities, and 100% of time in solo activities. Their preferred means of reaching orgasm is through physical touch-clitoral stimulation. Typically, they reach orgasm in about 5 minutes.      Genital Pain:   NA    Sexual aversion/avoidance:  Patient reports feeling averse to NA.    Patient denies gender identity concerns.    2. Relationship History     First sexual experience with partner: was [id number]-he was 18    Unpleasant or traumatic sexual experiences: denies-reports engaging in sex because it feels like obligation-reports feeling tense a lot when having sex-having a get it over with mentality     Same sex experiences and fantasies: denies    3. Compulsive Sexuality:  Patient denies any compulsive sexual concerns.  Patient's Strengths and Limitations:  Patient identified the following strengths or resources that will help them succeed in treatment: insight, intelligence, motivation, strong social skills and caring, empathetic. Things that may interfere with the patient's success in treatment include: none identified.     Personal and  Family Medical History:  Patient does report a family history of mental health concerns.  Patient reports family history includes Alzheimer Disease in her maternal grandmother; Anxiety Disorder in her brother; Arrhythmia in her brother; Breast Cancer in her paternal grandmother; Cervical Cancer in her maternal grandmother; Colon Cancer in her maternal grandfather; Diabetes Type 2  in her paternal grandfather; Hypertension in her paternal grandfather and paternal grandmother; Lung Cancer in her paternal grandfather; Myocardial Infarction in her paternal grandfather; Myocardial Infarction (age of onset: 45) in her paternal aunt; Elijah Disease in her sister..     Patient does report Mental Health Diagnosis and/or Treatment.  Patient Patient reported the following previous diagnoses which include(s): an Anxiety Disorder and an Eating Disorder.  Patient reported symptoms began whole life, first diagnosed in 6th grade.   Patient has received mental health services in the past: therapy with medication, psychotherapy, eating disorder program.  Psychiatric Hospitalizations: None.  Patient denies a history of civil commitment.  Patient is not receiving other mental health services.  These include none.         Patient has had a physical exam to rule out medical causes for current symptoms.  Date of last physical exam was within the past year. Client was encouraged to follow up with PCP if symptoms were to develop. The patient has a [facility] Primary Care Provider, who is named Cyndi Zarate..  Patient reports no current medical and/or dental concerns.  Patient denies any issues with pain..   There are not significant appetite / nutritional concerns / weight changes.   Patient does not report a history of head injury / trauma / cognitive impairment.      Patient reports current meds as:   No outpatient medications have been marked as taking for the 6/21/22 encounter (Virtual Visit) with Daniel Conklin, DORIS, [facility].        Medication Adherence:  Patient reports taking prescribed medications as prescribed.    Patient Allergies:    Allergies   Allergen Reactions     Latex      Benzoyl Peroxide Swelling     Duricef [Cefadroxil] Unknown     Monistat [Miconazole] Swelling     Sulfa Drugs Rash       Medical History:    Past Medical History:   Diagnosis Date     JAVIER (generalized anxiety disorder)     50mg zoloft     Rosacea      Undifferentiated connective tissue disease (H)          Current Mental Status Exam:   Appearance:  Appropriate    Eye Contact:  Good   Psychomotor:  Normal     Attitude / Demeanor: Cooperative   Speech      Rate / Production: Normal/ Responsive      Volume:  Normal  volume      Language:  intact  Mood:   Anxious   Affect:   Appropriate  Constricted    Thought Content: Clear   Thought Process: Coherent       Associations: No loosening of associations  Insight:   Good   Judgment:  Intact   Orientation:  All  Attention/concentration: Good      Substance Use:  Patient denies history of substance use.  Patient denies current substance use.  Patient denies belief that their current substance use is problematic.    Significant Losses / Trauma / Abuse / Neglect Issues:   Patient denies serving in the .  There are indications or report of significant loss, trauma, abuse or neglect issues related to: are no indications and client denies any losses, trauma, abuse, or neglect concerns.  Concerns for possible neglect are not present.     Safety Assessment:   Current Safety Concerns:  [place] Suicide Severity Rating Scale (Short Version)  [place] Suicide Severity Rating (Short Version) 10/21/2020 4/23/2021 2/11/2022 2/13/2022 2/17/2022 5/9/2022   Over the past 2 weeks have you felt down, depressed, or hopeless? no no no no no -   Over the past 2 weeks have you had thoughts of killing yourself? no no no no no -   Have you ever attempted to kill yourself? no no no no no -   Q1 Wished to be Dead (Past Month) - - - - -  no   Q2 Suicidal Thoughts (Past Month) - - - - - no   Q3 Suicidal Thought Method - - - - - no   Q4 Suicidal Intent without Specific Plan - - - - - no   Q5 Suicide Intent with Specific Plan - - - - - no   Q6 Suicide Behavior (Lifetime) - - - - - no   Level of Risk per Screen - - - - - low risk     Patient denies current homicidal ideation and behaviors.  Patient denies current self-injurious ideation and behaviors.    Patient denied risk behaviors associated with substance use.  Patient denies any high risk behaviors associated with mental health symptoms.  Patient reports the following current concerns for their personal safety: None.  Patient reports are not firearms in the house. There are no firearms in the home..    History of Safety Concerns:  Patient denied a history of homicidal ideation.     Patient denied a history of personal safety concerns.    Patient denied a history of assaultive behaviors.    Patient denied a history of sexual assault behaviors.     Patient denied a history of risk behaviors associated with substance use.  Patient denies any history of high risk behaviors associated with mental health symptoms.  Patient reports the following protective factors: has 3 kids, , very loving and inclusive neighborhood, family is supportive and loving, parents are around and help out     Risk Plan:  See Recommendations for Safety and Risk Management Plan    Review of Symptoms per patient report:  Depression: No symptoms  Rupali:  No Symptoms  Psychosis: No Symptoms  Anxiety: Excessive worry  Panic:  No symptoms  Post Traumatic Stress Disorder:  No Symptoms   Eating Disorder: Restriction, Binging and Purging  ADD / ADHD:  No symptoms  Conduct Disorder: No symptoms  Autism Spectrum Disorder: No symptoms  Obsessive Compulsive Disorder: No Symptoms    Patient reports the following compulsive behaviors and treatment history: NA.      Psychiatric Diagnosis:    300.09 (F41.8) Other Specified Anxiety Disorder    307.59 (F50.8) Other Specified Feeding or Eating Disorder  302.72 (F52.22) Female Sexual Interest / Arousal Disorder  Acquired  Generalized Moderate    Provisional Diagnostic Hypothesis (Explain R/O, other Provisional Diagnosis, and why alternative Diagnosis that were considered were ruled out): NA    Interactive Complexity  Communication difficulties present during the current psychiatric procedure included:    None    Elias Conklin, DORIS, [facility]

## 2022-07-05 DIAGNOSIS — I83.893 SYMPTOMATIC VARICOSE VEINS OF BOTH LOWER EXTREMITIES: Primary | ICD-10-CM

## 2022-07-07 ENCOUNTER — VIRTUAL VISIT (OUTPATIENT)
Dept: PSYCHOLOGY | Facility: CLINIC | Age: 42
End: 2022-07-07
Payer: COMMERCIAL

## 2022-07-07 DIAGNOSIS — F52.22 FEMALE SEXUAL INTEREST/AROUSAL DISORDER, ACQUIRED, GENERALIZED, MODERATE: ICD-10-CM

## 2022-07-07 DIAGNOSIS — F41.8 OTHER SPECIFIED ANXIETY DISORDERS: Primary | ICD-10-CM

## 2022-07-07 DIAGNOSIS — F50.89 OTHER SPECIFIED EATING DISORDER: ICD-10-CM

## 2022-07-07 PROCEDURE — 90834 PSYTX W PT 45 MINUTES: CPT | Mod: 95 | Performed by: MARRIAGE & FAMILY THERAPIST

## 2022-07-07 PROCEDURE — 99207 PR NO BILLABLE SERVICE THIS VISIT: CPT | Performed by: MARRIAGE & FAMILY THERAPIST

## 2022-07-07 NOTE — PROGRESS NOTES
[facility] - Progress Note    Date of Service: 22   Name: Heather Guillory  : 1980  Medical Record Number: [id number]  Treating Provider: DORIS Narvaez LADC  Type of Session: Individual  Present in Session: pt  Session Start and Stop Time: 1032-6094  Number of Minutes:  39    SERVICE MODALITY:  Video Visit:      Provider verified identity through the following two step process.  Patient provided:  Patient is known previously to provider    Telemedicine Visit: The patient's condition can be safely assessed and treated via synchronous audio and visual telemedicine encounter.      Reason for Telemedicine Visit: Services only offered telehealth    Originating Site (Patient Location): Patient's home    Distant Site (Provider Location): Provider Remote Setting- Home Office    Consent:  The patient/guardian has verbally consented to: the potential risks and benefits of telemedicine (video visit) versus in person care; bill my insurance or make self-payment for services provided; and responsibility for payment of non-covered services.     Patient would like the video invitation sent by:  My Chart    Mode of Communication:  Video Conference via [facility]    As the provider I attest to compliance with applicable laws and regulations related to telemedicine.    DSM-5 Diagnoses:  300.09 (F41.8) Other Specified Anxiety Disorder   307.59 (F50.8) Other Specified Feeding or Eating Disorder  302.72 (F52.22) Female Sexual Interest / Arousal Disorder  Acquired  Generalized Moderate      Current Reported Symptoms and Status update:  Pt reports anxiety, struggling with sexual interest and desire in relationship, feeling pressure as mom and wife to be perfect    Progress Toward Treatment Goals:   Created treatment plan     Therapeutic Interventions/Treatment Strategies:    Area(s) of treatment focus addressed in this session included Symptom Management and Develop Socialization /  Interpersonal Relationship Skills.    Created treatment plan     Psychotherapist offered support, feedback and validation. Treatment modalities used include Motivational Interviewing.  Support, Structured Activity and Clarification    Patient Response:   Patient responded to session by listening, focusing on goals, being attentive, appearing alert and verbalizing understanding  Possible barriers to participation / learning include: and no barriers identified    Current Mental Status Exam:   Appearance:  Appropriate   Eye Contact:  Good   Attitude / Demeanor: Cooperative   Speech      Rate / Production: Normal/ Responsive Emotional      Volume:  Normal  volume  Orientation:  All  Mood:   Anxious   Affect:   Tearful  Thought Content: Clear   Insight:   Good       Plan/Need for Future Services:  Return for therapy in 3 weeks to treat diagnosed problems.    Patient has a current master individualized treatment plan and today was our weekly review of the patient's progress.  See Epic treatment plan for progress / updates on goals and plan.    Assignment:  Start noticing anxious thoughts and judgements-notice what ones come up most often     Interactive Complexity:  There are four specific communication difficulties that complicate the work of the primary psychiatric procedure.  Interactive complexity (+[id number]) may be reported when at least one of these difficulties is present.    Communication difficulties present during current the psychiatric procedure include:  1. None.      Signature/Title:    DORIS Narvaez, [facility]     [facility]:  Individualized Treatment Plan     Date of Plan: 2022  Name: Heather Guillory MRN: [id number]  : 1980     Date of Creation: 2022  Date Treatment Plan Last Reviewed/Revised: NA  DSM5 Diagnoses: 300.09 (F41.8) Other Specified Anxiety Disorder   307.59 (F50.8) Other Specified Feeding or Eating Disorder  302.72 (F52.22) Female Sexual Interest /  Arousal Disorder  Acquired  Generalized Moderate    Psychosocial / Contextual Factors: struggling with family roles-pressure as mom and wife  PROMIS (reviewed every 90 days):   The following assessments were completed by patient for this visit:  PHQ2:   PHQ-2 ( 1999 [employer]) 5/9/2022 9/14/2021 8/27/2021 5/4/2021 12/30/2020 10/28/2020 7/16/2020   Q1: Little interest or pleasure in doing things 0 0 0 0 0 0 0   Q2: Feeling down, depressed or hopeless 0 0 0 0 0 0 0   PHQ-2 Score 0 0 0 0 0 0 0   PHQ-2 Total Score (12-17 Years)- Positive if 3 or more points; Administer PHQ-A if positive - 0 0 0 0 0 0   Q1: Little interest or pleasure in doing things Not at all - - - - - -   Q2: Feeling down, depressed or hopeless Not at all - - - - - -   PHQ-2 Score 0 - - - - - -     GAD7:   JAVIER-7 SCORE 12/3/2019 7/15/2020 10/28/2020 5/4/2021 8/27/2021 9/14/2021 6/1/2022   Total Score 0 (minimal anxiety) 0 (minimal anxiety) - - - - -   Total Score 0 0 0 2 2 0 0     CAGE-AID:   CAGE-AID Total Score 5/9/2022   Total Score 0   Total Score MyChart 0 (A total score of 2 or greater is considered clinically significant)     PROMIS 10-Global Health (all questions and answers displayed):   PROMIS 10 5/9/2022   In general, would you say your health is: Very good   In general, would you say your quality of life is: Very good   In general, how would you rate your physical health? Good   In general, how would you rate your mental health, including your mood and your ability to think? Good   In general, how would you rate your satisfaction with your social activities and relationships? Very good   In general, please rate how well you carry out your usual social activities and roles Excellent   To what extent are you able to carry out your everyday physical activities such as walking, climbing stairs, carrying groceries, or moving a chair? Completely   How often have you been bothered by emotional problems such as feeling anxious, depressed or irritable?  Sometimes   How would you rate your fatigue on average? Moderate   How would you rate your pain on average?   0 = No Pain  to  10 = Worst Imaginable Pain 2   In general, would you say your health is: 4   In general, would you say your quality of life is: 4   In general, how would you rate your physical health? 3   In general, how would you rate your mental health, including your mood and your ability to think? 3   In general, how would you rate your satisfaction with your social activities and relationships? 4   In general, please rate how well you carry out your usual social activities and roles. (This includes activities at home, at work and in your community, and responsibilities as a parent, child, spouse, employee, friend, etc.) 5   To what extent are you able to carry out your everyday physical activities such as walking, climbing stairs, carrying groceries, or moving a chair? 5   In the past 7 days, how often have you been bothered by emotional problems such as feeling anxious, depressed, or irritable? 3   In the past 7 days, how would you rate your fatigue on average? 3   In the past 7 days, how would you rate your pain on average, where 0 means no pain, and 10 means worst imaginable pain? 2   Global Mental Health Score 14   Global Physical Health Score 15   PROMIS TOTAL - SUBSCORES 29   Some recent data might be hidden     Referral / Collaboration:  Referral to another professional/service is not indicated at this time..  Anticipated number of session for this episode of care: 25  Anticipation frequency of session: bi weekly  Anticipated Duration of each session: 60 mins  Treatment plan will be reviewed in 90 days or when goals have been changed.    Impact of Symptoms on Function:  Decreased Physical/Health Status  Decreased Social/Family Function    Sexual Problems:  Low Desire    Gender Concerns:  NA    Client Strengths:  caring, insightful, motivated, open to learning, open to suggestions / feedback,  "wants to learn and willing to ask questions    Client Participation in Plan:  Contributed to goals and plan   Attended individual treatment plan meeting on 7/7/2022  Agrees with plan   Received copy of treatment plan   Discussed with staff     Areas of Vulnerability:  Anxiety  Eating disorder    Long-Term Goals:  Knowledge about illness and management of symptoms     Discharge Criteria:  Satisfactory progress toward treatment goals   Improvement re: identified problems and symptoms   Ability to continue recovery at next level of service   Has a discharge plan in place   Regular attendance as scheduled     Areas of Treatment Focus     Why are you seeking treatment/What do you want to focus on during treatment? \"I would like to address intimacy, pleasure, doing things together with partner, bring timo and fun back into relationship, increase desire\"       Area of Treatment Focus:   Symptom Stabilization and Management  Start Date:    7/7/2022    Goal: Target Date: 7/7/2023 Status: Active  -Will improve ability to intentionally physically relax/provide opportunity for counteracting rise in adrenaline and cortisol levels  -Will improve Lifestyle Balance / Reduce overwork, being over responsible, being too self-denying. Increase attention to and priority of  own needs, preferences, and values by scheduling time to do something you want to do for yourself  -Will Improve Mindfulness / Stay in the Here and Now Regularly practice intentionally focusing on what is occurring in the environment, what you are sensing, thoughts that are popping into your head, emotions that you are feeling, without judging any of it, just noticing it.  -Will improve self-talk, reduce negative self-talk and increase neutral or positive self-talk. Practice using the new self-statements that challenge perfectionistic thinking  -Will develop and practice interpersonal relationship skills such as increasing vulnerability, pleasure, desire, " communication, self compassion      Progress:           Treatment Strategies:   Facilitate increased self awareness  Provide education regarding sexual health wellness, relationship skills  Teach adaptive coping skills and communication skills  Use reality based supportive approach   Intervention(s):  Therapist will assign homework as needed  Therapist will make referrals to as needed  Therapist will provide educational materials on topics discussed in session     Area of Treatment Focus:   Community Resources / Support and Discharge Planning  Start Date:    7/7/2022    Goal: Target Date: 7/7/2023 Status: Active  Will develop an aftercare / transition plan by time of discharge        Progress:           Treatment Strategies:   Assist with discharge planning  Teach adaptive coping skills and communication skills  Use reality based supportive approach   Intervention(s):  Therapist will make referrals to as needed     See treatment plan signature page for patient and provider signature     The Individualized Treatment Plan Signature Page has been routed to the provider for co-sign (as required).    Elias Conklin, DORIS, [facility]

## 2022-07-07 NOTE — PROGRESS NOTES
[facility]  [address] [place] [address], [address]  [place] [address]  Phone: [phone]  Fax: [phone]  [email or web address]    [facility]:   Acknowledgement of Current Treatment Plan       I have reviewed my treatment plan with my therapist on 7/7/2022.   I agree with the plan as it is written in the electronic health record.    Name:      Signature:  Heather Guillory       Unable to sign due to covid 19 protocols   DORIS Bermudez  Psychotherapist   DORIS Kern, [facility] on 7/7/2022 at 2:39 PM           Regulatory Guidelines for Updating Treatment Plan  [place] Medical Assistance: Reviewed & signed at least every 90days  Medicare:  Update per policy

## 2022-07-08 ENCOUNTER — OFFICE VISIT (OUTPATIENT)
Dept: RHEUMATOLOGY | Facility: CLINIC | Age: 42
End: 2022-07-08
Attending: INTERNAL MEDICINE
Payer: COMMERCIAL

## 2022-07-08 ENCOUNTER — LAB (OUTPATIENT)
Dept: LAB | Facility: CLINIC | Age: 42
End: 2022-07-08
Payer: COMMERCIAL

## 2022-07-08 VITALS
TEMPERATURE: 98.6 F | WEIGHT: 160 LBS | DIASTOLIC BLOOD PRESSURE: 75 MMHG | OXYGEN SATURATION: 99 % | BODY MASS INDEX: 25.06 KG/M2 | RESPIRATION RATE: 16 BRPM | HEART RATE: 67 BPM | SYSTOLIC BLOOD PRESSURE: 109 MMHG

## 2022-07-08 DIAGNOSIS — Z79.899 LONG-TERM USE OF PLAQUENIL: ICD-10-CM

## 2022-07-08 DIAGNOSIS — M35.9 UNDIFFERENTIATED CONNECTIVE TISSUE DISEASE (H): Primary | ICD-10-CM

## 2022-07-08 DIAGNOSIS — M35.9 UNDIFFERENTIATED CONNECTIVE TISSUE DISEASE (H): ICD-10-CM

## 2022-07-08 LAB
ALBUMIN SERPL-MCNC: 3.9 G/DL (ref 3.4–5)
ALBUMIN UR-MCNC: NEGATIVE MG/DL
ALT SERPL W P-5'-P-CCNC: 13 U/L (ref 0–50)
APPEARANCE UR: CLEAR
AST SERPL W P-5'-P-CCNC: 14 U/L (ref 0–45)
BASOPHILS # BLD AUTO: 0.1 10E3/UL (ref 0–0.2)
BASOPHILS NFR BLD AUTO: 1 %
BILIRUB UR QL STRIP: NEGATIVE
COLOR UR AUTO: YELLOW
CREAT SERPL-MCNC: 0.6 MG/DL (ref 0.52–1.04)
CREAT UR-MCNC: 82 MG/DL
CRP SERPL-MCNC: <2.9 MG/L (ref 0–8)
EOSINOPHIL # BLD AUTO: 0.2 10E3/UL (ref 0–0.7)
EOSINOPHIL NFR BLD AUTO: 3 %
ERYTHROCYTE [DISTWIDTH] IN BLOOD BY AUTOMATED COUNT: 14.2 % (ref 10–15)
ERYTHROCYTE [SEDIMENTATION RATE] IN BLOOD BY WESTERGREN METHOD: 6 MM/HR (ref 0–20)
FERRITIN SERPL-MCNC: 7 NG/ML (ref 12–150)
GFR SERPL CREATININE-BSD FRML MDRD: >90 ML/MIN/1.73M2
GLUCOSE UR STRIP-MCNC: NEGATIVE MG/DL
HCT VFR BLD AUTO: 37.2 % (ref 35–47)
HGB BLD-MCNC: 11.5 G/DL (ref 11.7–15.7)
HGB UR QL STRIP: ABNORMAL
IMM GRANULOCYTES # BLD: 0 10E3/UL
IMM GRANULOCYTES NFR BLD: 0 %
IRON SATN MFR SERPL: 4 % (ref 15–46)
IRON SERPL-MCNC: 14 UG/DL (ref 35–180)
KETONES UR STRIP-MCNC: NEGATIVE MG/DL
LEUKOCYTE ESTERASE UR QL STRIP: NEGATIVE
LYMPHOCYTES # BLD AUTO: 2.5 10E3/UL (ref 0.8–5.3)
LYMPHOCYTES NFR BLD AUTO: 37 %
MCH RBC QN AUTO: 25.8 PG (ref 26.5–33)
MCHC RBC AUTO-ENTMCNC: 30.9 G/DL (ref 31.5–36.5)
MCV RBC AUTO: 84 FL (ref 78–100)
MONOCYTES # BLD AUTO: 0.5 10E3/UL (ref 0–1.3)
MONOCYTES NFR BLD AUTO: 7 %
MUCOUS THREADS #/AREA URNS LPF: PRESENT /LPF
NEUTROPHILS # BLD AUTO: 3.6 10E3/UL (ref 1.6–8.3)
NEUTROPHILS NFR BLD AUTO: 52 %
NITRATE UR QL: NEGATIVE
NRBC # BLD AUTO: 0 10E3/UL
NRBC BLD AUTO-RTO: 0 /100
PH UR STRIP: 6.5 [PH] (ref 5–7)
PLATELET # BLD AUTO: 195 10E3/UL (ref 150–450)
PROT UR-MCNC: 0.11 G/L
PROT/CREAT 24H UR: 0.13 G/G CR (ref 0–0.2)
RBC # BLD AUTO: 4.45 10E6/UL (ref 3.8–5.2)
RBC URINE: 2 /HPF
SP GR UR STRIP: 1.01 (ref 1–1.03)
TIBC SERPL-MCNC: 367 UG/DL (ref 240–430)
UROBILINOGEN UR STRIP-MCNC: NORMAL MG/DL
WBC # BLD AUTO: 6.8 10E3/UL (ref 4–11)
WBC URINE: 1 /HPF

## 2022-07-08 PROCEDURE — 86225 DNA ANTIBODY NATIVE: CPT | Performed by: PATHOLOGY

## 2022-07-08 PROCEDURE — G0463 HOSPITAL OUTPT CLINIC VISIT: HCPCS

## 2022-07-08 PROCEDURE — 84460 ALANINE AMINO (ALT) (SGPT): CPT | Performed by: PATHOLOGY

## 2022-07-08 PROCEDURE — 82565 ASSAY OF CREATININE: CPT | Performed by: PATHOLOGY

## 2022-07-08 PROCEDURE — 36415 COLL VENOUS BLD VENIPUNCTURE: CPT | Performed by: PATHOLOGY

## 2022-07-08 PROCEDURE — 99214 OFFICE O/P EST MOD 30 MIN: CPT | Performed by: INTERNAL MEDICINE

## 2022-07-08 PROCEDURE — 82728 ASSAY OF FERRITIN: CPT | Performed by: PATHOLOGY

## 2022-07-08 PROCEDURE — 86140 C-REACTIVE PROTEIN: CPT | Performed by: PATHOLOGY

## 2022-07-08 PROCEDURE — 82040 ASSAY OF SERUM ALBUMIN: CPT | Performed by: PATHOLOGY

## 2022-07-08 PROCEDURE — 83550 IRON BINDING TEST: CPT | Performed by: PATHOLOGY

## 2022-07-08 PROCEDURE — 85025 COMPLETE CBC W/AUTO DIFF WBC: CPT | Performed by: PATHOLOGY

## 2022-07-08 PROCEDURE — 99000 SPECIMEN HANDLING OFFICE-LAB: CPT | Performed by: PATHOLOGY

## 2022-07-08 PROCEDURE — 86160 COMPLEMENT ANTIGEN: CPT | Performed by: PATHOLOGY

## 2022-07-08 PROCEDURE — 84450 TRANSFERASE (AST) (SGOT): CPT | Performed by: PATHOLOGY

## 2022-07-08 PROCEDURE — 84156 ASSAY OF PROTEIN URINE: CPT | Performed by: PATHOLOGY

## 2022-07-08 PROCEDURE — 81001 URINALYSIS AUTO W/SCOPE: CPT | Performed by: PATHOLOGY

## 2022-07-08 PROCEDURE — 85652 RBC SED RATE AUTOMATED: CPT | Performed by: PATHOLOGY

## 2022-07-08 RX ORDER — HYDROXYCHLOROQUINE SULFATE 200 MG/1
TABLET, FILM COATED ORAL
Qty: 135 TABLET | Refills: 3 | Status: SHIPPED | OUTPATIENT
Start: 2022-07-08 | End: 2022-11-18

## 2022-07-08 ASSESSMENT — PAIN SCALES - GENERAL: PAINLEVEL: NO PAIN (0)

## 2022-07-08 NOTE — PROGRESS NOTES
Rheumatology follow up In Person Visit Note    Heather Guillory MRN# [id number]   Age: 42 year old YOB: 1980     Last seen: 2022  DOS: 2022    Reason for visit: UCTD    Assessment & Plan:   Problem list:    1-UCTD  2-HCQ monitoring  3-Iron deficiency        Undifferentiated connective tissue disease (UCTD):     Heather is a 41 yo WF . She was diagnosed with MCTD in 2016, 6 wk postpartum based on fatigue, arthritis, mild Raynaud's, low positive NINOSKA 1.1 and low positive RNP Ab 1.6. She is on  mg qd since 2017 with great response.  UCTD continues to be most likely diagnosis not MCTD as she does not have classic features of MCTD, had very low titer of RNP Ab in the past, (negative on most recent check), and had a mild disease which never required prednisone.  All autoimmunity labs (2017) came back negative (except + NINOSKA, low C3) which is further reassuring for UCTD, including APS panel, repeat RNP and inflammatory markers, dsDNA.      She had neg SSA/SSB antibodies in 2016, no concern for CHB. No need to re-check.  APS panel was neg.  She had neg anti-Sm, neg anti-DNA and neg centromere Ab in 2016.    In 2019, recommended to taper plaquenil to 200 mg twice a day on Mon/Wed/Fri and then 1 tab for the rest of the week. Had increased pain/stiffness in hands and feet and C3 dropped. So increased HCQ back to 200 mg bid. C3 went back to nl in 2020, had low C3 in 2021 but it fluctuates.     :    The patient reports stable symtpoms aside from increased fatigue in the setting of ongoing heavy menstrual bleeding. She has a submucosal fibroid with myomectomy planned for February. She does have some chest discomfort but appears likely musculoskeletal. Will repeat hgb and iron testing today.    As her UCTD symptoms including arthralgia appear stable, discussed that she could continue current regimen of HCQ alternating 200 mg BID and daily dosing. If she were to develop new or  worsening symptoms, would consider increasing back to 200 mg BID. Her last eye exam in 11/2021 was without evidence of HCQ toxicity.     -Check cbc with diff, UA, Cr, complements, dsDNA CRP, ESR, AST/ALT, iron studies  -Continue  mg BID 3x week,  mg daily 4x week  -Continue yearly ophthalmology exam for HCQ monitoring    Today 7/8/2022:    Overall stable with symptoms, labs. In fact C3 improved on 1/2022 labs. recommend to taper HCQ down to reduce risk of HCQ toxicity in future.    Has h/o iron deficiency, will re-check iron studies.    Plan:      Labs now and every 6 months    Cut back on plaquenil to 2 tabs on Mon/Wed/Fri then 1 tab a day the rest of the week with yearly eye exam    Return in 6 months (video or in person)    Nasrin King MD      Orders Placed This Encounter   Procedures     ALT     Albumin level     AST     Creatinine     Complement C4     Complement C3     CRP inflammation     DNA double stranded antibodies     Erythrocyte sedimentation rate auto     UA with Microscopic reflex to Culture     Protein  random urine     Creatinine random urine     Ferritin     Iron and iron binding capacity     CBC with Platelets & Differential       Subjective     Ms. Guillory is a 40 yo with history of UCTD, JAVIER, uterine fibroid who presents for follow up.   Undifferentiated connective tissue disease (UCTD):    The patient was last seen for a virtual visit in 7/2021. She notes things are going okay. She has felt more fatigue recently and continues to have significant vaginal bleeding with menstruation. Since her las visit she was seen by OB. MRI showed uterine fibroid; she's scheduled for a myomectomy next month. She notes occasional SOB with stairs. Ms. Guillory has reported 4 weeks of tightness/pressure in her chest and upper back that's present all the time but worse when she does activity with her upper extremities and at night after she's done more activity. It is not worse with exertion. She gets  some relief from using a massager but the discomfort doesn't resolve completely. She is taking iron supplements on some days. She denies dizziness or light headedness.     She's been having more tightness in her joints with the cold weather. The pain is worst in her hands and feet particularily in the morning. Her Raynaud's is also more of a problem thsi time of year but she denies fingertip ulceration and has been avoiding going outside.     Currently, she is taking  mg BID or daily; she says she takes it twice a day about half the time.     Ms. Guillory denies hairloss, cough, oral ulcers, GI or urinary symptoms.     Last eye exam in 11/2021 without evidence of HCQ toxicity.      Today 7/8/2022: Heather presents for follow up. She overall is doing ok, reports being stable. Still taking  mg a day.      ROS     A 10 point ROS was performed with pertinent findings listed above.           Social History:     Social History     Socioeconomic History     Marital status:      Spouse name: Chi     Number of children: 2     Years of education: Not on file     Highest education level: Not on file   Occupational History     Occupation:      Employer: [employer]     Comment: laid off 7/2020   Tobacco Use     Smoking status: Never Smoker     Smokeless tobacco: Never Used   Substance and Sexual Activity     Alcohol use: No     Alcohol/week: 0.0 standard drinks     Drug use: No     Sexual activity: Yes     Partners: Male     Birth control/protection: I.U.D.     Comment: Mirena placed in 2018   Other Topics Concern      Service No     Blood Transfusions No     Caffeine Concern No     Occupational Exposure No     Hobby Hazards No     Sleep Concern No     Stress Concern No     Weight Concern No     Special Diet No     Back Care No     Exercise No     Bike Helmet Yes     Comment: n/a     Seat Belt Yes     Self-Exams No     Parent/sibling w/ CABG, MI or angioplasty before 65F 55M? No    Social History Narrative    .    3 boys (ages 6, 5 and 3 as of Jan 2022)    Exercises when able.         How much exercise per week? 3-4 x's     How much calcium per day? In foods and PNV       How much caffeine per day? 1 soda occasional    How much vitamin D per day? PNV    Do you/your family wear seatbelts?  Yes    Do you/your family use safety helmets? Yes    Do you/your family use sunscreen? Yes    Do you/your family keep firearms in the home? No    Do you/your family have a smoke detector(s)? Yes        July 16, 2020 Mague Chase LPN         Social Determinants of Health     Financial Resource Strain: Not on file   Food Insecurity: Not on file   Transportation Needs: Not on file   Physical Activity: Not on file   Stress: Not on file   Social Connections: Not on file   Intimate Partner Violence: Not on file   Housing Stability: Not on file             Family History:     Family History   Problem Relation Age of Onset     Elijah Disease Sister      Arrhythmia Brother         details unknown; procedure in his 20s     Anxiety Disorder Brother      Alzheimer Disease Maternal Grandmother      Cervical Cancer Maternal Grandmother      Colon Cancer Maternal Grandfather      Breast Cancer Paternal Grandmother      Hypertension Paternal Grandmother      Hypertension Paternal Grandfather      Lung Cancer Paternal Grandfather         smoker     Diabetes Type 2  Paternal Grandfather      Myocardial Infarction Paternal Grandfather      Myocardial Infarction Paternal Aunt 45     Cerebrovascular Disease No family hx of      Coronary Artery Disease Early Onset No family hx of      Ovarian Cancer No family hx of        Objective     PHYSICAL EXAM  /75   Pulse 67   Temp 98.6  F (37  C)   Resp 16   Wt 72.6 kg (160 lb)   SpO2 99%   BMI 25.06 kg/m    Wt Readings from Last 4 Encounters:   07/08/22 72.6 kg (160 lb)   06/01/22 74.8 kg (165 lb)   03/28/22 73.5 kg (162 lb)   02/23/22 73 kg (161 lb)       Gen: no  acute distress, pleasant  HEENT: EOMI, no scleral injection or icterus  CV: RRR, no m/r/g   RESP: CTAB  ABD: Soft, non-tender  EXT: no edema  SKIN: no rash  MSK: no active synovitis or joint tenderness  NEURO: grossly non focal  Psych: nl affect    DATA:    CBC:  Recent Labs   Lab Test 08/27/21  0850 07/08/21  0919 04/23/21  1118   WBC 5.6 5.8 5.5   RBC 4.36 4.45 4.45   HGB 12.3 13.3 13.1   HCT 38.3 38.8 39.0   MCV 88 87 88   MCH 28.2 29.9 29.4   MCHC 32.1 34.3 33.6   RDW 11.9 12.4 11.9    207 195       BMP:  Recent Labs   Lab Test 08/27/21  0850 07/08/21  0919 03/31/21  1130 01/14/21  1228 10/28/20  1250 08/26/19  1055 04/26/19  0930 08/09/16  0000 01/09/16  0724     --   --   --  140  --   --   --  140   POTASSIUM 4.4  --  4.8  --  4.1  --   --   --  3.8   CHLORIDE 113*  --   --   --  109  --   --   --  110*   CO2 26  --   --   --  27  --   --   --  20   ANIONGAP 5  --   --   --  4  --   --   --  10   GLC 99  --   --   --  88  --  94  --  89   BUN 11  --   --   --  7  --   --   --  9   CR 0.79 0.72  --  0.66 0.63   < >  --    < > 0.57   GFRESTIMATED >90 >90  --  >90 >90   < >  --    < > >90  Non  GFR Calc     PRICILA 8.9  --   --   --  8.9  --   --   --  8.4*    < > = values in this interval not displayed.       LFT:  Recent Labs   Lab Test 08/27/21  0850 07/08/21  0919 01/14/21  1228 10/28/20  1250 09/28/17  0954 01/09/16  0724   PROTTOTAL 7.2  --   --  7.6  --  7.1   ALBUMIN 3.9 4.1 4.0 4.3   < > 3.3*   BILITOTAL 0.2  --   --  0.5  --  0.4   ALKPHOS 79  --   --  79  --  70   AST 12 17 13 10   < > 12   ALT 12 21 19 17   < > 15    < > = values in this interval not displayed.       No results found for: CKTOTAL  TSH   Date Value Ref Range Status   02/13/2022 0.43 0.40 - 4.00 mU/L Final   07/08/2021 1.16 0.40 - 4.00 mU/L Final     No results found for: URIC    Inflammatory markers  Lab Results   Component Value Date    CRP <2.9 07/08/2021    CRP <2.9 01/14/2021    CRP 6.4 07/16/2020     Lab  Results   Component Value Date    SED 6 07/08/2021    SED 4 01/14/2021    SED 7 07/16/2020     Ferritin   Date Value Ref Range Status   01/07/2022 7 (L) 12 - 150 ng/mL Final   07/08/2021 17 12 - 150 ng/mL Final   12/30/2020 63 12 - 150 ng/mL Final       UA RESULTS:  Recent Labs   Lab Test 07/08/21  0949 03/31/21  1140 01/14/21  1233 09/28/17  0950 08/31/17  0937 08/14/17  1101   COLOR Yellow Straw Yellow   < > Yellow Yellow   APPEARANCE Clear Clear Clear   < > Slightly Cloudy Clear   URINEGLC Negative Negative Negative   < > Negative Negative   URINEBILI Negative Negative Negative   < > Negative Negative   URINEKETONE Negative Negative Negative   < > Negative Negative   SG >1.030 1.009 1.010   < > >1.030 1.010   UBLD Large* Small* Negative   < > Large* Trace*   URINEPH 7.0 6.0 7.0   < > 7.0 6.5   PROTEIN Trace* Negative Negative   < > 30* Negative   UROBILINOGEN 0.2  --   --   --  0.2 0.2   NITRITE Negative Negative Negative   < > Negative Negative   LEUKEST Negative Negative Negative   < > Small* Negative   RBCU 5-10* 2 2   < > 10-25* O - 2   WBCU 0 - 5 <1 <1   < > 10-25* O - 2    < > = values in this interval not displayed.         Autoimmunity labs:     Lab Results   Component Value Date    RHF <20 07/22/2016     No results found for: CCPIGG  No results found for: ANCA  Lab Results   Component Value Date    W6SWCSQ 104 01/07/2022    Q5INQIA 98 07/08/2021    H2MFJGY 78 (L) 01/14/2021     Lab Results   Component Value Date    Y9EIENV 25 01/07/2022    G4CSURH 23 07/08/2021    C5CRQXY 19 01/14/2021     Lab Results   Component Value Date    JACKLYN 1.1 (H) 07/22/2016     Lab Results   Component Value Date    DNA 5.4 01/07/2022    DNA 5 07/08/2021    DNA 5 01/14/2021     Lab Results   Component Value Date    RNPIGG 0.6 09/28/2017    SSAIGG <0.2 01/20/2020    SSBIGG <0.2 01/20/2020       IMAGING:    Nasrin King MD  Rheumatology Fellow         Addendum:  Imaging Results:     Results for orders placed or performed in  visit on 06/06/22   MA Diagnostic Left w/Laron    Narrative    EXAM: Diagnostic mammography,  left , with tomosynthesis,    TECHNIQUE: Tomosynthesis (3D)    History: Screening call back     Findings: Additional views by mammography were used to further  evaluate possible developing focal asymmetry left breast cc view  retrocrural periareolar plane position noted on screening mammogram  5/26/2022.    Additional mammographic views do not confirm any abnormality.      Impression    IMPRESSION: BI-RADS CATEGORY: 1 -  NEGATIVE.    RECOMMENDED FOLLOW-UP: Annual Mammography.      Results were discussed with the patient.     SUZI TOSCANO MD         SYSTEM ID:  [id number]      Component      Latest Ref Rng & Units 1/7/2022   WBC      4.0 - 11.0 10e3/uL 6.3   RBC Count      3.80 - 5.20 10e6/uL 4.55   Hemoglobin      11.7 - 15.7 g/dL 11.9   Hematocrit      35.0 - 47.0 % 38.5   MCV      78 - 100 fL 85   MCH      26.5 - 33.0 pg 26.2 (L)   MCHC      31.5 - 36.5 g/dL 30.9 (L)   RDW      10.0 - 15.0 % 13.1   Platelet Count      150 - 450 10e3/uL 265   % Neutrophils      % 56   % Lymphocytes      % 36   % Monocytes      % 5   % Eosinophils      % 2   % Basophils      % 1   % Immature Granulocytes      % 0   NRBCs per 100 WBC      <1 /100 0   Absolute Neutrophils      1.6 - 8.3 10e3/uL 3.6   Absolute Lymphocytes      0.8 - 5.3 10e3/uL 2.3   Absolute Monocytes      0.0 - 1.3 10e3/uL 0.3   Absolute Eosinophils      0.0 - 0.7 10e3/uL 0.1   Absolute Basophils      0.0 - 0.2 10e3/uL 0.0   Absolute Immature Granulocytes      <=0.4 10e3/uL 0.0   Absolute NRBCs      10e3/uL 0.0   Color Urine      Colorless, Straw, Light Yellow, Yellow Straw   Appearance Urine      Clear Clear   Glucose Urine      Negative mg/dL Negative   Bilirubin Urine      Negative Negative   Ketones Urine      Negative mg/dL Negative   Specific Gravity Urine      1.003 - 1.035 1.006   Blood Urine      Negative Negative   pH Urine      5.0 - 7.0 7.0   Protein Albumin Urine       Negative mg/dL Negative   Urobilinogen mg/dL      Normal, 2.0 mg/dL Normal   Nitrite Urine      Negative Negative   Leukocyte Esterase Urine      Negative Negative   RBC Urine      <=2 /HPF 1   WBC Urine      <=5 /HPF <1   Squamous Epithelial /HPF Urine      <=1 /HPF 3 (H)   Iron      35 - 180 ug/dL 28 (L)   Iron Binding Cap      240 - 430 ug/dL 424   Iron Saturation Index      15 - 46 % 7 (L)   Creatinine      0.52 - 1.04 mg/dL 0.56   GFR Estimate      >60 mL/min/1.73m2 >90   AST      0 - 45 U/L 18   ALT      0 - 50 U/L 24   Albumin      3.4 - 5.0 g/dL 4.3   CRP Inflammation      0.0 - 8.0 mg/L <2.9   Sed Rate      0 - 20 mm/hr 8   Complement C3      81 - 157 mg/dL 104   Complement C4      13 - 39 mg/dL 25   DNA-ds      <10.0 IU/mL 5.4   Creatinine Urine      mg/dL 34   Ferritin      12 - 150 ng/mL 7 (L)

## 2022-07-08 NOTE — PATIENT INSTRUCTIONS
Labs now and every 6 months    Cut back on plaquenil to 2 tabs on Mon/Wed/Fri then 1 tab a day the rest of the week    Return in 6 months (video or in person)

## 2022-07-08 NOTE — NURSING NOTE
Chief Complaint   Patient presents with     RECHECK     Follow Up     /75   Pulse 67   Temp 98.6  F (37  C)   Resp 16   Wt 72.6 kg (160 lb)   SpO2 99%   BMI 25.06 kg/m    Shawn Aponte on 7/8/2022 at 1:43 PM

## 2022-07-11 LAB
C3 SERPL-MCNC: 94 MG/DL (ref 81–157)
C4 SERPL-MCNC: 22 MG/DL (ref 13–39)
DSDNA AB SER-ACNC: 5.5 IU/ML

## 2022-07-18 ENCOUNTER — TELEPHONE (OUTPATIENT)
Dept: RADIOLOGY | Facility: CLINIC | Age: 42
End: 2022-07-18

## 2022-07-18 NOTE — TELEPHONE ENCOUNTER
----- Message from Rama Holly RN sent at 7/5/2022  6:53 AM CDT -----  Regarding: Return Pt for Dr Fang OLIVO Please call pt to schedule Right venous comp ultrasound and then either in person visit with Dr Headley.    Due anytime now    Thanks    BENTLEY Holly, RN, BSN  Interventional Radiology Nurse Coordinator   Phone:  [phone]

## 2022-07-18 NOTE — TELEPHONE ENCOUNTER
I called the pt to schedule the requested appointments per . The pt states that she is needing to check her schedule and call back to schedule.  John Serrano on 7/18/2022 at 2:56 PM     The pt states has to check her schedule she will call back to schedule   RC on 7/13/2022 at 11:06 AM

## 2022-08-02 ENCOUNTER — TELEPHONE (OUTPATIENT)
Dept: INTERNAL MEDICINE | Facility: CLINIC | Age: 42
End: 2022-08-02

## 2022-08-02 ENCOUNTER — VIRTUAL VISIT (OUTPATIENT)
Dept: INTERNAL MEDICINE | Facility: CLINIC | Age: 42
End: 2022-08-02
Payer: COMMERCIAL

## 2022-08-02 DIAGNOSIS — N64.4 BREAST PAIN, LEFT: Primary | ICD-10-CM

## 2022-08-02 DIAGNOSIS — D50.9 IRON DEFICIENCY ANEMIA, UNSPECIFIED IRON DEFICIENCY ANEMIA TYPE: ICD-10-CM

## 2022-08-02 PROCEDURE — 99214 OFFICE O/P EST MOD 30 MIN: CPT | Mod: 95 | Performed by: NURSE PRACTITIONER

## 2022-08-02 RX ORDER — FERROUS SULFATE 325(65) MG
325 TABLET, DELAYED RELEASE (ENTERIC COATED) ORAL DAILY
Qty: 90 TABLET | Refills: 1 | Status: SHIPPED | OUTPATIENT
Start: 2022-08-02 | End: 2023-01-25

## 2022-08-02 NOTE — PATIENT INSTRUCTIONS
Thank you for visiting the Primary Care Center today at the [facility]! The following is some information about our clinic:     Primary Care Center Frequently-Asked Questions    (1) How do I schedule appointments at the [facility]?     Primary Care--to schedule or make changes to an existing appointment, please call our primary care line at [phone].    Labs--to schedule a lab appointment at the [facility] you can use [employer] or call [phone]. If you have a [place] location that is closer to home, you can reach out to that location for scheduling options.     Imaging--if you need to schedule a CT, X-ray, MRI, ultrasound, or other imaging study you can call [phone] to schedule at the [facility] or any other [facility] imaging location.     Referrals--if a referral to another specialty was ordered you can expect a phone call from their scheduling team. If you have not heard from them in a week, please call us or send us a [employer] message to check the status or get a scheduling number. Please note that this only applies to internal [facility] referrals. If the referral is external you would need to contact their office for scheduling.     (2) I have a question about my visit, who do I contact?     You can call us at the primary care line at [phone] to ask questions about your visit. You can also send a secure message through [employer], which is reviewed by clinic staff. Please note that [employer] messages have a twenty-four to forty-eight business hour turnaround time and should not be used for urgent concerns.    (3) How will I get the results of my tests?    If you are signed up for [employer] all tests will be released to you within twenty-four hours of resulting. Please allow three to five days for your doctor to review your results and place a note interpreting the results. If you do not have [employer] you will receive your  results through mail seven to ten business days following the return of the tests. Please note that if there should be any urgent or concerning results that your doctor or their registered nurse will reach out to you the same day as the tests come back. If you have follow up questions about your results or would like to discuss the results in detail please schedule a follow up with your provider either in person or virtually.     (4) How do I get refills of my prescriptions?     You should always first contact your pharmacy for refills of your medications. If submitting a refill request on [employer], please be sure to submit the request only once--repeat requests can cause delays in refill. If you are requesting a NEW medication or a medication related to new symptoms you will need to schedule an appointment with a provider prior to approval. Please note: Routine medication refills have up to one to three business day turnaround whereas controlled substances refills have up to five to seven business day turnaround.    (5) I have new symptoms, what do I do?     If you are having an immediate medical emergency, you should dial 911 for assistance.   For anything urgent that needs to be seen within a few hours to one day you should visit a local urgent care for assistance.  For non-urgent symptoms that need to be seen within a few days to a week you can schedule with an available provider in primary care by going to [employer] or calling [phone].   If you are not sure how serious your symptoms are or you would like to receive medical advice you can always call [phone] to speak with a triage nurse.

## 2022-08-02 NOTE — TELEPHONE ENCOUNTER
Called patient to assist w/ scheduling follow up appt per PCP, states will call back and schedule later.    Veronika Garcia VF

## 2022-08-02 NOTE — PROGRESS NOTES
Heather is a 42 year old who is being evaluated via a billable video visit.      How would you like to obtain your AVS? MyChart  If the video visit is dropped, the invitation should be resent by: Text to cell phone: [phone]  Will anyone else be joining your video visit? Roxy Garcia VF      Assessment & Plan     Breast pain, left  Intermittent left-sided breast pain; diagnostic mammogram in June was without concerning findings. Will obtain US for further evaluation and if any suspicious findings, repeat diagnostic mammogram of L side.  - US Breast Left Limited 1-3 Quadrants; Future    Iron deficiency anemia, unspecified iron deficiency anemia type  She restarted an oral iron supplement once a day since her visit with Dr. King. Will continue with daily iron supplementation, take with Vitamin C, and recheck CBC, iron/TIBC, and ferritin level in about 3 months. She agrees with the plan.  - Ferritin; Future  - Iron and iron binding capacity; Future  - CBC with platelets differential; Future  - ferrous sulfate (FE TABS) 325 (65 Fe) MG EC tablet; Take 1 tablet (325 mg) by mouth daily    MDM: 2 problem visit, prescription drug management      Return in about 6 months (around 2/2/2023).    HARVEY Ward [facility] INTERNAL MEDICINE [facility]   Heather is a 42 year old, presenting for the following health issues:  No chief complaint on file.      HPI     Low iron levels, saw Dr. King last month, advised to follow-up with PCP.   Taking oral iron every day since then. Had stopped for a while since surgery.  Periods are lighter (day 1-2 medium flow, then light, spotting for 3 days at the end, no longer heavy). Each month seems to be getting better.   No diarrhea, or GI symptoms. Stomach a little more sensitive with iron     Breast concern--intermittent shooting pain in L breast, will focus on it for a bit then goes away; hasn't had for several days.  "Wonders if muscular or if just fixated on it \"in her head\", since nothing was found on diagnostic mammo in June. No symptoms on the right. Feels almost a pins and needles sensation in the center; no palpable mass, no nipple retraction, discharge or skin changes.       Review of Systems   Constitutional, HEENT, cardiovascular, pulmonary, gi and gu systems are negative, except as otherwise noted.      Objective    Vitals - Patient Reported  Weight (Patient Reported): 71.2 kg (157 lb)  Height (Patient Reported): 170.2 cm (5' 7\")  BMI (Based on Pt Reported Ht/Wt): 24.59  Pain Score: No Pain (0)        Physical Exam   GENERAL: Healthy, alert and no distress  EYES: Eyes grossly normal to inspection.  No discharge or erythema, or obvious scleral/conjunctival abnormalities.  RESP: No audible wheeze, cough, or visible cyanosis.  No visible retractions or increased work of breathing.    SKIN: Visible skin clear. No significant rash, abnormal pigmentation or lesions.  NEURO: Cranial nerves grossly intact.  Mentation and speech appropriate for age.  PSYCH: Mentation appears normal, affect normal/bright, judgement and insight intact, normal speech and appearance well-groomed.                Video-Visit Details    Video Start Time: 1:04 PM    Type of service:  Video Visit    Video End Time:1:19 PM    Originating Location (pt. Location): Home    Distant Location (provider location):  [facility] INTERNAL MEDICINE [place]     Platform used for Video Visit: [employer]    .  ..  "

## 2022-08-03 ENCOUNTER — ANCILLARY PROCEDURE (OUTPATIENT)
Dept: ULTRASOUND IMAGING | Facility: CLINIC | Age: 42
End: 2022-08-03
Attending: RADIOLOGY
Payer: COMMERCIAL

## 2022-08-03 ENCOUNTER — OFFICE VISIT (OUTPATIENT)
Dept: VASCULAR SURGERY | Facility: CLINIC | Age: 42
End: 2022-08-03
Payer: COMMERCIAL

## 2022-08-03 VITALS — OXYGEN SATURATION: 100 % | SYSTOLIC BLOOD PRESSURE: 103 MMHG | HEART RATE: 67 BPM | DIASTOLIC BLOOD PRESSURE: 67 MMHG

## 2022-08-03 DIAGNOSIS — I83.893 SYMPTOMATIC VARICOSE VEINS OF BOTH LOWER EXTREMITIES: ICD-10-CM

## 2022-08-03 DIAGNOSIS — I83.812 VARICOSE VEINS OF LEFT LOWER EXTREMITY WITH PAIN: Primary | ICD-10-CM

## 2022-08-03 PROCEDURE — 93971 EXTREMITY STUDY: CPT | Mod: RT | Performed by: RADIOLOGY

## 2022-08-03 PROCEDURE — 99213 OFFICE O/P EST LOW 20 MIN: CPT | Performed by: RADIOLOGY

## 2022-08-03 ASSESSMENT — PAIN SCALES - GENERAL: PAINLEVEL: NO PAIN (0)

## 2022-08-03 NOTE — LETTER
8/3/2022       RE: Heather Guillory  [address] [facility] [address]     Dear Colleague,    Thank you for referring your patient, Heather Guillory, to the [facility] [place] [facility]. Please see a copy of my visit note below.    Ms. Guillory returns to care approximately 2 to 3 years after receiving left lower extremity treatment for superficial venous insufficiency.  She underwent endovenous laser ablation of the great saphenous vein, microphlebectomy, and sclerotherapy with sustained control of her lower extremity symptoms.    She is here today for evaluation of her right lower extremity, as she describes developing similar symptoms that she experienced on her left leg.  She describes a throbbing sensation primarily localized to her lower to mid thigh and along the lateral upper calf and knee region.  This discomfort seems to be exacerbated by the heat during summer as well as prolonged standing and ambulation.  The discomfort does slightly inhibit her ability to be more active and take care of her children.  The symptoms do seem to progress gradually throughout the day.  She has not been wearing compression socks currently to help manage the symptoms.    No other concerning symptoms.  No signs or symptoms of complications such as bleeding or phlebitis.  The area of discomfort and palpable veins along the upper lateral right calf.    PE:     Right leg: No pitting edema.  No dermal bronzing.  No open sores or ulcers.  No skin thickening.  Visible and palpable small varicosities along the upper lateral calf.    I reviewed the right lower extremity venous competency ultrasound which demonstrates:    1. RIGHT LEG:       A. No superficial or deep venous thrombosis demonstrated.       B. Common femoral vein incompetent above and below the  saphenofemoral junction with Valsalva.       C. Great saphenous vein incompetent at the  saphenofemoral  junction and in the proximal and mid calf.       D. Small saphenous vein incompetent in the proximal calf.       E. Incompetent varicose vein runs down the posterior lateral  distal thigh to the lateral calf and connects from the competent  posterior accessory saphenous vein to a competent  vein in  the mid calf.       F. No incompetent  vein demonstrated.    A/P: 42-year-old healthy female with clinical 2 right lower extremity superficial venous insufficiency with mild to moderate symptoms primarily localized to the lower mid thigh and upper lateral calf in the area of small sized varicosities.  Her ultrasound demonstrates 2 segments of great saphenous vein and small saphenous vein incompetency in the upper calf.  She has incompetent varicosities running along the posterior lateral right distal thigh and calf.    We will attempt a trial of conservative management with compression stockings.  She will initiate this sometime in the fall when the weather cools down.  We will then follow-up after 3 to 4 months of compression stocking use and reevaluate her symptoms to see whether she would benefit from any minimally invasive treatments.        Sincerely,    Jonathan Headley MD

## 2022-08-03 NOTE — NURSING NOTE
Chief Complaint   Patient presents with     Follow Up     Symptomatic varicose veins of both lower extremities.       Vitals were taken and medications were reconciled.    Magda Dc  2:19 PM

## 2022-08-03 NOTE — PATIENT INSTRUCTIONS
You were seen today in the Vascular IR Clinic by Dr Headley for follow up regarding varicose veins of the lower extremities.    Plan:    - Wear knee-high 20-30 mmHg compression stockings every day during wake hours.  This prescription can be filled at a medical supply store.  This must be done for at least 3 months.    - Follow up with Dr Headley in 6 months once compression trial complete.  Our  will contact you to coordinate this appointment.    Please call or send a MyChart with any questions or concerns.    Alka NUNN RN  [phone]

## 2022-08-03 NOTE — PROGRESS NOTES
Ms. Guillory returns to care approximately 2 to 3 years after receiving left lower extremity treatment for superficial venous insufficiency.  She underwent endovenous laser ablation of the great saphenous vein, microphlebectomy, and sclerotherapy with sustained control of her lower extremity symptoms.    She is here today for evaluation of her right lower extremity, as she describes developing similar symptoms that she experienced on her left leg.  She describes a throbbing sensation primarily localized to her lower to mid thigh and along the lateral upper calf and knee region.  This discomfort seems to be exacerbated by the heat during summer as well as prolonged standing and ambulation.  The discomfort does slightly inhibit her ability to be more active and take care of her children.  The symptoms do seem to progress gradually throughout the day.  She has not been wearing compression socks currently to help manage the symptoms.    No other concerning symptoms.  No signs or symptoms of complications such as bleeding or phlebitis.  The area of discomfort and palpable veins along the upper lateral right calf.    PE:     Right leg: No pitting edema.  No dermal bronzing.  No open sores or ulcers.  No skin thickening.  Visible and palpable small varicosities along the upper lateral calf.    I reviewed the right lower extremity venous competency ultrasound which demonstrates:    1. RIGHT LEG:       A. No superficial or deep venous thrombosis demonstrated.       B. Common femoral vein incompetent above and below the  saphenofemoral junction with Valsalva.       C. Great saphenous vein incompetent at the saphenofemoral  junction and in the proximal and mid calf.       D. Small saphenous vein incompetent in the proximal calf.       E. Incompetent varicose vein runs down the posterior lateral  distal thigh to the lateral calf and connects from the competent  posterior accessory saphenous vein to a competent  vein  in  the mid calf.       F. No incompetent  vein demonstrated.    A/P: 42-year-old healthy female with clinical 2 right lower extremity superficial venous insufficiency with mild to moderate symptoms primarily localized to the lower mid thigh and upper lateral calf in the area of small sized varicosities.  Her ultrasound demonstrates 2 segments of great saphenous vein and small saphenous vein incompetency in the upper calf.  She has incompetent varicosities running along the posterior lateral right distal thigh and calf.    We will attempt a trial of conservative management with compression stockings.  She will initiate this sometime in the fall when the weather cools down.  We will then follow-up after 3 to 4 months of compression stocking use and reevaluate her symptoms to see whether she would benefit from any minimally invasive treatments.

## 2022-08-10 ENCOUNTER — ANCILLARY PROCEDURE (OUTPATIENT)
Dept: MAMMOGRAPHY | Facility: CLINIC | Age: 42
End: 2022-08-10
Attending: NURSE PRACTITIONER
Payer: COMMERCIAL

## 2022-08-10 DIAGNOSIS — N64.4 BREAST PAIN, LEFT: ICD-10-CM

## 2022-08-10 PROCEDURE — G0279 TOMOSYNTHESIS, MAMMO: HCPCS | Performed by: RADIOLOGY

## 2022-08-10 PROCEDURE — 76642 ULTRASOUND BREAST LIMITED: CPT | Mod: LT | Performed by: RADIOLOGY

## 2022-08-10 PROCEDURE — 77065 DX MAMMO INCL CAD UNI: CPT | Mod: LT | Performed by: RADIOLOGY

## 2022-09-10 ENCOUNTER — HEALTH MAINTENANCE LETTER (OUTPATIENT)
Age: 42
End: 2022-09-10

## 2022-09-20 NOTE — TELEPHONE ENCOUNTER
Health Call Center    Phone Message    May a detailed message be left on voicemail: yes     Reason for Call: Order(s): Other:  Lab work, CBC  Reason for requested: post hospital follow up   Date needed: 02/17/2022  Provider name: Cyndi Zarate APRN CNP    Patient reporting that [employer] notified her in MyChart that labs were ordered. Writer did not see orders when scheduling lab appt. Patient requesting CBC blood test is ordered since her hemoglobin is low.    Patient requesting a [employer] message to confirm labs are active.      Action Taken: Message routed to:  [facility]    Travel Screening: Not Applicable                                                                       Azelaic Acid Pregnancy And Lactation Text: This medication is considered safe during pregnancy and breast feeding. Tetracycline Pregnancy And Lactation Text: This medication is Pregnancy Category D and not consider safe during pregnancy. It is also excreted in breast milk. Birth Control Pills Pregnancy And Lactation Text: This medication should be avoided if pregnant and for the first 30 days post-partum. Topical Sulfur Applications Counseling: Topical Sulfur Counseling: Patient counseled that this medication may cause skin irritation or allergic reactions.  In the event of skin irritation, the patient was advised to reduce the amount of the drug applied or use it less frequently.   The patient verbalized understanding of the proper use and possible adverse effects of topical sulfur application.  All of the patient's questions and concerns were addressed. High Dose Vitamin A Counseling: Side effects reviewed, pt to contact office should one occur. Sarecycline Counseling: Patient advised regarding possible photosensitivity and discoloration of the teeth, skin, lips, tongue and gums.  Patient instructed to avoid sunlight, if possible.  When exposed to sunlight, patients should wear protective clothing, sunglasses, and sunscreen.  The patient was instructed to call the office immediately if the following severe adverse effects occur:  hearing changes, easy bruising/bleeding, severe headache, or vision changes.  The patient verbalized understanding of the proper use and possible adverse effects of sarecycline.  All of the patient's questions and concerns were addressed. Azithromycin Counseling:  I discussed with the patient the risks of azithromycin including but not limited to GI upset, allergic reaction, drug rash, diarrhea, and yeast infections. Topical Retinoid Pregnancy And Lactation Text: This medication is Pregnancy Category C. It is unknown if this medication is excreted in breast milk. Doxycycline Pregnancy And Lactation Text: This medication is Pregnancy Category D and not consider safe during pregnancy. It is also excreted in breast milk but is considered safe for shorter treatment courses. High Dose Vitamin A Pregnancy And Lactation Text: High dose vitamin A therapy is contraindicated during pregnancy and breast feeding. Erythromycin Pregnancy And Lactation Text: This medication is Pregnancy Category B and is considered safe during pregnancy. It is also excreted in breast milk. Benzoyl Peroxide Counseling: Patient counseled that medicine may cause skin irritation and bleach clothing.  In the event of skin irritation, the patient was advised to reduce the amount of the drug applied or use it less frequently.   The patient verbalized understanding of the proper use and possible adverse effects of benzoyl peroxide.  All of the patient's questions and concerns were addressed. Spironolactone Counseling: Patient advised regarding risks of diarrhea, abdominal pain, hyperkalemia, birth defects (for female patients), liver toxicity and renal toxicity. The patient may need blood work to monitor liver and kidney function and potassium levels while on therapy. The patient verbalized understanding of the proper use and possible adverse effects of spironolactone.  All of the patient's questions and concerns were addressed. Use Enhanced Medication Counseling?: No Tazorac Counseling:  Patient advised that medication is irritating and drying.  Patient may need to apply sparingly and wash off after an hour before eventually leaving it on overnight.  The patient verbalized understanding of the proper use and possible adverse effects of tazorac.  All of the patient's questions and concerns were addressed. Detail Level: Zone Erythromycin Counseling:  I discussed with the patient the risks of erythromycin including but not limited to GI upset, allergic reaction, drug rash, diarrhea, increase in liver enzymes, and yeast infections. Topical Sulfur Applications Pregnancy And Lactation Text: This medication is Pregnancy Category C and has an unknown safety profile during pregnancy. It is unknown if this topical medication is excreted in breast milk. Aklief counseling:  Patient advised to apply a pea-sized amount only at bedtime and wait 30 minutes after washing their face before applying.  If too drying, patient may add a non-comedogenic moisturizer.  The most commonly reported side effects including irritation, redness, scaling, dryness, stinging, burning, itching, and increased risk of sunburn.  The patient verbalized understanding of the proper use and possible adverse effects of retinoids.  All of the patient's questions and concerns were addressed. Dapsone Counseling: I discussed with the patient the risks of dapsone including but not limited to hemolytic anemia, agranulocytosis, rashes, methemoglobinemia, kidney failure, peripheral neuropathy, headaches, GI upset, and liver toxicity.  Patients who start dapsone require monitoring including baseline LFTs and weekly CBCs for the first month, then every month thereafter.  The patient verbalized understanding of the proper use and possible adverse effects of dapsone.  All of the patient's questions and concerns were addressed. Azithromycin Pregnancy And Lactation Text: This medication is considered safe during pregnancy and is also secreted in breast milk. Dapsone Pregnancy And Lactation Text: This medication is Pregnancy Category C and is not considered safe during pregnancy or breast feeding. Spironolactone Pregnancy And Lactation Text: This medication can cause feminization of the male fetus and should be avoided during pregnancy. The active metabolite is also found in breast milk. Topical Clindamycin Counseling: Patient counseled that this medication may cause skin irritation or allergic reactions.  In the event of skin irritation, the patient was advised to reduce the amount of the drug applied or use it less frequently.   The patient verbalized understanding of the proper use and possible adverse effects of clindamycin.  All of the patient's questions and concerns were addressed. Bactrim Pregnancy And Lactation Text: This medication is Pregnancy Category D and is known to cause fetal risk.  It is also excreted in breast milk. Isotretinoin Counseling: Patient should get monthly blood tests, not donate blood, not drive at night if vision affected, not share medication, and not undergo elective surgery for 6 months after tx completed. Side effects reviewed, pt to contact office should one occur. Winlevi Counseling:  I discussed with the patient the risks of topical clascoterone including but not limited to erythema, scaling, itching, and stinging. Patient voiced their understanding. Bactrim Counseling:  I discussed with the patient the risks of sulfa antibiotics including but not limited to GI upset, allergic reaction, drug rash, diarrhea, dizziness, photosensitivity, and yeast infections.  Rarely, more serious reactions can occur including but not limited to aplastic anemia, agranulocytosis, methemoglobinemia, blood dyscrasias, liver or kidney failure, lung infiltrates or desquamative/blistering drug rashes. Benzoyl Peroxide Pregnancy And Lactation Text: This medication is Pregnancy Category C. It is unknown if benzoyl peroxide is excreted in breast milk. Minocycline Counseling: Patient advised regarding possible photosensitivity and discoloration of the teeth, skin, lips, tongue and gums.  Patient instructed to avoid sunlight, if possible.  When exposed to sunlight, patients should wear protective clothing, sunglasses, and sunscreen.  The patient was instructed to call the office immediately if the following severe adverse effects occur:  hearing changes, easy bruising/bleeding, severe headache, or vision changes.  The patient verbalized understanding of the proper use and possible adverse effects of minocycline.  All of the patient's questions and concerns were addressed. Tazorac Pregnancy And Lactation Text: This medication is not safe during pregnancy. It is unknown if this medication is excreted in breast milk. Aklief Pregnancy And Lactation Text: It is unknown if this medication is safe to use during pregnancy.  It is unknown if this medication is excreted in breast milk.  Breastfeeding women should use the topical cream on the smallest area of the skin for the shortest time needed while breastfeeding.  Do not apply to nipple and areola. Birth Control Pills Counseling: Birth Control Pill Counseling: I discussed with the patient the potential side effects of OCPs including but not limited to increased risk of stroke, heart attack, thrombophlebitis, deep venous thrombosis, hepatic adenomas, breast changes, GI upset, headaches, and depression.  The patient verbalized understanding of the proper use and possible adverse effects of OCPs. All of the patient's questions and concerns were addressed. Topical Clindamycin Pregnancy And Lactation Text: This medication is Pregnancy Category B and is considered safe during pregnancy. It is unknown if it is excreted in breast milk. Isotretinoin Pregnancy And Lactation Text: This medication is Pregnancy Category X and is considered extremely dangerous during pregnancy. It is unknown if it is excreted in breast milk. Azelaic Acid Counseling: Patient counseled that medicine may cause skin irritation and to avoid applying near the eyes.  In the event of skin irritation, the patient was advised to reduce the amount of the drug applied or use it less frequently.   The patient verbalized understanding of the proper use and possible adverse effects of azelaic acid.  All of the patient's questions and concerns were addressed. Topical Retinoid counseling:  Patient advised to apply a pea-sized amount only at bedtime and wait 30 minutes after washing their face before applying.  If too drying, patient may add a non-comedogenic moisturizer. The patient verbalized understanding of the proper use and possible adverse effects of retinoids.  All of the patient's questions and concerns were addressed. Doxycycline Counseling:  Patient counseled regarding possible photosensitivity and increased risk for sunburn.  Patient instructed to avoid sunlight, if possible.  When exposed to sunlight, patients should wear protective clothing, sunglasses, and sunscreen.  The patient was instructed to call the office immediately if the following severe adverse effects occur:  hearing changes, easy bruising/bleeding, severe headache, or vision changes.  The patient verbalized understanding of the proper use and possible adverse effects of doxycycline.  All of the patient's questions and concerns were addressed. Tetracycline Counseling: Patient counseled regarding possible photosensitivity and increased risk for sunburn.  Patient instructed to avoid sunlight, if possible.  When exposed to sunlight, patients should wear protective clothing, sunglasses, and sunscreen.  The patient was instructed to call the office immediately if the following severe adverse effects occur:  hearing changes, easy bruising/bleeding, severe headache, or vision changes.  The patient verbalized understanding of the proper use and possible adverse effects of tetracycline.  All of the patient's questions and concerns were addressed. Patient understands to avoid pregnancy while on therapy due to potential birth defects. Winlevi Pregnancy And Lactation Text: This medication is considered safe during pregnancy and breastfeeding.

## 2022-10-11 ENCOUNTER — MYC MEDICAL ADVICE (OUTPATIENT)
Dept: INTERNAL MEDICINE | Facility: CLINIC | Age: 42
End: 2022-10-11

## 2022-10-18 ENCOUNTER — VIRTUAL VISIT (OUTPATIENT)
Dept: INTERNAL MEDICINE | Facility: CLINIC | Age: 42
End: 2022-10-18
Payer: COMMERCIAL

## 2022-10-18 ENCOUNTER — TELEPHONE (OUTPATIENT)
Dept: INTERNAL MEDICINE | Facility: CLINIC | Age: 42
End: 2022-10-18

## 2022-10-18 DIAGNOSIS — R11.11 VOMITING WITHOUT NAUSEA, UNSPECIFIED VOMITING TYPE: ICD-10-CM

## 2022-10-18 DIAGNOSIS — R19.5 CHANGE IN STOOL: ICD-10-CM

## 2022-10-18 DIAGNOSIS — D50.9 IRON DEFICIENCY ANEMIA, UNSPECIFIED IRON DEFICIENCY ANEMIA TYPE: Primary | ICD-10-CM

## 2022-10-18 PROCEDURE — 99214 OFFICE O/P EST MOD 30 MIN: CPT | Mod: 95 | Performed by: NURSE PRACTITIONER

## 2022-10-18 NOTE — PATIENT INSTRUCTIONS
Thank you for visiting the Primary Care Center today at the [facility]! The following is some information about our clinic:     Primary Care Center Frequently-Asked Questions    (1) How do I schedule appointments at the [facility]?     Primary Care--to schedule or make changes to an existing appointment, please call our primary care line at [phone].    Labs--to schedule a lab appointment at the [facility] you can use [employer] or call [phone]. If you have a [facility] location that is closer to home, you can reach out to that location for scheduling options.     Imaging--if you need to schedule a CT, X-ray, MRI, ultrasound, or other imaging study you can call [phone] to schedule at the [facility] or any other [facility] imaging location.     Referrals--if a referral to another specialty was ordered you can expect a phone call from their scheduling team. If you have not heard from them in a week, please call us or send us a [employer] message to check the status or get a scheduling number. Please note that this only applies to internal [facility] referrals. If the referral is external you would need to contact their office for scheduling.     (2) I have a question about my visit, who do I contact?     You can call us at the primary care line at [phone] to ask questions about your visit. You can also send a secure message through [employer], which is reviewed by clinic staff. Please note that [employer] messages have a twenty-four to forty-eight business hour turnaround time and should not be used for urgent concerns.    (3) How will I get the results of my tests?    If you are signed up for "[employer]t all tests will be released to you within twenty-four hours of resulting. Please allow three to five days for your doctor to review your results and place a note interpreting the results. If you do not have [employer] you will receive your  results through mail seven to ten business days following the return of the tests. Please note that if there should be any urgent or concerning results that your doctor or their registered nurse will reach out to you the same day as the tests come back. If you have follow up questions about your results or would like to discuss the results in detail please schedule a follow up with your provider either in person or virtually.     (4) How do I get refills of my prescriptions?     You should always first contact your pharmacy for refills of your medications. If submitting a refill request on [employer], please be sure to submit the request only once--repeat requests can cause delays in refill. If you are requesting a NEW medication or a medication related to new symptoms you will need to schedule an appointment with a provider prior to approval. Please note: Routine medication refills have up to one to three business day turnaround whereas controlled substances refills have up to five to seven business day turnaround.    (5) I have new symptoms, what do I do?     If you are having an immediate medical emergency, you should dial 911 for assistance.   For anything urgent that needs to be seen within a few hours to one day you should visit a local urgent care for assistance.  For non-urgent symptoms that need to be seen within a few days to a week you can schedule with an available provider in primary care by going to [employer] or calling [phone].   If you are not sure how serious your symptoms are or you would like to receive medical advice you can always call [phone] to speak with a triage nurse.

## 2022-10-18 NOTE — Clinical Note
Hi, can you help her schedule a lab appointment? Also placed EGD/colonoscopy order, I think they call her. Thanks! -Cyndi

## 2022-10-18 NOTE — TELEPHONE ENCOUNTER
Left message with Primary Care clinic number for patient to call to schedule lab only appointment per Cyndi Zarate last visit 10/18/22. Lab ordered already in.     Brooke Siddiqui, Clinic , 10/18/22 at 2:31 PM

## 2022-10-18 NOTE — NURSING NOTE
Pt is concerned about taking FE TABS and Prenatal Vit-Fe tablets every day.  Pt is switching off and taking each of them every other day.  Would like to discuss with provider.    Olivia Garcia VF

## 2022-10-18 NOTE — PROGRESS NOTES
"Heather is a 42 year old who is being evaluated via a billable video visit.      Pt is in [place]    How would you like to obtain your AVS? MyChart  If the video visit is dropped, the invitation should be resent by: Send to e-mail at: [email or web address]  Will anyone else be joining your video visit? No      Assessment & Plan     Iron deficiency anemia, unspecified iron deficiency anemia type  Change in stool  Vomiting without nausea, unspecified vomiting type  Ongoing abdominal discomfort, cramping, concern for blood in stools, vomiting, unintentional weight loss, in setting of iron deficiency anemia--will refer for EGD and colonoscopy for further evaluation. May trial Omeprazole to help with stomach pain/vomiting in the interim. Recheck CBC, ferritin, and CMP. Okay to continue with iron supplement and prenatal vitamin, can separate these and take with small meals/snacks as tolerated. She agrees with the plan.   - Adult GI  Referral - Procedure Only; Future  - Ferritin; Future  - CBC with platelets differential; Future  - omeprazole (PRILOSEC) 20 MG DR capsule; Take 1 capsule (20 mg) by mouth daily  - Comprehensive metabolic panel; Future      26 minutes spent on the date of the encounter doing chart review, history and exam, documentation and further activities per the note       Follow-up pending above work-up    HARVEY Ward CNP  [facility] INTERNAL MEDICINE [facility]   Heather is a 42 year old, presenting for the following health issues:  Video Visit (Digestive discomfort )      HPI     Thinking about implanting last embryo, restarted prenatal vitamins. No decisions yet, but wanted to make sure she was current on her vitamins. Has been alternating between prenatal and iron, wasn't sure if she could take both together.      Last several weeks hasn't felt good, has a \"crampy\" stomach all the time, feels uncomfortable in the abdomen. Noticed dark red in the stool a few " "times, notes it was not bright red--has had hemorrhoids with BRBPR with wiping in the past, and this was different.  Feels consistently \"uncomfortable\", sensation of abdominal fullness, has vomited (no nausea) and gets temporary relief from that, but gets incredibly uncomfortable with eating.  Has tried Tums, didn't notice difference and weren't very palatable. Trying to eat smaller meals or snacks, such as a few apple slices to see how she feels with these.  Feels hungry but is afraid to eat because she feels terrible after eating.  Hasn't been able to identify a food trigger; seems related to anything she eats. Did feel particularly terrible after a meal that had a lot of bread. Had held breads but sx not significantly improved. Has lost ~6 pounds over the last few weeks.    Feels pressure on the rectum, feels like she needs to go to the bathroom even if nothing is coming out.   Nothing she can recall that triggered.     Review of Systems   Constitutional, HEENT, cardiovascular, pulmonary, gi and gu systems are negative, except as otherwise noted.      Objective    Vitals - Patient Reported  Weight (Patient Reported): 70.3 kg (155 lb)  Height (Patient Reported): 170.2 cm (5' 7\")  BMI (Based on Pt Reported Ht/Wt): 24.28  Pain Score: Mild Pain (3)  Pain Loc: Abdomen        Physical Exam   GENERAL: Healthy, alert and no distress  EYES: Eyes grossly normal to inspection.  No discharge or erythema, or obvious scleral/conjunctival abnormalities.  RESP: No audible wheeze, cough, or visible cyanosis.  No visible retractions or increased work of breathing.    SKIN: Visible skin clear. No significant rash, abnormal pigmentation or lesions.  NEURO: Cranial nerves grossly intact.  Mentation and speech appropriate for age.  PSYCH: Mentation appears normal, affect normal/bright, judgement and insight intact, normal speech and appearance well-groomed.                Video-Visit Details    Video Start Time: 10:35 AM    Type of " service:  Video Visit    Video End Time:10:51 AM    Originating Location (pt. Location): Home        Distant Location (provider location):  Off-site    Platform used for Video Visit: Lorenzo LEONG

## 2022-10-21 ENCOUNTER — TELEPHONE (OUTPATIENT)
Dept: GASTROENTEROLOGY | Facility: CLINIC | Age: 42
End: 2022-10-21

## 2022-10-21 NOTE — TELEPHONE ENCOUNTER
Screening Questions  BLUE  KIND OF PREP RED  LOCATION [review exclusion criteria] GREEN  SEDATION TYPE        Y Are you active on mychart?       Cyndi Zarate, HARVEY CNP Ordering/Referring Provider?        BCBS What type of coverage do you have?      N Have you had a positive covid test in the last 90 days?     25.1 1. BMI  [BMI 40+ - review exclusion criteria]    Y  2. Are you able to give consent for your medical care? [IF NO,RN REVIEW]        N  3. Are you taking any prescription pain medications on a routine schedule?      N  3a. EXTENDED PREP What kind of prescription?     N 4. Do you have any chemical dependencies such as alcohol, street drugs, or methadone?    N 5. Do you have any history of post-traumatic stress syndrome, severe anxiety or history of psychosis?      **If yes 3- 5 , please schedule with MAC sedation.**          IF YES TO ANY 6 - 10 - HOSPITAL SETTING ONLY.     N 6.   Do you need assistance transferring?     N 7.   Have you had a heart or lung transplant?    N 8.   Are you currently on dialysis?   N 9.   Do you use daily home oxygen?   N 10. Do you take nitroglycerin?   10a. N If yes, how often?     11. [FEMALES]  N Are you currently pregnant?    11a. N If yes, how many weeks? [ Greater than 12 weeks, OR NEEDED]    N 12. Do you have Pulmonary Hypertension? *NEED PAC APPT AT [facility]*     N 13. [review exclusion criteria]  Do you have any implantable devices in your body (pacemaker, defib, LVAD)?    N 14. In the past 6 months, have you had any heart related issues including cardiomyopathy or heart attack?     14a. N If yes, did it require cardiac stenting if so when?     N 15. Have you had a stroke or Transient ischemic attack (TIA - aka  mini stroke ) within 6 months?      N 16. Do you have mod to severe Obstructive Sleep Apnea?  [Hospital only - Ok at [facility]]    N 17. Do you have SEVERE AND UNCONTROLLED asthma? *NEED PAC APPT AT [facility]*     N 18. Are you currently taking any blood  "thinners?     18a. If yes, inform patient to \"follow up w/ ordering provider for bridging instructions.\"    N 19. Do you take the medication Phentermine?    19a. If yes, \"Hold for 7 days before procedure.  Please consult your prescribing provider if you have questions about holding this medication.\"     N  20. Do you have chronic kidney disease?      N  21. Do you have a diagnosis of diabetes?     N  22. On a regular basis do you go 3-5 days between bowel movements?      23. Preferred LOCAL Pharmacy for Pre Prescription    [ LIST ONLY ONE PHARMACY]     [id number] [place] [facility] [address] AT [facility] OF [address]        - CLOSING REMINDERS -    Informed patient they will need an adult    Cannot take any type of public or medical transportation alone    Conscious Sedation- Needs  for 6 hours after the procedure       MAC/General-Needs  for 24 hours after procedure    Pre-Procedure Covid test to be completed [[facility] PCR Testing Required]    Confirmed Nurse will call to complete assessment       - SCHEDULING DETAILS -     YANEZ  Surgeon    12/29  Date of Procedure  Upper and Lower Endoscopy [EGD and Colonoscopy]  Type of Procedure Scheduled   [facility]If you answer yes to questions #8, #20, #21Which Colonoscopy Prep was Sent?     MOD Sedation Type     N PAC / Pre-op Required         Additional comments:          "

## 2022-11-13 ENCOUNTER — MYC MEDICAL ADVICE (OUTPATIENT)
Dept: INTERNAL MEDICINE | Facility: CLINIC | Age: 42
End: 2022-11-13

## 2022-11-13 DIAGNOSIS — R11.11 VOMITING WITHOUT NAUSEA, UNSPECIFIED VOMITING TYPE: ICD-10-CM

## 2022-11-13 DIAGNOSIS — D50.9 IRON DEFICIENCY ANEMIA, UNSPECIFIED IRON DEFICIENCY ANEMIA TYPE: Primary | ICD-10-CM

## 2022-11-14 ENCOUNTER — TRANSFERRED RECORDS (OUTPATIENT)
Dept: HEALTH INFORMATION MANAGEMENT | Facility: CLINIC | Age: 42
End: 2022-11-14

## 2022-11-18 ENCOUNTER — LAB (OUTPATIENT)
Dept: LAB | Facility: CLINIC | Age: 42
End: 2022-11-18
Payer: COMMERCIAL

## 2022-11-18 VITALS — WEIGHT: 160.05 LBS | BODY MASS INDEX: 25.07 KG/M2

## 2022-11-18 DIAGNOSIS — R11.11 VOMITING WITHOUT NAUSEA, UNSPECIFIED VOMITING TYPE: ICD-10-CM

## 2022-11-18 DIAGNOSIS — M35.9 UNDIFFERENTIATED CONNECTIVE TISSUE DISEASE (H): ICD-10-CM

## 2022-11-18 DIAGNOSIS — D50.9 IRON DEFICIENCY ANEMIA, UNSPECIFIED IRON DEFICIENCY ANEMIA TYPE: ICD-10-CM

## 2022-11-18 LAB
ALBUMIN SERPL BCG-MCNC: 4.8 G/DL (ref 3.5–5.2)
ALP SERPL-CCNC: 70 U/L (ref 35–104)
ALT SERPL W P-5'-P-CCNC: 8 U/L (ref 10–35)
ANION GAP SERPL CALCULATED.3IONS-SCNC: 11 MMOL/L (ref 7–15)
AST SERPL W P-5'-P-CCNC: 19 U/L (ref 10–35)
BASOPHILS # BLD AUTO: 0 10E3/UL (ref 0–0.2)
BASOPHILS NFR BLD AUTO: 1 %
BILIRUB SERPL-MCNC: 0.7 MG/DL
BUN SERPL-MCNC: 8 MG/DL (ref 6–20)
CALCIUM SERPL-MCNC: 10 MG/DL (ref 8.6–10)
CHLORIDE SERPL-SCNC: 105 MMOL/L (ref 98–107)
CREAT SERPL-MCNC: 0.7 MG/DL (ref 0.51–0.95)
DEPRECATED HCO3 PLAS-SCNC: 25 MMOL/L (ref 22–29)
EOSINOPHIL # BLD AUTO: 0.1 10E3/UL (ref 0–0.7)
EOSINOPHIL NFR BLD AUTO: 2 %
ERYTHROCYTE [DISTWIDTH] IN BLOOD BY AUTOMATED COUNT: 12.2 % (ref 10–15)
FERRITIN SERPL-MCNC: 58 NG/ML (ref 6–175)
GFR SERPL CREATININE-BSD FRML MDRD: >90 ML/MIN/1.73M2
GLUCOSE SERPL-MCNC: 78 MG/DL (ref 70–99)
HCT VFR BLD AUTO: 43.1 % (ref 35–47)
HGB BLD-MCNC: 14.2 G/DL (ref 11.7–15.7)
IMM GRANULOCYTES # BLD: 0 10E3/UL
IMM GRANULOCYTES NFR BLD: 0 %
IRON BINDING CAPACITY (ROCHE): 310 UG/DL (ref 240–430)
IRON SATN MFR SERPL: 41 % (ref 15–46)
IRON SERPL-MCNC: 128 UG/DL (ref 37–145)
LIPASE SERPL-CCNC: 31 U/L (ref 13–60)
LYMPHOCYTES # BLD AUTO: 2.1 10E3/UL (ref 0.8–5.3)
LYMPHOCYTES NFR BLD AUTO: 39 %
MCH RBC QN AUTO: 28.2 PG (ref 26.5–33)
MCHC RBC AUTO-ENTMCNC: 32.9 G/DL (ref 31.5–36.5)
MCV RBC AUTO: 86 FL (ref 78–100)
MONOCYTES # BLD AUTO: 0.3 10E3/UL (ref 0–1.3)
MONOCYTES NFR BLD AUTO: 6 %
NEUTROPHILS # BLD AUTO: 2.7 10E3/UL (ref 1.6–8.3)
NEUTROPHILS NFR BLD AUTO: 52 %
NRBC # BLD AUTO: 0 10E3/UL
NRBC BLD AUTO-RTO: 0 /100
PLATELET # BLD AUTO: 214 10E3/UL (ref 150–450)
POTASSIUM SERPL-SCNC: 4.2 MMOL/L (ref 3.4–5.3)
PROT SERPL-MCNC: 7.6 G/DL (ref 6.4–8.3)
RBC # BLD AUTO: 5.04 10E6/UL (ref 3.8–5.2)
SODIUM SERPL-SCNC: 141 MMOL/L (ref 136–145)
WBC # BLD AUTO: 5.3 10E3/UL (ref 4–11)

## 2022-11-18 PROCEDURE — 36415 COLL VENOUS BLD VENIPUNCTURE: CPT | Performed by: PATHOLOGY

## 2022-11-18 PROCEDURE — 83540 ASSAY OF IRON: CPT | Performed by: PATHOLOGY

## 2022-11-18 PROCEDURE — 99000 SPECIMEN HANDLING OFFICE-LAB: CPT | Performed by: PATHOLOGY

## 2022-11-18 PROCEDURE — 83690 ASSAY OF LIPASE: CPT | Performed by: PATHOLOGY

## 2022-11-18 PROCEDURE — 82728 ASSAY OF FERRITIN: CPT | Performed by: PATHOLOGY

## 2022-11-18 PROCEDURE — 83550 IRON BINDING TEST: CPT | Performed by: PATHOLOGY

## 2022-11-18 PROCEDURE — 80053 COMPREHEN METABOLIC PANEL: CPT | Performed by: PATHOLOGY

## 2022-11-18 PROCEDURE — 85025 COMPLETE CBC W/AUTO DIFF WBC: CPT | Performed by: PATHOLOGY

## 2022-11-18 RX ORDER — HYDROXYCHLOROQUINE SULFATE 200 MG/1
TABLET, FILM COATED ORAL
Qty: 135 TABLET | Refills: 3 | Status: SHIPPED | OUTPATIENT
Start: 2022-11-18 | End: 2023-01-13

## 2022-11-18 NOTE — TELEPHONE ENCOUNTER
Plaquenil      Last Written Prescription Date:  7/8/2022  Last Fill Quantity: 135,   # refills: 3  Last Office Visit: 7/8/2022  Future Office visit: 1/13/2023    Plaquenil Eye Exam    Date of last eye exam specifically commenting on HCQ toxicity: 11/14/2022  Clinic: hydroxychloroquine (PLAQUENIL) 200 MG tablet   Ophthalmologist: Penelope Yanez  Toxicity: NO  Records scanned to chart: Yes    Veena Brown CMA   11/18/2022 9:45 AM

## 2022-11-22 ENCOUNTER — TELEPHONE (OUTPATIENT)
Dept: OBGYN | Facility: CLINIC | Age: 42
End: 2022-11-22

## 2022-11-22 DIAGNOSIS — R30.0 DYSURIA: Primary | ICD-10-CM

## 2022-11-22 RX ORDER — NITROFURANTOIN 25; 75 MG/1; MG/1
100 CAPSULE ORAL 2 TIMES DAILY
Qty: 10 CAPSULE | Refills: 0 | Status: SHIPPED | OUTPATIENT
Start: 2022-11-22 | End: 2023-01-13

## 2022-11-22 NOTE — TELEPHONE ENCOUNTER
Pt calling c/o UTI symptoms. Reports burning with urination, unable to empty bladder completely, frequency, emptying small amounts of urine. Reports she has had many UTIs previously. Denies blood in urine, flank pain. UA ordered per patient request/protocol. Macrobid ordered per protocol. Patient instructed to follow up if symptoms continue or worsen after course of ABX. Patient agreeable.

## 2022-12-05 ENCOUNTER — MYC MEDICAL ADVICE (OUTPATIENT)
Dept: INTERNAL MEDICINE | Facility: CLINIC | Age: 42
End: 2022-12-05

## 2022-12-07 ENCOUNTER — TELEPHONE (OUTPATIENT)
Dept: OBGYN | Facility: CLINIC | Age: 42
End: 2022-12-07

## 2022-12-08 ENCOUNTER — NURSE TRIAGE (OUTPATIENT)
Dept: NURSING | Facility: CLINIC | Age: 42
End: 2022-12-08

## 2022-12-08 ENCOUNTER — TELEPHONE (OUTPATIENT)
Dept: INTERNAL MEDICINE | Facility: CLINIC | Age: 42
End: 2022-12-08

## 2022-12-08 NOTE — TELEPHONE ENCOUNTER
Heather has seen Cyndi Zarate in past for abdominal pain  Referred to GI   Cant get in until end of Dec   Abdominal pain continues   Is scheduled to follow-up with Cyndi next week.  Wondering if she could get some imaging done before the appt      Will forward to primary care team to review and call pt back with recommendations                    Reason for Disposition    MODERATE pain (e.g., interferes with normal activities that comes and goes (cramps) lasts > 24 hours  (Exception: Pain with Vomiting or Diarrhea - see that Protocol.)    Additional Information    Negative: Passed out (i.e., fainted, collapsed and was not responding)    Negative: Shock suspected (e.g., cold/pale/clammy skin, too weak to stand, low BP, rapid pulse)    Negative: Sounds like a life-threatening emergency to the triager    Negative: Chest pain    Negative: Pain is mainly in upper abdomen (if needed ask: 'is it mainly above the belly button?')    Negative: Abdominal pain and pregnant < 20 weeks    Negative: Abdominal pain and pregnant 20 or more weeks    Negative: SEVERE abdominal pain (e.g., excruciating)    Negative: Vomiting red blood or black (coffee ground) material    Negative: Bloody, black, or tarry bowel movements  (Exception: Chronic-unchanged black-grey bowel movements and is taking iron pills or Pepto-Bismol.)    Negative: Constant abdominal pain lasting > 2 hours    Negative: Vomiting bile (green color)    Negative: Patient sounds very sick or weak to the triager    Negative: Vomiting and abdomen looks much more swollen than usual    Negative: White of the eyes have turned yellow (i.e., jaundice)    Negative: Blood in urine (red, pink, or tea-colored)    Negative: Fever > 103 F (39.4 C)    Negative: Fever > 101 F (38.3 C) and over 60 years of age    Negative: Fever > 100.0 F (37.8 C) and has diabetes mellitus or a weak immune system (e.g., HIV positive, cancer chemotherapy, organ transplant, splenectomy, chronic  "steroids)    Negative: Fever > 100.0 F (37.8 C) and bedridden (e.g., nursing home patient, stroke, chronic illness, recovering from surgery)    Negative: Pregnant or could be pregnant (i.e., missed last menstrual period)    Answer Assessment - Initial Assessment Questions  1. LOCATION: \"Where does it hurt?\"       Below left rib cage  Radiates to back on left side  2. RADIATION: \"Does the pain shoot anywhere else?\" (e.g., chest, back)      Yes radiates to back on left side  3. ONSET: \"When did the pain begin?\" (e.g., minutes, hours or days ago)       Couple months ago  4. SUDDEN: \"Gradual or sudden onset?\"      ?  5. PATTERN \"Does the pain come and go, or is it constant?\"     - If constant: \"Is it getting better, staying the same, or worsening?\"       (Note: Constant means the pain never goes away completely; most serious pain is constant and it progresses)      - If intermittent: \"How long does it last?\" \"Do you have pain now?\"      (Note: Intermittent means the pain goes away completely between bouts)      Constant  But worse after eating or drinking  6. SEVERITY: \"How bad is the pain?\"  (e.g., Scale 1-10; mild, moderate, or severe)    - MILD (1-3): doesn't interfere with normal activities, abdomen soft and not tender to touch     - MODERATE (4-7): interferes with normal activities or awakens from sleep, abdomen tender to touch     - SEVERE (8-10): excruciating pain, doubled over, unable to do any normal activities       moderate  7. RECURRENT SYMPTOM: \"Have you ever had this type of stomach pain before?\" If Yes, ask: \"When was the last time?\" and \"What happened that time?\"       no  8. CAUSE: \"What do you think is causing the stomach pain?\"      Not sure  9. RELIEVING/AGGRAVATING FACTORS: \"What makes it better or worse?\" (e.g., movement, antacids, bowel movement)      Eating makes it worse  When stomach is full  10. OTHER SYMPTOMS: \"Do you have any other symptoms?\" (e.g., back pain, diarrhea, fever, urination " "pain, vomiting)        Was seen in ER and diagnosed with UTI  On antibx    states normal BMS    Has had 8-10 lbx of wt loss in last 2 months  11. PREGNANCY: \"Is there any chance you are pregnant?\" \"When was your last menstrual period?\"        no    Protocols used: ABDOMINAL PAIN - FEMALE-A-OH    "

## 2022-12-08 NOTE — TELEPHONE ENCOUNTER
Heather is unable to make 0830 or 1130 appointments tomorrow.     I will update PCP.    Sayda Cotto) CYNTHIA Espitia

## 2022-12-08 NOTE — TELEPHONE ENCOUNTER
I called Heather to offer sooner follow-up (appointment available tomorrow). She will need to check in with her  to see if he can manage . She will update the clinic.    Sayda Espitia RN ([place])

## 2022-12-13 ENCOUNTER — TELEPHONE (OUTPATIENT)
Dept: INTERNAL MEDICINE | Facility: CLINIC | Age: 42
End: 2022-12-13

## 2022-12-13 ENCOUNTER — VIRTUAL VISIT (OUTPATIENT)
Dept: INTERNAL MEDICINE | Facility: CLINIC | Age: 42
End: 2022-12-13
Payer: COMMERCIAL

## 2022-12-13 DIAGNOSIS — N39.0 RECURRENT UTI: ICD-10-CM

## 2022-12-13 DIAGNOSIS — R10.12 LUQ ABDOMINAL PAIN: Primary | ICD-10-CM

## 2022-12-13 PROCEDURE — 99214 OFFICE O/P EST MOD 30 MIN: CPT | Mod: 95 | Performed by: NURSE PRACTITIONER

## 2022-12-13 NOTE — PROGRESS NOTES
Heather is a 42 year old who is being evaluated via a billable video visit.      How would you like to obtain your AVS? MyChart  If the video visit is dropped, the invitation should be resent by: Text to cell phone: [phone]  Will anyone else be joining your video visit? No     Veronika Garcia VF      Assessment & Plan     LUQ abdominal pain  Ongoing LUQ over the past 2 months, is tender to the touch--will proceed with abdominal CT for further evaluation. Keep upcoming EGD/Colonoscopy as scheduled. Continue with small frequent meals to help prevent exacerbating symptoms.   - CT Abdomen w Contrast; Future    Recurrent UTI  Hx ESBL UTI; symptoms do seem to be improving with most recent extended course of Macrobid, if symptoms return should repeat UA/UC. She agrees with the plan.  - UA with Micro reflex to Culture; Future      32 minutes spent on the date of the encounter doing chart review, history and exam, documentation and further activities per the note       Return in about 4 weeks (around 1/10/2023).  Or sooner prn if symptoms worsening    Cyndi Zarate, HARVEY [facility] INTERNAL MEDICINE [facility]   Heather is a 42 year old, presenting for the following health issues:  No chief complaint on file.      HPI     Follow-up from visit on 10/81/22. Has EGD/Colonoscopy scheduled, but wasn't able to get in until 12/29/22.  Has been really uncomfortable on a daily basis with ongoing pain in LUQ.  Pain located primarily L-side just under the ribs and radiates into the middle, has significant pain, wants to jump if she presses on it. R side-mildly tender, but really uncomfortable on the left.  Symptoms are much worse after eating or drinking. Has tried to avoid eating at times. Even drinking a lot of liquid can hurt more, more volume exacerbates the pain. No known specific food triggers. Light/small meals are okay, so trying to eat more frequently throughout the day.  "Weight has been stable overall.  Discomfort doesn't ever fully resolve.   Not waking from sleep, but does notice at night or when she first wakes up. Not a shooting pain, is more of an \"inflamed pain\", notices before eating or drinking.     Had UTI sx, had blood in the urine, urgency/burning; was prescribed a course of macrobid x5 days (no culture done initieally). Symptoms were starting to improve but 1 week later had similar sx, seen at [address] [place] [address], urine culture done and initially on Cipro but concern for prolonged QT (d/t interaction with Plaquenil), switched to Augmentin x2 d, then switched d/t sensitivities, Macrobid x10 days (12/7/22). Symptoms seem to be improving. Had flank pain, which has since resolved and no longer dysuria or hematuria.     Review of Systems   Constitutional, HEENT, cardiovascular, pulmonary, gi and gu systems are negative, except as otherwise noted.      Objective    Vitals - Patient Reported  Weight (Patient Reported): 68.9 kg (152 lb)  Height (Patient Reported): 170.2 cm (5' 7\")  BMI (Based on Pt Reported Ht/Wt): 23.81  Pain Loc: Other - see comment (constant discomfort)        Physical Exam   GENERAL: Healthy, alert and no distress  EYES: Eyes grossly normal to inspection.  No discharge or erythema, or obvious scleral/conjunctival abnormalities.  RESP: No audible wheeze, cough, or visible cyanosis.  No visible retractions or increased work of breathing.    SKIN: Visible skin clear. No significant rash, abnormal pigmentation or lesions.  NEURO: Cranial nerves grossly intact.  Mentation and speech appropriate for age.  PSYCH: Mentation appears normal, affect normal/bright, judgement and insight intact, normal speech and appearance well-groomed.                Video-Visit Details    Video Start Time: 10:04 AM    Type of service:  Video Visit    Video End Time:10:25 AM    Originating Location (pt. Location): Home        Distant Location (provider location):  Off-site    Platform " used for Video Visit: Lorenzo

## 2022-12-13 NOTE — PATIENT INSTRUCTIONS
Thank you for visiting the Primary Care Center today at the [facility]! The following is some information about our clinic:     Primary Care Center Frequently-Asked Questions    (1) How do I schedule appointments at the [facility]?     Primary Care--to schedule or make changes to an existing appointment, please call our primary care line at [phone].    Labs--to schedule a lab appointment at the [facility] you can use ContestMachine or call [phone]. If you have a [place] location that is closer to home, you can reach out to that location for scheduling options.     Imaging--if you need to schedule a CT, X-ray, MRI, ultrasound, or other imaging study you can call [phone] to schedule at the [facility] or any other [facility] imaging location.     Referrals--if a referral to another specialty was ordered you can expect a phone call from their scheduling team. If you have not heard from them in a week, please call us or send us a ContestMachine message to check the status or get a scheduling number. Please note that this only applies to internal [facility] referrals. If the referral is external you would need to contact their office for scheduling.     (2) I have a question about my visit, who do I contact?     You can call us at the primary care line at [phone] to ask questions about your visit. You can also send a secure message through ContestMachine, which is reviewed by clinic staff. Please note that ContestMachine messages have a twenty-four to forty-eight business hour turnaround time and should not be used for urgent concerns.    (3) How will I get the results of my tests?    If you are signed up for [facility] all tests will be released to you within twenty-four hours of resulting. Please allow three to five days for your doctor to review your results and place a note interpreting the results. If you do not have DentLighthart you will receive your  results through mail seven to ten business days following the return of the tests. Please note that if there should be any urgent or concerning results that your doctor or their registered nurse will reach out to you the same day as the tests come back. If you have follow up questions about your results or would like to discuss the results in detail please schedule a follow up with your provider either in person or virtually.     (4) How do I get refills of my prescriptions?     You should always first contact your pharmacy for refills of your medications. If submitting a refill request on [employer], please be sure to submit the request only once--repeat requests can cause delays in refill. If you are requesting a NEW medication or a medication related to new symptoms you will need to schedule an appointment with a provider prior to approval. Please note: Routine medication refills have up to one to three business day turnaround whereas controlled substances refills have up to five to seven business day turnaround.    (5) I have new symptoms, what do I do?     If you are having an immediate medical emergency, you should dial 911 for assistance.   For anything urgent that needs to be seen within a few hours to one day you should visit a local urgent care for assistance.  For non-urgent symptoms that need to be seen within a few days to a week you can schedule with an available provider in primary care by going to [facility] or calling [phone].   If you are not sure how serious your symptoms are or you would like to receive medical advice you can always call [phone] to speak with a triage nurse.

## 2022-12-13 NOTE — Clinical Note
Hi, could you help Heather schedule a CT scan (hoping to get within the next day or so?). Thanks! -Cyndi

## 2022-12-14 ENCOUNTER — MYC MEDICAL ADVICE (OUTPATIENT)
Dept: INTERNAL MEDICINE | Facility: CLINIC | Age: 42
End: 2022-12-14

## 2022-12-14 DIAGNOSIS — R10.12 LUQ ABDOMINAL PAIN: Primary | ICD-10-CM

## 2022-12-14 NOTE — TELEPHONE ENCOUNTER
Okay to start with abdominal US as alternative to CT for now given patient's concern for radiation exposure.   HARVEY Ward CNP

## 2022-12-15 ENCOUNTER — HOSPITAL ENCOUNTER (OUTPATIENT)
Dept: ULTRASOUND IMAGING | Facility: CLINIC | Age: 42
Discharge: HOME | End: 2022-12-15
Attending: NURSE PRACTITIONER | Admitting: NURSE PRACTITIONER
Payer: COMMERCIAL

## 2022-12-15 DIAGNOSIS — R10.12 LUQ ABDOMINAL PAIN: ICD-10-CM

## 2022-12-15 PROCEDURE — 76700 US EXAM ABDOM COMPLETE: CPT

## 2022-12-16 ENCOUNTER — TELEPHONE (OUTPATIENT)
Dept: GASTROENTEROLOGY | Facility: CLINIC | Age: 42
End: 2022-12-16

## 2022-12-16 DIAGNOSIS — D64.9 ANEMIA: Primary | ICD-10-CM

## 2022-12-16 RX ORDER — BISACODYL 5 MG
TABLET, DELAYED RELEASE (ENTERIC COATED) ORAL
Qty: 4 TABLET | Refills: 0 | Status: SHIPPED | OUTPATIENT
Start: 2022-12-16 | End: 2023-01-25

## 2022-12-16 RX ORDER — BISACODYL 5 MG
TABLET, DELAYED RELEASE (ENTERIC COATED) ORAL
Qty: 4 TABLET | Refills: 0 | Status: SHIPPED | OUTPATIENT
Start: 2022-12-16 | End: 2022-12-16

## 2022-12-16 NOTE — TELEPHONE ENCOUNTER
Pre assessment questions completed for upcoming colonoscopy/EGD procedure scheduled on 12/29/22    COVID policy reviewed.     Pre-op scheduled  N/A    Reviewed procedural arrival time 1315 and facility location [facility]; [address] [place] [address], [address] Floor, [place] [address]    Designated  policy reviewed. Instructed to have someone stay 6 hours post procedure.     Anticoagulation/blood thinners? No    Electronic implanted devices? No    Diabetic? No    Procedure indication: anemia/ nausea and vomiting    Bowel prep recommendation: Standard Golytely     Reviewed procedure prep instructions.     Prep instructions sent via E[employer]s.  Bowel prep script sent to     [facility] #[id number] - [place] - [address] AT [facility] OF [address]     Patient verbalized understanding and had no questions or concerns at this time.    RITA HENRY RN

## 2022-12-16 NOTE — TELEPHONE ENCOUNTER
The Pt called in requesting script be transferred to [facility] in [place].   Transferred script.     Conchis Malik RN   -[facility]

## 2022-12-29 ENCOUNTER — ANESTHESIA (OUTPATIENT)
Dept: SURGERY | Facility: AMBULATORY SURGERY CENTER | Age: 42
End: 2022-12-29
Payer: COMMERCIAL

## 2022-12-29 ENCOUNTER — ANESTHESIA EVENT (OUTPATIENT)
Dept: SURGERY | Facility: AMBULATORY SURGERY CENTER | Age: 42
End: 2022-12-29
Payer: COMMERCIAL

## 2022-12-29 ENCOUNTER — HOSPITAL ENCOUNTER (OUTPATIENT)
Facility: AMBULATORY SURGERY CENTER | Age: 42
Discharge: HOME | End: 2022-12-29
Attending: INTERNAL MEDICINE
Payer: COMMERCIAL

## 2022-12-29 VITALS
DIASTOLIC BLOOD PRESSURE: 68 MMHG | RESPIRATION RATE: 16 BRPM | WEIGHT: 152 LBS | BODY MASS INDEX: 23.86 KG/M2 | SYSTOLIC BLOOD PRESSURE: 101 MMHG | HEIGHT: 67 IN | TEMPERATURE: 97 F | OXYGEN SATURATION: 100 %

## 2022-12-29 VITALS — HEART RATE: 68 BPM

## 2022-12-29 DIAGNOSIS — K25.9 GASTRIC EROSION, UNSPECIFIED CHRONICITY: Primary | ICD-10-CM

## 2022-12-29 DIAGNOSIS — K64.9 HEMORRHOIDS, UNSPECIFIED HEMORRHOID TYPE: ICD-10-CM

## 2022-12-29 LAB
COLONOSCOPY: NORMAL
HCG UR QL: NEGATIVE
INTERNAL QC OK POCT: NORMAL
POCT KIT EXPIRATION DATE: NORMAL
POCT KIT LOT NUMBER: NORMAL
UPPER GI ENDOSCOPY: NORMAL

## 2022-12-29 PROCEDURE — 88342 IMHCHEM/IMCYTCHM 1ST ANTB: CPT | Mod: 26 | Performed by: PATHOLOGY

## 2022-12-29 PROCEDURE — 88342 IMHCHEM/IMCYTCHM 1ST ANTB: CPT | Mod: TC | Performed by: INTERNAL MEDICINE

## 2022-12-29 PROCEDURE — 81025 URINE PREGNANCY TEST: CPT | Performed by: PATHOLOGY

## 2022-12-29 PROCEDURE — 43239 EGD BIOPSY SINGLE/MULTIPLE: CPT

## 2022-12-29 PROCEDURE — 45380 COLONOSCOPY AND BIOPSY: CPT

## 2022-12-29 RX ORDER — SODIUM CHLORIDE, SODIUM LACTATE, POTASSIUM CHLORIDE, CALCIUM CHLORIDE 600; 310; 30; 20 MG/100ML; MG/100ML; MG/100ML; MG/100ML
INJECTION, SOLUTION INTRAVENOUS CONTINUOUS
Status: DISCONTINUED | OUTPATIENT
Start: 2022-12-29 | End: 2022-12-31 | Stop reason: HOSPADM

## 2022-12-29 RX ORDER — LIDOCAINE 40 MG/G
CREAM TOPICAL
Status: DISCONTINUED | OUTPATIENT
Start: 2022-12-29 | End: 2022-12-31 | Stop reason: HOSPADM

## 2022-12-29 RX ORDER — PROPOFOL 10 MG/ML
INJECTION, EMULSION INTRAVENOUS PRN
Status: DISCONTINUED | OUTPATIENT
Start: 2022-12-29 | End: 2022-12-29

## 2022-12-29 RX ORDER — ONDANSETRON 2 MG/ML
4 INJECTION INTRAMUSCULAR; INTRAVENOUS EVERY 6 HOURS PRN
Status: CANCELLED | OUTPATIENT
Start: 2022-12-29

## 2022-12-29 RX ORDER — NALOXONE HYDROCHLORIDE 0.4 MG/ML
0.2 INJECTION, SOLUTION INTRAMUSCULAR; INTRAVENOUS; SUBCUTANEOUS
Status: CANCELLED | OUTPATIENT
Start: 2022-12-29

## 2022-12-29 RX ORDER — LIDOCAINE HYDROCHLORIDE 20 MG/ML
INJECTION, SOLUTION INFILTRATION; PERINEURAL PRN
Status: DISCONTINUED | OUTPATIENT
Start: 2022-12-29 | End: 2022-12-29

## 2022-12-29 RX ORDER — ONDANSETRON 4 MG/1
4 TABLET, ORALLY DISINTEGRATING ORAL EVERY 6 HOURS PRN
Status: CANCELLED | OUTPATIENT
Start: 2022-12-29

## 2022-12-29 RX ORDER — PROCHLORPERAZINE MALEATE 10 MG
10 TABLET ORAL EVERY 6 HOURS PRN
Status: CANCELLED | OUTPATIENT
Start: 2022-12-29

## 2022-12-29 RX ORDER — FLUMAZENIL 0.1 MG/ML
0.2 INJECTION, SOLUTION INTRAVENOUS
Status: CANCELLED | OUTPATIENT
Start: 2022-12-29 | End: 2022-12-30

## 2022-12-29 RX ORDER — NALOXONE HYDROCHLORIDE 0.4 MG/ML
0.4 INJECTION, SOLUTION INTRAMUSCULAR; INTRAVENOUS; SUBCUTANEOUS
Status: CANCELLED | OUTPATIENT
Start: 2022-12-29

## 2022-12-29 RX ORDER — ZINC OXIDE AND COCOA BUTTER 270; 2052 MG/1; MG/1
1 SUPPOSITORY RECTAL AT BEDTIME
Qty: 30 SUPPOSITORY | Refills: 1 | Status: SHIPPED | OUTPATIENT
Start: 2022-12-29 | End: 2023-04-11

## 2022-12-29 RX ORDER — PROPOFOL 10 MG/ML
INJECTION, EMULSION INTRAVENOUS CONTINUOUS PRN
Status: DISCONTINUED | OUTPATIENT
Start: 2022-12-29 | End: 2022-12-29

## 2022-12-29 RX ORDER — ONDANSETRON 2 MG/ML
4 INJECTION INTRAMUSCULAR; INTRAVENOUS
Status: DISCONTINUED | OUTPATIENT
Start: 2022-12-29 | End: 2022-12-31 | Stop reason: HOSPADM

## 2022-12-29 RX ADMIN — PROPOFOL 60 MG: 10 INJECTION, EMULSION INTRAVENOUS at 13:23

## 2022-12-29 RX ADMIN — SODIUM CHLORIDE, SODIUM LACTATE, POTASSIUM CHLORIDE, CALCIUM CHLORIDE: 600; 310; 30; 20 INJECTION, SOLUTION INTRAVENOUS at 12:45

## 2022-12-29 RX ADMIN — PROPOFOL 250 MCG/KG/MIN: 10 INJECTION, EMULSION INTRAVENOUS at 13:23

## 2022-12-29 RX ADMIN — LIDOCAINE HYDROCHLORIDE 50 MG: 20 INJECTION, SOLUTION INFILTRATION; PERINEURAL at 13:23

## 2022-12-29 RX ADMIN — PROPOFOL 150 MCG/KG/MIN: 10 INJECTION, EMULSION INTRAVENOUS at 13:49

## 2022-12-29 RX ADMIN — Medication 100 MCG: at 13:36

## 2022-12-29 NOTE — ANESTHESIA PREPROCEDURE EVALUATION
Anesthesia Pre-Procedure Evaluation    Patient: Heather Guillory   MRN: [id number] : 1980        Procedure : Procedure(s):  ESOPHAGOGASTRODUODENOSCOPY (EGD)  COLONOSCOPY          Past Medical History:   Diagnosis Date     JAVIER (generalized anxiety disorder)     50mg zoloft     Rosacea      Undifferentiated connective tissue disease (H)       Past Surgical History:   Procedure Laterality Date     COLONOSCOPY N/A 10/21/2020    Procedure: COLONOSCOPY;  Surgeon: Yang Pete MD;  Location: [facility] OR     DAVINCI MYOMECTOMY N/A 2022    Procedure: MYOMECTOMY, UTERUS, ROBOT-ASSISTED, LAPAROSCOPIC;  Surgeon: Cate Mansfield MD;  Location: [facility]     IR ENDOVENOUS ABLATION VARICOSE VEINS  10/28/2019     IR FOLLOW UP VISIT OUTPATIENT  2019     IR STAB PHLEBECTOMY 10 - 20 STABS  10/28/2019     IR VEIN SCLEROSING MULTIPLE  10/28/2019     LAPAROSCOPIC SALPINGECTOMY Bilateral 2021    Procedure: SALPINGECTOMY, LAPAROSCOPIC BILATERAL;  Surgeon: Cate Mansfield MD;  Location: [facility]     OPERATIVE HYSTEROSCOPY WITH MORCELLATOR  2014    Procedure: OPERATIVE HYSTEROSCOPY WITH MORCELLATOR;  Hysteroscopic Polypectomy;  Surgeon: Cate Mansfield MD;  Location: [facility]     REMOVE INTRAUTERINE DEVICE N/A 2021    Procedure: removal of intrauterine device;  Surgeon: Cate Mansfield MD;  Location: [facility]      Allergies   Allergen Reactions     Latex      Benzoyl Peroxide Swelling     Duricef [Cefadroxil] Unknown     Monistat [Miconazole] Swelling     Sulfa Drugs Rash      Social History     Tobacco Use     Smoking status: Never     Smokeless tobacco: Never   Substance Use Topics     Alcohol use: No     Alcohol/week: 0.0 standard drinks      Wt Readings from Last 1 Encounters:   22 68.9 kg (152 lb)        Anesthesia Evaluation            ROS/MED HX  ENT/Pulmonary:  - neg pulmonary ROS     Neurologic:  - neg neurologic ROS     Cardiovascular:       METS/Exercise Tolerance: >4  METS    Hematologic:  - neg hematologic  ROS     Musculoskeletal:  - neg musculoskeletal ROS     GI/Hepatic:  - neg GI/hepatic ROS     Renal/Genitourinary:  - neg Renal ROS     Endo:  - neg endo ROS     Psychiatric/Substance Use:  - neg psychiatric ROS     Infectious Disease:  - neg infectious disease ROS     Malignancy:       Other: Comment: Auto immune disorder           Physical Exam    Airway        Mallampati: II   TM distance: > 3 FB   Neck ROM: full   Mouth opening: > 3 cm    Respiratory Devices and Support         Dental  no notable dental history         Cardiovascular   cardiovascular exam normal          Pulmonary   pulmonary exam normal                OUTSIDE LABS:  CBC:   Lab Results   Component Value Date    WBC 5.3 11/18/2022    WBC 6.8 07/08/2022    HGB 14.2 11/18/2022    HGB 11.5 (L) 07/08/2022    HCT 43.1 11/18/2022    HCT 37.2 07/08/2022     11/18/2022     07/08/2022     BMP:   Lab Results   Component Value Date     11/18/2022     02/25/2022    POTASSIUM 4.2 11/18/2022    POTASSIUM 4.0 02/25/2022    CHLORIDE 105 11/18/2022    CHLORIDE 108 02/25/2022    CO2 25 11/18/2022    CO2 27 02/25/2022    BUN 8.0 11/18/2022    BUN 6 (L) 02/25/2022    CR 0.70 11/18/2022    CR 0.60 07/08/2022    GLC 78 11/18/2022    GLC 99 02/25/2022     COAGS:   Lab Results   Component Value Date    INR 1.00 02/17/2022     POC:   Lab Results   Component Value Date    BGM 96 04/23/2021    HCG Negative 12/29/2022    HCGS Negative 02/17/2022     HEPATIC:   Lab Results   Component Value Date    ALBUMIN 4.8 11/18/2022    PROTTOTAL 7.6 11/18/2022    ALT 8 (L) 11/18/2022    AST 19 11/18/2022    ALKPHOS 70 11/18/2022    BILITOTAL 0.7 11/18/2022     OTHER:   Lab Results   Component Value Date    A1C 5.1 07/16/2020    PRICILA 10.0 11/18/2022    MAG 1.7 02/13/2022    LIPASE 31 11/18/2022    TSH 0.43 02/13/2022    CRP <2.9 07/08/2022    SED 6 07/08/2022       Anesthesia Plan    ASA Status:  2   NPO Status:  NPO  Appropriate    Anesthesia Type: MAC.   Induction: Intravenous.           Consents            Postoperative Care    Pain management: IV analgesics, Multi-modal analgesia.   PONV prophylaxis: Ondansetron (or other 5HT-3), Background Propofol Infusion, Dexamethasone or Solumedrol     Comments:                John Merida DO

## 2022-12-29 NOTE — ANESTHESIA CARE TRANSFER NOTE
Patient: Heather Guillory    Procedure: Procedure(s):  ESOPHAGOGASTRODUODENOSCOPY, WITH BIOPSY  COLONOSCOPY, WITH BIOPSY       Diagnosis: Iron deficiency anemia, unspecified iron deficiency anemia type [D50.9]  Diagnosis Additional Information: No value filed.    Anesthesia Type:   MAC     Note:    Oropharynx: spontaneously breathing  Level of Consciousness: drowsy  Oxygen Supplementation: room air    Independent Airway: airway patency satisfactory and stable  Dentition: dentition changed  Vital Signs Stable: post-procedure vital signs reviewed and stable  Report to RN Given: handoff report given  Patient transferred to: Phase II    Handoff Report: Identifed the Patient, Identified the Reponsible Provider, Reviewed the pertinent medical history, Discussed the surgical course, Reviewed Intra-OP anesthesia mangement and issues during anesthesia, Set expectations for post-procedure period and Allowed opportunity for questions and acknowledgement of understanding      Vitals:  Vitals Value Taken Time   BP     Temp     Pulse 68 12/29/22 1405   Resp     SpO2         Electronically Signed By: HARVEY Aguillon CRNA  December 29, 2022  2:11 PM

## 2022-12-29 NOTE — ANESTHESIA POSTPROCEDURE EVALUATION
Patient: Heather Guillory    Procedure: Procedure(s):  ESOPHAGOGASTRODUODENOSCOPY, WITH BIOPSY  COLONOSCOPY, WITH BIOPSY       Anesthesia Type:  MAC    Note:  Disposition: Outpatient   Postop Pain Control: Uneventful            Sign Out: Well controlled pain   PONV: No   Neuro/Psych: Uneventful            Sign Out: Acceptable/Baseline neuro status   Airway/Respiratory: Uneventful            Sign Out: Acceptable/Baseline resp. status   CV/Hemodynamics: Uneventful            Sign Out: Acceptable CV status; No obvious hypovolemia; No obvious fluid overload   Other NRE: NONE   DID A NON-ROUTINE EVENT OCCUR? No           Last vitals:  Vitals Value Taken Time   BP 89/49 12/29/22 1414   Temp 36.4  C (97.5  F) 12/29/22 1414   Pulse     Resp 14 12/29/22 1414   SpO2 100 % 12/29/22 1414       Electronically Signed By: John Merida DO  December 29, 2022  2:19 PM

## 2022-12-29 NOTE — H&P
Heather Guillory  [id number]  female  42 year old      Reason for procedure/surgery: LUQ pain, low Hb, diarrhea    Patient Active Problem List   Diagnosis     JAVIER (generalized anxiety disorder)     Undifferentiated connective tissue disease (H)     Rosacea     Varicose veins of left lower extremity with pain     Encounter for sterilization     Pelvic floor dysfunction     Urinary urgency     Urinary frequency     Abnormal LFTs     Other specified anxiety disorders     Other specified eating disorder     Female sexual interest/arousal disorder, acquired, generalized, moderate       Past Surgical History:    Past Surgical History:   Procedure Laterality Date     COLONOSCOPY N/A 10/21/2020    Procedure: COLONOSCOPY;  Surgeon: Yang Pete MD;  Location: [facility] OR     DAVINCI MYOMECTOMY N/A 2/11/2022    Procedure: MYOMECTOMY, UTERUS, ROBOT-ASSISTED, LAPAROSCOPIC;  Surgeon: Cate Mansfield MD;  [facility]     IR ENDOVENOUS ABLATION VARICOSE VEINS  10/28/2019     IR FOLLOW UP VISIT OUTPATIENT  11/20/2019     IR STAB PHLEBECTOMY 10 - 20 STABS  10/28/2019     IR VEIN SCLEROSING MULTIPLE  10/28/2019     LAPAROSCOPIC SALPINGECTOMY Bilateral 4/23/2021    Procedure: SALPINGECTOMY, LAPAROSCOPIC BILATERAL;  Surgeon: Cate Mansfield MD;  [facility]     OPERATIVE HYSTEROSCOPY WITH MORCELLATOR  4/8/2014    Procedure: OPERATIVE HYSTEROSCOPY WITH MORCELLATOR;  Hysteroscopic Polypectomy;  Surgeon: Cate Mansfield MD;  [facility]     REMOVE INTRAUTERINE DEVICE N/A 4/23/2021    Procedure: removal of intrauterine device;  Surgeon: Cate Mansfield MD;  Location: [facility]       Past Medical History:   Past Medical History:   Diagnosis Date     JAVIER (generalized anxiety disorder)     50mg zoloft     Rosacea      Undifferentiated connective tissue disease (H)        Social History:   Social History     Tobacco Use     Smoking status: Never     Smokeless tobacco: Never   Substance Use Topics      Alcohol use: No     Alcohol/week: 0.0 standard drinks       Family History:   Family History   Problem Relation Age of Onset     Elijah Disease Sister      Arrhythmia Brother         details unknown; procedure in his 20s     Anxiety Disorder Brother      Alzheimer Disease Maternal Grandmother      Cervical Cancer Maternal Grandmother      Colon Cancer Maternal Grandfather      Breast Cancer Paternal Grandmother      Hypertension Paternal Grandmother      Hypertension Paternal Grandfather      Lung Cancer Paternal Grandfather         smoker     Diabetes Type 2  Paternal Grandfather      Myocardial Infarction Paternal Grandfather      Myocardial Infarction Paternal Aunt 45     Cerebrovascular Disease No family hx of      Coronary Artery Disease Early Onset No family hx of      Ovarian Cancer No family hx of        Allergies:   Allergies   Allergen Reactions     Latex      Benzoyl Peroxide Swelling     Duricef [Cefadroxil] Unknown     Monistat [Miconazole] Swelling     Sulfa Drugs Rash       Active Medications:   Current Outpatient Medications   Medication Sig Dispense Refill     bisacodyl (DULCOLAX) 5 MG EC tablet Take 2 tablets at 3 pm the day before your procedure. If your procedure is before 11 am, take 2 additional tablets at 11 pm. If your procedure is after 11 am, take 2 additional tablets at 6 am. For additional instructions refer to your colonoscopy prep instructions. 4 tablet 0     ferrous sulfate (FE TABS) 325 (65 Fe) MG EC tablet Take 1 tablet (325 mg) by mouth daily 90 tablet 1     hydroxychloroquine (PLAQUENIL) 200 MG tablet plaquenil 2 tabs on Mon/Wed/Fri then 1 tab a day the rest of the week 135 tablet 3     metroNIDAZOLE (METROCREAM) 0.75 % external cream Apply topically 2 times daily       nitroFURantoin macrocrystal-monohydrate (MACROBID) 100 MG capsule Take 1 capsule (100 mg) by mouth 2 times daily 10 capsule 0     omeprazole (PRILOSEC) 20 MG DR capsule Take 1 capsule (20 mg) by mouth daily 60  "capsule 1     polyethylene glycol (GOLYTELY) 236 g suspension The night before the exam at 6 pm drink an 8-ounce glass every 15 minutes until the jug is half empty. If you arrive before 11 AM: Drink the other half of the Golytely jug at 11 PM night before procedure. If you arrive after 11 AM: Drink the other half of the Golytely jug at 6 AM day of procedure. For additional instructions refer to your colonoscopy prep instructions. 4000 mL 0     Prenatal Vit-Fe Fumarate-FA (PRENATAL VITAMIN AND MINERAL PO) Take 1 tablet by mouth daily        COMPRESSION STOCKINGS Please measure and distribute 2 pair of 20mmHg - 30mmHg knee high open or closed toe compression stockings with extra refills as indicated or what insurance will allow . (Patient not taking: Reported on 10/18/2022) 2 each 4       Systemic Review:   CONSTITUTIONAL: NEGATIVE for fever, chills, change in weight  ENT/MOUTH: NEGATIVE for ear, mouth and throat problems  RESP: NEGATIVE for significant cough or SOB  CV: NEGATIVE for chest pain, palpitations or peripheral edema    Physical Examination:   Vital Signs: BP 95/72   Temp 98.7  F (37.1  C) (Temporal)   Resp 16   Ht 1.702 m (5' 7\")   Wt 68.9 kg (152 lb)   SpO2 100%   BMI 23.81 kg/m    GENERAL:  alert and no distress  RESP: clear   CV: rrr  ABDOMEN: soft  MS: no gross musculoskeletal defects noted, no edema    Plan: Appropriate to proceed as scheduled.      Magda Lobo MD  12/29/2022    PCP:  Cnydi Zarate    "

## 2022-12-30 PROCEDURE — 88305 TISSUE EXAM BY PATHOLOGIST: CPT | Mod: TC | Performed by: INTERNAL MEDICINE

## 2022-12-30 PROCEDURE — 88305 TISSUE EXAM BY PATHOLOGIST: CPT | Mod: 26 | Performed by: PATHOLOGY

## 2023-01-02 LAB
PATH REPORT.ADDENDUM SPEC: NORMAL
PATH REPORT.COMMENTS IMP SPEC: NORMAL
PATH REPORT.FINAL DX SPEC: NORMAL
PATH REPORT.GROSS SPEC: NORMAL
PATH REPORT.MICROSCOPIC SPEC OTHER STN: NORMAL
PATH REPORT.RELEVANT HX SPEC: NORMAL
PHOTO IMAGE: NORMAL

## 2023-01-13 ENCOUNTER — OFFICE VISIT (OUTPATIENT)
Dept: RHEUMATOLOGY | Facility: CLINIC | Age: 43
End: 2023-01-13
Attending: INTERNAL MEDICINE
Payer: COMMERCIAL

## 2023-01-13 ENCOUNTER — LAB (OUTPATIENT)
Dept: LAB | Facility: CLINIC | Age: 43
End: 2023-01-13
Payer: COMMERCIAL

## 2023-01-13 ENCOUNTER — OFFICE VISIT (OUTPATIENT)
Dept: INTERNAL MEDICINE | Facility: CLINIC | Age: 43
End: 2023-01-13
Payer: COMMERCIAL

## 2023-01-13 VITALS
OXYGEN SATURATION: 99 % | HEART RATE: 64 BPM | HEIGHT: 67 IN | WEIGHT: 156.4 LBS | DIASTOLIC BLOOD PRESSURE: 69 MMHG | BODY MASS INDEX: 24.55 KG/M2 | SYSTOLIC BLOOD PRESSURE: 101 MMHG

## 2023-01-13 VITALS
BODY MASS INDEX: 24.63 KG/M2 | WEIGHT: 156.9 LBS | HEART RATE: 73 BPM | OXYGEN SATURATION: 99 % | SYSTOLIC BLOOD PRESSURE: 104 MMHG | DIASTOLIC BLOOD PRESSURE: 69 MMHG | HEIGHT: 67 IN

## 2023-01-13 DIAGNOSIS — R53.83 OTHER FATIGUE: ICD-10-CM

## 2023-01-13 DIAGNOSIS — M35.9 UNDIFFERENTIATED CONNECTIVE TISSUE DISEASE (H): ICD-10-CM

## 2023-01-13 DIAGNOSIS — Z79.899 LONG-TERM USE OF PLAQUENIL: ICD-10-CM

## 2023-01-13 DIAGNOSIS — M35.9 UNDIFFERENTIATED CONNECTIVE TISSUE DISEASE (H): Primary | ICD-10-CM

## 2023-01-13 DIAGNOSIS — K25.9 GASTRIC EROSION, UNSPECIFIED CHRONICITY: ICD-10-CM

## 2023-01-13 LAB
ALBUMIN MFR UR ELPH: 16.4 MG/DL (ref 1–14)
ALBUMIN SERPL BCG-MCNC: 4.5 G/DL (ref 3.5–5.2)
ALBUMIN UR-MCNC: 20 MG/DL
ALT SERPL W P-5'-P-CCNC: 8 U/L (ref 10–35)
APPEARANCE UR: CLEAR
AST SERPL W P-5'-P-CCNC: 16 U/L (ref 10–35)
BASOPHILS # BLD AUTO: 0.1 10E3/UL (ref 0–0.2)
BASOPHILS NFR BLD AUTO: 1 %
BILIRUB UR QL STRIP: NEGATIVE
COLOR UR AUTO: YELLOW
CREAT SERPL-MCNC: 0.63 MG/DL (ref 0.51–0.95)
CREAT UR-MCNC: 233 MG/DL
CRP SERPL-MCNC: <3 MG/L
DEPRECATED CALCIDIOL+CALCIFEROL SERPL-MC: 32 UG/L (ref 20–75)
EOSINOPHIL # BLD AUTO: 0.1 10E3/UL (ref 0–0.7)
EOSINOPHIL NFR BLD AUTO: 2 %
ERYTHROCYTE [DISTWIDTH] IN BLOOD BY AUTOMATED COUNT: 12.2 % (ref 10–15)
ERYTHROCYTE [SEDIMENTATION RATE] IN BLOOD BY WESTERGREN METHOD: 14 MM/HR (ref 0–20)
GFR SERPL CREATININE-BSD FRML MDRD: >90 ML/MIN/1.73M2
GLUCOSE UR STRIP-MCNC: NEGATIVE MG/DL
HCT VFR BLD AUTO: 40.8 % (ref 35–47)
HGB BLD-MCNC: 13.3 G/DL (ref 11.7–15.7)
HGB UR QL STRIP: ABNORMAL
IMM GRANULOCYTES # BLD: 0 10E3/UL
IMM GRANULOCYTES NFR BLD: 0 %
KETONES UR STRIP-MCNC: NEGATIVE MG/DL
LEUKOCYTE ESTERASE UR QL STRIP: NEGATIVE
LYMPHOCYTES # BLD AUTO: 1.9 10E3/UL (ref 0.8–5.3)
LYMPHOCYTES NFR BLD AUTO: 37 %
MCH RBC QN AUTO: 27.9 PG (ref 26.5–33)
MCHC RBC AUTO-ENTMCNC: 32.6 G/DL (ref 31.5–36.5)
MCV RBC AUTO: 86 FL (ref 78–100)
MONOCYTES # BLD AUTO: 0.3 10E3/UL (ref 0–1.3)
MONOCYTES NFR BLD AUTO: 5 %
MUCOUS THREADS #/AREA URNS LPF: PRESENT /LPF
NEUTROPHILS # BLD AUTO: 2.8 10E3/UL (ref 1.6–8.3)
NEUTROPHILS NFR BLD AUTO: 55 %
NITRATE UR QL: NEGATIVE
NRBC # BLD AUTO: 0 10E3/UL
NRBC BLD AUTO-RTO: 0 /100
PH UR STRIP: 6.5 [PH] (ref 5–7)
PLATELET # BLD AUTO: 198 10E3/UL (ref 150–450)
PROT/CREAT 24H UR: 0.07 MG/MG CR (ref 0–0.2)
RBC # BLD AUTO: 4.77 10E6/UL (ref 3.8–5.2)
RBC URINE: 5 /HPF
SP GR UR STRIP: 1.02 (ref 1–1.03)
SQUAMOUS EPITHELIAL: 1 /HPF
TSH SERPL DL<=0.005 MIU/L-ACNC: 0.83 UIU/ML (ref 0.3–4.2)
UROBILINOGEN UR STRIP-MCNC: NORMAL MG/DL
WBC # BLD AUTO: 5.1 10E3/UL (ref 4–11)
WBC URINE: 2 /HPF

## 2023-01-13 PROCEDURE — G0463 HOSPITAL OUTPT CLINIC VISIT: HCPCS

## 2023-01-13 PROCEDURE — 99214 OFFICE O/P EST MOD 30 MIN: CPT | Performed by: INTERNAL MEDICINE

## 2023-01-13 PROCEDURE — 84450 TRANSFERASE (AST) (SGOT): CPT | Performed by: PATHOLOGY

## 2023-01-13 PROCEDURE — 86160 COMPLEMENT ANTIGEN: CPT | Performed by: INTERNAL MEDICINE

## 2023-01-13 PROCEDURE — 85652 RBC SED RATE AUTOMATED: CPT | Performed by: PATHOLOGY

## 2023-01-13 PROCEDURE — 85025 COMPLETE CBC W/AUTO DIFF WBC: CPT | Performed by: PATHOLOGY

## 2023-01-13 PROCEDURE — 86140 C-REACTIVE PROTEIN: CPT | Performed by: PATHOLOGY

## 2023-01-13 PROCEDURE — 84156 ASSAY OF PROTEIN URINE: CPT | Performed by: PATHOLOGY

## 2023-01-13 PROCEDURE — 81001 URINALYSIS AUTO W/SCOPE: CPT | Performed by: PATHOLOGY

## 2023-01-13 PROCEDURE — 84443 ASSAY THYROID STIM HORMONE: CPT | Performed by: PATHOLOGY

## 2023-01-13 PROCEDURE — 99212 OFFICE O/P EST SF 10 MIN: CPT | Performed by: INTERNAL MEDICINE

## 2023-01-13 PROCEDURE — 82040 ASSAY OF SERUM ALBUMIN: CPT | Performed by: PATHOLOGY

## 2023-01-13 PROCEDURE — 82306 VITAMIN D 25 HYDROXY: CPT | Performed by: INTERNAL MEDICINE

## 2023-01-13 PROCEDURE — 84460 ALANINE AMINO (ALT) (SGPT): CPT | Performed by: PATHOLOGY

## 2023-01-13 PROCEDURE — 36415 COLL VENOUS BLD VENIPUNCTURE: CPT | Performed by: PATHOLOGY

## 2023-01-13 PROCEDURE — 99213 OFFICE O/P EST LOW 20 MIN: CPT | Performed by: NURSE PRACTITIONER

## 2023-01-13 PROCEDURE — 82565 ASSAY OF CREATININE: CPT | Performed by: PATHOLOGY

## 2023-01-13 PROCEDURE — 86225 DNA ANTIBODY NATIVE: CPT | Performed by: INTERNAL MEDICINE

## 2023-01-13 RX ORDER — HYDROXYCHLOROQUINE SULFATE 200 MG/1
200 TABLET, FILM COATED ORAL DAILY
Qty: 90 TABLET | Refills: 1 | Status: SHIPPED | OUTPATIENT
Start: 2023-01-13 | End: 2023-07-13

## 2023-01-13 RX ORDER — PHENAZOPYRIDINE HYDROCHLORIDE 200 MG/1
200 TABLET, FILM COATED ORAL 3 TIMES DAILY PRN
COMMUNITY
Start: 2022-12-07 | End: 2023-01-13

## 2023-01-13 RX ORDER — TRETINOIN 0.5 MG/G
1 CREAM TOPICAL EVERY EVENING
COMMUNITY
Start: 2022-10-11 | End: 2023-04-19

## 2023-01-13 RX ORDER — HYDROXYCHLOROQUINE SULFATE 200 MG/1
1 TABLET, FILM COATED ORAL
COMMUNITY
End: 2023-04-19

## 2023-01-13 ASSESSMENT — ANXIETY QUESTIONNAIRES
5. BEING SO RESTLESS THAT IT IS HARD TO SIT STILL: NOT AT ALL
3. WORRYING TOO MUCH ABOUT DIFFERENT THINGS: NOT AT ALL
GAD7 TOTAL SCORE: 2
1. FEELING NERVOUS, ANXIOUS, OR ON EDGE: NOT AT ALL
GAD7 TOTAL SCORE: 2
6. BECOMING EASILY ANNOYED OR IRRITABLE: NOT AT ALL
7. FEELING AFRAID AS IF SOMETHING AWFUL MIGHT HAPPEN: NOT AT ALL
IF YOU CHECKED OFF ANY PROBLEMS ON THIS QUESTIONNAIRE, HOW DIFFICULT HAVE THESE PROBLEMS MADE IT FOR YOU TO DO YOUR WORK, TAKE CARE OF THINGS AT HOME, OR GET ALONG WITH OTHER PEOPLE: NOT DIFFICULT AT ALL
2. NOT BEING ABLE TO STOP OR CONTROL WORRYING: SEVERAL DAYS

## 2023-01-13 ASSESSMENT — PATIENT HEALTH QUESTIONNAIRE - PHQ9
SUM OF ALL RESPONSES TO PHQ QUESTIONS 1-9: 1
5. POOR APPETITE OR OVEREATING: SEVERAL DAYS

## 2023-01-13 ASSESSMENT — PAIN SCALES - GENERAL: PAINLEVEL: NO PAIN (0)

## 2023-01-13 NOTE — PROGRESS NOTES
Rheumatology follow up In Person Visit Note    Heather Guillory MRN# [id number]   Age: 42 year old YOB: 1980     Last seen: 2022  DOS: 2023    Reason for visit: UCTD    Assessment & Plan:   Problem list:    1-UCTD  2-HCQ monitoring  3-Iron deficiency        Undifferentiated connective tissue disease (UCTD):     Heather is a 41 yo WF . She was diagnosed with MCTD in 2016, 6 wk postpartum based on fatigue, arthritis, mild Raynaud's, low positive NINOSKA 1.1 and low positive RNP Ab 1.6. She is on  mg qd since 2017 with great response.  UCTD continues to be most likely diagnosis not MCTD as she does not have classic features of MCTD, had very low titer of RNP Ab in the past, (negative on most recent check), and had a mild disease which never required prednisone.  All autoimmunity labs (2017) came back negative (except + NINOSKA, low C3) which is further reassuring for UCTD, including APS panel, repeat RNP and inflammatory markers, dsDNA.      She had neg SSA/SSB antibodies in 2016, no concern for CHB. No need to re-check.  APS panel was neg.  She had neg anti-Sm, neg anti-DNA and neg centromere Ab in 2016.    In 2019, recommended to taper plaquenil to 200 mg twice a day on Mon/Wed/Fri and then 1 tab for the rest of the week. Had increased pain/stiffness in hands and feet and C3 dropped. So increased HCQ back to 200 mg bid. C3 went back to nl in 2020, had low C3 in 2021 but it fluctuates.     :    The patient reports stable symtpoms aside from increased fatigue in the setting of ongoing heavy menstrual bleeding. She has a submucosal fibroid with myomectomy planned for February. She does have some chest discomfort but appears likely musculoskeletal. Will repeat hgb and iron testing today.    As her UCTD symptoms including arthralgia appear stable, discussed that she could continue current regimen of HCQ alternating 200 mg BID and daily dosing. If she were to develop new  or worsening symptoms, would consider increasing back to 200 mg BID. Her last eye exam in 11/2021 was without evidence of HCQ toxicity.     -Check cbc with diff, UA, Cr, complements, dsDNA CRP, ESR, AST/ALT, iron studies  -Continue  mg BID 3x week,  mg daily 4x week  -Continue yearly ophthalmology exam for HCQ monitoring    7/8/2022:    Overall stable with symptoms, labs. In fact C3 improved on 1/2022 labs. recommend to taper HCQ down to reduce risk of HCQ toxicity in future.    Has h/o iron deficiency, will re-check iron studies.    Today 1/13/2023: Stable on HCQ 1 tab (200 mg) a day, will continue. Labs in 7/2022 showed stable UCTD but ROBIN.    Plan:    Stay on plaquenil 1 tab a day    Labs today    Return video in 6 months    Nasrin King MD      Orders Placed This Encounter   Procedures     ALT     Albumin level     AST     Creatinine     Complement C4     Complement C3     CRP inflammation     DNA double stranded antibodies     Erythrocyte sedimentation rate auto     UA with Microscopic reflex to Culture     Protein  random urine     Creatinine random urine     Vitamin D Deficiency     TSH with free T4 reflex     CBC with Platelets & Differential       Subjective     Ms. Guillory is a 42 yo with history of UCTD, JAVIER, uterine fibroid who presents for follow up.   Undifferentiated connective tissue disease (UCTD):    The patient was last seen for a virtual visit in 7/2021. She notes things are going okay. She has felt more fatigue recently and continues to have significant vaginal bleeding with menstruation. Since her las visit she was seen by OB. MRI showed uterine fibroid; she's scheduled for a myomectomy next month. She notes occasional SOB with stairs. Ms. Guillory has reported 4 weeks of tightness/pressure in her chest and upper back that's present all the time but worse when she does activity with her upper extremities and at night after she's done more activity. It is not worse with exertion. She  gets some relief from using a massager but the discomfort doesn't resolve completely. She is taking iron supplements on some days. She denies dizziness or light headedness.     She's been having more tightness in her joints with the cold weather. The pain is worst in her hands and feet particularily in the morning. Her Raynaud's is also more of a problem thsi time of year but she denies fingertip ulceration and has been avoiding going outside.     Currently, she is taking  mg BID or daily; she says she takes it twice a day about half the time.     Ms. Guillory denies hairloss, cough, oral ulcers, GI or urinary symptoms.     Last eye exam in 11/2021 without evidence of HCQ toxicity.      7/8/2022: Heather presents for follow up. She overall is doing ok, reports being stable. Still taking  mg a day.      Today 1/13/2023: Heather presents for follow up. Her HCQ was reduced from 200 mg bid to 400/200 mg every other day in 7/2022. Later it was decreased to 1 tab/200 mg a day in 10/2022. She is overall stable, no flares since last visit.    Has fatigue, but thinks it is stress related. Had some left leg pain, but it is better, started 2 days after colonoscopy.      ROS     A 10 point ROS was performed with pertinent findings listed above.           Social History:     Social History     Socioeconomic History     Marital status:      Spouse name: Chi     Number of children: 2     Years of education: Not on file     Highest education level: Not on file   Occupational History     Occupation:      Employer: [employer]     Comment: laid off 7/2020   Tobacco Use     Smoking status: Never     Smokeless tobacco: Never   Substance and Sexual Activity     Alcohol use: No     Alcohol/week: 0.0 standard drinks     Drug use: No     Sexual activity: Yes     Partners: Male     Birth control/protection: I.U.D.     Comment: Mirena placed in 2018   Other Topics Concern      Service No      Blood Transfusions No     Caffeine Concern No     Occupational Exposure No     Hobby Hazards No     Sleep Concern No     Stress Concern No     Weight Concern No     Special Diet No     Back Care No     Exercise No     Bike Helmet Yes     Comment: n/a     Seat Belt Yes     Self-Exams No     Parent/sibling w/ CABG, MI or angioplasty before 65F 55M? No   Social History Narrative    .    3 boys (ages 6, 5 and 3 as of Jan 2022)    Exercises when able.         How much exercise per week? 3-4 x's     How much calcium per day? In foods and PNV       How much caffeine per day? 1 soda occasional    How much vitamin D per day? PNV    Do you/your family wear seatbelts?  Yes    Do you/your family use safety helmets? Yes    Do you/your family use sunscreen? Yes    Do you/your family keep firearms in the home? No    Do you/your family have a smoke detector(s)? Yes        July 16, 2020 Mague Chase LPN         Social Determinants of Health     Financial Resource Strain: Not on file   Food Insecurity: Not on file   Transportation Needs: Not on file   Physical Activity: Not on file   Stress: Not on file   Social Connections: Not on file   Intimate Partner Violence: Not on file   Housing Stability: Not on file             Family History:     Family History   Problem Relation Age of Onset     Elijah Disease Sister      Arrhythmia Brother         details unknown; procedure in his 20s     Anxiety Disorder Brother      Alzheimer Disease Maternal Grandmother      Cervical Cancer Maternal Grandmother      Colon Cancer Maternal Grandfather      Breast Cancer Paternal Grandmother      Hypertension Paternal Grandmother      Hypertension Paternal Grandfather      Lung Cancer Paternal Grandfather         smoker     Diabetes Type 2  Paternal Grandfather      Myocardial Infarction Paternal Grandfather      Myocardial Infarction Paternal Aunt 45     Cerebrovascular Disease No family hx of      Coronary Artery Disease Early Onset No family hx  "of      Ovarian Cancer No family hx of        Objective     PHYSICAL EXAM  /69   Pulse 64   Ht 1.702 m (5' 7\")   Wt 70.9 kg (156 lb 6.4 oz)   SpO2 99%   BMI 24.50 kg/m    Wt Readings from Last 4 Encounters:   01/13/23 70.9 kg (156 lb 6.4 oz)   01/13/23 71.2 kg (156 lb 14.4 oz)   12/29/22 68.9 kg (152 lb)   11/18/22 72.6 kg (160 lb 0.9 oz)       Gen: no acute distress, pleasant  HEENT: EOMI, no scleral injection or icterus  CV: RRR, no m/r/g   RESP: CTAB  ABD: Soft, non-tender  EXT: no edema  SKIN: no rash  MSK: no active synovitis or joint tenderness  NEURO: grossly non focal  Psych: nl affect    DATA:    CBC:  Recent Labs   Lab Test 08/27/21  0850 07/08/21  0919 04/23/21  1118   WBC 5.6 5.8 5.5   RBC 4.36 4.45 4.45   HGB 12.3 13.3 13.1   HCT 38.3 38.8 39.0   MCV 88 87 88   MCH 28.2 29.9 29.4   MCHC 32.1 34.3 33.6   RDW 11.9 12.4 11.9    207 195       BMP:  Recent Labs   Lab Test 08/27/21  0850 07/08/21  0919 03/31/21  1130 01/14/21  1228 10/28/20  1250 08/26/19  1055 04/26/19  0930 08/09/16  0000 01/09/16  0724     --   --   --  140  --   --   --  140   POTASSIUM 4.4  --  4.8  --  4.1  --   --   --  3.8   CHLORIDE 113*  --   --   --  109  --   --   --  110*   CO2 26  --   --   --  27  --   --   --  20   ANIONGAP 5  --   --   --  4  --   --   --  10   GLC 99  --   --   --  88  --  94  --  89   BUN 11  --   --   --  7  --   --   --  9   CR 0.79 0.72  --  0.66 0.63   < >  --    < > 0.57   GFRESTIMATED >90 >90  --  >90 >90   < >  --    < > >90  Non  GFR Calc     PRICILA 8.9  --   --   --  8.9  --   --   --  8.4*    < > = values in this interval not displayed.       LFT:  Recent Labs   Lab Test 08/27/21  0850 07/08/21  0919 01/14/21  1228 10/28/20  1250 09/28/17  0954 01/09/16  0724   PROTTOTAL 7.2  --   --  7.6  --  7.1   ALBUMIN 3.9 4.1 4.0 4.3   < > 3.3*   BILITOTAL 0.2  --   --  0.5  --  0.4   ALKPHOS 79  --   --  79  --  70   AST 12 17 13 10   < > 12   ALT 12 21 19 17   < > 15    " < > = values in this interval not displayed.       No results found for: CKTOTAL  TSH   Date Value Ref Range Status   02/13/2022 0.43 0.40 - 4.00 mU/L Final   07/08/2021 1.16 0.40 - 4.00 mU/L Final     No results found for: URIC    Inflammatory markers  Lab Results   Component Value Date    CRP <2.9 07/08/2021    CRP <2.9 01/14/2021    CRP 6.4 07/16/2020     Lab Results   Component Value Date    SED 6 07/08/2021    SED 4 01/14/2021    SED 7 07/16/2020     Ferritin   Date Value Ref Range Status   11/18/2022 58 6 - 175 ng/mL Final   07/08/2022 7 (L) 12 - 150 ng/mL Final   01/07/2022 7 (L) 12 - 150 ng/mL Final   07/08/2021 17 12 - 150 ng/mL Final   12/30/2020 63 12 - 150 ng/mL Final       UA RESULTS:  Recent Labs   Lab Test 07/08/21  0949 03/31/21  1140 01/14/21  1233 09/28/17  0950 08/31/17  0937 08/14/17  1101   COLOR Yellow Straw Yellow   < > Yellow Yellow   APPEARANCE Clear Clear Clear   < > Slightly Cloudy Clear   URINEGLC Negative Negative Negative   < > Negative Negative   URINEBILI Negative Negative Negative   < > Negative Negative   URINEKETONE Negative Negative Negative   < > Negative Negative   SG >1.030 1.009 1.010   < > >1.030 1.010   UBLD Large* Small* Negative   < > Large* Trace*   URINEPH 7.0 6.0 7.0   < > 7.0 6.5   PROTEIN Trace* Negative Negative   < > 30* Negative   UROBILINOGEN 0.2  --   --   --  0.2 0.2   NITRITE Negative Negative Negative   < > Negative Negative   LEUKEST Negative Negative Negative   < > Small* Negative   RBCU 5-10* 2 2   < > 10-25* O - 2   WBCU 0 - 5 <1 <1   < > 10-25* O - 2    < > = values in this interval not displayed.         Autoimmunity labs:     Lab Results   Component Value Date    RHF <20 07/22/2016     No results found for: CCPIGG  No results found for: ANCA  Lab Results   Component Value Date    L1ZYMTI 94 07/08/2022    T0ZOFAZ 104 01/07/2022    S7DXQAC 98 07/08/2021     Lab Results   Component Value Date    Z4VYANX 22 07/08/2022    P0HFNUN 25 01/07/2022    N9FTOAI 23  07/08/2021     Lab Results   Component Value Date    JACKLYN 1.1 (H) 07/22/2016     Lab Results   Component Value Date    DNA 5.5 07/08/2022    DNA 5.4 01/07/2022    DNA 5 07/08/2021     Lab Results   Component Value Date    RNPIGG 0.6 09/28/2017    SSAIGG <0.2 01/20/2020    SSBIGG <0.2 01/20/2020       IMAGING:    Nasrin King MD  Rheumatology Fellow         Addendum:  Imaging Results:     Results for orders placed or performed during the hospital encounter of 12/15/22   US Abdomen Complete    Narrative    US ABDOMEN COMPLETE 12/15/2022 11:00 AM     HISTORY: Left upper quadrant abdominal pain x two months. Patient  concerned about radiation exposure with CT, so opting for US first.     TECHNIQUE: Complete abdominal ultrasound.  COMPARISON: 2/17/2022    FINDINGS:    GALLBLADDER: Normal. No gallstones, wall thickening, or  pericholecystic fluid. Negative sonographic Lugo's sign.    BILE DUCTS: No biliary dilatation. The common duct measures 1 mm.    LIVER: Normal parenchyma with smooth contour. No focal mass.    RIGHT KIDNEY: Normal size. Normal echogenicity with no hydronephrosis  or mass.     LEFT KIDNEY: Normal size. Normal echogenicity with no hydronephrosis  or mass.     SPLEEN: Normal.    PANCREAS: The visualized portions are normal.    AORTA: Normal in caliber.     IVC: Normal where visualized.    No ascites.      Impression    IMPRESSION:  1.  Normal complete abdominal ultrasound. No specific finding to  explain the patient's pain.    BAILEY LYLE MD         SYSTEM ID:  [id number]      Component      Latest Ref Rng & Units 1/7/2022   WBC      4.0 - 11.0 10e3/uL 6.3   RBC Count      3.80 - 5.20 10e6/uL 4.55   Hemoglobin      11.7 - 15.7 g/dL 11.9   Hematocrit      35.0 - 47.0 % 38.5   MCV      78 - 100 fL 85   MCH      26.5 - 33.0 pg 26.2 (L)   MCHC      31.5 - 36.5 g/dL 30.9 (L)   RDW      10.0 - 15.0 % 13.1   Platelet Count      150 - 450 10e3/uL 265   % Neutrophils      % 56   % Lymphocytes      % 36    % Monocytes      % 5   % Eosinophils      % 2   % Basophils      % 1   % Immature Granulocytes      % 0   NRBCs per 100 WBC      <1 /100 0   Absolute Neutrophils      1.6 - 8.3 10e3/uL 3.6   Absolute Lymphocytes      0.8 - 5.3 10e3/uL 2.3   Absolute Monocytes      0.0 - 1.3 10e3/uL 0.3   Absolute Eosinophils      0.0 - 0.7 10e3/uL 0.1   Absolute Basophils      0.0 - 0.2 10e3/uL 0.0   Absolute Immature Granulocytes      <=0.4 10e3/uL 0.0   Absolute NRBCs      10e3/uL 0.0   Color Urine      Colorless, Straw, Light Yellow, Yellow Straw   Appearance Urine      Clear Clear   Glucose Urine      Negative mg/dL Negative   Bilirubin Urine      Negative Negative   Ketones Urine      Negative mg/dL Negative   Specific Gravity Urine      1.003 - 1.035 1.006   Blood Urine      Negative Negative   pH Urine      5.0 - 7.0 7.0   Protein Albumin Urine      Negative mg/dL Negative   Urobilinogen mg/dL      Normal, 2.0 mg/dL Normal   Nitrite Urine      Negative Negative   Leukocyte Esterase Urine      Negative Negative   RBC Urine      <=2 /HPF 1   WBC Urine      <=5 /HPF <1   Squamous Epithelial /HPF Urine      <=1 /HPF 3 (H)   Iron      35 - 180 ug/dL 28 (L)   Iron Binding Cap      240 - 430 ug/dL 424   Iron Saturation Index      15 - 46 % 7 (L)   Creatinine      0.52 - 1.04 mg/dL 0.56   GFR Estimate      >60 mL/min/1.73m2 >90   AST      0 - 45 U/L 18   ALT      0 - 50 U/L 24   Albumin      3.4 - 5.0 g/dL 4.3   CRP Inflammation      0.0 - 8.0 mg/L <2.9   Sed Rate      0 - 20 mm/hr 8   Complement C3      81 - 157 mg/dL 104   Complement C4      13 - 39 mg/dL 25   DNA-ds      <10.0 IU/mL 5.4   Creatinine Urine      mg/dL 34   Ferritin      12 - 150 ng/mL 7 (L)     Component      Latest Ref Rng & Units 7/8/2022   WBC      4.0 - 11.0 10e3/uL 6.8   RBC Count      3.80 - 5.20 10e6/uL 4.45   Hemoglobin      11.7 - 15.7 g/dL 11.5 (L)   Hematocrit      35.0 - 47.0 % 37.2   MCV      78 - 100 fL 84   MCH      26.5 - 33.0 pg 25.8 (L)   MCHC       31.5 - 36.5 g/dL 30.9 (L)   RDW      10.0 - 15.0 % 14.2   Platelet Count      150 - 450 10e3/uL 195   % Neutrophils      % 52   % Lymphocytes      % 37   % Monocytes      % 7   % Eosinophils      % 3   % Basophils      % 1   % Immature Granulocytes      % 0   NRBCs per 100 WBC      <1 /100 0   Absolute Neutrophils      1.6 - 8.3 10e3/uL 3.6   Absolute Lymphocytes      0.8 - 5.3 10e3/uL 2.5   Absolute Monocytes      0.0 - 1.3 10e3/uL 0.5   Absolute Eosinophils      0.0 - 0.7 10e3/uL 0.2   Absolute Basophils      0.0 - 0.2 10e3/uL 0.1   Absolute Immature Granulocytes      <=0.4 10e3/uL 0.0   Absolute NRBCs      10e3/uL 0.0   Color Urine      Colorless, Straw, Light Yellow, Yellow Yellow   Appearance Urine      Clear Clear   Glucose Urine      Negative mg/dL Negative   Bilirubin Urine      Negative Negative   Ketones Urine      Negative mg/dL Negative   Specific Gravity Urine      1.003 - 1.035 1.010   Blood Urine      Negative Small (A)   pH Urine      5.0 - 7.0 6.5   Protein Albumin Urine      Negative mg/dL Negative   Urobilinogen mg/dL      Normal, 2.0 mg/dL Normal   Nitrite Urine      Negative Negative   Leukocyte Esterase Urine      Negative Negative   Mucus Urine      None Seen /LPF Present (A)   RBC Urine      <=2 /HPF 2   WBC Urine      <=5 /HPF 1   Protein Random Urine      g/L 0.11   Protein Total Urine g/gr Creatinine      0.00 - 0.20 g/g Cr 0.13   Creatinine Urine      mg/dL 82   Iron      35 - 180 ug/dL 14 (L)   Iron Binding Cap      240 - 430 ug/dL 367   Iron Saturation Index      15 - 46 % 4 (L)   Creatinine      0.52 - 1.04 mg/dL 0.60   GFR Estimate      >60 mL/min/1.73m2 >90   ALT      0 - 50 U/L 13   Albumin      3.4 - 5.0 g/dL 3.9   AST      0 - 45 U/L 14   Complement C4      13 - 39 mg/dL 22   Complement C3      81 - 157 mg/dL 94   CRP Inflammation      0.0 - 8.0 mg/L <2.9   DNA-ds      <10.0 IU/mL 5.5   Sed Rate      0 - 20 mm/hr 6   Ferritin      12 - 150 ng/mL 7 (L)

## 2023-01-13 NOTE — PROGRESS NOTES
Assessment & Plan     Gastric erosion, unspecified chronicity  Refill provided for Omeprazole 20 mg BID. Will continue with this x12 weeks and then step down to once daily to ensure full resolution of symptoms before tapering off in the future. She agrees with the plan. Will continue to work on stress management, positive coping mechanisms, meeting with therapist. If anxiety worsening or persisting, consider starting Buspar for anxiety or Pristiq given sexual side effects felt on Sertraline.  - omeprazole (PRILOSEC) 20 MG DR capsule; Take 1 capsule (20 mg) by mouth 2 times daily    MDM: 1 stable condition, prescription drug management    Return in about 6 weeks (around 2/24/2023) for using a video visit.    HARVEY Ward [facility] INTERNAL MEDICINE [facility]   Heather is a 42 year old, presenting for the following health issues:  Follow Up (Pt here for follow up and would like to discuss test results.)      HPI     Follow-up  EGD 12/29/22 showed erosive gastropathy (no sign of recent bleeding) and a single gastric polyp. Colonoscopy was normal.  Wonders if related to stress.   Avoiding high-acidic foods or things that might make her more uncomfortable.  Has been working on behavioral concerns with her oldest child, working on managing stress. Trying to walk more, enjoys walking outside or using treadmill, getting body moving/listen to music, finds this helpful.   Working with therapist on a weekly basis who they had seen for their son.   Considering possibly starting medication again, prefers to continue working on stress management for now than going back to medication. More worry/anxiety than low mood. Sertraline ([id number]), had sexual side effects with this.  Learning skills to cope with anxiety but still has physical sx. Not affecting family but feels overwhelmed.  Hoping once they have a good path for son is in place will help resolve other symptoms.     Review  "of Systems   Constitutional, HEENT, cardiovascular, pulmonary, gi and gu systems are negative, except as otherwise noted.      Objective    /69 (BP Location: Right arm, Patient Position: Sitting, Cuff Size: Adult Regular)   Pulse 73   Ht 1.702 m (5' 7.01\")   Wt 71.2 kg (156 lb 14.4 oz)   SpO2 99%   BMI 24.57 kg/m    Body mass index is 24.57 kg/m .  Physical Exam   GENERAL: healthy, alert and no distress  HENT: ear canals and TM's normal, nose and mouth without ulcers or lesions  NECK: no adenopathy, no asymmetry, masses, or scars and thyroid normal to palpation  RESP: lungs clear to auscultation - no rales, rhonchi or wheezes  CV: regular rate and rhythm, normal S1 S2, no S3 or S4, no murmur, click or rub, no peripheral edema and peripheral pulses strong  ABDOMEN: soft, mild tenderness in RUQ, no guarding or rebound, no hepatosplenomegaly, no masses and bowel sounds normal  MS: no gross musculoskeletal defects noted, no edema  PSYCH: mentation appears normal, affect normal/bright                    "

## 2023-01-13 NOTE — NURSING NOTE
"Chief Complaint   Patient presents with     RECHECK     F/u     /69   Pulse 64   Ht 1.702 m (5' 7\")   Wt 70.9 kg (156 lb 6.4 oz)   SpO2 99%   BMI 24.50 kg/m        Georges Rios MA  "

## 2023-01-13 NOTE — LETTER
2023       RE: Heather Guillory  [address] [facility] [address]     Dear Colleague,    Thank you for referring your patient, Heather Guillory, to the [facility]HEUMATOLOGY [facility]IN[facility] [place] at [facility]. Please see a copy of my visit note below.      Rheumatology follow up In Person Visit Note    Heather Guillory MRN# [id number]   Age: 42 year old YOB: 1980     Last seen: 2022  DOS: 2023    Reason for visit: UCTD    Assessment & Plan:   Problem list:    1-UCTD  2-HCQ monitoring  3-Iron deficiency        Undifferentiated connective tissue disease (UCTD):     Heather is a 41 yo WF . She was diagnosed with MCTD in 2016, 6 wk postpartum based on fatigue, arthritis, mild Raynaud's, low positive NINOSKA 1.1 and low positive RNP Ab 1.6. She is on  mg qd since 2017 with great response.  UCTD continues to be most likely diagnosis not MCTD as she does not have classic features of MCTD, had very low titer of RNP Ab in the past, (negative on most recent check), and had a mild disease which never required prednisone.  All autoimmunity labs (2017) came back negative (except + NINOSKA, low C3) which is further reassuring for UCTD, including APS panel, repeat RNP and inflammatory markers, dsDNA.      She had neg SSA/SSB antibodies in 2016, no concern for CHB. No need to re-check.  APS panel was neg.  She had neg anti-Sm, neg anti-DNA and neg centromere Ab in 2016.    In 2019, recommended to taper plaquenil to 200 mg twice a day on Mon/Wed/Fri and then 1 tab for the rest of the week. Had increased pain/stiffness in hands and feet and C3 dropped. So increased HCQ back to 200 mg bid. C3 went back to nl in 2020, had low C3 in 2021 but it fluctuates.     :    The patient reports stable symtpoms aside from increased fatigue in the setting of ongoing heavy menstrual bleeding. She has a submucosal fibroid with myomectomy  planned for February. She does have some chest discomfort but appears likely musculoskeletal. Will repeat hgb and iron testing today.    As her UCTD symptoms including arthralgia appear stable, discussed that she could continue current regimen of HCQ alternating 200 mg BID and daily dosing. If she were to develop new or worsening symptoms, would consider increasing back to 200 mg BID. Her last eye exam in 11/2021 was without evidence of HCQ toxicity.     -Check cbc with diff, UA, Cr, complements, dsDNA CRP, ESR, AST/ALT, iron studies  -Continue  mg BID 3x week,  mg daily 4x week  -Continue yearly ophthalmology exam for HCQ monitoring    7/8/2022:    Overall stable with symptoms, labs. In fact C3 improved on 1/2022 labs. recommend to taper HCQ down to reduce risk of HCQ toxicity in future.    Has h/o iron deficiency, will re-check iron studies.    Today 1/13/2023: Stable on HCQ 1 tab (200 mg) a day, will continue. Labs in 7/2022 showed stable UCTD but ROBIN.    Plan:    Stay on plaquenil 1 tab a day    Labs today    Return video in 6 months    Nasrin King MD      Orders Placed This Encounter   Procedures     ALT     Albumin level     AST     Creatinine     Complement C4     Complement C3     CRP inflammation     DNA double stranded antibodies     Erythrocyte sedimentation rate auto     UA with Microscopic reflex to Culture     Protein  random urine     Creatinine random urine     Vitamin D Deficiency     TSH with free T4 reflex     CBC with Platelets & Differential       Subjective     Ms. Guillory is a 42 yo with history of UCTD, JAVIER, uterine fibroid who presents for follow up.   Undifferentiated connective tissue disease (UCTD):    The patient was last seen for a virtual visit in 7/2021. She notes things are going okay. She has felt more fatigue recently and continues to have significant vaginal bleeding with menstruation. Since her las visit she was seen by OB. MRI showed uterine fibroid; she's  scheduled for a myomectomy next month. She notes occasional SOB with stairs. Ms. Guillory has reported 4 weeks of tightness/pressure in her chest and upper back that's present all the time but worse when she does activity with her upper extremities and at night after she's done more activity. It is not worse with exertion. She gets some relief from using a massager but the discomfort doesn't resolve completely. She is taking iron supplements on some days. She denies dizziness or light headedness.     She's been having more tightness in her joints with the cold weather. The pain is worst in her hands and feet particularily in the morning. Her Raynaud's is also more of a problem thsi time of year but she denies fingertip ulceration and has been avoiding going outside.     Currently, she is taking  mg BID or daily; she says she takes it twice a day about half the time.     Ms. Guillory denies hairloss, cough, oral ulcers, GI or urinary symptoms.     Last eye exam in 11/2021 without evidence of HCQ toxicity.      7/8/2022: Heather presents for follow up. She overall is doing ok, reports being stable. Still taking  mg a day.      Today 1/13/2023: Heather presents for follow up. Her HCQ was reduced from 200 mg bid to 400/200 mg every other day in 7/2022. Later it was decreased to 1 tab/200 mg a day in 10/2022. She is overall stable, no flares since last visit.    Has fatigue, but thinks it is stress related. Had some left leg pain, but it is better, started 2 days after colonoscopy.      ROS     A 10 point ROS was performed with pertinent findings listed above.           Social History:     Social History     Socioeconomic History     Marital status:      Spouse name: Chi     Number of children: 2     Years of education: Not on file     Highest education level: Not on file   Occupational History     Occupation:      Employer: [employer]     Comment: laid off 7/2020   Tobacco Use      Smoking status: Never     Smokeless tobacco: Never   Substance and Sexual Activity     Alcohol use: No     Alcohol/week: 0.0 standard drinks     Drug use: No     Sexual activity: Yes     Partners: Male     Birth control/protection: I.U.D.     Comment: Mirena placed in 2018   Other Topics Concern      Service No     Blood Transfusions No     Caffeine Concern No     Occupational Exposure No     Hobby Hazards No     Sleep Concern No     Stress Concern No     Weight Concern No     Special Diet No     Back Care No     Exercise No     Bike Helmet Yes     Comment: n/a     Seat Belt Yes     Self-Exams No     Parent/sibling w/ CABG, MI or angioplasty before 65F 55M? No   Social History Narrative    .    3 boys (ages 6, 5 and 3 as of Jan 2022)    Exercises when able.         How much exercise per week? 3-4 x's     How much calcium per day? In foods and PNV       How much caffeine per day? 1 soda occasional    How much vitamin D per day? PNV    Do you/your family wear seatbelts?  Yes    Do you/your family use safety helmets? Yes    Do you/your family use sunscreen? Yes    Do you/your family keep firearms in the home? No    Do you/your family have a smoke detector(s)? Yes        July 16, 2020 Mague Chase LPN         Social Determinants of Health     Financial Resource Strain: Not on file   Food Insecurity: Not on file   Transportation Needs: Not on file   Physical Activity: Not on file   Stress: Not on file   Social Connections: Not on file   Intimate Partner Violence: Not on file   Housing Stability: Not on file             Family History:     Family History   Problem Relation Age of Onset     Elijah Disease Sister      Arrhythmia Brother         details unknown; procedure in his 20s     Anxiety Disorder Brother      Alzheimer Disease Maternal Grandmother      Cervical Cancer Maternal Grandmother      Colon Cancer Maternal Grandfather      Breast Cancer Paternal Grandmother      Hypertension Paternal  "Grandmother      Hypertension Paternal Grandfather      Lung Cancer Paternal Grandfather         smoker     Diabetes Type 2  Paternal Grandfather      Myocardial Infarction Paternal Grandfather      Myocardial Infarction Paternal Aunt 45     Cerebrovascular Disease No family hx of      Coronary Artery Disease Early Onset No family hx of      Ovarian Cancer No family hx of        Objective     PHYSICAL EXAM  /69   Pulse 64   Ht 1.702 m (5' 7\")   Wt 70.9 kg (156 lb 6.4 oz)   SpO2 99%   BMI 24.50 kg/m    Wt Readings from Last 4 Encounters:   01/13/23 70.9 kg (156 lb 6.4 oz)   01/13/23 71.2 kg (156 lb 14.4 oz)   12/29/22 68.9 kg (152 lb)   11/18/22 72.6 kg (160 lb 0.9 oz)       Gen: no acute distress, pleasant  HEENT: EOMI, no scleral injection or icterus  CV: RRR, no m/r/g   RESP: CTAB  ABD: Soft, non-tender  EXT: no edema  SKIN: no rash  MSK: no active synovitis or joint tenderness  NEURO: grossly non focal  Psych: nl affect    DATA:    CBC:  Recent Labs   Lab Test 08/27/21  0850 07/08/21  0919 04/23/21  1118   WBC 5.6 5.8 5.5   RBC 4.36 4.45 4.45   HGB 12.3 13.3 13.1   HCT 38.3 38.8 39.0   MCV 88 87 88   MCH 28.2 29.9 29.4   MCHC 32.1 34.3 33.6   RDW 11.9 12.4 11.9    207 195       BMP:  Recent Labs   Lab Test 08/27/21  0850 07/08/21  0919 03/31/21  1130 01/14/21  1228 10/28/20  1250 08/26/19  1055 04/26/19  0930 08/09/16  0000 01/09/16  0724     --   --   --  140  --   --   --  140   POTASSIUM 4.4  --  4.8  --  4.1  --   --   --  3.8   CHLORIDE 113*  --   --   --  109  --   --   --  110*   CO2 26  --   --   --  27  --   --   --  20   ANIONGAP 5  --   --   --  4  --   --   --  10   GLC 99  --   --   --  88  --  94  --  89   BUN 11  --   --   --  7  --   --   --  9   CR 0.79 0.72  --  0.66 0.63   < >  --    < > 0.57   GFRESTIMATED >90 >90  --  >90 >90   < >  --    < > >90  Non  GFR Calc     PRICILA 8.9  --   --   --  8.9  --   --   --  8.4*    < > = values in this interval not " displayed.       LFT:  Recent Labs   Lab Test 08/27/21  0850 07/08/21  0919 01/14/21  1228 10/28/20  1250 09/28/17  0954 01/09/16  0724   PROTTOTAL 7.2  --   --  7.6  --  7.1   ALBUMIN 3.9 4.1 4.0 4.3   < > 3.3*   BILITOTAL 0.2  --   --  0.5  --  0.4   ALKPHOS 79  --   --  79  --  70   AST 12 17 13 10   < > 12   ALT 12 21 19 17   < > 15    < > = values in this interval not displayed.       No results found for: CKTOTAL  TSH   Date Value Ref Range Status   02/13/2022 0.43 0.40 - 4.00 mU/L Final   07/08/2021 1.16 0.40 - 4.00 mU/L Final     No results found for: URIC    Inflammatory markers  Lab Results   Component Value Date    CRP <2.9 07/08/2021    CRP <2.9 01/14/2021    CRP 6.4 07/16/2020     Lab Results   Component Value Date    SED 6 07/08/2021    SED 4 01/14/2021    SED 7 07/16/2020     Ferritin   Date Value Ref Range Status   11/18/2022 58 6 - 175 ng/mL Final   07/08/2022 7 (L) 12 - 150 ng/mL Final   01/07/2022 7 (L) 12 - 150 ng/mL Final   07/08/2021 17 12 - 150 ng/mL Final   12/30/2020 63 12 - 150 ng/mL Final       UA RESULTS:  Recent Labs   Lab Test 07/08/21  0949 03/31/21  1140 01/14/21  1233 09/28/17  0950 08/31/17  0937 08/14/17  1101   COLOR Yellow Straw Yellow   < > Yellow Yellow   APPEARANCE Clear Clear Clear   < > Slightly Cloudy Clear   URINEGLC Negative Negative Negative   < > Negative Negative   URINEBILI Negative Negative Negative   < > Negative Negative   URINEKETONE Negative Negative Negative   < > Negative Negative   SG >1.030 1.009 1.010   < > >1.030 1.010   UBLD Large* Small* Negative   < > Large* Trace*   URINEPH 7.0 6.0 7.0   < > 7.0 6.5   PROTEIN Trace* Negative Negative   < > 30* Negative   UROBILINOGEN 0.2  --   --   --  0.2 0.2   NITRITE Negative Negative Negative   < > Negative Negative   LEUKEST Negative Negative Negative   < > Small* Negative   RBCU 5-10* 2 2   < > 10-25* O - 2   WBCU 0 - 5 <1 <1   < > 10-25* O - 2    < > = values in this interval not displayed.         Autoimmunity  labs:     Lab Results   Component Value Date    RHF <20 07/22/2016     No results found for: CCPIGG  No results found for: ANCA  Lab Results   Component Value Date    Z6HWZJP 94 07/08/2022    R2BRZQQ 104 01/07/2022    N4BDOBM 98 07/08/2021     Lab Results   Component Value Date    Q3BCRDT 22 07/08/2022    X6KFOGY 25 01/07/2022    J9IZBOB 23 07/08/2021     Lab Results   Component Value Date    JACKLYN 1.1 (H) 07/22/2016     Lab Results   Component Value Date    DNA 5.5 07/08/2022    DNA 5.4 01/07/2022    DNA 5 07/08/2021     Lab Results   Component Value Date    RNPIGG 0.6 09/28/2017    SSAIGG <0.2 01/20/2020    SSBIGG <0.2 01/20/2020       IMAGING:    Nasrin King MD  Rheumatology Fellow         Addendum:  Imaging Results:     Results for orders placed or performed during the hospital encounter of 12/15/22   US Abdomen Complete    Narrative    US ABDOMEN COMPLETE 12/15/2022 11:00 AM     HISTORY: Left upper quadrant abdominal pain x two months. Patient  concerned about radiation exposure with CT, so opting for US first.     TECHNIQUE: Complete abdominal ultrasound.  COMPARISON: 2/17/2022    FINDINGS:    GALLBLADDER: Normal. No gallstones, wall thickening, or  pericholecystic fluid. Negative sonographic Lugo's sign.    BILE DUCTS: No biliary dilatation. The common duct measures 1 mm.    LIVER: Normal parenchyma with smooth contour. No focal mass.    RIGHT KIDNEY: Normal size. Normal echogenicity with no hydronephrosis  or mass.     LEFT KIDNEY: Normal size. Normal echogenicity with no hydronephrosis  or mass.     SPLEEN: Normal.    PANCREAS: The visualized portions are normal.    AORTA: Normal in caliber.     IVC: Normal where visualized.    No ascites.      Impression    IMPRESSION:  1.  Normal complete abdominal ultrasound. No specific finding to  explain the patient's pain.    BAILEY LYLE MD         SYSTEM ID:  [id number]      Component      Latest Ref Rng & Units 1/7/2022   WBC      4.0 - 11.0 10e3/uL 6.3    RBC Count      3.80 - 5.20 10e6/uL 4.55   Hemoglobin      11.7 - 15.7 g/dL 11.9   Hematocrit      35.0 - 47.0 % 38.5   MCV      78 - 100 fL 85   MCH      26.5 - 33.0 pg 26.2 (L)   MCHC      31.5 - 36.5 g/dL 30.9 (L)   RDW      10.0 - 15.0 % 13.1   Platelet Count      150 - 450 10e3/uL 265   % Neutrophils      % 56   % Lymphocytes      % 36   % Monocytes      % 5   % Eosinophils      % 2   % Basophils      % 1   % Immature Granulocytes      % 0   NRBCs per 100 WBC      <1 /100 0   Absolute Neutrophils      1.6 - 8.3 10e3/uL 3.6   Absolute Lymphocytes      0.8 - 5.3 10e3/uL 2.3   Absolute Monocytes      0.0 - 1.3 10e3/uL 0.3   Absolute Eosinophils      0.0 - 0.7 10e3/uL 0.1   Absolute Basophils      0.0 - 0.2 10e3/uL 0.0   Absolute Immature Granulocytes      <=0.4 10e3/uL 0.0   Absolute NRBCs      10e3/uL 0.0   Color Urine      Colorless, Straw, Light Yellow, Yellow Straw   Appearance Urine      Clear Clear   Glucose Urine      Negative mg/dL Negative   Bilirubin Urine      Negative Negative   Ketones Urine      Negative mg/dL Negative   Specific Gravity Urine      1.003 - 1.035 1.006   Blood Urine      Negative Negative   pH Urine      5.0 - 7.0 7.0   Protein Albumin Urine      Negative mg/dL Negative   Urobilinogen mg/dL      Normal, 2.0 mg/dL Normal   Nitrite Urine      Negative Negative   Leukocyte Esterase Urine      Negative Negative   RBC Urine      <=2 /HPF 1   WBC Urine      <=5 /HPF <1   Squamous Epithelial /HPF Urine      <=1 /HPF 3 (H)   Iron      35 - 180 ug/dL 28 (L)   Iron Binding Cap      240 - 430 ug/dL 424   Iron Saturation Index      15 - 46 % 7 (L)   Creatinine      0.52 - 1.04 mg/dL 0.56   GFR Estimate      >60 mL/min/1.73m2 >90   AST      0 - 45 U/L 18   ALT      0 - 50 U/L 24   Albumin      3.4 - 5.0 g/dL 4.3   CRP Inflammation      0.0 - 8.0 mg/L <2.9   Sed Rate      0 - 20 mm/hr 8   Complement C3      81 - 157 mg/dL 104   Complement C4      13 - 39 mg/dL 25   DNA-ds      <10.0 IU/mL 5.4    Creatinine Urine      mg/dL 34   Ferritin      12 - 150 ng/mL 7 (L)     Component      Latest Ref Rng & Units 7/8/2022   WBC      4.0 - 11.0 10e3/uL 6.8   RBC Count      3.80 - 5.20 10e6/uL 4.45   Hemoglobin      11.7 - 15.7 g/dL 11.5 (L)   Hematocrit      35.0 - 47.0 % 37.2   MCV      78 - 100 fL 84   MCH      26.5 - 33.0 pg 25.8 (L)   MCHC      31.5 - 36.5 g/dL 30.9 (L)   RDW      10.0 - 15.0 % 14.2   Platelet Count      150 - 450 10e3/uL 195   % Neutrophils      % 52   % Lymphocytes      % 37   % Monocytes      % 7   % Eosinophils      % 3   % Basophils      % 1   % Immature Granulocytes      % 0   NRBCs per 100 WBC      <1 /100 0   Absolute Neutrophils      1.6 - 8.3 10e3/uL 3.6   Absolute Lymphocytes      0.8 - 5.3 10e3/uL 2.5   Absolute Monocytes      0.0 - 1.3 10e3/uL 0.5   Absolute Eosinophils      0.0 - 0.7 10e3/uL 0.2   Absolute Basophils      0.0 - 0.2 10e3/uL 0.1   Absolute Immature Granulocytes      <=0.4 10e3/uL 0.0   Absolute NRBCs      10e3/uL 0.0   Color Urine      Colorless, Straw, Light Yellow, Yellow Yellow   Appearance Urine      Clear Clear   Glucose Urine      Negative mg/dL Negative   Bilirubin Urine      Negative Negative   Ketones Urine      Negative mg/dL Negative   Specific Gravity Urine      1.003 - 1.035 1.010   Blood Urine      Negative Small (A)   pH Urine      5.0 - 7.0 6.5   Protein Albumin Urine      Negative mg/dL Negative   Urobilinogen mg/dL      Normal, 2.0 mg/dL Normal   Nitrite Urine      Negative Negative   Leukocyte Esterase Urine      Negative Negative   Mucus Urine      None Seen /LPF Present (A)   RBC Urine      <=2 /HPF 2   WBC Urine      <=5 /HPF 1   Protein Random Urine      g/L 0.11   Protein Total Urine g/gr Creatinine      0.00 - 0.20 g/g Cr 0.13   Creatinine Urine      mg/dL 82   Iron      35 - 180 ug/dL 14 (L)   Iron Binding Cap      240 - 430 ug/dL 367   Iron Saturation Index      15 - 46 % 4 (L)   Creatinine      0.52 - 1.04 mg/dL 0.60   GFR Estimate      >60  mL/min/1.73m2 >90   ALT      0 - 50 U/L 13   Albumin      3.4 - 5.0 g/dL 3.9   AST      0 - 45 U/L 14   Complement C4      13 - 39 mg/dL 22   Complement C3      81 - 157 mg/dL 94   CRP Inflammation      0.0 - 8.0 mg/L <2.9   DNA-ds      <10.0 IU/mL 5.5   Sed Rate      0 - 20 mm/hr 6   Ferritin      12 - 150 ng/mL 7 (L)       Nasrin King MD

## 2023-01-13 NOTE — NURSING NOTE
"Heather Guillory is a 42 year old female patient that presents today in clinic for the following:    Chief Complaint   Patient presents with     Follow Up     Pt here for follow up and would like to discuss test results.     The patient's allergies and medications were reviewed as noted. A set of vitals were recorded as noted without incident: /69 (BP Location: Right arm, Patient Position: Sitting, Cuff Size: Adult Regular)   Pulse 73   Ht 1.702 m (5' 7.01\")   Wt 71.2 kg (156 lb 14.4 oz)   SpO2 99%   BMI 24.57 kg/m  . The patient does not have any other questions for the provider.    Melissa Aguirre, EMT at 8:41 AM on 1/13/2023  "

## 2023-01-16 LAB
C3 SERPL-MCNC: 94 MG/DL (ref 81–157)
C4 SERPL-MCNC: 21 MG/DL (ref 13–39)
DSDNA AB SER-ACNC: 5.8 IU/ML

## 2023-01-18 ENCOUNTER — LAB (OUTPATIENT)
Dept: LAB | Facility: CLINIC | Age: 43
End: 2023-01-18
Payer: COMMERCIAL

## 2023-01-18 DIAGNOSIS — K31.9 MUCOSAL ABNORMALITY OF DUODENUM: Primary | ICD-10-CM

## 2023-01-18 DIAGNOSIS — K31.9 MUCOSAL ABNORMALITY OF DUODENUM: ICD-10-CM

## 2023-01-18 PROCEDURE — 86364 TISS TRNSGLTMNASE EA IG CLAS: CPT

## 2023-01-18 PROCEDURE — 36415 COLL VENOUS BLD VENIPUNCTURE: CPT

## 2023-01-18 PROCEDURE — 82784 ASSAY IGA/IGD/IGG/IGM EACH: CPT

## 2023-01-18 NOTE — RESULT ENCOUNTER NOTE
EGD bx results: mild duodenal IEL, reactive gastropathy, fundic gland polyp, Hp neg. Colon bx: melanosis coli. Plan: celiac serologies, follow up in clinic, cont PPI.

## 2023-01-19 ENCOUNTER — TELEPHONE (OUTPATIENT)
Dept: GASTROENTEROLOGY | Facility: CLINIC | Age: 43
End: 2023-01-19
Payer: COMMERCIAL

## 2023-01-19 LAB
IGA SERPL-MCNC: 188 MG/DL (ref 84–499)
TTG IGA SER-ACNC: 0.8 U/ML
TTG IGG SER-ACNC: 1 U/ML

## 2023-01-25 ENCOUNTER — VIRTUAL VISIT (OUTPATIENT)
Dept: GASTROENTEROLOGY | Facility: CLINIC | Age: 43
End: 2023-01-25
Payer: COMMERCIAL

## 2023-01-25 VITALS — BODY MASS INDEX: 24.48 KG/M2 | WEIGHT: 156 LBS | HEIGHT: 67 IN

## 2023-01-25 DIAGNOSIS — R19.7 DIARRHEA, UNSPECIFIED TYPE: ICD-10-CM

## 2023-01-25 DIAGNOSIS — R10.12 LUQ ABDOMINAL PAIN: Primary | ICD-10-CM

## 2023-01-25 PROCEDURE — 99213 OFFICE O/P EST LOW 20 MIN: CPT | Mod: 95 | Performed by: INTERNAL MEDICINE

## 2023-01-25 ASSESSMENT — PAIN SCALES - GENERAL: PAINLEVEL: NO PAIN (0)

## 2023-01-25 NOTE — LETTER
1/25/2023         RE: Heather Guillory  [address] [place] [address]        Dear Colleague,    Thank you for referring your patient, Heather Guillory, to the [facility] GASTROENTEROLOGY [facility]INIC [place]. Please see a copy of my visit note below.    GASTROENTEROLOGY Video Follow up visit    CC/REFERRING MD:    Cyndi Zarate    HISTORY OF PRESENT ILLNESS:    Heather Guillory is a 42 year old female who is being evaluated via a billable video visit.      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  Recall the biopsies were taken for evaluation of diarrhea.    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    ALLERGIES  Cefadroxil, Sulfa drugs, Benzoyl peroxide, Latex, and Miconazole    PE/  Gen: pleasant NAD  HEENT: hearing intact  Psych: AOx3, normal mood and affect    PERTINENT STUDIES have been reviewed.  TSH recently completed  Path: EGD bx results: mild duodenal IEL, reactive gastropathy, fundic gland polyp, Hp neg. Colon bx: melanosis coli.      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  - findings on EGD: celiac serologies completed and wnl, cont PPI for now, drop down to qdaily after a month, consider discontinue in 6-12 mo  - ROBIN: stable now   - melanosis coli: patient does not use laxatives or senna.  Other less common reasons for this: anthraquinone tea/herbal remedies (patient does not use), chronic diarrhea, use of ASA or NSAIDs  - abdo pain: patient feels is diet related. Consider medication s/e  - diarrhea: patient also feels is diet related. Continue to monitor      Appointment duration: 16 minutes    RTC 6 mo earlier prn    Thank you for this consultation.  It was a pleasure to participate in the care of this patient; please contact us with any further questions.        Sincerely,    Magda Lobo MD

## 2023-01-25 NOTE — PROGRESS NOTES
Heather is a 42 year old who is being evaluated via a billable video visit.      How would you like to obtain your AVS? MyChart  If the video visit is dropped, the invitation should be resent by: Send to e-mail at: [email or web address]  Will anyone else be joining your video visit? No      Video-Visit Details  Video appt start: 9:54  Video appt end: 10:10  Type of service:  Video Visit     Originating Location (pt. Location): Home  Distant Location (provider location):  Off-site  Platform used for Video Visit: [facility]           GASTROENTEROLOGY Video Follow up visit    CC/REFERRING MD:    Cyndi Zarate    HISTORY OF PRESENT ILLNESS:    Heather Guillory is a 42 year old female who is being evaluated via a billable video visit.      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  Recall the biopsies were taken for evaluation of diarrhea.    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    ALLERGIES  Cefadroxil, Sulfa drugs, Benzoyl peroxide, Latex, and Miconazole    PE/  Gen:  pleasant NAD  HEENT: hearing intact  Psych: AOx3, normal mood and affect    PERTINENT STUDIES have been reviewed.  TSH recently completed  Path: EGD bx results: mild duodenal IEL, reactive gastropathy, fundic gland polyp, Hp neg. Colon bx: melanosis coli.     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  - findings on EGD: celiac serologies completed and wnl, cont PPI for now, drop down to qdaily after a month, consider discontinue in 6-12 mo  - ROBIN: stable now   - melanosis coli: patient does not use laxatives or senna.  Other less common reasons for this: anthraquinone tea/herbal remedies (patient does not use), chronic diarrhea, use of ASA or NSAIDs  - abdo pain: patient feels is diet related. Consider medication s/e  - diarrhea: patient also feels is diet related. Continue to monitor      Appointment duration: 16 minutes    RTC 6 mo earlier prn    Thank you for this consultation.  It was a pleasure to participate in the care of this patient; please contact us with any further questions.

## 2023-02-09 ENCOUNTER — TELEPHONE (OUTPATIENT)
Dept: GASTROENTEROLOGY | Facility: CLINIC | Age: 43
End: 2023-02-09
Payer: COMMERCIAL

## 2023-02-09 NOTE — TELEPHONE ENCOUNTER
Spoke with patient about GI clinic follow up orders per Dr. Lobo. Patient unable to schedule at this time and will call back to schedule. Number provided over the phone     Schedule Return GI visit with Dr. DALILA Lobo (around 8/6/23)

## 2023-03-14 NOTE — PATIENT INSTRUCTIONS
"GYN Problem/Follow Up Visit    Chief Complaint   Patient presents with   • DISCUSS HORMONES           HPI  Virgie Kim is a 43 y.o. female, , who presents for aub. States typically q mon menses x 7 days but the last two menses have each been several days late and light. denies pain. Denies hot flashes or night sweats. C/o weight loss without trying. States has lost 15 pounds over the course of a few months. Denies any other sx or concerns.        Additional OB/GYN History   Patient's last menstrual period was 2023 (approximate).  Current contraception: contraceptive methods: salpingectomy  Desires to: continue contraception  Allergies : Penicillins     The additional following portions of the patient's history were reviewed and updated as appropriate: allergies, current medications, past family history, past medical history, past social history, past surgical history and problem list.    Review of Systems    I have reviewed and agree with the HPI, ROS, and historical information as entered above. Ann Don, APRN    Objective   /71   Pulse 86   Ht 165.1 cm (65\")   Wt 59 kg (130 lb)   LMP 2023 (Approximate)   BMI 21.63 kg/m²     Physical Exam  Vitals reviewed.   Neurological:      Mental Status: She is alert and oriented to person, place, and time.            Assessment and Plan    Diagnoses and all orders for this visit:    1. Abnormal uterine bleeding (Primary)  -     CBC (No Diff)  -     Follicle Stimulating Hormone  -     Prolactin  -     TSH  -     T4, Free    2. Weight loss  -     CBC (No Diff)  -     TSH  -     T4, Free    will check labs. Discussed hormone meds if sx persist. Will discuss further based on lab results. F/u in one week.     Counseling:  She understands the importance of having the above orders performed in a timely fashion.  She is encouraged to review her results online and/or contact or office if she has questions.     Follow Up:  Return in about 1 " Labs today    Return in 6 months (if feel better, could go down to 1 tab a day at that time)   week (around 3/21/2023) for lab f/u-phone appt nelia.      Ann Don, APRN  03/14/2023

## 2023-03-28 ENCOUNTER — VIRTUAL VISIT (OUTPATIENT)
Dept: INTERNAL MEDICINE | Facility: CLINIC | Age: 43
End: 2023-03-28
Payer: COMMERCIAL

## 2023-03-28 DIAGNOSIS — K25.9 GASTRIC EROSION, UNSPECIFIED CHRONICITY: Primary | ICD-10-CM

## 2023-03-28 PROCEDURE — 99213 OFFICE O/P EST LOW 20 MIN: CPT | Mod: VID | Performed by: NURSE PRACTITIONER

## 2023-03-28 RX ORDER — SUCRALFATE 1 G/1
1 TABLET ORAL 4 TIMES DAILY PRN
Qty: 120 TABLET | Refills: 1 | Status: SHIPPED | OUTPATIENT
Start: 2023-03-28 | End: 2023-04-19

## 2023-03-28 NOTE — PROGRESS NOTES
Heather is a 42 year old who is being evaluated via a billable video visit.      How would you like to obtain your AVS? MyChart  If the video visit is dropped, the invitation should be resent by: Send to e-mail at: [email or web address]  Will anyone else be joining your video visit? No          Assessment & Plan     Gastric erosion, unspecified chronicity  Recent exacerbation in LUQ abdominal pain with less strict adherence to BID PPI dosing and increased dietary trigger (coffee). Has since resumed Omeprazole 20 mg BID and cut coffee/soda out of diet. Can use Sucralfate to help reduce symptoms and help promote healing; reviewed dosing and avoid other medications within 2 hours of use. Encouraged scheduling follow-up with Dr. Lobo in ~3 months in GI. She agrees with the plan.   - sucralfate (CARAFATE) 1 GM tablet; Take 1 tablet (1 g) by mouth 4 times daily as needed (epigastric pain or nausea)      25 minutes spent by me on the date of the encounter doing chart review, history and exam, documentation and further activities per the note        Return in about 6 months (around 9/28/2023) for Routine preventive. or sooner prn for any changes or concerns    Cyndi Zarate, HARVEY CNP  [facility] INTERNAL MEDICINE [facility]   Heather is a 42 year old, presenting for the following health issues:  Video Visit and abdominal issues  No flowsheet data found.  HPI       Follow-up.  Doing okay. Saw Dr. Lobo in GI on 1/25/23, recommended Omprazole 20 mg BID and follow-up in 6 months, consider discontinuing PPI in 6-12 months.    GI--wasn't as regular with omeprazole BID while out of town, had some iced coffee or soda, but found this significantly exacerbated her symptoms. Wonders if getting some relief from omeprazole, but still in a lot of discomfort. Decided she'll drink nothing but water until feeling better.  Wants to feel better, but feels its a continuous discomfort, would like to make these  "symptoms go away faster.     Met with Dr. King in Rheum 1/13/23. Reducing Plaquenil to every other day, trying to see how she does with this.     Review of Systems   Constitutional, HEENT, cardiovascular, pulmonary, gi and gu systems are negative, except as otherwise noted.      Objective    Vitals - Patient Reported  Weight (Patient Reported): 70.3 kg (155 lb)  Height (Patient Reported): 170.2 cm (5' 7\")  BMI (Based on Pt Reported Ht/Wt): 24.28  Pain Score: No Pain (0)        Physical Exam   GENERAL: Healthy, alert and no distress  EYES: Eyes grossly normal to inspection.  No discharge or erythema, or obvious scleral/conjunctival abnormalities.  RESP: No audible wheeze, cough, or visible cyanosis.  No visible retractions or increased work of breathing.    SKIN: Visible skin clear. No significant rash, abnormal pigmentation or lesions.  NEURO: Cranial nerves grossly intact.  Mentation and speech appropriate for age.  PSYCH: Mentation appears normal, affect normal/bright, judgement and insight intact, normal speech and appearance well-groomed.                Video-Visit Details    Type of service:  Video Visit   Video Start Time: 8:35 AM  Video End Time: 8:51 AM    Originating Location (pt. Location): Home    Distant Location (provider location):  Off-site  Platform used for Video Visit: Lorenzo      "

## 2023-03-28 NOTE — PATIENT INSTRUCTIONS
Thank you for visiting the Primary Care Center today at the [facility]! The following is some information about our clinic:     Primary Care Center Frequently-Asked Questions    (1) How do I schedule appointments at the [facility]?     Primary Care--to schedule or make changes to an existing appointment, please call our primary care line at [phone].    Labs--to schedule a lab appointment at the [facility] you can use VOZ or call [phone]. If you have a [place] location that is closer to home, you can reach out to that location for scheduling options.     Imaging--if you need to schedule a CT, X-ray, MRI, ultrasound, or other imaging study you can call [phone] to schedule at the [facility] or any other [facility] imaging location.     Referrals--if a referral to another specialty was ordered you can expect a phone call from their scheduling team. If you have not heard from them in a week, please call us or send us a VOZ message to check the status or get a scheduling number. Please note that this only applies to internal [facility] referrals. If the referral is external you would need to contact their office for scheduling.     (2) I have a question about my visit, who do I contact?     You can call us at the primary care line at [phone] to ask questions about your visit. You can also send a secure message through VOZ, which is reviewed by clinic staff. Please note that VOZ messages have a twenty-four to forty-eight business hour turnaround time and should not be used for urgent concerns.    (3) How will I get the results of my tests?    If you are signed up for Spotlight[employer]fmt all tests will be released to you within twenty-four hours of resulting. Please allow three to five days for your doctor to review your results and place a note interpreting the results. If you do not have [employer] you will receive your  results through mail seven to ten business days following the return of the tests. Please note that if there should be any urgent or concerning results that your doctor or their registered nurse will reach out to you the same day as the tests come back. If you have follow up questions about your results or would like to discuss the results in detail please schedule a follow up with your provider either in person or virtually.     (4) How do I get refills of my prescriptions?     You should always first contact your pharmacy for refills of your medications. If submitting a refill request on [place], please be sure to submit the request only once--repeat requests can cause delays in refill. If you are requesting a NEW medication or a medication related to new symptoms you will need to schedule an appointment with a provider prior to approval. Please note: Routine medication refills have up to one to three business day turnaround whereas controlled substances refills have up to five to seven business day turnaround.    (5) I have new symptoms, what do I do?     If you are having an immediate medical emergency, you should dial 911 for assistance.   For anything urgent that needs to be seen within a few hours to one day you should visit a local urgent care for assistance.  For non-urgent symptoms that need to be seen within a few days to a week you can schedule with an available provider in primary care by going to [employer] or calling [phone].   If you are not sure how serious your symptoms are or you would like to receive medical advice you can always call [phone] to speak with a triage nurse.

## 2023-03-28 NOTE — NURSING NOTE
Is the patient currently in the state of [place]? YES    Visit mode:VIDEO    If the visit is dropped, the patient can be reconnected by: VIDEO VISIT: Text to cell phone: [phone]    Will anyone else be joining the visit? NO      How would you like to obtain your AVS? MyChart    Are changes needed to the allergy or medication list? NO    Reason for visit: abdominal issues      Veronika Garcia VF

## 2023-04-03 ENCOUNTER — TELEPHONE (OUTPATIENT)
Dept: RHEUMATOLOGY | Facility: CLINIC | Age: 43
End: 2023-04-03
Payer: COMMERCIAL

## 2023-04-05 ENCOUNTER — LAB (OUTPATIENT)
Dept: LAB | Facility: CLINIC | Age: 43
End: 2023-04-05
Payer: COMMERCIAL

## 2023-04-05 DIAGNOSIS — M35.9 UNDIFFERENTIATED CONNECTIVE TISSUE DISEASE (H): ICD-10-CM

## 2023-04-05 LAB
ALBUMIN MFR UR ELPH: <6 MG/DL (ref 1–14)
ALBUMIN SERPL BCG-MCNC: 4.6 G/DL (ref 3.5–5.2)
ALBUMIN UR-MCNC: NEGATIVE MG/DL
ALT SERPL W P-5'-P-CCNC: 21 U/L (ref 10–35)
APPEARANCE UR: CLEAR
AST SERPL W P-5'-P-CCNC: 22 U/L (ref 10–35)
BACTERIA #/AREA URNS HPF: ABNORMAL /HPF
BASOPHILS # BLD AUTO: 0 10E3/UL (ref 0–0.2)
BASOPHILS NFR BLD AUTO: 0 %
BILIRUB UR QL STRIP: NEGATIVE
COLOR UR AUTO: YELLOW
CREAT SERPL-MCNC: 0.6 MG/DL (ref 0.51–0.95)
CREAT UR-MCNC: 21.9 MG/DL
CRP SERPL-MCNC: <3 MG/L
EOSINOPHIL # BLD AUTO: 0.1 10E3/UL (ref 0–0.7)
EOSINOPHIL NFR BLD AUTO: 1 %
ERYTHROCYTE [DISTWIDTH] IN BLOOD BY AUTOMATED COUNT: 12.3 % (ref 10–15)
ERYTHROCYTE [SEDIMENTATION RATE] IN BLOOD BY WESTERGREN METHOD: 5 MM/HR (ref 0–20)
GFR SERPL CREATININE-BSD FRML MDRD: >90 ML/MIN/1.73M2
GLUCOSE UR STRIP-MCNC: NEGATIVE MG/DL
HCT VFR BLD AUTO: 38.5 % (ref 35–47)
HGB BLD-MCNC: 12.7 G/DL (ref 11.7–15.7)
HGB UR QL STRIP: ABNORMAL
IMM GRANULOCYTES # BLD: 0 10E3/UL
IMM GRANULOCYTES NFR BLD: 0 %
KETONES UR STRIP-MCNC: NEGATIVE MG/DL
LEUKOCYTE ESTERASE UR QL STRIP: NEGATIVE
LYMPHOCYTES # BLD AUTO: 2.2 10E3/UL (ref 0.8–5.3)
LYMPHOCYTES NFR BLD AUTO: 31 %
MCH RBC QN AUTO: 28.8 PG (ref 26.5–33)
MCHC RBC AUTO-ENTMCNC: 33 G/DL (ref 31.5–36.5)
MCV RBC AUTO: 87 FL (ref 78–100)
MONOCYTES # BLD AUTO: 0.4 10E3/UL (ref 0–1.3)
MONOCYTES NFR BLD AUTO: 5 %
NEUTROPHILS # BLD AUTO: 4.5 10E3/UL (ref 1.6–8.3)
NEUTROPHILS NFR BLD AUTO: 62 %
NITRATE UR QL: NEGATIVE
PH UR STRIP: 7 [PH] (ref 5–7)
PLATELET # BLD AUTO: 212 10E3/UL (ref 150–450)
PROT/CREAT 24H UR: NORMAL MG/G{CREAT}
RBC # BLD AUTO: 4.41 10E6/UL (ref 3.8–5.2)
RBC #/AREA URNS AUTO: ABNORMAL /HPF
SP GR UR STRIP: 1.01 (ref 1–1.03)
SQUAMOUS #/AREA URNS AUTO: ABNORMAL /LPF
UROBILINOGEN UR STRIP-ACNC: 0.2 E.U./DL
WBC # BLD AUTO: 7.2 10E3/UL (ref 4–11)
WBC #/AREA URNS AUTO: ABNORMAL /HPF

## 2023-04-05 PROCEDURE — 84450 TRANSFERASE (AST) (SGOT): CPT

## 2023-04-05 PROCEDURE — 82565 ASSAY OF CREATININE: CPT

## 2023-04-05 PROCEDURE — 36415 COLL VENOUS BLD VENIPUNCTURE: CPT

## 2023-04-05 PROCEDURE — 85652 RBC SED RATE AUTOMATED: CPT

## 2023-04-05 PROCEDURE — 84460 ALANINE AMINO (ALT) (SGPT): CPT

## 2023-04-05 PROCEDURE — 81001 URINALYSIS AUTO W/SCOPE: CPT

## 2023-04-05 PROCEDURE — 85025 COMPLETE CBC W/AUTO DIFF WBC: CPT

## 2023-04-05 PROCEDURE — 84156 ASSAY OF PROTEIN URINE: CPT

## 2023-04-05 PROCEDURE — 86225 DNA ANTIBODY NATIVE: CPT

## 2023-04-05 PROCEDURE — 86140 C-REACTIVE PROTEIN: CPT

## 2023-04-05 PROCEDURE — 86160 COMPLEMENT ANTIGEN: CPT | Mod: 59

## 2023-04-05 PROCEDURE — 86160 COMPLEMENT ANTIGEN: CPT

## 2023-04-05 PROCEDURE — 82040 ASSAY OF SERUM ALBUMIN: CPT

## 2023-04-06 LAB
C3 SERPL-MCNC: 105 MG/DL (ref 81–157)
C4 SERPL-MCNC: 22 MG/DL (ref 13–39)

## 2023-04-07 LAB — DSDNA AB SER-ACNC: 4.9 IU/ML

## 2023-04-11 ENCOUNTER — OFFICE VISIT (OUTPATIENT)
Dept: INTERNAL MEDICINE | Facility: CLINIC | Age: 43
End: 2023-04-11
Payer: COMMERCIAL

## 2023-04-11 VITALS
WEIGHT: 159.8 LBS | SYSTOLIC BLOOD PRESSURE: 109 MMHG | HEIGHT: 67 IN | BODY MASS INDEX: 25.08 KG/M2 | DIASTOLIC BLOOD PRESSURE: 75 MMHG | TEMPERATURE: 98.4 F | HEART RATE: 76 BPM | OXYGEN SATURATION: 99 %

## 2023-04-11 DIAGNOSIS — J06.9 VIRAL UPPER RESPIRATORY TRACT INFECTION: Primary | ICD-10-CM

## 2023-04-11 PROCEDURE — 99000 SPECIMEN HANDLING OFFICE-LAB: CPT | Performed by: PATHOLOGY

## 2023-04-11 PROCEDURE — 99214 OFFICE O/P EST MOD 30 MIN: CPT | Mod: CS | Performed by: NURSE PRACTITIONER

## 2023-04-11 PROCEDURE — U0005 INFEC AGEN DETEC AMPLI PROBE: HCPCS | Performed by: PATHOLOGY

## 2023-04-11 PROCEDURE — U0003 INFECTIOUS AGENT DETECTION BY NUCLEIC ACID (DNA OR RNA); SEVERE ACUTE RESPIRATORY SYNDROME CORONAVIRUS 2 (SARS-COV-2) (CORONAVIRUS DISEASE [COVID-19]), AMPLIFIED PROBE TECHNIQUE, MAKING USE OF HIGH THROUGHPUT TECHNOLOGIES AS DESCRIBED BY CMS-2020-01-R: HCPCS | Performed by: PATHOLOGY

## 2023-04-11 NOTE — NURSING NOTE
"Heather Guillory is a 42 year old female patient that presents today in clinic for the following:    Chief Complaint   Patient presents with     RECHECK     Fatigue.  Sore throat.  Symptoms have been persistent for the past 2 weeks.     The patient's allergies and medications were reviewed as noted. A set of vitals were recorded as noted without incident: /75 (BP Location: Right arm, Patient Position: Sitting, Cuff Size: Adult Regular)   Pulse 76   Temp 98.4  F (36.9  C) (Oral)   Ht 1.702 m (5' 7\")   Wt 72.5 kg (159 lb 12.8 oz)   SpO2 99%   BMI 25.03 kg/m  . The patient does not have any other questions for the provider.    Moses Menchaca, EMT at 5:08 PM on 4/11/2023.  Primary care clinic: [phone]  "

## 2023-04-11 NOTE — PROGRESS NOTES
"Ms. Guillory is a 42 year old female with history of JAVIER, undifferentiated connective tissue disorder, uterine fibroids who presents for sore throat and fatigue     She is a patient of Cyndi Zarate NP     History of Present Illness:    Sore throat and fatigue:   Symptoms began on March 23rd while on a family trip to [place] with chills, fever (temperature up to 101.7 X 1 day), body aches, headache, and malaise. Still experiencing ongoing mild sore throat, fatigue, and body aches. Denies shortness of breath or chest pain. No known exposure to illness. Acetaminophen has helped with the body aches. Strep test negative at urgent care on 4/8. 3x COVID-19 home tests negative (last ~ March 30).     Rheumatology labs last obtained 4/5/23 and were negative for inflammatory concerns.      Review of external notes as documented above     Past Medical History:  Past Medical History:   Diagnosis Date     JAVIER (generalized anxiety disorder)     50mg zoloft     Rosacea      Undifferentiated connective tissue disease (H)        Active Meds:  Current Outpatient Medications   Medication     hydroxychloroquine (PLAQUENIL) 200 MG tablet     hydroxychloroquine (PLAQUENIL) 200 MG tablet     metroNIDAZOLE (METROCREAM) 0.75 % external cream     omeprazole (PRILOSEC) 20 MG DR capsule     Prenatal Vit-Fe Fumarate-FA (PRENATAL VITAMIN AND MINERAL PO)     sucralfate (CARAFATE) 1 GM tablet     tretinoin (RETIN-A) 0.05 % external cream     No current facility-administered medications for this visit.        Allergies:  Reviewed, refer to EMR      A full 6-pt Review of Systems was performed, verified and is negative except as documented in the HPI.  All health questionnaires were reviewed, verified and relevant information documented above.      Physical Exam:  Vitals: /75 (BP Location: Right arm, Patient Position: Sitting, Cuff Size: Adult Regular)   Pulse 76   Temp 98.4  F (36.9  C) (Oral)   Ht 1.702 m (5' 7\")   Wt 72.5 kg (159 lb " 12.8 oz)   SpO2 99%   BMI 25.03 kg/m    Constitutional: Alert, oriented, pleasant, no acute distress  Head: Normocephalic, atraumatic  Eyes: Extra-ocular movements intact, pupils equally round and reactive bilaterally, no scleral icterus  ENT: Oropharynx clear, moist mucus membranes, good dentition.  TMs clear.  Neck: Supple, mild anterior cervical and submental lymphadenopathy, no thyromegaly, no JVD  Cardiovascular: Regular rate and rhythm, no murmurs, rubs or gallops, peripheral pulses full/symmetric  Respiratory: Good air movement bilaterally, lungs clear, no wheezes/rales/rhonchi  GI: Abdomen soft, bowel sounds present, nondistended, nontender, no organomegaly or masses, no rebound/guarding  Psychiatric: normal mentation, affect and mood      Assessment and Plan:    (J06.9) Viral upper respiratory tract infection  (primary encounter diagnosis)  Comment: Symptoms most likely due to viral infection. Strep negative. Too far out from symptom onset to test for influenza. Less likely mononucleosis given age and risk factors.   Plan: Rule out Symptomatic COVID-19 Virus (Coronavirus) by PCR Nasopharyngeal   Reassurance provided.  Symptom management as needed.       #Routine Health Maintenence:  Immunizations (zoster, pneumovax, flu, Tdap, Hep A/B):   Most Recent Immunizations   Administered Date(s) Administered     COVID-19 Vaccine 12+ (Pfizer) 12/15/2021     DTaP, Unspecified 06/02/2006     FLU 6-35 months 08/20/2012     Flu, Unspecified 10/13/2018     HPV Quadrivalent 04/20/2009     Influenza (H1N1) 01/09/2010     Influenza (IIV3) PF 10/03/2014     Influenza Vaccine >6 months (Alfuria,Fluzone) 12/15/2022     Influenza Vaccine, 6+MO IM (QUADRIVALENT W/PRESERVATIVES) 10/13/2018     Meningococcal (Menomune ) 08/30/2000     TDAP Vaccine (Boostrix) 01/15/2018     Lipids:   Recent Labs   Lab Test 08/27/21  0850 04/26/19  0930   CHOL 182 160   HDL 59 51   * 100*   TRIG 51 45         Return to clinic:  As needed and  with no improvement, worsening, or new symptom development.     Options for treatment and follow-up care were reviewed with the patient. She engaged in the decision making process and verbalized understanding of the options discussed and agreed with the final plan.    I spent a total of 30 minutes in the care of this pt today, including time prior to, during, and following today's office visit. This time includes reviewing the patient's chart and prior history, external notes, obtaining a history, performing an examination, interpreting test results, and evaluation and counseling the patient. This time also includes ordering medications or tests necessary in addition to communication to other member's of the patient's health care team. Time spent in documentation and care coordination is included.    Mechelle Elkins, NP student       I was present with the NPP student who participated in the service and in the documentation of the services provided.  I have verified the history and personally performed the physical exam and medical decision making, as documented by the student and edited by me.      Sandra Diaz, ANP, AGAP  Nurse Practitioner

## 2023-04-12 ENCOUNTER — NURSE TRIAGE (OUTPATIENT)
Dept: NURSING | Facility: CLINIC | Age: 43
End: 2023-04-12
Payer: COMMERCIAL

## 2023-04-12 ENCOUNTER — TELEPHONE (OUTPATIENT)
Dept: NURSING | Facility: CLINIC | Age: 43
End: 2023-04-12
Payer: COMMERCIAL

## 2023-04-12 LAB — SARS-COV-2 RNA RESP QL NAA+PROBE: POSITIVE

## 2023-04-12 NOTE — TELEPHONE ENCOUNTER
Patient classified as COVID treatment eligible by Epic high risk algorithm:  Yes    Coronavirus (COVID-19) Notification    Reason for call  Notify of POSITIVE COVID-19 lab result, assess symptoms,  review [facility] recommendations    Lab Result   Lab test for 2019-nCoV rRt-PCR or SARS-COV-2 PCR  Oropharyngeal AND/OR nasopharyngeal swabs were POSITIVE for 2019-nCoV RNA [OR] SARS-COV-2 RNA (COVID-19) RNA     We have been unable to reach patient by phone at this time to notify of their Positive COVID-19 result.    Left voicemail message requesting a call back to [phone] [facility] for results.        A Positive COVID-19 letter will be sent via netZentry or the mail.    Peri Dobbins

## 2023-04-12 NOTE — TELEPHONE ENCOUNTER
Nurse Triage SBAR    Is this a 2nd Level Triage? NO    Situation: Patient calling to ask about a recent positive Covid test result.  Consent: not needed    Background: Patient was seen in clinic yesterday after not feeling well for the last 3 weeks. Patient started having symptoms on Mar 23rd.   Covid tests negative on Mar 23rd, 24th and 25th, then she stopped testing.    Covid testing done at clinic appointment yesterday and came back positive.    Assessment:   Overall feeling better  Swollen glands   No cough  Throat is red, but does not hurt to swallow    Protocol Recommended Disposition:   Home care    Recommendation: Advised home care. Gave patient information on the Long Covid Clinic if symptoms persist. Patient verbalized understanding and agreed with plan.       Veena Vasquez RN [facility] 4/12/2023 11:30 AM    Reason for Disposition    [1] PERSISTING SYMPTOMS OF COVID-19 AND [2] symptoms BETTER (improving)    Additional Information    Negative: SEVERE difficulty breathing (e.g., struggling for each breath, speaks in single words)    Negative: [1] SEVERE weakness (e.g., can't stand or can barely walk) AND [2] new-onset or WORSE    Negative: Difficult to awaken or acting confused (e.g., disoriented, slurred speech)    Negative: Bluish (or gray) lips or face now    Negative: Sounds like a life-threatening emergency to the triager    Negative: [1] Typical COVID-19 symptoms AND [2] lasting less than 3 weeks    Negative: [1] Chest pain, pressure, or tightness AND [2] new-onset or worsening    Negative: [1] Fever AND [2] new-onset or worsening    Negative: [1] MODERATE difficulty breathing (e.g., speaks in phrases, SOB even at rest, pulse 100-120) AND [2] new-onset or WORSE    Negative: [1] MODERATE difficulty breathing AND [2] oxygen level (e.g., pulse oximetry) 91 to 94 percent    Negative: Oxygen level (e.g., pulse oximetry) 90 percent or lower    Negative: MODERATE difficulty breathing (e.g.,  speaks in phrases, SOB even at rest, pulse 100-120)    Negative: [1] Drinking very little AND [2] dehydration suspected (e.g., no urine > 12 hours, very dry mouth, very lightheaded)    Negative: Patient sounds very sick or weak to the triager    Negative: [1] MILD difficulty breathing (e.g., minimal/no SOB at rest, SOB with walking, pulse <100) AND [2] new-onset    Negative: Oxygen level (e.g., pulse oximetry) 91 to 94 percent    Negative: [1] PERSISTING SYMPTOMS OF COVID-19 AND [2] NEW symptom AND [3] could be serious    Negative: [1] Caller has URGENT question AND [2] triager unable to answer question    Negative: [1] PERSISTING SYMPTOMS OF COVID-19 AND [2] symptoms WORSE    Negative: [1] Caller has NON-URGENT question AND [2] triager unable to answer    Negative: [1] PERSISTING SYMPTOMS OF COVID-19 AND [2] NO medical visit for COVID-19 in past 2 weeks    Negative: Patient wants to be seen    Negative: [1] PERSISTING SYMPTOMS OF COVID-19 AND [2] symptoms SAME AND [3] medical visit for COVID-19 in past 2 weeks    Protocols used: CORONAVIRUS (COVID-19) PERSISTING SYMPTOMS FOLLOW-UP CALL-A-OH

## 2023-04-19 ENCOUNTER — OFFICE VISIT (OUTPATIENT)
Dept: OBGYN | Facility: CLINIC | Age: 43
End: 2023-04-19
Attending: OBSTETRICS & GYNECOLOGY
Payer: COMMERCIAL

## 2023-04-19 VITALS
WEIGHT: 162 LBS | SYSTOLIC BLOOD PRESSURE: 114 MMHG | BODY MASS INDEX: 25.37 KG/M2 | DIASTOLIC BLOOD PRESSURE: 80 MMHG | HEART RATE: 75 BPM

## 2023-04-19 DIAGNOSIS — N92.0 MENORRHAGIA WITH REGULAR CYCLE: Primary | ICD-10-CM

## 2023-04-19 PROCEDURE — 99212 OFFICE O/P EST SF 10 MIN: CPT | Performed by: OBSTETRICS & GYNECOLOGY

## 2023-04-19 PROCEDURE — 99213 OFFICE O/P EST LOW 20 MIN: CPT | Performed by: OBSTETRICS & GYNECOLOGY

## 2023-04-19 RX ORDER — VITAMIN B COMPLEX
TABLET ORAL DAILY
COMMUNITY
End: 2024-04-16

## 2023-04-19 RX ORDER — SACCHAROMYCES BOULARDII 250 MG
250 CAPSULE ORAL 2 TIMES DAILY
COMMUNITY
End: 2023-10-03

## 2023-04-19 ASSESSMENT — PAIN SCALES - GENERAL: PAINLEVEL: NO PAIN (0)

## 2023-04-19 NOTE — PROGRESS NOTES
Chief Complaint   Patient presents with     RECHECK     Menstrual issues   Tigist Lloyd LPN    Starting to bleed heavily again.  Had Mirena in past and bleeding was good. Had h/o myomectomy as well as salpingectomy. Wondering about another pregnancy, as they have embryo at [facility].    /80   Pulse 75   Wt 73.5 kg (162 lb)   LMP 03/21/2023   BMI 25.37 kg/m    No exam done today.    A/p: AUB with h/o fibroid.    Pelvic sono to eval.  Discussed IUD vs hormone tx to manage menses. Not interested at  This time.    Cate Mansfield MD, FACOG  (she/her/hers)    Department of Ob/Gyn/Women's Health  [employer]  [facility]  [address] [place] [address]  [email or web address]  p. [phone]  f. [phone]

## 2023-04-19 NOTE — LETTER
4/19/2023       RE: Heather Guillory  [address] [place] [address]     Dear Colleague,    Thank you for referring your patient, Heather Guillory, to the [facility] [place] at [facility]. Please see a copy of my visit note below.    Chief Complaint   Patient presents with    RECHECK     Menstrual issues   Tigist CHRISTIANE LloydN    Starting to bleed heavily again.  Had Mirena in past and bleeding was good. Had h/o myomectomy as well as salpingectomy. Wondering about another pregnancy, as they have embryo at [facility].    /80   Pulse 75   Wt 73.5 kg (162 lb)   LMP 03/21/2023   BMI 25.37 kg/m    No exam done today.    A/p: AUB with h/o fibroid.    Pelvic sono to eval.  Discussed IUD vs hormone tx to manage menses. Not interested at  This time.    Cate Mansfield MD, FACOG  (she/her/hers)    Department of Ob/Gyn/Women's Health  [facility] [employer] [employer]  [employer] [facility]  [address] [place], [place] [address] [address]  [email or web address]  p. [phone]  f. [phone]

## 2023-04-26 ENCOUNTER — ANCILLARY PROCEDURE (OUTPATIENT)
Dept: ULTRASOUND IMAGING | Facility: CLINIC | Age: 43
End: 2023-04-26
Attending: OBSTETRICS & GYNECOLOGY
Payer: COMMERCIAL

## 2023-04-26 DIAGNOSIS — N92.0 MENORRHAGIA WITH REGULAR CYCLE: ICD-10-CM

## 2023-04-26 PROCEDURE — 76830 TRANSVAGINAL US NON-OB: CPT

## 2023-04-26 PROCEDURE — 76856 US EXAM PELVIC COMPLETE: CPT

## 2023-06-02 ENCOUNTER — ANCILLARY PROCEDURE (OUTPATIENT)
Dept: MAMMOGRAPHY | Facility: CLINIC | Age: 43
End: 2023-06-02
Attending: NURSE PRACTITIONER
Payer: COMMERCIAL

## 2023-06-02 DIAGNOSIS — Z12.31 VISIT FOR SCREENING MAMMOGRAM: ICD-10-CM

## 2023-06-02 PROCEDURE — 77067 SCR MAMMO BI INCL CAD: CPT | Mod: GC | Performed by: RADIOLOGY

## 2023-06-02 PROCEDURE — 77063 BREAST TOMOSYNTHESIS BI: CPT | Mod: GC | Performed by: RADIOLOGY

## 2023-06-06 ENCOUNTER — TELEPHONE (OUTPATIENT)
Dept: OBGYN | Facility: CLINIC | Age: 43
End: 2023-06-06
Payer: COMMERCIAL

## 2023-06-06 DIAGNOSIS — N30.01 ACUTE CYSTITIS WITH HEMATURIA: Primary | ICD-10-CM

## 2023-06-06 RX ORDER — NITROFURANTOIN 25; 75 MG/1; MG/1
100 CAPSULE ORAL 2 TIMES DAILY
Qty: 20 CAPSULE | Refills: 0 | Status: SHIPPED | OUTPATIENT
Start: 2023-06-06 | End: 2023-06-16

## 2023-06-06 NOTE — TELEPHONE ENCOUNTER
Chief Complaint/Follow-up on: T1DM    Subjective: Pt feeling better today - no further abdominal pain. Able to tolerate clears. She was en route to the cath lab.    MEDICATIONS  (STANDING):  aspirin  chewable 81 milliGRAM(s) Oral daily  atorvastatin 80 milliGRAM(s) Oral at bedtime  clopidogrel Tablet 75 milliGRAM(s) Oral daily  dextrose 5% + sodium chloride 0.45%. 1000 milliLiter(s) (25 mL/Hr) IV Continuous <Continuous>  heparin  Infusion.  Unit(s)/Hr (6.5 mL/Hr) IV Continuous <Continuous>  insulin Infusion 1 Unit(s)/Hr (1 mL/Hr) IV Continuous <Continuous>  metoprolol succinate ER 25 milliGRAM(s) Oral daily  pantoprazole  Injectable 40 milliGRAM(s) IV Push every 12 hours  phenylephrine    Infusion 0.02 MICROgram(s)/kG/Min (0.4 mL/Hr) IV Continuous <Continuous>  piperacillin/tazobactam IVPB. 3.375 Gram(s) IV Intermittent every 12 hours      PHYSICAL EXAM:  VITALS: T(C): 36.8 (05-01-18 @ 11:00)  T(F): 98.2 (05-01-18 @ 11:00), Max: 99.4 (05-01-18 @ 00:00)  HR: 71 (05-01-18 @ 11:00) (66 - 86)  BP: 101/55 (05-01-18 @ 11:00) (75/37 - 196/77)  RR:  (10 - 46)  SpO2:  (95% - 100%)  Wt(kg): --  GENERAL: NAD, well-groomed, well-developed  HEENT:  Atraumatic, Normocephalic, moist mucous membranes  RESPIRATORY: Clear to auscultation bilaterally; No rales, rhonchi, wheezing, or rubs  CARDIOVASCULAR: Regular rate and rhythm; No murmurs; no peripheral edema  GI: Soft, nontender, non distended, normal bowel sounds      POCT Blood Glucose.: 145 mg/dL (05-01-18 @ 12:12)  POCT Blood Glucose.: 190 mg/dL (05-01-18 @ 11:01)  POCT Blood Glucose.: 198 mg/dL (05-01-18 @ 09:49)  POCT Blood Glucose.: 199 mg/dL (05-01-18 @ 08:40)  POCT Blood Glucose.: 212 mg/dL (05-01-18 @ 08:05)  POCT Blood Glucose.: 243 mg/dL (05-01-18 @ 07:00)  POCT Blood Glucose.: 207 mg/dL (05-01-18 @ 05:05)  POCT Blood Glucose.: 190 mg/dL (05-01-18 @ 04:01)  POCT Blood Glucose.: 142 mg/dL (05-01-18 @ 02:04)  POCT Blood Glucose.: 131 mg/dL (05-01-18 @ 00:03)  POCT Blood Glucose.: 127 mg/dL (04-30-18 @ 23:17)  POCT Blood Glucose.: 120 mg/dL (04-30-18 @ 22:08)  POCT Blood Glucose.: 133 mg/dL (04-30-18 @ 21:02)  POCT Blood Glucose.: 100 mg/dL (04-30-18 @ 20:03)  POCT Blood Glucose.: 79 mg/dL (04-30-18 @ 19:06)  POCT Blood Glucose.: 120 mg/dL (04-30-18 @ 16:53)  POCT Blood Glucose.: 234 mg/dL (04-30-18 @ 15:16)  POCT Blood Glucose.: 182 mg/dL (04-30-18 @ 13:53)  POCT Blood Glucose.: 220 mg/dL (04-30-18 @ 12:57)  POCT Blood Glucose.: 250 mg/dL (04-30-18 @ 11:04)  POCT Blood Glucose.: 247 mg/dL (04-30-18 @ 11:02)  POCT Blood Glucose.: 171 mg/dL (04-30-18 @ 09:51)  POCT Blood Glucose.: 79 mg/dL (04-30-18 @ 09:11)  POCT Blood Glucose.: 161 mg/dL (04-30-18 @ 07:55)  POCT Blood Glucose.: 233 mg/dL (04-30-18 @ 06:54)  POCT Blood Glucose.: 222 mg/dL (04-30-18 @ 06:04)  POCT Blood Glucose.: 206 mg/dL (04-30-18 @ 04:57)  POCT Blood Glucose.: 236 mg/dL (04-30-18 @ 04:14)  POCT Blood Glucose.: 292 mg/dL (04-30-18 @ 02:55)  POCT Blood Glucose.: 263 mg/dL (04-30-18 @ 02:16)  POCT Blood Glucose.: 281 mg/dL (04-30-18 @ 01:11)  POCT Blood Glucose.: 244 mg/dL (04-30-18 @ 00:02)  POCT Blood Glucose.: 268 mg/dL (04-29-18 @ 23:02)  POCT Blood Glucose.: 340 mg/dL (04-29-18 @ 22:02)  POCT Blood Glucose.: 384 mg/dL (04-29-18 @ 21:07)  POCT Blood Glucose.: 446 mg/dL (04-29-18 @ 20:09)  POCT Blood Glucose.: 511 mg/dL (04-29-18 @ 19:13)  POCT Blood Glucose.: >600 mg/dL (04-29-18 @ 18:06)  POCT Blood Glucose.: >600 mg/dL (04-29-18 @ 17:01)  POCT Blood Glucose.: >600 mg/dL (04-29-18 @ 15:51)  POCT Blood Glucose.: >600 mg/dL (04-29-18 @ 14:57)  POCT Blood Glucose.: >600 mg/dL (04-29-18 @ 13:23)  POCT Blood Glucose.: >600 mg/dL (04-29-18 @ 13:21)    05-01    136  |  91<L>  |  31<H>  ----------------------------<  200<H>  4.2   |  27  |  4.73<H>    EGFR if : 11<L>  EGFR if non : 9<L>    Ca    8.8      05-01  Mg     2.2     05-01  Phos  5.0     05-01    TPro  6.9  /  Alb  4.2  /  TBili  0.4  /  DBili  x   /  AST  64<H>  /  ALT  23  /  AlkPhos  82  05-01          Hemoglobin A1C, Whole Blood: 7.1 % <H> [4.0 - 5.6] (03-01-18 @ 01:59) Patient called to on call physician.  She was seen at an urgent care last week and diagnosed with a UTI.  She was prescribed Macrobid and culture returned from the urgent care stating that she had an e coli UTI sensitive to Macrobid.  She began to feel better, but didn't finish the course because of a viral gastroenteritis. Now her symptoms have returned including dysuria and hematuria. We discussed options for longer course of Macrobid vs trial of a different antibiotic.  Given patient on Plaquenil and allergy to sulfa medications we elected to treat with Macrobid for 10 days.  Prescription sent to [facility] in [place].  Jackie Zamora MD

## 2023-06-12 ASSESSMENT — ENCOUNTER SYMPTOMS
HEARTBURN: 0
JAUNDICE: 0
NAIL CHANGES: 0
BOWEL INCONTINENCE: 0
BLOATING: 1
CONSTIPATION: 0
NAUSEA: 1
RECTAL PAIN: 0
ABDOMINAL PAIN: 1
VOMITING: 0
POOR WOUND HEALING: 0
DIARRHEA: 0
BLOOD IN STOOL: 0
SKIN CHANGES: 0

## 2023-06-13 ENCOUNTER — OFFICE VISIT (OUTPATIENT)
Dept: INTERNAL MEDICINE | Facility: CLINIC | Age: 43
End: 2023-06-13
Payer: COMMERCIAL

## 2023-06-13 VITALS
BODY MASS INDEX: 25.16 KG/M2 | HEART RATE: 75 BPM | DIASTOLIC BLOOD PRESSURE: 69 MMHG | OXYGEN SATURATION: 100 % | SYSTOLIC BLOOD PRESSURE: 101 MMHG | HEIGHT: 67 IN | WEIGHT: 160.3 LBS

## 2023-06-13 DIAGNOSIS — L65.9 HAIR LOSS: ICD-10-CM

## 2023-06-13 DIAGNOSIS — M54.2 CERVICALGIA: ICD-10-CM

## 2023-06-13 DIAGNOSIS — R79.0 LOW FERRITIN: ICD-10-CM

## 2023-06-13 DIAGNOSIS — N92.0 MENORRHAGIA WITH REGULAR CYCLE: ICD-10-CM

## 2023-06-13 DIAGNOSIS — Z00.00 ENCOUNTER FOR PREVENTIVE CARE: Primary | ICD-10-CM

## 2023-06-13 PROCEDURE — 99396 PREV VISIT EST AGE 40-64: CPT | Performed by: NURSE PRACTITIONER

## 2023-06-13 ASSESSMENT — PATIENT HEALTH QUESTIONNAIRE - PHQ9: SUM OF ALL RESPONSES TO PHQ QUESTIONS 1-9: 0

## 2023-06-13 NOTE — PATIENT INSTRUCTIONS
Multivitamin   Biotin  Vit D3 [id number] international unit(s)/day      To schedule your x-ray appointment with imaging, please call [phone]     To schedule your lab appointment with labs, please call [phone]

## 2023-06-13 NOTE — PROGRESS NOTES
Ms. Guillory is a 43 year old female with history of connective tissue disease, JAVIER, rosacea who presents for annual exam.    She is a patient of Cyndi Zarate.      History of Present Illness:    Hair loss:  Was told by her  that she has increased shedding of hair 3 weeks ago. Patient has always had hair falling out when washing and never thought much of it, although she has noticed more hair breakage recently. No changes to diet, does not much meat but this is not new for her. Was under a lot of stress over the spring, but has not been under stress recently.      L arm/ Cervical neck pain:    First started noticing constant numbness, tingling in left arm radiating down to hand since . The numbness/tingling comes and goes and is not currently present at today's visit. Not aggravated with movement. Pt is left handed. Does have a 5 year old child that she lifts on the right side. Has been sleeping on right side to avoid putting pressure on L arm, this has slightly improved symptoms. No trauma or changes to activity.   Seen by orthopedics who recommended seeing nonoperative spine provider, has not made appointment as of yet.     Menorrhagia:  Still some months with heavy menses  Fibroid removed 18 months ago, 2022      Preventative:   Up to date. PHQ9 completed today.     Reproductive/Sexual Health:    LMP: 23  Menarche age: 14  Menses/ Vaginal bleeding: menorrhagia, fibroid removed 1 year ago  Partners - male partner  Contraception - none, bilateral salpingectomy 2 years ago   Paps: 2020 - NIL  STI hx: none  Pelvic pain: none      Obstetric History          OB History    Para Term  AB Living   3 3 3 0 0 3   SAB IAB Ectopic Multiple Live Births    0 0 0 0 0        Recent Labs, Imaging  Lab Results   Component Value Date    WBC 7.2 2023    HGB 12.7 2023    HCT 38.5 2023     2023     2022    POTASSIUM 4.2 2022     CHLORIDE 105 11/18/2022    CO2 25 11/18/2022    BUN 8.0 11/18/2022    CR 0.60 04/05/2023    GLC 78 11/18/2022    SED 5 04/05/2023    AST 22 04/05/2023    ALT 21 04/05/2023    ALKPHOS 70 11/18/2022    BILITOTAL 0.7 11/18/2022    INR 1.00 02/17/2022       Review of external notes as documented above       Past Medical History:  Past Medical History:   Diagnosis Date     JAVIER (generalized anxiety disorder)     50mg zoloft     Rosacea      Undifferentiated connective tissue disease (H)        Active Meds:  Current Outpatient Medications   Medication     hydroxychloroquine (PLAQUENIL) 200 MG tablet     metroNIDAZOLE (METROCREAM) 0.75 % external cream     nitroFURantoin macrocrystal-monohydrate (MACROBID) 100 MG capsule     omeprazole (PRILOSEC) 20 MG DR capsule     saccharomyces boulardii (FLORASTOR) 250 MG capsule     Vitamin D3 (VITAMIN D, CHOLECALCIFEROL,) 25 mcg (1000 units) tablet     No current facility-administered medications for this visit.        Allergies:  Reviewed, refer to EMR    Family History:  I have reviewed this patient's family history and updated it with pertinent information if needed.  Family History   Problem Relation Age of Onset     Elijah Disease Sister      Arrhythmia Brother         details unknown; procedure in his 20s     Anxiety Disorder Brother      Alzheimer Disease Maternal Grandmother      Cervical Cancer Maternal Grandmother      Colon Cancer Maternal Grandfather      Breast Cancer Paternal Grandmother      Hypertension Paternal Grandmother      Hypertension Paternal Grandfather      Lung Cancer Paternal Grandfather         smoker     Diabetes Type 2  Paternal Grandfather      Myocardial Infarction Paternal Grandfather      Myocardial Infarction Paternal Aunt 45     Cerebrovascular Disease No family hx of      Coronary Artery Disease Early Onset No family hx of      Ovarian Cancer No family hx of        Relevant Social History:  Employment: none   Relationship: male partner  Sexual  Health:  Diet/Nutrition: tries to eat a healthy diet  Caffeine: 1 cup of coffee a day  Alcohol: none  Tobacco/Vaping: none  Street Drugs/Cannabis: none  Exercise/Activity: 30min walk 3-4 times a week       A full 10-pt Review of Systems was performed, verified and is negative except as documented in the HPI.  All health questionnaires were reviewed, verified and relevant information documented above.      Physical Exam:  Vitals: LMP 06/12/2023   Constitutional: Alert, oriented, pleasant, no acute distress  Head: Normocephalic, atraumatic  Eyes: Extra-ocular movements intact, pupils equally round and reactive bilaterally, no scleral icterus  ENT: Oropharynx clear, moist mucus membranes, good dentition  Neck: Supple, no lymphadenopathy, no thyromegaly, no JVD  Cardiovascular: Regular rate and rhythm, no murmurs, rubs or gallops, peripheral pulses full/symmetric  Respiratory: Good air movement bilaterally, lungs clear, no wheezes/rales/rhonchi  GI: Abdomen soft, bowel sounds present, nondistended, nontender, no organomegaly or masses, no rebound/guarding  Musculoskeletal: No edema, normal muscle tone, normal gait, normal ROM of upper extremities, empty can test negative  Neurologic: Alert and oriented, cranial nerves 2-12 intact, strength 5/5 throughout, no numbness or tingling  Skin: No rashes/lesions  Psychiatric: normal mentation, affect and mood        Assessment and Plan:    43 year old female with history of connective tissue disease, JAVIER, rosacea who presents for annual exam.    (Z00.00) Encounter for preventive care  (primary encounter diagnosis)  Comment: PHQ9 completed today. All other preventative screenings up to date.   Plan: Comprehensive metabolic panel, Lipid panel         reflex to direct LDL Fasting    (M54.2) Cervicalgia  Comment: Intermittent numbness and tingling. Cervical xray to rule out degenerative disk disease. Consider PT consult.   Plan: XR Cervical Spine 2/3 Views    (L65.9) Hair  loss  Comment: Increased shedding and thinning of hair. Telogen effluvium vs androgenic alopecia. Reports increased stress, will get labs to rule out androgenic alopecia. Can start OTC vitamin D and biotene supplement.   Plan: Testosterone Free and Total, DHEA sulfate    (R79.0) Low ferritin  (N92.0) Menorrhagia  Comment: History of low ferritin. Will obtain labs and start iron supplementation if low.   Plan: Ferritin, Iron and iron binding capacity            #Routine Health Maintenence:  Immunizations (zoster, pneumovax, flu, Tdap, Hep A/B):   Most Recent Immunizations   Administered Date(s) Administered     COVID-19 MONOVALENT 12+ (Pfizer) 12/15/2021     DTaP, Unspecified 06/02/2006     FLU 6-35 months 08/20/2012     Flu, Unspecified 10/13/2018     HPV Quadrivalent 04/20/2009     Influenza (H1N1) 01/09/2010     Influenza (IIV3) PF 10/03/2014     Influenza Vaccine >6 months (Alfuria,Fluzone) 12/15/2022     Influenza Vaccine, 6+MO IM (QUADRIVALENT W/PRESERVATIVES) 10/13/2018     Meningococcal (Menomune ) 08/30/2000     TDAP Vaccine (Boostrix) 01/15/2018     Lipids:   Recent Labs   Lab Test 08/27/21  0850 04/26/19  0930   CHOL 182 160   HDL 59 51   * 100*   TRIG 51 45     Colonoscopy (50-75 yrs): 2022 - NEG  Pap (21-65 yrs): 2020 - NIL  Depression: PHQ9 - 0    Return to clinic:  As needed and with no improvement, worsening, or new symptom development.     Options for treatment and follow-up care were reviewed with the patient. She engaged in the decision making process and verbalized understanding of the options discussed and agreed with the final plan.    I spent a total of 30 minutes in the care of this pt today, including time prior to, during, and following today's office visit. This time includes reviewing the patient's chart and prior history, external notes, obtaining a history, performing an examination, interpreting test results, and evaluation and counseling the patient. This time also includes  ordering medications or tests necessary in addition to communication to other member's of the patient's health care team. Time spent in documentation and care coordination is included.      Vipin Jaimes, NP student      I was present with the NPP student who participated in the service and in the documentation of the services provided.  I have verified the history and personally performed the physical exam and medical decision making, as documented by the student and edited by me.      Sandra Diaz ANP, [facility]  Nurse Practitioner        Answers for HPI/ROS submitted by the patient on 6/12/2023  General Symptoms: No  Skin Symptoms: Yes  HENT Symptoms: No  EYE SYMPTOMS: No  HEART SYMPTOMS: No  LUNG SYMPTOMS: No  INTESTINAL SYMPTOMS: Yes  URINARY SYMPTOMS: No  GYNECOLOGIC SYMPTOMS: No  BREAST SYMPTOMS: No  SKELETAL SYMPTOMS: No  BLOOD SYMPTOMS: No  NERVOUS SYSTEM SYMPTOMS: No  MENTAL HEALTH SYMPTOMS: No  Changes in hair: Yes  Changes in moles/birth marks: No  Itching: No  Rashes: No  Changes in nails: No  Acne: No  Hair in places you don't want it: No  Change in facial hair: No  Warts: No  Non-healing sores: No  Scarring: No  Flaking of skin: No  Color changes of hands/feet in cold : No  Sun sensitivity: No  Skin thickening: No  Heart burn or indigestion: No  Nausea: Yes  Vomiting: No  Abdominal pain: Yes  Bloating: Yes  Constipation: No  Diarrhea: No  Blood in stool: No  Black stools: No  Rectal or Anal pain: No  Fecal incontinence: No  Yellowing of skin or eyes: No  Vomit with blood: No  Change in stools: No

## 2023-06-13 NOTE — NURSING NOTE
"Heather Guillory is a 43 year old female patient that presents today in clinic for the following:    Chief Complaint   Patient presents with     Physical     Pt here for annual physical and would like to discuss numbness in left side and increasing hair loss.     The patient's allergies and medications were reviewed as noted. A set of vitals were recorded as noted without incident: /69 (BP Location: Right arm, Patient Position: Sitting, Cuff Size: Adult Regular)   Pulse 75   Ht 1.702 m (5' 7.01\")   Wt 72.7 kg (160 lb 4.8 oz)   LMP 03/21/2023   SpO2 100%   BMI 25.10 kg/m  . The patient does not have any other questions for the provider.    Melissa Aguirre, EMT at 1:48 PM on 6/13/2023  "

## 2023-06-20 ENCOUNTER — LAB (OUTPATIENT)
Dept: LAB | Facility: CLINIC | Age: 43
End: 2023-06-20
Payer: COMMERCIAL

## 2023-06-20 DIAGNOSIS — R79.0 LOW FERRITIN: ICD-10-CM

## 2023-06-20 DIAGNOSIS — Z00.00 ENCOUNTER FOR PREVENTIVE CARE: ICD-10-CM

## 2023-06-20 DIAGNOSIS — L65.9 HAIR LOSS: ICD-10-CM

## 2023-06-20 LAB
ALBUMIN SERPL BCG-MCNC: 4.6 G/DL (ref 3.5–5.2)
ALP SERPL-CCNC: 69 U/L (ref 35–104)
ALT SERPL W P-5'-P-CCNC: 11 U/L (ref 0–50)
ANION GAP SERPL CALCULATED.3IONS-SCNC: 9 MMOL/L (ref 7–15)
AST SERPL W P-5'-P-CCNC: 21 U/L (ref 0–45)
BILIRUB SERPL-MCNC: 0.3 MG/DL
BUN SERPL-MCNC: 6.6 MG/DL (ref 6–20)
CALCIUM SERPL-MCNC: 9.3 MG/DL (ref 8.6–10)
CHLORIDE SERPL-SCNC: 108 MMOL/L (ref 98–107)
CHOLEST SERPL-MCNC: 161 MG/DL
CREAT SERPL-MCNC: 0.66 MG/DL (ref 0.51–0.95)
DEPRECATED HCO3 PLAS-SCNC: 25 MMOL/L (ref 22–29)
FERRITIN SERPL-MCNC: 36 NG/ML (ref 6–175)
GFR SERPL CREATININE-BSD FRML MDRD: >90 ML/MIN/1.73M2
GLUCOSE SERPL-MCNC: 96 MG/DL (ref 70–99)
HDLC SERPL-MCNC: 57 MG/DL
IRON BINDING CAPACITY (ROCHE): 317 UG/DL (ref 240–430)
IRON SATN MFR SERPL: 11 % (ref 15–46)
IRON SERPL-MCNC: 34 UG/DL (ref 37–145)
LDLC SERPL CALC-MCNC: 95 MG/DL
NONHDLC SERPL-MCNC: 104 MG/DL
POTASSIUM SERPL-SCNC: 4 MMOL/L (ref 3.4–5.3)
PROT SERPL-MCNC: 7.3 G/DL (ref 6.4–8.3)
SHBG SERPL-SCNC: 148 NMOL/L (ref 30–135)
SODIUM SERPL-SCNC: 142 MMOL/L (ref 136–145)
TRIGL SERPL-MCNC: 43 MG/DL

## 2023-06-20 PROCEDURE — 84146 ASSAY OF PROLACTIN: CPT

## 2023-06-20 PROCEDURE — 83001 ASSAY OF GONADOTROPIN (FSH): CPT

## 2023-06-20 PROCEDURE — 84403 ASSAY OF TOTAL TESTOSTERONE: CPT

## 2023-06-20 PROCEDURE — 84443 ASSAY THYROID STIM HORMONE: CPT

## 2023-06-20 PROCEDURE — 83550 IRON BINDING TEST: CPT

## 2023-06-20 PROCEDURE — 82728 ASSAY OF FERRITIN: CPT

## 2023-06-20 PROCEDURE — 82627 DEHYDROEPIANDROSTERONE: CPT

## 2023-06-20 PROCEDURE — 80053 COMPREHEN METABOLIC PANEL: CPT

## 2023-06-20 PROCEDURE — 83540 ASSAY OF IRON: CPT

## 2023-06-20 PROCEDURE — 84270 ASSAY OF SEX HORMONE GLOBUL: CPT

## 2023-06-20 PROCEDURE — 80061 LIPID PANEL: CPT

## 2023-06-20 PROCEDURE — 36415 COLL VENOUS BLD VENIPUNCTURE: CPT

## 2023-06-21 ENCOUNTER — MYC MEDICAL ADVICE (OUTPATIENT)
Dept: INTERNAL MEDICINE | Facility: CLINIC | Age: 43
End: 2023-06-21
Payer: COMMERCIAL

## 2023-06-21 LAB — DHEA-S SERPL-MCNC: 28 UG/DL (ref 35–430)

## 2023-06-22 LAB
FSH SERPL IRP2-ACNC: 10.7 MIU/ML
PROLACTIN SERPL 3RD IS-MCNC: 18 NG/ML (ref 5–23)
TSH SERPL DL<=0.005 MIU/L-ACNC: 1.68 UIU/ML (ref 0.3–4.2)

## 2023-06-23 LAB
TESTOST FREE SERPL-MCNC: 0.12 NG/DL
TESTOST SERPL-MCNC: 20 NG/DL (ref 8–60)

## 2023-06-28 DIAGNOSIS — R79.89 ELEVATED TESTOSTERONE LEVEL IN FEMALE: Primary | ICD-10-CM

## 2023-07-05 ENCOUNTER — VIRTUAL VISIT (OUTPATIENT)
Dept: GASTROENTEROLOGY | Facility: CLINIC | Age: 43
End: 2023-07-05
Payer: COMMERCIAL

## 2023-07-05 DIAGNOSIS — R19.7 DIARRHEA, UNSPECIFIED TYPE: ICD-10-CM

## 2023-07-05 DIAGNOSIS — R10.12 LUQ ABDOMINAL PAIN: Primary | ICD-10-CM

## 2023-07-05 DIAGNOSIS — K25.9 GASTRIC EROSION, UNSPECIFIED CHRONICITY: ICD-10-CM

## 2023-07-05 PROCEDURE — 99214 OFFICE O/P EST MOD 30 MIN: CPT | Mod: 95 | Performed by: PHYSICIAN ASSISTANT

## 2023-07-05 RX ORDER — SUCRALFATE 1 G/1
1 TABLET ORAL 4 TIMES DAILY PRN
Qty: 120 TABLET | Refills: 3 | Status: SHIPPED | OUTPATIENT
Start: 2023-07-05 | End: 2023-10-03

## 2023-07-05 ASSESSMENT — PAIN SCALES - GENERAL: PAINLEVEL: MILD PAIN (2)

## 2023-07-05 NOTE — PROGRESS NOTES
GASTROENTEROLOGY Follow-up VIDEO VISIT    CC/REFERRING MD:    Cyndi Zarate  No ref. provider found    REASON FOR CONSULTATION:   No ref. provider found for   Chief Complaint   Patient presents with     RECHECK       HISTORY OF PRESENT ILLNESS:    Heather Guillory is a 43 year old female who is being evaluated via a billable video visit for follow-up.  Patient most recently seen by Dr. Magda Lobo in January of this year.  Prior to that, she had been seen by Gabriel Jang PA-C.  She had been experiencing some recurring epigastric and left upper quadrant pain as well as nausea.  There had also been intermittent bouts of diarrhea as well.  She ultimately underwent EGD and colonoscopy in December 2022.  This was notable for some reactive gastropathy with erosion and a normal-appearing colonoscopy.  She was started on PPI twice daily as well as sucralfate and started to see some improvement in her symptoms.  After some time, she started to use the omeprazole a bit less frequently and now is essentially back to baseline in terms of this continuous pain.  She describes it as being there essentially all day, burning in nature, can be worse after meals, especially if they are larger meals.  Also has history of iron deficiency anemia, has been stable for several months but recent blood work from a few weeks ago shows a big decrease in iron saturation.  She has not seen any hematochezia or melena.  Wondering if she should resume her iron supplementation, worried that it may make her GI symptoms worse.      I have reviewed and updated the patient's Past Medical History, Social History, Family History and Medication List.    Exam:    General appearance:  Healthy appearing adult, in no acute distress  Eyes:  Sclera anicteric, Pupils round and reactive to light  Ears, nose, mouth and throat:  No obvious external lesions of ears and nose.  Hearing intact  Neck:  Symmetric, No obvious external lesions  Respiratory:   Normal respiration, no use of accessory muscles   MSK:  No visual upper extremity, neck or facial muscle atrophy  ABD:  No visual abdominal distention, no audible borborygmi  Skin:  No rashes or jaundice   Psychiatric:  Oriented to person, place and time, Appropriate mood and affect.   Neurologic:  Peripheral muscle function and dexterity appear to be intact      PERTINENT STUDIES have been reviewed.    ASSESSMENT/PLAN:    Heather Guillory is a 43 year old female who presents for follow up of epigastric/left upper quadrant pain.  We spent some time reviewing her symptoms.  She had some brief improvement with PPI and sucralfate, now just on PPI therapy and is back to baseline in terms of symptom severity.  She has some known reactive gastropathy with erosion on EGD.    My recommendation was that we repeat EGD at this time to reassess her stomach.  She really has not had improvement with PPI monotherapy and so she will try to taper off that and we will switch to sucralfate, as this did give her more relief initially.  I did also recommend a gastric emptying scan to assess for delayed gastric emptying, which may be a source of her symptoms.  This may all be functional dyspepsia as well and a trial of a TCA may be warranted, depending on the results of these tests.  We will follow-up with patient after EGD.    1. LUQ abdominal pain  - sucralfate (CARAFATE) 1 GM tablet; Take 1 tablet (1 g) by mouth 4 times daily as needed (nausea, epigastric pain)  Dispense: 120 tablet; Refill: 3  - Adult GI  Referral - Procedure Only; Future    2. Diarrhea, unspecified type    3. Gastric erosion, unspecified chronicity  - sucralfate (CARAFATE) 1 GM tablet; Take 1 tablet (1 g) by mouth 4 times daily as needed (nausea, epigastric pain)  Dispense: 120 tablet; Refill: 3  - Adult GI  Referral - Procedure Only; Future      Video-Visit Details    Video Visit Time: 19 minutes    Type of service:  Video Visit    Originating  Location (pt. Location): Home    Distant Location (provider location):  Off-site    Platform used for Video Visit: Lorenzo    A total of 28 minutes was spent with reviewing the chart, discussing with the patient, documentation and coordination of care.    Ganga Moy PA-C  Division of Gastroenterology, Hepatology, and Nutrition  [facility]  [phone]    RTC 2 months

## 2023-07-05 NOTE — NURSING NOTE
Is the patient currently in the state of [place]? YES    Visit mode:VIDEO    If the visit is dropped, the patient can be reconnected by: VIDEO VISIT: Text to cell phone: [phone]    Will anyone else be joining the visit? NO      How would you like to obtain your AVS? MyChart    Are changes needed to the allergy or medication list? NO    Reason for visit: RECHECK

## 2023-07-07 ENCOUNTER — TELEPHONE (OUTPATIENT)
Dept: GASTROENTEROLOGY | Facility: CLINIC | Age: 43
End: 2023-07-07
Payer: COMMERCIAL

## 2023-07-07 NOTE — TELEPHONE ENCOUNTER
"Endoscopy Scheduling Screen    Have you had a positive Covid test in the last 14 days?  No    Are you active on MyChart?   Yes    What insurance is in the chart?  BC/BS: Schedule in [facility] unless patient meets exclusion criteria.     Ordering/Referring Provider: NETTA MURRAY   (If ordering provider performs procedure, schedule with ordering provider unless otherwise instructed. )    BMI: Estimated body mass index is 25.1 kg/m  as calculated from the following:    Height as of 6/13/23: 1.702 m (5' 7.01\").    Weight as of 6/13/23: 72.7 kg (160 lb 4.8 oz).     Sedation Ordered  moderate sedation.   If patient BMI > 50 do not schedule in [facility].    Are you taking any prescription medications for pain?   No    Are you taking methadone or Suboxone?  No    Do you have a history of malignant hyperthermia or adverse reaction to anesthesia?  No    (Females) Are you currently pregnant?   No     Have you been diagnosed or told you have pulmonary hypertension?   No    Do you have an LVAD?  No    Have you been told you have moderate to severe sleep apnea?  No    Have you been told you have COPD, asthma, or any other lung disease?  No    Do you have any heart conditions?  No     Have you ever had or are you awaiting a heart or lung transplant?   No    Have you had a stroke or transient ischemic attack (TIA aka \"mini stroke\" in the last 6 months?   No    Have you been diagnosed with or been told you have cirrhosis of the liver?   No    Are you currently on dialysis?   No    Do you need assistance transferring?   No    BMI: Estimated body mass index is 25.1 kg/m  as calculated from the following:    Height as of 6/13/23: 1.702 m (5' 7.01\").    Weight as of 6/13/23: 72.7 kg (160 lb 4.8 oz).     Is patients BMI > 40 and scheduling location [facility]?  No    Do you take the medication Phentermine, Ozempic or Wegovy?  No    Do you take the medication Naltrexone?  No    Do you take blood thinners?  No      Prep   Are you currently on dialysis or do " you have chronic kidney disease?  No    Do you have a diagnosis of diabetes?  No    Do you have a diagnosis of cystic fibrosis (CF)?  No    On a regular basis do you go 3 -5 days between bowel movements?      BMI > 40?      Preferred Pharmacy:    [id number] - [place], [place] - [address] AT [facility] [address] [place] [address]  [address] [place] [address]  Phone: [phone] Fax: [phone]      Final Scheduling Details   Colonoscopy prep sent?      Procedure scheduled  Upper endoscopy (EGD)    Surgeon:  RENATA     Date of procedure:  9/21     Schedule PAC:   No    Location  [facility] - [facility]    Sedation   Moderate Sedation    Patient Reminders:    You will receive a call from a Nurse to review instructions and health history.  This assessment must be completed prior to your procedure.  Failure to complete the Nurse assessment may result in the procedure being cancelled.       On the day of your procedure, please designate an adult(s) who can drive you home stay with you for the next 24 hours. The medicines used in the exam will make you sleepy. You will not be able to drive.       You cannot take public transportation, ride share services, or non-medical taxi service without a responsible caregiver.  Medical transport services are allowed with the requirement that a responsible caregiver will receive you at your destination.  We require that drivers and caregivers are confirmed prior to your procedure.

## 2023-07-13 ENCOUNTER — VIRTUAL VISIT (OUTPATIENT)
Dept: RHEUMATOLOGY | Facility: CLINIC | Age: 43
End: 2023-07-13
Attending: INTERNAL MEDICINE
Payer: COMMERCIAL

## 2023-07-13 VITALS — WEIGHT: 155 LBS | BODY MASS INDEX: 24.33 KG/M2 | HEIGHT: 67 IN

## 2023-07-13 DIAGNOSIS — M35.9 UNDIFFERENTIATED CONNECTIVE TISSUE DISEASE (H): Primary | ICD-10-CM

## 2023-07-13 DIAGNOSIS — Z79.52 LONG TERM CURRENT USE OF SYSTEMIC STEROIDS: ICD-10-CM

## 2023-07-13 PROCEDURE — 99214 OFFICE O/P EST MOD 30 MIN: CPT | Mod: VID | Performed by: INTERNAL MEDICINE

## 2023-07-13 ASSESSMENT — PAIN SCALES - GENERAL: PAINLEVEL: NO PAIN (0)

## 2023-07-13 NOTE — LETTER
2023       RE: Heather Guillory  [address] [facility] [address]     Dear Colleague,    Thank you for referring your patient, Heather Guillory, to the [facility] [place] at [facility]. Please see a copy of my visit note below.    Virtual Visit Details    Type of service:  Video Visit 1-1:28 pm    Originating Location (pt. Location): Home    Distant Location (provider location):  On-site  Platform used for Video Visit: [facility]       Rheumatology follow up Virtual Visit Note    Heather Guillory MRN# [id number]   Age: 43 year old YOB: 1980     Last seen: 2023  DOS:2023    Reason for visit: UCTD    Assessment & Plan:   Problem list:    1-UCTD  2-HCQ monitoring  3-Iron deficiency        Undifferentiated connective tissue disease (UCTD):     Heather is a 43 yo WF . She was diagnosed with MCTD in 2016, 6 wk postpartum based on fatigue, arthritis, mild Raynaud's, low positive NINOSKA 1.1 and low positive RNP Ab 1.6. She is on  mg qd since 2017 with great response.  UCTD continues to be most likely diagnosis not MCTD as she does not have classic features of MCTD, had very low titer of RNP Ab in the past, (negative on most recent check), and had a mild disease which never required prednisone.  All autoimmunity labs (2017) came back negative (except + NINOSKA, low C3) which is further reassuring for UCTD, including APS panel, repeat RNP and inflammatory markers, dsDNA.      She had neg SSA/SSB antibodies in 2016, no concern for CHB. No need to re-check.  APS panel was neg.  She had neg anti-Sm, neg anti-DNA and neg centromere Ab in 2016.    In 2019, recommended to taper plaquenil to 200 mg twice a day on Mon/Wed/Fri and then 1 tab for the rest of the week. Had increased pain/stiffness in hands and feet and C3 dropped. So increased HCQ back to 200 mg bid. C3 went back to nl in 2020, had low C3 in  1/2021 but it fluctuates.     1/022:    The patient reports stable symtpoms aside from increased fatigue in the setting of ongoing heavy menstrual bleeding. She has a submucosal fibroid with myomectomy planned for February. She does have some chest discomfort but appears likely musculoskeletal. Will repeat hgb and iron testing today.    As her UCTD symptoms including arthralgia appear stable, discussed that she could continue current regimen of HCQ alternating 200 mg BID and daily dosing. If she were to develop new or worsening symptoms, would consider increasing back to 200 mg BID. Her last eye exam in 11/2021 was without evidence of HCQ toxicity.     -Check cbc with diff, UA, Cr, complements, dsDNA CRP, ESR, AST/ALT, iron studies  -Continue  mg BID 3x week,  mg daily 4x week  -Continue yearly ophthalmology exam for HCQ monitoring    7/8/2022:    Overall stable with symptoms, labs. In fact C3 improved on 1/2022 labs. recommend to taper HCQ down to reduce risk of HCQ toxicity in future.    Has h/o iron deficiency, will re-check iron studies.    1/13/2023: Stable on HCQ 1 tab (200 mg) a day, will continue. Labs in 7/2022 showed stable UCTD but ROBIN.    Today 7/13/2023: Stable UCTD, will monitor sx/labs off HCQ. Discussed m/o low iron, low DHEA, both could contribute to fatigue.    Plan:    Labs in 10/2023    Ask women health about taking DHEA    Stay off hydroxychlorquine    Iron gummies with raspberry with food (take 2 a day)    Ok to go on prenatal vit one tab a day    Eye exam one more time    Return in 6 months (in person)    Nasrin King MD      Orders Placed This Encounter   Procedures    Iron and iron binding capacity    Ferritin    TSH with free T4 reflex    Vitamin D Deficiency    ALT    Albumin level    AST    Creatinine    Complement C4    Complement C3    CRP inflammation    DNA double stranded antibodies    Erythrocyte sedimentation rate auto    UA with Microscopic reflex to Culture     Protein  random urine    Creatinine random urine    CBC with Platelets & Differential       Subjective     Ms. Guillory is a 40 yo with history of UCTD, JAVIER, uterine fibroid who presents for follow up.   Undifferentiated connective tissue disease (UCTD):    The patient was last seen for a virtual visit in 7/2021. She notes things are going okay. She has felt more fatigue recently and continues to have significant vaginal bleeding with menstruation. Since her las visit she was seen by OB. MRI showed uterine fibroid; she's scheduled for a myomectomy next month. She notes occasional SOB with stairs. Ms. Guillory has reported 4 weeks of tightness/pressure in her chest and upper back that's present all the time but worse when she does activity with her upper extremities and at night after she's done more activity. It is not worse with exertion. She gets some relief from using a massager but the discomfort doesn't resolve completely. She is taking iron supplements on some days. She denies dizziness or light headedness.     She's been having more tightness in her joints with the cold weather. The pain is worst in her hands and feet particularily in the morning. Her Raynaud's is also more of a problem thsi time of year but she denies fingertip ulceration and has been avoiding going outside.     Currently, she is taking  mg BID or daily; she says she takes it twice a day about half the time.     Ms. Guillory denies hairloss, cough, oral ulcers, GI or urinary symptoms.     Last eye exam in 11/2021 without evidence of HCQ toxicity.      7/8/2022: Heather presents for follow up. She overall is doing ok, reports being stable. Still taking  mg a day.      1/13/2023: Heather presents for follow up. Her HCQ was reduced from 200 mg bid to 400/200 mg every other day in 7/2022. Later it was decreased to 1 tab/200 mg a day in 10/2022. She is overall stable, no flares since last visit.    Has fatigue, but thinks it is stress  related. Had some left leg pain, but it is better, started 2 days after colonoscopy.    Today 7/13/2023:    -Off HCQ x few weeks    -EGD 9/2023 showed GERD, has abdominal pain with radiation to back with nausea    -takes sucralfate 4 times a day    -no iron taking since 9/23    -continues to have fatigue    -on vit D 2000 units qd    -walks a lot    -no change in sx off HCQ    -was found to have low DHEA  ROS     A 10 point ROS was performed with pertinent findings listed above.           Social History:     Social History     Socioeconomic History    Marital status:      Spouse name: Chi    Number of children: 2    Years of education: Not on file    Highest education level: Not on file   Occupational History    Occupation:      Employer: [employer]     Comment: laid off 7/2020   Tobacco Use    Smoking status: Never     Passive exposure: Never    Smokeless tobacco: Never   Vaping Use    Vaping Use: Never used   Substance and Sexual Activity    Alcohol use: No     Alcohol/week: 0.0 standard drinks of alcohol    Drug use: No    Sexual activity: Yes     Partners: Male     Birth control/protection: I.U.D.     Comment: Mirena placed in 2018   Other Topics Concern     Service No    Blood Transfusions No    Caffeine Concern No    Occupational Exposure No    Hobby Hazards No    Sleep Concern No    Stress Concern No    Weight Concern No    Special Diet No    Back Care No    Exercise No    Bike Helmet Yes     Comment: n/a    Seat Belt Yes    Self-Exams No    Parent/sibling w/ CABG, MI or angioplasty before 65F 55M? No   Social History Narrative    .    3 boys (ages 6, 5 and 3 as of Jan 2022)    Exercises when able.         How much exercise per week? 3-4 x's     How much calcium per day? In foods and PNV       How much caffeine per day? 1 soda occasional    How much vitamin D per day? PNV    Do you/your family wear seatbelts?  Yes    Do you/your family use safety helmets? Yes    Do  "you/your family use sunscreen? Yes    Do you/your family keep firearms in the home? No    Do you/your family have a smoke detector(s)? Yes        July 16, 2020 Mague Chase LPN         Social Determinants of Health     Financial Resource Strain: Not on file   Food Insecurity: Not on file   Transportation Needs: Not on file   Physical Activity: Not on file   Stress: Not on file   Social Connections: Not on file   Intimate Partner Violence: Not on file   Housing Stability: Not on file             Family History:     Family History   Problem Relation Age of Onset    Elijah Disease Sister     Arrhythmia Brother         details unknown; procedure in his 20s    Anxiety Disorder Brother     Alzheimer Disease Maternal Grandmother     Cervical Cancer Maternal Grandmother     Colon Cancer Maternal Grandfather     Breast Cancer Paternal Grandmother     Hypertension Paternal Grandmother     Hypertension Paternal Grandfather     Lung Cancer Paternal Grandfather         smoker    Diabetes Type 2  Paternal Grandfather     Myocardial Infarction Paternal Grandfather     Myocardial Infarction Paternal Aunt 45    Cerebrovascular Disease No family hx of     Coronary Artery Disease Early Onset No family hx of     Ovarian Cancer No family hx of        Objective     PHYSICAL EXAM  Ht 1.702 m (5' 7\")   Wt 70.3 kg (155 lb)   BMI 24.28 kg/m    Wt Readings from Last 4 Encounters:   07/28/23 73.2 kg (161 lb 6.4 oz)   07/14/23 72.1 kg (158 lb 14.4 oz)   07/13/23 70.3 kg (155 lb)   06/13/23 72.7 kg (160 lb 4.8 oz)       Gen: no acute distress, pleasant  HEENT: EOMI, no scleral injection or icterus  SKIN: no rash  MSK: no active synovitis  NEURO: grossly non focal  Psych: nl affect    DATA:    CBC:  Recent Labs   Lab Test 08/27/21  0850 07/08/21  0919 04/23/21  1118   WBC 5.6 5.8 5.5   RBC 4.36 4.45 4.45   HGB 12.3 13.3 13.1   HCT 38.3 38.8 39.0   MCV 88 87 88   MCH 28.2 29.9 29.4   MCHC 32.1 34.3 33.6   RDW 11.9 12.4 11.9    207 195 "       BMP:  Recent Labs   Lab Test 08/27/21  0850 07/08/21  0919 03/31/21  1130 01/14/21  1228 10/28/20  1250 08/26/19  1055 04/26/19  0930 08/09/16  0000 01/09/16  0724     --   --   --  140  --   --   --  140   POTASSIUM 4.4  --  4.8  --  4.1  --   --   --  3.8   CHLORIDE 113*  --   --   --  109  --   --   --  110*   CO2 26  --   --   --  27  --   --   --  20   ANIONGAP 5  --   --   --  4  --   --   --  10   GLC 99  --   --   --  88  --  94  --  89   BUN 11  --   --   --  7  --   --   --  9   CR 0.79 0.72  --  0.66 0.63   < >  --    < > 0.57   GFRESTIMATED >90 >90  --  >90 >90   < >  --    < > >90  Non  GFR Calc     PRICILA 8.9  --   --   --  8.9  --   --   --  8.4*    < > = values in this interval not displayed.       LFT:  Recent Labs   Lab Test 08/27/21  0850 07/08/21  0919 01/14/21  1228 10/28/20  1250 09/28/17  0954 01/09/16  0724   PROTTOTAL 7.2  --   --  7.6  --  7.1   ALBUMIN 3.9 4.1 4.0 4.3   < > 3.3*   BILITOTAL 0.2  --   --  0.5  --  0.4   ALKPHOS 79  --   --  79  --  70   AST 12 17 13 10   < > 12   ALT 12 21 19 17   < > 15    < > = values in this interval not displayed.       No results found for: CKTOTAL  TSH   Date Value Ref Range Status   06/20/2023 1.68 0.30 - 4.20 uIU/mL Final   02/13/2022 0.43 0.40 - 4.00 mU/L Final   07/08/2021 1.16 0.40 - 4.00 mU/L Final     No results found for: URIC    Inflammatory markers  Lab Results   Component Value Date    CRP <2.9 07/08/2021    CRP <2.9 01/14/2021    CRP 6.4 07/16/2020     Lab Results   Component Value Date    SED 6 07/08/2021    SED 4 01/14/2021    SED 7 07/16/2020     Ferritin   Date Value Ref Range Status   06/20/2023 36 6 - 175 ng/mL Final   11/18/2022 58 6 - 175 ng/mL Final   07/08/2022 7 (L) 12 - 150 ng/mL Final   07/08/2021 17 12 - 150 ng/mL Final   12/30/2020 63 12 - 150 ng/mL Final       UA RESULTS:  Recent Labs   Lab Test 07/08/21  0949 03/31/21  1140 01/14/21  1233 09/28/17  0950 08/31/17  0937 08/14/17  1101   COLOR Yellow  Straw Yellow   < > Yellow Yellow   APPEARANCE Clear Clear Clear   < > Slightly Cloudy Clear   URINEGLC Negative Negative Negative   < > Negative Negative   URINEBILI Negative Negative Negative   < > Negative Negative   URINEKETONE Negative Negative Negative   < > Negative Negative   SG >1.030 1.009 1.010   < > >1.030 1.010   UBLD Large* Small* Negative   < > Large* Trace*   URINEPH 7.0 6.0 7.0   < > 7.0 6.5   PROTEIN Trace* Negative Negative   < > 30* Negative   UROBILINOGEN 0.2  --   --   --  0.2 0.2   NITRITE Negative Negative Negative   < > Negative Negative   LEUKEST Negative Negative Negative   < > Small* Negative   RBCU 5-10* 2 2   < > 10-25* O - 2   WBCU 0 - 5 <1 <1   < > 10-25* O - 2    < > = values in this interval not displayed.         Autoimmunity labs:     Lab Results   Component Value Date    RHF <20 07/22/2016     No results found for: CCPIGG  No results found for: ANCA  Lab Results   Component Value Date    L4PUUDS 105 04/05/2023    Y7ABZAG 94 01/13/2023    R9ZHABD 94 07/08/2022     Lab Results   Component Value Date    L5NURJB 22 04/05/2023    M9CTRJT 21 01/13/2023    P1GQDKC 22 07/08/2022     Lab Results   Component Value Date    JACKLYN 1.1 (H) 07/22/2016     Lab Results   Component Value Date    DNA 4.9 04/05/2023    DNA 5.8 01/13/2023    DNA 5.5 07/08/2022     Lab Results   Component Value Date    RNPIGG 0.6 09/28/2017    SSAIGG <0.2 01/20/2020    SSBIGG <0.2 01/20/2020       IMAGING:    Nasrin King MD  Rheumatology Fellow         Addendum:  Imaging Results:     Results for orders placed or performed in visit on 06/02/23   MA Screen Bilateral w/Laron    Narrative    BILATERAL FULL FIELD DIGITAL SCREENING MAMMOGRAM WITH TOMOSYNTHESIS    Performed on: 6/2/23    Compared to: 05/26/2022 and 07/23/2020    Technique:  This study was evaluated with the assistance of Computer-Aided   Detection.  Breast Tomosynthesis was used in interpretation.    Findings: The breasts have scattered areas of  fibroglandular density.    There is no radiographic evidence of malignancy.     Impression    IMPRESSION: ACR BI-RADS Category 1: Negative    RECOMMENDED FOLLOW-UP: Annual routine screening mammogram    The results and recommendations of this examination will be communicated   to the patient.    I have personally reviewed the examination and initial interpretation  and I agree with the findings.      Navjot Montoya MD; Tiff Trivedi MD    Based on the pre-exam history questionnaire and medical history, there may   be increased risk for developing breast cancer or other cancers in the   future. The contact for scheduling cancer genetic counseling for risk   assessment and possible genetic testing and supplemental screening is:   [phone].        Component      Latest Ref Rng & Units 1/7/2022   WBC      4.0 - 11.0 10e3/uL 6.3   RBC Count      3.80 - 5.20 10e6/uL 4.55   Hemoglobin      11.7 - 15.7 g/dL 11.9   Hematocrit      35.0 - 47.0 % 38.5   MCV      78 - 100 fL 85   MCH      26.5 - 33.0 pg 26.2 (L)   MCHC      31.5 - 36.5 g/dL 30.9 (L)   RDW      10.0 - 15.0 % 13.1   Platelet Count      150 - 450 10e3/uL 265   % Neutrophils      % 56   % Lymphocytes      % 36   % Monocytes      % 5   % Eosinophils      % 2   % Basophils      % 1   % Immature Granulocytes      % 0   NRBCs per 100 WBC      <1 /100 0   Absolute Neutrophils      1.6 - 8.3 10e3/uL 3.6   Absolute Lymphocytes      0.8 - 5.3 10e3/uL 2.3   Absolute Monocytes      0.0 - 1.3 10e3/uL 0.3   Absolute Eosinophils      0.0 - 0.7 10e3/uL 0.1   Absolute Basophils      0.0 - 0.2 10e3/uL 0.0   Absolute Immature Granulocytes      <=0.4 10e3/uL 0.0   Absolute NRBCs      10e3/uL 0.0   Color Urine      Colorless, Straw, Light Yellow, Yellow Straw   Appearance Urine      Clear Clear   Glucose Urine      Negative mg/dL Negative   Bilirubin Urine      Negative Negative   Ketones Urine      Negative mg/dL Negative   Specific Gravity Urine      1.003 - 1.035  1.006   Blood Urine      Negative Negative   pH Urine      5.0 - 7.0 7.0   Protein Albumin Urine      Negative mg/dL Negative   Urobilinogen mg/dL      Normal, 2.0 mg/dL Normal   Nitrite Urine      Negative Negative   Leukocyte Esterase Urine      Negative Negative   RBC Urine      <=2 /HPF 1   WBC Urine      <=5 /HPF <1   Squamous Epithelial /HPF Urine      <=1 /HPF 3 (H)   Iron      35 - 180 ug/dL 28 (L)   Iron Binding Cap      240 - 430 ug/dL 424   Iron Saturation Index      15 - 46 % 7 (L)   Creatinine      0.52 - 1.04 mg/dL 0.56   GFR Estimate      >60 mL/min/1.73m2 >90   AST      0 - 45 U/L 18   ALT      0 - 50 U/L 24   Albumin      3.4 - 5.0 g/dL 4.3   CRP Inflammation      0.0 - 8.0 mg/L <2.9   Sed Rate      0 - 20 mm/hr 8   Complement C3      81 - 157 mg/dL 104   Complement C4      13 - 39 mg/dL 25   DNA-ds      <10.0 IU/mL 5.4   Creatinine Urine      mg/dL 34   Ferritin      12 - 150 ng/mL 7 (L)     Component      Latest Ref Rng & Units 7/8/2022   WBC      4.0 - 11.0 10e3/uL 6.8   RBC Count      3.80 - 5.20 10e6/uL 4.45   Hemoglobin      11.7 - 15.7 g/dL 11.5 (L)   Hematocrit      35.0 - 47.0 % 37.2   MCV      78 - 100 fL 84   MCH      26.5 - 33.0 pg 25.8 (L)   MCHC      31.5 - 36.5 g/dL 30.9 (L)   RDW      10.0 - 15.0 % 14.2   Platelet Count      150 - 450 10e3/uL 195   % Neutrophils      % 52   % Lymphocytes      % 37   % Monocytes      % 7   % Eosinophils      % 3   % Basophils      % 1   % Immature Granulocytes      % 0   NRBCs per 100 WBC      <1 /100 0   Absolute Neutrophils      1.6 - 8.3 10e3/uL 3.6   Absolute Lymphocytes      0.8 - 5.3 10e3/uL 2.5   Absolute Monocytes      0.0 - 1.3 10e3/uL 0.5   Absolute Eosinophils      0.0 - 0.7 10e3/uL 0.2   Absolute Basophils      0.0 - 0.2 10e3/uL 0.1   Absolute Immature Granulocytes      <=0.4 10e3/uL 0.0   Absolute NRBCs      10e3/uL 0.0   Color Urine      Colorless, Straw, Light Yellow, Yellow Yellow   Appearance Urine      Clear Clear   Glucose Urine       Negative mg/dL Negative   Bilirubin Urine      Negative Negative   Ketones Urine      Negative mg/dL Negative   Specific Gravity Urine      1.003 - 1.035 1.010   Blood Urine      Negative Small (A)   pH Urine      5.0 - 7.0 6.5   Protein Albumin Urine      Negative mg/dL Negative   Urobilinogen mg/dL      Normal, 2.0 mg/dL Normal   Nitrite Urine      Negative Negative   Leukocyte Esterase Urine      Negative Negative   Mucus Urine      None Seen /LPF Present (A)   RBC Urine      <=2 /HPF 2   WBC Urine      <=5 /HPF 1   Protein Random Urine      g/L 0.11   Protein Total Urine g/gr Creatinine      0.00 - 0.20 g/g Cr 0.13   Creatinine Urine      mg/dL 82   Iron      35 - 180 ug/dL 14 (L)   Iron Binding Cap      240 - 430 ug/dL 367   Iron Saturation Index      15 - 46 % 4 (L)   Creatinine      0.52 - 1.04 mg/dL 0.60   GFR Estimate      >60 mL/min/1.73m2 >90   ALT      0 - 50 U/L 13   Albumin      3.4 - 5.0 g/dL 3.9   AST      0 - 45 U/L 14   Complement C4      13 - 39 mg/dL 22   Complement C3      81 - 157 mg/dL 94   CRP Inflammation      0.0 - 8.0 mg/L <2.9   DNA-ds      <10.0 IU/mL 5.5   Sed Rate      0 - 20 mm/hr 6   Ferritin      12 - 150 ng/mL 7 (L)         Nasrin King MD

## 2023-07-13 NOTE — PROGRESS NOTES
Virtual Visit Details    Type of service:  Video Visit 1-1:28 pm    Originating Location (pt. Location): Home    Distant Location (provider location):  On-site  Platform used for Video Visit: [facility]       Rheumatology follow up Virtual Visit Note    Heather Guillory MRN# [id number]   Age: 43 year old YOB: 1980     Last seen: 2023  DOS:2023    Reason for visit: UCTD    Assessment & Plan:   Problem list:    1-UCTD  2-HCQ monitoring  3-Iron deficiency        Undifferentiated connective tissue disease (UCTD):     Heather is a 41 yo WF . She was diagnosed with MCTD in 2016, 6 wk postpartum based on fatigue, arthritis, mild Raynaud's, low positive NINOSKA 1.1 and low positive RNP Ab 1.6. She is on  mg qd since 2017 with great response.  UCTD continues to be most likely diagnosis not MCTD as she does not have classic features of MCTD, had very low titer of RNP Ab in the past, (negative on most recent check), and had a mild disease which never required prednisone.  All autoimmunity labs (2017) came back negative (except + NINOSKA, low C3) which is further reassuring for UCTD, including APS panel, repeat RNP and inflammatory markers, dsDNA.      She had neg SSA/SSB antibodies in 2016, no concern for CHB. No need to re-check.  APS panel was neg.  She had neg anti-Sm, neg anti-DNA and neg centromere Ab in 2016.    In 2019, recommended to taper plaquenil to 200 mg twice a day on Mon/Wed/Fri and then 1 tab for the rest of the week. Had increased pain/stiffness in hands and feet and C3 dropped. So increased HCQ back to 200 mg bid. C3 went back to nl in 2020, had low C3 in 2021 but it fluctuates.     :    The patient reports stable symtpoms aside from increased fatigue in the setting of ongoing heavy menstrual bleeding. She has a submucosal fibroid with myomectomy planned for February. She does have some chest discomfort but appears likely musculoskeletal. Will repeat hgb and iron  testing today.    As her UCTD symptoms including arthralgia appear stable, discussed that she could continue current regimen of HCQ alternating 200 mg BID and daily dosing. If she were to develop new or worsening symptoms, would consider increasing back to 200 mg BID. Her last eye exam in 11/2021 was without evidence of HCQ toxicity.     -Check cbc with diff, UA, Cr, complements, dsDNA CRP, ESR, AST/ALT, iron studies  -Continue  mg BID 3x week,  mg daily 4x week  -Continue yearly ophthalmology exam for HCQ monitoring    7/8/2022:    Overall stable with symptoms, labs. In fact C3 improved on 1/2022 labs. recommend to taper HCQ down to reduce risk of HCQ toxicity in future.    Has h/o iron deficiency, will re-check iron studies.    1/13/2023: Stable on HCQ 1 tab (200 mg) a day, will continue. Labs in 7/2022 showed stable UCTD but ROBIN.    Today 7/13/2023: Stable UCTD, will monitor sx/labs off HCQ. Discussed m/o low iron, low DHEA, both could contribute to fatigue.    Plan:    Labs in 10/2023    Ask women health about taking DHEA    Stay off hydroxychlorquine    Iron gummies with raspberry with food (take 2 a day)    Ok to go on prenatal vit one tab a day    Eye exam one more time    Return in 6 months (in person)    Nasrin King MD      Orders Placed This Encounter   Procedures     Iron and iron binding capacity     Ferritin     TSH with free T4 reflex     Vitamin D Deficiency     ALT     Albumin level     AST     Creatinine     Complement C4     Complement C3     CRP inflammation     DNA double stranded antibodies     Erythrocyte sedimentation rate auto     UA with Microscopic reflex to Culture     Protein  random urine     Creatinine random urine     CBC with Platelets & Differential       Subjective     Ms. Guillory is a 40 yo with history of UCTD, JAVIER, uterine fibroid who presents for follow up.   Undifferentiated connective tissue disease (UCTD):    The patient was last seen for a virtual visit in  7/2021. She notes things are going okay. She has felt more fatigue recently and continues to have significant vaginal bleeding with menstruation. Since her las visit she was seen by OB. MRI showed uterine fibroid; she's scheduled for a myomectomy next month. She notes occasional SOB with stairs. Ms. Guillory has reported 4 weeks of tightness/pressure in her chest and upper back that's present all the time but worse when she does activity with her upper extremities and at night after she's done more activity. It is not worse with exertion. She gets some relief from using a massager but the discomfort doesn't resolve completely. She is taking iron supplements on some days. She denies dizziness or light headedness.     She's been having more tightness in her joints with the cold weather. The pain is worst in her hands and feet particularily in the morning. Her Raynaud's is also more of a problem thsi time of year but she denies fingertip ulceration and has been avoiding going outside.     Currently, she is taking  mg BID or daily; she says she takes it twice a day about half the time.     Ms. Guillory denies hairloss, cough, oral ulcers, GI or urinary symptoms.     Last eye exam in 11/2021 without evidence of HCQ toxicity.      7/8/2022: Heather presents for follow up. She overall is doing ok, reports being stable. Still taking  mg a day.      1/13/2023: Heather presents for follow up. Her HCQ was reduced from 200 mg bid to 400/200 mg every other day in 7/2022. Later it was decreased to 1 tab/200 mg a day in 10/2022. She is overall stable, no flares since last visit.    Has fatigue, but thinks it is stress related. Had some left leg pain, but it is better, started 2 days after colonoscopy.    Today 7/13/2023:    -Off HCQ x few weeks    -EGD 9/2023 showed GERD, has abdominal pain with radiation to back with nausea    -takes sucralfate 4 times a day    -no iron taking since 9/23    -continues to have  fatigue    -on vit D 2000 units qd    -walks a lot    -no change in sx off HCQ    -was found to have low DHEA  ROS     A 10 point ROS was performed with pertinent findings listed above.           Social History:     Social History     Socioeconomic History     Marital status:      Spouse name: Chi     Number of children: 2     Years of education: Not on file     Highest education level: Not on file   Occupational History     Occupation:      Employer: [employer]     Comment: laid off 7/2020   Tobacco Use     Smoking status: Never     Passive exposure: Never     Smokeless tobacco: Never   Vaping Use     Vaping Use: Never used   Substance and Sexual Activity     Alcohol use: No     Alcohol/week: 0.0 standard drinks of alcohol     Drug use: No     Sexual activity: Yes     Partners: Male     Birth control/protection: I.U.D.     Comment: Mirena placed in 2018   Other Topics Concern      Service No     Blood Transfusions No     Caffeine Concern No     Occupational Exposure No     Hobby Hazards No     Sleep Concern No     Stress Concern No     Weight Concern No     Special Diet No     Back Care No     Exercise No     Bike Helmet Yes     Comment: n/a     Seat Belt Yes     Self-Exams No     Parent/sibling w/ CABG, MI or angioplasty before 65F 55M? No   Social History Narrative    .    3 boys (ages 6, 5 and 3 as of Jan 2022)    Exercises when able.         How much exercise per week? 3-4 x's     How much calcium per day? In foods and PNV       How much caffeine per day? 1 soda occasional    How much vitamin D per day? PNV    Do you/your family wear seatbelts?  Yes    Do you/your family use safety helmets? Yes    Do you/your family use sunscreen? Yes    Do you/your family keep firearms in the home? No    Do you/your family have a smoke detector(s)? Yes        July 16, 2020 Mague Chase LPN         Social Determinants of Health     Financial Resource Strain: Not on file   Food  "Insecurity: Not on file   Transportation Needs: Not on file   Physical Activity: Not on file   Stress: Not on file   Social Connections: Not on file   Intimate Partner Violence: Not on file   Housing Stability: Not on file             Family History:     Family History   Problem Relation Age of Onset     Elijah Disease Sister      Arrhythmia Brother         details unknown; procedure in his 20s     Anxiety Disorder Brother      Alzheimer Disease Maternal Grandmother      Cervical Cancer Maternal Grandmother      Colon Cancer Maternal Grandfather      Breast Cancer Paternal Grandmother      Hypertension Paternal Grandmother      Hypertension Paternal Grandfather      Lung Cancer Paternal Grandfather         smoker     Diabetes Type 2  Paternal Grandfather      Myocardial Infarction Paternal Grandfather      Myocardial Infarction Paternal Aunt 45     Cerebrovascular Disease No family hx of      Coronary Artery Disease Early Onset No family hx of      Ovarian Cancer No family hx of        Objective     PHYSICAL EXAM  Ht 1.702 m (5' 7\")   Wt 70.3 kg (155 lb)   BMI 24.28 kg/m    Wt Readings from Last 4 Encounters:   07/28/23 73.2 kg (161 lb 6.4 oz)   07/14/23 72.1 kg (158 lb 14.4 oz)   07/13/23 70.3 kg (155 lb)   06/13/23 72.7 kg (160 lb 4.8 oz)       Gen: no acute distress, pleasant  HEENT: EOMI, no scleral injection or icterus  SKIN: no rash  MSK: no active synovitis  NEURO: grossly non focal  Psych: nl affect    DATA:    CBC:  Recent Labs   Lab Test 08/27/21  0850 07/08/21  0919 04/23/21  1118   WBC 5.6 5.8 5.5   RBC 4.36 4.45 4.45   HGB 12.3 13.3 13.1   HCT 38.3 38.8 39.0   MCV 88 87 88   MCH 28.2 29.9 29.4   MCHC 32.1 34.3 33.6   RDW 11.9 12.4 11.9    207 195       BMP:  Recent Labs   Lab Test 08/27/21  0850 07/08/21  0919 03/31/21  1130 01/14/21  1228 10/28/20  1250 08/26/19  1055 04/26/19  0930 08/09/16  0000 01/09/16  0724     --   --   --  140  --   --   --  140   POTASSIUM 4.4  --  4.8  --  4.1  " --   --   --  3.8   CHLORIDE 113*  --   --   --  109  --   --   --  110*   CO2 26  --   --   --  27  --   --   --  20   ANIONGAP 5  --   --   --  4  --   --   --  10   GLC 99  --   --   --  88  --  94  --  89   BUN 11  --   --   --  7  --   --   --  9   CR 0.79 0.72  --  0.66 0.63   < >  --    < > 0.57   GFRESTIMATED >90 >90  --  >90 >90   < >  --    < > >90  Non  GFR Calc     PRICILA 8.9  --   --   --  8.9  --   --   --  8.4*    < > = values in this interval not displayed.       LFT:  Recent Labs   Lab Test 08/27/21  0850 07/08/21  0919 01/14/21  1228 10/28/20  1250 09/28/17  0954 01/09/16  0724   PROTTOTAL 7.2  --   --  7.6  --  7.1   ALBUMIN 3.9 4.1 4.0 4.3   < > 3.3*   BILITOTAL 0.2  --   --  0.5  --  0.4   ALKPHOS 79  --   --  79  --  70   AST 12 17 13 10   < > 12   ALT 12 21 19 17   < > 15    < > = values in this interval not displayed.       No results found for: CKTOTAL  TSH   Date Value Ref Range Status   06/20/2023 1.68 0.30 - 4.20 uIU/mL Final   02/13/2022 0.43 0.40 - 4.00 mU/L Final   07/08/2021 1.16 0.40 - 4.00 mU/L Final     No results found for: URIC    Inflammatory markers  Lab Results   Component Value Date    CRP <2.9 07/08/2021    CRP <2.9 01/14/2021    CRP 6.4 07/16/2020     Lab Results   Component Value Date    SED 6 07/08/2021    SED 4 01/14/2021    SED 7 07/16/2020     Ferritin   Date Value Ref Range Status   06/20/2023 36 6 - 175 ng/mL Final   11/18/2022 58 6 - 175 ng/mL Final   07/08/2022 7 (L) 12 - 150 ng/mL Final   07/08/2021 17 12 - 150 ng/mL Final   12/30/2020 63 12 - 150 ng/mL Final       UA RESULTS:  Recent Labs   Lab Test 07/08/21  0949 03/31/21  1140 01/14/21  1233 09/28/17  0950 08/31/17  0937 08/14/17  1101   COLOR Yellow Straw Yellow   < > Yellow Yellow   APPEARANCE Clear Clear Clear   < > Slightly Cloudy Clear   URINEGLC Negative Negative Negative   < > Negative Negative   URINEBILI Negative Negative Negative   < > Negative Negative   URINEKETONE Negative Negative  Negative   < > Negative Negative   SG >1.030 1.009 1.010   < > >1.030 1.010   UBLD Large* Small* Negative   < > Large* Trace*   URINEPH 7.0 6.0 7.0   < > 7.0 6.5   PROTEIN Trace* Negative Negative   < > 30* Negative   UROBILINOGEN 0.2  --   --   --  0.2 0.2   NITRITE Negative Negative Negative   < > Negative Negative   LEUKEST Negative Negative Negative   < > Small* Negative   RBCU 5-10* 2 2   < > 10-25* O - 2   WBCU 0 - 5 <1 <1   < > 10-25* O - 2    < > = values in this interval not displayed.         Autoimmunity labs:     Lab Results   Component Value Date    RHF <20 07/22/2016     No results found for: CCPIGG  No results found for: ANCA  Lab Results   Component Value Date    Q1DQLOP 105 04/05/2023    S0EDZEF 94 01/13/2023    F4DIFDD 94 07/08/2022     Lab Results   Component Value Date    U0CKCVV 22 04/05/2023    M4GAYBZ 21 01/13/2023    J3BWNFD 22 07/08/2022     Lab Results   Component Value Date    JACKLYN 1.1 (H) 07/22/2016     Lab Results   Component Value Date    DNA 4.9 04/05/2023    DNA 5.8 01/13/2023    DNA 5.5 07/08/2022     Lab Results   Component Value Date    RNPIGG 0.6 09/28/2017    SSAIGG <0.2 01/20/2020    SSBIGG <0.2 01/20/2020       IMAGING:    Nasrin King MD  Rheumatology Fellow         Addendum:  Imaging Results:     Results for orders placed or performed in visit on 06/02/23   MA Screen Bilateral w/Laron    Narrative    BILATERAL FULL FIELD DIGITAL SCREENING MAMMOGRAM WITH TOMOSYNTHESIS    Performed on: 6/2/23    Compared to: 05/26/2022 and 07/23/2020    Technique:  This study was evaluated with the assistance of Computer-Aided   Detection.  Breast Tomosynthesis was used in interpretation.    Findings: The breasts have scattered areas of fibroglandular density.    There is no radiographic evidence of malignancy.     Impression    IMPRESSION: ACR BI-RADS Category 1: Negative    RECOMMENDED FOLLOW-UP: Annual routine screening mammogram    The results and recommendations of this examination will  be communicated   to the patient.    I have personally reviewed the examination and initial interpretation  and I agree with the findings.      Navjot Montoya MD; Tiff Trivedi MD    Based on the pre-exam history questionnaire and medical history, there may   be increased risk for developing breast cancer or other cancers in the   future. The contact for scheduling cancer genetic counseling for risk   assessment and possible genetic testing and supplemental screening is:   [phone].        Component      Latest Ref Rng & Units 1/7/2022   WBC      4.0 - 11.0 10e3/uL 6.3   RBC Count      3.80 - 5.20 10e6/uL 4.55   Hemoglobin      11.7 - 15.7 g/dL 11.9   Hematocrit      35.0 - 47.0 % 38.5   MCV      78 - 100 fL 85   MCH      26.5 - 33.0 pg 26.2 (L)   MCHC      31.5 - 36.5 g/dL 30.9 (L)   RDW      10.0 - 15.0 % 13.1   Platelet Count      150 - 450 10e3/uL 265   % Neutrophils      % 56   % Lymphocytes      % 36   % Monocytes      % 5   % Eosinophils      % 2   % Basophils      % 1   % Immature Granulocytes      % 0   NRBCs per 100 WBC      <1 /100 0   Absolute Neutrophils      1.6 - 8.3 10e3/uL 3.6   Absolute Lymphocytes      0.8 - 5.3 10e3/uL 2.3   Absolute Monocytes      0.0 - 1.3 10e3/uL 0.3   Absolute Eosinophils      0.0 - 0.7 10e3/uL 0.1   Absolute Basophils      0.0 - 0.2 10e3/uL 0.0   Absolute Immature Granulocytes      <=0.4 10e3/uL 0.0   Absolute NRBCs      10e3/uL 0.0   Color Urine      Colorless, Straw, Light Yellow, Yellow Straw   Appearance Urine      Clear Clear   Glucose Urine      Negative mg/dL Negative   Bilirubin Urine      Negative Negative   Ketones Urine      Negative mg/dL Negative   Specific Gravity Urine      1.003 - 1.035 1.006   Blood Urine      Negative Negative   pH Urine      5.0 - 7.0 7.0   Protein Albumin Urine      Negative mg/dL Negative   Urobilinogen mg/dL      Normal, 2.0 mg/dL Normal   Nitrite Urine      Negative Negative   Leukocyte Esterase Urine      Negative  Negative   RBC Urine      <=2 /HPF 1   WBC Urine      <=5 /HPF <1   Squamous Epithelial /HPF Urine      <=1 /HPF 3 (H)   Iron      35 - 180 ug/dL 28 (L)   Iron Binding Cap      240 - 430 ug/dL 424   Iron Saturation Index      15 - 46 % 7 (L)   Creatinine      0.52 - 1.04 mg/dL 0.56   GFR Estimate      >60 mL/min/1.73m2 >90   AST      0 - 45 U/L 18   ALT      0 - 50 U/L 24   Albumin      3.4 - 5.0 g/dL 4.3   CRP Inflammation      0.0 - 8.0 mg/L <2.9   Sed Rate      0 - 20 mm/hr 8   Complement C3      81 - 157 mg/dL 104   Complement C4      13 - 39 mg/dL 25   DNA-ds      <10.0 IU/mL 5.4   Creatinine Urine      mg/dL 34   Ferritin      12 - 150 ng/mL 7 (L)     Component      Latest Ref Rng & Units 7/8/2022   WBC      4.0 - 11.0 10e3/uL 6.8   RBC Count      3.80 - 5.20 10e6/uL 4.45   Hemoglobin      11.7 - 15.7 g/dL 11.5 (L)   Hematocrit      35.0 - 47.0 % 37.2   MCV      78 - 100 fL 84   MCH      26.5 - 33.0 pg 25.8 (L)   MCHC      31.5 - 36.5 g/dL 30.9 (L)   RDW      10.0 - 15.0 % 14.2   Platelet Count      150 - 450 10e3/uL 195   % Neutrophils      % 52   % Lymphocytes      % 37   % Monocytes      % 7   % Eosinophils      % 3   % Basophils      % 1   % Immature Granulocytes      % 0   NRBCs per 100 WBC      <1 /100 0   Absolute Neutrophils      1.6 - 8.3 10e3/uL 3.6   Absolute Lymphocytes      0.8 - 5.3 10e3/uL 2.5   Absolute Monocytes      0.0 - 1.3 10e3/uL 0.5   Absolute Eosinophils      0.0 - 0.7 10e3/uL 0.2   Absolute Basophils      0.0 - 0.2 10e3/uL 0.1   Absolute Immature Granulocytes      <=0.4 10e3/uL 0.0   Absolute NRBCs      10e3/uL 0.0   Color Urine      Colorless, Straw, Light Yellow, Yellow Yellow   Appearance Urine      Clear Clear   Glucose Urine      Negative mg/dL Negative   Bilirubin Urine      Negative Negative   Ketones Urine      Negative mg/dL Negative   Specific Gravity Urine      1.003 - 1.035 1.010   Blood Urine      Negative Small (A)   pH Urine      5.0 - 7.0 6.5   Protein Albumin Urine       Negative mg/dL Negative   Urobilinogen mg/dL      Normal, 2.0 mg/dL Normal   Nitrite Urine      Negative Negative   Leukocyte Esterase Urine      Negative Negative   Mucus Urine      None Seen /LPF Present (A)   RBC Urine      <=2 /HPF 2   WBC Urine      <=5 /HPF 1   Protein Random Urine      g/L 0.11   Protein Total Urine g/gr Creatinine      0.00 - 0.20 g/g Cr 0.13   Creatinine Urine      mg/dL 82   Iron      35 - 180 ug/dL 14 (L)   Iron Binding Cap      240 - 430 ug/dL 367   Iron Saturation Index      15 - 46 % 4 (L)   Creatinine      0.52 - 1.04 mg/dL 0.60   GFR Estimate      >60 mL/min/1.73m2 >90   ALT      0 - 50 U/L 13   Albumin      3.4 - 5.0 g/dL 3.9   AST      0 - 45 U/L 14   Complement C4      13 - 39 mg/dL 22   Complement C3      81 - 157 mg/dL 94   CRP Inflammation      0.0 - 8.0 mg/L <2.9   DNA-ds      <10.0 IU/mL 5.5   Sed Rate      0 - 20 mm/hr 6   Ferritin      12 - 150 ng/mL 7 (L)

## 2023-07-13 NOTE — PATIENT INSTRUCTIONS
Labs in 10/2023    Ask women health about taking DHEA    Stay off hydroxychlorquine    Iron gummies with raspberry with food (take 2 a day)    Ok to go on prenatal vit one tab a day    Eye exam one more time    Return in 6 months (in person)

## 2023-07-13 NOTE — NURSING NOTE
Is the patient currently in the state of [place]? YES    Visit mode:VIDEO    If the visit is dropped, the patient can be reconnected by: VIDEO VISIT: Text to cell phone: [phone]    Will anyone else be joining the visit? NO      How would you like to obtain your AVS? MyChart    Are changes needed to the allergy or medication list? NO    Reason for visit: MIRZA Tellez, VF

## 2023-07-14 ENCOUNTER — OFFICE VISIT (OUTPATIENT)
Dept: INTERNAL MEDICINE | Facility: CLINIC | Age: 43
End: 2023-07-14
Payer: COMMERCIAL

## 2023-07-14 ENCOUNTER — LAB (OUTPATIENT)
Dept: LAB | Facility: CLINIC | Age: 43
End: 2023-07-14
Payer: COMMERCIAL

## 2023-07-14 ENCOUNTER — ANCILLARY PROCEDURE (OUTPATIENT)
Dept: CT IMAGING | Facility: CLINIC | Age: 43
End: 2023-07-14
Attending: NURSE PRACTITIONER
Payer: COMMERCIAL

## 2023-07-14 VITALS
SYSTOLIC BLOOD PRESSURE: 113 MMHG | OXYGEN SATURATION: 99 % | WEIGHT: 158.9 LBS | DIASTOLIC BLOOD PRESSURE: 76 MMHG | HEIGHT: 67 IN | BODY MASS INDEX: 24.94 KG/M2 | HEART RATE: 60 BPM

## 2023-07-14 DIAGNOSIS — M35.9 UNDIFFERENTIATED CONNECTIVE TISSUE DISEASE (H): ICD-10-CM

## 2023-07-14 DIAGNOSIS — R10.12 LUQ ABDOMINAL PAIN: Primary | ICD-10-CM

## 2023-07-14 DIAGNOSIS — R10.12 LUQ ABDOMINAL PAIN: ICD-10-CM

## 2023-07-14 DIAGNOSIS — Z79.52 LONG TERM CURRENT USE OF SYSTEMIC STEROIDS: ICD-10-CM

## 2023-07-14 LAB
ALBUMIN SERPL BCG-MCNC: 4.9 G/DL (ref 3.5–5.2)
ALP SERPL-CCNC: 78 U/L (ref 35–104)
ALT SERPL W P-5'-P-CCNC: 15 U/L (ref 0–50)
ANION GAP SERPL CALCULATED.3IONS-SCNC: 9 MMOL/L (ref 7–15)
AST SERPL W P-5'-P-CCNC: 20 U/L (ref 0–45)
BILIRUB SERPL-MCNC: 0.5 MG/DL
BUN SERPL-MCNC: 7.7 MG/DL (ref 6–20)
CALCIUM SERPL-MCNC: 9.9 MG/DL (ref 8.6–10)
CHLORIDE SERPL-SCNC: 107 MMOL/L (ref 98–107)
CREAT SERPL-MCNC: 0.68 MG/DL (ref 0.51–0.95)
DEPRECATED HCO3 PLAS-SCNC: 26 MMOL/L (ref 22–29)
GFR SERPL CREATININE-BSD FRML MDRD: >90 ML/MIN/1.73M2
GLUCOSE SERPL-MCNC: 102 MG/DL (ref 70–99)
HGB BLD-MCNC: 13.7 G/DL (ref 11.7–15.7)
LIPASE SERPL-CCNC: 33 U/L (ref 13–60)
POTASSIUM SERPL-SCNC: 4.7 MMOL/L (ref 3.4–5.3)
PROT SERPL-MCNC: 7.8 G/DL (ref 6.4–8.3)
SODIUM SERPL-SCNC: 142 MMOL/L (ref 136–145)
TSH SERPL DL<=0.005 MIU/L-ACNC: 1.38 UIU/ML (ref 0.3–4.2)

## 2023-07-14 PROCEDURE — 80053 COMPREHEN METABOLIC PANEL: CPT | Performed by: PATHOLOGY

## 2023-07-14 PROCEDURE — 36415 COLL VENOUS BLD VENIPUNCTURE: CPT | Performed by: PATHOLOGY

## 2023-07-14 PROCEDURE — 99214 OFFICE O/P EST MOD 30 MIN: CPT | Performed by: NURSE PRACTITIONER

## 2023-07-14 PROCEDURE — 83690 ASSAY OF LIPASE: CPT | Performed by: PATHOLOGY

## 2023-07-14 PROCEDURE — 85018 HEMOGLOBIN: CPT | Performed by: PATHOLOGY

## 2023-07-14 PROCEDURE — 84443 ASSAY THYROID STIM HORMONE: CPT | Performed by: PATHOLOGY

## 2023-07-14 PROCEDURE — 74177 CT ABD & PELVIS W/CONTRAST: CPT | Mod: GC | Performed by: RADIOLOGY

## 2023-07-14 RX ORDER — IOPAMIDOL 755 MG/ML
88 INJECTION, SOLUTION INTRAVASCULAR ONCE
Status: COMPLETED | OUTPATIENT
Start: 2023-07-14 | End: 2023-07-14

## 2023-07-14 RX ADMIN — IOPAMIDOL 88 ML: 755 INJECTION, SOLUTION INTRAVASCULAR at 09:51

## 2023-07-14 NOTE — NURSING NOTE
"Heather Guillory is a 43 year old female patient that presents today in clinic for the following:    Chief Complaint   Patient presents with     Follow Up     The patient's allergies and medications were reviewed as noted. A set of vitals were recorded as noted without incident: /76 (BP Location: Right arm, Patient Position: Sitting, Cuff Size: Adult Regular)   Pulse 60   Ht 1.702 m (5' 7.01\")   Wt 72.1 kg (158 lb 14.4 oz)   SpO2 99%   BMI 24.88 kg/m  . The patient does not have any other questions for the provider.    Munir Melendez, EMT 7:57 AM on 7/14/2023   "

## 2023-07-14 NOTE — PROGRESS NOTES
Assessment & Plan     LUQ abdominal pain  Pain persists since Oct 2022. EGD showed erosive esophagitis; though symptoms not significantly improved with PPI. Continues to have discomfort with eating at times along with nausea, fatigue, and generally feeling unwell. Will check labs today with repeat Hgb, lipase, and CMP, as well as check abdominal CT. Keep EGD as scheduled in September and continue with sucralfate as this seems to be helpful for symptoms at this point. Restart oral iron supplement and daily multivitamin. She agrees with the plan.   - Hemoglobin; Future  - Lipase; Future  - Comprehensive metabolic panel (BMP + Alb, Alk Phos, ALT, AST, Total. Bili, TP); Future      38 minutes spent by me on the date of the encounter doing chart review, history and exam, documentation and further activities per the note       Return in about 8-12 weeks (around 9/8/2023), sooner prn if symptoms acutely worsen    Cyndi Zarate, HARVEY CNP  [facility] INTERNAL MEDICINE [facility]   Heather is a 43 year old, presenting for the following health issues:  Follow Up    HPI     GI appt last week--still having discomfort in LUQ, nausea and generally not feeling well. Repeat upper EGD scheduled.  Sucralfate was helping, started feeling better, but then sx returned once this was stopped. Resumed Sucralfate 5 days ago, stomach has settled more. Omeprazole helps somewhat, but not as much as the sucralfate. GI advised to hold PPI until EGD in September.     Routine physical with Sandra Diaz in June, had checked labs due to her  had commented on some increased hair loss.   Lower DHEA, elevated SHBG.  Fatigue, Iron levels were lower. Frustrated by fatigue, wants to be active with her kids. Feels lagging, difficult to jump out of bed in the AM. Heavy bleeding with periods for 1-2 days, much improved from prior to the myomectomy in Sept 2022.   Dr. King recommended resuming iron supplement.  "Has been off since the fall.  Has stopped hydroxychloroquine, wondering if this has impacted inflammation and energy levels (has taken for past 7 years), plan to recheck labs in October. Joints generally feel better in the warmer months, so felt like an appropriate time to wean off.  Covid+ in April. Felt sick in weeks following, though that has settled.      Review of Systems   Constitutional, HEENT, cardiovascular, pulmonary, gi and gu systems are negative, except as otherwise noted.      Objective    /76 (BP Location: Right arm, Patient Position: Sitting, Cuff Size: Adult Regular)   Pulse 60   Ht 1.702 m (5' 7.01\")   Wt 72.1 kg (158 lb 14.4 oz)   SpO2 99%   BMI 24.88 kg/m    Body mass index is 24.88 kg/m .  Physical Exam   GENERAL: alert and no distress  HENT: nose and mouth without ulcers or lesions  NECK: no adenopathy, no asymmetry, masses, or scars and thyroid normal to palpation  RESP: lungs clear to auscultation - no rales, rhonchi or wheezes  CV: regular rate and rhythm, normal S1 S2, no S3 or S4, no murmur, click or rub, no peripheral edema and peripheral pulses strong  ABDOMEN: soft, non-distended, tender in LUQ and epigastric region, no guarding or rebound tenderness, no hepatosplenomegaly, no masses and bowel sounds normal  MS: no gross musculoskeletal defects noted, no edema  SKIN: no suspicious lesions or rashes  PSYCH: mentation appears normal, affect normal/bright                    "

## 2023-07-14 NOTE — DISCHARGE INSTRUCTIONS

## 2023-07-19 ENCOUNTER — MYC MEDICAL ADVICE (OUTPATIENT)
Dept: ONCOLOGY | Facility: CLINIC | Age: 43
End: 2023-07-19
Payer: COMMERCIAL

## 2023-07-19 ENCOUNTER — TRANSCRIBE ORDERS (OUTPATIENT)
Dept: OTHER | Age: 43
End: 2023-07-19

## 2023-07-19 DIAGNOSIS — Z80.9 FAMILY HISTORY OF CANCER: Primary | ICD-10-CM

## 2023-07-24 ENCOUNTER — TELEPHONE (OUTPATIENT)
Dept: GASTROENTEROLOGY | Facility: CLINIC | Age: 43
End: 2023-07-24
Payer: COMMERCIAL

## 2023-07-24 NOTE — TELEPHONE ENCOUNTER
Caller:   Reason for Reschedule/Cancellation (please be detailed, any staff messages or encounters to note?):       Prior to reschedule please review:  Ordering Provider:     NETTA MURRAY     Sedation per order: CS  Does patient have any ASC Exclusions, please identify?:       Notes on Cancelled Procedure:  Procedure: Upper Endoscopy [EGD]   Date: 9/21  Location: [facility]; [address] [place] [address], [address] [place] [address]  Surgeon: RENATA      Rescheduled: Yes  Procedure: Upper Endoscopy [EGD]   Date: 10/5  Location: [facility]; [address] [place] [address], [address] [place] [address]  Surgeon: RENATA  Sedation Level Scheduled  CS,  Reason for Sedation Level ORDER  Prep/Instructions updated and sent: N     Send In - basket message to Panc - Willie Pool if EUS  procedure is canceled or rescheduled: [ N/A, YES or NO]

## 2023-07-24 NOTE — TELEPHONE ENCOUNTER
Caller:   Reason for Reschedule/Cancellation (please be detailed, any staff messages or encounters to note?):         Prior to reschedule please review:  Ordering Provider:     NETTA MURRAY      Sedation per order: CS  Does patient have any ASC Exclusions, please identify?:         Notes on Cancelled Procedure:  Procedure: Upper Endoscopy [EGD]   Date: 9/21  Location: [facility]; [address] [place] [address], [address] [place] [address]  Surgeon: RENATA        Rescheduled: Yes  Procedure: Upper Endoscopy [EGD]   Date: 10/5  Location: [facility]; [address] [place] [address], [address] [place] [address]  Surgeon: RENATA  Sedation Level Scheduled  CS,  Reason for Sedation Level ORDER  Prep/Instructions updated and sent: N               Send In - basket message to Panc - Willie Pool if EUS  procedure is canceled or rescheduled: [ N/A, YES or NO]

## 2023-07-26 NOTE — PROGRESS NOTES
"[facility]    HPI: Heather Guillory is a 43 year old  with history of bilateral salpingectomy and myomectomy who presents to clinic today to discuss abnormal labs in the setting of hair loss and chronic fatigue.  She initially presented to her PCP with fatigue and hair loss and had labs done showing low DHEAS and high SHBG. She also endorses history of chronically low iron in the setting of autoimmune condition. Additionally, patient endorses embryo preservation and she and her partner are considering having another pregnancy. She  wants conselling on hormonal effect on her symptoms and on future embryo transfer and pregnancy.     OB History:     -3   -2 pregnancies by IVF    Gyn History:   Menses: monthly, lasting 4-5 days, heaviest day 1 pad every couple of hours  STI Hx: none  Contraception: s/p BS, hx of mIUD (experienced worsening acne)  Sexual activity: monogamous male partner  Pap smear history: Hx of abnormal pap smear not requiring LEEP  History of robotic myomectomy, bilateral salpingectomy    Labs:   CMP normal  TSH 1.38  From 23  FSH 10.7  Prolactin 18  DHEAS 28  Free testosterone 0.12, total 20  SHBG 148 (slightly high)  Iron 34 (slightly low), , Iron sat index 11%  Ferritin 36    Imaging:   Pelvic US 23  History: Menorrhagia     Findings: The uterus measures 10.7 x 5.7 x 7.5cm. The endometrial  stripe measures 7 mm thick.     The right and left ovaries measure 3.2 x 1.8 x 1.7 cm and 2.2 x 2.6 x  2.4 cm, respectively. Both ovaries appear normal.                                                                   Impression: Normal pelvic ultrasound    PMH, PSH, Family history, Social history, medications and allergies were reviewed and updated in EMR.     Objective:   /72   Pulse 72   Ht 1.702 m (5' 7.01\")   Wt 73.2 kg (161 lb 6.4 oz)   LMP 2023   BMI 25.27 kg/m    General: Healthy appearing. Alert, oriented. Affect is appropriate. "   HEENT: Eyes are normal with clear sclerae. Ears are symmetric.   Heart: Regular rate    Lungs: Breathing is unlabored.     Assessment/Plan:   Heather Guillory is a 43 year old  female who presents for consultation regarding hair loss & fatigue & future embryo transfer/pregnancy in the setting of abnormal SHBG and DHEAS. She was found to have normal FSH for her cycle timing and free testosterone with regular menses. We discussed with patient that hormonal labs fluctuate daily and slightly elevated SHBG with free Testosterone on the lower side is expected since SHBG binds to testosterone and more of SHBG would present as lower testosterone.     Also discussed with patient that fluctuations in hormonal labs may indicate that she is perimenopausal, a stage that can last up to 10 years. Discussed treatment options for perimenopausal symptoms include a form of hormonal therapy such as CHCs. Also discussed that there are few treatments that have been shown to help hair loss, including rogaine. Patient endorsed understanding and would like to think more about those options since she had unpleasant experience with mIUD.     Counseled patient that embryo transfer and pregnancy can be achieved at her age although she would be at a higher risk of some pregnancy complications including  delivery and pre-eclampsia.     Denae Cannon MD MPH  Obstetric & Gynecology, PGY-1  2023 , 8:43 AM      I examined Heather Guillory on 2023 with Dr. Cannon and agree with the presentation, exam and plan of care documented in this note with edits by me.   Diane Pierre MD

## 2023-07-28 ENCOUNTER — OFFICE VISIT (OUTPATIENT)
Dept: OBGYN | Facility: CLINIC | Age: 43
End: 2023-07-28
Attending: NURSE PRACTITIONER
Payer: COMMERCIAL

## 2023-07-28 VITALS
DIASTOLIC BLOOD PRESSURE: 72 MMHG | HEART RATE: 72 BPM | HEIGHT: 67 IN | BODY MASS INDEX: 25.33 KG/M2 | SYSTOLIC BLOOD PRESSURE: 103 MMHG | WEIGHT: 161.4 LBS

## 2023-07-28 DIAGNOSIS — R79.89 ABNORMAL LEVEL OF FEMALE SEX HORMONES: ICD-10-CM

## 2023-07-28 DIAGNOSIS — L65.9 HAIR LOSS: Primary | ICD-10-CM

## 2023-07-28 PROCEDURE — 99213 OFFICE O/P EST LOW 20 MIN: CPT | Mod: GC | Performed by: STUDENT IN AN ORGANIZED HEALTH CARE EDUCATION/TRAINING PROGRAM

## 2023-07-28 PROCEDURE — 99213 OFFICE O/P EST LOW 20 MIN: CPT | Performed by: STUDENT IN AN ORGANIZED HEALTH CARE EDUCATION/TRAINING PROGRAM

## 2023-07-28 NOTE — PATIENT INSTRUCTIONS
Thank you for trusting us with your care!     If you need to contact us for questions about:  Symptoms, Scheduling & Medical Questions; Non-urgent (2-3 day response) Bethanie message, Urgent (needing response today) [phone] (if after 3:30pm next day response)   Prescriptions: Please call your Pharmacy   Billing: Raheem [phone] or [facility]:[phone]    
Home

## 2023-07-28 NOTE — LETTER
2023       RE: Heather Guillory  [address] [facility] [place] [address]     Dear Colleague,    Thank you for referring your patient, Heather Guillory, to the [facility]. Please see a copy of my visit note below.    [facility]    HPI: Heather Guillory is a 43 year old  with history of bilateral salpingectomy and myomectomy who presents to clinic today to discuss abnormal labs in the setting of hair loss and chronic fatigue.  She initially presented to her PCP with fatigue and hair loss and had labs done showing low DHEAS and high SHBG. She also endorses history of chronically low iron in the setting of autoimmune condition. Additionally, patient endorses embryo preservation and she and her partner are considering having another pregnancy. She  wants conselling on hormonal effect on her symptoms and on future embryo transfer and pregnancy.     OB History:     -3   -2 pregnancies by IVF    Gyn History:   Menses: monthly, lasting 4-5 days, heaviest day 1 pad every couple of hours  STI Hx: none  Contraception: s/p BS, hx of mIUD (experienced worsening acne)  Sexual activity: monogamous male partner  Pap smear history: Hx of abnormal pap smear not requiring LEEP  History of robotic myomectomy, bilateral salpingectomy    Labs:   CMP normal  TSH 1.38  From 23  FSH 10.7  Prolactin 18  DHEAS 28  Free testosterone 0.12, total 20  SHBG 148 (slightly high)  Iron 34 (slightly low), , Iron sat index 11%  Ferritin 36    Imaging:   Pelvic US 23  History: Menorrhagia     Findings: The uterus measures 10.7 x 5.7 x 7.5cm. The endometrial  stripe measures 7 mm thick.     The right and left ovaries measure 3.2 x 1.8 x 1.7 cm and 2.2 x 2.6 x  2.4 cm, respectively. Both ovaries appear normal.                                                                   Impression: Normal pelvic  "ultrasound    PMH, PSH, Family history, Social history, medications and allergies were reviewed and updated in EMR.     Objective:   /72   Pulse 72   Ht 1.702 m (5' 7.01\")   Wt 73.2 kg (161 lb 6.4 oz)   LMP 2023   BMI 25.27 kg/m    General: Healthy appearing. Alert, oriented. Affect is appropriate.   HEENT: Eyes are normal with clear sclerae. Ears are symmetric.   Heart: Regular rate    Lungs: Breathing is unlabored.     Assessment/Plan:   Heather Guillory is a 43 year old  female who presents for consultation regarding hair loss & fatigue & future embryo transfer/pregnancy in the setting of abnormal SHBG and DHEAS. She was found to have normal FSH for her cycle timing and free testosterone with regular menses. We discussed with patient that hormonal labs fluctuate daily and slightly elevated SHBG with free Testosterone on the lower side is expected since SHBG binds to testosterone and more of SHBG would present as lower testosterone.     Also discussed with patient that fluctuations in hormonal labs may indicate that she is perimenopausal, a stage that can last up to 10 years. Discussed treatment options for perimenopausal symptoms include a form of hormonal therapy such as CHCs. Also discussed that there are few treatments that have been shown to help hair loss, including rogaine. Patient endorsed understanding and would like to think more about those options since she had unpleasant experience with mIUD.     Counseled patient that embryo transfer and pregnancy can be achieved at her age although she would be at a higher risk of some pregnancy complications including  delivery and pre-eclampsia.     Denae Cannon MD MPH  Obstetric & Gynecology, PGY-1  2023 , 8:43 AM      I examined Heather Guillory on 2023 with Dr. Cannon and agree with the presentation, exam and plan of care documented in this note with edits by me.   Diane Pierre MD     "

## 2023-07-31 ENCOUNTER — TELEPHONE (OUTPATIENT)
Dept: RHEUMATOLOGY | Facility: CLINIC | Age: 43
End: 2023-07-31
Payer: COMMERCIAL

## 2023-07-31 NOTE — TELEPHONE ENCOUNTER
Patient was call and scheduled fot the following:    Appointment type: Return In-person  Provider: Dr. King  Return date: February 2024  Specialty phone number: [phone]  Additional appointment(s) needed: Patient will schedule a lab appointment at [facility].  Additonal Notes:

## 2023-08-28 ENCOUNTER — TELEPHONE (OUTPATIENT)
Dept: GASTROENTEROLOGY | Facility: CLINIC | Age: 43
End: 2023-08-28
Payer: COMMERCIAL

## 2023-08-28 NOTE — TELEPHONE ENCOUNTER
Attempted to contact patient in order to complete pre assessment questions.     No answer. Left message to return call to [phone] option 4      Procedure details:    Patient scheduled for Upper endoscopy (EGD) on 9/7/23 .     Arrival time: 1315. Procedure time 1415    Pre op exam needed? N/A    [facility]; [address] [place] [address], [address] Floor, [place] [address]    Sedation type: Conscious sedation     Indication for procedure:     epigastric/LUQ pain, nausea         Chart review:     Electronic implanted devices? No    Diabetic? No    Diabetic medication HOLDING recommendations: (if applicable)  Oral diabetic medications: No  Diabetic injectables: No  Insulin: No      Medication review:    Anticoagulants? No    NSAIDS? No    Other medication HOLDING recommendations:  Sucralfate (Carafate): HOLD 1 day before procedure.      Prep for procedure:     Prep instructions sent via CSDN.       Heather Cooper RN  Endoscopy Procedure Pre Assessment RN

## 2023-08-28 NOTE — TELEPHONE ENCOUNTER
Caller: Heather Guillory    Reason for Reschedule/Cancellation (please be detailed, any staff messages or encounters to note?): pt wants a sooner appt.      Prior to reschedule please review:  Ordering Provider: NETTA MURRAY   Sedation per order: MODERATE  Does patient have any ASC Exclusions, please identify?: NO      Notes on Cancelled Procedure:  Procedure: Upper Endoscopy [EGD]   Date: 10/5  Location: [facility]; [address] [place] [address], [address] [place] [address]  Surgeon: RENATA      Rescheduled: Yes  Procedure: Lower Endoscopy [Colonoscopy]   Date: 9/7/23  Location: [facility]; [address] [place] [address], [address] [place] [address]  Surgeon: RENATA  Sedation Level Scheduled  MODERATE,  Reason for Sedation Level PER ORDER  Prep/Instructions updated and sent: YES/MYCHART       Send In - basket message to Panc - Willie Pool if EUS  procedure is canceled or rescheduled: [ N/A, YES or NO] N/A

## 2023-08-30 NOTE — TELEPHONE ENCOUNTER
Pre assessment completed for upcoming procedure.   (Please see previous telephone encounter notes for complete details)    Patient  returned call.       Procedure details:    Arrival time and facility location reviewed.    Pre op exam needed? N/A    Designated  policy reviewed. Instructed to have someone stay 6 hours post procedure.     COVID policy reviewed.      Medication review:    Medications reviewed. Please see supporting documentation below. Holding recommendations discussed (if applicable).       Prep for procedure:     Procedure prep instructions reviewed.        Additional information needed?  N/A      Patient  verbalized understanding and had no questions or concerns at this time.      Rachelle Jain RN  Endoscopy Procedure Pre Assessment RN  [phone] option 4

## 2023-08-30 NOTE — TELEPHONE ENCOUNTER
Second call attempt to complete pre assessment.     No answer.  Left message to return call to [phone] #4 within 24 hours or risk procedure being cancelled.     Additional information needed?  N/A      Heather Cooper RN  Endoscopy Procedure Pre Assessment RN

## 2023-09-07 ENCOUNTER — HOSPITAL ENCOUNTER (OUTPATIENT)
Facility: AMBULATORY SURGERY CENTER | Age: 43
Discharge: HOME | End: 2023-09-07
Attending: STUDENT IN AN ORGANIZED HEALTH CARE EDUCATION/TRAINING PROGRAM | Admitting: STUDENT IN AN ORGANIZED HEALTH CARE EDUCATION/TRAINING PROGRAM
Payer: COMMERCIAL

## 2023-09-07 VITALS
DIASTOLIC BLOOD PRESSURE: 58 MMHG | RESPIRATION RATE: 15 BRPM | HEART RATE: 60 BPM | SYSTOLIC BLOOD PRESSURE: 106 MMHG | TEMPERATURE: 97.2 F | OXYGEN SATURATION: 98 %

## 2023-09-07 LAB
HCG UR QL: NEGATIVE
INTERNAL QC OK POCT: NORMAL
POCT KIT EXPIRATION DATE: NORMAL
POCT KIT LOT NUMBER: NORMAL
UPPER GI ENDOSCOPY: NORMAL

## 2023-09-07 PROCEDURE — 81025 URINE PREGNANCY TEST: CPT | Performed by: PATHOLOGY

## 2023-09-07 PROCEDURE — 88305 TISSUE EXAM BY PATHOLOGIST: CPT | Mod: 26 | Performed by: PATHOLOGY

## 2023-09-07 PROCEDURE — 43239 EGD BIOPSY SINGLE/MULTIPLE: CPT | Performed by: STUDENT IN AN ORGANIZED HEALTH CARE EDUCATION/TRAINING PROGRAM

## 2023-09-07 PROCEDURE — 88305 TISSUE EXAM BY PATHOLOGIST: CPT | Mod: TC | Performed by: STUDENT IN AN ORGANIZED HEALTH CARE EDUCATION/TRAINING PROGRAM

## 2023-09-07 RX ORDER — PROCHLORPERAZINE MALEATE 10 MG
10 TABLET ORAL EVERY 6 HOURS PRN
Status: DISCONTINUED | OUTPATIENT
Start: 2023-09-07 | End: 2023-09-08 | Stop reason: HOSPADM

## 2023-09-07 RX ORDER — ONDANSETRON 2 MG/ML
4 INJECTION INTRAMUSCULAR; INTRAVENOUS EVERY 6 HOURS PRN
Status: DISCONTINUED | OUTPATIENT
Start: 2023-09-07 | End: 2023-09-08 | Stop reason: HOSPADM

## 2023-09-07 RX ORDER — NALOXONE HYDROCHLORIDE 0.4 MG/ML
0.2 INJECTION, SOLUTION INTRAMUSCULAR; INTRAVENOUS; SUBCUTANEOUS
Status: DISCONTINUED | OUTPATIENT
Start: 2023-09-07 | End: 2023-09-08 | Stop reason: HOSPADM

## 2023-09-07 RX ORDER — ONDANSETRON 4 MG/1
4 TABLET, ORALLY DISINTEGRATING ORAL EVERY 6 HOURS PRN
Status: DISCONTINUED | OUTPATIENT
Start: 2023-09-07 | End: 2023-09-08 | Stop reason: HOSPADM

## 2023-09-07 RX ORDER — FLUMAZENIL 0.1 MG/ML
0.2 INJECTION, SOLUTION INTRAVENOUS
Status: DISCONTINUED | OUTPATIENT
Start: 2023-09-07 | End: 2023-09-08 | Stop reason: HOSPADM

## 2023-09-07 RX ORDER — ONDANSETRON 2 MG/ML
4 INJECTION INTRAMUSCULAR; INTRAVENOUS
Status: COMPLETED | OUTPATIENT
Start: 2023-09-07 | End: 2023-09-07

## 2023-09-07 RX ORDER — NALOXONE HYDROCHLORIDE 0.4 MG/ML
0.4 INJECTION, SOLUTION INTRAMUSCULAR; INTRAVENOUS; SUBCUTANEOUS
Status: DISCONTINUED | OUTPATIENT
Start: 2023-09-07 | End: 2023-09-08 | Stop reason: HOSPADM

## 2023-09-07 RX ORDER — LIDOCAINE 40 MG/G
CREAM TOPICAL
Status: DISCONTINUED | OUTPATIENT
Start: 2023-09-07 | End: 2023-09-07 | Stop reason: HOSPADM

## 2023-09-07 RX ORDER — FENTANYL CITRATE 50 UG/ML
INJECTION, SOLUTION INTRAMUSCULAR; INTRAVENOUS DAILY PRN
Status: DISCONTINUED | OUTPATIENT
Start: 2023-09-07 | End: 2023-09-07 | Stop reason: HOSPADM

## 2023-09-07 RX ADMIN — ONDANSETRON 4 MG: 2 INJECTION INTRAMUSCULAR; INTRAVENOUS at 15:09

## 2023-09-07 NOTE — DISCHARGE INSTRUCTIONS
Discharge Instructions after  Upper Endoscopy (EGD)    Activity and Diet  You were given medicine for pain. You may be dizzy or sleepy.  For 24 hours:   Do not drive or use heavy equipment.   Do not make important decisions.   Do not drink any alcohol.  ___ You may return to your regular diet.    Discomfort  You may have a sore throat for 2 to 3 days. It may help to:   Avoid hot liquids for 24 hours.   Use sore throat lozenges.   Gargle as needed with salt water up to 4 times a day. Mix 1 cup of warm water  with 1 teaspoon of salt. Do not swallow.  ___ Your esophagus was dilated (opened) or banded during the exam:   Drink only cool liquids for the rest of the day. Eat a soft diet for the next few days.   You may have a sore chest for 2 to 3 days.    You may take Tylenol (acetaminophen) for pain unless your doctor has told you not to.    Do not take aspirin or ibuprofen (Advil, Motrin) or other NSAIDS  (anti-inflammatory drugs) for ___ days.    Follow-up  ___ We took small tissue samples for study. If you do not have a follow-up visit scheduled,  call your provider s office in 2 weeks for the results.    Other instructions________________________________________________________    When to call us:  Problems are rare. Call right away if you have:   Unusual throat pain or trouble swallowing   Unusual pain in belly or chest that is not relieved by belching or passing air   Black stools (tar-like looking bowel movement)   Temperature above 100.6  F. (37.5  C).    If you vomit blood or have severe pain, go to an emergency room.    If you have questions, call:  Monday to Friday, 8 a.m. to 4:30 p.m.: Central Scheduling Department:[phone]    After hours: Hospital: [phone] (Ask for the GI fellow on call)

## 2023-09-07 NOTE — H&P
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, acquired, generalized, moderate       Past Surgical History:    Past Surgical History:   Procedure Laterality Date    COLONOSCOPY N/A 10/21/2020    Procedure: COLONOSCOPY;  Surgeon: Yang Pete MD;  Location: [facility] OR    COLONOSCOPY N/A 12/29/2022    Procedure: COLONOSCOPY, WITH BIOPSY;  Surgeon: Magda Lobo MD;  Location: [facility] OR    DAVINCI MYOMECTOMY N/A 2/11/2022    Procedure: MYOMECTOMY, UTERUS, ROBOT-ASSISTED, LAPAROSCOPIC;  Surgeon: Cate Mansfield MD;  [facility]: [facility]    ESOPHAGOSCOPY, GASTROSCOPY, DUODENOSCOPY (EGD), COMBINED N/A 12/29/2022    Procedure: ESOPHAGOGASTRODUODENOSCOPY, WITH BIOPSY;  Surgeon: Magda Lobo MD;  Location: [facility] OR    IR ENDOVENOUS ABLATION VARICOSE VEINS  10/28/2019    IR FOLLOW UP VISIT OUTPATIENT  11/20/2019    IR STAB PHLEBECTOMY 10 - 20 STABS  10/28/2019    IR VEIN SCLEROSING MULTIPLE  10/28/2019    LAPAROSCOPIC SALPINGECTOMY Bilateral 4/23/2021    Procedure: SALPINGECTOMY, LAPAROSCOPIC BILATERAL;  Surgeon: Cate Mansfield MD;  [facility]: [facility]    OPERATIVE HYSTEROSCOPY WITH MORCELLATOR  4/8/2014    Procedure: OPERATIVE HYSTEROSCOPY WITH MORCELLATOR;  Hysteroscopic Polypectomy;  Surgeon: Cate Mansfield MD;  [facility]    REMOVE INTRAUTERINE DEVICE N/A 4/23/2021    Procedure: removal of intrauterine device;  Surgeon: Cate Mansfield MD;  Location: [facility]       Past Medical History:    Past Medical History:   Diagnosis Date    JAVIER (generalized anxiety disorder)     50mg zoloft    Rosacea     Undifferentiated connective tissue disease (H)        Social History:   Social History     Tobacco Use    Smoking status: Never     Passive exposure: Never    Smokeless tobacco: Never   Substance Use Topics    Alcohol use: No     Alcohol/week: 0.0 standard drinks of alcohol       Family History:   Family History   Problem Relation Age of Onset    Elijah Disease Sister     Arrhythmia Brother         details unknown; procedure in his 20s    Anxiety Disorder Brother     Alzheimer Disease Maternal Grandmother     Cervical Cancer Maternal Grandmother     Colon Cancer Maternal Grandfather     Breast Cancer Paternal Grandmother     Hypertension Paternal Grandmother     Hypertension Paternal Grandfather     Lung Cancer Paternal Grandfather         smoker    Diabetes Type 2  Paternal Grandfather     Myocardial Infarction Paternal Grandfather     Myocardial Infarction Paternal Aunt 45    Cerebrovascular Disease No family hx of     Coronary Artery Disease Early Onset No family hx of     Ovarian Cancer No family hx of        Allergies:   Allergies   Allergen Reactions    Cefadroxil Unknown and Hives    Sulfa Antibiotics Rash and Hives    Benzoyl Peroxide Swelling    Latex Rash    Miconazole Swelling       Active Medications:   Current Outpatient Medications   Medication Sig Dispense Refill    Iron Combinations (IRON COMPLEX PO)       sucralfate (CARAFATE) 1 GM tablet Take 1 tablet (1 g) by mouth 4 times daily as needed (nausea, epigastric pain) 120 tablet 3    Vitamin D3 (VITAMIN D, CHOLECALCIFEROL,) 25 mcg (1000 units) tablet Take by mouth daily      Bacillus Coagulans-Inulin (ALIGN PREBIOTIC-PROBIOTIC PO)  (Patient not taking: Reported on 7/13/2023)      metroNIDAZOLE (METROCREAM) 0.75 % external cream Apply topically 2 times daily (Patient not taking: Reported on 7/28/2023)      omeprazole (PRILOSEC) 20 MG DR capsule  Take 1 capsule (20 mg) by mouth 2 times daily (Patient not taking: Reported on 7/28/2023) 60 capsule 3    saccharomyces boulardii (FLORASTOR) 250 MG capsule Take 250 mg by mouth 2 times daily (Patient not taking: Reported on 6/13/2023)         Systemic Review:   CONSTITUTIONAL: NEGATIVE for fever, chills, change in weight  ENT/MOUTH: NEGATIVE for ear, mouth and throat problems  RESP: NEGATIVE for significant cough or SOB  CV: NEGATIVE for chest pain, palpitations or peripheral edema    Physical Examination:   Vital Signs: /77   Pulse 59   Temp 97  F (36.1  C) (Temporal)   Resp 15   SpO2 100%   GENERAL: healthy, alert and no distress  NECK: no adenopathy, no asymmetry, masses, or scars  RESP: lungs clear to auscultation - no rales, rhonchi or wheezes  CV: regular rate and rhythm, normal S1 S2, no S3 or S4, no murmur, click or rub, no peripheral edema and peripheral pulses strong  ABDOMEN: soft, nontender, no hepatosplenomegaly, no masses and bowel sounds normal  MS: no gross musculoskeletal defects noted, no edema      Plan: Appropriate to proceed as scheduled.      Jackie Aldana MD  9/7/2023    PCP:  Cyndi Zarate

## 2023-09-11 ENCOUNTER — MYC MEDICAL ADVICE (OUTPATIENT)
Dept: GASTROENTEROLOGY | Facility: CLINIC | Age: 43
End: 2023-09-11
Payer: COMMERCIAL

## 2023-09-11 DIAGNOSIS — R11.2 NAUSEA AND VOMITING, UNSPECIFIED VOMITING TYPE: ICD-10-CM

## 2023-09-11 DIAGNOSIS — R10.12 LUQ ABDOMINAL PAIN: Primary | ICD-10-CM

## 2023-09-11 NOTE — TELEPHONE ENCOUNTER
Replied to [employer] message that message will be forwarded to provider for review.     Antonia Buck RN

## 2023-09-11 NOTE — RESULT ENCOUNTER NOTE
Jayden Romero,    The report and subsequent biopsies from your recent upper endoscopy are available for you to review.  In short, everything looks pretty good.  The erosions in the stomach that were previously seen have now healed.  The esophagus and duodenum look normal as well.  Biopsies from the stomach showed just some evidence of previous inflammation, but no active inflammation, which is good.    How has your stomach been feeling lately?  Has there been any change since we last spoke?      Sincerely,  Ganga NUNN [facility] GI, Hepatology, and Nutrition

## 2023-09-14 ENCOUNTER — HOSPITAL ENCOUNTER (OUTPATIENT)
Dept: NUCLEAR MEDICINE | Facility: CLINIC | Age: 43
Setting detail: NUCLEAR MEDICINE
Discharge: HOME | End: 2023-09-14
Attending: PHYSICIAN ASSISTANT | Admitting: PHYSICIAN ASSISTANT
Payer: COMMERCIAL

## 2023-09-14 DIAGNOSIS — R11.2 NAUSEA AND VOMITING, UNSPECIFIED VOMITING TYPE: ICD-10-CM

## 2023-09-14 DIAGNOSIS — R10.12 LUQ ABDOMINAL PAIN: ICD-10-CM

## 2023-09-14 PROCEDURE — 78227 HEPATOBIL SYST IMAGE W/DRUG: CPT

## 2023-09-14 PROCEDURE — 78227 HEPATOBIL SYST IMAGE W/DRUG: CPT | Mod: 26 | Performed by: RADIOLOGY

## 2023-09-14 PROCEDURE — 343N000001 HC RX 343: Performed by: PHYSICIAN ASSISTANT

## 2023-09-14 PROCEDURE — A9537 TC99M MEBROFENIN: HCPCS | Performed by: PHYSICIAN ASSISTANT

## 2023-09-14 RX ORDER — KIT FOR THE PREPARATION OF TECHNETIUM TC 99M MEBROFENIN 45 MG/10ML
5 INJECTION, POWDER, LYOPHILIZED, FOR SOLUTION INTRAVENOUS ONCE
Status: COMPLETED | OUTPATIENT
Start: 2023-09-14 | End: 2023-09-14

## 2023-09-14 RX ADMIN — MEBROFENIN 4.8 MILLICURIE: 45 INJECTION, POWDER, LYOPHILIZED, FOR SOLUTION INTRAVENOUS at 09:04

## 2023-09-14 NOTE — RESULT ENCOUNTER NOTE
Jayden Romero,    The report from your recent hepatobiliary scan is available for you to review.  Overall, it looks like your gallbladder is functioning normally.  Did you develop any bothersome symptoms during this test?    Sincerely,  Ganga NUNN [facility] GI, Hepatology, and Nutrition

## 2023-10-03 ENCOUNTER — OFFICE VISIT (OUTPATIENT)
Dept: GASTROENTEROLOGY | Facility: CLINIC | Age: 43
End: 2023-10-03
Attending: PHYSICIAN ASSISTANT
Payer: COMMERCIAL

## 2023-10-03 VITALS
OXYGEN SATURATION: 100 % | SYSTOLIC BLOOD PRESSURE: 103 MMHG | BODY MASS INDEX: 25.11 KG/M2 | HEART RATE: 78 BPM | HEIGHT: 67 IN | WEIGHT: 160 LBS | DIASTOLIC BLOOD PRESSURE: 67 MMHG

## 2023-10-03 DIAGNOSIS — R10.12 LUQ ABDOMINAL PAIN: Primary | ICD-10-CM

## 2023-10-03 PROBLEM — F52.22 FEMALE SEXUAL INTEREST/AROUSAL DISORDER, ACQUIRED, GENERALIZED, MODERATE: Status: RESOLVED | Noted: 2022-06-21 | Resolved: 2023-10-03

## 2023-10-03 PROCEDURE — 99213 OFFICE O/P EST LOW 20 MIN: CPT | Performed by: PHYSICIAN ASSISTANT

## 2023-10-03 ASSESSMENT — PAIN SCALES - GENERAL: PAINLEVEL: NO PAIN (0)

## 2023-10-03 NOTE — LETTER
10/3/2023         RE: Heather Guillory  [address]  [place] [address]        Dear Colleague,    Thank you for referring your patient, Heather Guillory, to the [facility]. Please see a copy of my visit note below.    FOLLOW UP GI CLINIC VISIT    CC/REFERRING MD:  Ganga Moy  REASON FOR CONSULTATION:   Ganga Moy for   Chief Complaint   Patient presents with     Follow Up     Follow up after upper endoscopy (09/07/2023).  Last seen on 07/05/2023 for LUQ abdominal pain and diarrhea.       ASSESSMENT/PLAN: Patient is here for follow-up of intermittent left upper quadrant pain with some associated nausea.  Overall, she is feeling better, and it sounds like if she is doing smaller meals, she is having less symptoms.  We reviewed her testing, recent EGD and CT scan over the summer did not show any concerning findings.  Her colon was clear as well.  We did spend some time discussing abdominal wall pain.  She has a negative Carnots sign today, which is reassuring.  We did review that previous abdominal surgery can cause a person to have adhesions, and perhaps this could result in some small bowel obstruction.  There is no evidence of this on CT from over the summer, but if he does get symptomatic again, perhaps MR enterography would be more useful.  Otherwise, I think she can continue to monitor symptoms and follow-up with me as needed going forward.      RTC PRN    Thank you for this consultation.  It was a pleasure to participate in the care of this patient; please contact us with any further questions.      27 minutes spent on the date of the encounter doing chart review, patient visit, and documentation    This note was created with voice recognition software, and while reviewed for accuracy, typos may remain.     Ganga Moy PA-C  Division of Gastroenterology, Hepatology and Nutrition  [facility]  Heather Guillory is a 43  year old female with a past medical history significant for undifferentiated connective tissue disease and anxiety that is seen for follow up in the GI clinic today for follow-up.  Last visit with me was just about 3 months ago.  At the time, she was experiencing some recurrent epigastric and left upper quadrant abdominal pain as well is nausea.  There had been intermittent bouts of diarrhea as well.  Her previous testing had included EGD and colonoscopy in December 2022.  EGD was notable for some reactive gastropathy with erosion and a normal-appearing colonoscopy.  She has been started on PPI twice daily as well as sucralfate, which did provide some temporary relief in symptoms, but is pain slowly returned, she saw me for further evaluation.  I had recommended updating EGD to see if gastric erosions had resolved.  I also ended up sending her for hepatobiliary scan to rule out biliary dyskinesia.  This was normal and repeat EGD showed that the erosions had resolved.    Patient follows up today stating that for the most part, she is feeling pretty well.  There have been a couple of little episodes of pain but for the most part she is doing a lot better.  She did speak with her father, who is a physician, and he wondered if there might be some scar tissue contributing to pain, as she has history of laparoscopic port placement in the left upper quadrant.  In general, she has found that if she eats smaller meals and does not fully follow up her stomach, things are better.    ROS:    10 point ROS neg other than the symptoms noted above in the HPI.    PREVIOUS ENDOSCOPY:  Reviewed, see HPI    PERTINENT RELEVANT IMAGING OR LABS:  Reviewed    ALLERGIES:     Allergies   Allergen Reactions     Cefadroxil Unknown and Hives     Sulfa Antibiotics Rash and Hives     Benzoyl Peroxide Swelling     Latex Rash     Miconazole Swelling       PERTINENT MEDICATIONS:    Current Outpatient Medications:      Bacillus Coagulans-Inulin  (ALIGN PREBIOTIC-PROBIOTIC PO), , Disp: , Rfl:      Iron Combinations (IRON COMPLEX PO), , Disp: , Rfl:      Multiple Vitamins-Minerals (WOMENS MULTIVITAMIN PO), Take 1 Piece of gum by mouth daily, Disp: , Rfl:      Vitamin D3 (VITAMIN D, CHOLECALCIFEROL,) 25 mcg (1000 units) tablet, Take by mouth daily, Disp: , Rfl:     PROBLEM LIST  Patient Active Problem List    Diagnosis Date Noted     Other specified anxiety disorders 06/21/2022     Priority: Medium     Other specified eating disorder 06/21/2022     Priority: Medium     Abnormal LFTs 02/13/2022     Priority: Medium     Pelvic floor dysfunction 04/15/2021     Priority: Medium     Urinary urgency 04/15/2021     Priority: Medium     Urinary frequency 04/15/2021     Priority: Medium     Encounter for sterilization 12/30/2020     Priority: Medium     Added automatically from request for surgery [id number]       Varicose veins of left lower extremity with pain 10/29/2019     Priority: Medium     JAVIER (generalized anxiety disorder)      Priority: Medium     has a Rx for Zoloft; not using currently       Undifferentiated connective tissue disease (H24)      Priority: Medium     Rosacea      Priority: Medium       PERTINENT PAST MEDICAL HISTORY:  Past Medical History:   Diagnosis Date     JAVIER (generalized anxiety disorder)     50mg zoloft     Rosacea      Undifferentiated connective tissue disease (H24)        PREVIOUS SURGERIES:  Past Surgical History:   Procedure Laterality Date     COLONOSCOPY N/A 10/21/2020    Procedure: COLONOSCOPY;  Surgeon: Yang Pete MD;  Location: [facility] OR     COLONOSCOPY N/A 12/29/2022    Procedure: COLONOSCOPY, WITH BIOPSY;  Surgeon: Magda Lobo MD;  Location: [facility] OR     DAVINCI MYOMECTOMY N/A 2/11/2022    Procedure: MYOMECTOMY, UTERUS, ROBOT-ASSISTED, LAPAROSCOPIC;  Surgeon: Cate Mansfield MD;  [facility] OR     ESOPHAGOSCOPY, GASTROSCOPY, DUODENOSCOPY (EGD), COMBINED N/A 12/29/2022    Procedure:  ESOPHAGOGASTRODUODENOSCOPY, WITH BIOPSY;  Surgeon: Magda Lobo MD;  Location: [facility] OR     ESOPHAGOSCOPY, GASTROSCOPY, DUODENOSCOPY (EGD), COMBINED N/A 9/7/2023    Procedure: Esophagoscopy, gastroscopy, duodenoscopy (EGD), combined WITH BIOPSY;  Surgeon: Jackie Aldana MD;  Location: [facility] OR     IR ENDOVENOUS ABLATION VARICOSE VEINS  10/28/2019     IR FOLLOW UP VISIT OUTPATIENT  11/20/2019     IR STAB PHLEBECTOMY 10 - 20 STABS  10/28/2019     IR VEIN SCLEROSING MULTIPLE  10/28/2019     LAPAROSCOPIC SALPINGECTOMY Bilateral 4/23/2021    Procedure: SALPINGECTOMY, LAPAROSCOPIC BILATERAL;  Surgeon: Cate Mansfield MD;  [facility]: [facility] OR     OPERATIVE HYSTEROSCOPY WITH MORCELLATOR  4/8/2014    Procedure: OPERATIVE HYSTEROSCOPY WITH MORCELLATOR;  Hysteroscopic Polypectomy;  Surgeon: Cate Mansfield MD;  [facility]: [facility] OR     REMOVE INTRAUTERINE DEVICE N/A 4/23/2021    Procedure: removal of intrauterine device;  Surgeon: Cate Mansfield MD;  Location: [facility] OR       SOCIAL HISTORY:  Social History     Socioeconomic History     Marital status:      Spouse name: Chi     Number of children: 2     Years of education: Not on file     Highest education level: Not on file   Occupational History     Occupation:      Employer: [employer]     Comment: laid off 7/2020   Tobacco Use     Smoking status: Never     Passive exposure: Never     Smokeless tobacco: Never   Vaping Use     Vaping Use: Never used   Substance and Sexual Activity     Alcohol use: No     Alcohol/week: 0.0 standard drinks of alcohol     Drug use: No     Sexual activity: Yes     Partners: Male     Birth control/protection: I.U.D.     Comment: Mirena placed in 2018   Other Topics Concern      Service No     Blood Transfusions No     Caffeine Concern No     Occupational Exposure No     Hobby Hazards No     Sleep Concern No     Stress Concern No     Weight Concern No     Special Diet No     Back Care No      Exercise No     Bike Helmet Yes     Comment: n/a     Seat Belt Yes     Self-Exams No     Parent/sibling w/ CABG, MI or angioplasty before 65F 55M? No   Social History Narrative    .    3 boys (ages 6, 5 and 3 as of Jan 2022)    Exercises when able.         How much exercise per week? 3-4 x's     How much calcium per day? In foods and PNV       How much caffeine per day? 1 soda occasional    How much vitamin D per day? PNV    Do you/your family wear seatbelts?  Yes    Do you/your family use safety helmets? Yes    Do you/your family use sunscreen? Yes    Do you/your family keep firearms in the home? No    Do you/your family have a smoke detector(s)? Yes        July 16, 2020 Mague Chase LPN         Social Determinants of Health     Financial Resource Strain: Not on file   Food Insecurity: Not on file   Transportation Needs: Not on file   Physical Activity: Not on file   Stress: Not on file   Social Connections: Not on file   Interpersonal Safety: Not on file   Housing Stability: Not on file       FAMILY HISTORY:  Family History   Problem Relation Age of Onset     Elijah Disease Sister      Arrhythmia Brother         details unknown; procedure in his 20s     Anxiety Disorder Brother      Alzheimer Disease Maternal Grandmother      Cervical Cancer Maternal Grandmother      Colon Cancer Maternal Grandfather      Breast Cancer Paternal Grandmother      Hypertension Paternal Grandmother      Hypertension Paternal Grandfather      Lung Cancer Paternal Grandfather         smoker     Diabetes Type 2  Paternal Grandfather      Myocardial Infarction Paternal Grandfather      Myocardial Infarction Paternal Aunt 45     Cerebrovascular Disease No family hx of      Coronary Artery Disease Early Onset No family hx of      Ovarian Cancer No family hx of        Past/family/social history reviewed and no changes    PHYSICAL EXAMINATION:  Constitutional: aaox3, cooperative, pleasant, not dyspneic/diaphoretic, no acute  "distress  Vitals reviewed: /67 (BP Location: Left arm, Patient Position: Sitting, Cuff Size: Adult Regular)   Pulse 78   Ht 1.702 m (5' 7\")   Wt 72.6 kg (160 lb)   SpO2 100%   BMI 25.06 kg/m    Wt:   Wt Readings from Last 2 Encounters:   10/03/23 72.6 kg (160 lb)   08/22/23 70.3 kg (155 lb)      Eyes: Sclera anicteric/injected  CV: No edema  Respiratory: Unlabored breathing  Skin: warm, perfused, no jaundice  Psych: Normal affect  MSK: Normal gait                    Again, thank you for allowing me to participate in the care of your patient.        Sincerely,        Ganga Moy PA-C  "

## 2023-10-03 NOTE — NURSING NOTE
"Chief Complaint   Patient presents with    Follow Up     Follow up after upper endoscopy (09/07/2023).  Last seen on 07/05/2023 for LUQ abdominal pain and diarrhea.     She requests these members of her care team be copied on today's visit information:  [place]: [place] [id number]    Vitals:    10/03/23 1227   BP: 103/67   BP Location: Left arm   Patient Position: Sitting   Cuff Size: Adult Regular   Pulse: 78   SpO2: 100%   Weight: 72.6 kg (160 lb)   Height: 1.702 m (5' 7\")     Body mass index is 25.06 kg/m .    Medications were reconciled.        Bertha Saini CMA    "

## 2023-10-04 NOTE — PROGRESS NOTES
FOLLOW UP GI CLINIC VISIT    CC/REFERRING MD:  Ganga Moy  REASON FOR CONSULTATION:   Ganga Moy for   Chief Complaint   Patient presents with    Follow Up     Follow up after upper endoscopy (09/07/2023).  Last seen on 07/05/2023 for LUQ abdominal pain and diarrhea.       ASSESSMENT/PLAN: Patient is here for follow-up of intermittent left upper quadrant pain with some associated nausea.  Overall, she is feeling better, and it sounds like if she is doing smaller meals, she is having less symptoms.  We reviewed her testing, recent EGD and CT scan over the summer did not show any concerning findings.  Her colon was clear as well.  We did spend some time discussing abdominal wall pain.  She has a negative Carnots sign today, which is reassuring.  We did review that previous abdominal surgery can cause a person to have adhesions, and perhaps this could result in some small bowel obstruction.  There is no evidence of this on CT from over the summer, but if he does get symptomatic again, perhaps MR enterography would be more useful.  Otherwise, I think she can continue to monitor symptoms and follow-up with me as needed going forward.      RTC PRN    Thank you for this consultation.  It was a pleasure to participate in the care of this patient; please contact us with any further questions.      27 minutes spent on the date of the encounter doing chart review, patient visit, and documentation    This note was created with voice recognition software, and while reviewed for accuracy, typos may remain.     Ganga Moy PA-C  Division of Gastroenterology, Hepatology and Nutrition  [facility]  Heather Guillory is a 43 year old female with a past medical history significant for undifferentiated connective tissue disease and anxiety that is seen for follow up in the GI clinic today for follow-up.  Last visit with me was just about 3 months ago.  At the time, she was  experiencing some recurrent epigastric and left upper quadrant abdominal pain as well is nausea.  There had been intermittent bouts of diarrhea as well.  Her previous testing had included EGD and colonoscopy in December 2022.  EGD was notable for some reactive gastropathy with erosion and a normal-appearing colonoscopy.  She has been started on PPI twice daily as well as sucralfate, which did provide some temporary relief in symptoms, but is pain slowly returned, she saw me for further evaluation.  I had recommended updating EGD to see if gastric erosions had resolved.  I also ended up sending her for hepatobiliary scan to rule out biliary dyskinesia.  This was normal and repeat EGD showed that the erosions had resolved.    Patient follows up today stating that for the most part, she is feeling pretty well.  There have been a couple of little episodes of pain but for the most part she is doing a lot better.  She did speak with her father, who is a physician, and he wondered if there might be some scar tissue contributing to pain, as she has history of laparoscopic port placement in the left upper quadrant.  In general, she has found that if she eats smaller meals and does not fully follow up her stomach, things are better.    ROS:    10 point ROS neg other than the symptoms noted above in the HPI.    PREVIOUS ENDOSCOPY:  Reviewed, see HPI    PERTINENT RELEVANT IMAGING OR LABS:  Reviewed    ALLERGIES:     Allergies   Allergen Reactions    Cefadroxil Unknown and Hives    Sulfa Antibiotics Rash and Hives    Benzoyl Peroxide Swelling    Latex Rash    Miconazole Swelling       PERTINENT MEDICATIONS:    Current Outpatient Medications:     Bacillus Coagulans-Inulin (ALIGN PREBIOTIC-PROBIOTIC PO), , Disp: , Rfl:     Iron Combinations (IRON COMPLEX PO), , Disp: , Rfl:     Multiple Vitamins-Minerals (WOMENS MULTIVITAMIN PO), Take 1 Piece of gum by mouth daily, Disp: , Rfl:     Vitamin D3 (VITAMIN D, CHOLECALCIFEROL,) 25 mcg  (1000 units) tablet, Take by mouth daily, Disp: , Rfl:     PROBLEM LIST  Patient Active Problem List    Diagnosis Date Noted    Other specified anxiety disorders 06/21/2022     Priority: Medium    Other specified eating disorder 06/21/2022     Priority: Medium    Abnormal LFTs 02/13/2022     Priority: Medium    Pelvic floor dysfunction 04/15/2021     Priority: Medium    Urinary urgency 04/15/2021     Priority: Medium    Urinary frequency 04/15/2021     Priority: Medium    Encounter for sterilization 12/30/2020     Priority: Medium     Added automatically from request for surgery [id number]      Varicose veins of left lower extremity with pain 10/29/2019     Priority: Medium    JAVIER (generalized anxiety disorder)      Priority: Medium     has a Rx for Zoloft; not using currently      Undifferentiated connective tissue disease (H24)      Priority: Medium    Rosacea      Priority: Medium       PERTINENT PAST MEDICAL HISTORY:  Past Medical History:   Diagnosis Date    JAVIER (generalized anxiety disorder)     50mg zoloft    Rosacea     Undifferentiated connective tissue disease (H24)        PREVIOUS SURGERIES:  Past Surgical History:   Procedure Laterality Date    COLONOSCOPY N/A 10/21/2020    Procedure: COLONOSCOPY;  Surgeon: Yang Pete MD;  Location: [facility] OR    COLONOSCOPY N/A 12/29/2022    Procedure: COLONOSCOPY, WITH BIOPSY;  Surgeon: Magda Lobo MD;  Location: [facility] OR    DAVINCI MYOMECTOMY N/A 2/11/2022    Procedure: MYOMECTOMY, UTERUS, ROBOT-ASSISTED, LAPAROSCOPIC;  Surgeon: Cate Mansfield MD;  [facility] OR    ESOPHAGOSCOPY, GASTROSCOPY, DUODENOSCOPY (EGD), COMBINED N/A 12/29/2022    Procedure: ESOPHAGOGASTRODUODENOSCOPY, WITH BIOPSY;  Surgeon: Magda Lobo MD;  Location: [facility] OR    ESOPHAGOSCOPY, GASTROSCOPY, DUODENOSCOPY (EGD), COMBINED N/A 9/7/2023    Procedure: Esophagoscopy, gastroscopy, duodenoscopy (EGD), combined WITH BIOPSY;  Surgeon: Jackie Aldana MD;  Location: [facility] OR    IR  ENDOVENOUS ABLATION VARICOSE VEINS  10/28/2019    IR FOLLOW UP VISIT OUTPATIENT  11/20/2019    IR STAB PHLEBECTOMY 10 - 20 STABS  10/28/2019    IR VEIN SCLEROSING MULTIPLE  10/28/2019    LAPAROSCOPIC SALPINGECTOMY Bilateral 4/23/2021    Procedure: SALPINGECTOMY, LAPAROSCOPIC BILATERAL;  Surgeon: Cate Mansfield MD;  [facility] OR    OPERATIVE HYSTEROSCOPY WITH MORCELLATOR  4/8/2014    Procedure: OPERATIVE HYSTEROSCOPY WITH MORCELLATOR;  Hysteroscopic Polypectomy;  Surgeon: Cate Mansfield MD;  [facility] OR    REMOVE INTRAUTERINE DEVICE N/A 4/23/2021    Procedure: removal of intrauterine device;  Surgeon: Cate Mansfield MD;  Location[facility] OR       SOCIAL HISTORY:  Social History     Socioeconomic History    Marital status:      Spouse name: Chi    Number of children: 2    Years of education: Not on file    Highest education level: Not on file   Occupational History    Occupation:      Employer: [employer]     Comment: laid off 7/2020   Tobacco Use    Smoking status: Never     Passive exposure: Never    Smokeless tobacco: Never   Vaping Use    Vaping Use: Never used   Substance and Sexual Activity    Alcohol use: No     Alcohol/week: 0.0 standard drinks of alcohol    Drug use: No    Sexual activity: Yes     Partners: Male     Birth control/protection: I.U.D.     Comment: Mirena placed in 2018   Other Topics Concern     Service No    Blood Transfusions No    Caffeine Concern No    Occupational Exposure No    Hobby Hazards No    Sleep Concern No    Stress Concern No    Weight Concern No    Special Diet No    Back Care No    Exercise No    Bike Helmet Yes     Comment: n/a    Seat Belt Yes    Self-Exams No    Parent/sibling w/ CABG, MI or angioplasty before 65F 55M? No   Social History Narrative    .    3 boys (ages 6, 5 and 3 as of Jan 2022)    Exercises when able.         How much exercise per week? 3-4 x's     How much calcium per day? In foods  "and PNV       How much caffeine per day? 1 soda occasional    How much vitamin D per day? PNV    Do you/your family wear seatbelts?  Yes    Do you/your family use safety helmets? Yes    Do you/your family use sunscreen? Yes    Do you/your family keep firearms in the home? No    Do you/your family have a smoke detector(s)? Yes        July 16, 2020 Mague Chase LPN         Social Determinants of Health     Financial Resource Strain: Not on file   Food Insecurity: Not on file   Transportation Needs: Not on file   Physical Activity: Not on file   Stress: Not on file   Social Connections: Not on file   Interpersonal Safety: Not on file   Housing Stability: Not on file       FAMILY HISTORY:  Family History   Problem Relation Age of Onset    Elijah Disease Sister     Arrhythmia Brother         details unknown; procedure in his 20s    Anxiety Disorder Brother     Alzheimer Disease Maternal Grandmother     Cervical Cancer Maternal Grandmother     Colon Cancer Maternal Grandfather     Breast Cancer Paternal Grandmother     Hypertension Paternal Grandmother     Hypertension Paternal Grandfather     Lung Cancer Paternal Grandfather         smoker    Diabetes Type 2  Paternal Grandfather     Myocardial Infarction Paternal Grandfather     Myocardial Infarction Paternal Aunt 45    Cerebrovascular Disease No family hx of     Coronary Artery Disease Early Onset No family hx of     Ovarian Cancer No family hx of        Past/family/social history reviewed and no changes    PHYSICAL EXAMINATION:  Constitutional: aaox3, cooperative, pleasant, not dyspneic/diaphoretic, no acute distress  Vitals reviewed: /67 (BP Location: Left arm, Patient Position: Sitting, Cuff Size: Adult Regular)   Pulse 78   Ht 1.702 m (5' 7\")   Wt 72.6 kg (160 lb)   SpO2 100%   BMI 25.06 kg/m    Wt:   Wt Readings from Last 2 Encounters:   10/03/23 72.6 kg (160 lb)   08/22/23 70.3 kg (155 lb)      Eyes: Sclera anicteric/injected  CV: No " edema  Respiratory: Unlabored breathing  Skin: warm, perfused, no jaundice  Psych: Normal affect  MSK: Normal gait

## 2023-11-01 ENCOUNTER — TRANSFERRED RECORDS (OUTPATIENT)
Dept: MULTI SPECIALTY CLINIC | Facility: CLINIC | Age: 43
End: 2023-11-01

## 2023-11-01 LAB — RETINOPATHY: NORMAL

## 2023-11-08 ENCOUNTER — VIRTUAL VISIT (OUTPATIENT)
Dept: ONCOLOGY | Facility: CLINIC | Age: 43
End: 2023-11-08
Attending: NURSE PRACTITIONER
Payer: COMMERCIAL

## 2023-11-08 DIAGNOSIS — Z80.0 FAMILY HISTORY OF COLON CANCER: ICD-10-CM

## 2023-11-08 DIAGNOSIS — Z80.3 FAMILY HISTORY OF MALIGNANT NEOPLASM OF BREAST: Primary | ICD-10-CM

## 2023-11-08 DIAGNOSIS — Z80.8 FAMILY HISTORY OF MELANOMA: ICD-10-CM

## 2023-11-08 PROCEDURE — 96040 HC GENETIC COUNSELING, EACH 30 MINUTES: CPT | Mod: GT,95 | Performed by: GENETIC COUNSELOR, MS

## 2023-11-08 NOTE — PROGRESS NOTES
Virtual Visit Details    Type of service:  Video Visit     Originating Location (pt. Location): Home  Distant Location (provider location):  Off-site  Platform used for Video Visit: Lorenzo  Time spent over video: 60 minutes    11/8/2023    Referring Provider: Self (Mammogram letter)    Presenting Information:   I spoke with Heather Guillory over video today for genetic counseling to discuss her family history of cancer. This appointment was conducted virtually due to COVID-19 precautions. We talked today to review this history, cancer screening recommendations, and available genetic testing options.    Personal History:  Heather is a 43 year old female. She reports a personal history of either basal or squamous cell carcinoma in the past. She has had multiple skin biopsies and sees dermatology regularly.     She had her first menstrual period at age 13 or 14, her first child at age 34, and is premenopausal. Heather has her ovaries and uterus in place. She had a laparoscopic bilateral salpingectomy on 4/23/21. She had a myomectomy in 2022. She reports that she has not used hormone replacement therapy. She has used oral contraceptives, an IUD, and did IVF for two of her three children. She has clinical breast exams and mammograms; her most recent mammogram on 6/2/23 was negative. Her most recent colonoscopy on 12/29/22 detected no polyps. A previous colonoscopy on 10/21/20 also detected no polyps. Heather reported no history of tobacco use and no alcohol use.    Family History: (Please see scanned pedigree for detailed family history information)  Siblings:    Her sister is 43 years old and was diagnosed with a melanoma (stage 0) on her face in her late 30s. This was treated with surgical removal only. She also has a diagnosis of Elijah's disease (Heather has had testing for this in the past).   Maternal:    Her mother is 71 years old with no known history of cancer or colon polyps.    Her mother is an only child.      Her grandmother was diagnosed with cervical cancer in her 40s or 50s. She passed away at 82.    Her grandfather was diagnosed with colon cancer in his mid-late 70s and passed away at age 78. He also had a history of multiple skin cancers (not melanomas).   Paternal:    Her father is 72 years old with no known history of cancer.     Her aunt is 75 years old. She had her gallbladder and appendix removed in her early 60s and was found to have cancer within either her appendix or gallbladder on the surgical pathology. This did not require any further treatment.     Her grandmother was diagnosed with breast cancer at age 82 or 83 that was metastatic. She passed away at age 85.     Her grandfather was diagnosed with lung cancer in his 70s. He may have had a history of smoking when younger.     She is not aware of any genetic testing for hereditary cancer in any of her family members.     Her maternal ethnicity is [place], [place], [place] sor[place]. Her paternal ethnicity is Polish, Ashkenazi Bahai.     Discussion:    Heather's family history of cancer is suggestive of a hereditary cancer syndrome.    We reviewed the features of sporadic, familial, and hereditary cancers. In looking at Heather's family history, it is possible that a cancer susceptibility gene is present due to her paternal family history of breast cancer and Ashkenazi Bahai ancestry.     We discussed the natural history and genetics of hereditary cancer. Based on her family history, we discussed the BRCA1 and BRCA2 genes. Mutations in these genes cause a condition known as Hereditary Breast and Ovarian Cancer syndrome (HBOC). Women with a mutation in either of these genes are at increased risk for breast and ovarian cancer. There is also an increased risk for a second primary breast cancer. Men with a mutation in either of these genes are at increased risk for breast and prostate cancer. Both women and men may also be at increased risk for pancreatic cancer and  melanoma.     We discussed that there are three mutations in the BRCA genes that are more common in the Ashkenazi Religious population. About 1 in 40 individuals of Ashkenazi Religious background have one of these three mutations, which account for about 90% of the BRCA mutations in this population.    A detailed handout regarding these genes and other genes in which mutations are associated with an increased risk for breast cancer will be provided to Heather via [facility] and can be found in the after visit summary. Topics included: inheritance pattern, cancer risks, cancer screening recommendations, and also risks, benefits and limitations of testing.     Based on her personal and family history, Heather meets current National Comprehensive Cancer Network (NCCN) criteria for genetic testing of high-penetrance breast cancer susceptibility genes, specifically, BRCA1, BRCA2, CDH1, PALB2, PTEN, and TP53.       We discussed that there are additional genes that could cause increased risk for breast, melanoma, colon, and other cancers. As many of these genes present with overlapping features in a family and accurate cancer risk cannot always be established based upon the pedigree analysis alone, it would be reasonable for Heather to consider panel genetic testing to analyze multiple genes at once.  We reviewed genetic testing options for Heather based on her personal and family history: a panel of genes associated with an increased risk for certain cancers, or larger panel options to include genes associated with increased risk for multiple different cancer types. Heather expressed an interest in learning as much information as possible from the testing. We discussed expanded panel options including the Common Hereditary Cancers panel and the Multi-Cancer panel. She opted for a Custom Panel (a combination of the Multi-Cancer Panel + the Hereditary Skin Cancer Panel, including Preliminary-evidence Genes for Skin  Cancer).  The [facility] Multi-Cancer Panel analyzes 70 genes associated with an increased risk for cancer: AIP, ALK, APC, MARY, AXIN2, BAP1, BARD1, BLM, BMPR1A, BRCA1, BRCA2, BRIP1, CDC73, CDH1, CDK4, CDKN1B, CDKN2A, CHEK2, CTNNA1, DICER1, EGFR, EPCAM, FH, FLCN, GREM1, HOXB13, KIT, LZTR1, MAX, MBD4, MEN1, MET, MITF, MLH1, MSH2, MSH3, MSH6, MUTYH, NF1, NF2, NTHL1, PALB2, PDGFRA, PMS2, POLD1, POLE, POT1, INYLT0L, PTCH1, PTEN, RAD51C, RAD51D, RB1, RET, SDHA, SDHAF2, SDHB, SDHC, SDHD, SMAD4, SMARCA4, SMARCB1, SMARCE1, STK11, SUFU, [id number], TP53, TSC1, TSC2, VHL.  This panel includes genes associated with hereditary cancers across multiple major organ systems, including: breast, gynecologic (ovarian, uterine/endometrial), gastrointestinal (colorectal, gastric, pancreatic), endocrine (thyroid, parathyroid, pituitary, adrenal glands), genitourinary (renal/urinary tract, prostate), skin (melanoma, basal cell carcinoma), and brain/nervous system.  Individuals who are found to carry a pathogenic variant in one of these genes have an increased risk of developing certain cancers/tumors. Identifying those at elevated risk may guide implementation of additional screening, surveillance and interventions.    We discussed that many genes on this panel are associated with specific hereditary cancer syndromes and have published management guidelines. Other genes have medical management guidelines available to screen for certain cancers. The remaining genes are associated with increased cancer risk and may allow us to make medical recommendations when mutations are identified. Some of the genes on this expanded panel may have limited information available about specific cancer risks and therefore, there may be limited screening guidelines available. She stated that she understood potential limitations of a larger gene panel.     Due to COVID-19 precautions consent was obtained over the phone/video today. Genetic testing via a Custom Panel  (a combination of the Multi-Cancer Panel + the Hereditary Skin Cancer Panel, including Preliminary-evidence Genes for Skin Cancer) will be sent to [facility]. Heather opted to schedule a blood draw for testing. Turnaround time from date when sample is received at the lab: approximately 3-4 weeks.    Medical Management: For Heather, we reviewed that the information from genetic testing may determine:    additional cancer screening for which Heather may qualify (i.e. mammogram and breast MRI, more frequent colonoscopies, more frequent dermatologic exams, etc.),    options for risk reducing surgeries Heather could consider (i.e. bilateral mastectomy, surgery to remove her ovaries and/or uterus, etc.),      and targeted chemotherapies if she were to develop certain cancers in the future (i.e. immunotherapy for individuals with Mclaughlin syndrome, PARP inhibitors, etc.).     These recommendations will be discussed in detail once genetic testing is completed.     Plan:  1) Today Heather elected to proceed with genetic testing via a Custom Panel (a combination of the Multi-Cancer Panel + the Hereditary Skin Cancer Panel, including Preliminary-evidence Genes for Skin Cancer) offered by [employer].  2) This information should be available in 4-5 weeks.  3) Heather will be scheduled for a virtual visit (phone or video) to discuss the results.    Tiana Velasquez MS, [facility]  Licensed, Certified Genetic Counselor  Office: [phone]  Email: [email or web address]

## 2023-11-08 NOTE — PATIENT INSTRUCTIONS
Assessing Cancer Risk  Cancer is a common diagnosis which impacts many families.  Individuals may develop cancer due to environmental factors (such as exposures and lifestyle), aging, genetic predisposition, or a combination of these factors.      Only about 5-10% of cancers are thought to be due to an inherited cancer susceptibility gene.    These families often have:  Several people with the same or related types of cancer  Cancers diagnosed at a young age (before age 50)  Individuals with more than one primary cancer  Multiple generations of the family affected with cancer    Comprehensive Breast and Gynecologic Cancer Panel  We each inherit two copies of every gene in our bodies: one from our mother, and one from our father. Each gene has a specific job to do.  When a gene has a mistake or  mutation  in it, it does not work like it should.     Some people may be candidates for genetic testing of more than one gene.  For these families, genetic testing using a cancer panel may be offered. These panels will test different genes at once known to increase the risk for breast, ovarian, uterine, and/or other cancers.    This handout will review common hereditary breast and gynecologic cancer syndromes. The genes that will be discussed in this handout are: MARY, BRCA1, BRCA2, BRIP1, CDH1, CHEK2, MLH1, MSH2, MSH6, PMS2, EPCAM, PTEN, PALB2, RAD51C, RAD51D, and TP53.    The purpose of this handout is to serve as a brief summary of the breast and gynecologic cancer risk genes that have published clinical management guidelines for individuals who are found to carry a mutation. Inheriting a mutation does not mean a person will develop cancer, but it does significantly increase their risk above the general population risk.     ______________________________________________________________________________    Hereditary Breast and Ovarian Cancer Syndrome (BRCA1 and BRCA2)  A single mutation in one of the copies of BRCA1 or  BRCA2 increases the risk for breast and ovarian cancer, among others.  The risk for pancreatic cancer and melanoma may also be slightly increased in some families.  The chart below shows the chance that someone with a BRCA mutation would develop cancer in his or her lifetime1,2,3,4.       Lifetime Cancer Risks    General Population BRCA1  BRCA2   Breast  12% >60% >60%   Ovarian  1-2% 39-58% 13-29%   Prostate 12% 7-26% 19-61%   Male Breast 0.1% 0.2-1.2% 1.8-7.1%   Pancreas 1-2% Up to 5% 5-10%     A person s ethnic background is also important to consider, as individuals of Ashkenazi [place] ancestry have a higher chance of having a BRCA gene mutation.  There are three BRCA mutations that occur more frequently in this population.      Mclaughlin Syndrome (MLH1, MSH2, MSH6, PMS2, and EPCAM)  Currently five genes are known to cause Mclaughlin Syndrome: MLH1, MSH2, MSH6, PMS2, and EPCAM.  A single mutation in one of the Mclaughlin Syndrome genes increases the risk for colon, endometrial, ovarian, and stomach cancers.  Other cancers that occur less commonly in Mclaughlin Syndrome include urinary tract, skin, and brain cancers.  The chart below shows the chance that a person with Mclaughlin syndrome would develop cancer in his or her lifetime5.      Lifetime Cancer Risks    General Population Mclaughlin Syndrome   Colon 5% 10-61%   Endometrial 3% 13-57%   Ovarian 1-2% 1-38%   Stomach <1% 1-9%   *Cancer risk varies depending on Mclaughlin syndrome gene found      Cowden Syndrome (PTEN)  Cowden syndrome is a hereditary condition that increases the risk for breast, thyroid, endometrial, colon, and kidney cancer.  Cowden syndrome is caused by a mutation in the PTEN gene.  A single mutation in one of the copies of PTEN causes Cowden syndrome and increases cancer risk.  The chart below shows the chance that someone with a PTEN mutation would develop cancer in their lifetime6,7.  Other benign features seen in some individuals with Cowden syndrome include benign  skin lesions (facial papules, keratoses, lipomas), learning disability, autism, thyroid nodules, colon polyps, and larger head size.     Lifetime Cancer Risks    General Population Cowden   Breast 12% 40-60%*   Thyroid 1% Up to 38%   Renal 1-2% Up to 35%   Endometrial 3% Up to 28%   Colon 5% Up to 9%   Melanoma 2-3% Up to 6%   *Emerging data suggests the risk for breast cancer could be greater than 60%               Li-Fraumeni Syndrome (TP53)  Li-Fraumeni Syndrome (LFS) is a cancer predisposition syndrome caused by a mutation in the TP53 gene. A single mutation in one of the copies of TP53 increases the risk for multiple cancers. Individuals with LFS are at an increased risk for developing cancer at a young age. The lifetime risk for development of a LFS-associated cancer is 50% by age 30 and 90% by age 60.   Core Cancers: Sarcomas, Breast, Brain, Lung, Leukemias/Lymphomas, Adrenocortical carcinomas  Other Cancers: Gastrointestinal, Thyroid, Skin, Genitourinary       Hereditary Diffuse Gastric Cancer (CDH1)  Currently, one gene is known to cause hereditary diffuse gastric cancer (HDGC): CDH1.  Individuals with HDGC are at increased risk for diffuse gastric cancer and lobular breast cancer. Of people diagnosed with HDGC, 30-50% have a mutation in the CDH1 gene.  This suggests there are likely other genes that may cause HDGC that have not been identified yet.      Lifetime Cancer Risks    General Population HDGC   Diffuse Gastric  <1% ~80%   Breast 12% 41-60%       Additional Genes    MARY  MARY is a moderate-risk breast cancer gene. Women who have a mutation in MARY can have between a 2-4 fold increased risk for breast cancer compared to the general population8. MARY mutations have also been associated with increased risk for pancreatic cancer between 5-10%9. Individuals who inherit two MARY mutations have a condition called ataxia-telangiectasia (AT).  This rare autosomal recessive condition affects the nervous system  and immune system, and is associated with progressive cerebellar ataxia beginning in childhood. Individuals with ataxia-telangiectasia often have a weakened immune system and have an increased risk for childhood cancers.    PALB2  Mutations in PALB2 have been shown to increase the risk of breast cancer up to 41-60% in some families; where individuals fall within this risk range is dependent upon family [id number]. PALB2 mutations have also been associated with increased risk for pancreatic cancer between 5-10%.  Individuals who inherit two PALB2 mutations--one from their mother and one from their father--have a condition called Fanconi Anemia.  This rare autosomal recessive condition is associated with short stature, developmental delay, bone marrow failure, and increased risk for childhood cancers.    CHEK2   CHEK2 is a moderate-risk breast cancer gene.  Women who have a mutation in CHEK2 have around a 2-4 fold increased risk for breast cancer compared to the general population, and this risk may be higher depending upon family history.11,12,13 The risk of colon cancer may be twice as high as the general population risk of colon cancer of 5%. Mutations in CHEK2 have also been shown to increase the risk of other cancers, including prostate, however these cancer risks are currently not well understood.    BRIP1, RAD51C and RAD51D  Mutations in RAD51C and RAD51D have been shown to increase the risk of ovarian cancer and breast cancer 14,. Mutations in BRIP1 have been shown to increase the risk of ovarian cancer and possibly female breast cancer 15 .       Lifetime Cancer Risk    General Population        BRIP1   RAD51C  RAD51D   Breast 12% Not well defined 20-40% 20-40%   Ovarian 1-2% 5-15% 10-15% 10-20%     ______________________________________________________________  Inheritance  All of the cancer syndromes reviewed above are inherited in an autosomal dominant pattern.  This means that if a parent has a mutation,  each of their children will have a 50% chance of inheriting that same mutation. Therefore, each child --male or female-- would have a 50% chance of being at increased risk for developing cancer.    Image obtained from Genetics Home Reference, 2013     Mutations in some genes can occur de raymond, which means that a person s mutation occurred for the first time in them and was not inherited from a parent.  Now that they have the mutation, however, it can be passed on to future generations.    Genetic Testing  Genetic testing involves a blood test and will look for any harmful mutations that are associated with increased cancer risk.  If possible, it is recommended that the person(s) who has had cancer be tested before other family members.  That person will give us the most useful information about whether or not a specific gene is associated with the cancer in the family.    Results  There are three possible results of genetic testing:  Positive--a harmful mutation was identified in one or more of the genes  Negative--no mutations were identified in any of the genes tested  Variant of unknown significance--a variation in one of the genes was identified, but it is unclear how this impacts cancer risk in the family    Advantages and Disadvantages   There are advantages and disadvantages to genetic testing.    Advantages  May clarify your cancer risk  Can help you make medical decisions  May explain the cancers in your family  May give useful information to your family members (if you share your results)    Disadvantages  Possible negative emotional impact of learning about inherited cancer risk  Uncertainty in interpreting a negative test result in some situations  Possible genetic discrimination concerns (see below)    Genetic Information Nondiscrimination Act (DILMA)  The Genetic Information Nondiscrimination Act of 2008 (DILMA) is a federal law that protects individuals from health insurance or employment discrimination  based on a genetic test result alone (with some exceptions, including employers with fewer than 15 employees, and ).  Although rare, DILMA  does not cover discrimination protections in terms of life insurance, long term care, or disability insurances.  Visit the National Human Sunshine Heart Research [place] website to learn more.    Reducing Cancer Risk  All of the genes described in this handout have nationally recognized cancer screening guidelines that would be recommended for individuals who test positive.  In addition to increased cancer screening, surgeries may be offered or recommended to reduce cancer risk.  Recommendations are based upon an individual s genetic test result as well as their personal and family history of cancer.    Questions to Think About Regarding Genetic Testing:  What effect will the test result have on me and my relationship with my family members if I have an inherited gene mutation?  If I don t have a gene mutation?  Should I share my test results, and how will my family react to this news, which may also affect them?  Are my children ready to learn new information that may one day affect their own health?    Hereditary Cancer Resources    FORCE: Facing Our Risk of Cancer Empowered facingourrisk.org   Bright Pink bebrightpink.org   Li-Fraumeni Syndrome Association lfsassociation.org   PTEN World PTENworld.com   No stomach for cancer, Inc. nostomachforcancer.org   Stomach cancer relief network Scrnet.org   Collaborative Group of the Americas on Inherited Colorectal Cancer (CGA) cgaicc.com    Cancer Care cancercare.org   American Cancer Society (ACS) cancer.org   National Cancer [place] (NCI) cancer.gov     Please call us if you have any questions or concerns.   Cancer Risk Management Program [phone] ([phone])  Ever Gutierrez, [place] [facility] [place]  [phone]  Poppy Aguirre, MS, [facility] [place] [phone]  Daina Pabon, MS, [facility] [place]  [phone]  Arleth Rocha, MS, [facility] [place]  [phone]  Tiana Velasquez,  MS, [facility] [place]  [phone]  Jeanne Altamirano, MS, [facility] [place] [phone]  Vivian Perez, MS, [facility] [place] [phone]    References  Remington Maria PDP, Spencer S, Martin CHOWDHURY, Laila JE, Lucina JL, Luda N, Luis E H, Faviola O, Jassi A, Pasini B, Radidieudonne P, Mankim S, Alirio DM, Brandt N, Jef E, Daysi H, Alaniz E, Oscar J, Gronlizandro J, Faheem B, Tulinius H, Thorlacius S, Eerola H, Nevanlinna H, Jonh K, Janice OP. Average risks of breast and ovarian cancer associated with BRCA1 or BRCA2 mutations detected in case series unselected for family history: a combined analysis of 222 studies. Am J Hum Audelia. 2003;72:1117-30.  Tam N, Justyna M, Ashlee G.  BRCA1 and BRCA2 Hereditary Breast and Ovarian Cancer. Gene Reviews online. 2013.  Steve YC, Arielle S, Maria Fernanda G, Lopez S. Breast cancer risk among male BRCA1 and BRCA2 mutation carriers. J Natl Cancer Inst. 2007;99:1811-4.  Tunde SIMPSON, Sharif I, Agusto J, Rosendo E, Saida ER, Malachi F. Risk of breast cancer in male BRCA2 carriers. J Med Audelia. 2010;47:710-1.  National Comprehensive Cancer Network. Clinical practice guidelines in oncology, colorectal cancer screening. Available online (registration required). 2015.  Eric MH, Mario J, Fabienne J, Irwin VILLAFUERTE, Alia MS, Eng C. Lifetime cancer risks in individuals with germline PTEN mutations. Clin Cancer Res. 2012;18:400-7.  Mis R. Cowden Syndrome: A Critical Review of the Clinical Literature. J Audelia . 2009:18:13-27.  Kristen PADGETT, Darryl MUNIZ, Mary S, Jess P, Mellissa T, James M, Jesse B, Anoop H, Justin R, Diane K, Rob L, Tunde SIMPSON, Alirio MUNIZ, Bill DF, Arlen MR, The Breast Cancer Susceptibility Collaboration (UK) & Jailyn CONNER. MARY mutations that cause ataxia-telangiectasia are breast cancer susceptibility alleles. Nature Genetics. 2006;38:873-875  Nikko N , Alexus Y, Ashley J, Marisol L, Clifton JEONG , Fred ML, Levi S, Heidy AG, Chelle S, Sharlene ML, Marcos J , Jose Eduardo R, Erwin STAPLETON, Carlito  JR, Aleta VE, Jewel M, Volinstein B, Rudy N, Shiloh RH, Kasey KW, and Sae AP. MARY mutations in patients with hereditary pancreatic cancer. Cancer Discover. 2012;2:41-46  Carmen LU., et al. Breast-Cancer Risk in Families with Mutations in PALB2. NEJM. 2014; 371(6):497-506.  CHEK2 Breast Cancer Case-Control Consortium. CHEK2*1100delC and susceptibility to breast cancer: A collaborative analysis involving 10,860 breast cancer cases and 9,065 controls from 10 studies. Am J Hum [place]elia, 74 (2004), pp. 3835-3615  Cee T, Estefany S, Carmencita K, et al. Spectrum of Mutations in BRCA1, BRCA2, CHEK2, and TP53 in Families at High Risk of Breast Cancer. LIZ. 2006;295(12):5706-9608.   Charleen C, Deisy D, Lucian PADGETT, et al. Risk of breast cancer in women with a CHEK2 mutation with and without a family history of breast cancer. J Clin Oncol. 2011;29:6852-2296.  Song H, Ginos E, Ramus SJ, et al. Contribution of germline mutations in the RAD51B, RAD51C, and RAD51D genes to ovarian cancer in the population. J Clin Oncol. 2015;33(26):2404-6207. Doi:10.1200/JCO.2015.61.2408.  Jac T, Alexx DF, Fadia P, et al. Mutations in BRIP1 confer high risk of ovarian cancer. Chika Audelia. 2011;43(11):4415-8055. doi:10.1038/ng.955.

## 2023-11-08 NOTE — NURSING NOTE
Is the patient currently in the state of [place]? YES    Visit mode:VIDEO    If the visit is dropped, the patient can be reconnected by: VIDEO VISIT: Send to e-mail at: [email or web address]    Will anyone else be joining the visit? NO  (If patient encounters technical issues they should call [phone]    How would you like to obtain your AVS? MyChart    Are changes needed to the allergy or medication list? N/A    Reason for visit: Consult    Dennis CLARKE

## 2023-11-08 NOTE — LETTER
11/8/2023         RE: Heather Guillory  [address]  [place] [address]        Dear Colleague,    Thank you for referring your patient, Heather Guillory, to the [facility]. Please see a copy of my visit note below.    Virtual Visit Details    Type of service:  Video Visit     Originating Location (pt. Location): Home  Distant Location (provider location):  Off-site  Platform used for Video Visit: [facility]  Time spent over video: 60 minutes    11/8/2023    Referring Provider: Self (Mammogram letter)    Presenting Information:   I spoke with Heather Guillory over video today for genetic counseling to discuss her family history of cancer. This appointment was conducted virtually due to COVID-19 precautions. We talked today to review this history, cancer screening recommendations, and available genetic testing options.    Personal History:  Heather is a 43 year old female. She reports a personal history of either basal or squamous cell carcinoma in the past. She has had multiple skin biopsies and sees dermatology regularly.     She had her first menstrual period at age 13 or 14, her first child at age 34, and is premenopausal. Heather has her ovaries and uterus in place. She had a laparoscopic bilateral salpingectomy on 4/23/21. She had a myomectomy in 2022. She reports that she has not used hormone replacement therapy. She has used oral contraceptives, an IUD, and did IVF for two of her three children. She has clinical breast exams and mammograms; her most recent mammogram on 6/2/23 was negative. Her most recent colonoscopy on 12/29/22 detected no polyps. A previous colonoscopy on 10/21/20 also detected no polyps. Heather reported no history of tobacco use and no alcohol use.    Family History: (Please see scanned pedigree for detailed family history information)  Siblings:  Her sister is 43 years old and was diagnosed with a melanoma (stage 0) on her face in her late 30s. This  was treated with surgical removal only. She also has a diagnosis of Elijah's disease (Heather has had testing for this in the past).   Maternal:  Her mother is 71 years old with no known history of cancer or colon polyps.  Her mother is an only child.   Her grandmother was diagnosed with cervical cancer in her 40s or 50s. She passed away at 82.  Her grandfather was diagnosed with colon cancer in his mid-late 70s and passed away at age 78. He also had a history of multiple skin cancers (not melanomas).   Paternal:  Her father is 72 years old with no known history of cancer.   Her aunt is 75 years old. She had her gallbladder and appendix removed in her early 60s and was found to have cancer within either her appendix or gallbladder on the surgical pathology. This did not require any further treatment.   Her grandmother was diagnosed with breast cancer at age 82 or 83 that was metastatic. She passed away at age 85.   Her grandfather was diagnosed with lung cancer in his 70s. He may have had a history of smoking when younger.     She is not aware of any genetic testing for hereditary cancer in any of her family members.     Her maternal ethnicity is French, Divehi, [place]. Her paternal ethnicity is Polish, Ashkenazi Pentecostal.     Discussion:  Heather's family history of cancer is suggestive of a hereditary cancer syndrome.  We reviewed the features of sporadic, familial, and hereditary cancers. In looking at Heather's family history, it is possible that a cancer susceptibility gene is present due to her paternal family history of breast cancer and Ashkenazi Pentecostal ancestry.   We discussed the natural history and genetics of hereditary cancer. Based on her family history, we discussed the BRCA1 and BRCA2 genes. Mutations in these genes cause a condition known as Hereditary Breast and Ovarian Cancer syndrome (HBOC). Women with a mutation in either of these genes are at increased risk for breast and ovarian cancer. There  is also an increased risk for a second primary breast cancer. Men with a mutation in either of these genes are at increased risk for breast and prostate cancer. Both women and men may also be at increased risk for pancreatic cancer and melanoma.   We discussed that there are three mutations in the BRCA genes that are more common in the Ashkenazi Religious population. About 1 in 40 individuals of Ashkenazi Religious background have one of these three mutations, which account for about 90% of the BRCA mutations in this population.  A detailed handout regarding these genes and other genes in which mutations are associated with an increased risk for breast cancer will be provided to Heather via [employer] and can be found in the after visit summary. Topics included: inheritance pattern, cancer risks, cancer screening recommendations, and also risks, benefits and limitations of testing.   Based on her personal and family history, Heather meets current National Comprehensive Cancer Network (NCCN) criteria for genetic testing of high-penetrance breast cancer susceptibility genes, specifically, BRCA1, BRCA2, CDH1, PALB2, PTEN, and TP53.     We discussed that there are additional genes that could cause increased risk for breast, melanoma, colon, and other cancers. As many of these genes present with overlapping features in a family and accurate cancer risk cannot always be established based upon the pedigree analysis alone, it would be reasonable for Heather to consider panel genetic testing to analyze multiple genes at once.  We reviewed genetic testing options for Heather based on her personal and family history: a panel of genes associated with an increased risk for certain cancers, or larger panel options to include genes associated with increased risk for multiple different cancer types. Heather expressed an interest in learning as much information as possible from the testing. We discussed expanded panel options including  the Common Hereditary Cancers panel and the Multi-Cancer panel. She opted for a Custom Panel (a combination of the Multi-Cancer Panel + the Hereditary Skin Cancer Panel, including Preliminary-evidence Genes for Skin Cancer).  The [facility] Multi-Cancer Panel analyzes 70 genes associated with an increased risk for cancer: AIP, ALK, APC, MARY, AXIN2, BAP1, BARD1, BLM, BMPR1A, BRCA1, BRCA2, BRIP1, CDC73, CDH1, CDK4, CDKN1B, CDKN2A, CHEK2, CTNNA1, DICER1, EGFR, EPCAM, FH, FLCN, GREM1, HOXB13, KIT, LZTR1, MAX, MBD4, MEN1, MET, MITF, MLH1, MSH2, MSH3, MSH6, MUTYH, NF1, NF2, NTHL1, PALB2, PDGFRA, PMS2, POLD1, POLE, POT1, AXSGA5I, PTCH1, PTEN, RAD51C, RAD51D, RB1, RET, SDHA, SDHAF2, SDHB, SDHC, SDHD, SMAD4, SMARCA4, SMARCB1, SMARCE1, STK11, SUFU, LOXC976, TP53, TSC1, TSC2, VHL.  This panel includes genes associated with hereditary cancers across multiple major organ systems, including: breast, gynecologic (ovarian, uterine/endometrial), gastrointestinal (colorectal, gastric, pancreatic), endocrine (thyroid, parathyroid, pituitary, adrenal glands), genitourinary (renal/urinary tract, prostate), skin (melanoma, basal cell carcinoma), and brain/nervous system.  Individuals who are found to carry a pathogenic variant in one of these genes have an increased risk of developing certain cancers/tumors. Identifying those at elevated risk may guide implementation of additional screening, surveillance and interventions.    We discussed that many genes on this panel are associated with specific hereditary cancer syndromes and have published management guidelines. Other genes have medical management guidelines available to screen for certain cancers. The remaining genes are associated with increased cancer risk and may allow us to make medical recommendations when mutations are identified. Some of the genes on this expanded panel may have limited information available about specific cancer risks and therefore, there may be limited  screening guidelines available. She stated that she understood potential limitations of a larger gene panel.   Due to COVID-19 precautions consent was obtained over the phone/video today. Genetic testing via a Custom Panel (a combination of the Multi-Cancer Panel + the Hereditary Skin Cancer Panel, including Preliminary-evidence Genes for Skin Cancer) will be sent to [facility]. Heather opted to schedule a blood draw for testing. Turnaround time from date when sample is received at the lab: approximately 3-4 weeks.  Medical Management: For Heather, we reviewed that the information from genetic testing may determine:  additional cancer screening for which Heather may qualify (i.e. mammogram and breast MRI, more frequent colonoscopies, more frequent dermatologic exams, etc.),  options for risk reducing surgeries Heather could consider (i.e. bilateral mastectomy, surgery to remove her ovaries and/or uterus, etc.),    and targeted chemotherapies if she were to develop certain cancers in the future (i.e. immunotherapy for individuals with Mclaughlin syndrome, PARP inhibitors, etc.).   These recommendations will be discussed in detail once genetic testing is completed.     Plan:  1) Today Heather elected to proceed with genetic testing via a Custom Panel (a combination of the Multi-Cancer Panel + the Hereditary Skin Cancer Panel, including Preliminary-evidence Genes for Skin Cancer) offered by [facility].  2) This information should be available in 4-5 weeks.  3) Heather will be scheduled for a virtual visit (phone or video) to discuss the results.    Tiana Velasquez MS, [facility] [place]  Licensed, Certified Genetic Counselor  Office: [phone]  Email: [email or web address]

## 2023-11-13 ENCOUNTER — LAB (OUTPATIENT)
Dept: LAB | Facility: CLINIC | Age: 43
End: 2023-11-13
Payer: COMMERCIAL

## 2023-11-13 DIAGNOSIS — Z80.3 FAMILY HISTORY OF MALIGNANT NEOPLASM OF BREAST: ICD-10-CM

## 2023-11-13 DIAGNOSIS — Z80.0 FAMILY HISTORY OF COLON CANCER: ICD-10-CM

## 2023-11-13 DIAGNOSIS — Z80.8 FAMILY HISTORY OF MELANOMA: ICD-10-CM

## 2023-11-13 PROCEDURE — 36415 COLL VENOUS BLD VENIPUNCTURE: CPT

## 2023-11-13 PROCEDURE — 99000 SPECIMEN HANDLING OFFICE-LAB: CPT

## 2023-11-20 LAB — SCANNED LAB RESULT: NORMAL

## 2023-12-05 ENCOUNTER — VIRTUAL VISIT (OUTPATIENT)
Dept: ONCOLOGY | Facility: CLINIC | Age: 43
End: 2023-12-05
Attending: GENETIC COUNSELOR, MS
Payer: COMMERCIAL

## 2023-12-05 DIAGNOSIS — Z80.0 FAMILY HISTORY OF COLON CANCER: ICD-10-CM

## 2023-12-05 DIAGNOSIS — Z80.3 FAMILY HISTORY OF MALIGNANT NEOPLASM OF BREAST: Primary | ICD-10-CM

## 2023-12-05 DIAGNOSIS — Z80.8 FAMILY HISTORY OF MELANOMA: ICD-10-CM

## 2023-12-05 PROCEDURE — 999N000069 HC STATISTIC GENETIC COUNSELING, < 16 MIN: Mod: GT,95 | Performed by: GENETIC COUNSELOR, MS

## 2023-12-05 NOTE — LETTER
"    12/5/2023         RE: Heather Guillory  [address]  [place] [address]        Dear Colleague,    Thank you for referring your patient, Heather Guillory, to the [facility]. Please see a copy of my visit note below.    Virtual Visit Details    Type of service:  Video Visit     Originating Location (pt. Location): Home  Distant Location (provider location):  Off-site  Platform used for Video Visit: [facility]   Time spent over video: 9 minutes    12/5/2023    Referring Provider: Self (Mammogram letter)    Presenting Information:  I spoke to Heather over video today to discuss her genetic testing results. Her blood was drawn on 11/13/23. A Custom Panel (a combination of the Multi-Cancer Panel + the Hereditary Skin Cancer Panel, including Preliminary-evidence Genes for Skin Cancer) was ordered from [facility]. This testing was done because of Heather's family history of cancer.    Genetic Testing Result: NEGATIVE  Heather is negative for mutations in the 73 genes analyzed: AIP, ALK, APC, MARY, AXIN2, BAP1, BARD1, BLM, BMPR1A, BRCA1, BRCA2, BRIP1, CDC73, CDH1, CDK4, CDKN1B, CDKN2A, CHEK2, CTNNA1, DICER1, EGFR, EPCAM, FH, FLCN, GREM1, HOXB13, KIT, LZTR1, MAX, MBD4, MC1R, MEN1, MET, MITF, MLH1, MSH2, MSH3, MSH6, MUTYH, NF1, NF2, NTHL1, PALB2, PDGFRA, PMS2, POLD1, POLE, POT1, VBTDA3J, PTCH1, PTCH2, PTEN, RAD51C, RAD51D, RB1, RET, SDHA, SDHAF2, SDHB, SDHC, SDHD, SMAD4, SMARCA4, SMARCB1, SMARCE1, STK11, SUFU, QMYD492, TP53, TSC1, TSC2, VHL, WRN.     A copy of the test report can be found in the Laboratory tab, dated 11/13/23, and named \"LABORATORY MISCELLANEOUS ORDER\". The report is scanned in as a linked document.    Interpretation:  We discussed several different interpretations of this negative test result.    One explanation may be that there is a different gene or combination of genes and environment that are associated with the cancers in this family.  It is possible that her " relatives have a mutation in one of these genes and she did not inherit it.  There is also a small possibility that there is a mutation in one of these genes, and the testing laboratory could not find it with their current testing methods.       Screening:  Based on this negative test result, it is important for Heather and her relatives to refer back to the family history for appropriate cancer screening.    Based on the personal and family history information she provided, Heather has an estimated 12% lifetime risk of developing breast cancer based on the DELMAR Risk Evaluation v8 model. Therefore, Heather does not meet current National Comprehensive Cancer Network (NCCN) guidelines for high risk breast screening, which is offered to women with a 20% lifetime risk or higher. However, it is still important for Heather to continue with routine breast screening under the care of her physicians. Breast cancer screening is generally recommended to begin approximately 10 years younger than the earliest age of breast cancer diagnosis in the family, or at age 40, whichever comes first. Heather is encouraged to discuss breast screening with her physicians.    She should continue with her skin exams as recommended by dermatology based on her personal history of non-melanoma skin cancer and her family history of melanoma in her sister.  We also discussed her maternal grandfather's history of colon cancer. Per National Comprehensive Cancer Network (NCCN) guidelines, individuals with a second- and/or third-degree relative with colorectal cancer diagnosed at any age should start colonoscopy at age 45, and should be repeated every 10 years, or per colonoscopy findings. Per the American Cancer Society, colon screening in the average population should begin at age 45. Heather has already begun having colonoscopies and should continue as recommended by her physicians.     Other population cancer screening options, such as those  recommended by the American Cancer Society and the National Comprehensive Cancer Network (NCCN), are also appropriate for Heather and her family. These screening recommendations may change if there are changes to Heather's personal and/or family history of cancer. Final screening recommendations should be made by each individual's primary care provider.     Inheritance:  We reviewed the autosomal dominant inheritance of mutations in these genes. We discussed that Heather cannot/did not pass on an identifiable mutation in these genes to her children based on this test result. Mutations in these genes do not skip generations.      Additional Testing Considerations:  Although Heather's genetic testing result was negative, other relatives may still carry a gene mutation associated with an increased risk for cancer. Genetic counseling is recommended for her father (or if he is not interested in genetic testing, her siblings) and paternal relatives to discuss genetic testing options. If any of her relatives do pursue genetic testing, Heather is encouraged to contact me so that we may review the impact of their test results on her.    Summary:  We do not have an explanation for Heather's family history of cancer. While no genetic changes were identified, Heather may still be at risk for certain cancers due to family history, environmental factors, or other genetic causes not identified by this test. Because of that, it is important that she continue with cancer screening based on her personal and family history as discussed above.    Genetic testing is rapidly advancing, and new cancer susceptibility genes will most likely be identified in the future. Therefore, I encouraged Heather to contact me annually or if there are changes in her personal or family history. This may change how we assess her cancer risk, screening, and the testing we would offer.    Plan:  1. A copy of her results was released to her today via  the online [employer] portal.   2. She plans to follow-up with her physicians.  3. She should contact me regularly, or sooner if her family history changes.    If Heather has any further questions, I encouraged her to contact me at [phone].    Tiana Velasquez MS, [facility] [place]  Licensed, Certified Genetic Counselor  Office: [phone]  Email: [email or web address]

## 2023-12-05 NOTE — NURSING NOTE
Is the patient currently in the state of [place]? YES    Visit mode:VIDEO    If the visit is dropped, the patient can be reconnected by: VIDEO VISIT: Send to e-mail at: [email or web address]    Will anyone else be joining the visit? NO  (If patient encounters technical issues they should call [phone]    How would you like to obtain your AVS? MyChart    Are changes needed to the allergy or medication list? N/A    Reason for visit: MIRZA CLARKE

## 2023-12-05 NOTE — PROGRESS NOTES
"Virtual Visit Details    Type of service:  Video Visit     Originating Location (pt. Location): Home  Distant Location (provider location):  Off-site  Platform used for Video Visit: [facility]   Time spent over video: 9 minutes    12/5/2023    Referring Provider: Self (Mammogram letter)    Presenting Information:  I spoke to Heather over video today to discuss her genetic testing results. Her blood was drawn on 11/13/23. A Custom Panel (a combination of the Multi-Cancer Panel + the Hereditary Skin Cancer Panel, including Preliminary-evidence Genes for Skin Cancer) was ordered from [facility]. This testing was done because of Heather's family history of cancer.    Genetic Testing Result: NEGATIVE  Heather is negative for mutations in the 73 genes analyzed: AIP, ALK, APC, MARY, AXIN2, BAP1, BARD1, BLM, BMPR1A, BRCA1, BRCA2, BRIP1, CDC73, CDH1, CDK4, CDKN1B, CDKN2A, CHEK2, CTNNA1, DICER1, EGFR, EPCAM, FH, FLCN, GREM1, HOXB13, KIT, LZTR1, MAX, MBD4, MC1R, MEN1, MET, MITF, MLH1, MSH2, MSH3, MSH6, MUTYH, NF1, NF2, NTHL1, PALB2, PDGFRA, PMS2, POLD1, POLE, POT1, DEJGV2H, PTCH1, PTCH2, PTEN, RAD51C, RAD51D, RB1, RET, SDHA, SDHAF2, SDHB, SDHC, SDHD, SMAD4, SMARCA4, SMARCB1, SMARCE1, STK11, SUFU, [id number], TP53, TSC1, TSC2, VHL, WRN.     A copy of the test report can be found in the Laboratory tab, dated 11/13/23, and named \"LABORATORY MISCELLANEOUS ORDER\". The report is scanned in as a linked document.    Interpretation:  We discussed several different interpretations of this negative test result.    1. One explanation may be that there is a different gene or combination of genes and environment that are associated with the cancers in this family.  2. It is possible that her relatives have a mutation in one of these genes and she did not inherit it.  3. There is also a small possibility that there is a mutation in one of these genes, and the testing laboratory could not find it with their current testing methods.   "     Screening:  Based on this negative test result, it is important for Heather and her relatives to refer back to the family history for appropriate cancer screening.      Based on the personal and family history information she provided, Heather has an estimated 12% lifetime risk of developing breast cancer based on the DELMAR Risk Evaluation v8 model. Therefore, Heather does not meet current National Comprehensive Cancer Network (NCCN) guidelines for high risk breast screening, which is offered to women with a 20% lifetime risk or higher. However, it is still important for Heather to continue with routine breast screening under the care of her physicians. Breast cancer screening is generally recommended to begin approximately 10 years younger than the earliest age of breast cancer diagnosis in the family, or at age 40, whichever comes first. Heather is encouraged to discuss breast screening with her physicians.      She should continue with her skin exams as recommended by dermatology based on her personal history of non-melanoma skin cancer and her family history of melanoma in her sister.  We also discussed her maternal grandfather's history of colon cancer. Per National Comprehensive Cancer Network (NCCN) guidelines, individuals with a second- and/or third-degree relative with colorectal cancer diagnosed at any age should start colonoscopy at age 45, and should be repeated every 10 years, or per colonoscopy findings. Per the American Cancer Society, colon screening in the average population should begin at age 45. Heather has already begun having colonoscopies and should continue as recommended by her physicians.       Other population cancer screening options, such as those recommended by the American Cancer Society and the National Comprehensive Cancer Network (NCCN), are also appropriate for Heather and her family. These screening recommendations may change if there are changes to Heather's personal  and/or family history of cancer. Final screening recommendations should be made by each individual's primary care provider.     Inheritance:  We reviewed the autosomal dominant inheritance of mutations in these genes. We discussed that Heather cannot/did not pass on an identifiable mutation in these genes to her children based on this test result. Mutations in these genes do not skip generations.      Additional Testing Considerations:  Although Heather's genetic testing result was negative, other relatives may still carry a gene mutation associated with an increased risk for cancer. Genetic counseling is recommended for her father (or if he is not interested in genetic testing, her siblings) and paternal relatives to discuss genetic testing options. If any of her relatives do pursue genetic testing, Heather is encouraged to contact me so that we may review the impact of their test results on her.    Summary:  We do not have an explanation for Heather's family history of cancer. While no genetic changes were identified, Heather may still be at risk for certain cancers due to family history, environmental factors, or other genetic causes not identified by this test. Because of that, it is important that she continue with cancer screening based on her personal and family history as discussed above.    Genetic testing is rapidly advancing, and new cancer susceptibility genes will most likely be identified in the future. Therefore, I encouraged Heather to contact me annually or if there are changes in her personal or family history. This may change how we assess her cancer risk, screening, and the testing we would offer.    Plan:  1. A copy of her results was released to her today via the online [employer] portal.   2. She plans to follow-up with her physicians.  3. She should contact me regularly, or sooner if her family history changes.    If Heather has any further questions, I encouraged her to contact  me at [phone].    Tiana Velasquez MS, [facility] [place]  Licensed, Certified Genetic Counselor  Office: [phone]  Email: [email or web address]

## 2023-12-12 ENCOUNTER — VIRTUAL VISIT (OUTPATIENT)
Dept: INTERNAL MEDICINE | Facility: CLINIC | Age: 43
End: 2023-12-12
Payer: COMMERCIAL

## 2023-12-12 ENCOUNTER — TELEPHONE (OUTPATIENT)
Dept: INTERNAL MEDICINE | Facility: CLINIC | Age: 43
End: 2023-12-12

## 2023-12-12 DIAGNOSIS — R00.2 PALPITATIONS: Primary | ICD-10-CM

## 2023-12-12 PROCEDURE — 99213 OFFICE O/P EST LOW 20 MIN: CPT | Mod: 95 | Performed by: NURSE PRACTITIONER

## 2023-12-12 ASSESSMENT — ANXIETY QUESTIONNAIRES
GAD7 TOTAL SCORE: 0
5. BEING SO RESTLESS THAT IT IS HARD TO SIT STILL: NOT AT ALL
6. BECOMING EASILY ANNOYED OR IRRITABLE: NOT AT ALL
2. NOT BEING ABLE TO STOP OR CONTROL WORRYING: NOT AT ALL
7. FEELING AFRAID AS IF SOMETHING AWFUL MIGHT HAPPEN: NOT AT ALL
GAD7 TOTAL SCORE: 0
1. FEELING NERVOUS, ANXIOUS, OR ON EDGE: NOT AT ALL
IF YOU CHECKED OFF ANY PROBLEMS ON THIS QUESTIONNAIRE, HOW DIFFICULT HAVE THESE PROBLEMS MADE IT FOR YOU TO DO YOUR WORK, TAKE CARE OF THINGS AT HOME, OR GET ALONG WITH OTHER PEOPLE: NOT DIFFICULT AT ALL
3. WORRYING TOO MUCH ABOUT DIFFERENT THINGS: NOT AT ALL

## 2023-12-12 ASSESSMENT — PATIENT HEALTH QUESTIONNAIRE - PHQ9
SUM OF ALL RESPONSES TO PHQ QUESTIONS 1-9: 0
5. POOR APPETITE OR OVEREATING: NOT AT ALL

## 2023-12-12 NOTE — NURSING NOTE
Is the patient currently in the state of [place]? YES    Visit mode:VIDEO    If the visit is dropped, the patient can be reconnected by: VIDEO VISIT: Send to e-mail at: [email or web address]    Will anyone else be joining the visit? NO  (If patient encounters technical issues they should call [phone]    How would you like to obtain your AVS? MyChart    Are changes needed to the allergy or medication list? Pt stated no changes to allergies and Pt stated no med changes    Reason for visit: RECHECK and Changes in heart rhythm    Olivia CLARKE

## 2023-12-12 NOTE — PATIENT INSTRUCTIONS
Thank you for visiting the Primary Care Center today at the [facility]! The following is some information about our clinic:     Primary Care Center Frequently-Asked Questions    (1) How do I schedule appointments at the [facility]?     Primary Care--to schedule or make changes to an existing appointment, please call our primary care line at [phone].    Labs--to schedule a lab appointment at the [facility] you can use [facility] or call [phone]. If you have a [place] location that is closer to home, you can reach out to that location for scheduling options.     Imaging--if you need to schedule a CT, X-ray, MRI, ultrasound, or other imaging study you can call [phone] to schedule at the [facility] or any other [facility] imaging location.     Referrals--if a referral to another specialty was ordered you can expect a phone call from their scheduling team. If you have not heard from them in a week, please call us or send us a [facility] message to check the status or get a scheduling number. Please note that this only applies to internal [facility] referrals. If the referral is external you would need to contact their office for scheduling.     (2) I have a question about my visit, who do I contact?     You can call us at the primary care line at [phone] to ask questions about your visit. You can also send a secure message through [facility], which is reviewed by clinic staff. Please note that [facility] messages have a twenty-four to forty-eight business hour turnaround time and should not be used for urgent concerns.    (3) How will I get the results of my tests?    If you are signed up for [facility] all tests will be released to you within twenty-four hours of resulting. Please allow three to five days for your doctor to review your results and place a note interpreting the results. If you do not have [facility] you will receive your  results through mail seven to ten business days following the return of the tests. Please note that if there should be any urgent or concerning results that your doctor or their registered nurse will reach out to you the same day as the tests come back. If you have follow up questions about your results or would like to discuss the results in detail please schedule a follow up with your provider either in person or virtually.     (4) How do I get refills of my prescriptions?     You should always first contact your pharmacy for refills of your medications. If submitting a refill request on [facility], please be sure to submit the request only once--repeat requests can cause delays in refill. If you are requesting a NEW medication or a medication related to new symptoms you will need to schedule an appointment with a provider prior to approval. Please note: Routine medication refills have up to one to three business day turnaround whereas controlled substances refills have up to five to seven business day turnaround.    (5) I have new symptoms, what do I do?     If you are having an immediate medical emergency, you should dial 911 for assistance.   For anything urgent that needs to be seen within a few hours to one day you should visit a local urgent care for assistance.  For non-urgent symptoms that need to be seen within a few days to a week you can schedule with an available provider in primary care by going to [employer] or calling [phone].   If you are not sure how serious your symptoms are or you would like to receive medical advice you can always call [phone] to speak with a triage nurse.

## 2023-12-12 NOTE — PROGRESS NOTES
Heather is a 43 year old who is being evaluated via a billable video visit.      How would you like to obtain your AVS? MyChart  If the video visit is dropped, the invitation should be resent by: Send to e-mail at: [email or web address]  Will anyone else be joining your video visit? No          Assessment & Plan     Palpitations  Intermittent episodes of elevated HR (up to 150 bpm on her smart watch), accompanied by associated symptoms of lightheadedness, mild nausea, and sweating.  She was able to stop symptoms with bearing down, but also recognizes that having her dad on the phone with her helped ease some of her anxiety.  She is noticing these episodes prior to feeling anxious, but then endorses worsening anxiety in light of the physical symptoms and not knowing what is going on.  Will check labs with CMP, CBC, TSH, and obtain 14-d Ziopatch monitor for further evaluation.  Since symptoms are relatively infrequent and she was able to control with bearing down, okay to wait until she returns from [place], but seek medical attention if symptoms acutely worsen or if severe CP, SOB, lightheadedness/dizziness or syncope.   She agrees with the plan.   - TSH with free T4 reflex; Future  - Comprehensive metabolic panel (BMP + Alb, Alk Phos, ALT, AST, Total. Bili, TP); Future  - CBC with platelets and differential; Future  - Adult Leadless EKG Monitor 8 to 14 Days; Future      23 minutes spent by me on the date of the encounter doing chart review, history and exam, documentation and further activities per the note       Follow-up pending above work-up    HARVEY Ward [facility] INTERNAL MEDICINE [facility] [place]   Heather is a 43 year old, presenting for the following health issues:  RECHECK and Changes in heart rhythm      HPI     Changes in heart rhythm--occurred 4-5x over the last couple months, not sure if heart was going into faster rhythm. Noticed Smart watch monitor--HR was at 68,  "then up to 150, was driving and got anxious, sweaty, didn't feel good when this happened and had to pull over.  Not short of breath, but did feel a little lightheaded, almost like BP was lower, and kind of queasy.  Not occurring every day, about once every 7-14 days.  Not dizzy but felt like she should sit down. Her brother had hx SVT 10 yr ago.   Makes her scared when it happens, feels pounding/heavy thumping in the chest.  Wasn't feeling anxious or nervous prior to symptom onset.  Was home alone, about to  kids from school, felt like she was going to pass out. Started feeling anxious once she noticed her heart racing.  Lasted 4-6 minutes.  Called her dad, he walked her through how to bear down and then was back at a normal rate.     Another episode happened when sitting in a [employer] drive-up, again was relaxed prior to symptom onset, but then started feeling anxious about her physical symptoms and had a fear that something terrible would happen because heart was racing so fast. No prior hx panic attacks.  Going to [place] with her family 12/22-1/2    Review of Systems   Constitutional, HEENT, cardiovascular, pulmonary, gi and gu systems are negative, except as otherwise noted.      Objective    Vitals - Patient Reported  Weight (Patient Reported): 70.3 kg (155 lb)  Height (Patient Reported): 170.2 cm (5' 7\")  BMI (Based on Pt Reported Ht/Wt): 24.28  Pain Score: No Pain (0)        Physical Exam   GENERAL: Healthy, alert and no distress  EYES: Eyes grossly normal to inspection.  No discharge or erythema, or obvious scleral/conjunctival abnormalities.  RESP: No audible wheeze, cough, or visible cyanosis.  No visible retractions or increased work of breathing.    SKIN: Visible skin clear. No significant rash, abnormal pigmentation or lesions.  NEURO: Cranial nerves grossly intact.  Mentation and speech appropriate for age.  PSYCH: Mentation appears normal, affect normal/bright, judgement and insight intact, normal " speech and appearance well-groomed.                Video-Visit Details    Type of service:  Video Visit   Start time: 9:34 AM  End time: 9:49 AM    Originating Location (pt. Location): Home    Distant Location (provider location):  Off-site  Platform used for Video Visit: Lorenzo

## 2023-12-12 NOTE — Clinical Note
Hi, can you help Heather schedule a ziopatch hook-up? I gave her the lab number, if you can just confirm that she got a lab appt scheduled as well. Thanks! -Cyndi

## 2023-12-13 ENCOUNTER — TELEPHONE (OUTPATIENT)
Dept: INTERNAL MEDICINE | Facility: CLINIC | Age: 43
End: 2023-12-13
Payer: COMMERCIAL

## 2023-12-14 ENCOUNTER — LAB (OUTPATIENT)
Dept: LAB | Facility: CLINIC | Age: 43
End: 2023-12-14
Payer: COMMERCIAL

## 2023-12-14 ENCOUNTER — MYC MEDICAL ADVICE (OUTPATIENT)
Dept: INTERNAL MEDICINE | Facility: CLINIC | Age: 43
End: 2023-12-14

## 2023-12-14 DIAGNOSIS — R73.01 ELEVATED FASTING GLUCOSE: Primary | ICD-10-CM

## 2023-12-14 DIAGNOSIS — R00.2 PALPITATIONS: ICD-10-CM

## 2023-12-14 DIAGNOSIS — Z13.1 SCREENING FOR DIABETES MELLITUS: ICD-10-CM

## 2023-12-14 DIAGNOSIS — Z79.52 LONG TERM CURRENT USE OF SYSTEMIC STEROIDS: ICD-10-CM

## 2023-12-14 DIAGNOSIS — R73.01 ELEVATED FASTING GLUCOSE: ICD-10-CM

## 2023-12-14 DIAGNOSIS — M35.9 UNDIFFERENTIATED CONNECTIVE TISSUE DISEASE (H): ICD-10-CM

## 2023-12-14 LAB
ALBUMIN SERPL BCG-MCNC: 4.6 G/DL (ref 3.5–5.2)
ALP SERPL-CCNC: 71 U/L (ref 40–150)
ALT SERPL W P-5'-P-CCNC: 13 U/L (ref 0–50)
ANION GAP SERPL CALCULATED.3IONS-SCNC: 8 MMOL/L (ref 7–15)
AST SERPL W P-5'-P-CCNC: 23 U/L (ref 0–45)
BASOPHILS # BLD AUTO: 0 10E3/UL (ref 0–0.2)
BASOPHILS NFR BLD AUTO: 0 %
BILIRUB SERPL-MCNC: 0.4 MG/DL
BUN SERPL-MCNC: 11.5 MG/DL (ref 6–20)
C3 SERPL-MCNC: 96 MG/DL (ref 81–157)
C4 SERPL-MCNC: 18 MG/DL (ref 13–39)
CALCIUM SERPL-MCNC: 9.7 MG/DL (ref 8.6–10)
CHLORIDE SERPL-SCNC: 107 MMOL/L (ref 98–107)
CREAT SERPL-MCNC: 0.64 MG/DL (ref 0.51–0.95)
CRP SERPL-MCNC: <3 MG/L
DEPRECATED HCO3 PLAS-SCNC: 26 MMOL/L (ref 22–29)
EGFRCR SERPLBLD CKD-EPI 2021: >90 ML/MIN/1.73M2
EOSINOPHIL # BLD AUTO: 0.1 10E3/UL (ref 0–0.7)
EOSINOPHIL NFR BLD AUTO: 2 %
ERYTHROCYTE [DISTWIDTH] IN BLOOD BY AUTOMATED COUNT: 12 % (ref 10–15)
ERYTHROCYTE [SEDIMENTATION RATE] IN BLOOD BY WESTERGREN METHOD: 6 MM/HR (ref 0–20)
FERRITIN SERPL-MCNC: 30 NG/ML (ref 6–175)
GLUCOSE SERPL-MCNC: 118 MG/DL (ref 70–99)
HBA1C MFR BLD: 5.5 %
HCT VFR BLD AUTO: 42.5 % (ref 35–47)
HGB BLD-MCNC: 14.1 G/DL (ref 11.7–15.7)
IMM GRANULOCYTES # BLD: 0 10E3/UL
IMM GRANULOCYTES NFR BLD: 0 %
IRON BINDING CAPACITY (ROCHE): 309 UG/DL (ref 240–430)
IRON SATN MFR SERPL: 15 % (ref 15–46)
IRON SERPL-MCNC: 46 UG/DL (ref 37–145)
LYMPHOCYTES # BLD AUTO: 1.7 10E3/UL (ref 0.8–5.3)
LYMPHOCYTES NFR BLD AUTO: 27 %
MCH RBC QN AUTO: 28.3 PG (ref 26.5–33)
MCHC RBC AUTO-ENTMCNC: 33.2 G/DL (ref 31.5–36.5)
MCV RBC AUTO: 85 FL (ref 78–100)
MONOCYTES # BLD AUTO: 0.4 10E3/UL (ref 0–1.3)
MONOCYTES NFR BLD AUTO: 7 %
NEUTROPHILS # BLD AUTO: 4 10E3/UL (ref 1.6–8.3)
NEUTROPHILS NFR BLD AUTO: 64 %
NRBC # BLD AUTO: 0 10E3/UL
NRBC BLD AUTO-RTO: 0 /100
PLATELET # BLD AUTO: 175 10E3/UL (ref 150–450)
POTASSIUM SERPL-SCNC: 4.6 MMOL/L (ref 3.4–5.3)
PROT SERPL-MCNC: 7.5 G/DL (ref 6.4–8.3)
RBC # BLD AUTO: 4.99 10E6/UL (ref 3.8–5.2)
SODIUM SERPL-SCNC: 141 MMOL/L (ref 135–145)
TSH SERPL DL<=0.005 MIU/L-ACNC: 1.19 UIU/ML (ref 0.3–4.2)
VIT D+METAB SERPL-MCNC: 45 NG/ML (ref 20–50)
WBC # BLD AUTO: 6.3 10E3/UL (ref 4–11)

## 2023-12-14 PROCEDURE — 86140 C-REACTIVE PROTEIN: CPT | Performed by: PATHOLOGY

## 2023-12-14 PROCEDURE — 85025 COMPLETE CBC W/AUTO DIFF WBC: CPT | Performed by: PATHOLOGY

## 2023-12-14 PROCEDURE — 80053 COMPREHEN METABOLIC PANEL: CPT | Performed by: PATHOLOGY

## 2023-12-14 PROCEDURE — 82306 VITAMIN D 25 HYDROXY: CPT | Performed by: INTERNAL MEDICINE

## 2023-12-14 PROCEDURE — 36415 COLL VENOUS BLD VENIPUNCTURE: CPT | Performed by: PATHOLOGY

## 2023-12-14 PROCEDURE — 85652 RBC SED RATE AUTOMATED: CPT | Performed by: PATHOLOGY

## 2023-12-14 PROCEDURE — 82728 ASSAY OF FERRITIN: CPT | Performed by: PATHOLOGY

## 2023-12-14 PROCEDURE — 83540 ASSAY OF IRON: CPT | Performed by: PATHOLOGY

## 2023-12-14 PROCEDURE — 86225 DNA ANTIBODY NATIVE: CPT | Performed by: INTERNAL MEDICINE

## 2023-12-14 PROCEDURE — 83036 HEMOGLOBIN GLYCOSYLATED A1C: CPT | Performed by: NURSE PRACTITIONER

## 2023-12-14 PROCEDURE — 86160 COMPLEMENT ANTIGEN: CPT | Performed by: INTERNAL MEDICINE

## 2023-12-14 PROCEDURE — 99000 SPECIMEN HANDLING OFFICE-LAB: CPT | Performed by: PATHOLOGY

## 2023-12-14 PROCEDURE — 84443 ASSAY THYROID STIM HORMONE: CPT | Performed by: PATHOLOGY

## 2023-12-14 PROCEDURE — 83550 IRON BINDING TEST: CPT | Performed by: PATHOLOGY

## 2023-12-14 NOTE — TELEPHONE ENCOUNTER
A1c added to lab sample given slightly elevated glucose on this AM's labs. Pt reports fasting >8 hours prior to blood draw.  HARVEY Ward CNP

## 2023-12-15 LAB — DSDNA AB SER-ACNC: 6.1 IU/ML

## 2024-01-04 NOTE — MR AVS SNAPSHOT
After Visit Summary   12/14/2017    Heather Guillory    MRN: [id number]           Patient Information     Date Of Birth          1980        Visit Information        Provider Department      12/14/2017 8:30 AM Nasrin King MD [facility] Rheumatology        Today's Diagnoses     Undifferentiated connective tissue disease (H)    -  1      Care Instructions    Labs today    Return in March 2018              Follow-ups after your visit        Your next 10 appointments already scheduled     Dec 18, 2017 10:45 AM CST   RETURN OB with April Palma MD   [facility] ([facility])    [facility] [address]  [address]  [facility] [place] [address]   [phone]            Dec 22, 2017  9:00 AM CST   ULTRASOUND with [facility] ULTRASOUND   [facility] ([facility])    [facility] [address]  [address]  [facility] [address]   [phone]            Mar 02, 2018  8:00 AM CST   (Arrive by 7:45 AM)   Return Visit with Nasrin King MD   [facility] Rheumatology ([facility])    [address]  [address]rd [address]  [place] [address] [phone]              Future tests that were ordered for you today     Open Future Orders        Priority Expected Expires Ordered    UA with Microscopic reflex to Culture Routine  12/14/2018 12/14/2017            Who to contact     If you have questions or need follow up information about today's clinic visit or your schedule please contact [facility] RHEUMATOLOGY directly at [phone].  Normal or non-critical lab and imaging results will be communicated to you by MyChart, letter or phone within 4 business days after the clinic has received the results. If you do not hear from us within 7 days, please contact the clinic through MyChart or phone. If you have a critical or abnormal lab result, we will notify you by phone as soon  "as possible.  Submit refill requests through [employer] or call your pharmacy and they will forward the refill request to us. Please allow 3 business days for your refill to be completed.          Additional Information About Your Visit        [facility] Information     [facility] gives you secure access to your electronic health record. If you see a primary care provider, you can also send messages to your care team and make appointments. If you have questions, please call your primary care clinic.  If you do not have a primary care provider, please call [phone] and they will assist you.        Care EveryWhere ID     This is your Care EveryWhere ID. This could be used by other organizations to access your [facility] medical records  [id number]        Your Vitals Were     Pulse Temperature Height BMI (Body Mass Index)          77 98.1  F (36.7  C) (Oral) 1.702 m (5' 7\") 27.19 kg/m2         Blood Pressure from Last 3 Encounters:   12/14/17 100/65   11/20/17 110/72   11/10/17 101/64    Weight from Last 3 Encounters:   12/14/17 78.7 kg (173 lb 9.6 oz)   11/20/17 76.8 kg (169 lb 6.4 oz)   11/10/17 78 kg (171 lb 14.4 oz)               Primary Care Provider Fax #    Physician No Ref-Primary [phone]       No address on file        Equal Access to Services     ROSELIA OREILLY : Hadii juanjo galarza hadasho Soomaali, waaxda luqadaha, qaybta kaalmada adeegyada, john graham . So [facility] [phone].    ATENCIÓN: Si habla español, tiene a nelson disposición servicios gratuitos de asistencia lingüística. Llame al [phone].    We comply with applicable federal civil rights laws and [place] laws. We do not discriminate on the basis of race, color, national origin, age, disability, sex, sexual orientation, or gender identity.            Thank you!     Thank you for choosing [facility]  for your care. Our goal is always to provide you with excellent care. Hearing back from our patients is one way we can " continue to improve our services. Please take a few minutes to complete the written survey that you may receive in the mail after your visit with us. Thank you!             Your Updated Medication List - Protect others around you: Learn how to safely use, store and throw away your medicines at www.disposemymeds.org.          This list is accurate as of: 12/14/17 11:28 PM.  Always use your most recent med list.                   Brand Name Dispense Instructions for use Diagnosis    hydroxychloroquine 200 MG tablet    PLAQUENIL     Take 200 mg by mouth 2 times daily        Prenatal Vitamins 28-0.8 MG Tabs      Take 1 tablet by mouth daily    Supervision of high-risk pregnancy of elderly multigravida       sertraline 25 MG tablet    ZOLOFT    90 tablet    Take 1 tablet (25 mg) by mouth daily Increase to 50 mg daily after one week if symptoms improved but not enough    Anxiety          no

## 2024-01-15 DIAGNOSIS — R00.2 PALPITATIONS: Primary | ICD-10-CM

## 2024-01-15 PROCEDURE — 99207 ZIO PATCH 8-14 DAYS APPLICATION: CPT | Performed by: NURSE PRACTITIONER

## 2024-01-16 DIAGNOSIS — R00.2 PALPITATIONS: Primary | ICD-10-CM

## 2024-02-01 ENCOUNTER — OFFICE VISIT (OUTPATIENT)
Dept: RHEUMATOLOGY | Facility: CLINIC | Age: 44
End: 2024-02-01
Attending: INTERNAL MEDICINE
Payer: COMMERCIAL

## 2024-02-01 VITALS
SYSTOLIC BLOOD PRESSURE: 112 MMHG | OXYGEN SATURATION: 98 % | WEIGHT: 157 LBS | BODY MASS INDEX: 24.59 KG/M2 | HEART RATE: 76 BPM | DIASTOLIC BLOOD PRESSURE: 77 MMHG

## 2024-02-01 DIAGNOSIS — M35.9 UNDIFFERENTIATED CONNECTIVE TISSUE DISEASE (H): ICD-10-CM

## 2024-02-01 DIAGNOSIS — Z79.52 LONG TERM CURRENT USE OF SYSTEMIC STEROIDS: Primary | ICD-10-CM

## 2024-02-01 PROCEDURE — 99213 OFFICE O/P EST LOW 20 MIN: CPT | Performed by: INTERNAL MEDICINE

## 2024-02-01 PROCEDURE — 99214 OFFICE O/P EST MOD 30 MIN: CPT | Performed by: INTERNAL MEDICINE

## 2024-02-01 ASSESSMENT — PAIN SCALES - GENERAL: PAINLEVEL: NO PAIN (0)

## 2024-02-01 NOTE — NURSING NOTE
"Chief Complaint   Patient presents with    RECHECK     Vital signs:      BP: 112/77 Pulse: 76     SpO2: 98 %       Weight: 71.2 kg (157 lb)  Estimated body mass index is 24.59 kg/m  as calculated from the following:    Height as of 10/3/23: 1.702 m (5' 7\").    Weight as of this encounter: 71.2 kg (157 lb).      Veena Brown CMA   2/1/2024 11:45 AM    "

## 2024-02-01 NOTE — PROGRESS NOTES
Rheumatology follow up In Person Visit Note    Heather Guillory MRN# [id number]   Age: 43 year old YOB: 1980     Last seen:  2023  DOS: 2024    Reason for visit: UCTD    Assessment & Plan:   Problem list:    1-UCTD  2-HCQ monitoring  3-Iron deficiency        Undifferentiated connective tissue disease (UCTD):     Heather is a 41 yo WF . She was diagnosed with MCTD in 2016, 6 wk postpartum based on fatigue, arthritis, mild Raynaud's, low positive NINOSKA 1.1 and low positive RNP Ab 1.6. She is on  mg qd since 2017 with great response.  UCTD continues to be most likely diagnosis not MCTD as she does not have classic features of MCTD, had very low titer of RNP Ab in the past, (negative on most recent check), and had a mild disease which never required prednisone.  All autoimmunity labs (2017) came back negative (except + NINOSKA, low C3) which is further reassuring for UCTD, including APS panel, repeat RNP and inflammatory markers, dsDNA.      She had neg SSA/SSB antibodies in 2016, no concern for CHB. No need to re-check.  APS panel was neg.  She had neg anti-Sm, neg anti-DNA and neg centromere Ab in 2016.    In 2019, recommended to taper plaquenil to 200 mg twice a day on Mon/Wed/Fri and then 1 tab for the rest of the week. Had increased pain/stiffness in hands and feet and C3 dropped. So increased HCQ back to 200 mg bid. C3 went back to nl in 2020, had low C3 in 2021 but it fluctuates.     :    The patient reports stable symtpoms aside from increased fatigue in the setting of ongoing heavy menstrual bleeding. She has a submucosal fibroid with myomectomy planned for February. She does have some chest discomfort but appears likely musculoskeletal. Will repeat hgb and iron testing today.    As her UCTD symptoms including arthralgia appear stable, discussed that she could continue current regimen of HCQ alternating 200 mg BID and daily dosing. If she were to develop new  or worsening symptoms, would consider increasing back to 200 mg BID. Her last eye exam in 11/2021 was without evidence of HCQ toxicity.     -Check cbc with diff, UA, Cr, complements, dsDNA CRP, ESR, AST/ALT, iron studies  -Continue  mg BID 3x week,  mg daily 4x week  -Continue yearly ophthalmology exam for HCQ monitoring    7/8/2022:    Overall stable with symptoms, labs. In fact C3 improved on 1/2022 labs. recommend to taper HCQ down to reduce risk of HCQ toxicity in future.    Has h/o iron deficiency, will re-check iron studies.    1/13/2023: Stable on HCQ 1 tab (200 mg) a day, will continue. Labs in 7/2022 showed stable UCTD but ROBIN.    7/13/2023: Stable UCTD, will monitor sx/labs off HCQ. Discussed m/o low iron, low DHEA, both could contribute to fatigue.    Plan:    Labs in 10/2023    Ask women health about taking DHEA    Stay off hydroxychlorquine    Iron gummies with raspberry with food (take 2 a day)    Ok to go on prenatal vit one tab a day    Eye exam one more time    Return in 6 months (in person)        Today 2/1/2024:    Stable UCTD off the HCQ, no flare ups, will check labs    I don't think increased motion sickness with park rides and flights are due to UCTD.    Overall looks great/healthier in great mood today, I believe replacing iron has helped, her ferritin is within normal range but towards the low end and I recommend to continue with iron gummies, could take up to 6 a day which equals 1 slow Fe a day.    Hair loss is minor, seeing derm, no alopecia on exam, no scalp lesions, some could be related to low iron/stress, expect it to grew back. Will re-check iron levels today. Thyroid tests are good.    Vit d is normal, recommend to continue with OTC vit D 2000 units a day.      Plan:    Iron gummies up to 6 a day is ok to take    Labs today    Return in a year in person      Nasrin King MD      Orders Placed This Encounter   Procedures    Vitamin D Deficiency    Ferritin    Iron  and iron binding capacity    ALT    Albumin level    AST    Creatinine    Complement C4    Complement C3    CRP inflammation    DNA double stranded antibodies    Erythrocyte sedimentation rate auto    UA with Microscopic reflex to Culture    Protein  random urine    Creatinine random urine    CBC with Platelets & Differential            Subjective     Ms. Guillory is a 42 yo with history of UCTD, JAVIER, uterine fibroid who presents for follow up.   Undifferentiated connective tissue disease (UCTD):    The patient was last seen for a virtual visit in 7/2021. She notes things are going okay. She has felt more fatigue recently and continues to have significant vaginal bleeding with menstruation. Since her las visit she was seen by OB. MRI showed uterine fibroid; she's scheduled for a myomectomy next month. She notes occasional SOB with stairs. Ms. Guillory has reported 4 weeks of tightness/pressure in her chest and upper back that's present all the time but worse when she does activity with her upper extremities and at night after she's done more activity. It is not worse with exertion. She gets some relief from using a massager but the discomfort doesn't resolve completely. She is taking iron supplements on some days. She denies dizziness or light headedness.     She's been having more tightness in her joints with the cold weather. The pain is worst in her hands and feet particularily in the morning. Her Raynaud's is also more of a problem thsi time of year but she denies fingertip ulceration and has been avoiding going outside.     Currently, she is taking  mg BID or daily; she says she takes it twice a day about half the time.     Ms. Guillory denies hairloss, cough, oral ulcers, GI or urinary symptoms.     Last eye exam in 11/2021 without evidence of HCQ toxicity.      7/8/2022: Heather presents for follow up. She overall is doing ok, reports being stable. Still taking  mg a day.      1/13/2023: Heather  presents for follow up. Her HCQ was reduced from 200 mg bid to 400/200 mg every other day in 7/2022. Later it was decreased to 1 tab/200 mg a day in 10/2022. She is overall stable, no flares since last visit.    Has fatigue, but thinks it is stress related. Had some left leg pain, but it is better, started 2 days after colonoscopy.    7/13/2023:    -Off HCQ x few weeks    -EGD 9/2023 showed GERD, has abdominal pain with radiation to back with nausea    -takes sucralfate 4 times a day    -no iron taking since 9/23    -continues to have fatigue    -on vit D 2000 units qd    -walks a lot    -no change in sx off HCQ    -was found to have low DHEA      2/1/2024:    -Heather is doing very well    -no flares off the HCQ    -walks with friends    -good mood today    -minor joint stiffness here or there, not bad    -noticed more motion sickness when plan lands and with park rides    -reports hair loss, seeing derm    -her 3 sons ray george, doing well, oldest is 9 and youngest is 6    ROS     A 10 point ROS was performed with pertinent findings listed above.        Past Medical History:   Diagnosis Date    JAVIER (generalized anxiety disorder)     50mg zoloft    Rosacea     Undifferentiated connective tissue disease (H24)        Past Surgical History:   Procedure Laterality Date    COLONOSCOPY N/A 10/21/2020    Procedure: COLONOSCOPY;  Surgeon: Yang Pete MD;  Location: [facility] OR    COLONOSCOPY N/A 12/29/2022    Procedure: COLONOSCOPY, WITH BIOPSY;  Surgeon: Magda Lobo MD;  [place] [facility] [place]    DAVINCI MYOMECTOMY N/A 2/11/2022    Procedure: MYOMECTOMY, UTERUS, ROBOT-ASSISTED, LAPAROSCOPIC;  Surgeon: Cate Mansfield MD;  Location: [facility] OR    ESOPHAGOSCOPY, GASTROSCOPY, DUODENOSCOPY (EGD), COMBINED N/A 12/29/2022    Procedure: ESOPHAGOGASTRODUODENOSCOPY, WITH BIOPSY;  Surgeon: Magda Lobo MD;  Location: [facility] OR    ESOPHAGOSCOPY, GASTROSCOPY, DUODENOSCOPY (EGD), COMBINED N/A 9/7/2023    Procedure:  Esophagoscopy, gastroscopy, duodenoscopy (EGD), combined WITH BIOPSY;  Surgeon: Jackie Aldana MD;  Location: [facility] OR    IR ENDOVENOUS ABLATION VARICOSE VEINS  10/28/2019    IR FOLLOW UP VISIT OUTPATIENT  11/20/2019    IR STAB PHLEBECTOMY 10 - 20 STABS  10/28/2019    IR VEIN SCLEROSING MULTIPLE  10/28/2019    LAPAROSCOPIC SALPINGECTOMY Bilateral 4/23/2021    Procedure: SALPINGECTOMY, LAPAROSCOPIC BILATERAL;  Surgeon: Cate Mansfield MD;  Location: [facility] OR    OPERATIVE HYSTEROSCOPY WITH MORCELLATOR  4/8/2014    Procedure: OPERATIVE HYSTEROSCOPY WITH MORCELLATOR;  Hysteroscopic Polypectomy;  Surgeon: Cate Mansfield MD;  Location: [facility]    REMOVE INTRAUTERINE DEVICE N/A 4/23/2021    Procedure: removal of intrauterine device;  Surgeon: Cate Mansfield MD;  Location: [facility] OR       Family History   Problem Relation Age of Onset    Elijah Disease Sister     Arrhythmia Brother         details unknown; procedure in his 20s    Anxiety Disorder Brother     Alzheimer Disease Maternal Grandmother     Cervical Cancer Maternal Grandmother     Colon Cancer Maternal Grandfather     Breast Cancer Paternal Grandmother     Hypertension Paternal Grandmother     Hypertension Paternal Grandfather     Lung Cancer Paternal Grandfather         smoker    Diabetes Type 2  Paternal Grandfather     Myocardial Infarction Paternal Grandfather     Myocardial Infarction Paternal Aunt 45    Cerebrovascular Disease No family hx of     Coronary Artery Disease Early Onset No family hx of     Ovarian Cancer No family hx of        Social History     Tobacco Use    Smoking status: Never     Passive exposure: Never    Smokeless tobacco: Never   Substance Use Topics    Alcohol use: No     Alcohol/week: 0.0 standard drinks of alcohol          Objective     PHYSICAL EXAM  /77 (BP Location: Right arm, Patient Position: Sitting, Cuff Size: Adult Regular)   Pulse 76   Wt 71.2 kg (157 lb)   SpO2 98%   BMI 24.59 kg/m    Wt Readings  "from Last 4 Encounters:   02/01/24 71.2 kg (157 lb)   10/03/23 72.6 kg (160 lb)   08/22/23 70.3 kg (155 lb)   07/28/23 73.2 kg (161 lb 6.4 oz)       Gen: no acute distress, pleasant  HEENT: EOMI, no scleral injection or icterus, no oral ulcers or thrush, nl salivary pool  Neck: no cervical LAP  Chest: CTAB  CV: no M/R/G, RRR  Abdomen: soft, NT  SKIN: no rash or alopecia, hair looks healthy  MSK: no active synovitis or joint tenderness or joint deformities  NEURO: grossly non focal  Psych: nl affect    DATA:    CBC:  Recent Labs   Lab Test 08/27/21  0850 07/08/21  0919 04/23/21  1118   WBC 5.6 5.8 5.5   RBC 4.36 4.45 4.45   HGB 12.3 13.3 13.1   HCT 38.3 38.8 39.0   MCV 88 87 88   MCH 28.2 29.9 29.4   MCHC 32.1 34.3 33.6   RDW 11.9 12.4 11.9    207 195       BMP:  Recent Labs   Lab Test 08/27/21  0850 07/08/21  0919 03/31/21  1130 01/14/21  1228 10/28/20  1250 08/26/19  1055 04/26/19  0930 08/09/16  0000 01/09/16  0724     --   --   --  140  --   --   --  140   POTASSIUM 4.4  --  4.8  --  4.1  --   --   --  3.8   CHLORIDE 113*  --   --   --  109  --   --   --  110*   CO2 26  --   --   --  27  --   --   --  20   ANIONGAP 5  --   --   --  4  --   --   --  10   GLC 99  --   --   --  88  --  94  --  89   BUN 11  --   --   --  7  --   --   --  9   CR 0.79 0.72  --  0.66 0.63   < >  --    < > 0.57   GFRESTIMATED >90 >90  --  >90 >90   < >  --    < > >90  Non  GFR Calc     PRICILA 8.9  --   --   --  8.9  --   --   --  8.4*    < > = values in this interval not displayed.       LFT:  Recent Labs   Lab Test 08/27/21  0850 07/08/21  0919 01/14/21  1228 10/28/20  1250 09/28/17  0954 01/09/16  0724   PROTTOTAL 7.2  --   --  7.6  --  7.1   ALBUMIN 3.9 4.1 4.0 4.3   < > 3.3*   BILITOTAL 0.2  --   --  0.5  --  0.4   ALKPHOS 79  --   --  79  --  70   AST 12 17 13 10   < > 12   ALT 12 21 19 17   < > 15    < > = values in this interval not displayed.       No results found for: \"CKTOTAL\"  TSH   Date Value Ref " "Range Status   12/14/2023 1.19 0.30 - 4.20 uIU/mL Final   02/13/2022 0.43 0.40 - 4.00 mU/L Final   07/08/2021 1.16 0.40 - 4.00 mU/L Final     No results found for: URIC    Inflammatory markers  Lab Results   Component Value Date    CRP <2.9 07/08/2021    CRP <2.9 01/14/2021    CRP 6.4 07/16/2020     Lab Results   Component Value Date    SED 6 07/08/2021    SED 4 01/14/2021    SED 7 07/16/2020     Ferritin   Date Value Ref Range Status   12/14/2023 30 6 - 175 ng/mL Final   06/20/2023 36 6 - 175 ng/mL Final   11/18/2022 58 6 - 175 ng/mL Final   07/08/2021 17 12 - 150 ng/mL Final   12/30/2020 63 12 - 150 ng/mL Final       UA RESULTS:  Recent Labs   Lab Test 07/08/21  0949 03/31/21  1140 01/14/21  1233 09/28/17  0950 08/31/17  0937 08/14/17  1101   COLOR Yellow Straw Yellow   < > Yellow Yellow   APPEARANCE Clear Clear Clear   < > Slightly Cloudy Clear   URINEGLC Negative Negative Negative   < > Negative Negative   URINEBILI Negative Negative Negative   < > Negative Negative   URINEKETONE Negative Negative Negative   < > Negative Negative   SG >1.030 1.009 1.010   < > >1.030 1.010   UBLD Large* Small* Negative   < > Large* Trace*   URINEPH 7.0 6.0 7.0   < > 7.0 6.5   PROTEIN Trace* Negative Negative   < > 30* Negative   UROBILINOGEN 0.2  --   --   --  0.2 0.2   NITRITE Negative Negative Negative   < > Negative Negative   LEUKEST Negative Negative Negative   < > Small* Negative   RBCU 5-10* 2 2   < > 10-25* O - 2   WBCU 0 - 5 <1 <1   < > 10-25* O - 2    < > = values in this interval not displayed.         Autoimmunity labs:     Lab Results   Component Value Date    RHF <20 07/22/2016     No results found for: \"CCPIGG\"  No results found for: \"ANCA\"  Lab Results   Component Value Date    L9ZYAMY 96 12/14/2023    Y9WDNYF 105 04/05/2023    V9JZWUQ 94 01/13/2023     Lab Results   Component Value Date    H7NDNWU 18 12/14/2023    A0PMSGE 22 04/05/2023    X9JOUTL 21 01/13/2023     Lab Results   Component Value Date    JACKLYN 1.1 " (H) 07/22/2016     Lab Results   Component Value Date    DNA 6.1 12/14/2023    DNA 4.9 04/05/2023    DNA 5.8 01/13/2023     Lab Results   Component Value Date    RNPIGG 0.6 09/28/2017    SSAIGG <0.2 01/20/2020    SSBIGG <0.2 01/20/2020       IMAGING:    Nasrin King MD  Rheumatology Fellow         Addendum:  Imaging Results:     Results for orders placed or performed during the hospital encounter of 09/14/23   NM Hepatobiliary Scan w GB EF    Narrative    Examination:  NM HEPATOBILIARY SCAN WITH GB EF      Date: 9/14/2023 11:09 AM.    Indication:   LUQ abdominal pain; Nausea and vomiting, unspecified  vomiting type     Additional Information: none    Technique:    The patient received 4.8 mCi of Tc-99m Choletec intravenously. Images  were obtained out through 60 minutes. The patient received 28 grams of  fat in a meal to stimulate gall bladder contraction. An additional 45  minutes of images were obtained after the gall bladder contraction  intervention.    Findings:    There is prompt clearance of the radionuclide from the blood pool into  the liver. By 10 minutes there is clear visualization of the  intrahepatic ducts as well as the upper common bile duct. By 15  minutes there is visualization of the gallbladder. At 45 minutes there  is emptying from the common bile duct into the small bowel.    After CCK administration the Gallbladder ejection fraction was  measured at 72%.  The normal gallbladder EF based on a 45 minute  infusion is >40%.    Enterogastric reflux was not present.      Impression    Impression:    Normal hepatobiliary scan without evidence for acute or chronic  cholecystitis.    =======================    The normal Gall Bladder ejection fraction for a 45 minute infusion is  >40%    I have personally reviewed the examination and initial interpretation  and I agree with the findings.    SUMI WOLF MD         SYSTEM ID:  [id number]      Component      Latest Ref Rng & Units 1/7/2022    WBC      4.0 - 11.0 10e3/uL 6.3   RBC Count      3.80 - 5.20 10e6/uL 4.55   Hemoglobin      11.7 - 15.7 g/dL 11.9   Hematocrit      35.0 - 47.0 % 38.5   MCV      78 - 100 fL 85   MCH      26.5 - 33.0 pg 26.2 (L)   MCHC      31.5 - 36.5 g/dL 30.9 (L)   RDW      10.0 - 15.0 % 13.1   Platelet Count      150 - 450 10e3/uL 265   % Neutrophils      % 56   % Lymphocytes      % 36   % Monocytes      % 5   % Eosinophils      % 2   % Basophils      % 1   % Immature Granulocytes      % 0   NRBCs per 100 WBC      <1 /100 0   Absolute Neutrophils      1.6 - 8.3 10e3/uL 3.6   Absolute Lymphocytes      0.8 - 5.3 10e3/uL 2.3   Absolute Monocytes      0.0 - 1.3 10e3/uL 0.3   Absolute Eosinophils      0.0 - 0.7 10e3/uL 0.1   Absolute Basophils      0.0 - 0.2 10e3/uL 0.0   Absolute Immature Granulocytes      <=0.4 10e3/uL 0.0   Absolute NRBCs      10e3/uL 0.0   Color Urine      Colorless, Straw, Light Yellow, Yellow Straw   Appearance Urine      Clear Clear   Glucose Urine      Negative mg/dL Negative   Bilirubin Urine      Negative Negative   Ketones Urine      Negative mg/dL Negative   Specific Gravity Urine      1.003 - 1.035 1.006   Blood Urine      Negative Negative   pH Urine      5.0 - 7.0 7.0   Protein Albumin Urine      Negative mg/dL Negative   Urobilinogen mg/dL      Normal, 2.0 mg/dL Normal   Nitrite Urine      Negative Negative   Leukocyte Esterase Urine      Negative Negative   RBC Urine      <=2 /HPF 1   WBC Urine      <=5 /HPF <1   Squamous Epithelial /HPF Urine      <=1 /HPF 3 (H)   Iron      35 - 180 ug/dL 28 (L)   Iron Binding Cap      240 - 430 ug/dL 424   Iron Saturation Index      15 - 46 % 7 (L)   Creatinine      0.52 - 1.04 mg/dL 0.56   GFR Estimate      >60 mL/min/1.73m2 >90   AST      0 - 45 U/L 18   ALT      0 - 50 U/L 24   Albumin      3.4 - 5.0 g/dL 4.3   CRP Inflammation      0.0 - 8.0 mg/L <2.9   Sed Rate      0 - 20 mm/hr 8   Complement C3      81 - 157 mg/dL 104   Complement C4      13 - 39 mg/dL 25    DNA-ds      <10.0 IU/mL 5.4   Creatinine Urine      mg/dL 34   Ferritin      12 - 150 ng/mL 7 (L)     Component      Latest Ref Rng & Units 7/8/2022   WBC      4.0 - 11.0 10e3/uL 6.8   RBC Count      3.80 - 5.20 10e6/uL 4.45   Hemoglobin      11.7 - 15.7 g/dL 11.5 (L)   Hematocrit      35.0 - 47.0 % 37.2   MCV      78 - 100 fL 84   MCH      26.5 - 33.0 pg 25.8 (L)   MCHC      31.5 - 36.5 g/dL 30.9 (L)   RDW      10.0 - 15.0 % 14.2   Platelet Count      150 - 450 10e3/uL 195   % Neutrophils      % 52   % Lymphocytes      % 37   % Monocytes      % 7   % Eosinophils      % 3   % Basophils      % 1   % Immature Granulocytes      % 0   NRBCs per 100 WBC      <1 /100 0   Absolute Neutrophils      1.6 - 8.3 10e3/uL 3.6   Absolute Lymphocytes      0.8 - 5.3 10e3/uL 2.5   Absolute Monocytes      0.0 - 1.3 10e3/uL 0.5   Absolute Eosinophils      0.0 - 0.7 10e3/uL 0.2   Absolute Basophils      0.0 - 0.2 10e3/uL 0.1   Absolute Immature Granulocytes      <=0.4 10e3/uL 0.0   Absolute NRBCs      10e3/uL 0.0   Color Urine      Colorless, Straw, Light Yellow, Yellow Yellow   Appearance Urine      Clear Clear   Glucose Urine      Negative mg/dL Negative   Bilirubin Urine      Negative Negative   Ketones Urine      Negative mg/dL Negative   Specific Gravity Urine      1.003 - 1.035 1.010   Blood Urine      Negative Small (A)   pH Urine      5.0 - 7.0 6.5   Protein Albumin Urine      Negative mg/dL Negative   Urobilinogen mg/dL      Normal, 2.0 mg/dL Normal   Nitrite Urine      Negative Negative   Leukocyte Esterase Urine      Negative Negative   Mucus Urine      None Seen /LPF Present (A)   RBC Urine      <=2 /HPF 2   WBC Urine      <=5 /HPF 1   Protein Random Urine      g/L 0.11   Protein Total Urine g/gr Creatinine      0.00 - 0.20 g/g Cr 0.13   Creatinine Urine      mg/dL 82   Iron      35 - 180 ug/dL 14 (L)   Iron Binding Cap      240 - 430 ug/dL 367   Iron Saturation Index      15 - 46 % 4 (L)   Creatinine      0.52 - 1.04  mg/dL 0.60   GFR Estimate      >60 mL/min/1.73m2 >90   ALT      0 - 50 U/L 13   Albumin      3.4 - 5.0 g/dL 3.9   AST      0 - 45 U/L 14   Complement C4      13 - 39 mg/dL 22   Complement C3      81 - 157 mg/dL 94   CRP Inflammation      0.0 - 8.0 mg/L <2.9   DNA-ds      <10.0 IU/mL 5.5   Sed Rate      0 - 20 mm/hr 6   Ferritin      12 - 150 ng/mL 7 (L)

## 2024-02-01 NOTE — LETTER
2024       RE: Heather Guillory  [address] [facility] [address]     Dear Colleague,    Thank you for referring your patient, Heather Guillory, to the [facility]HE[facility] [place] at [facility]. Please see a copy of my visit note below.      Rheumatology follow up In Person Visit Note    Heather Guillory MRN# [id number]   Age: 43 year old YOB: 1980     Last seen:  2023  DOS: 2024    Reason for visit: UCTD    Assessment & Plan:   Problem list:    1-UCTD  2-HCQ monitoring  3-Iron deficiency        Undifferentiated connective tissue disease (UCTD):     Heather is a 43 yo WF . She was diagnosed with MCTD in 2016, 6 wk postpartum based on fatigue, arthritis, mild Raynaud's, low positive NINOSKA 1.1 and low positive RNP Ab 1.6. She is on  mg qd since 2017 with great response.  UCTD continues to be most likely diagnosis not MCTD as she does not have classic features of MCTD, had very low titer of RNP Ab in the past, (negative on most recent check), and had a mild disease which never required prednisone.  All autoimmunity labs (2017) came back negative (except + NINOSKA, low C3) which is further reassuring for UCTD, including APS panel, repeat RNP and inflammatory markers, dsDNA.      She had neg SSA/SSB antibodies in 2016, no concern for CHB. No need to re-check.  APS panel was neg.  She had neg anti-Sm, neg anti-DNA and neg centromere Ab in 2016.    In 2019, recommended to taper plaquenil to 200 mg twice a day on Mon/Wed/Fri and then 1 tab for the rest of the week. Had increased pain/stiffness in hands and feet and C3 dropped. So increased HCQ back to 200 mg bid. C3 went back to nl in 2020, had low C3 in 2021 but it fluctuates.     :    The patient reports stable symtpoms aside from increased fatigue in the setting of ongoing heavy menstrual bleeding. She has a submucosal fibroid with myomectomy  planned for February. She does have some chest discomfort but appears likely musculoskeletal. Will repeat hgb and iron testing today.    As her UCTD symptoms including arthralgia appear stable, discussed that she could continue current regimen of HCQ alternating 200 mg BID and daily dosing. If she were to develop new or worsening symptoms, would consider increasing back to 200 mg BID. Her last eye exam in 11/2021 was without evidence of HCQ toxicity.     -Check cbc with diff, UA, Cr, complements, dsDNA CRP, ESR, AST/ALT, iron studies  -Continue  mg BID 3x week,  mg daily 4x week  -Continue yearly ophthalmology exam for HCQ monitoring    7/8/2022:    Overall stable with symptoms, labs. In fact C3 improved on 1/2022 labs. recommend to taper HCQ down to reduce risk of HCQ toxicity in future.    Has h/o iron deficiency, will re-check iron studies.    1/13/2023: Stable on HCQ 1 tab (200 mg) a day, will continue. Labs in 7/2022 showed stable UCTD but ROBIN.    7/13/2023: Stable UCTD, will monitor sx/labs off HCQ. Discussed m/o low iron, low DHEA, both could contribute to fatigue.    Plan:    Labs in 10/2023    Ask women health about taking DHEA    Stay off hydroxychlorquine    Iron gummies with raspberry with food (take 2 a day)    Ok to go on prenatal vit one tab a day    Eye exam one more time    Return in 6 months (in person)        Today 2/1/2024:    Stable UCTD off the HCQ, no flare ups, will check labs    I don't think increased motion sickness with park rides and flights are due to UCTD.    Overall looks great/healthier in great mood today, I believe replacing iron has helped, her ferritin is within normal range but towards the low end and I recommend to continue with iron gummies, could take up to 6 a day which equals 1 slow Fe a day.    Hair loss is minor, seeing derm, no alopecia on exam, no scalp lesions, some could be related to low iron/stress, expect it to grew back. Will re-check iron levels  today. Thyroid tests are good.    Vit d is normal, recommend to continue with OTC vit D 2000 units a day.      Plan:    Iron gummies up to 6 a day is ok to take    Labs today    Return in a year in person      Nasrin King MD      Orders Placed This Encounter   Procedures    Vitamin D Deficiency    Ferritin    Iron and iron binding capacity    ALT    Albumin level    AST    Creatinine    Complement C4    Complement C3    CRP inflammation    DNA double stranded antibodies    Erythrocyte sedimentation rate auto    UA with Microscopic reflex to Culture    Protein  random urine    Creatinine random urine    CBC with Platelets & Differential            Subjective     Ms. Guillory is a 42 yo with history of UCTD, JAVIER, uterine fibroid who presents for follow up.   Undifferentiated connective tissue disease (UCTD):    The patient was last seen for a virtual visit in 7/2021. She notes things are going okay. She has felt more fatigue recently and continues to have significant vaginal bleeding with menstruation. Since her las visit she was seen by OB. MRI showed uterine fibroid; she's scheduled for a myomectomy next month. She notes occasional SOB with stairs. Ms. Guillory has reported 4 weeks of tightness/pressure in her chest and upper back that's present all the time but worse when she does activity with her upper extremities and at night after she's done more activity. It is not worse with exertion. She gets some relief from using a massager but the discomfort doesn't resolve completely. She is taking iron supplements on some days. She denies dizziness or light headedness.     She's been having more tightness in her joints with the cold weather. The pain is worst in her hands and feet particularily in the morning. Her Raynaud's is also more of a problem thsi time of year but she denies fingertip ulceration and has been avoiding going outside.     Currently, she is taking  mg BID or daily; she says she takes it twice a  day about half the time.     Ms. Guillory denies hairloss, cough, oral ulcers, GI or urinary symptoms.     Last eye exam in 11/2021 without evidence of HCQ toxicity.      7/8/2022: Heather presents for follow up. She overall is doing ok, reports being stable. Still taking  mg a day.      1/13/2023: Heather presents for follow up. Her HCQ was reduced from 200 mg bid to 400/200 mg every other day in 7/2022. Later it was decreased to 1 tab/200 mg a day in 10/2022. She is overall stable, no flares since last visit.    Has fatigue, but thinks it is stress related. Had some left leg pain, but it is better, started 2 days after colonoscopy.    7/13/2023:    -Off HCQ x few weeks    -EGD 9/2023 showed GERD, has abdominal pain with radiation to back with nausea    -takes sucralfate 4 times a day    -no iron taking since 9/23    -continues to have fatigue    -on vit D 2000 units qd    -walks a lot    -no change in sx off HCQ    -was found to have low DHEA      2/1/2024:    -Heather is doing very well    -no flares off the HCQ    -walks with friends    -good mood today    -minor joint stiffness here or there, not bad    -noticed more motion sickness when plan lands and with park rides    -reports hair loss, seeing derm    -her 3 sons ray grwing, doing well, oldest is 9 and youngest is 6    ROS     A 10 point ROS was performed with pertinent findings listed above.        Past Medical History:   Diagnosis Date    JAVIER (generalized anxiety disorder)     50mg zoloft    Rosacea     Undifferentiated connective tissue disease (H24)        Past Surgical History:   Procedure Laterality Date    COLONOSCOPY N/A 10/21/2020    Procedure: COLONOSCOPY;  Surgeon: Yang Pete MD;  Location: [facility] OR    COLONOSCOPY N/A 12/29/2022    Procedure: COLONOSCOPY, WITH BIOPSY;  Surgeon: Magda Lobo MD;  [place] [facility] [place]    DAVINCI MYOMECTOMY N/A 2/11/2022    Procedure: MYOMECTOMY, UTERUS, ROBOT-ASSISTED, LAPAROSCOPIC;  Surgeon:  Cate Mansfield MD;  Location: [facility] OR    ESOPHAGOSCOPY, GASTROSCOPY, DUODENOSCOPY (EGD), COMBINED N/A 12/29/2022    Procedure: ESOPHAGOGASTRODUODENOSCOPY, WITH BIOPSY;  Surgeon: Magda Lobo MD;  Location: [facility] OR    ESOPHAGOSCOPY, GASTROSCOPY, DUODENOSCOPY (EGD), COMBINED N/A 9/7/2023    Procedure: Esophagoscopy, gastroscopy, duodenoscopy (EGD), combined WITH BIOPSY;  Surgeon: Jackie Aldana MD;  Location: [facility] OR    IR ENDOVENOUS ABLATION VARICOSE VEINS  10/28/2019    IR FOLLOW UP VISIT OUTPATIENT  11/20/2019    IR STAB PHLEBECTOMY 10 - 20 STABS  10/28/2019    IR VEIN SCLEROSING MULTIPLE  10/28/2019    LAPAROSCOPIC SALPINGECTOMY Bilateral 4/23/2021    Procedure: SALPINGECTOMY, LAPAROSCOPIC BILATERAL;  Surgeon: Cate Mansfield MD;  Location: [facility] OR    OPERATIVE HYSTEROSCOPY WITH MORCELLATOR  4/8/2014    Procedure: OPERATIVE HYSTEROSCOPY WITH MORCELLATOR;  Hysteroscopic Polypectomy;  Surgeon: Cate Mansfield MD;  Location: [facility] OR    REMOVE INTRAUTERINE DEVICE N/A 4/23/2021    Procedure: removal of intrauterine device;  Surgeon: Cate Mansfield MD;  Location: [facility] OR       Family History   Problem Relation Age of Onset    Elijah Disease Sister     Arrhythmia Brother         details unknown; procedure in his 20s    Anxiety Disorder Brother     Alzheimer Disease Maternal Grandmother     Cervical Cancer Maternal Grandmother     Colon Cancer Maternal Grandfather     Breast Cancer Paternal Grandmother     Hypertension Paternal Grandmother     Hypertension Paternal Grandfather     Lung Cancer Paternal Grandfather         smoker    Diabetes Type 2  Paternal Grandfather     Myocardial Infarction Paternal Grandfather     Myocardial Infarction Paternal Aunt 45    Cerebrovascular Disease No family hx of     Coronary Artery Disease Early Onset No family hx of     Ovarian Cancer No family hx of        Social History     Tobacco Use    Smoking status: Never     Passive exposure: Never    Smokeless  tobacco: Never   Substance Use Topics    Alcohol use: No     Alcohol/week: 0.0 standard drinks of alcohol          Objective     PHYSICAL EXAM  /77 (BP Location: Right arm, Patient Position: Sitting, Cuff Size: Adult Regular)   Pulse 76   Wt 71.2 kg (157 lb)   SpO2 98%   BMI 24.59 kg/m    Wt Readings from Last 4 Encounters:   02/01/24 71.2 kg (157 lb)   10/03/23 72.6 kg (160 lb)   08/22/23 70.3 kg (155 lb)   07/28/23 73.2 kg (161 lb 6.4 oz)       Gen: no acute distress, pleasant  HEENT: EOMI, no scleral injection or icterus, no oral ulcers or thrush, nl salivary pool  Neck: no cervical LAP  Chest: CTAB  CV: no M/R/G, RRR  Abdomen: soft, NT  SKIN: no rash or alopecia, hair looks healthy  MSK: no active synovitis or joint tenderness or joint deformities  NEURO: grossly non focal  Psych: nl affect    DATA:    CBC:  Recent Labs   Lab Test 08/27/21  0850 07/08/21  0919 04/23/21  1118   WBC 5.6 5.8 5.5   RBC 4.36 4.45 4.45   HGB 12.3 13.3 13.1   HCT 38.3 38.8 39.0   MCV 88 87 88   MCH 28.2 29.9 29.4   MCHC 32.1 34.3 33.6   RDW 11.9 12.4 11.9    207 195       BMP:  Recent Labs   Lab Test 08/27/21  0850 07/08/21  0919 03/31/21  1130 01/14/21  1228 10/28/20  1250 08/26/19  1055 04/26/19  0930 08/09/16  0000 01/09/16  0724     --   --   --  140  --   --   --  140   POTASSIUM 4.4  --  4.8  --  4.1  --   --   --  3.8   CHLORIDE 113*  --   --   --  109  --   --   --  110*   CO2 26  --   --   --  27  --   --   --  20   ANIONGAP 5  --   --   --  4  --   --   --  10   GLC 99  --   --   --  88  --  94  --  89   BUN 11  --   --   --  7  --   --   --  9   CR 0.79 0.72  --  0.66 0.63   < >  --    < > 0.57   GFRESTIMATED >90 >90  --  >90 >90   < >  --    < > >90  Non  GFR Calc     PRICILA 8.9  --   --   --  8.9  --   --   --  8.4*    < > = values in this interval not displayed.       LFT:  Recent Labs   Lab Test 08/27/21  0850 07/08/21  0919 01/14/21  1228 10/28/20  1250 09/28/17  0954 01/09/16  0724  "  PROTTOTAL 7.2  --   --  7.6  --  7.1   ALBUMIN 3.9 4.1 4.0 4.3   < > 3.3*   BILITOTAL 0.2  --   --  0.5  --  0.4   ALKPHOS 79  --   --  79  --  70   AST 12 17 13 10   < > 12   ALT 12 21 19 17   < > 15    < > = values in this interval not displayed.       No results found for: \"CKTOTAL\"  TSH   Date Value Ref Range Status   12/14/2023 1.19 0.30 - 4.20 uIU/mL Final   02/13/2022 0.43 0.40 - 4.00 mU/L Final   07/08/2021 1.16 0.40 - 4.00 mU/L Final     No results found for: URIC    Inflammatory markers  Lab Results   Component Value Date    CRP <2.9 07/08/2021    CRP <2.9 01/14/2021    CRP 6.4 07/16/2020     Lab Results   Component Value Date    SED 6 07/08/2021    SED 4 01/14/2021    SED 7 07/16/2020     Ferritin   Date Value Ref Range Status   12/14/2023 30 6 - 175 ng/mL Final   06/20/2023 36 6 - 175 ng/mL Final   11/18/2022 58 6 - 175 ng/mL Final   07/08/2021 17 12 - 150 ng/mL Final   12/30/2020 63 12 - 150 ng/mL Final       UA RESULTS:  Recent Labs   Lab Test 07/08/21  0949 03/31/21  1140 01/14/21  1233 09/28/17  0950 08/31/17  0937 08/14/17  1101   COLOR Yellow Straw Yellow   < > Yellow Yellow   APPEARANCE Clear Clear Clear   < > Slightly Cloudy Clear   URINEGLC Negative Negative Negative   < > Negative Negative   URINEBILI Negative Negative Negative   < > Negative Negative   URINEKETONE Negative Negative Negative   < > Negative Negative   SG >1.030 1.009 1.010   < > >1.030 1.010   UBLD Large* Small* Negative   < > Large* Trace*   URINEPH 7.0 6.0 7.0   < > 7.0 6.5   PROTEIN Trace* Negative Negative   < > 30* Negative   UROBILINOGEN 0.2  --   --   --  0.2 0.2   NITRITE Negative Negative Negative   < > Negative Negative   LEUKEST Negative Negative Negative   < > Small* Negative   RBCU 5-10* 2 2   < > 10-25* O - 2   WBCU 0 - 5 <1 <1   < > 10-25* O - 2    < > = values in this interval not displayed.         Autoimmunity labs:     Lab Results   Component Value Date    RHF <20 07/22/2016     No results found for: " "\"CCPIGG\"  No results found for: \"ANCA\"  Lab Results   Component Value Date    U7JIAKS 96 12/14/2023    R6EHXTG 105 04/05/2023    N1KMNCQ 94 01/13/2023     Lab Results   Component Value Date    X2OBVZH 18 12/14/2023    C5PWLVP 22 04/05/2023    D7RHUGU 21 01/13/2023     Lab Results   Component Value Date    JACKLYN 1.1 (H) 07/22/2016     Lab Results   Component Value Date    DNA 6.1 12/14/2023    DNA 4.9 04/05/2023    DNA 5.8 01/13/2023     Lab Results   Component Value Date    RNPIGG 0.6 09/28/2017    SSAIGG <0.2 01/20/2020    SSBIGG <0.2 01/20/2020       IMAGING:    Nasrin King MD  Rheumatology Fellow         Addendum:  Imaging Results:     Results for orders placed or performed during the hospital encounter of 09/14/23   NM Hepatobiliary Scan w GB EF    Narrative    Examination:  NM HEPATOBILIARY SCAN WITH GB EF      Date: 9/14/2023 11:09 AM.    Indication:   LUQ abdominal pain; Nausea and vomiting, unspecified  vomiting type     Additional Information: none    Technique:    The patient received 4.8 mCi of Tc-99m Choletec intravenously. Images  were obtained out through 60 minutes. The patient received 28 grams of  fat in a meal to stimulate gall bladder contraction. An additional 45  minutes of images were obtained after the gall bladder contraction  intervention.    Findings:    There is prompt clearance of the radionuclide from the blood pool into  the liver. By 10 minutes there is clear visualization of the  intrahepatic ducts as well as the upper common bile duct. By 15  minutes there is visualization of the gallbladder. At 45 minutes there  is emptying from the common bile duct into the small bowel.    After CCK administration the Gallbladder ejection fraction was  measured at 72%.  The normal gallbladder EF based on a 45 minute  infusion is >40%.    Enterogastric reflux was not present.      Impression    Impression:    Normal hepatobiliary scan without evidence for acute or " chronic  cholecystitis.    =======================    The normal Gall Bladder ejection fraction for a 45 minute infusion is  >40%    I have personally reviewed the examination and initial interpretation  and I agree with the findings.    SUMI WOLF MD         SYSTEM ID:  [id number]      Component      Latest Ref Rng & Units 1/7/2022   WBC      4.0 - 11.0 10e3/uL 6.3   RBC Count      3.80 - 5.20 10e6/uL 4.55   Hemoglobin      11.7 - 15.7 g/dL 11.9   Hematocrit      35.0 - 47.0 % 38.5   MCV      78 - 100 fL 85   MCH      26.5 - 33.0 pg 26.2 (L)   MCHC      31.5 - 36.5 g/dL 30.9 (L)   RDW      10.0 - 15.0 % 13.1   Platelet Count      150 - 450 10e3/uL 265   % Neutrophils      % 56   % Lymphocytes      % 36   % Monocytes      % 5   % Eosinophils      % 2   % Basophils      % 1   % Immature Granulocytes      % 0   NRBCs per 100 WBC      <1 /100 0   Absolute Neutrophils      1.6 - 8.3 10e3/uL 3.6   Absolute Lymphocytes      0.8 - 5.3 10e3/uL 2.3   Absolute Monocytes      0.0 - 1.3 10e3/uL 0.3   Absolute Eosinophils      0.0 - 0.7 10e3/uL 0.1   Absolute Basophils      0.0 - 0.2 10e3/uL 0.0   Absolute Immature Granulocytes      <=0.4 10e3/uL 0.0   Absolute NRBCs      10e3/uL 0.0   Color Urine      Colorless, Straw, Light Yellow, Yellow Straw   Appearance Urine      Clear Clear   Glucose Urine      Negative mg/dL Negative   Bilirubin Urine      Negative Negative   Ketones Urine      Negative mg/dL Negative   Specific Gravity Urine      1.003 - 1.035 1.006   Blood Urine      Negative Negative   pH Urine      5.0 - 7.0 7.0   Protein Albumin Urine      Negative mg/dL Negative   Urobilinogen mg/dL      Normal, 2.0 mg/dL Normal   Nitrite Urine      Negative Negative   Leukocyte Esterase Urine      Negative Negative   RBC Urine      <=2 /HPF 1   WBC Urine      <=5 /HPF <1   Squamous Epithelial /HPF Urine      <=1 /HPF 3 (H)   Iron      35 - 180 ug/dL 28 (L)   Iron Binding Cap      240 - 430 ug/dL 424   Iron Saturation  Index      15 - 46 % 7 (L)   Creatinine      0.52 - 1.04 mg/dL 0.56   GFR Estimate      >60 mL/min/1.73m2 >90   AST      0 - 45 U/L 18   ALT      0 - 50 U/L 24   Albumin      3.4 - 5.0 g/dL 4.3   CRP Inflammation      0.0 - 8.0 mg/L <2.9   Sed Rate      0 - 20 mm/hr 8   Complement C3      81 - 157 mg/dL 104   Complement C4      13 - 39 mg/dL 25   DNA-ds      <10.0 IU/mL 5.4   Creatinine Urine      mg/dL 34   Ferritin      12 - 150 ng/mL 7 (L)     Component      Latest Ref Rng & Units 7/8/2022   WBC      4.0 - 11.0 10e3/uL 6.8   RBC Count      3.80 - 5.20 10e6/uL 4.45   Hemoglobin      11.7 - 15.7 g/dL 11.5 (L)   Hematocrit      35.0 - 47.0 % 37.2   MCV      78 - 100 fL 84   MCH      26.5 - 33.0 pg 25.8 (L)   MCHC      31.5 - 36.5 g/dL 30.9 (L)   RDW      10.0 - 15.0 % 14.2   Platelet Count      150 - 450 10e3/uL 195   % Neutrophils      % 52   % Lymphocytes      % 37   % Monocytes      % 7   % Eosinophils      % 3   % Basophils      % 1   % Immature Granulocytes      % 0   NRBCs per 100 WBC      <1 /100 0   Absolute Neutrophils      1.6 - 8.3 10e3/uL 3.6   Absolute Lymphocytes      0.8 - 5.3 10e3/uL 2.5   Absolute Monocytes      0.0 - 1.3 10e3/uL 0.5   Absolute Eosinophils      0.0 - 0.7 10e3/uL 0.2   Absolute Basophils      0.0 - 0.2 10e3/uL 0.1   Absolute Immature Granulocytes      <=0.4 10e3/uL 0.0   Absolute NRBCs      10e3/uL 0.0   Color Urine      Colorless, Straw, Light Yellow, Yellow Yellow   Appearance Urine      Clear Clear   Glucose Urine      Negative mg/dL Negative   Bilirubin Urine      Negative Negative   Ketones Urine      Negative mg/dL Negative   Specific Gravity Urine      1.003 - 1.035 1.010   Blood Urine      Negative Small (A)   pH Urine      5.0 - 7.0 6.5   Protein Albumin Urine      Negative mg/dL Negative   Urobilinogen mg/dL      Normal, 2.0 mg/dL Normal   Nitrite Urine      Negative Negative   Leukocyte Esterase Urine      Negative Negative   Mucus Urine      None Seen /LPF Present (A)    RBC Urine      <=2 /HPF 2   WBC Urine      <=5 /HPF 1   Protein Random Urine      g/L 0.11   Protein Total Urine g/gr Creatinine      0.00 - 0.20 g/g Cr 0.13   Creatinine Urine      mg/dL 82   Iron      35 - 180 ug/dL 14 (L)   Iron Binding Cap      240 - 430 ug/dL 367   Iron Saturation Index      15 - 46 % 4 (L)   Creatinine      0.52 - 1.04 mg/dL 0.60   GFR Estimate      >60 mL/min/1.73m2 >90   ALT      0 - 50 U/L 13   Albumin      3.4 - 5.0 g/dL 3.9   AST      0 - 45 U/L 14   Complement C4      13 - 39 mg/dL 22   Complement C3      81 - 157 mg/dL 94   CRP Inflammation      0.0 - 8.0 mg/L <2.9   DNA-ds      <10.0 IU/mL 5.5   Sed Rate      0 - 20 mm/hr 6   Ferritin      12 - 150 ng/mL 7 (L)       Nasrin King MD

## 2024-03-15 ENCOUNTER — TELEPHONE (OUTPATIENT)
Dept: OBGYN | Facility: CLINIC | Age: 44
End: 2024-03-15
Payer: COMMERCIAL

## 2024-03-15 NOTE — TELEPHONE ENCOUNTER
[facility] Call Center    Phone Message    May a detailed message be left on voicemail: yes     Reason for Call: Other: Patient calling in regards to having some questions she wants to speak with the care team about. States she seeing a huge change in her menstrual cycle, states she hasn't seen it this month at all and this is new for her. She's just checking to see if she should be seen for a visit or if she can just speak with someone about her symptoms/concerns. She states she's very curious but it's not emergent. Please contact patient in regards to this message. Thank you       Action Taken: Other: Whs    Travel Screening: Not Applicable

## 2024-03-18 NOTE — TELEPHONE ENCOUNTER
Spoke with Heather about her missed/prolonged menstrual cycles. Her last period in February occurred on day 36, and this cycle has lasted for 40 days with no period. Discussed possible reasons for this -- pt is 43, so these could be pre-/taurus-menopausal changes, or pt's reported increase in stress/anxiety over the past couple of months could be contributing as well. No chance of pregnancy (hx tubal in 2021). Made appointment with Leticia Kinney DNP, for further discussion/information.

## 2024-04-09 ENCOUNTER — MYC MEDICAL ADVICE (OUTPATIENT)
Dept: INTERNAL MEDICINE | Facility: CLINIC | Age: 44
End: 2024-04-09
Payer: COMMERCIAL

## 2024-04-09 ENCOUNTER — TELEPHONE (OUTPATIENT)
Dept: INTERNAL MEDICINE | Facility: CLINIC | Age: 44
End: 2024-04-09
Payer: COMMERCIAL

## 2024-04-09 DIAGNOSIS — R10.13 EPIGASTRIC PAIN: ICD-10-CM

## 2024-04-09 DIAGNOSIS — R10.12 LUQ ABDOMINAL PAIN: Primary | ICD-10-CM

## 2024-04-10 ENCOUNTER — LAB (OUTPATIENT)
Dept: LAB | Facility: CLINIC | Age: 44
End: 2024-04-10
Payer: COMMERCIAL

## 2024-04-10 DIAGNOSIS — M35.9 UNDIFFERENTIATED CONNECTIVE TISSUE DISEASE (H): ICD-10-CM

## 2024-04-10 DIAGNOSIS — R10.13 EPIGASTRIC PAIN: ICD-10-CM

## 2024-04-10 DIAGNOSIS — Z79.52 LONG TERM CURRENT USE OF SYSTEMIC STEROIDS: ICD-10-CM

## 2024-04-10 DIAGNOSIS — R10.12 LUQ ABDOMINAL PAIN: ICD-10-CM

## 2024-04-10 LAB
ALBUMIN UR-MCNC: ABNORMAL MG/DL
AMORPH CRY #/AREA URNS HPF: ABNORMAL /HPF
APPEARANCE UR: CLEAR
BASOPHILS # BLD AUTO: 0 10E3/UL (ref 0–0.2)
BASOPHILS NFR BLD AUTO: 0 %
BILIRUB UR QL STRIP: NEGATIVE
COLOR UR AUTO: YELLOW
EOSINOPHIL # BLD AUTO: 0.1 10E3/UL (ref 0–0.7)
EOSINOPHIL NFR BLD AUTO: 1 %
ERYTHROCYTE [DISTWIDTH] IN BLOOD BY AUTOMATED COUNT: 12.3 % (ref 10–15)
ERYTHROCYTE [SEDIMENTATION RATE] IN BLOOD BY WESTERGREN METHOD: 5 MM/HR (ref 0–20)
GLUCOSE UR STRIP-MCNC: NEGATIVE MG/DL
HCT VFR BLD AUTO: 48.9 % (ref 35–47)
HGB BLD-MCNC: 15.6 G/DL (ref 11.7–15.7)
HGB UR QL STRIP: ABNORMAL
IMM GRANULOCYTES # BLD: 0 10E3/UL
IMM GRANULOCYTES NFR BLD: 0 %
KETONES UR STRIP-MCNC: NEGATIVE MG/DL
LEUKOCYTE ESTERASE UR QL STRIP: NEGATIVE
LYMPHOCYTES # BLD AUTO: 2.2 10E3/UL (ref 0.8–5.3)
LYMPHOCYTES NFR BLD AUTO: 39 %
MCH RBC QN AUTO: 27.4 PG (ref 26.5–33)
MCHC RBC AUTO-ENTMCNC: 31.9 G/DL (ref 31.5–36.5)
MCV RBC AUTO: 86 FL (ref 78–100)
MONOCYTES # BLD AUTO: 0.3 10E3/UL (ref 0–1.3)
MONOCYTES NFR BLD AUTO: 5 %
NEUTROPHILS # BLD AUTO: 3.1 10E3/UL (ref 1.6–8.3)
NEUTROPHILS NFR BLD AUTO: 54 %
NITRATE UR QL: NEGATIVE
PH UR STRIP: 7 [PH] (ref 5–7)
PLATELET # BLD AUTO: 180 10E3/UL (ref 150–450)
RBC # BLD AUTO: 5.69 10E6/UL (ref 3.8–5.2)
RBC #/AREA URNS AUTO: ABNORMAL /HPF
SP GR UR STRIP: 1.02 (ref 1–1.03)
UROBILINOGEN UR STRIP-ACNC: 1 E.U./DL
WBC # BLD AUTO: 5.6 10E3/UL (ref 4–11)
WBC #/AREA URNS AUTO: ABNORMAL /HPF

## 2024-04-10 PROCEDURE — 83550 IRON BINDING TEST: CPT

## 2024-04-10 PROCEDURE — 86160 COMPLEMENT ANTIGEN: CPT

## 2024-04-10 PROCEDURE — 84450 TRANSFERASE (AST) (SGOT): CPT

## 2024-04-10 PROCEDURE — 85025 COMPLETE CBC W/AUTO DIFF WBC: CPT

## 2024-04-10 PROCEDURE — 82150 ASSAY OF AMYLASE: CPT

## 2024-04-10 PROCEDURE — 82565 ASSAY OF CREATININE: CPT

## 2024-04-10 PROCEDURE — 82040 ASSAY OF SERUM ALBUMIN: CPT

## 2024-04-10 PROCEDURE — 84460 ALANINE AMINO (ALT) (SGPT): CPT

## 2024-04-10 PROCEDURE — 84156 ASSAY OF PROTEIN URINE: CPT

## 2024-04-10 PROCEDURE — 82728 ASSAY OF FERRITIN: CPT

## 2024-04-10 PROCEDURE — 36415 COLL VENOUS BLD VENIPUNCTURE: CPT

## 2024-04-10 PROCEDURE — 81001 URINALYSIS AUTO W/SCOPE: CPT

## 2024-04-10 PROCEDURE — 85652 RBC SED RATE AUTOMATED: CPT

## 2024-04-10 PROCEDURE — 83540 ASSAY OF IRON: CPT

## 2024-04-10 PROCEDURE — 86225 DNA ANTIBODY NATIVE: CPT

## 2024-04-10 PROCEDURE — 83690 ASSAY OF LIPASE: CPT

## 2024-04-10 PROCEDURE — 86140 C-REACTIVE PROTEIN: CPT

## 2024-04-10 PROCEDURE — 82306 VITAMIN D 25 HYDROXY: CPT

## 2024-04-11 LAB
ALBUMIN MFR UR ELPH: 15.6 MG/DL
ALBUMIN SERPL BCG-MCNC: 4.7 G/DL (ref 3.5–5.2)
ALT SERPL W P-5'-P-CCNC: 14 U/L (ref 0–50)
AMYLASE SERPL-CCNC: 89 U/L (ref 28–100)
AST SERPL W P-5'-P-CCNC: 23 U/L (ref 0–45)
C3 SERPL-MCNC: 100 MG/DL (ref 81–157)
C4 SERPL-MCNC: 20 MG/DL (ref 13–39)
CREAT SERPL-MCNC: 0.6 MG/DL (ref 0.51–0.95)
CREAT UR-MCNC: 175 MG/DL
CRP SERPL-MCNC: <3 MG/L
EGFRCR SERPLBLD CKD-EPI 2021: >90 ML/MIN/1.73M2
FERRITIN SERPL-MCNC: 43 NG/ML (ref 6–175)
IRON BINDING CAPACITY (ROCHE): 321 UG/DL (ref 240–430)
IRON SATN MFR SERPL: 38 % (ref 15–46)
IRON SERPL-MCNC: 122 UG/DL (ref 37–145)
LIPASE SERPL-CCNC: 33 U/L (ref 13–60)
PROT/CREAT 24H UR: 0.09 MG/MG CR (ref 0–0.2)
VIT D+METAB SERPL-MCNC: 51 NG/ML (ref 20–50)

## 2024-04-15 LAB — DSDNA AB SER-ACNC: 2.9 IU/ML

## 2024-04-15 NOTE — PROGRESS NOTES
Subjective:  Heather Guillory is a 43yr female, , who presents to clinical today with heavy menstrual bleeding and premenstrual symptoms.      Heavy menstrual bleeding: Her last period in February occurred on day 36, and this cycle has lasted for 40 days with no period. Bleeding 1st day changing pad every 1 to 2 hours. Denies clots.  Dark red blood at beginning and then turns brighter red Day 2 and 3.  Lasting 4 to 7 days.  Denies cramping while bleeding and intramenstrual spotting. Bleeding was improved after myomectomy for 6 to 8 months and now seems to be getting heavier again.  She reports they are not as bad as they were before myomectomy. No chance of pregnancy (hx tubal in ).  Has tried Mirena IUD in past but experienced cystic acne that improved with removal.  Premenstural symptoms: reports experiencing severe fatigue in the week leading up to period.  Able to fall asleep and stay asleep, however finds herself still struggling to get out of bed the morning.  She reports an increase in joint stiffness, along with cramping and headaches in the week leading up to cycle start as well.  Brai    TSH WNL 2023    Chronic use of steroids for 7 years, discontinued summer of 2023.    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  Has been at home since then managing a busy household with 3 young kids. Behavioral issues with her oldest have caused increased internal and household stress. Seeing a therapist, which has been helpful.  supportive as well as close relationship with mother and sister.    Diet:  feels she goes too long between meals, prioritizes kids meals and forgets or runs out of time to feed herself, stopped iron supplementation due to GI distress.    Exercise: enjoys walking    Past Medical History:   Diagnosis Date    JAVIER (generalized anxiety disorder)     50mg zoloft    Rosacea     Undifferentiated connective tissue  disease (H24)        Past Surgical History:   Procedure Laterality Date    COLONOSCOPY N/A 10/21/2020    Procedure: COLONOSCOPY;  Surgeon: Yang Pete MD;  Location: [facility] OR    COLONOSCOPY N/A 12/29/2022    Procedure: COLONOSCOPY, WITH BIOPSY;  Surgeon: Magda Lobo MD;  Location: [facility] OR    DAVINCI MYOMECTOMY N/A 02/11/2022    Procedure: MYOMECTOMY, UTERUS, ROBOT-ASSISTED, LAPAROSCOPIC;  Surgeon: Cate Mansfield MD;  [facility]    ESOPHAGOSCOPY, GASTROSCOPY, DUODENOSCOPY (EGD), COMBINED N/A 12/29/2022    Procedure: ESOPHAGOGASTRODUODENOSCOPY, WITH BIOPSY;  Surgeon: Magda Lobo MD;  Location: [facility]    ESOPHAGOSCOPY, GASTROSCOPY, DUODENOSCOPY (EGD), COMBINED N/A 09/07/2023    Procedure: Esophagoscopy, gastroscopy, duodenoscopy (EGD), combined WITH BIOPSY;  Surgeon: Jackie Aldana MD;  Location: [facility] OR    IR ENDOVENOUS ABLATION VARICOSE VEINS  10/28/2019    IR FOLLOW UP VISIT OUTPATIENT  11/20/2019    IR STAB PHLEBECTOMY 10 - 20 STABS  10/28/2019    IR VEIN SCLEROSING MULTIPLE  10/28/2019    LAPAROSCOPIC SALPINGECTOMY Bilateral 04/23/2021    Procedure: SALPINGECTOMY, LAPAROSCOPIC BILATERAL;  Surgeon: Cate Mansfield MD;  [facility]    OPERATIVE HYSTEROSCOPY WITH MORCELLATOR  04/08/2014    Procedure: OPERATIVE HYSTEROSCOPY WITH MORCELLATOR;  Hysteroscopic Polypectomy;  Surgeon: Cate Mansfield MD;  [facility]    REMOVE INTRAUTERINE DEVICE N/A 04/23/2021    Procedure: removal of intrauterine device;  Surgeon: Cate Mansfield MD;  [facility]    TUBAL LIGATION         Family History   Problem Relation Age of Onset    Elijah Disease Sister     Arrhythmia Brother         details unknown; procedure in his 20s    Anxiety Disorder Brother     Alzheimer Disease Maternal Grandmother     Cervical Cancer Maternal Grandmother     Colon Cancer Maternal Grandfather     Breast Cancer Paternal Grandmother     Hypertension Paternal Grandmother     Hypertension  "Paternal Grandfather     Lung Cancer Paternal Grandfather         smoker    Diabetes Type 2  Paternal Grandfather     Myocardial Infarction Paternal Grandfather     Myocardial Infarction Paternal Aunt 45    Cerebrovascular Disease No family hx of     Coronary Artery Disease Early Onset No family hx of     Ovarian Cancer No family hx of        Social History     Tobacco Use    Smoking status: Never     Passive exposure: Never    Smokeless tobacco: Never   Substance Use Topics    Alcohol use: No     Alcohol/week: 0.0 standard drinks of alcohol        Allergies   Allergen Reactions    Cefadroxil Unknown and Hives    Sulfa Antibiotics Rash and Hives    Benzoyl Peroxide Swelling    Latex Rash    Miconazole Swelling     No current outpatient medications on file.     No current facility-administered medications for this visit.      ROS: 10 point ROS neg other than the symptoms noted above in the HPI.     Objective:  /68   Pulse 75   Ht 1.702 m (5' 7\")   Wt 70.9 kg (156 lb 3.2 oz)   LMP 04/15/2024 (Exact Date)   BMI 24.46 kg/m     General: pleasant female in no acute distress  Psych: normal mentation, well oriented  Respiratory:  Unlabored breathing  Musculoskeletal: no gross deformities     Assessment/Plan:  Encounter Diagnoses   Name Primary?    Irregular menstrual cycle Yes    Menorrhagia with irregular cycle     Premenstrual symptom     History of uterine fibroid      Cycle irregularity most likely related to hormonal fluctuations - may be related to higher stress or perimenopause. Normal TSH recently. No intermenstrual bleeding or concern for infection/ pain. Pt counseled benefit vs risks for Kyleena IUD and COCs, both as options to manage heavy menstrual bleeding; COCs would additionally help to regulate cycles. Counseled on impact of these options on premenstrual fatigue - that COCs is better for reliably stopping ovulation and, therefore, keeping hormones stable.  Given hx of fibroids (hx of myomectomy) " and small fibroid noted on MRI 7/2023, will repeat TV Pelvic US to evaluate for structural abnormalities that could be impacting heaviness of bleeding. Pt will schedule.  Pt is likely due for pap smear, given hx of immunosuppression (rheumatology noted chronic use of steroids); however, plaquenil was what rheumatology visit notes patient to be on - need to confirm when pt is next seen.  Last pap smear 7/2020.   Education provided on diet, exercise, and stress management.     Pt left, stable. She expressed understanding and agreement with the plan for care.    I, Angi Menjivar, completed the PFSH and ROS. I then acted as a scribe for NICKI Ceja, for the remainder of the visit.  Angi Menjivar BSN RN EZEQUIELNP DNP Student    I agree with the PFSH and ROS as completed by the NICKI Student, except for changes made by me.  The remainder of the encounter was performed by me and scribed by the NICKI Student.  The scribed note accurately reflects my personal services and decisions made by me.  Leticia Kinney DNP, APRN, NICKI

## 2024-04-16 ENCOUNTER — TELEPHONE (OUTPATIENT)
Dept: OBGYN | Facility: CLINIC | Age: 44
End: 2024-04-16

## 2024-04-16 ENCOUNTER — OFFICE VISIT (OUTPATIENT)
Dept: OBGYN | Facility: CLINIC | Age: 44
End: 2024-04-16
Attending: NURSE PRACTITIONER
Payer: COMMERCIAL

## 2024-04-16 ENCOUNTER — OFFICE VISIT (OUTPATIENT)
Dept: INTERNAL MEDICINE | Facility: CLINIC | Age: 44
End: 2024-04-16
Payer: COMMERCIAL

## 2024-04-16 VITALS
HEART RATE: 75 BPM | BODY MASS INDEX: 24.52 KG/M2 | DIASTOLIC BLOOD PRESSURE: 68 MMHG | WEIGHT: 156.2 LBS | SYSTOLIC BLOOD PRESSURE: 102 MMHG | HEIGHT: 67 IN

## 2024-04-16 VITALS
HEIGHT: 67 IN | HEART RATE: 69 BPM | TEMPERATURE: 97.7 F | SYSTOLIC BLOOD PRESSURE: 109 MMHG | DIASTOLIC BLOOD PRESSURE: 74 MMHG | BODY MASS INDEX: 24.59 KG/M2 | OXYGEN SATURATION: 100 %

## 2024-04-16 DIAGNOSIS — Z86.018 HISTORY OF UTERINE FIBROID: ICD-10-CM

## 2024-04-16 DIAGNOSIS — N92.1 MENORRHAGIA WITH IRREGULAR CYCLE: ICD-10-CM

## 2024-04-16 DIAGNOSIS — N94.3 PREMENSTRUAL SYMPTOM: ICD-10-CM

## 2024-04-16 DIAGNOSIS — R10.12 LUQ ABDOMINAL PAIN: Primary | ICD-10-CM

## 2024-04-16 DIAGNOSIS — N92.6 IRREGULAR MENSTRUAL CYCLE: Primary | ICD-10-CM

## 2024-04-16 PROCEDURE — 99213 OFFICE O/P EST LOW 20 MIN: CPT | Performed by: NURSE PRACTITIONER

## 2024-04-16 PROCEDURE — 99214 OFFICE O/P EST MOD 30 MIN: CPT | Performed by: NURSE PRACTITIONER

## 2024-04-16 PROCEDURE — 99213 OFFICE O/P EST LOW 20 MIN: CPT | Mod: GE

## 2024-04-16 NOTE — LETTER
2024       RE: Heather Guillory  [address]  [place] [address]     Dear Colleague,    Thank you for referring your patient, Heather Guillory, to the [facility] [place] [facility]. Please see a copy of my visit note below.    Subjective:  Heather Guillory is a 43yr female, , who presents to clinical today with heavy menstrual bleeding and premenstrual symptoms.      Heavy menstrual bleeding: Her last period in February occurred on day 36, and this cycle has lasted for 40 days with no period. Bleeding 1st day changing pad every 1 to 2 hours. Denies clots.  Dark red blood at beginning and then turns brighter red Day 2 and 3.  Lasting 4 to 7 days.  Denies cramping while bleeding and intramenstrual spotting. Bleeding was improved after myomectomy for 6 to 8 months and now seems to be getting heavier again.  She reports they are not as bad as they were before myomectomy. No chance of pregnancy (hx tubal in ).  Has tried Mirena IUD in past but experienced cystic acne that improved with removal.  Premenstural symptoms: reports experiencing severe fatigue in the week leading up to period.  Able to fall asleep and stay asleep, however finds herself still struggling to get out of bed the morning.  She reports an increase in joint stiffness, along with cramping and headaches in the week leading up to cycle start as well.  Brai    TSH WNL 2023    Chronic use of steroids for 7 years, discontinued summer of 2023.    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  Has been at home since then managing a busy household with 3 young kids. Behavioral issues with her oldest have caused increased internal and household stress. Seeing a therapist, which has been helpful.  supportive as well as close relationship with mother and sister.    Diet:  feels she goes  too long between meals, prioritizes kids meals and forgets or runs out of time to feed herself, stopped iron supplementation due to GI distress.    Exercise: enjoys walking    Past Medical History:   Diagnosis Date    JAVIER (generalized anxiety disorder)     50mg zoloft    Rosacea     Undifferentiated connective tissue disease (H24)        Past Surgical History:   Procedure Laterality Date    COLONOSCOPY N/A 10/21/2020    Procedure: COLONOSCOPY;  Surgeon: Yang Pete MD;  Location: [facility]    COLONOSCOPY N/A 12/29/2022    Procedure: COLONOSCOPY, WITH BIOPSY;  Surgeon: Magda Lobo MD;  Location: [facility]    DAVINCI MYOMECTOMY N/A 02/11/2022    Procedure: MYOMECTOMY, UTERUS, ROBOT-ASSISTED, LAPAROSCOPIC;  Surgeon: Cate Mansfield MD;  [facility]    ESOPHAGOSCOPY, GASTROSCOPY, DUODENOSCOPY (EGD), COMBINED N/A 12/29/2022    Procedure: ESOPHAGOGASTRODUODENOSCOPY, WITH BIOPSY;  Surgeon: Magda Lobo MD;  Location: [facility]    ESOPHAGOSCOPY, GASTROSCOPY, DUODENOSCOPY (EGD), COMBINED N/A 09/07/2023    Procedure: Esophagoscopy, gastroscopy, duodenoscopy (EGD), combined WITH BIOPSY;  Surgeon: Jackie Aldana MD;  Location: [facility]    IR ENDOVENOUS ABLATION VARICOSE VEINS  10/28/2019    IR FOLLOW UP VISIT OUTPATIENT  11/20/2019    IR STAB PHLEBECTOMY 10 - 20 STABS  10/28/2019    IR VEIN SCLEROSING MULTIPLE  10/28/2019    LAPAROSCOPIC SALPINGECTOMY Bilateral 04/23/2021    Procedure: SALPINGECTOMY, LAPAROSCOPIC BILATERAL;  Surgeon: Cate Mansfield MD;  [facility]    OPERATIVE HYSTEROSCOPY WITH MORCELLATOR  04/08/2014    Procedure: OPERATIVE HYSTEROSCOPY WITH MORCELLATOR;  Hysteroscopic Polypectomy;  Surgeon: Cate Mansfield MD;  [facility]    REMOVE INTRAUTERINE DEVICE N/A 04/23/2021    Procedure: removal of intrauterine device;  Surgeon: Cate Mansfield MD;  [facility]    TUBAL LIGATION         Family History   Problem Relation Age of Onset    Elijah Disease  "Sister     Arrhythmia Brother         details unknown; procedure in his 20s    Anxiety Disorder Brother     Alzheimer Disease Maternal Grandmother     Cervical Cancer Maternal Grandmother     Colon Cancer Maternal Grandfather     Breast Cancer Paternal Grandmother     Hypertension Paternal Grandmother     Hypertension Paternal Grandfather     Lung Cancer Paternal Grandfather         smoker    Diabetes Type 2  Paternal Grandfather     Myocardial Infarction Paternal Grandfather     Myocardial Infarction Paternal Aunt 45    Cerebrovascular Disease No family hx of     Coronary Artery Disease Early Onset No family hx of     Ovarian Cancer No family hx of        Social History     Tobacco Use    Smoking status: Never     Passive exposure: Never    Smokeless tobacco: Never   Substance Use Topics    Alcohol use: No     Alcohol/week: 0.0 standard drinks of alcohol        Allergies   Allergen Reactions    Cefadroxil Unknown and Hives    Sulfa Antibiotics Rash and Hives    Benzoyl Peroxide Swelling    Latex Rash    Miconazole Swelling     No current outpatient medications on file.     No current facility-administered medications for this visit.      ROS: 10 point ROS neg other than the symptoms noted above in the HPI.     Objective:  /68   Pulse 75   Ht 1.702 m (5' 7\")   Wt 70.9 kg (156 lb 3.2 oz)   LMP 04/15/2024 (Exact Date)   BMI 24.46 kg/m     General: pleasant female in no acute distress  Psych: normal mentation, well oriented  Respiratory:  Unlabored breathing  Musculoskeletal: no gross deformities     Assessment/Plan:  Encounter Diagnoses   Name Primary?    Irregular menstrual cycle Yes    Menorrhagia with irregular cycle     Premenstrual symptom     History of uterine fibroid      Cycle irregularity most likely related to hormonal fluctuations - may be related to higher stress or perimenopause. Normal TSH recently. No intermenstrual bleeding or concern for infection/ pain. Pt counseled benefit vs risks for " Kyleena IUD and COCs, both as options to manage heavy menstrual bleeding; COCs would additionally help to regulate cycles. Counseled on impact of these options on premenstrual fatigue - that COCs is better for reliably stopping ovulation and, therefore, keeping hormones stable.  Given hx of fibroids (hx of myomectomy) and small fibroid noted on MRI 7/2023, will repeat TV Pelvic US to evaluate for structural abnormalities that could be impacting heaviness of bleeding. Pt will schedule.  Pt is likely due for pap smear, given hx of immunosuppression (rheumatology noted chronic use of steroids); however, plaquenil was what rheumatology visit notes patient to be on - need to confirm when pt is next seen.  Last pap smear 7/2020.   Education provided on diet, exercise, and stress management.     Pt left, stable. She expressed understanding and agreement with the plan for care.    I, Angi Menjivar, completed the PFSH and ROS. I then acted as a scribe for NICKI Ceja, for the remainder of the visit.  Angi Menjivar BSN RN NICKI DNP Student    I agree with the PFSH and ROS as completed by the NICKI Student, except for changes made by me.  The remainder of the encounter was performed by me and scribed by the WHNP Student.  The scribed note accurately reflects my personal services and decisions made by me.  Leticia Kinney DNP, APRDALILA, NICKI

## 2024-04-16 NOTE — PATIENT INSTRUCTIONS
Thank you for trusting us with your care!     If you need to contact us for questions about:  Symptoms, Scheduling & Medical Questions; Non-urgent (2-3 day response) Bethanie message, Urgent (needing response today) [phone] (if after 3:30pm next day response)   Prescriptions: Please call your Pharmacy   Billing: [facility]em [phone] or [facility]:[phone]

## 2024-04-19 ENCOUNTER — ANCILLARY PROCEDURE (OUTPATIENT)
Dept: ULTRASOUND IMAGING | Facility: CLINIC | Age: 44
End: 2024-04-19
Attending: NURSE PRACTITIONER
Payer: COMMERCIAL

## 2024-04-19 DIAGNOSIS — N92.1 MENORRHAGIA WITH IRREGULAR CYCLE: ICD-10-CM

## 2024-04-19 DIAGNOSIS — Z86.018 HISTORY OF UTERINE FIBROID: ICD-10-CM

## 2024-04-19 PROCEDURE — 76830 TRANSVAGINAL US NON-OB: CPT

## 2024-04-19 PROCEDURE — 76830 TRANSVAGINAL US NON-OB: CPT | Mod: 26 | Performed by: OBSTETRICS & GYNECOLOGY

## 2024-04-29 ASSESSMENT — ANXIETY QUESTIONNAIRES
2. NOT BEING ABLE TO STOP OR CONTROL WORRYING: NOT AT ALL
7. FEELING AFRAID AS IF SOMETHING AWFUL MIGHT HAPPEN: NOT AT ALL
IF YOU CHECKED OFF ANY PROBLEMS ON THIS QUESTIONNAIRE, HOW DIFFICULT HAVE THESE PROBLEMS MADE IT FOR YOU TO DO YOUR WORK, TAKE CARE OF THINGS AT HOME, OR GET ALONG WITH OTHER PEOPLE: NOT DIFFICULT AT ALL
1. FEELING NERVOUS, ANXIOUS, OR ON EDGE: NOT AT ALL
GAD7 TOTAL SCORE: 0
GAD7 TOTAL SCORE: 0
3. WORRYING TOO MUCH ABOUT DIFFERENT THINGS: NOT AT ALL
7. FEELING AFRAID AS IF SOMETHING AWFUL MIGHT HAPPEN: NOT AT ALL
6. BECOMING EASILY ANNOYED OR IRRITABLE: NOT AT ALL
8. IF YOU CHECKED OFF ANY PROBLEMS, HOW DIFFICULT HAVE THESE MADE IT FOR YOU TO DO YOUR WORK, TAKE CARE OF THINGS AT HOME, OR GET ALONG WITH OTHER PEOPLE?: NOT DIFFICULT AT ALL
5. BEING SO RESTLESS THAT IT IS HARD TO SIT STILL: NOT AT ALL
4. TROUBLE RELAXING: NOT AT ALL

## 2024-05-01 NOTE — PROGRESS NOTES
Progress Note    SUBJECTIVE:  Heather Guillory is an 43 year old, , who requests an Annual Preventive Exam.     Concerns today include:     Has had a headache x 10 days. Tried a muscle relaxer, ibuprofen, and tylenol. Nothing has helped it to completely go away. Has had GI ulcers in the past so cautious not to take too many of these OTC medications. Onset was when she was away at a cabin with friends. Took a nap when it started; this was when it was at its worst, when it didn't resolve with caffeine, motrin, and ice. Was much better when she woke up from her nap.  Comes and goes, quite bothersome. Knows she clenches her jaw and carries tension in her upper back/ neck. Pain is primarily behind her left eye and then into the left side of her head. Has been massaging the right side of her face quite a bit, unsure if this has made it better or worse. Denies vision changes; does have light sensitivity. Has an achey characteristic with occasional sharp pains. Was sharp when it first came on. Pain is noticeable, not feeling like she needs to lay down - able to do her day-to-day activities. Doesn't notice it as much when busy during the day, but then feels it is noticeable (still present) in the evening. Denies numbness or tingling in her arms. No change in gait or stability. Drinks 1 coffee per day; drinks water the rest of the day. Feels hydration has been ok. No other symptoms - denies nasal congestion, cough, or other respiratory symptoms. No hx of seasonal allergies.     US follow-up: Menstrual cycles have been slightly variable, q36-40 days. Bleeding 1st day changing pad every 1 to 2 hours. Denies clots.  Dark red blood at beginning and then turns brighter red Day 2 and 3.  Lasting 4 to 7 days.  Denies cramping while bleeding; no intramenstrual spotting. Bleeding was improved after myomectomy for 6 to 8 months and now seems to be getting heavier again.  She reports they are not as bad as they were before  myomectomy. Had tubal ligation in 2021. Has tried Mirena IUD in past but experienced cystic acne that improved with removal. Patient's last menstrual period was 04/16/2024 (exact date). This period lasted ~5 days, moderate in amount. She had an US 4/19/2024 which showed:     Narrative & Impression     Gynecological Ultrasonography:   Uterus: anteverted. Contour is irregular w/ myomata: 1 Left Subserosal Anterior  2.2 x 1.6 x 1.5 cm, 2 Left Intramural Posterior 2.05 x 1.6 x 1.8 cm, 3 Right Intramural Posterior 1.42 cm x 7mm  x 1.4 cm, and others smaller.  Size: 11.34 x 8.79 x 5.78 cm  Endometrium: Thickness Total 5.29 mm  Findings:   Right Ovary: 3.23 x 2.27 x 2.06  cm. Wnl  Left Ovary: 2.17 x 2.05 x 1.67  cm. Wnl  Cul de Sac Free Fluid: Trace  Technique: Transvaginal Imaging performed     Impression: The uterus is small, with thin endometrial stripe that appears smooth.   There are a few small fibroids noted, none of which appear to distort the uterine cavity.  Ovaries normal.      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  Has been at home since then managing a busy household with 3 young kids. Behavioral issues with her oldest have caused increased internal and household stress. Seeing a therapist, which has been helpful; she really likes the therapist she has been seeing.  is supportive as well as close relationship with mother and sister.    Mental health: Heather and her  have an embryo frozen; they have been contemplating whether to have another pregnancy. Feels undecided and starting to feel some pressure to make a decision based on age; continues to pay the fee for preservation.  No other specific mental health concerns.      Diet:  no concerns  Exercise: enjoys walking; active with kids     Recent Labs   Lab  Test 06/20/23  0958 08/27/21  0850   CHOL 161 182   HDL 57 59   LDL 95 113*   TRIG 43 51     HgbA1c 12/2023 WNL    TSH: WNL 12/2023    Menstrual History:      7/28/2023     7:45 AM 4/16/2024    10:40 AM 5/2/2024     9:40 AM   Menstrual History   LAST MENSTRUAL PERIOD 7/4/2023 4/15/2024 4/16/2024     Last    Lab Results   Component Value Date    PAP NIL 07/16/2020     History of abnormal Pap smear: NO - age 30-65 PAP every 5 years with negative HPV co-testing recommended    Last   Lab Results   Component Value Date    HPV16 Negative 07/16/2020     Last   Lab Results   Component Value Date    HPV18 Negative 07/16/2020     Last   Lab Results   Component Value Date    HRHPV Negative 07/16/2020     Mammogram current: no (patient will schedule)  Last Mammogram:   US Breast Left Limited 1-3 Quadrants    Result Date: 8/10/2022  Narrative: EXAMINATION: MA DIAGNOSTIC LEFT W/ EDUARDA, US BREAST LEFT LIMITED 1-3 QUADRANTS, 8/10/2022 10:55 AM HISTORY/FAMILY HISTORY: Left breast shooting pain, especially with palpation for about a month, intermittent. COMPARISON: 6/26/2022, 5/26/2022, 7/23/2020 FINDINGS: MAMMOGRAM: TECHNIQUE: Standard mammographic views were performed with tomosynthesis and synthetic mammogram. BREAST DENSITY: Scattered fibroglandular densities. Left CC view of the breast demonstrates no suspicious mammographic findings. ULTRASOUND: Targeted, real-time ultrasound evaluation was performed by the technologist and radiologist. Ultrasound of the left central region, area of pain, demonstrates dilated ducts without intraductal mass. No suspicious sonographic findings. During real-time imaging, there are 2 clusters of microcysts at 2:00 and 8:00, no mammographic correlate.     US Breast Left    Result Date: 7/24/2020  Narrative: Examinations: MA DIAGNOSTIC BILATERAL W/ EDUARDA, US BREAST LEFT LIMITED 1-3 QUADRANTS, 7/23/2020 1:32 PM and CAD History/Family History: Patient tenderness in the left periareolar region for 6 to 8  weeks. Physician palpated left axillary lump. Family history for breast cancer in great-grandmother, diagnosed at age 80. The patient is of Ashkenazi Roman Catholic descent. Nipple tenderness; Axillary lump, left Breast Density: Scattered fibroglandular densities Technique: Standard mammographic views were performed with tomosynthesis and 2D reconstruction. Comparisons: None, baseline Findings:   Mammogram: No suspicious mammographic findings in either breast. Ultrasound: Targeted, real-time ultrasound evaluation was performed by the technologist and radiologist. In the area of concern in the left periareolar region, there are dilated ducts without intraductal mass. Incidentally visualized are cluster of microcysts in the left breast at 2:00. No suspicious sonographic findings. In the left axilla in the area of concern, there is a skin lesion measuring 5 x 5 mm. A stalk is visualized during real-time imaging.        Last Colonoscopy:  Results for orders placed or performed during the hospital encounter of 12/29/22   COLONOSCOPY   Result Value Ref Range    COLONOSCOPY       [facility]  [address] [place] [address], [place] [address] [phone]     Endoscopy Department  _______________________________________________________________________________  Patient Name: Heather Guillory          Procedure Date: 12/29/2022 1:18 PM  MRN: [id number]                       Account Number: [id number]  YOB: 1980              Admit Type: Outpatient  Age: 42                               Room: [facility] [place] PROCEDURE ROOM 03  Gender: Female                        Note Status: Finalized  Attending MD: BRIDGET BYNUM MD      Total Sedation Time:   _______________________________________________________________________________     Procedure:             Colonoscopy  Indications:           Abdominal pain in the left upper quadrant, Diarrhea  Providers:             BRIDGET BYNUM MD, Annamarie Solis CRNA, [place]                           CYNTHIA Nava  Referring MD:          DENISHA SOLARES  Medicines:             Monitored Anesthesia Care  Complications:          No immediate complications.  _______________________________________________________________________________  Procedure:             Pre-Anesthesia Assessment:                         - Prior to the procedure, a History and Physical was                          performed, and patient medications and allergies were                          reviewed. The patient is competent. The risks and                          benefits of the procedure and the sedation options and                          risks were discussed with the patient. All questions                          were answered and informed consent was obtained.                          Patient identification and proposed procedure were                          verified by the physician in the pre-procedure area.                          Mental Status Examination: alert and oriented. Airway                          Examination: normal oropharyngeal airway and neck                          mobility. Respiratory Examination: clear to                           auscultation. CV Examination: normal. Prophylactic                          Antibiotics: The patient does not require prophylactic                          antibiotics. Prior Anticoagulants: The patient has                          taken no anticoagulant or antiplatelet agents. ASA                          Grade Assessment: per anaesthesiology. After reviewing                          the risks and benefits, the patient was deemed in                          satisfactory condition to undergo the procedure. The                          anesthesia plan was to use monitored anesthesia care                          (MAC). Immediately prior to administration of                          medications, the patient was re-assessed for adequacy                          to receive sedatives.  The heart rate, respiratory                          rate, oxygen saturations, blood pressure, adequacy of                          pulmonary ventilation, and response to care were                           monitored throughout the procedure. The physical                          status of the patient was re-assessed after the                          procedure.                         After obtaining informed consent, the colonoscope was                          passed under direct vision. Throughout the procedure,                          the patient's blood pressure, pulse, and oxygen                          saturations were monitored continuously. The                          Colonoscope was introduced through the anus and                          advanced to the terminal ileum. The colonoscopy was                          performed without difficulty. The patient tolerated                          the procedure well. The quality of the bowel                          preparation was good. The terminal ileum, ileocecal                          valve, appendiceal orifice, and rectum were                          photographed.                                                                                    Findings:       The terminal ileum appeared normal.       The colon (entire examined portion) appeared normal. Biopsies for        histology were taken with a cold forceps from the entire colon for        evaluation of microscopic colitis.       Internal hemorrhoids were found during retroflexion. The hemorrhoids        were medium-sized.                                                                                   Impression:            - The examined portion of the ileum was normal.                         - The entire examined colon is normal. Biopsied.                         - Internal hemorrhoids.  Recommendation:        - Patient has a contact number available for                           emergencies. The signs and symptoms of potential                          delayed complications were discussed with the patient.                          Return to normal activities tomorrow. Written                          discharge instructions  were provided to the patient.                         - Resume previous diet.                         - Continue present medications.                         - Await pathology results.                                                                                     Electronically Signed by: Dr. Bridget Lobo  __________________  BRIDGET LOBO MD  12/29/2022 2:28:27 PM  I was physically present for the entire viewing portion of the exam.  __________________________  Signature of teaching physician  BRIDGET LOBO MD  Number of Addenda: 0    Note Initiated On: 12/29/2022 1:18 PM  Scope In:  Scope Out:           HISTORY:  Current Outpatient Medications   Medication Sig Dispense Refill    SUMAtriptan (IMITREX) 50 MG tablet Take 1 tablet (50 mg) by mouth at onset of headache for migraine May repeat in 2 hours. Max 4 tablets/24 hours. 9 tablet 0     No current facility-administered medications for this visit.     Allergies   Allergen Reactions    Cefadroxil Unknown and Hives    Sulfa Antibiotics Rash and Hives    Benzoyl Peroxide Swelling    Latex Rash    Miconazole Swelling     Immunization History   Administered Date(s) Administered    COVID-19 MONOVALENT 12+ (Pfizer) 03/15/2021, 04/05/2021, 12/15/2021    DTaP, Unspecified 06/02/2006    Flu, Unspecified 11/30/2001, 10/13/2018    HPV Quadrivalent 10/16/2008, 12/18/2008, 04/20/2009    Influenza (H1N1) 01/09/2010    Influenza (IIV3) PF 10/03/2014    Influenza Vaccine >6 months,quad, PF 10/09/2015, 02/13/2017, 10/13/2017, 10/12/2019, 11/27/2020, 12/15/2022    Influenza Vaccine, 6+MO IM (QUADRIVALENT W/PRESERVATIVES) 10/13/2018    Influenza, seasonal, injectable, PF 10/16/2008, 09/11/2009, 11/11/2010, 10/01/2011, 08/20/2012     Meningococcal (Menomune ) 2000    TDAP Vaccine (Boostrix) 12/15/2014, 2016, 01/15/2018       OB History    Para Term  AB Living   4 3 3 0 1 3   SAB IAB Ectopic Multiple Live Births   0 0 0 0 3     Past Medical History:   Diagnosis Date    JAVIER (generalized anxiety disorder)     50mg zoloft    Rosacea     Undifferentiated connective tissue disease (H24)      Past Surgical History:   Procedure Laterality Date    COLONOSCOPY N/A 10/21/2020    Procedure: COLONOSCOPY;  Surgeon: Yang Pete MD;  Location: [facility] OR    COLONOSCOPY N/A 2022    Procedure: COLONOSCOPY, WITH BIOPSY;  Surgeon: Magda Lobo MD;  Location: [facility] OR    DAVINCI MYOMECTOMY N/A 2022    Procedure: MYOMECTOMY, UTERUS, ROBOT-ASSISTED, LAPAROSCOPIC;  Surgeon: Cate Mansfield MD;  Location: [facility]    ESOPHAGOSCOPY, GASTROSCOPY, DUODENOSCOPY (EGD), COMBINED N/A 2022    Procedure: ESOPHAGOGASTRODUODENOSCOPY, WITH BIOPSY;  Surgeon: Magda Lobo MD;  Location: [facility] OR    ESOPHAGOSCOPY, GASTROSCOPY, DUODENOSCOPY (EGD), COMBINED N/A 2023    Procedure: Esophagoscopy, gastroscopy, duodenoscopy (EGD), combined WITH BIOPSY;  Surgeon: Jackie Aldana MD;  Location: [facility] OR    IR ENDOVENOUS ABLATION VARICOSE VEINS  10/28/2019    IR FOLLOW UP VISIT OUTPATIENT  2019    IR STAB PHLEBECTOMY 10 - 20 STABS  10/28/2019    IR VEIN SCLEROSING MULTIPLE  10/28/2019    LAPAROSCOPIC SALPINGECTOMY Bilateral 2021    Procedure: SALPINGECTOMY, LAPAROSCOPIC BILATERAL;  Surgeon: Cate Mansfield MD;  Location: [facility]    OPERATIVE HYSTEROSCOPY WITH MORCELLATOR  2014    Procedure: OPERATIVE HYSTEROSCOPY WITH MORCELLATOR;  Hysteroscopic Polypectomy;  Surgeon: Cate Mansfield MD;  Location: [facility]    REMOVE INTRAUTERINE DEVICE N/A 2021    Procedure: removal of intrauterine device;  Surgeon: Cate Mansfield MD;  Location: [facility]    TUBAL LIGATION       Family History   Problem  Relation Age of Onset    Elijah Disease Sister     Arrhythmia Brother         details unknown; procedure in his 20s    Anxiety Disorder Brother     Alzheimer Disease Maternal Grandmother     Cervical Cancer Maternal Grandmother     Colon Cancer Maternal Grandfather     Breast Cancer Paternal Grandmother     Hypertension Paternal Grandmother     Hypertension Paternal Grandfather     Lung Cancer Paternal Grandfather         smoker    Diabetes Type 2  Paternal Grandfather     Myocardial Infarction Paternal Grandfather     Myocardial Infarction Paternal Aunt 45    Cerebrovascular Disease No family hx of     Coronary Artery Disease Early Onset No family hx of     Ovarian Cancer No family hx of      Social History     Socioeconomic History    Marital status:      Spouse name: Chi    Number of children: 2    Years of education: None    Highest education level: None   Occupational History    Occupation:      Employer: [employer]     Comment: laid off 7/2020   Tobacco Use    Smoking status: Never     Passive exposure: Never    Smokeless tobacco: Never   Vaping Use    Vaping status: Never Used   Substance and Sexual Activity    Alcohol use: No     Alcohol/week: 0.0 standard drinks of alcohol    Drug use: No    Sexual activity: Yes     Partners: Male     Birth control/protection: I.U.D.     Comment: Mirena placed in 2018   Other Topics Concern     Service No    Blood Transfusions No    Caffeine Concern No    Occupational Exposure No    Hobby Hazards No    Sleep Concern No    Stress Concern No    Weight Concern No    Special Diet No    Back Care No    Exercise No    Bike Helmet Yes     Comment: n/a    Seat Belt Yes    Self-Exams No    Parent/sibling w/ CABG, MI or angioplasty before 65F 55M? No   Social History Narrative    .    3 boys (ages 6, 5 and 3 as of Jan 2022)    Exercises when able.         How much exercise per week? 3-4 x's     How much calcium per day? In foods and PNV     "   How much caffeine per day? 1 soda occasional    How much vitamin D per day? PNV    Do you/your family wear seatbelts?  Yes    Do you/your family use safety helmets? Yes    Do you/your family use sunscreen? Yes    Do you/your family keep firearms in the home? No    Do you/your family have a smoke detector(s)? Yes        July 16, 2020 Mague Chase LPN         Social Determinants of Health     Financial Resource Strain: Low Risk  (12/11/2023)    Financial Resource Strain     Within the past 12 months, have you or your family members you live with been unable to get utilities (heat, electricity) when it was really needed?: No   Food Insecurity: Low Risk  (12/11/2023)    Food Insecurity     Within the past 12 months, did you worry that your food would run out before you got money to buy more?: No     Within the past 12 months, did the food you bought just not last and you didn t have money to get more?: No   Transportation Needs: Low Risk  (12/11/2023)    Transportation Needs     Within the past 12 months, has lack of transportation kept you from medical appointments, getting your medicines, non-medical meetings or appointments, work, or from getting things that you need?: No   Housing Stability: Low Risk  (12/11/2023)    Housing Stability     Do you have housing? : Yes     Are you worried about losing your housing?: No       ROS  [unfilled]      1/13/2023     9:26 AM 6/13/2023     2:25 PM 12/12/2023     9:19 AM   PHQ-9 SCORE   PHQ-9 Total Score 1 0 0         1/13/2023     9:26 AM 12/12/2023     9:19 AM 4/29/2024     8:44 AM   JAVIER-7 SCORE   Total Score   0 (minimal anxiety)   Total Score 2 0 0     Answers submitted by the patient for this visit:  JAVIER-7 (Submitted on 4/29/2024)  JAVIER 7 TOTAL SCORE: 0    EXAM:  Blood pressure 109/73, pulse 75, height 1.702 m (5' 7\"), weight 70.3 kg (155 lb), last menstrual period 04/16/2024, not currently breastfeeding. Body mass index is 24.28 kg/m .  General - pleasant female in no acute " distress.  Skin - no suspicious lesions or rashes  EENT-  euthyroid with out palpable nodules  Neck - supple without lymphadenopathy.  Lungs - clear to auscultation bilaterally.  Heart - regular rate and rhythm without murmur.  Abdomen - soft, nontender, nondistended, no masses or organomegaly noted.  Musculoskeletal - no gross deformities.  Neurological - normal strength, sensation, and mental status.    Breast Exam:  Breast: Without visible skin changes. No dimpling or lesions seen.   Breasts supple, non-tender with palpation, no dominant mass, nodularity, or nipple discharge noted bilaterally. Axillary nodes negative.      Pelvic Exam:  EG/BUS: Normal genital architecture without lesions, erythema or abnormal secretions; Bartholin's, Urethra, [place]'s normal   Urethral meatus: normal   Urethra: no masses, tenderness, or scarring   Vagina: moist, pink, rugae with creamy, white, and odorless secretions  Cervix: pink, moist, closed, without lesion  Rectum: anus normal     ASSESSMENT/ PLAN:  1. Visit for preventive health examination  - MA Screening Digital Bilateral; Future  - Obtaining, preparing and conveyance of cervical or vaginal smear to laboratory.  - Pap thin layer screen with HPV - recommended age 30 - 65 years  - HPV Hold (Lab Only)  - preventive health labs up to date    2. Encounter for screening mammogram for malignant neoplasm of breast  - MA Screening Digital Bilateral; Future    3. Screening for cervical cancer  - Obtaining, preparing and conveyance of cervical or vaginal smear to laboratory.  - Pap thin layer screen with HPV - recommended age 30 - 65 years  - HPV Hold (Lab Only)    4. Migraine without aura and without status migrainosus, not intractable  - SUMAtriptan (IMITREX) 50 MG tablet; Take 1 tablet (50 mg) by mouth at onset of headache for migraine May repeat in 2 hours. Max 4 tablets/24 hours.  Dispense: 9 tablet; Refill: 0  Pt recommended to schedule an appointment with family or internal  medicine if 1-2 doses of Imitrex does not relieve her headache.     5. Menorrhagia with irregular cycle:  - Counseled pt on US results and reviewed options for management with pt. She is considering a Kyleena IUD and will schedule if desired.     Additional teaching done at this visit regarding: encouraged psychotherapy related to family planning, offered referral to woman's well-being clinic, pt will consider.      Return to clinic in one year.  Follow-up as needed.    Leticia Kinney, DNP, APRN, WHNP

## 2024-05-02 ENCOUNTER — OFFICE VISIT (OUTPATIENT)
Dept: OBGYN | Facility: CLINIC | Age: 44
End: 2024-05-02
Attending: NURSE PRACTITIONER
Payer: COMMERCIAL

## 2024-05-02 VITALS
DIASTOLIC BLOOD PRESSURE: 73 MMHG | SYSTOLIC BLOOD PRESSURE: 109 MMHG | HEART RATE: 75 BPM | HEIGHT: 67 IN | WEIGHT: 155 LBS | BODY MASS INDEX: 24.33 KG/M2

## 2024-05-02 DIAGNOSIS — Z12.31 ENCOUNTER FOR SCREENING MAMMOGRAM FOR MALIGNANT NEOPLASM OF BREAST: ICD-10-CM

## 2024-05-02 DIAGNOSIS — N92.1 MENORRHAGIA WITH IRREGULAR CYCLE: ICD-10-CM

## 2024-05-02 DIAGNOSIS — Z12.4 SCREENING FOR CERVICAL CANCER: ICD-10-CM

## 2024-05-02 DIAGNOSIS — G43.009 MIGRAINE WITHOUT AURA AND WITHOUT STATUS MIGRAINOSUS, NOT INTRACTABLE: ICD-10-CM

## 2024-05-02 DIAGNOSIS — Z00.00 VISIT FOR PREVENTIVE HEALTH EXAMINATION: Primary | ICD-10-CM

## 2024-05-02 PROCEDURE — 87624 HPV HI-RISK TYP POOLED RSLT: CPT | Performed by: NURSE PRACTITIONER

## 2024-05-02 PROCEDURE — 99214 OFFICE O/P EST MOD 30 MIN: CPT | Mod: 25 | Performed by: NURSE PRACTITIONER

## 2024-05-02 PROCEDURE — 99396 PREV VISIT EST AGE 40-64: CPT | Performed by: NURSE PRACTITIONER

## 2024-05-02 PROCEDURE — G0145 SCR C/V CYTO,THINLAYER,RESCR: HCPCS | Performed by: NURSE PRACTITIONER

## 2024-05-02 PROCEDURE — 99213 OFFICE O/P EST LOW 20 MIN: CPT | Performed by: NURSE PRACTITIONER

## 2024-05-02 RX ORDER — SUMATRIPTAN 50 MG/1
50 TABLET, FILM COATED ORAL
Qty: 9 TABLET | Refills: 0 | Status: SHIPPED | OUTPATIENT
Start: 2024-05-02

## 2024-05-02 NOTE — LETTER
2024       RE: Heather Guillory  [address]  [place] [address]     Dear Colleague,    Thank you for referring your patient, Heather Guillory, to the [facility] [place] at [facility]. Please see a copy of my visit note below.    Progress Note    SUBJECTIVE:  Heather Guillory is an 43 year old, , who requests an Annual Preventive Exam.     Concerns today include:     Has had a headache x 10 days. Tried a muscle relaxer, ibuprofen, and tylenol. Nothing has helped it to completely go away. Has had GI ulcers in the past so cautious not to take too many of these OTC medications. Onset was when she was away at a cabin with friends. Took a nap when it started; this was when it was at its worst, when it didn't resolve with caffeine, motrin, and ice. Was much better when she woke up from her nap.  Comes and goes, quite bothersome. Knows she clenches her jaw and carries tension in her upper back/ neck. Pain is primarily behind her left eye and then into the left side of her head. Has been massaging the right side of her face quite a bit, unsure if this has made it better or worse. Denies vision changes; does have light sensitivity. Has an achey characteristic with occasional sharp pains. Was sharp when it first came on. Pain is noticeable, not feeling like she needs to lay down - able to do her day-to-day activities. Doesn't notice it as much when busy during the day, but then feels it is noticeable (still present) in the evening. Denies numbness or tingling in her arms. No change in gait or stability. Drinks 1 coffee per day; drinks water the rest of the day. Feels hydration has been ok. No other symptoms - denies nasal congestion, cough, or other respiratory symptoms. No hx of seasonal allergies.     US follow-up: Menstrual cycles have been slightly variable, q36-40 days. Bleeding 1st day changing pad every 1 to 2 hours. Denies  clots.  Dark red blood at beginning and then turns brighter red Day 2 and 3.  Lasting 4 to 7 days.  Denies cramping while bleeding; no intramenstrual spotting. Bleeding was improved after myomectomy for 6 to 8 months and now seems to be getting heavier again.  She reports they are not as bad as they were before myomectomy. Had tubal ligation in 2021. Has tried Mirena IUD in past but experienced cystic acne that improved with removal. Patient's last menstrual period was 04/16/2024 (exact date). This period lasted ~5 days, moderate in amount. She had an US 4/19/2024 which showed:     Narrative & Impression     Gynecological Ultrasonography:   Uterus: anteverted. Contour is irregular w/ myomata: 1 Left Subserosal Anterior  2.2 x 1.6 x 1.5 cm, 2 Left Intramural Posterior 2.05 x 1.6 x 1.8 cm, 3 Right Intramural Posterior 1.42 cm x 7mm  x 1.4 cm, and others smaller.  Size: 11.34 x 8.79 x 5.78 cm  Endometrium: Thickness Total 5.29 mm  Findings:   Right Ovary: 3.23 x 2.27 x 2.06  cm. Wnl  Left Ovary: 2.17 x 2.05 x 1.67  cm. Wnl  Cul de Sac Free Fluid: Trace  Technique: Transvaginal Imaging performed     Impression: The uterus is small, with thin endometrial stripe that appears smooth.   There are a few small fibroids noted, none of which appear to distort the uterine cavity.  Ovaries normal.      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  Has been at home since then managing a busy household with 3 young kids. Behavioral issues with her oldest have caused increased internal and household stress. Seeing a therapist, which has been helpful; she really likes the therapist she has been seeing.  is supportive as well as close relationship with mother and sister.    Mental health: Heather and her  have an embryo frozen; they have been  contemplating whether to have another pregnancy. Feels undecided and starting to feel some pressure to make a decision based on age; continues to pay the fee for preservation.  No other specific mental health concerns.      Diet:  no concerns  Exercise: enjoys walking; active with kids     Recent Labs   Lab Test 06/20/23  0958 08/27/21  0850   CHOL 161 182   HDL 57 59   LDL 95 113*   TRIG 43 51     HgbA1c 12/2023 WNL    TSH: WNL 12/2023    Menstrual History:      7/28/2023     7:45 AM 4/16/2024    10:40 AM 5/2/2024     9:40 AM   Menstrual History   LAST MENSTRUAL PERIOD 7/4/2023 4/15/2024 4/16/2024     Last    Lab Results   Component Value Date    PAP NIL 07/16/2020     History of abnormal Pap smear: NO - age 30-65 PAP every 5 years with negative HPV co-testing recommended    Last   Lab Results   Component Value Date    HPV16 Negative 07/16/2020     Last   Lab Results   Component Value Date    HPV18 Negative 07/16/2020     Last   Lab Results   Component Value Date    HRHPV Negative 07/16/2020     Mammogram current: no (patient will schedule)  Last Mammogram:   US Breast Left Limited 1-3 Quadrants    Result Date: 8/10/2022  Narrative: EXAMINATION: MA DIAGNOSTIC LEFT W/ EDUARDA, US BREAST LEFT LIMITED 1-3 QUADRANTS, 8/10/2022 10:55 AM HISTORY/FAMILY HISTORY: Left breast shooting pain, especially with palpation for about a month, intermittent. COMPARISON: 6/26/2022, 5/26/2022, 7/23/2020 FINDINGS: MAMMOGRAM: TECHNIQUE: Standard mammographic views were performed with tomosynthesis and synthetic mammogram. BREAST DENSITY: Scattered fibroglandular densities. Left CC view of the breast demonstrates no suspicious mammographic findings. ULTRASOUND: Targeted, real-time ultrasound evaluation was performed by the technologist and radiologist. Ultrasound of the left central region, area of pain, demonstrates dilated ducts without intraductal mass. No suspicious sonographic findings. During real-time imaging, there are 2 clusters of  microcysts at 2:00 and 8:00, no mammographic correlate.     US Breast Left    Result Date: 7/24/2020  Narrative: Examinations: MA DIAGNOSTIC BILATERAL W/ EDUARDA, US BREAST LEFT LIMITED 1-3 QUADRANTS, 7/23/2020 1:32 PM and CAD History/Family History: Patient tenderness in the left periareolar region for 6 to 8 weeks. Physician palpated left axillary lump. Family history for breast cancer in great-grandmother, diagnosed at age 80. The patient is of Ashkenazi Jainism descent. Nipple tenderness; Axillary lump, left Breast Density: Scattered fibroglandular densities Technique: Standard mammographic views were performed with tomosynthesis and 2D reconstruction. Comparisons: None, baseline Findings:   Mammogram: No suspicious mammographic findings in either breast. Ultrasound: Targeted, real-time ultrasound evaluation was performed by the technologist and radiologist. In the area of concern in the left periareolar region, there are dilated ducts without intraductal mass. Incidentally visualized are cluster of microcysts in the left breast at 2:00. No suspicious sonographic findings. In the left axilla in the area of concern, there is a skin lesion measuring 5 x 5 mm. A stalk is visualized during real-time imaging.        Last Colonoscopy:  Results for orders placed or performed during the hospital encounter of 12/29/22   COLONOSCOPY   Result Value Ref Range    COLONOSCOPY       [facility]  [address] [place] [address], [place] [address] [phone]     Endoscopy Department  _______________________________________________________________________________  Patient Name: Heather Guillory          Procedure Date: 12/29/2022 1:18 PM  MRN: [id number]                       Account Number: [id number]  YOB: 1980              Admit Type: Outpatient  Age: 42                               Room: [facility] PROCEDURE ROOM 03  Gender: Female                        Note Status: Finalized  Attending MD: BRIDGET BYNUM MD       Total Sedation Time:   _______________________________________________________________________________     Procedure:             Colonoscopy  Indications:           Abdominal pain in the left upper quadrant, Diarrhea  Providers:             BRIDGET BYNUM MD, Annamarie Solis CRNA, Florence Nava RN  Referring MD:          DENISHA SOLARES  Medicines:             Monitored Anesthesia Care  Complications:          No immediate complications.  _______________________________________________________________________________  Procedure:             Pre-Anesthesia Assessment:                         - Prior to the procedure, a History and Physical was                          performed, and patient medications and allergies were                          reviewed. The patient is competent. The risks and                          benefits of the procedure and the sedation options and                          risks were discussed with the patient. All questions                          were answered and informed consent was obtained.                          Patient identification and proposed procedure were                          verified by the physician in the pre-procedure area.                          Mental Status Examination: alert and oriented. Airway                          Examination: normal oropharyngeal airway and neck                          mobility. Respiratory Examination: clear to                           auscultation. CV Examination: normal. Prophylactic                          Antibiotics: The patient does not require prophylactic                          antibiotics. Prior Anticoagulants: The patient has                          taken no anticoagulant or antiplatelet agents. ASA                          Grade Assessment: per anaesthesiology. After reviewing                          the risks and benefits, the patient was deemed in                          satisfactory  condition to undergo the procedure. The                          anesthesia plan was to use monitored anesthesia care                          (MAC). Immediately prior to administration of                          medications, the patient was re-assessed for adequacy                          to receive sedatives. The heart rate, respiratory                          rate, oxygen saturations, blood pressure, adequacy of                          pulmonary ventilation, and response to care were                           monitored throughout the procedure. The physical                          status of the patient was re-assessed after the                          procedure.                         After obtaining informed consent, the colonoscope was                          passed under direct vision. Throughout the procedure,                          the patient's blood pressure, pulse, and oxygen                          saturations were monitored continuously. The                          Colonoscope was introduced through the anus and                          advanced to the terminal ileum. The colonoscopy was                          performed without difficulty. The patient tolerated                          the procedure well. The quality of the bowel                          preparation was good. The terminal ileum, ileocecal                          valve, appendiceal orifice, and rectum were                          photographed.                                                                                    Findings:       The terminal ileum appeared normal.       The colon (entire examined portion) appeared normal. Biopsies for        histology were taken with a cold forceps from the entire colon for        evaluation of microscopic colitis.       Internal hemorrhoids were found during retroflexion. The hemorrhoids        were medium-sized.                                                                                    Impression:            - The examined portion of the ileum was normal.                         - The entire examined colon is normal. Biopsied.                         - Internal hemorrhoids.  Recommendation:        - Patient has a contact number available for                          emergencies. The signs and symptoms of potential                          delayed complications were discussed with the patient.                          Return to normal activities tomorrow. Written                          discharge instructions  were provided to the patient.                         - Resume previous diet.                         - Continue present medications.                         - Await pathology results.                                                                                     Electronically Signed by: Dr. Bridget Lobo  __________________  BRIDGET LOBO MD  12/29/2022 2:28:27 PM  I was physically present for the entire viewing portion of the exam.  __________________________  Signature of teaching physician  BRIDGET LOBO MD  Number of Addenda: 0    Note Initiated On: 12/29/2022 1:18 PM  Scope In:  Scope Out:           HISTORY:  Current Outpatient Medications   Medication Sig Dispense Refill    SUMAtriptan (IMITREX) 50 MG tablet Take 1 tablet (50 mg) by mouth at onset of headache for migraine May repeat in 2 hours. Max 4 tablets/24 hours. 9 tablet 0     No current facility-administered medications for this visit.     Allergies   Allergen Reactions    Cefadroxil Unknown and Hives    Sulfa Antibiotics Rash and Hives    Benzoyl Peroxide Swelling    Latex Rash    Miconazole Swelling     Immunization History   Administered Date(s) Administered    COVID-19 MONOVALENT 12+ (Pfizer) 03/15/2021, 04/05/2021, 12/15/2021    DTaP, Unspecified 06/02/2006    Flu, Unspecified 11/30/2001, 10/13/2018    HPV Quadrivalent 10/16/2008, 12/18/2008, 04/20/2009    Influenza (H1N1) 01/09/2010    Influenza  (IIV3) PF 10/03/2014    Influenza Vaccine >6 months,quad, PF 10/09/2015, 2017, 10/13/2017, 10/12/2019, 2020, 12/15/2022    Influenza Vaccine, 6+MO IM (QUADRIVALENT W/PRESERVATIVES) 10/13/2018    Influenza, seasonal, injectable, PF 10/16/2008, 2009, 2010, 10/01/2011, 2012    Meningococcal (Menomune ) 2000    TDAP Vaccine (Boostrix) 12/15/2014, 2016, 01/15/2018       OB History    Para Term  AB Living   4 3 3 0 1 3   SAB IAB Ectopic Multiple Live Births   0 0 0 0 3     Past Medical History:   Diagnosis Date    JAVIER (generalized anxiety disorder)     50mg zoloft    Rosacea     Undifferentiated connective tissue disease (H24)      Past Surgical History:   Procedure Laterality Date    COLONOSCOPY N/A 10/21/2020    Procedure: COLONOSCOPY;  Surgeon: Yang Pete MD;  Location: [facility] OR    COLONOSCOPY N/A 2022    Procedure: COLONOSCOPY, WITH BIOPSY;  Surgeon: Magda Lobo MD;  Location: [facility] OR    DAVINCI MYOMECTOMY N/A 2022    Procedure: MYOMECTOMY, UTERUS, ROBOT-ASSISTED, LAPAROSCOPIC;  Surgeon: Cate Mansfield MD;  Location: [facility] OR    ESOPHAGOSCOPY, GASTROSCOPY, DUODENOSCOPY (EGD), COMBINED N/A 2022    Procedure: ESOPHAGOGASTRODUODENOSCOPY, WITH BIOPSY;  Surgeon: Magda Lobo MD;  Location: [facility] OR    ESOPHAGOSCOPY, GASTROSCOPY, DUODENOSCOPY (EGD), COMBINED N/A 2023    Procedure: Esophagoscopy, gastroscopy, duodenoscopy (EGD), combined WITH BIOPSY;  Surgeon: Jackie Aldana MD;  Location: [facility] OR    IR ENDOVENOUS ABLATION VARICOSE VEINS  10/28/2019    IR FOLLOW UP VISIT OUTPATIENT  2019    IR STAB PHLEBECTOMY 10 - 20 STABS  10/28/2019    IR VEIN SCLEROSING MULTIPLE  10/28/2019    LAPAROSCOPIC SALPINGECTOMY Bilateral 2021    Procedure: SALPINGECTOMY, LAPAROSCOPIC BILATERAL;  Surgeon: Cate Mansfield MD;  Location: [facility]    OPERATIVE HYSTEROSCOPY WITH MORCELLATOR  2014    Procedure: OPERATIVE  HYSTEROSCOPY WITH MORCELLATOR;  Hysteroscopic Polypectomy;  Surgeon: Cate Mansfield MD;  Location: [facility]    REMOVE INTRAUTERINE DEVICE N/A 04/23/2021    Procedure: removal of intrauterine device;  Surgeon: Cate Mansfield MD;  Location: [facility] OR    TUBAL LIGATION       Family History   Problem Relation Age of Onset    Elijah Disease Sister     Arrhythmia Brother         details unknown; procedure in his 20s    Anxiety Disorder Brother     Alzheimer Disease Maternal Grandmother     Cervical Cancer Maternal Grandmother     Colon Cancer Maternal Grandfather     Breast Cancer Paternal Grandmother     Hypertension Paternal Grandmother     Hypertension Paternal Grandfather     Lung Cancer Paternal Grandfather         smoker    Diabetes Type 2  Paternal Grandfather     Myocardial Infarction Paternal Grandfather     Myocardial Infarction Paternal Aunt 45    Cerebrovascular Disease No family hx of     Coronary Artery Disease Early Onset No family hx of     Ovarian Cancer No family hx of      Social History     Socioeconomic History    Marital status:      Spouse name: Chi    Number of children: 2    Years of education: None    Highest education level: None   Occupational History    Occupation:      Employer: [employer]     Comment: laid off 7/2020   Tobacco Use    Smoking status: Never     Passive exposure: Never    Smokeless tobacco: Never   Vaping Use    Vaping status: Never Used   Substance and Sexual Activity    Alcohol use: No     Alcohol/week: 0.0 standard drinks of alcohol    Drug use: No    Sexual activity: Yes     Partners: Male     Birth control/protection: I.U.D.     Comment: Mirena placed in 2018   Other Topics Concern     Service No    Blood Transfusions No    Caffeine Concern No    Occupational Exposure No    Hobby Hazards No    Sleep Concern No    Stress Concern No    Weight Concern No    Special Diet No    Back Care No    Exercise No    Bike Helmet Yes      Comment: n/a    Seat Belt Yes    Self-Exams No    Parent/sibling w/ CABG, MI or angioplasty before 65F 55M? No   Social History Narrative    .    3 boys (ages 6, 5 and 3 as of Jan 2022)    Exercises when able.         How much exercise per week? 3-4 x's     How much calcium per day? In foods and PNV       How much caffeine per day? 1 soda occasional    How much vitamin D per day? PNV    Do you/your family wear seatbelts?  Yes    Do you/your family use safety helmets? Yes    Do you/your family use sunscreen? Yes    Do you/your family keep firearms in the home? No    Do you/your family have a smoke detector(s)? Yes        July 16, 2020 Mague Chase LPN         Social Determinants of Health     Financial Resource Strain: Low Risk  (12/11/2023)    Financial Resource Strain     Within the past 12 months, have you or your family members you live with been unable to get utilities (heat, electricity) when it was really needed?: No   Food Insecurity: Low Risk  (12/11/2023)    Food Insecurity     Within the past 12 months, did you worry that your food would run out before you got money to buy more?: No     Within the past 12 months, did the food you bought just not last and you didn t have money to get more?: No   Transportation Needs: Low Risk  (12/11/2023)    Transportation Needs     Within the past 12 months, has lack of transportation kept you from medical appointments, getting your medicines, non-medical meetings or appointments, work, or from getting things that you need?: No   Housing Stability: Low Risk  (12/11/2023)    Housing Stability     Do you have housing? : Yes     Are you worried about losing your housing?: No       ROS  [unfilled]      1/13/2023     9:26 AM 6/13/2023     2:25 PM 12/12/2023     9:19 AM   PHQ-9 SCORE   PHQ-9 Total Score 1 0 0         1/13/2023     9:26 AM 12/12/2023     9:19 AM 4/29/2024     8:44 AM   JAVIER-7 SCORE   Total Score   0 (minimal anxiety)   Total Score 2 0 0     Answers submitted  "by the patient for this visit:  JAVIER-7 (Submitted on 4/29/2024)  JAVIER 7 TOTAL SCORE: 0    EXAM:  Blood pressure 109/73, pulse 75, height 1.702 m (5' 7\"), weight 70.3 kg (155 lb), last menstrual period 04/16/2024, not currently breastfeeding. Body mass index is 24.28 kg/m .  General - pleasant female in no acute distress.  Skin - no suspicious lesions or rashes  EENT-  euthyroid with out palpable nodules  Neck - supple without lymphadenopathy.  Lungs - clear to auscultation bilaterally.  Heart - regular rate and rhythm without murmur.  Abdomen - soft, nontender, nondistended, no masses or organomegaly noted.  Musculoskeletal - no gross deformities.  Neurological - normal strength, sensation, and mental status.    Breast Exam:  Breast: Without visible skin changes. No dimpling or lesions seen.   Breasts supple, non-tender with palpation, no dominant mass, nodularity, or nipple discharge noted bilaterally. Axillary nodes negative.      Pelvic Exam:  EG/BUS: Normal genital architecture without lesions, erythema or abnormal secretions; Bartholin's, Urethra, Bluewell's normal   Urethral meatus: normal   Urethra: no masses, tenderness, or scarring   Vagina: moist, pink, rugae with creamy, white, and odorless secretions  Cervix: pink, moist, closed, without lesion  Rectum: anus normal     ASSESSMENT/ PLAN:  1. Visit for preventive health examination  - MA Screening Digital Bilateral; Future  - Obtaining, preparing and conveyance of cervical or vaginal smear to laboratory.  - Pap thin layer screen with HPV - recommended age 30 - 65 years  - HPV Hold (Lab Only)  - preventive health labs up to date    2. Encounter for screening mammogram for malignant neoplasm of breast  - MA Screening Digital Bilateral; Future    3. Screening for cervical cancer  - Obtaining, preparing and conveyance of cervical or vaginal smear to laboratory.  - Pap thin layer screen with HPV - recommended age 30 - 65 years  - HPV Hold (Lab Only)    4. Migraine " without aura and without status migrainosus, not intractable  - SUMAtriptan (IMITREX) 50 MG tablet; Take 1 tablet (50 mg) by mouth at onset of headache for migraine May repeat in 2 hours. Max 4 tablets/24 hours.  Dispense: 9 tablet; Refill: 0  Pt recommended to schedule an appointment with family or internal medicine if 1-2 doses of Imitrex does not relieve her headache.     5. Menorrhagia with irregular cycle:  - Counseled pt on US results and reviewed options for management with pt. She is considering a Kyleena IUD and will schedule if desired.     Additional teaching done at this visit regarding: encouraged psychotherapy related to family planning, offered referral to woman's well-being clinic, pt will consider.      Return to clinic in one year.  Follow-up as needed.    Leticia Kinney, DNP, APRN, WHNP

## 2024-05-06 ENCOUNTER — MYC MEDICAL ADVICE (OUTPATIENT)
Dept: GASTROENTEROLOGY | Facility: CLINIC | Age: 44
End: 2024-05-06
Payer: COMMERCIAL

## 2024-05-07 LAB
BKR LAB AP GYN ADEQUACY: NORMAL
BKR LAB AP GYN INTERPRETATION: NORMAL
BKR LAB AP HPV REFLEX: NORMAL
BKR LAB AP LMP: NORMAL
BKR LAB AP PREVIOUS ABNORMAL: NORMAL
PATH REPORT.COMMENTS IMP SPEC: NORMAL
PATH REPORT.COMMENTS IMP SPEC: NORMAL
PATH REPORT.RELEVANT HX SPEC: NORMAL

## 2024-05-11 LAB
HUMAN PAPILLOMA VIRUS 16 DNA: NEGATIVE
HUMAN PAPILLOMA VIRUS 18 DNA: NEGATIVE
HUMAN PAPILLOMA VIRUS FINAL DIAGNOSIS: NORMAL
HUMAN PAPILLOMA VIRUS OTHER HR: NEGATIVE

## 2024-05-14 NOTE — LETTER
"10/13/2017       RE: Heather Guillory  [address] [address] [facility] [address]     Dear Colleague,    Thank you for referring your patient, Heather Guillory, to the [facility] at [facility]. Please see a copy of my visit note below.    S: Heather Guillory is a 37 y.o.  at 14w2d who presents for routine Ob care and follow up of a yeast infection. She has been feeling good and feels that the pregnancy is progressing well. Denies headache, abdominal pain, fetal movement, and vaginal bleeding.   She was prescribed fluconazole for the yeast infection at her previous visit, but decided not to use the medication because she was worried about potential fetal effects. Instead she cut sugar out of her diet and is applying coconut oil topically to the vulva. Her symptoms have improved but she is still experiencing dysuria, itching, and irritation that have led to breaks in the vulvar skin. She is now willing to use the medication to treat the yeast infection.   Her mood has been stable since changing Zoloft to 50 mg and she feels that her anxiety is under much better control.     O: /71  Pulse 75  Ht 1.702 m (5' 7\")  Wt 76.9 kg (169 lb 9.6 oz)  BMI 26.56 kg/m2    Gen: appears well, no distress  Abdominal: gravid, soft, non-tender    FHR: 150    A/P: Heather Guillory is a 37 y.o.  at 14w2d who presents for routine Ob care and follow up of a yeast infection.    1. Routine prenatal care  -Discussed aneuploidy screening for next visit- AFP only  -Comprehensive US scheduled prior to next visit  -Flu shot this visit- ordered.  -RTC 4 weeks for SOUMYA    2. Yeast infection  -Fluconazole 150 mg PO 1x dose  -Can continue to apply coconut oil topically to vulva to avoid irritation    This patient was seen with Dr. Mansfield and I am acting as the scribe for the visit  Feng Salazar  Medical Student MS3    I agree with the PFSH and ROS as completed by the " MS, except for changes made by me. The remainder of the encounter was performed by me and scribed by the MS. The scribed note accurately reflects my personal services and the decisions made by me.      Again, thank you for allowing me to participate in the care of your patient.      Sincerely,    Cate Mansfield MD       [employer]

## 2024-05-15 ENCOUNTER — TELEPHONE (OUTPATIENT)
Dept: OBGYN | Facility: CLINIC | Age: 44
End: 2024-05-15
Payer: COMMERCIAL

## 2024-05-15 ENCOUNTER — OFFICE VISIT (OUTPATIENT)
Dept: OBGYN | Facility: CLINIC | Age: 44
End: 2024-05-15
Attending: ADVANCED PRACTICE MIDWIFE
Payer: COMMERCIAL

## 2024-05-15 VITALS
DIASTOLIC BLOOD PRESSURE: 82 MMHG | WEIGHT: 155 LBS | TEMPERATURE: 98.8 F | HEART RATE: 71 BPM | HEIGHT: 67 IN | BODY MASS INDEX: 24.33 KG/M2 | SYSTOLIC BLOOD PRESSURE: 118 MMHG

## 2024-05-15 DIAGNOSIS — N64.4 NIPPLE PAIN: Primary | ICD-10-CM

## 2024-05-15 PROCEDURE — 99213 OFFICE O/P EST LOW 20 MIN: CPT | Performed by: ADVANCED PRACTICE MIDWIFE

## 2024-05-15 NOTE — PROGRESS NOTES
"S: Heather Guillory is a 43 year old  here for left nipple pain.    Pt reports that yesterday her left nipple started to feel sore. Worsened and today it hurts even when her shirt touches the nipple. Sees a small area of light discoloration. No skin changes, pain, masses of lumps at areola or breast.   Hx of breastfeeding- last time was >5 years ago. No nipple discharge noted.   Notes she has been working out more, keeping bra on longer. No fever, chills or s/s of infection.     Due for mammogram. Last 23- normal.  Has had ultrasound and imaging for pain in left breast in  and - all normal.     Has a family history for breast cancer in great-grandmother, diagnosed at age 80. The patient is of Ashkenazi Bapt[employer] descent.    O: /82   Pulse 71   Temp 98.8  F (37.1  C)   Ht 1.7 m (5' 6.93\")   Wt 70.3 kg (155 lb)   LMP 2024 (Exact Date)   BMI 24.33 kg/m      Breasts: Left nipple: pin sized area on top of nipple with slight white/cream colored bump, mild erythema, looks like inclusion cyst, mild tenderness to superficial touch,  otherwise normal without suspicious masses, no areolar or breast skin changes or axillary nodes, symmetric fibrous changes in both upper outer quadrants.    A: (N64.4) Nipple pain  (primary encounter diagnosis)  Plan: [facility] Diagnostic Digital Bilateral, Breast         Provider  Referral        P:  - Recommended keeping area clean, using warm compresses several times a day.   - Recommended breast imaging. Phone number given to schedule.  Orders and breast center referral placed.    Pt verbalized understanding and agrees with plan of care.     HARVEY Ortez, SUELLEN    20 minutes spent on the date of the encounter doing chart review, history and exam, documentation and further activities as noted above    "

## 2024-05-15 NOTE — LETTER
"5/15/2024       RE: Heather Guillory  [address]  [place] [address]     Dear Colleague,    Thank you for referring your patient, Heather Guillory, to the [facility] [place] at [facility]. Please see a copy of my visit note below.    S: Heather Guillory is a 43 year old  here for left nipple pain.    Pt reports that yesterday her left nipple started to feel sore. Worsened and today it hurts even when her shirt touches the nipple. Sees a small area of light discoloration. No skin changes, pain, masses of lumps at areola or breast.   Hx of breastfeeding- last time was >5 years ago. No nipple discharge noted.   Notes she has been working out more, keeping bra on longer. No fever, chills or s/s of infection.     Due for mammogram. Last 23- normal.  Has had ultrasound and imaging for pain in left breast in  and - all normal.     Has a family history for breast cancer in great-grandmother, diagnosed at age 80. The patient is of Ashkenazi Baptism descent.    O: /82   Pulse 71   Temp 98.8  F (37.1  C)   Ht 1.7 m (5' 6.93\")   Wt 70.3 kg (155 lb)   LMP 2024 (Exact Date)   BMI 24.33 kg/m      Breasts: Left nipple: pin sized area on top of nipple with slight white/cream colored bump, mild erythema, looks like inclusion cyst, mild tenderness to superficial touch,  otherwise normal without suspicious masses, no areolar or breast skin changes or axillary nodes, symmetric fibrous changes in both upper outer quadrants.    A: (N64.4) Nipple pain  (primary encounter diagnosis)  Plan: [facility] Diagnostic Digital Bilateral, Breast         Provider  Referral        P:  - Recommended keeping area clean, using warm compresses several times a day.   - Recommended breast imaging. Phone number given to schedule.  Orders and breast center referral placed.    Pt verbalized understanding and agrees with plan of care. "     HARVEY Ortez, SUELLEN    20 minutes spent on the date of the encounter doing chart review, history and exam, documentation and further activities as noted above

## 2024-05-15 NOTE — TELEPHONE ENCOUNTER
S-(situation): Heather calls in with concerns about localized nipple pain.    B-(background): due for screening mammo. Last baby born in 2018, not breastfeeding.    A-(assessment): Over the last week, Heather has noticed increasing left nipple tenderness. Today, she noticed a white patch on the top of her left nipple -- just some discoloration, not a sore/scab. No bleeding, nipple discharge, or deeper breast pain. Pain is very localized to left nipple.    R-(recommendations): Recommended pt present to clinic so we can assess the site in person. Scheduled.

## 2024-05-15 NOTE — TELEPHONE ENCOUNTER
[facility] Call Center    Phone Message    May a detailed message be left on voicemail: yes     Reason for Call: Symptoms or Concerns     If patient has red-flag symptoms, warm transfer to triage line    Current symptom or concern: Breast soreness, even sore for her shirt to touch it.    Symptoms have been present for:  a little over a week.      Action Taken: Other: OB/Gyn    Travel Screening: Not Applicable    Thank you!  [facility]

## 2024-05-16 ENCOUNTER — PATIENT OUTREACH (OUTPATIENT)
Dept: ONCOLOGY | Facility: CLINIC | Age: 44
End: 2024-05-16
Payer: COMMERCIAL

## 2024-05-16 NOTE — PROGRESS NOTES
New Patient Oncology Nurse Navigator Note     Referring provider: Dalila Shearer CNM     Referring Clinic/Organization: [facility]     Referred to (specialty:) Breast Provider Referral      Date Referral Received: May 14, 2024     Evaluation for:  N64.4 (ICD-10-CM) - Nipple pain    Clinical History (per Nurse review of records provided):      2020 breast pain in periareolar area    5/26/2022- mammo wnl, left mammo with dick neg     8/10/22: shooting left breast pain x 1 month  ULTRASOUND:  Targeted, real-time ultrasound evaluation was performed by the technologist and radiologist.  Ultrasound of the left central region, area of pain, demonstrates dilated ducts without intraductal mass. No suspicious sonographic findings. During real-time imaging, there are 2 clusters of microcysts at 2:00 and 8:00, no mammographic correlate.                                                      IMPRESSION: BI-RADS CATEGORY: 2 - Benign.  RECOMMENDED FOLLOW-UP: Annual Mammography. Diagnostic mammo left wnl     6/2/23 normal mammo with dick     She presented to referring provider's office on 5/15/24 with nipple pain.    Bilateral diagnostic mammograms and left breast ultrasounds have been ordered.    5/24/24 - Bilateral diagnostic mammograms and left breast ultrasound  Mammogram: No suspicious abnormality in either breast or significant change compared to prior.   On targeted left breast ultrasound in the area of pain in the subareolar region, there is only normal appearing breast tissue. No intraductal mass or suspicious abnormality.  On physical exam there are two small erythematous lesions adjacent to the nipple at about 9:00 and 1:00 positions, which appear to be scabbing.  IMPRESSION: BI-RADS CATEGORY: 2 - Benign.  RECOMMENDED FOLLOW-UP: Routine yearly mammography beginning at age 40  or as discussed with your provider    History for breast cancer in great-grandmother, diagnosed at age  80.  The patient is of Ashkenazi [place]ah[employer]ism descent    5/17 1301 Telephoned and left voice message requesting call back to discuss referral placed by Dalila Shearer CNM. She returned call and transferred to Specialty Breast Imaging Schedulers.    5/24/24 - Bilateral diagnostic mammograms and left breast ultrasound  Findings:     Mammogram:  No suspicious abnormality in either breast or significant change compared to prior.  Ultrasound:  Targeted left breast ultrasound evaluation was performed by the technologist and radiologist. In the area of pain in the subareolar region, there is only normal appearing breast tissue. No intraductal mass or suspicious abnormality.  On physical exam there are two small erythematous lesions adjacent to the nipple at about 9:00 and 1:00 positions, which appear to be scabbing.                                        IMPRESSION: BI-RADS CATEGORY: 2 - Benign.  RECOMMENDED FOLLOW-UP: Routine yearly mammography beginning at age 40 or as discussed with your provider. Given lack of imaging findings in the left breast, management of pain and nipple lesions would need to be based on clinical grounds.  Discussed potential dermatology consultation if lesions are not improving.    5/28 1441 - Telephoned and spoke with Heather. Her breast imaging showed benign findings. She would like to meet with Nilda Mari though. Writer received referral, reviewed for appropriate plan, and call warm transferred to New Patient Scheduling for completion.    Records Location: See Bookmarked material

## 2024-05-24 ENCOUNTER — ANCILLARY PROCEDURE (OUTPATIENT)
Dept: MAMMOGRAPHY | Facility: CLINIC | Age: 44
End: 2024-05-24
Attending: ADVANCED PRACTICE MIDWIFE
Payer: COMMERCIAL

## 2024-05-24 DIAGNOSIS — N64.4 NIPPLE PAIN: ICD-10-CM

## 2024-05-24 PROCEDURE — G0279 TOMOSYNTHESIS, MAMMO: HCPCS | Performed by: STUDENT IN AN ORGANIZED HEALTH CARE EDUCATION/TRAINING PROGRAM

## 2024-05-24 PROCEDURE — 76642 ULTRASOUND BREAST LIMITED: CPT | Mod: LT | Performed by: STUDENT IN AN ORGANIZED HEALTH CARE EDUCATION/TRAINING PROGRAM

## 2024-05-24 PROCEDURE — 77066 DX MAMMO INCL CAD BI: CPT | Performed by: STUDENT IN AN ORGANIZED HEALTH CARE EDUCATION/TRAINING PROGRAM

## 2024-05-29 ENCOUNTER — MYC MEDICAL ADVICE (OUTPATIENT)
Dept: INTERNAL MEDICINE | Facility: CLINIC | Age: 44
End: 2024-05-29
Payer: COMMERCIAL

## 2024-05-29 DIAGNOSIS — R48.0 DYSLEXIA: Primary | ICD-10-CM

## 2024-05-30 ENCOUNTER — OFFICE VISIT (OUTPATIENT)
Dept: DERMATOLOGY | Facility: CLINIC | Age: 44
End: 2024-05-30
Payer: COMMERCIAL

## 2024-05-30 ENCOUNTER — OFFICE VISIT (OUTPATIENT)
Dept: INTERNAL MEDICINE | Facility: CLINIC | Age: 44
End: 2024-05-30
Payer: COMMERCIAL

## 2024-05-30 VITALS
BODY MASS INDEX: 23.7 KG/M2 | DIASTOLIC BLOOD PRESSURE: 75 MMHG | WEIGHT: 151 LBS | HEART RATE: 72 BPM | SYSTOLIC BLOOD PRESSURE: 110 MMHG | HEIGHT: 67 IN | TEMPERATURE: 98 F | OXYGEN SATURATION: 100 %

## 2024-05-30 DIAGNOSIS — D22.9 MULTIPLE MELANOCYTIC NEVI: Primary | ICD-10-CM

## 2024-05-30 DIAGNOSIS — R30.0 DYSURIA: Primary | ICD-10-CM

## 2024-05-30 DIAGNOSIS — D49.2 NEOPLASM OF UNSPECIFIED BEHAVIOR OF BONE, SOFT TISSUE, AND SKIN: ICD-10-CM

## 2024-05-30 LAB
ALBUMIN UR-MCNC: NEGATIVE MG/DL
APPEARANCE UR: CLEAR
BACTERIA #/AREA URNS HPF: ABNORMAL /HPF
BILIRUB UR QL STRIP: NEGATIVE
COLOR UR AUTO: YELLOW
GLUCOSE UR STRIP-MCNC: NEGATIVE MG/DL
HGB UR QL STRIP: ABNORMAL
KETONES UR STRIP-MCNC: NEGATIVE MG/DL
LEUKOCYTE ESTERASE UR QL STRIP: NEGATIVE
NITRATE UR QL: NEGATIVE
PH UR STRIP: 7 [PH] (ref 5–7)
RBC URINE: 3 /HPF
SP GR UR STRIP: 1.01 (ref 1–1.03)
SQUAMOUS EPITHELIAL: 1 /HPF
UROBILINOGEN UR STRIP-MCNC: NORMAL MG/DL
WBC URINE: 2 /HPF

## 2024-05-30 PROCEDURE — 11103 TANGNTL BX SKIN EA SEP/ADDL: CPT | Performed by: DERMATOLOGY

## 2024-05-30 PROCEDURE — 88312 SPECIAL STAINS GROUP 1: CPT | Mod: TC | Performed by: DERMATOLOGY

## 2024-05-30 PROCEDURE — 88305 TISSUE EXAM BY PATHOLOGIST: CPT | Mod: 26 | Performed by: DERMATOLOGY

## 2024-05-30 PROCEDURE — 99203 OFFICE O/P NEW LOW 30 MIN: CPT | Mod: 25 | Performed by: DERMATOLOGY

## 2024-05-30 PROCEDURE — 99213 OFFICE O/P EST LOW 20 MIN: CPT | Mod: GC

## 2024-05-30 PROCEDURE — 11104 PUNCH BX SKIN SINGLE LESION: CPT | Performed by: DERMATOLOGY

## 2024-05-30 PROCEDURE — 81001 URINALYSIS AUTO W/SCOPE: CPT | Performed by: PATHOLOGY

## 2024-05-30 ASSESSMENT — PAIN SCALES - GENERAL: PAINLEVEL: NO PAIN (0)

## 2024-05-30 NOTE — PATIENT INSTRUCTIONS
"Information on protecting and monitoring your skin    It is important to keep your skin protected from the sun. The best sun protection is avoiding the sun from getting to your skin by seeking shade, staying out of the sun during peak hours (10 AM-2 PM), wearing long sleeves or sun-protective clothing (such as \"Coolibar,\" a [place]-based company).     If these measures are not feasible, or for other sun-exposed areas (like the face), it is important to wear wear sunscreen SPF 30+ that covers both UVA and UVB. We prefer physical block sunscreens with zinc oxide or titanium dioxide as the active ingredients. Examples include Neutrogena Sensitive Skin, Elta MD (available on [employer] or at [employer]) or Neven Vision sunscreens.     In addition, watch for signs of worrisome moles and check all your skin regularly. Monthly is recommended.     This includes:  A - asymmetry: not the same on the both sides  B - border: irregular borders, jagged or bumpy borders  C - color: very black or multi colored moles  D - diameter: anything bigger than a pencil eraser  E - evolution: anything that is changing    Moles should not be tender, bleed, or itch.     It is important to let us know if you have any of these concerning features right away.     Avoid tanning bed use as this raises the risk of developing skin cancer.   "

## 2024-05-30 NOTE — LETTER
5/30/2024       RE: Heather Guillory  [address]  [place] [address]     Dear Colleague,    Thank you for referring your patient, Heather Guillory, to the [facility] [place] at [facility]. Please see a copy of my visit note below.    [facility] Dermatology Note  Encounter Date: May 30, 2024  Office Visit     Dermatology Problem List:  1. Family history of melanoma   - Sister with melanoma in situ of the face at age 41  2. NUB x2 s/p biopsy right buttock, left nipple   ____________________________________________    Assessment & Plan:    #NUB x2 on right buttock, left nipple. Right buttock: clinically dysplastic nevus vs. melanoma; left nipple: benign keratosis vs. unilateral nevoid keratosis of the nipple vs paget's)  - Otherwise benign skin exam   - Shave biopsy and Punch biopsy performed today (see procedure note(s) below).    #Family History of Melanoma, with few scattered benign appearing nevi.  - Reviewed sun protective measures for melanoma prevention   - Reviewed ABCDE's of melanoma, handout provided     Procedures Performed:   - Shave biopsy procedure note, location(s): right buttock. After discussion of benefits and risks including but not limited to bleeding, infection, scar, incomplete removal, recurrence, and non-diagnostic biopsy, verbal consent and photographs were obtained. The area was cleaned with isopropyl alcohol. 0.5mL of 1% lidocaine with epinephrine was injected to obtain adequate anesthesia of lesion(s). Shave biopsy at site(s) performed. Hemostasis was achieved with aluminium chloride. Petrolatum ointment and a sterile dressing were applied. The patient tolerated the procedure and no complications were noted. The patient was provided with verbal and written post care instructions.   - Procedure(s) performed by faculty.     - Punch biopsy procedure note, location(s): left nipple. After  discussion of benefits and risks including but not limited to bleeding, infection, scar, incomplete removal, recurrence, and non-diagnostic biopsy, verbak consent and photographs were obtained. The area was cleaned with isopropyl alcohol. 0.5mL of 1% lidocaine with epinephrine was injected to obtain adequate anesthesia and a 3 mm punch biopsy was performed at site(s). 4-0 Prolene sutures were utilized to approximate the epidermal edges. White petrolatum ointment and a bandage was applied to the wound. Explicit verbal and written wound care instructions were provided. The patient left the dermatology clinic in good condition.   - Procedure(s) performed by faculty.     Follow-up: pending path results; otherwise 1 year     Staff and Medical Student:     Adarsh Zaman M3     I was present with the medical student who participated in the service and in the documentation.  I have verified the history and personally performed the physical exam and medical decision making.  I agree with the assessment and plan of care as documented in the note.  I personally performed all procedures.    Gabriel Hurtado MD  Dermatology Attending    ____________________________________________    CC: Skin Check (Heather is here today for a skin check )    HPI:  Ms. Heather Guillory is a(n) 44 year old female who presents today as a new patient for skin check.    Patient reports that she has regularly followed with dermatology at [facility] previously, though is switching to [facility] as she receives her other care here.     Patient has a family history of melanoma in situ in her sister (dx at age 41) and NMSC in her mother and maternal grandfather. She denies a personal history of skin cancer. She does endorse 2-3 years of regular tanning bed use in her 20's. No history of blistering sunburns, currently very diligent about sun protection.     Patient reports today recent skin changes to the left nipple area. She reports increased sensitivity in  this area, though denies pain or itching. Was seen by Gyn and had a mammogram and was recommended to be evaluated by a dermatologist. She is also scheduled to be seen by a breast specialist, though not for a while.    Patient is otherwise feeling well, without additional skin concerns.    Labs:  None reviewed.    Physical Exam:  Vitals: LMP 04/16/2024 (Exact Date)   SKIN: Full skin, which includes the head/face, both arms, chest, back, abdomen,both legs, genitalia and/or groin buttocks, digits and/or nails, was examined.  - on left buttock, 4mm symmetric, well-defined, two-toned tan-brown macule  - left nipple with some generalized scaling, 5mm tan, hyperkeratotic papule   - Many tan-brown macules distributed diffusely on body   - No other lesions of concern on areas examined.     Medications:  Current Outpatient Medications   Medication Sig Dispense Refill    SUMAtriptan (IMITREX) 50 MG tablet Take 1 tablet (50 mg) by mouth at onset of headache for migraine May repeat in 2 hours. Max 4 tablets/24 hours. 9 tablet 0     No current facility-administered medications for this visit.      Past Medical History:   Patient Active Problem List   Diagnosis    JAVIER (generalized anxiety disorder)    Undifferentiated connective tissue disease (H24)    Rosacea    Varicose veins of left lower extremity with pain    Encounter for sterilization    Pelvic floor dysfunction    Urinary urgency    Urinary frequency    Abnormal LFTs    Other specified anxiety disorders    Other specified eating disorder     Past Medical History:   Diagnosis Date    JAVIER (generalized anxiety disorder)     50mg zoloft    Rosacea     Undifferentiated connective tissue disease (H24)         CC Referred Self,

## 2024-05-30 NOTE — NURSING NOTE
Dermatology Rooming Note    Heather Guillory's goals for this visit include:   Chief Complaint   Patient presents with    Skin Check     Heather is here today for a skin check      ASHISH Beck - Dermatology

## 2024-05-30 NOTE — PROGRESS NOTES
[facility] Dermatology Note  Encounter Date: May 30, 2024  Office Visit     Dermatology Problem List:  1. Family history of melanoma   - Sister with melanoma in situ of the face at age 41  2. NUB x2 s/p biopsy right buttock, left nipple   ____________________________________________    Assessment & Plan:    #NUB x2 on right buttock, left nipple. Right buttock: clinically dysplastic nevus vs. melanoma; left nipple: benign keratosis vs. unilateral nevoid keratosis of the nipple vs paget's)  - Otherwise benign skin exam   - Shave biopsy and Punch biopsy performed today (see procedure note(s) below).    #Family History of Melanoma, with few scattered benign appearing nevi.  - Reviewed sun protective measures for melanoma prevention   - Reviewed ABCDE's of melanoma, handout provided     Procedures Performed:   - Shave biopsy procedure note, location(s): right buttock. After discussion of benefits and risks including but not limited to bleeding, infection, scar, incomplete removal, recurrence, and non-diagnostic biopsy, verbal consent and photographs were obtained. The area was cleaned with isopropyl alcohol. 0.5mL of 1% lidocaine with epinephrine was injected to obtain adequate anesthesia of lesion(s). Shave biopsy at site(s) performed. Hemostasis was achieved with aluminium chloride. Petrolatum ointment and a sterile dressing were applied. The patient tolerated the procedure and no complications were noted. The patient was provided with verbal and written post care instructions.   - Procedure(s) performed by faculty.     - Punch biopsy procedure note, location(s): left nipple. After discussion of benefits and risks including but not limited to bleeding, infection, scar, incomplete removal, recurrence, and non-diagnostic biopsy, verbak consent and photographs were obtained. The area was cleaned with isopropyl alcohol. 0.5mL of 1% lidocaine with epinephrine was injected to obtain adequate anesthesia  and a 3 mm punch biopsy was performed at site(s). 4-0 Prolene sutures were utilized to approximate the epidermal edges. White petrolatum ointment and a bandage was applied to the wound. Explicit verbal and written wound care instructions were provided. The patient left the dermatology clinic in good condition.   - Procedure(s) performed by faculty.     Follow-up: pending path results; otherwise 1 year     Staff and Medical Student:     Adarsh Zaman, M3     I was present with the medical student who participated in the service and in the documentation.  I have verified the history and personally performed the physical exam and medical decision making.  I agree with the assessment and plan of care as documented in the note.  I personally performed all procedures.    Gabriel Hurtado MD  Dermatology Attending    ____________________________________________    CC: Skin Check (Heather is here today for a skin check )    HPI:  Ms. Heather Guillory is a(n) 44 year old female who presents today as a new patient for skin check.    Patient reports that she has regularly followed with dermatology at [facility] previously, though is switching to [facility] as she receives her other care here.     Patient has a family history of melanoma in situ in her sister (dx at age 41) and NMSC in her mother and maternal grandfather. She denies a personal history of skin cancer. She does endorse 2-3 years of regular tanning bed use in her 20's. No history of blistering sunburns, currently very diligent about sun protection.     Patient reports today recent skin changes to the left nipple area. She reports increased sensitivity in this area, though denies pain or itching. Was seen by Gyn and had a mammogram and was recommended to be evaluated by a dermatologist. She is also scheduled to be seen by a breast specialist, though not for a while.    Patient is otherwise feeling well, without additional skin concerns.    Labs:  None  reviewed.    Physical Exam:  Vitals: LMP 04/16/2024 (Exact Date)   SKIN: Full skin, which includes the head/face, both arms, chest, back, abdomen,both legs, genitalia and/or groin buttocks, digits and/or nails, was examined.  - on left buttock, 4mm symmetric, well-defined, two-toned tan-brown macule  - left nipple with some generalized scaling, 5mm tan, hyperkeratotic papule   - Many tan-brown macules distributed diffusely on body   - No other lesions of concern on areas examined.     Medications:  Current Outpatient Medications   Medication Sig Dispense Refill    SUMAtriptan (IMITREX) 50 MG tablet Take 1 tablet (50 mg) by mouth at onset of headache for migraine May repeat in 2 hours. Max 4 tablets/24 hours. 9 tablet 0     No current facility-administered medications for this visit.      Past Medical History:   Patient Active Problem List   Diagnosis    JAVIER (generalized anxiety disorder)    Undifferentiated connective tissue disease (H24)    Rosacea    Varicose veins of left lower extremity with pain    Encounter for sterilization    Pelvic floor dysfunction    Urinary urgency    Urinary frequency    Abnormal LFTs    Other specified anxiety disorders    Other specified eating disorder     Past Medical History:   Diagnosis Date    JAVIER (generalized anxiety disorder)     50mg zoloft    Rosacea     Undifferentiated connective tissue disease (H24)         CC Referred Self, MD  No address on file on close of this encounter.

## 2024-05-30 NOTE — PATIENT INSTRUCTIONS
Heather Guillory, it was a pleasure seeing you in clinic today (May 30, 2024). To briefly summarize our plan moving forward:    1) We will obtain UA following this visit. Depending on the results will determine your antibiotic choice and duration. If your urine studies are growing an organism, then it is likely growing a resistant organism to macrobid and we will know what antibiotic to give once the culture susceptibilities are known. If the urine studies are not growing anything, then the organism likely is susceptible to the Macrobid, so we will likely continue the antibiotic twice a day for 5-days total. (Stop after Monday AM)    2) Take the pyridium as need for symptomatic support    3) Please seek immediate medical attention if you have new onset fevers, chills, back pain, or worsening of your current  symptoms.    If you have any further questions or concerns, please call the clinic ([phone]) or send a message via the FullContact feature.   Thank you for entrusting me with your care, and I look forward to seeing you at your next clinic visit.    Zaria Rivera MD  Internal Medicine  PGY-1  [facility]

## 2024-05-30 NOTE — PROGRESS NOTES
Assessment & Plan     Dysuria  Hx of UTIs  Given the clinical history and patient's history of UTIs, I suspect the patient most likely has a lower urinary tract infection or cystitis.  Patient has already taken in 2 doses of Macrobid prior to evaluation for her urinary tract and symptoms.  - UA with Microscopic reflex to Culture - clinic collect; Future  - Pending the UA results will determine if choice of antibiotics and clinical duration:   -If UA negative, then will continue Macrobid 100 mg BID for 5-days total (through AM of 6/3)   -If UA abnormal, then will reflex to Ucx and determine antibiotic choice/duration based on susceptibilities   - Continue PTA Macrobid 100 mg BID for now  - Given recurrent UTIs, recommend discussing with PCP about placing a standing UA order in the event that she has future UTIs      Tanner Romero is a 44 year old, with a history of recurrent UTIs and prior ESBL UTI (2018), who presented for the following health issues:  UTI (Pt reports urinary urgency, frequency, pain, hematuria )    Patient reports that she had a sudden onset of pelvic discomfort and urinary urgency starting late yesterday afternoon.  Additionally she endorses having dysuria and late last night, she noticed the passage of clots and hematuria.  Due to concern for possible UTI, the patient took 100 mg of Macrobid and a Pyridium dose.  Patient had a previous prescription for Macrobid given to her back in March 2024 as prophylactic prior to her travels to [place], but she did not have to take any at that time.  Patient took another dose of Macrobid this morning prior to presenting to clinic.  She denies any fevers, chills, abdominal pain, back pain.  Patient was seen in dermatology clinic prior to this visit; she had several extractions done for which lidocaine was used as a local anesthetic.  Following her visit in Derm clinic, the patient googled the potential side effects between lidocaine and Macrobid and saw  "that a potential drug interaction would yield methemoglobinemia.  Patient denies any chest pain, shortness of breath, discoloration of her skin.    Patient has a longstanding history of urinary tract infections since the birth of her third child.  She has a history of an ESBL E. coli UTI in 2028 however she has had subsequent UTIs growing pan susceptible E. coli.  Patient has a history of nonobstructing kidney stones.      Review of Systems  Constitutional, HEENT, cardiovascular, pulmonary, gi and gu systems are negative, except as otherwise noted.      Objective    /75 (BP Location: Right arm, Patient Position: Sitting, Cuff Size: Adult Regular)   Pulse 72   Temp 98  F (36.7  C) (Oral)   Ht 1.702 m (5' 7\")   Wt 68.5 kg (151 lb)   LMP 04/16/2024 (Exact Date)   SpO2 100%   BMI 23.65 kg/m    Body mass index is 23.65 kg/m .  Physical Exam   GENERAL: alert and no distress  NECK: no adenopathy, no asymmetry, masses, or scars  RESP: lungs clear to auscultation - no rales, rhonchi or wheezes  CV: regular rate and rhythm, normal S1 S2, no S3 or S4, no murmur, click or rub, no peripheral edema  ABDOMEN: soft, nontender, no hepatosplenomegaly, no masses and bowel sounds normal  MS: no gross musculoskeletal defects noted, no edema      Patient examined independently and case discussed with attending provider, Dr. Ruben Hernandez.    Signed Electronically by: Zaria Rivera MD    "

## 2024-05-30 NOTE — PROGRESS NOTES
"I, Ruben Hernandez MD discussed with the resident during the visit, and agree with the resident's findings and plan of care as documented in the resident's note. I was immediately available to the patient should the need have arisen.  /75 (BP Location: Right arm, Patient Position: Sitting, Cuff Size: Adult Regular)   Pulse 72   Temp 98  F (36.7  C) (Oral)   Ht 1.702 m (5' 7\")   Wt 68.5 kg (151 lb)   LMP 04/16/2024 (Exact Date)   SpO2 100%   BMI 23.65 kg/m    I personally reviewed vital signs and past record.  Key findings: symptoms consistent with previous UTI, and then passed some clots. Took some old macrobid.  In future consider standing order for UA with reflex to culture.    "

## 2024-05-30 NOTE — NURSING NOTE
Lidocaine-epinephrine 1-1:552046 % injection   1mL once for one use, starting 5/30/2024 ending 5/30/2024,  2mL disp, R-0, injection  Injected by Christa Brito LPN

## 2024-06-02 PROBLEM — D49.2 NEOPLASM OF UNSPECIFIED BEHAVIOR OF BONE, SOFT TISSUE, AND SKIN: Status: ACTIVE | Noted: 2024-06-02

## 2024-06-02 PROBLEM — D22.9 MULTIPLE MELANOCYTIC NEVI: Status: ACTIVE | Noted: 2024-06-02

## 2024-06-03 LAB
PATH REPORT.COMMENTS IMP SPEC: NORMAL
PATH REPORT.FINAL DX SPEC: NORMAL
PATH REPORT.GROSS SPEC: NORMAL
PATH REPORT.MICROSCOPIC SPEC OTHER STN: NORMAL
PATH REPORT.RELEVANT HX SPEC: NORMAL

## 2024-06-12 ENCOUNTER — TELEPHONE (OUTPATIENT)
Dept: DERMATOLOGY | Facility: CLINIC | Age: 44
End: 2024-06-12
Payer: COMMERCIAL

## 2024-06-20 ENCOUNTER — TELEPHONE (OUTPATIENT)
Dept: DERMATOLOGY | Facility: CLINIC | Age: 44
End: 2024-06-20
Payer: COMMERCIAL

## 2024-07-01 ENCOUNTER — TELEPHONE (OUTPATIENT)
Dept: INTERNAL MEDICINE | Facility: CLINIC | Age: 44
End: 2024-07-01
Payer: COMMERCIAL

## 2024-07-01 DIAGNOSIS — R30.0 DYSURIA: Primary | ICD-10-CM

## 2024-07-01 NOTE — TELEPHONE ENCOUNTER
From lv note-- Given recurrent UTIs, recommend discussing with PCP about placing a standing UA order in the event that she has future UTIs

## 2024-07-01 NOTE — TELEPHONE ENCOUNTER
[facility] Call Center    Phone Message    May a detailed message be left on voicemail: yes     Reason for Call: Other: Per pt would like to request a Urine culture due to the symptoms pt got this morning. Please call pt back once orders are in to schedule. Thank you!     Action Taken: Message routed to:  [facility]    Travel Screening: Not Applicable     Date of Service:

## 2024-07-02 ENCOUNTER — LAB (OUTPATIENT)
Dept: LAB | Facility: CLINIC | Age: 44
End: 2024-07-02
Payer: COMMERCIAL

## 2024-07-02 ENCOUNTER — MYC MEDICAL ADVICE (OUTPATIENT)
Dept: INTERNAL MEDICINE | Facility: CLINIC | Age: 44
End: 2024-07-02

## 2024-07-02 DIAGNOSIS — R30.0 DYSURIA: Primary | ICD-10-CM

## 2024-07-02 DIAGNOSIS — R30.0 DYSURIA: ICD-10-CM

## 2024-07-02 LAB
ALBUMIN UR-MCNC: NEGATIVE MG/DL
APPEARANCE UR: CLEAR
BACTERIA #/AREA URNS HPF: ABNORMAL /HPF
BILIRUB UR QL STRIP: NEGATIVE
COLOR UR AUTO: YELLOW
GLUCOSE UR STRIP-MCNC: NEGATIVE MG/DL
HGB UR QL STRIP: ABNORMAL
KETONES UR STRIP-MCNC: NEGATIVE MG/DL
LEUKOCYTE ESTERASE UR QL STRIP: NEGATIVE
NITRATE UR QL: NEGATIVE
PH UR STRIP: 6.5 [PH] (ref 5–7)
RBC #/AREA URNS AUTO: ABNORMAL /HPF
SP GR UR STRIP: 1.01 (ref 1–1.03)
SQUAMOUS #/AREA URNS AUTO: ABNORMAL /LPF
UROBILINOGEN UR STRIP-ACNC: 0.2 E.U./DL
WBC #/AREA URNS AUTO: ABNORMAL /HPF

## 2024-07-02 PROCEDURE — 81001 URINALYSIS AUTO W/SCOPE: CPT

## 2024-07-02 NOTE — TELEPHONE ENCOUNTER
Patient confirmed scheduled appointment:  Date: 7/2/24  Time: 130pm  Visit type: LAB  Provider: Per PCP  Location: [facility]  Testing/imaging: UA  Additional notes: location per pt request

## 2024-07-13 NOTE — TELEPHONE ENCOUNTER
RECORDS STATUS - BREAST    RECORDS REQUESTED FROM: [facility]   NOTES DETAILS STATUS   OFFICE NOTE from referring provider Epic 05/15/24: Dalila Shearer CNM   MEDICATION LIST Epic    GENONOMIC TESTING     TYPE:   (Next Generation Sequencing, including Foundation One testing, and Oncotype score) External: [facility] 11/13/23: [id number]   IMAGING (NEED IMAGES & REPORT)     MAMMO PACS 05/24/24-07/23/20   ULTRASOUND PACS 05/24/24-07/23/20: [facility]

## 2024-07-22 NOTE — PROGRESS NOTES
NEW CONSULTATION   2024     Heather Guillory is a 44 year old woman who presents with left nipple concerns, left axillary concern, and family history of breast cancer. She was referred by Dr. Shearer.    HPI:    Family history of paternal grandmother diagnosed with breast cancer in her 80's. Ashkenazi Ten[employer] descent. Heather had negative genetic testing in .     Earlier this year she developed enlargement of the left areolar gordon glands. They appears as enlarged clogged pores. In May she had a mammogram and ultrasound that were normal. She had a left areolar biopsy by dermatology that demonstrated ruptured folliculitis. She reports less frequent periods in the last year. She had hormone levels check by her women's health provider. She report her left nipple and breast have returned mostly back to normal. There are still a couple small bumps on her areolar and left breast skin lesion. She also reports several weeks of a left axillary mass.      BREAST-SPECIFIC HISTORY:    Previous breast imaging: Yes  - 20 b/l Dmammo and left breast ultrasound for pain and lump: left axillary 5 mm skin thickening BI-RADS 2  - 20 Smammo: left breast asymmetry BI-RADS 0  - 20 left Dmammo BI-RADS 1  - 8/10/22 left Dmammo and left breast ultrasound for pain, cyst 2-8:00 BI-RADS 2  - 23 Smammo BI-RADS 1  - 24 b/l Dmammo and left breast ultrasound for pain and discharge BI RADS 2.     Prior breast biopsies/surgeries: No    Prior history of breast cancer or DCIS: No  Prior radiation history: No   Breast density: heterogeneously dense    GYN HISTORY:  . Age at 1st pregnancy: 34. Breastfeeding history: Yes.   Age at menarche:   Menopausal: premenopausal.   Contraception: tubal ligation   Menopausal hormone replacement therapy: No     RISK ASSESSMENT: < 3% 5 year risk by Susan, < 20% lifetime risk by DELMAR  Susan:0.9% 5 year risk   DELMAR/Raeann: 16.3% lifetime risk    FAMILY  HISTORY:  Breast ca: Yes  - paternal grandmother, 80  Ovarian ca: No  Pancreatic ca: No  Prostate: No  Gastric ca: No  Melanoma: No  Colon ca: Yes  - maternal grandfather  Other cancer: Yes  - maternal grandmother with cervical cancer  - paternal grandfather with lung cancer  Ashkenazi Protestant ethnicity: yes with family history of breast cancer  Other genetic, testing, syndromes, or clotting disorders: no     PAST MEDICAL HISTORY  Past Medical History:   Diagnosis Date    JAVIER (generalized anxiety disorder)     50mg zoloft    Rosacea     Undifferentiated connective tissue disease (H24)      PAST SURGICAL HISTORY   Past Surgical History:   Procedure Laterality Date    COLONOSCOPY N/A 10/21/2020    Procedure: COLONOSCOPY;  Surgeon: Yang Pete MD;  Location: [facility] OR    COLONOSCOPY N/A 12/29/2022    Procedure: COLONOSCOPY, WITH BIOPSY;  Surgeon: Magda Lobo MD;  Location: [facility] OR    DAVINCI MYOMECTOMY N/A 02/11/2022    Procedure: MYOMECTOMY, UTERUS, ROBOT-ASSISTED, LAPAROSCOPIC;  Surgeon: Cate Mansfield MD;  [facility]    ESOPHAGOSCOPY, GASTROSCOPY, DUODENOSCOPY (EGD), COMBINED N/A 12/29/2022    Procedure: ESOPHAGOGASTRODUODENOSCOPY, WITH BIOPSY;  Surgeon: Magda Lobo MD;  Location: [facility] OR    ESOPHAGOSCOPY, GASTROSCOPY, DUODENOSCOPY (EGD), COMBINED N/A 09/07/2023    Procedure: Esophagoscopy, gastroscopy, duodenoscopy (EGD), combined WITH BIOPSY;  Surgeon: Jackie Aldana MD;  Location: [facility] OR    IR ENDOVENOUS ABLATION VARICOSE VEINS  10/28/2019    IR FOLLOW UP VISIT OUTPATIENT  11/20/2019    IR STAB PHLEBECTOMY 10 - 20 STABS  10/28/2019    IR VEIN SCLEROSING MULTIPLE  10/28/2019    LAPAROSCOPIC SALPINGECTOMY Bilateral 04/23/2021    Procedure: SALPINGECTOMY, LAPAROSCOPIC BILATERAL;  Surgeon: Cate Mansfield MD;  [facility]    OPERATIVE HYSTEROSCOPY WITH MORCELLATOR  04/08/2014    Procedure: OPERATIVE HYSTEROSCOPY WITH MORCELLATOR;  Hysteroscopic Polypectomy;  Surgeon:  "Cate Mansfield MD;  [facility]    REMOVE INTRAUTERINE DEVICE N/A 04/23/2021    Procedure: removal of intrauterine device;  Surgeon: Cate Mansfield MD;  [facility]    TUBAL LIGATION       MEDICATIONS  Current Outpatient Medications   Medication Sig Dispense Refill    SUMAtriptan (IMITREX) 50 MG tablet Take 1 tablet (50 mg) by mouth at onset of headache for migraine May repeat in 2 hours. Max 4 tablets/24 hours. (Patient not taking: Reported on 5/30/2024) 9 tablet 0      ALLERGIES  Allergies   Allergen Reactions    Cefadroxil Unknown and Hives    Sulfa Antibiotics Rash and Hives    Benzoyl Peroxide Swelling    Latex Rash    Miconazole Swelling      SOCIAL HISTORY:  Smokes: No  EtOH: No  Exercise: active   Has children. Her sister in law has breast cancer, she is tearful today in the office.     ROS:  Change in vision No  Headaches: no  Respiratory: No shortness of breath, dyspnea on exertion, cough, or hemoptysis   Cardiovascular: negative   Gastrointestinal: negative Abdominal pain: no  Breast:skin lesion and left axillary mass  Musculoskeletal: negative Joint pain No Back pain: no  Psychiatric: negative  Hematologic/Lymphatic/Immunologic: negative  Endocrine: negative    EXAM  /79 (BP Location: Right arm, Patient Position: Sitting, Cuff Size: Adult Regular)   Pulse 66   Temp 98.6  F (37  C) (Oral)   Resp 14   Ht 1.703 m (5' 7.05\")   Wt 68 kg (149 lb 14.4 oz)   SpO2 100%   BMI 23.44 kg/m     PHYSICAL EXAM  Respiratory: breathing non labored.   Breasts: Examination was done in both the upright and supine positions.  Breasts are symmetrical. No breast masses noted. No nipple discharge. Left areola 3:00-6:00 there are 3 3 mm skin lesions consistent with gordon gland or hair follicle, no signs of inflammation or infection. Left breast 10:00 4 mm skin lesion consistent with folliculitis scar.  No clavicular or cervical lymphadenopathy. Right and left 1 cm axillary lymph nodes. " Left axillar 1 cm skin mass without erythema or tenderness, consistent with epidermoid cyst.       ASSESSMENT/PLAN:    Heather Guillory is a 44 year old woman with left areolar lesions and left breast skin lesion consistent with resolving folliculitis. Left axillary mass consistent with epidermoid cyst. Family history of breast cancer and of Ashkenazi Restorationism decent. Genetic testing was negative in 2023.     1) Left axillary mass and left breast lesions  - left axillary and left breast ultrasound ordered. No ultrasound available today.     2) Family history of breast cancer  Continue yearly screening mammogram with tomosynthesis and clinical encounter. Be familiar with your breast and how they normally feel and appear. Promptly report any changes to your healthcare provider.   - Screening mammogram due: 5/25/25    3) Lifestyle Modifications were provided.   - Maintain your best healthy weight. Higher body fat and adult weight gain is associated with increased risk for breast cancer. This increase in risk has been attributed to increase in circulating endogenous estrogen levels from fat tissue.   - Any alcohol intake increases the risk for breast cancer. If you choose to drink alcohol limit alcohol consumption to less than 1 drink (1 ounce of liquor, 6 ounces of wine, or 8 ounces of beer) per day or less than 3 drinks per week.  - Be active daily and void being sedentary.   - Vitamin D may decrease the risk of developing breast cancer.     Nilda Mari PA-C    30 minutes spent on the date of the encounter doing chart review, review of test results, interpretation of tests, patient visit and documentation.

## 2024-07-24 ENCOUNTER — ONCOLOGY VISIT (OUTPATIENT)
Dept: SURGERY | Facility: CLINIC | Age: 44
End: 2024-07-24
Attending: ADVANCED PRACTICE MIDWIFE
Payer: COMMERCIAL

## 2024-07-24 ENCOUNTER — PRE VISIT (OUTPATIENT)
Dept: SURGERY | Facility: CLINIC | Age: 44
End: 2024-07-24
Payer: COMMERCIAL

## 2024-07-24 VITALS
TEMPERATURE: 98.6 F | BODY MASS INDEX: 23.53 KG/M2 | WEIGHT: 149.9 LBS | HEART RATE: 66 BPM | RESPIRATION RATE: 14 BRPM | OXYGEN SATURATION: 100 % | DIASTOLIC BLOOD PRESSURE: 79 MMHG | HEIGHT: 67 IN | SYSTOLIC BLOOD PRESSURE: 110 MMHG

## 2024-07-24 DIAGNOSIS — R22.32 AXILLARY MASS, LEFT: Primary | ICD-10-CM

## 2024-07-24 DIAGNOSIS — L98.8 SKIN LESION OF BREAST: ICD-10-CM

## 2024-07-24 DIAGNOSIS — N64.4 NIPPLE PAIN: ICD-10-CM

## 2024-07-24 PROCEDURE — 99213 OFFICE O/P EST LOW 20 MIN: CPT | Performed by: PHYSICIAN ASSISTANT

## 2024-07-24 PROCEDURE — 99203 OFFICE O/P NEW LOW 30 MIN: CPT | Performed by: PHYSICIAN ASSISTANT

## 2024-07-24 ASSESSMENT — PAIN SCALES - GENERAL: PAINLEVEL: NO PAIN (0)

## 2024-07-24 NOTE — NURSING NOTE
"Oncology Rooming Note    July 24, 2024 12:20 PM   Heather Guillory is a 44 year old female who presents for:    Chief Complaint   Patient presents with    Oncology Clinic Visit     Nipple pain     Initial Vitals: /79 (BP Location: Right arm, Patient Position: Sitting, Cuff Size: Adult Regular)   Pulse 66   Temp 98.6  F (37  C) (Oral)   Resp 14   Ht 1.703 m (5' 7.05\")   Wt 68 kg (149 lb 14.4 oz)   SpO2 100%   BMI 23.44 kg/m   Estimated body mass index is 23.44 kg/m  as calculated from the following:    Height as of this encounter: 1.703 m (5' 7.05\").    Weight as of this encounter: 68 kg (149 lb 14.4 oz). Body surface area is 1.79 meters squared.  No Pain (0) Comment: Data Unavailable   No LMP recorded. (Menstrual status: Tubal ).  Allergies reviewed: Yes  Medications reviewed: Yes    Medications: Medication refills not needed today.  Pharmacy name entered into Whatser: [id number] - [place], [place] - [address] AT [facility] [address]    Frailty Screening:   Is the patient here for a new oncology consult visit in cancer care? 1. Yes. Over the past month, have you experienced difficulty or required a caregiver to assist with:   1. Balance, walking or general mobility (including any falls)? NO  2. Completion of self-care tasks such as bathing, dressing, toileting, grooming/hygiene?  NO  3. Concentration or memory that affects your daily life?  NO       Clinical concerns: Patient states no new concerns to discuss with provider.  Nilda was NOT notified.      Lashonda Lopez EMT            "

## 2024-07-24 NOTE — LETTER
2024      Heather Guillory  [address]  [place] [address]      Dear Colleague,    Thank you for referring your patient, Heatehr Guillory, to the [facility]. Please see a copy of my visit note below.    NEW CONSULTATION   2024     Heather Guillory is a 44 year old woman who presents with left nipple concerns, left axillary concern, and family history of breast cancer. She was referred by Dr. Shearer.    HPI:    Family history of paternal grandmother diagnosed with breast cancer in her 80's. Ashkenazi [employer] descent. Heather had negative genetic testing in .     Earlier this year she developed enlargement of the left areolar gordon glands. They appears as enlarged clogged pores. In May she had a mammogram and ultrasound that were normal. She had a left areolar biopsy by dermatology that demonstrated ruptured folliculitis. She reports less frequent periods in the last year. She had hormone levels check by her women's health provider. She report her left nipple and breast have returned mostly back to normal. There are still a couple small bumps on her areolar and left breast skin lesion. She also reports several weeks of a left axillary mass.      BREAST-SPECIFIC HISTORY:    Previous breast imaging: Yes  - 20 b/l Dmammo and left breast ultrasound for pain and lump: left axillary 5 mm skin thickening BI-RADS 2  - 20 Smammo: left breast asymmetry BI-RADS 0  - 20 left Dmammo BI-RADS 1  - 8/10/22 left Dmammo and left breast ultrasound for pain, cyst 2-8:00 BI-RADS 2  - 23 Smammo BI-RADS 1  - 24 b/l Dmammo and left breast ultrasound for pain and discharge BI RADS 2.     Prior breast biopsies/surgeries: No    Prior history of breast cancer or DCIS: No  Prior radiation history: No   Breast density: heterogeneously dense    GYN HISTORY:  . Age at 1st pregnancy: 34. Breastfeeding history: Yes.   Age at menarche: 13  Menopausal:  premenopausal.   Contraception: tubal ligation   Menopausal hormone replacement therapy: No     RISK ASSESSMENT: < 3% 5 year risk by Susan, < 20% lifetime risk by DELMAR  Susan:0.9% 5 year risk   DELMAR/Raeann: 16.3% lifetime risk    FAMILY HISTORY:  Breast ca: Yes  - paternal grandmother, 80  Ovarian ca: No  Pancreatic ca: No  Prostate: No  Gastric ca: No  Melanoma: No  Colon ca: Yes  - maternal grandfather  Other cancer: Yes  - maternal grandmother with cervical cancer  - paternal grandfather with lung cancer  Ashkenazi [employer] ethnicity: yes with family history of breast cancer  Other genetic, testing, syndromes, or clotting disorders: no     PAST MEDICAL HISTORY  Past Medical History:   Diagnosis Date     JAVIER (generalized anxiety disorder)     50mg zoloft     Rosacea      Undifferentiated connective tissue disease (H24)      PAST SURGICAL HISTORY   Past Surgical History:   Procedure Laterality Date     COLONOSCOPY N/A 10/21/2020    Procedure: COLONOSCOPY;  Surgeon: Yang Pete MD;  Location: [facility] OR     COLONOSCOPY N/A 12/29/2022    Procedure: COLONOSCOPY, WITH BIOPSY;  Surgeon: Magda Lobo MD;  Location: [facility] OR     DAVINCI MYOMECTOMY N/A 02/11/2022    Procedure: MYOMECTOMY, UTERUS, ROBOT-ASSISTED, LAPAROSCOPIC;  Surgeon: Cate Mansfield MD;  [facility]     ESOPHAGOSCOPY, GASTROSCOPY, DUODENOSCOPY (EGD), COMBINED N/A 12/29/2022    Procedure: ESOPHAGOGASTRODUODENOSCOPY, WITH BIOPSY;  Surgeon: Magda Lobo MD;  Location: [facility] OR     ESOPHAGOSCOPY, GASTROSCOPY, DUODENOSCOPY (EGD), COMBINED N/A 09/07/2023    Procedure: Esophagoscopy, gastroscopy, duodenoscopy (EGD), combined WITH BIOPSY;  Surgeon: Jackie Aldana MD;  Location: [facility] OR     IR ENDOVENOUS ABLATION VARICOSE VEINS  10/28/2019     IR FOLLOW UP VISIT OUTPATIENT  11/20/2019     IR STAB PHLEBECTOMY 10 - 20 STABS  10/28/2019     IR VEIN SCLEROSING MULTIPLE  10/28/2019     LAPAROSCOPIC SALPINGECTOMY Bilateral 04/23/2021     "Procedure: SALPINGECTOMY, LAPAROSCOPIC BILATERAL;  Surgeon: Cate Mansfield MD;  [facility]     OPERATIVE HYSTEROSCOPY WITH MORCELLATOR  04/08/2014    Procedure: OPERATIVE HYSTEROSCOPY WITH MORCELLATOR;  Hysteroscopic Polypectomy;  Surgeon: Cate Mansfield MD;  [facility]     REMOVE INTRAUTERINE DEVICE N/A 04/23/2021    Procedure: removal of intrauterine device;  Surgeon: Cate Mansfield MD;  [facility]     TUBAL LIGATION       MEDICATIONS  Current Outpatient Medications   Medication Sig Dispense Refill     SUMAtriptan (IMITREX) 50 MG tablet Take 1 tablet (50 mg) by mouth at onset of headache for migraine May repeat in 2 hours. Max 4 tablets/24 hours. (Patient not taking: Reported on 5/30/2024) 9 tablet 0      ALLERGIES  Allergies   Allergen Reactions     Cefadroxil Unknown and Hives     Sulfa Antibiotics Rash and Hives     Benzoyl Peroxide Swelling     Latex Rash     Miconazole Swelling      SOCIAL HISTORY:  Smokes: No  EtOH: No  Exercise: active   Has children. Her sister in law has breast cancer, she is tearful today in the office.     ROS:  Change in vision No  Headaches: no  Respiratory: No shortness of breath, dyspnea on exertion, cough, or hemoptysis   Cardiovascular: negative   Gastrointestinal: negative Abdominal pain: no  Breast:skin lesion and left axillary mass  Musculoskeletal: negative Joint pain No Back pain: no  Psychiatric: negative  Hematologic/Lymphatic/Immunologic: negative  Endocrine: negative    EXAM  /79 (BP Location: Right arm, Patient Position: Sitting, Cuff Size: Adult Regular)   Pulse 66   Temp 98.6  F (37  C) (Oral)   Resp 14   Ht 1.703 m (5' 7.05\")   Wt 68 kg (149 lb 14.4 oz)   SpO2 100%   BMI 23.44 kg/m     PHYSICAL EXAM  Respiratory: breathing non labored.   Breasts: Examination was done in both the upright and supine positions.  Breasts are symmetrical. No breast masses noted. No nipple discharge. Left areola 3:00-6:00 there are 3 3 " mm skin lesions consistent with gordon gland or hair follicle, no signs of inflammation or infection. Left breast 10:00 4 mm skin lesion consistent with folliculitis scar.  No clavicular or cervical lymphadenopathy. Right and left 1 cm axillary lymph nodes. Left axillar 1 cm skin mass without erythema or tenderness, consistent with epidermoid cyst.       ASSESSMENT/PLAN:    Heather Guillory is a 44 year old woman with left areolar lesions and left breast skin lesion consistent with resolving folliculitis. Left axillary mass consistent with epidermoid cyst. Family history of breast cancer and of Ashkenazi Hoahaoism decent. Genetic testing was negative in 2023.     1) Left axillary mass and left breast lesions  - left axillary and left breast ultrasound ordered. No ultrasound available today.     2) Family history of breast cancer  Continue yearly screening mammogram with tomosynthesis and clinical encounter. Be familiar with your breast and how they normally feel and appear. Promptly report any changes to your healthcare provider.   - Screening mammogram due: 5/25/25    3) Lifestyle Modifications were provided.   - Maintain your best healthy weight. Higher body fat and adult weight gain is associated with increased risk for breast cancer. This increase in risk has been attributed to increase in circulating endogenous estrogen levels from fat tissue.   - Any alcohol intake increases the risk for breast cancer. If you choose to drink alcohol limit alcohol consumption to less than 1 drink (1 ounce of liquor, 6 ounces of wine, or 8 ounces of beer) per day or less than 3 drinks per week.  - Be active daily and void being sedentary.   - Vitamin D may decrease the risk of developing breast cancer.     Nilda Mari PA-C    30 minutes spent on the date of the encounter doing chart review, review of test results, interpretation of tests, patient visit and documentation.        Again, thank you for allowing me to participate  in the care of your patient.        Sincerely,        Nilda Mari PA-C

## 2024-07-24 NOTE — PATIENT INSTRUCTIONS
Heather Guillory is a 44 year old woman with left areolar lesions and left breast skin lesion consistent with resolving folliculitis. Left axillary mass consistent with epidermoid cyst. Family history of breast cancer and of Ashkenazi Tenriism decent. Genetic testing was negative in 2023.     1) Left axillary mass and left breast lesions  - left axillary and left breast ultrasound ordered.     2) Family history of breast cancer  Continue yearly screening mammogram with tomosynthesis and clinical encounter. Be familiar with your breast and how they normally feel and appear. Promptly report any changes to your healthcare provider.   - Screening mammogram due: 5/25/25    3) Lifestyle Modifications were provided.   - Maintain your best healthy weight. Higher body fat and adult weight gain is associated with increased risk for breast cancer. This increase in risk has been attributed to increase in circulating endogenous estrogen levels from fat tissue.   - Any alcohol intake increases the risk for breast cancer. If you choose to drink alcohol limit alcohol consumption to less than 1 drink (1 ounce of liquor, 6 ounces of wine, or 8 ounces of beer) per day or less than 3 drinks per week.  - Be active daily and void being sedentary.   - Vitamin D may decrease the risk of developing breast cancer.

## 2024-08-05 ENCOUNTER — ANCILLARY PROCEDURE (OUTPATIENT)
Dept: MAMMOGRAPHY | Facility: CLINIC | Age: 44
End: 2024-08-05
Attending: PHYSICIAN ASSISTANT
Payer: COMMERCIAL

## 2024-08-05 DIAGNOSIS — R22.32 AXILLARY MASS, LEFT: ICD-10-CM

## 2024-08-05 DIAGNOSIS — L98.8 SKIN LESION OF BREAST: ICD-10-CM

## 2024-08-05 PROCEDURE — 76642 ULTRASOUND BREAST LIMITED: CPT | Mod: LT | Performed by: RADIOLOGY

## 2024-10-09 NOTE — PROGRESS NOTES
Chief Complaint   Patient presents with     RECHECK     6 month f/u     Blood pressure 107/71, pulse 66, temperature 98.5  F (36.9  C), temperature source Oral, weight 71 kg (156 lb 9.6 oz), SpO2 99 %, not currently breastfeeding.    Sita Diaz, CMA      English

## 2024-11-01 ENCOUNTER — TRANSFERRED RECORDS (OUTPATIENT)
Dept: MULTI SPECIALTY CLINIC | Facility: CLINIC | Age: 44
End: 2024-11-01

## 2024-11-01 LAB — RETINOPATHY: NORMAL

## 2025-02-04 ENCOUNTER — TELEPHONE (OUTPATIENT)
Dept: DERMATOLOGY | Facility: CLINIC | Age: 45
End: 2025-02-04

## 2025-02-04 ENCOUNTER — OFFICE VISIT (OUTPATIENT)
Dept: DERMATOLOGY | Facility: CLINIC | Age: 45
End: 2025-02-04
Payer: COMMERCIAL

## 2025-02-04 DIAGNOSIS — D49.2 NEOPLASM OF UNSPECIFIED BEHAVIOR OF BONE, SOFT TISSUE, AND SKIN: Primary | ICD-10-CM

## 2025-02-04 ASSESSMENT — PAIN SCALES - GENERAL: PAINLEVEL_OUTOF10: NO PAIN (0)

## 2025-02-04 NOTE — PROGRESS NOTES
[facility] Dermatology Note  Encounter Date: Feb 4, 2025  Office Visit     Dermatology Problem List:    # NUB, L lower eyelid margin    # Family history of melanoma   - Sister with melanoma in situ of the face at age 41  # Predominantly intradermal melanocytic nevus, R buttock.  - s/p shave bx 05/30/2024  # Ruptured folliculitis, L nipple  - s/p punch bx 05/30/2024  ____________________________________________    Assessment & Plan:    # NUB, L lower eyelid margin, concerning for BCC  - derm surg referral placed and message sent to team    Procedures Performed:   N/A    Follow-up: 06/09/2025 for FBSC.     Staff and Scribe:     Scribe Disclosure:   I, Jess Delaney, am serving as a scribe; to document services personally performed by Heather Jerome MD -based on data collection and the provider's statements to me.     Provider Disclosure:   The documentation recorded by the scribe accurately reflects the services I personally performed and the decisions made by me.    Heather Jerome MD    Department of Dermatology  [facility]: Phone: [phone], Fax: [phone]  2/4/2025       ____________________________________________    CC: Derm Problem (Lesion on lower left eye lid. Saw eye clinic yesterday and was told to be seen right away for this. )    HPI:  Ms. Heather Guillory is a(n) 44 year old female who presents today as a return patient for above.    The patient reports that yesterday she was seen at the eye Dr. Due to her eyes always feeling tired/droopy. Her eye provider noticed that a lesion on her eyelid had grown since her last visit. She is feeling some anxieties about this lesion and would like this checked today.   This lesion has been present for about 15 months. Her eye provider stated that this area has grown.     Patient is otherwise feeling well, without additional skin  concerns.    Labs:  Derm path from 05/30/2024  Final Diagnosis   A. Right buttock:  - Predominantly intradermal melanocytic nevus - (see description)     B. Left nipple:  - Ruptured folliculitis - (see comment)       Physical exam:  Vitals: There were no vitals taken for this visit.  GEN: This is a well developed, well-nourished female in no acute distress, in a pleasant mood.    SKIN: Focused examination of the below was performed.  - L lower eyelid margin, shiny white papule with arborizing vessels.   - No other lesions of concern on areas examined.       Medications:  Current Outpatient Medications   Medication Sig Dispense Refill    metroNIDAZOLE (METROCREAM) 0.75 % external cream APPLY TOPICALLY TO THE AFFECTED AREA TWICE DAILY AS NEEDED FOR ROSACEA      SUMAtriptan (IMITREX) 50 MG tablet Take 1 tablet (50 mg) by mouth at onset of headache for migraine May repeat in 2 hours. Max 4 tablets/24 hours. (Patient not taking: Reported on 2/4/2025) 9 tablet 0     No current facility-administered medications for this visit.      Past Medical History:   Patient Active Problem List   Diagnosis    JAVIER (generalized anxiety disorder)    Undifferentiated connective tissue disease    Rosacea    Varicose veins of left lower extremity with pain    Encounter for sterilization    Pelvic floor dysfunction    Urinary urgency    Urinary frequency    Abnormal LFTs    Other specified anxiety disorders    Other specified eating disorder    Multiple melanocytic nevi    Neoplasm of unspecified behavior of bone, soft tissue, and skin     Past Medical History:   Diagnosis Date    JAVIER (generalized anxiety disorder)     50mg zoloft    Rosacea     Undifferentiated connective tissue disease         CC Referred Self, MD  No address on file on close of this encounter.

## 2025-02-04 NOTE — Clinical Note
Hi team - this tawana pt has a lesion on her left lower eyelid concerning for bcc, she would appreciate an expedited appt, thank you for considering!! No location preference, would just rather get in :) thanks!!

## 2025-02-04 NOTE — LETTER
2/4/2025      Heather Guillory  [address]  [place] [address]      Dear Colleague,    Thank you for referring your patient, Heather Guillory, to the [facility]. Please see a copy of my visit note below.    [facility] Dermatology Note  Encounter Date: Feb 4, 2025  Office Visit     Dermatology Problem List:    # NUB, L lower eyelid margin    # Family history of melanoma   - Sister with melanoma in situ of the face at age 41  # Predominantly intradermal melanocytic nevus, R buttock.  - s/p shave bx 05/30/2024  # Ruptured folliculitis, L nipple  - s/p punch bx 05/30/2024  ____________________________________________    Assessment & Plan:    # NUB, L lower eyelid margin, concerning for BCC  - derm surg referral placed and message sent to team    Procedures Performed:   N/A    Follow-up: 06/09/2025 for FBSC.     Staff and Scribe:     Scribe Disclosure:   I, Jess Delaney, am serving as a scribe; to document services personally performed by Heather Jerome MD -based on data collection and the provider's statements to me.     Provider Disclosure:   The documentation recorded by the scribe accurately reflects the services I personally performed and the decisions made by me.    Heather Jerome MD    Department of Dermatology  [facility]: Phone: [phone], Fax: [phone]  2/4/2025       ____________________________________________    CC: Derm Problem (Lesion on lower left eye lid. Saw eye clinic yesterday and was told to be seen right away for this. )    HPI:  Ms. Heather Guillory is a(n) 44 year old female who presents today as a return patient for above.    The patient reports that yesterday she was seen at the eye Dr. Due to her eyes always feeling tired/droopy. Her eye provider noticed that a lesion on her eyelid had grown since her last visit. She is feeling some  anxieties about this lesion and would like this checked today.   This lesion has been present for about 15 months. Her eye provider stated that this area has grown.     Patient is otherwise feeling well, without additional skin concerns.    Labs:  Derm path from 05/30/2024  Final Diagnosis   A. Right buttock:  - Predominantly intradermal melanocytic nevus - (see description)     B. Left nipple:  - Ruptured folliculitis - (see comment)       Physical exam:  Vitals: There were no vitals taken for this visit.  GEN: This is a well developed, well-nourished female in no acute distress, in a pleasant mood.    SKIN: Focused examination of the below was performed.  - L lower eyelid margin, shiny white papule with arborizing vessels.   - No other lesions of concern on areas examined.       Medications:  Current Outpatient Medications   Medication Sig Dispense Refill     metroNIDAZOLE (METROCREAM) 0.75 % external cream APPLY TOPICALLY TO THE AFFECTED AREA TWICE DAILY AS NEEDED FOR ROSACEA       SUMAtriptan (IMITREX) 50 MG tablet Take 1 tablet (50 mg) by mouth at onset of headache for migraine May repeat in 2 hours. Max 4 tablets/24 hours. (Patient not taking: Reported on 2/4/2025) 9 tablet 0     No current facility-administered medications for this visit.      Past Medical History:   Patient Active Problem List   Diagnosis     JAVIER (generalized anxiety disorder)     Undifferentiated connective tissue disease     Rosacea     Varicose veins of left lower extremity with pain     Encounter for sterilization     Pelvic floor dysfunction     Urinary urgency     Urinary frequency     Abnormal LFTs     Other specified anxiety disorders     Other specified eating disorder     Multiple melanocytic nevi     Neoplasm of unspecified behavior of bone, soft tissue, and skin     Past Medical History:   Diagnosis Date     JAVIER (generalized anxiety disorder)     50mg zoloft     Rosacea      Undifferentiated connective tissue disease         CC  Referred Self, MD  No address on file on close of this encounter.       Again, thank you for allowing me to participate in the care of your patient.        Sincerely,        Heather Jerome MD    Electronically signed

## 2025-02-04 NOTE — PATIENT INSTRUCTIONS
Proper skin care from [facility]:    -Eliminate harsh soaps as they strip the natural oils from the skin, often resulting in dry itchy skin ( i.e. Dial, Zest, Barbadian Spring)  -Use mild soaps such as Cetaphil or Dove Sensitive Skin in the shower. You do not need to use soap on arms, legs, and trunk every time you shower unless visibly soiled.   -Avoid hot or cold showers.  -After showering, lightly dry off and apply moisturizing within 2-3 minutes. This will help trap moisture in the skin.   -Aggressive use of a moisturizer at least 1-2 times a day to the entire body (including -Vanicream, Cetaphil, Aquaphor or Cerave) and moisturize hands after every washing.  -We recommend using moisturizers that come in a tub that needs to be scooped out, not a pump. This has more of an oil base. It will hold moisture in your skin much better than a water base moisturizer. The above recommended are non-pore clogging.      Wear a sunscreen with at least SPF 30 on your face, ears, neck and V of the chest daily. Wear sunscreen on other areas of the body if those areas are exposed to the sun throughout the day. Sunscreens can contain physical and/or chemical blockers. Physical blockers are less likely to clog pores, these include zinc oxide and titanium dioxide. Reapply every two hour and after swimming.     Sunscreen examples: https://www.ewg.org/sunscreen/    UV radiation  UVA radiation remains constant throughout the day and throughout the year. It is a longer wavelength than UVB and therefore penetrates deeper into the skin leading to immediate and delayed tanning, photoaging, and skin cancer. 70-80% of UVA and UVB radiation occurs between the hours of 10am-2pm.  UVB radiation  UVB radiation causes the most harmful effects and is more significant during the summer months. However, snow and ice can reflect UVB radiation leading to skin damage during the winter months as well. UVB radiation is responsible for tanning,  burning, inflammation, delayed erythema (pinkness), pigmentation (brown spots), and skin cancer.     I recommend self monthly full body exams and yearly full body exams with a dermatology provider. If you develop a new or changing lesion please follow up for examination. Most skin cancers are pink and scaly or pink and pearly. However, we do see blue/brown/black skin cancers.  Consider the ABCDEs of melanoma when giving yourself your monthly full body exam ( don't forget the groin, buttocks, feet, toes, etc). A-asymmetry, B-borders, C-color, D-diameter, E-elevation or evolving. If you see any of these changes please follow up in clinic. If you cannot see your back I recommend purchasing a hand held mirror to use with a larger wall mirror.       Checking for Skin Cancer  You can find cancer early by checking your skin each month. There are 3 kinds of skin cancer. They are melanoma, basal cell carcinoma, and squamous cell carcinoma. Doing monthly skin checks is the best way to find new marks or skin changes. Follow the instructions below for checking your skin.   The ABCDEs of checking moles for melanoma   Check your moles or growths for signs of melanoma using ABCDE:   Asymmetry: the sides of the mole or growth don t match  Border: the edges are ragged, notched, or blurred  Color: the color within the mole or growth varies  Diameter: the mole or growth is larger than 6 mm (size of a pencil eraser)  Evolving: the size, shape, or color of the mole or growth is changing (evolving is not shown in the images below)    Checking for other types of skin cancer  Basal cell carcinoma or squamous cell carcinoma have symptoms such as:     A spot or mole that looks different from all other marks on your skin  Changes in how an area feels, such as itching, tenderness, or pain  Changes in the skin's surface, such as oozing, bleeding, or scaliness  A sore that does not heal  New swelling or redness beyond the border of a  mole    Who s at risk?  Anyone can get skin cancer. But you are at greater risk if you have:   Fair skin, light-colored hair, or light-colored eyes  Many moles or abnormal moles on your skin  A history of sunburns from sunlight or tanning beds  A family history of skin cancer  A history of exposure to radiation or chemicals  A weakened immune system  If you have had skin cancer in the past, you are at risk for recurring skin cancer.   How to check your skin  Do your monthly skin checkups in front of a full-length mirror. Check all parts of your body, including your:   Head (ears, face, neck, and scalp)  Torso (front, back, and sides)  Arms (tops, undersides, upper, and lower armpits)  Hands (palms, backs, and fingers, including under the nails)  Buttocks and genitals  Legs (front, back, and sides)  Feet (tops, soles, toes, including under the nails, and between toes)  If you have a lot of moles, take digital photos of them each month. Make sure to take photos both up close and from a distance. These can help you see if any moles change over time.   Most skin changes are not cancer. But if you see any changes in your skin, call your doctor right away. Only he or she can diagnose a problem. If you have skin cancer, seeing your doctor can be the first step toward getting the treatment that could save your life.   [employer] last reviewed this educational content on 4/1/2019 2000-2020 [employer]. [address] [place] [address], [place] [address]. All rights reserved. This information is not intended as a substitute for professional medical care. Always follow your healthcare professional's instructions.       When should I call my doctor?  If you are worsening or not improving, please, contact us or seek urgent care as noted below.     Who should I call with questions (adults)?    [facility] [phone]  For urgent needs outside of business hours call the [facility] at  [phone] and ask for the dermatology resident on call to be paged  If this is a medical emergency and you are unable to reach an ER, Call 911      If you need a prescription refill, please contact your pharmacy. Refills are approved or denied by our Physicians during normal business hours, Monday through Friday.  Per office policy, refills will not be granted if you have not been seen within the past year (or sooner depending on the condition).

## 2025-02-05 ENCOUNTER — LAB (OUTPATIENT)
Dept: LAB | Facility: CLINIC | Age: 45
End: 2025-02-05
Payer: COMMERCIAL

## 2025-02-05 ENCOUNTER — VIRTUAL VISIT (OUTPATIENT)
Dept: RHEUMATOLOGY | Facility: CLINIC | Age: 45
End: 2025-02-05
Attending: INTERNAL MEDICINE
Payer: COMMERCIAL

## 2025-02-05 ENCOUNTER — OFFICE VISIT (OUTPATIENT)
Dept: DERMATOLOGY | Facility: CLINIC | Age: 45
End: 2025-02-05
Attending: DERMATOLOGY
Payer: COMMERCIAL

## 2025-02-05 VITALS — BODY MASS INDEX: 24.33 KG/M2 | HEIGHT: 67 IN | WEIGHT: 155 LBS

## 2025-02-05 DIAGNOSIS — Z79.52 LONG TERM CURRENT USE OF SYSTEMIC STEROIDS: ICD-10-CM

## 2025-02-05 DIAGNOSIS — D22.10 MELANOCYTIC NEVI OF UNSPECIFIED EYELID, INCLUDING CANTHUS: ICD-10-CM

## 2025-02-05 DIAGNOSIS — D23.9 HYDROCYSTOMA: ICD-10-CM

## 2025-02-05 DIAGNOSIS — M35.9 UNDIFFERENTIATED CONNECTIVE TISSUE DISEASE: ICD-10-CM

## 2025-02-05 DIAGNOSIS — D49.2 NEOPLASM OF UNSPECIFIED BEHAVIOR OF BONE, SOFT TISSUE, AND SKIN: ICD-10-CM

## 2025-02-05 DIAGNOSIS — Z79.52 LONG TERM CURRENT USE OF SYSTEMIC STEROIDS: Primary | ICD-10-CM

## 2025-02-05 LAB
ALBUMIN MFR UR ELPH: 7.5 MG/DL
ALBUMIN SERPL BCG-MCNC: 4.7 G/DL (ref 3.5–5.2)
ALBUMIN UR-MCNC: NEGATIVE MG/DL
ALT SERPL W P-5'-P-CCNC: 16 U/L (ref 0–50)
AMORPH CRY #/AREA URNS HPF: ABNORMAL /HPF
APPEARANCE UR: ABNORMAL
AST SERPL W P-5'-P-CCNC: 20 U/L (ref 0–45)
BASOPHILS # BLD AUTO: 0.1 10E3/UL (ref 0–0.2)
BASOPHILS NFR BLD AUTO: 1 %
BILIRUB UR QL STRIP: NEGATIVE
COLOR UR AUTO: ABNORMAL
CREAT SERPL-MCNC: 0.71 MG/DL (ref 0.51–0.95)
CREAT UR-MCNC: 92.2 MG/DL
CRP SERPL-MCNC: <3 MG/L
EGFRCR SERPLBLD CKD-EPI 2021: >90 ML/MIN/1.73M2
EOSINOPHIL # BLD AUTO: 0.1 10E3/UL (ref 0–0.7)
EOSINOPHIL NFR BLD AUTO: 1 %
ERYTHROCYTE [DISTWIDTH] IN BLOOD BY AUTOMATED COUNT: 12.4 % (ref 10–15)
ERYTHROCYTE [SEDIMENTATION RATE] IN BLOOD BY WESTERGREN METHOD: 6 MM/HR (ref 0–20)
FERRITIN SERPL-MCNC: 27 NG/ML (ref 6–175)
GLUCOSE UR STRIP-MCNC: NEGATIVE MG/DL
HCT VFR BLD AUTO: 42.1 % (ref 35–47)
HGB BLD-MCNC: 13.7 G/DL (ref 11.7–15.7)
HGB UR QL STRIP: ABNORMAL
IMM GRANULOCYTES # BLD: 0 10E3/UL
IMM GRANULOCYTES NFR BLD: 0 %
IRON BINDING CAPACITY (ROCHE): 359 UG/DL (ref 240–430)
IRON SATN MFR SERPL: 19 % (ref 15–46)
IRON SERPL-MCNC: 70 UG/DL (ref 37–145)
KETONES UR STRIP-MCNC: ABNORMAL MG/DL
LEUKOCYTE ESTERASE UR QL STRIP: NEGATIVE
LYMPHOCYTES # BLD AUTO: 2.1 10E3/UL (ref 0.8–5.3)
LYMPHOCYTES NFR BLD AUTO: 24 %
MCH RBC QN AUTO: 27.6 PG (ref 26.5–33)
MCHC RBC AUTO-ENTMCNC: 32.5 G/DL (ref 31.5–36.5)
MCV RBC AUTO: 85 FL (ref 78–100)
MONOCYTES # BLD AUTO: 0.4 10E3/UL (ref 0–1.3)
MONOCYTES NFR BLD AUTO: 5 %
MUCOUS THREADS #/AREA URNS LPF: PRESENT /LPF
NEUTROPHILS # BLD AUTO: 5.9 10E3/UL (ref 1.6–8.3)
NEUTROPHILS NFR BLD AUTO: 69 %
NITRATE UR QL: NEGATIVE
NRBC # BLD AUTO: 0 10E3/UL
NRBC BLD AUTO-RTO: 0 /100
PH UR STRIP: 7.5 [PH] (ref 5–7)
PLATELET # BLD AUTO: 209 10E3/UL (ref 150–450)
PROT/CREAT 24H UR: 0.08 MG/MG CR (ref 0–0.2)
RBC # BLD AUTO: 4.97 10E6/UL (ref 3.8–5.2)
RBC URINE: 5 /HPF
SP GR UR STRIP: 1.02 (ref 1–1.03)
SQUAMOUS EPITHELIAL: 2 /HPF
UROBILINOGEN UR STRIP-MCNC: NORMAL MG/DL
WBC # BLD AUTO: 8.5 10E3/UL (ref 4–11)
WBC URINE: <1 /HPF

## 2025-02-05 PROCEDURE — 36415 COLL VENOUS BLD VENIPUNCTURE: CPT | Performed by: PATHOLOGY

## 2025-02-05 PROCEDURE — 84450 TRANSFERASE (AST) (SGOT): CPT | Performed by: PATHOLOGY

## 2025-02-05 PROCEDURE — 86160 COMPLEMENT ANTIGEN: CPT | Performed by: INTERNAL MEDICINE

## 2025-02-05 PROCEDURE — 84460 ALANINE AMINO (ALT) (SGPT): CPT | Performed by: PATHOLOGY

## 2025-02-05 PROCEDURE — 11102 TANGNTL BX SKIN SINGLE LES: CPT | Mod: GC | Performed by: DERMATOLOGY

## 2025-02-05 PROCEDURE — 82040 ASSAY OF SERUM ALBUMIN: CPT | Performed by: PATHOLOGY

## 2025-02-05 PROCEDURE — 84156 ASSAY OF PROTEIN URINE: CPT | Performed by: PATHOLOGY

## 2025-02-05 PROCEDURE — G2211 COMPLEX E/M VISIT ADD ON: HCPCS | Performed by: INTERNAL MEDICINE

## 2025-02-05 PROCEDURE — 99000 SPECIMEN HANDLING OFFICE-LAB: CPT | Performed by: PATHOLOGY

## 2025-02-05 PROCEDURE — 81001 URINALYSIS AUTO W/SCOPE: CPT | Performed by: PATHOLOGY

## 2025-02-05 PROCEDURE — 82728 ASSAY OF FERRITIN: CPT | Performed by: PATHOLOGY

## 2025-02-05 PROCEDURE — 86140 C-REACTIVE PROTEIN: CPT | Performed by: PATHOLOGY

## 2025-02-05 PROCEDURE — 86225 DNA ANTIBODY NATIVE: CPT | Performed by: INTERNAL MEDICINE

## 2025-02-05 PROCEDURE — 83540 ASSAY OF IRON: CPT | Performed by: PATHOLOGY

## 2025-02-05 PROCEDURE — 83550 IRON BINDING TEST: CPT | Performed by: PATHOLOGY

## 2025-02-05 PROCEDURE — 85025 COMPLETE CBC W/AUTO DIFF WBC: CPT | Performed by: PATHOLOGY

## 2025-02-05 PROCEDURE — 98006 SYNCH AUDIO-VIDEO EST MOD 30: CPT | Performed by: INTERNAL MEDICINE

## 2025-02-05 PROCEDURE — 85652 RBC SED RATE AUTOMATED: CPT | Performed by: PATHOLOGY

## 2025-02-05 PROCEDURE — 88305 TISSUE EXAM BY PATHOLOGIST: CPT | Mod: 26 | Performed by: DERMATOLOGY

## 2025-02-05 PROCEDURE — 82565 ASSAY OF CREATININE: CPT | Performed by: PATHOLOGY

## 2025-02-05 PROCEDURE — 82306 VITAMIN D 25 HYDROXY: CPT | Performed by: INTERNAL MEDICINE

## 2025-02-05 PROCEDURE — 88305 TISSUE EXAM BY PATHOLOGIST: CPT | Mod: TC | Performed by: DERMATOLOGY

## 2025-02-05 ASSESSMENT — PAIN SCALES - GENERAL
PAINLEVEL_OUTOF10: NO PAIN (0)
PAINLEVEL_OUTOF10: NO PAIN (0)

## 2025-02-05 NOTE — PATIENT INSTRUCTIONS
Wound care    Wound Care After a Biopsy    What is a skin biopsy?  A skin biopsy allows the doctor to examine a very small piece of tissue under the microscope to determine the diagnosis and the best treatment for the skin condition. A local anesthetic (numbing medicine) is injected with a very small needle into the skin area to be tested. A small piece of skin is taken from the area. Sometimes a suture (stitch) is used.     What are the risks of a skin biopsy?  I will experience scar, bleeding, swelling, pain, crusting and redness. I may experience incomplete removal or recurrence. Risks of this procedure are excessive bleeding, bruising, infection, nerve damage, numbness, thick (hypertrophic or keloidal) scar and non-diagnostic biopsy.    How should I care for my wound for the first 24 hours?  If it bleeds, hold direct pressure on the area for 15 minutes. If bleeding does not stop, call us or go to the emergency room  Avoid strenuous exercise the first 1-2 days or as your doctor instructs you    How should I care for the wound after 24 hours?    You may bathe or shower as normal  Clean the wound once a day with Saline and apply either ointment from biopsy or Vaseline  Do not scrub, be gentle  Apply white petroleum/Vaseline after cleaning the wound with a cotton swab or a clean finger.  If you are unable to cover the site with a Bandaid /bandage, re-apply ointment 2-3 times a day to keep the site moist. Moisture will help with healing  Avoid strenuous activity for first 1-2 days  Avoid lakes, rivers, pools, and oceans until the stitches are removed or the site is healed    How do I clean my wound?  Wash hands thoroughly with soap or use hand  before all wound care  Clean the wound with Qtip and Saline.  Apply white petroleum/Vaseline  to wound after it is clean      How should I care for my wound long term?  Do not get your wound dirty  Keep up with wound care for one week or until the area is healed.  You  "should have some soreness but it should be mild and slowly go away over several days. Talk to your doctor about using tylenol for pain    When should I call my doctor?  If you have increased:   Pain or swelling  Pus or drainage (clear or slightly yellow drainage is ok)  Temperature over 100F  Spreading redness or warmth around wound    When will I hear about my results?  The biopsy results can take 2 weeks to come back.  Your results will automatically release to [employer] before your provider has even reviewed them.  The clinic will call you with the results, send you a [employer] message, or have you schedule a follow-up clinic or phone time to discuss the results.  Contact our clinics if you do not hear from us in 2 weeks.      If you are able to take acetaminophen (\"Tylenol,\" etc.) and ibuprofen (\"Advil\" or \"Motrin,\" etc.), then you may STAGGER these medications by taking 400 mg of ibuprofen (usually two tabs) every 8 hours and 1,000 mg of acetaminophen (e.g., two tabs of extra-strength Tylenol) every 8 hours.    This means, for example, that you could take the followin,000 mg of Tylenol, followed 4 hours later by 400 mg Ibuprofen, followed 4 hours later with 1,000 mg of Tylenol, followed 4 hours later by 400 mg Ibuprofen, followed 4 hours later with 1,000 mg of Tylenol, and so forth.     Essentially, you can take either 1,000 mg of Tylenol or 400 mg of ibuprofen in alternating fashion EVERY FOUR HOURS.    Do NOT exceed more than 4,000 mg of Tylenol or 3,200 mg of ibuprofen per 24 hours. If you are not able to take Tylenol or ibuprofen as above due to other health issues (or a physician has told you directly that you are not allowed to take one of them, say due to pre-existing severe liver or kidney issues), then disregard the above directions.    Scientific evidence supports that this combination/schedule of pain medications is just as effective, if not more effective, than taking a narcotic pain medicine.      " If you have concerns and would like to be see sooner. Please call or send us in a [employer] message if possible and attach a photo.        What to expect:    The first couple of days your wound may be tender and may bleed slightly when doing wound care.  There may be swelling and bruising around the wound, especially if it is near the eyes. For your comfort, you may apply ice or cold compresses to the bruises after your have removed the pressure bandage.  The area around your wound may be numb for several weeks or even months.  You may experience periodic sharp pain or mild itching around the wound as it heals.   The suture line will look dark for a while but will lighten over time.       When to call us:    You have bleeding that will not stop after applying pressure and ice.  You have pain that is not controlled with Tylenol (acetaminophen.)  You have signs or symptoms of an infection such as:  Fever over 100 degrees Fahrenheit  Redness, warmth or foul-smelling drainage from the wound  If you have any questions, or are not sure how to take care of the wound.    Phone numbers:    During business hours (M-F 8:00-4:30 p.m.)    [facility]- [phone] (Option 1 appt. Ask the call representative for the Dermatology Surgery Team)    For Dermatology Surgery scheduling please call [facility] at [phone]    ---------------------------------------------------------  Evenings/Weekends/Holidays    Hospital - [phone] (Ask for the dermatology resident on call. They will connect with the surgery Fellow)  TTY for hearing gattbmua-[phone]  *Ask  to page dermatologist on-call  Emergency Zlrr-[phone]  TTY for hearing impaired- [phone]

## 2025-02-05 NOTE — LETTER
2/5/2025       RE: Heather Guillory  [address]  [place] [address]     Dear Colleague,    Thank you for referring your patient, Heather Guillory, to the [facility] [place] [facility]. Please see a copy of my visit note below.    [facility] Note  Encounter Date: Feb 5, 2025  Office Visit     Dermatology Problem List:  # NUB, L lower eyelid margin, s/p bx 02/05/25, results pending     # Family history of melanoma   - Sister with melanoma in situ of the face at age 41  # Predominantly intradermal melanocytic nevus, R buttock.  - s/p shave bx 05/30/2024  # Ruptured folliculitis, L nipple  - s/p punch bx 05/30/2024    ____________________________________________    Assessment & Plan:    # Neoplasm of unspecified behavior of the skin (D49.2) on the left lower eyelid margin. The differential diagnosis includes hidrocystoma vs nevus vs NMSC vs NUB.  - Shave biopsy performed today (see procedure note(s) below).    Procedures Performed:   - Shave biopsy procedure note, location(s): Left lower eyelid margin. After discussion of benefits and risks including but not limited to bleeding, infection, scar, incomplete removal, recurrence, and non-diagnostic biopsy, verbal consent and photographs were obtained. The area was cleaned with isopropyl alcohol. 0.5mL of 1% lidocaine with epinephrine was injected to obtain adequate anesthesia of lesion(s). Shave biopsy at site(s) performed. Hemostasis was achieved with aluminium chloride. Petrolatum ointment and a sterile dressing were applied. The patient tolerated the procedure and no complications were noted. The patient was provided with verbal and written post care instructions.     Follow-up: prn for new or changing lesions    Staff Involved:  Staff/Scribe/Fellow    Scribe Disclosure:   ISheila, am serving as a scribe; to document services  personally performed by Huang Cintron MD, based on data collection and the provider's statements to me.     Provider Disclosure:  I agree with the above history, review of systems, physical exam and plan.  I have reviewed the content of the documentation and have edited it as needed. I have personally performed the services documented here and the documentation accurately represents those services and the decisions I have made.      Electronically signed by:  Nathanael Calero MD    ____________________________________________    CC: Biopsy (Left lower eyelid)    HPI:  Ms. Heather Guillory is a(n) 44 year old female who presents today as a new patient for biopsy of suspected BCC on the left lower eyelid margin.      Patient is otherwise feeling well, without additional skin concerns.    Labs Reviewed:  N/A    Physical Exam:  Vitals: There were no vitals taken for this visit.  SKIN: Focused examination of the left lower eyelid margin was performed.  - Pearly papule on the left lower eyelid margin, with eyelash growth.  - No other lesions of concern on areas examined.     Medications:  Current Outpatient Medications   Medication Sig Dispense Refill     metroNIDAZOLE (METROCREAM) 0.75 % external cream APPLY TOPICALLY TO THE AFFECTED AREA TWICE DAILY AS NEEDED FOR ROSACEA       No current facility-administered medications for this visit.      Past Medical History:   Patient Active Problem List   Diagnosis     JAVIER (generalized anxiety disorder)     Undifferentiated connective tissue disease     Rosacea     Varicose veins of left lower extremity with pain     Encounter for sterilization     Pelvic floor dysfunction     Urinary urgency     Urinary frequency     Abnormal LFTs     Other specified anxiety disorders     Other specified eating disorder     Multiple melanocytic nevi     Neoplasm of unspecified behavior of bone, soft tissue, and skin     Past Medical History:   Diagnosis Date     JAVIER (generalized anxiety disorder)      50mg zoloft     Rosacea      Undifferentiated connective tissue disease         CC Heather Jerome MD  [address] [place] [address] on close of this encounter.    Attestation signed by Huang Cintron MD at 2/10/2025  9:46 AM:  .imet      Again, thank you for allowing me to participate in the care of your patient.      Sincerely,    Huang Cintron MD

## 2025-02-05 NOTE — PROGRESS NOTES
[facility] Dermatology Note  Encounter Date: Feb 5, 2025  Office Visit     Dermatology Problem List:  # NUB, L lower eyelid margin, s/p bx 02/05/25, results pending     # Family history of melanoma   - Sister with melanoma in situ of the face at age 41  # Predominantly intradermal melanocytic nevus, R buttock.  - s/p shave bx 05/30/2024  # Ruptured folliculitis, L nipple  - s/p punch bx 05/30/2024    ____________________________________________    Assessment & Plan:    # Neoplasm of unspecified behavior of the skin (D49.2) on the left lower eyelid margin. The differential diagnosis includes hidrocystoma vs nevus vs NMSC vs NUB.  - Shave biopsy performed today (see procedure note(s) below).    Procedures Performed:   - Shave biopsy procedure note, location(s): Left lower eyelid margin. After discussion of benefits and risks including but not limited to bleeding, infection, scar, incomplete removal, recurrence, and non-diagnostic biopsy, verbal consent and photographs were obtained. The area was cleaned with isopropyl alcohol. 0.5mL of 1% lidocaine with epinephrine was injected to obtain adequate anesthesia of lesion(s). Shave biopsy at site(s) performed. Hemostasis was achieved with aluminium chloride. Petrolatum ointment and a sterile dressing were applied. The patient tolerated the procedure and no complications were noted. The patient was provided with verbal and written post care instructions.     Follow-up: prn for new or changing lesions    Staff Involved:  Staff/Scribe/Fellow    Scribe Disclosure:   I, Sheila Gregory, am serving as a scribe; to document services personally performed by Huang Cintron MD, based on data collection and the provider's statements to me.     Provider Disclosure:  I agree with the above history, review of systems, physical exam and plan.  I have reviewed the content of the documentation and have edited it as needed. I have personally performed the services  documented here and the documentation accurately represents those services and the decisions I have made.      Electronically signed by:  Nathanael Calero MD    ____________________________________________    CC: Biopsy (Left lower eyelid)    HPI:  Ms. Heather Guillory is a(n) 44 year old female who presents today as a new patient for biopsy of suspected BCC on the left lower eyelid margin.      Patient is otherwise feeling well, without additional skin concerns.    Labs Reviewed:  N/A    Physical Exam:  Vitals: There were no vitals taken for this visit.  SKIN: Focused examination of the left lower eyelid margin was performed.  - Pearly papule on the left lower eyelid margin, with eyelash growth.  - No other lesions of concern on areas examined.     Medications:  Current Outpatient Medications   Medication Sig Dispense Refill    metroNIDAZOLE (METROCREAM) 0.75 % external cream APPLY TOPICALLY TO THE AFFECTED AREA TWICE DAILY AS NEEDED FOR ROSACEA       No current facility-administered medications for this visit.      Past Medical History:   Patient Active Problem List   Diagnosis    JAVIER (generalized anxiety disorder)    Undifferentiated connective tissue disease    Rosacea    Varicose veins of left lower extremity with pain    Encounter for sterilization    Pelvic floor dysfunction    Urinary urgency    Urinary frequency    Abnormal LFTs    Other specified anxiety disorders    Other specified eating disorder    Multiple melanocytic nevi    Neoplasm of unspecified behavior of bone, soft tissue, and skin     Past Medical History:   Diagnosis Date    JAVIER (generalized anxiety disorder)     50mg zoloft    Rosacea     Undifferentiated connective tissue disease         CC Heather Jerome MD  [address] [place] [address] on close of this encounter.

## 2025-02-05 NOTE — LETTER
2025       RE: Heather Guillory  [address] [facility] [address]     Dear Colleague,    Thank you for referring your patient, Heather Guillory, to the [facility] [place] at [facility]. Please see a copy of my visit note below.    Virtual Visit Details    Type of service:  Video Visit     10:20-10:36 am  Originating Location (pt. Location): Home  Distant Location (provider location):  On-site  Platform used for Video Visit: [facility]      Rheumatology follow up Virtual Visit Note    Heather Guillory MRN# [id number]   Age: 44 year old YOB: 1980     Last seen: 2024  DOS: 2025    Reason for visit: UCTD    Assessment & Plan:   Problem list:    1-UCTD  2-HCQ monitoring  3-Iron deficiency        Undifferentiated connective tissue disease (UCTD):     Heather is a 43 yo WF . She was diagnosed with MCTD in 2016, 6 wk postpartum based on fatigue, arthritis, mild Raynaud's, low positive NINOSKA 1.1 and low positive RNP Ab 1.6. She is on  mg qd since 2017 with great response.  UCTD continues to be most likely diagnosis not MCTD as she does not have classic features of MCTD, had very low titer of RNP Ab in the past, (negative on most recent check), and had a mild disease which never required prednisone.  All autoimmunity labs (2017) came back negative (except + NINOSKA, low C3) which is further reassuring for UCTD, including APS panel, repeat RNP and inflammatory markers, dsDNA.      She had neg SSA/SSB antibodies in 2016, no concern for CHB. No need to re-check.  APS panel was neg.  She had neg anti-Sm, neg anti-DNA and neg centromere Ab in 2016.    In 2019, recommended to taper plaquenil to 200 mg twice a day on Mon/Wed/Fri and then 1 tab for the rest of the week. Had increased pain/stiffness in hands and feet and C3 dropped. So increased HCQ back to 200 mg bid. C3 went back to nl in 2020, had low C3 in  1/2021 but it fluctuates.     1/022:    The patient reports stable symtpoms aside from increased fatigue in the setting of ongoing heavy menstrual bleeding. She has a submucosal fibroid with myomectomy planned for February. She does have some chest discomfort but appears likely musculoskeletal. Will repeat hgb and iron testing today.    As her UCTD symptoms including arthralgia appear stable, discussed that she could continue current regimen of HCQ alternating 200 mg BID and daily dosing. If she were to develop new or worsening symptoms, would consider increasing back to 200 mg BID. Her last eye exam in 11/2021 was without evidence of HCQ toxicity.     -Check cbc with diff, UA, Cr, complements, dsDNA CRP, ESR, AST/ALT, iron studies  -Continue  mg BID 3x week,  mg daily 4x week  -Continue yearly ophthalmology exam for HCQ monitoring    7/8/2022:    Overall stable with symptoms, labs. In fact C3 improved on 1/2022 labs. recommend to taper HCQ down to reduce risk of HCQ toxicity in future.    Has h/o iron deficiency, will re-check iron studies.    1/13/2023: Stable on HCQ 1 tab (200 mg) a day, will continue. Labs in 7/2022 showed stable UCTD but ROBIN.    7/13/2023: Stable UCTD, will monitor sx/labs off HCQ. Discussed m/o low iron, low DHEA, both could contribute to fatigue.    Plan:    Labs in 10/2023    Ask women health about taking DHEA    Stay off hydroxychlorquine    Iron gummies with raspberry with food (take 2 a day)    Ok to go on prenatal vit one tab a day    Eye exam one more time    Return in 6 months (in person)        2/1/2024:    Stable UCTD off the HCQ, no flare ups, will check labs    I don't think increased motion sickness with park rides and flights are due to UCTD.    Overall looks great/healthier in great mood today, I believe replacing iron has helped, her ferritin is within normal range but towards the low end and I recommend to continue with iron gummies, could take up to 6 a day  which equals 1 slow Fe a day.    Hair loss is minor, seeing derm, no alopecia on exam, no scalp lesions, some could be related to low iron/stress, expect it to grew back. Will re-check iron levels today. Thyroid tests are good.    Vit d is normal, recommend to continue with OTC vit D 2000 units a day.      Plan:    Iron gummies up to 6 a day is ok to take    Labs today    Return in a year in person      Today 2/5/2025:    Doing very well off HCQ especially with doing work out, will see derm today for ?BCC of L lower eyelid.    Will check labs today or in April then once a year.    Added vit D, iron studies.    Discussed diet rich in iron, can not tolerate iron supplements due to GI SE.    Plan:    Labs today or in April then yearly labs    Return in a year in person        TT 30 min was spent on date of the encounter doing chart review, history and exam, documentation and further activities as noted above. Any prior notes, outside records, laboratory results, and imaging studies were reviewed if relevant.      The longitudinal plan of care for the diagnosis(es)/condition(s) as documented were addressed during this visit. Due to the added complexity in care, I will continue to support Heather in the subsequent management and with ongoing continuity of care.      Nasrin King MD      Orders Placed This Encounter   Procedures     ALT     Albumin level     AST     Creatinine     Complement C4     Complement C3     CRP inflammation     DNA double stranded antibodies     Erythrocyte sedimentation rate auto     UA with Microscopic reflex to Culture     Protein  random urine     Creatinine random urine     Vitamin D Deficiency     Ferritin     Iron and iron binding capacity     CBC with Platelets & Differential            Subjective     Ms. Guillory is a 42 yo with history of UCTD, JAVIER, uterine fibroid who presents for follow up.   Undifferentiated connective tissue disease (UCTD):    The patient was last seen for a  virtual visit in 7/2021. She notes things are going okay. She has felt more fatigue recently and continues to have significant vaginal bleeding with menstruation. Since her las visit she was seen by OB. MRI showed uterine fibroid; she's scheduled for a myomectomy next month. She notes occasional SOB with stairs. Ms. Guillory has reported 4 weeks of tightness/pressure in her chest and upper back that's present all the time but worse when she does activity with her upper extremities and at night after she's done more activity. It is not worse with exertion. She gets some relief from using a massager but the discomfort doesn't resolve completely. She is taking iron supplements on some days. She denies dizziness or light headedness.     She's been having more tightness in her joints with the cold weather. The pain is worst in her hands and feet particularily in the morning. Her Raynaud's is also more of a problem thsi time of year but she denies fingertip ulceration and has been avoiding going outside.     Currently, she is taking  mg BID or daily; she says she takes it twice a day about half the time.     Ms. Guillory denies hairloss, cough, oral ulcers, GI or urinary symptoms.     Last eye exam in 11/2021 without evidence of HCQ toxicity.      7/8/2022: Heather presents for follow up. She overall is doing ok, reports being stable. Still taking  mg a day.      1/13/2023: Heather presents for follow up. Her HCQ was reduced from 200 mg bid to 400/200 mg every other day in 7/2022. Later it was decreased to 1 tab/200 mg a day in 10/2022. She is overall stable, no flares since last visit.    Has fatigue, but thinks it is stress related. Had some left leg pain, but it is better, started 2 days after colonoscopy.    7/13/2023:    -Off HCQ x few weeks    -EGD 9/2023 showed GERD, has abdominal pain with radiation to back with nausea    -takes sucralfate 4 times a day    -no iron taking since 9/23    -continues to have  fatigue    -on vit D 2000 units qd    -walks a lot    -no change in sx off HCQ    -was found to have low DHEA      2/1/2024:    -Heather is doing very well    -no flares off the HCQ    -walks with friends    -good mood today    -minor joint stiffness here or there, not bad    -noticed more motion sickness when plan lands and with park rides    -reports hair loss, seeing derm    -her 3 sons ray george, doing well, oldest is 9 and youngest is 6    Today 2/5/2025:    -going to see Dr. Hayward in rheum-derm clinic for suspected L lower eyelid    -in Oct, started lifting weight (3 times week), added pilates once a week, noticed more energy, less fatigue, it has helped her with energy and sleep, mental health    -no joint pain/swelling    -no other rashes    -gets headaches around the time she gets period and gets some pain on her feet>hands, more of irritation, it self resolves    -periods are different from month to month, feels like she is going through taurus-menopausal sx    -eating healthier    -stress is a trigger for joint pain, less stressed with work out, feeling stronger    -no oral ulcers, hair loss, CP, SOB, cough, fevers    -trying eating iron rich food, iron pill caused her GI upset, curious to see what iron numbers are    ROS     A 10 point ROS was performed with pertinent findings listed above.        Past Medical History:   Diagnosis Date     JAVIER (generalized anxiety disorder)     50mg zoloft     Rosacea      Undifferentiated connective tissue disease        Past Surgical History:   Procedure Laterality Date     COLONOSCOPY N/A 10/21/2020    Procedure: COLONOSCOPY;  Surgeon: Yang Pete MD;  Location: [facility] OR     COLONOSCOPY N/A 12/29/2022    Procedure: COLONOSCOPY, WITH BIOPSY;  Surgeon: Magda Lobo MD;  Location: [facility] OR     DAVINCI MYOMECTOMY N/A 02/11/2022    Procedure: MYOMECTOMY, UTERUS, ROBOT-ASSISTED, LAPAROSCOPIC;  Surgeon: Cate Mansfield MD;  Location:  OR      ESOPHAGOSCOPY, GASTROSCOPY, DUODENOSCOPY (EGD), COMBINED N/A 12/29/2022    Procedure: ESOPHAGOGASTRODUODENOSCOPY, WITH BIOPSY;  Surgeon: Magda Lobo MD;  Location: [facility] OR     ESOPHAGOSCOPY, GASTROSCOPY, DUODENOSCOPY (EGD), COMBINED N/A 09/07/2023    Procedure: Esophagoscopy, gastroscopy, duodenoscopy (EGD), combined WITH BIOPSY;  Surgeon: Jackie Aldana MD;  Location: [facility] OR     IR ENDOVENOUS ABLATION VARICOSE VEINS  10/28/2019     IR FOLLOW UP VISIT OUTPATIENT  11/20/2019     IR STAB PHLEBECTOMY 10 - 20 STABS  10/28/2019     IR VEIN SCLEROSING MULTIPLE  10/28/2019     LAPAROSCOPIC SALPINGECTOMY Bilateral 04/23/2021    Procedure: SALPINGECTOMY, LAPAROSCOPIC BILATERAL;  Surgeon: Cate Mansfield MD;  Location: [facility] OR     OPERATIVE HYSTEROSCOPY WITH MORCELLATOR  04/08/2014    Procedure: OPERATIVE HYSTEROSCOPY WITH MORCELLATOR;  Hysteroscopic Polypectomy;  Surgeon: Cate Mansfield MD;  Location: [facility] OR     REMOVE INTRAUTERINE DEVICE N/A 04/23/2021    Procedure: removal of intrauterine device;  Surgeon: Cate Mansfield MD;  Location: [facility] OR     TUBAL LIGATION         Family History   Problem Relation Age of Onset     Skin Cancer Mother      Elijah Disease Sister      Melanoma Sister      Arrhythmia Brother         details unknown; procedure in his 20s     Anxiety Disorder Brother      Alzheimer Disease Maternal Grandmother      Cervical Cancer Maternal Grandmother      Colon Cancer Maternal Grandfather      Breast Cancer Paternal Grandmother      Hypertension Paternal Grandmother      Hypertension Paternal Grandfather      Lung Cancer Paternal Grandfather         smoker     Diabetes Type 2  Paternal Grandfather      Myocardial Infarction Paternal Grandfather      Myocardial Infarction Paternal Aunt 45     Cerebrovascular Disease No family hx of      Coronary Artery Disease Early Onset No family hx of      Ovarian Cancer No family hx of        Social History     Tobacco Use     Smoking status:  "Never     Passive exposure: Never     Smokeless tobacco: Never   Substance Use Topics     Alcohol use: No     Alcohol/week: 0.0 standard drinks of alcohol          Objective     PHYSICAL EXAM  Ht 1.702 m (5' 7\")   Wt 70.3 kg (155 lb)   BMI 24.28 kg/m    Wt Readings from Last 4 Encounters:   02/05/25 70.3 kg (155 lb)   07/24/24 68 kg (149 lb 14.4 oz)   05/30/24 68.5 kg (151 lb)   05/15/24 70.3 kg (155 lb)       Gen: no acute distress, pleasant  HEENT: EOMI, no scleral injection or icterus, red patch over L lower eyelid  SKIN: no other rashes or alopecia, hair looks healthy  MSK: no active synovitis or joint deformities  NEURO: grossly non focal  Psych: nl affect    DATA:    CBC:  Recent Labs   Lab Test 08/27/21  0850 07/08/21  0919 04/23/21  1118   WBC 5.6 5.8 5.5   RBC 4.36 4.45 4.45   HGB 12.3 13.3 13.1   HCT 38.3 38.8 39.0   MCV 88 87 88   MCH 28.2 29.9 29.4   MCHC 32.1 34.3 33.6   RDW 11.9 12.4 11.9    207 195       BMP:  Recent Labs   Lab Test 08/27/21  0850 07/08/21  0919 03/31/21  1130 01/14/21  1228 10/28/20  1250 08/26/19  1055 04/26/19  0930 08/09/16  0000 01/09/16  0724     --   --   --  140  --   --   --  140   POTASSIUM 4.4  --  4.8  --  4.1  --   --   --  3.8   CHLORIDE 113*  --   --   --  109  --   --   --  110*   CO2 26  --   --   --  27  --   --   --  20   ANIONGAP 5  --   --   --  4  --   --   --  10   GLC 99  --   --   --  88  --  94  --  89   BUN 11  --   --   --  7  --   --   --  9   CR 0.79 0.72  --  0.66 0.63   < >  --    < > 0.57   GFRESTIMATED >90 >90  --  >90 >90   < >  --    < > >90  Non  GFR Calc     PRICILA 8.9  --   --   --  8.9  --   --   --  8.4*    < > = values in this interval not displayed.       LFT:  Recent Labs   Lab Test 08/27/21  0850 07/08/21  0919 01/14/21  1228 10/28/20  1250 09/28/17  0954 01/09/16  0724   PROTTOTAL 7.2  --   --  7.6  --  7.1   ALBUMIN 3.9 4.1 4.0 4.3   < > 3.3*   BILITOTAL 0.2  --   --  0.5  --  0.4   ALKPHOS 79  --   --  79  --  " "70   AST 12 17 13 10   < > 12   ALT 12 21 19 17   < > 15    < > = values in this interval not displayed.       No results found for: \"CKTOTAL\"  TSH   Date Value Ref Range Status   12/14/2023 1.19 0.30 - 4.20 uIU/mL Final   02/13/2022 0.43 0.40 - 4.00 mU/L Final   07/08/2021 1.16 0.40 - 4.00 mU/L Final     No results found for: URIC    Inflammatory markers  Lab Results   Component Value Date    CRP <2.9 07/08/2021    CRP <2.9 01/14/2021    CRP 6.4 07/16/2020     Lab Results   Component Value Date    SED 6 07/08/2021    SED 4 01/14/2021    SED 7 07/16/2020     Ferritin   Date Value Ref Range Status   04/10/2024 43 6 - 175 ng/mL Final   12/14/2023 30 6 - 175 ng/mL Final   06/20/2023 36 6 - 175 ng/mL Final   07/08/2021 17 12 - 150 ng/mL Final   12/30/2020 63 12 - 150 ng/mL Final       UA RESULTS:  Recent Labs   Lab Test 07/08/21  0949 03/31/21  1140 01/14/21  1233 09/28/17  0950 08/31/17  0937 08/14/17  1101   COLOR Yellow Straw Yellow   < > Yellow Yellow   APPEARANCE Clear Clear Clear   < > Slightly Cloudy Clear   URINEGLC Negative Negative Negative   < > Negative Negative   URINEBILI Negative Negative Negative   < > Negative Negative   URINEKETONE Negative Negative Negative   < > Negative Negative   SG >1.030 1.009 1.010   < > >1.030 1.010   UBLD Large* Small* Negative   < > Large* Trace*   URINEPH 7.0 6.0 7.0   < > 7.0 6.5   PROTEIN Trace* Negative Negative   < > 30* Negative   UROBILINOGEN 0.2  --   --   --  0.2 0.2   NITRITE Negative Negative Negative   < > Negative Negative   LEUKEST Negative Negative Negative   < > Small* Negative   RBCU 5-10* 2 2   < > 10-25* O - 2   WBCU 0 - 5 <1 <1   < > 10-25* O - 2    < > = values in this interval not displayed.         Autoimmunity labs:     Lab Results   Component Value Date    RHF <20 07/22/2016     No results found for: \"CCPIGG\"  No results found for: \"ANCA\"  Lab Results   Component Value Date    L1UAZKD 100 04/10/2024    I7QBULW 96 12/14/2023    J4VVMVR 105 04/05/2023 "     Lab Results   Component Value Date    N8KVEYX 20 04/10/2024    U3YGMXE 18 12/14/2023    K9MDIHZ 22 04/05/2023     Lab Results   Component Value Date    JACKLYN 1.1 (H) 07/22/2016     Lab Results   Component Value Date    DNA 2.9 04/10/2024    DNA 6.1 12/14/2023    DNA 4.9 04/05/2023     Lab Results   Component Value Date    RNPIGG 0.6 09/28/2017    SSAIGG <0.2 01/20/2020    SSBIGG <0.2 01/20/2020       IMAGING:    Nasrin King MD  Rheumatology Fellow         Addendum:  Imaging Results:     Results for orders placed or performed in visit on 08/05/24   US Breast Left Limited 1-3 Quadrants    Narrative    Examinations: US BREAST LEFT LIMITED 1-3 QUADRANTS, 8/5/2024 10:16 AM    Comparisons: Mammogram and ultrasound of 5/24/2024    History: Left axillary lump. This lump has been inflamed although is  not inflamed today. Small skin lesion medial left breast.  History of  left subareolar pain.    Ultrasound:  Targeted ultrasound evaluation was performed by the technologist and  radiologist.  The left breast subareolar there is normal ducts. No suspicious  ultrasound FINDINGS. Left breast 9:00 4 cm from the nipple in the area  of skin lesion demonstrates a tiny hypoechoic cyst within the skin.  Ultrasound left axillary lump shows a sebaceous cyst filled with mixed  debris.      Impression    IMPRESSION: BI-RADS CATEGORY: 2 - Benign.    RECOMMENDED FOLLOW-UP: Routine yearly mammography beginning at age 40  or as discussed with your provider.    Clinical management. The patient has seen Camille Mercado for breast  concerns. Recommend continued clinical follow-up for the left axillary  lump should it become inflamed.    The finding and recommendation were discussed with the patient at the  time of evaluation.    TRAN LYLE MD         SYSTEM ID:  [id number]      Component      Latest Ref Rng & Units 1/7/2022   WBC      4.0 - 11.0 10e3/uL 6.3   RBC Count      3.80 - 5.20 10e6/uL 4.55   Hemoglobin      11.7 - 15.7 g/dL 11.9    Hematocrit      35.0 - 47.0 % 38.5   MCV      78 - 100 fL 85   MCH      26.5 - 33.0 pg 26.2 (L)   MCHC      31.5 - 36.5 g/dL 30.9 (L)   RDW      10.0 - 15.0 % 13.1   Platelet Count      150 - 450 10e3/uL 265   % Neutrophils      % 56   % Lymphocytes      % 36   % Monocytes      % 5   % Eosinophils      % 2   % Basophils      % 1   % Immature Granulocytes      % 0   NRBCs per 100 WBC      <1 /100 0   Absolute Neutrophils      1.6 - 8.3 10e3/uL 3.6   Absolute Lymphocytes      0.8 - 5.3 10e3/uL 2.3   Absolute Monocytes      0.0 - 1.3 10e3/uL 0.3   Absolute Eosinophils      0.0 - 0.7 10e3/uL 0.1   Absolute Basophils      0.0 - 0.2 10e3/uL 0.0   Absolute Immature Granulocytes      <=0.4 10e3/uL 0.0   Absolute NRBCs      10e3/uL 0.0   Color Urine      Colorless, Straw, Light Yellow, Yellow Straw   Appearance Urine      Clear Clear   Glucose Urine      Negative mg/dL Negative   Bilirubin Urine      Negative Negative   Ketones Urine      Negative mg/dL Negative   Specific Gravity Urine      1.003 - 1.035 1.006   Blood Urine      Negative Negative   pH Urine      5.0 - 7.0 7.0   Protein Albumin Urine      Negative mg/dL Negative   Urobilinogen mg/dL      Normal, 2.0 mg/dL Normal   Nitrite Urine      Negative Negative   Leukocyte Esterase Urine      Negative Negative   RBC Urine      <=2 /HPF 1   WBC Urine      <=5 /HPF <1   Squamous Epithelial /HPF Urine      <=1 /HPF 3 (H)   Iron      35 - 180 ug/dL 28 (L)   Iron Binding Cap      240 - 430 ug/dL 424   Iron Saturation Index      15 - 46 % 7 (L)   Creatinine      0.52 - 1.04 mg/dL 0.56   GFR Estimate      >60 mL/min/1.73m2 >90   AST      0 - 45 U/L 18   ALT      0 - 50 U/L 24   Albumin      3.4 - 5.0 g/dL 4.3   CRP Inflammation      0.0 - 8.0 mg/L <2.9   Sed Rate      0 - 20 mm/hr 8   Complement C3      81 - 157 mg/dL 104   Complement C4      13 - 39 mg/dL 25   DNA-ds      <10.0 IU/mL 5.4   Creatinine Urine      mg/dL 34   Ferritin      12 - 150 ng/mL 7 (L)     Component       Latest Ref Rng & Units 7/8/2022   WBC      4.0 - 11.0 10e3/uL 6.8   RBC Count      3.80 - 5.20 10e6/uL 4.45   Hemoglobin      11.7 - 15.7 g/dL 11.5 (L)   Hematocrit      35.0 - 47.0 % 37.2   MCV      78 - 100 fL 84   MCH      26.5 - 33.0 pg 25.8 (L)   MCHC      31.5 - 36.5 g/dL 30.9 (L)   RDW      10.0 - 15.0 % 14.2   Platelet Count      150 - 450 10e3/uL 195   % Neutrophils      % 52   % Lymphocytes      % 37   % Monocytes      % 7   % Eosinophils      % 3   % Basophils      % 1   % Immature Granulocytes      % 0   NRBCs per 100 WBC      <1 /100 0   Absolute Neutrophils      1.6 - 8.3 10e3/uL 3.6   Absolute Lymphocytes      0.8 - 5.3 10e3/uL 2.5   Absolute Monocytes      0.0 - 1.3 10e3/uL 0.5   Absolute Eosinophils      0.0 - 0.7 10e3/uL 0.2   Absolute Basophils      0.0 - 0.2 10e3/uL 0.1   Absolute Immature Granulocytes      <=0.4 10e3/uL 0.0   Absolute NRBCs      10e3/uL 0.0   Color Urine      Colorless, Straw, Light Yellow, Yellow Yellow   Appearance Urine      Clear Clear   Glucose Urine      Negative mg/dL Negative   Bilirubin Urine      Negative Negative   Ketones Urine      Negative mg/dL Negative   Specific Gravity Urine      1.003 - 1.035 1.010   Blood Urine      Negative Small (A)   pH Urine      5.0 - 7.0 6.5   Protein Albumin Urine      Negative mg/dL Negative   Urobilinogen mg/dL      Normal, 2.0 mg/dL Normal   Nitrite Urine      Negative Negative   Leukocyte Esterase Urine      Negative Negative   Mucus Urine      None Seen /LPF Present (A)   RBC Urine      <=2 /HPF 2   WBC Urine      <=5 /HPF 1   Protein Random Urine      g/L 0.11   Protein Total Urine g/gr Creatinine      0.00 - 0.20 g/g Cr 0.13   Creatinine Urine      mg/dL 82   Iron      35 - 180 ug/dL 14 (L)   Iron Binding Cap      240 - 430 ug/dL 367   Iron Saturation Index      15 - 46 % 4 (L)   Creatinine      0.52 - 1.04 mg/dL 0.60   GFR Estimate      >60 mL/min/1.73m2 >90   ALT      0 - 50 U/L 13   Albumin      3.4 - 5.0 g/dL 3.9   AST       0 - 45 U/L 14   Complement C4      13 - 39 mg/dL 22   Complement C3      81 - 157 mg/dL 94   CRP Inflammation      0.0 - 8.0 mg/L <2.9   DNA-ds      <10.0 IU/mL 5.5   Sed Rate      0 - 20 mm/hr 6   Ferritin      12 - 150 ng/mL 7 (L)     Component      Latest Ref Rng 4/10/2024  11:40 AM 4/10/2024  11:50 AM   WBC      4.0 - 11.0 10e3/uL 5.6     RBC Count      3.80 - 5.20 10e6/uL 5.69 (H)     Hemoglobin      11.7 - 15.7 g/dL 15.6     Hematocrit      35.0 - 47.0 % 48.9 (H)     MCV      78 - 100 fL 86     MCH      26.5 - 33.0 pg 27.4     MCHC      31.5 - 36.5 g/dL 31.9     RDW      10.0 - 15.0 % 12.3     Platelet Count      150 - 450 10e3/uL 180     % Neutrophils      % 54     % Lymphocytes      % 39     % Monocytes      % 5     % Eosinophils      % 1     % Basophils      % 0     % Immature Granulocytes      % 0     Absolute Neutrophils      1.6 - 8.3 10e3/uL 3.1     Absolute Lymphocytes      0.8 - 5.3 10e3/uL 2.2     Absolute Monocytes      0.0 - 1.3 10e3/uL 0.3     Absolute Eosinophils      0.0 - 0.7 10e3/uL 0.1     Absolute Basophils      0.0 - 0.2 10e3/uL 0.0     Absolute Immature Granulocytes      <=0.4 10e3/uL 0.0     Color Urine      Colorless, Straw, Light Yellow, Yellow   Yellow    Appearance Urine      Clear   Clear    Glucose Urine      Negative mg/dL  Negative    Bilirubin Urine      Negative   Negative    Ketones Urine      Negative mg/dL  Negative    Specific Gravity Urine      1.003 - 1.035   1.025    Blood Urine      Negative   Trace !    pH Urine      5.0 - 7.0   7.0    Protein Albumin Urine      Negative mg/dL  Trace !    Urobilinogen Urine      0.2, 1.0 E.U./dL  1.0    Nitrite Urine      Negative   Negative    Leukocyte Esterase Urine      Negative   Negative    Iron      37 - 145 ug/dL 122     Iron Binding Capacity      240 - 430 ug/dL 321     Iron Sat Index      15 - 46 % 38     Total Protein Urine mg/dL        mg/dL  15.6    Total Protein Urine mg/mg Creat      0.00 - 0.20 mg/mg Cr  0.09     Creatinine Urine      mg/dL  175.0    RBC Urine      0-2 /HPF /HPF  None Seen    WBC Urine      0-5 /HPF /HPF  None Seen    Amorphous Crystals      None Seen /HPF  Many !    Creatinine      0.51 - 0.95 mg/dL 0.60     GFR Estimate      >60 mL/min/1.73m2 >90     Vitamin D, Total (25-Hydroxy)      20 - 50 ng/mL 51 (H)     Ferritin      6 - 175 ng/mL 43     ALT      0 - 50 U/L 14     Albumin      3.5 - 5.2 g/dL 4.7     AST      0 - 45 U/L 23     Complement C4      13 - 39 mg/dL 20     Complement C3      81 - 157 mg/dL 100     CRP Inflammation      <5.00 mg/L <3.00     DNA-ds      <10.0 IU/mL 2.9     Sed Rate      0 - 20 mm/hr 5     Lipase      13 - 60 U/L 33     Amylase      28 - 100 U/L 89        Legend:  (H) High  ! Abnormal      Again, thank you for allowing me to participate in the care of your patient.      Sincerely,    Nasrin King MD

## 2025-02-05 NOTE — NURSING NOTE
Chief Complaint   Patient presents with    Biopsy     Left lower eyelid     [facility]      Drug Administration Record     verified patient identity using patient's name and date of birth.  patient instructed to remain in clinic for 15 minutes  afterwards, and to report any adverse reaction to me immediately.    Drug Name:proparacaine hydrochloride ophthalmic solution usp 0.5%   Dose:   Route administered: eye drop  NDC #: 49944-522-84  Amount of waste(mL):2 DROPS IN LEFT EYE  Reason for waste: Single use bottle    LOT #: [id number]  SITE: LEFT EYE  : [employer]  EXPIRATION DATE: 05/2026    Drug Administration Record     verified patient identity using patient's name and date of birth.   patient instructed to remain in clinic for 15 minutes  afterwards, and to report any adverse reaction to me immediately.    Drug Name: erythromycin ophthalmic ointment usp 0.5% (5mg/g)  Dose: 1 gram  Route administered: TOPICALLY   NDC #: [id number]  Amount of waste(mL):0  Reason for waste: Single use tube    LOT #: [id number]  SITE: LEFT EYE  : [employer]  EXPIRATION DATE: 10/2025

## 2025-02-05 NOTE — TELEPHONE ENCOUNTER
[facility] Call Center    Phone Message    May a detailed message be left on voicemail: yes     Reason for Call: Other: Pt would like to know if Dr. Cintron has a cancellation list for something sooner. Please call back to discuss. Writer was unsure if there is a wait list for Dr.Ian Cintron. Thank you.      Action Taken: Message routed to:  [facility] Derm    Travel Screening: Not Applicable     Date of Service:                                                                     
Called and spoke with pt to schedule for   bx of suspected bcc on left lower eyelid margin        Pt understands that the consultation/biopsy will determine what further appts she may need. We can schedule additional appts based on Dr. Cintron's findings.     Мария Chi, Complex  2/4/2025 4:27 PM    
Called and spoke with pt. Heather accepted a cancellation opening for this afternoon.     Мария Chi, Complex  2/5/2025 10:18 AM    
Heather Jerome MD Siegfried, Marta; Huang Cintron MD  Hi team - this tawana pt has a lesion on her left lower eyelid concerning for bcc, she would appreciate an expedited appt, thank you for considering!! No location preference, would just rather get in :) thanks!!      Huang Cintron MD Schultz, Brittney, MD; Мария Chi  Absolutely,  Мария adding her in in a consult slot is fine for biopsy.  Hopefully this is just a little hidrocystoma.  The presence of eyelashes is encouraging for benignity.  
/

## 2025-02-05 NOTE — PROGRESS NOTES
Virtual Visit Details    Type of service:  Video Visit     10:20-10:36 am  Originating Location (pt. Location): Home  Distant Location (provider location):  On-site  Platform used for Video Visit: [facility]      Rheumatology follow up Virtual Visit Note    Heather Guillory MRN# [id number]   Age: 44 year old YOB: 1980     Last seen: 2024  DOS: 2025    Reason for visit: UCTD    Assessment & Plan:   Problem list:    1-UCTD  2-HCQ monitoring  3-Iron deficiency        Undifferentiated connective tissue disease (UCTD):     Heather is a 41 yo WF . She was diagnosed with MCTD in 2016, 6 wk postpartum based on fatigue, arthritis, mild Raynaud's, low positive NINOSKA 1.1 and low positive RNP Ab 1.6. She is on  mg qd since 2017 with great response.  UCTD continues to be most likely diagnosis not MCTD as she does not have classic features of MCTD, had very low titer of RNP Ab in the past, (negative on most recent check), and had a mild disease which never required prednisone.  All autoimmunity labs (2017) came back negative (except + NINOSKA, low C3) which is further reassuring for UCTD, including APS panel, repeat RNP and inflammatory markers, dsDNA.      She had neg SSA/SSB antibodies in 2016, no concern for CHB. No need to re-check.  APS panel was neg.  She had neg anti-Sm, neg anti-DNA and neg centromere Ab in 2016.    In 2019, recommended to taper plaquenil to 200 mg twice a day on Mon/Wed/Fri and then 1 tab for the rest of the week. Had increased pain/stiffness in hands and feet and C3 dropped. So increased HCQ back to 200 mg bid. C3 went back to nl in 2020, had low C3 in 2021 but it fluctuates.     :    The patient reports stable symtpoms aside from increased fatigue in the setting of ongoing heavy menstrual bleeding. She has a submucosal fibroid with myomectomy planned for February. She does have some chest discomfort but appears likely musculoskeletal. Will repeat hgb and  iron testing today.    As her UCTD symptoms including arthralgia appear stable, discussed that she could continue current regimen of HCQ alternating 200 mg BID and daily dosing. If she were to develop new or worsening symptoms, would consider increasing back to 200 mg BID. Her last eye exam in 11/2021 was without evidence of HCQ toxicity.     -Check cbc with diff, UA, Cr, complements, dsDNA CRP, ESR, AST/ALT, iron studies  -Continue  mg BID 3x week,  mg daily 4x week  -Continue yearly ophthalmology exam for HCQ monitoring    7/8/2022:    Overall stable with symptoms, labs. In fact C3 improved on 1/2022 labs. recommend to taper HCQ down to reduce risk of HCQ toxicity in future.    Has h/o iron deficiency, will re-check iron studies.    1/13/2023: Stable on HCQ 1 tab (200 mg) a day, will continue. Labs in 7/2022 showed stable UCTD but ROBIN.    7/13/2023: Stable UCTD, will monitor sx/labs off HCQ. Discussed m/o low iron, low DHEA, both could contribute to fatigue.    Plan:    Labs in 10/2023    Ask women health about taking DHEA    Stay off hydroxychlorquine    Iron gummies with raspberry with food (take 2 a day)    Ok to go on prenatal vit one tab a day    Eye exam one more time    Return in 6 months (in person)        2/1/2024:    Stable UCTD off the HCQ, no flare ups, will check labs    I don't think increased motion sickness with park rides and flights are due to UCTD.    Overall looks great/healthier in great mood today, I believe replacing iron has helped, her ferritin is within normal range but towards the low end and I recommend to continue with iron gummies, could take up to 6 a day which equals 1 slow Fe a day.    Hair loss is minor, seeing derm, no alopecia on exam, no scalp lesions, some could be related to low iron/stress, expect it to grew back. Will re-check iron levels today. Thyroid tests are good.    Vit d is normal, recommend to continue with OTC vit D 2000 units a  day.      Plan:    Iron gummies up to 6 a day is ok to take    Labs today    Return in a year in person      Today 2/5/2025:    Doing very well off HCQ especially with doing work out, will see derm today for ?BCC of L lower eyelid.    Will check labs today or in April then once a year.    Added vit D, iron studies.    Discussed diet rich in iron, can not tolerate iron supplements due to GI SE.    Plan:    Labs today or in April then yearly labs    Return in a year in person        TT 30 min was spent on date of the encounter doing chart review, history and exam, documentation and further activities as noted above. Any prior notes, outside records, laboratory results, and imaging studies were reviewed if relevant.      The longitudinal plan of care for the diagnosis(es)/condition(s) as documented were addressed during this visit. Due to the added complexity in care, I will continue to support Heather in the subsequent management and with ongoing continuity of care.      Nasrin King MD      Orders Placed This Encounter   Procedures    ALT    Albumin level    AST    Creatinine    Complement C4    Complement C3    CRP inflammation    DNA double stranded antibodies    Erythrocyte sedimentation rate auto    UA with Microscopic reflex to Culture    Protein  random urine    Creatinine random urine    Vitamin D Deficiency    Ferritin    Iron and iron binding capacity    CBC with Platelets & Differential            Subjective     Ms. Guillory is a 42 yo with history of UCTD, JAVIER, uterine fibroid who presents for follow up.   Undifferentiated connective tissue disease (UCTD):    The patient was last seen for a virtual visit in 7/2021. She notes things are going okay. She has felt more fatigue recently and continues to have significant vaginal bleeding with menstruation. Since her las visit she was seen by OB. MRI showed uterine fibroid; she's scheduled for a myomectomy next month. She notes occasional SOB with stairs. Ms.  Pastora has reported 4 weeks of tightness/pressure in her chest and upper back that's present all the time but worse when she does activity with her upper extremities and at night after she's done more activity. It is not worse with exertion. She gets some relief from using a massager but the discomfort doesn't resolve completely. She is taking iron supplements on some days. She denies dizziness or light headedness.     She's been having more tightness in her joints with the cold weather. The pain is worst in her hands and feet particularily in the morning. Her Raynaud's is also more of a problem thsi time of year but she denies fingertip ulceration and has been avoiding going outside.     Currently, she is taking  mg BID or daily; she says she takes it twice a day about half the time.     Ms. Guillory denies hairloss, cough, oral ulcers, GI or urinary symptoms.     Last eye exam in 11/2021 without evidence of HCQ toxicity.      7/8/2022: Heather presents for follow up. She overall is doing ok, reports being stable. Still taking  mg a day.      1/13/2023: Heather presents for follow up. Her HCQ was reduced from 200 mg bid to 400/200 mg every other day in 7/2022. Later it was decreased to 1 tab/200 mg a day in 10/2022. She is overall stable, no flares since last visit.    Has fatigue, but thinks it is stress related. Had some left leg pain, but it is better, started 2 days after colonoscopy.    7/13/2023:    -Off HCQ x few weeks    -EGD 9/2023 showed GERD, has abdominal pain with radiation to back with nausea    -takes sucralfate 4 times a day    -no iron taking since 9/23    -continues to have fatigue    -on vit D 2000 units qd    -walks a lot    -no change in sx off HCQ    -was found to have low DHEA      2/1/2024:    -Heather is doing very well    -no flares off the HCQ    -walks with friends    -good mood today    -minor joint stiffness here or there, not bad    -noticed more motion sickness when plan  lands and with park rides    -reports hair loss, seeing derm    -her 3 sons ray george, doing well, oldest is 9 and youngest is 6    Today 2/5/2025:    -going to see Dr. Hayward in rheum-derm clinic for suspected L lower eyelid    -in Oct, started lifting weight (3 times week), added pilates once a week, noticed more energy, less fatigue, it has helped her with energy and sleep, mental health    -no joint pain/swelling    -no other rashes    -gets headaches around the time she gets period and gets some pain on her feet>hands, more of irritation, it self resolves    -periods are different from month to month, feels like she is going through taurus-menopausal sx    -eating healthier    -stress is a trigger for joint pain, less stressed with work out, feeling stronger    -no oral ulcers, hair loss, CP, SOB, cough, fevers    -trying eating iron rich food, iron pill caused her GI upset, curious to see what iron numbers are    ROS     A 10 point ROS was performed with pertinent findings listed above.        Past Medical History:   Diagnosis Date    JAVIER (generalized anxiety disorder)     50mg zoloft    Rosacea     Undifferentiated connective tissue disease        Past Surgical History:   Procedure Laterality Date    COLONOSCOPY N/A 10/21/2020    Procedure: COLONOSCOPY;  Surgeon: Yang Pete MD;  Location: [facility] [place] OR    COLONOSCOPY N/A 12/29/2022    Procedure: COLONOSCOPY, WITH BIOPSY;  Surgeon: Magda Lobo MD;  Location: [facility] [place] OR    DAVINCI MYOMECTOMY N/A 02/11/2022    Procedure: MYOMECTOMY, UTERUS, ROBOT-ASSISTED, LAPAROSCOPIC;  Surgeon: Cate Mansfield MD;  Location:  OR    ESOPHAGOSCOPY, GASTROSCOPY, DUODENOSCOPY (EGD), COMBINED N/A 12/29/2022    Procedure: ESOPHAGOGASTRODUODENOSCOPY, WITH BIOPSY;  Surgeon: Magda Lobo MD;  Location: [facility] [place] OR    ESOPHAGOSCOPY, GASTROSCOPY, DUODENOSCOPY (EGD), COMBINED N/A 09/07/2023    Procedure: Esophagoscopy, gastroscopy, duodenoscopy (EGD), combined WITH BIOPSY;   "Surgeon: Jackie Aldana MD;  Location: [facility] OR    IR ENDOVENOUS ABLATION VARICOSE VEINS  10/28/2019    IR FOLLOW UP VISIT OUTPATIENT  11/20/2019    IR STAB PHLEBECTOMY 10 - 20 STABS  10/28/2019    IR VEIN SCLEROSING MULTIPLE  10/28/2019    LAPAROSCOPIC SALPINGECTOMY Bilateral 04/23/2021    Procedure: SALPINGECTOMY, LAPAROSCOPIC BILATERAL;  Surgeon: Cate Mansfield MD;  Location: [facility] OR    OPERATIVE HYSTEROSCOPY WITH MORCELLATOR  04/08/2014    Procedure: OPERATIVE HYSTEROSCOPY WITH MORCELLATOR;  Hysteroscopic Polypectomy;  Surgeon: Cate Mansfield MD;  Location: [facility] OR    REMOVE INTRAUTERINE DEVICE N/A 04/23/2021    Procedure: removal of intrauterine device;  Surgeon: Cate Mansfield MD;  Location: [facility] OR    TUBAL LIGATION         Family History   Problem Relation Age of Onset    Skin Cancer Mother     Elijah Disease Sister     Melanoma Sister     Arrhythmia Brother         details unknown; procedure in his 20s    Anxiety Disorder Brother     Alzheimer Disease Maternal Grandmother     Cervical Cancer Maternal Grandmother     Colon Cancer Maternal Grandfather     Breast Cancer Paternal Grandmother     Hypertension Paternal Grandmother     Hypertension Paternal Grandfather     Lung Cancer Paternal Grandfather         smoker    Diabetes Type 2  Paternal Grandfather     Myocardial Infarction Paternal Grandfather     Myocardial Infarction Paternal Aunt 45    Cerebrovascular Disease No family hx of     Coronary Artery Disease Early Onset No family hx of     Ovarian Cancer No family hx of        Social History     Tobacco Use    Smoking status: Never     Passive exposure: Never    Smokeless tobacco: Never   Substance Use Topics    Alcohol use: No     Alcohol/week: 0.0 standard drinks of alcohol          Objective     PHYSICAL EXAM  Ht 1.702 m (5' 7\")   Wt 70.3 kg (155 lb)   BMI 24.28 kg/m    Wt Readings from Last 4 Encounters:   02/05/25 70.3 kg (155 lb)   07/24/24 68 kg (149 lb 14.4 oz)   05/30/24 " "68.5 kg (151 lb)   05/15/24 70.3 kg (155 lb)       Gen: no acute distress, pleasant  HEENT: EOMI, no scleral injection or icterus, red patch over L lower eyelid  SKIN: no other rashes or alopecia, hair looks healthy  MSK: no active synovitis or joint deformities  NEURO: grossly non focal  Psych: nl affect    DATA:    CBC:  Recent Labs   Lab Test 08/27/21  0850 07/08/21  0919 04/23/21  1118   WBC 5.6 5.8 5.5   RBC 4.36 4.45 4.45   HGB 12.3 13.3 13.1   HCT 38.3 38.8 39.0   MCV 88 87 88   MCH 28.2 29.9 29.4   MCHC 32.1 34.3 33.6   RDW 11.9 12.4 11.9    207 195       BMP:  Recent Labs   Lab Test 08/27/21  0850 07/08/21  0919 03/31/21  1130 01/14/21  1228 10/28/20  1250 08/26/19  1055 04/26/19  0930 08/09/16  0000 01/09/16  0724     --   --   --  140  --   --   --  140   POTASSIUM 4.4  --  4.8  --  4.1  --   --   --  3.8   CHLORIDE 113*  --   --   --  109  --   --   --  110*   CO2 26  --   --   --  27  --   --   --  20   ANIONGAP 5  --   --   --  4  --   --   --  10   GLC 99  --   --   --  88  --  94  --  89   BUN 11  --   --   --  7  --   --   --  9   CR 0.79 0.72  --  0.66 0.63   < >  --    < > 0.57   GFRESTIMATED >90 >90  --  >90 >90   < >  --    < > >90  Non  GFR Calc     PRICILA 8.9  --   --   --  8.9  --   --   --  8.4*    < > = values in this interval not displayed.       LFT:  Recent Labs   Lab Test 08/27/21  0850 07/08/21  0919 01/14/21  1228 10/28/20  1250 09/28/17  0954 01/09/16  0724   PROTTOTAL 7.2  --   --  7.6  --  7.1   ALBUMIN 3.9 4.1 4.0 4.3   < > 3.3*   BILITOTAL 0.2  --   --  0.5  --  0.4   ALKPHOS 79  --   --  79  --  70   AST 12 17 13 10   < > 12   ALT 12 21 19 17   < > 15    < > = values in this interval not displayed.       No results found for: \"CKTOTAL\"  TSH   Date Value Ref Range Status   12/14/2023 1.19 0.30 - 4.20 uIU/mL Final   02/13/2022 0.43 0.40 - 4.00 mU/L Final   07/08/2021 1.16 0.40 - 4.00 mU/L Final     No results found for: URIC    Inflammatory markers  Lab " "Results   Component Value Date    CRP <2.9 07/08/2021    CRP <2.9 01/14/2021    CRP 6.4 07/16/2020     Lab Results   Component Value Date    SED 6 07/08/2021    SED 4 01/14/2021    SED 7 07/16/2020     Ferritin   Date Value Ref Range Status   04/10/2024 43 6 - 175 ng/mL Final   12/14/2023 30 6 - 175 ng/mL Final   06/20/2023 36 6 - 175 ng/mL Final   07/08/2021 17 12 - 150 ng/mL Final   12/30/2020 63 12 - 150 ng/mL Final       UA RESULTS:  Recent Labs   Lab Test 07/08/21  0949 03/31/21  1140 01/14/21  1233 09/28/17  0950 08/31/17  0937 08/14/17  1101   COLOR Yellow Straw Yellow   < > Yellow Yellow   APPEARANCE Clear Clear Clear   < > Slightly Cloudy Clear   URINEGLC Negative Negative Negative   < > Negative Negative   URINEBILI Negative Negative Negative   < > Negative Negative   URINEKETONE Negative Negative Negative   < > Negative Negative   SG >1.030 1.009 1.010   < > >1.030 1.010   UBLD Large* Small* Negative   < > Large* Trace*   URINEPH 7.0 6.0 7.0   < > 7.0 6.5   PROTEIN Trace* Negative Negative   < > 30* Negative   UROBILINOGEN 0.2  --   --   --  0.2 0.2   NITRITE Negative Negative Negative   < > Negative Negative   LEUKEST Negative Negative Negative   < > Small* Negative   RBCU 5-10* 2 2   < > 10-25* O - 2   WBCU 0 - 5 <1 <1   < > 10-25* O - 2    < > = values in this interval not displayed.         Autoimmunity labs:     Lab Results   Component Value Date    RHF <20 07/22/2016     No results found for: \"CCPIGG\"  No results found for: \"ANCA\"  Lab Results   Component Value Date    C2MOLJE 100 04/10/2024    T3SJRLU 96 12/14/2023    D8RRYGT 105 04/05/2023     Lab Results   Component Value Date    E2IVBJV 20 04/10/2024    Z7VMVFD 18 12/14/2023    Z7XWWPP 22 04/05/2023     Lab Results   Component Value Date    JACKLYN 1.1 (H) 07/22/2016     Lab Results   Component Value Date    DNA 2.9 04/10/2024    DNA 6.1 12/14/2023    DNA 4.9 04/05/2023     Lab Results   Component Value Date    RNPIGG 0.6 09/28/2017    SSAIGG <0.2 " 01/20/2020    SSBIGG <0.2 01/20/2020       IMAGING:    Nasrin King MD  Rheumatology Fellow         Addendum:  Imaging Results:     Results for orders placed or performed in visit on 08/05/24   US Breast Left Limited 1-3 Quadrants    Narrative    Examinations: US BREAST LEFT LIMITED 1-3 QUADRANTS, 8/5/2024 10:16 AM    Comparisons: Mammogram and ultrasound of 5/24/2024    History: Left axillary lump. This lump has been inflamed although is  not inflamed today. Small skin lesion medial left breast.  History of  left subareolar pain.    Ultrasound:  Targeted ultrasound evaluation was performed by the technologist and  radiologist.  The left breast subareolar there is normal ducts. No suspicious  ultrasound FINDINGS. Left breast 9:00 4 cm from the nipple in the area  of skin lesion demonstrates a tiny hypoechoic cyst within the skin.  Ultrasound left axillary lump shows a sebaceous cyst filled with mixed  debris.      Impression    IMPRESSION: BI-RADS CATEGORY: 2 - Benign.    RECOMMENDED FOLLOW-UP: Routine yearly mammography beginning at age 40  or as discussed with your provider.    Clinical management. The patient has seen Camille Mercado for breast  concerns. Recommend continued clinical follow-up for the left axillary  lump should it become inflamed.    The finding and recommendation were discussed with the patient at the  time of evaluation.    TRAN LYLE MD         SYSTEM ID:  [id number]      Component      Latest Ref Rng & Units 1/7/2022   WBC      4.0 - 11.0 10e3/uL 6.3   RBC Count      3.80 - 5.20 10e6/uL 4.55   Hemoglobin      11.7 - 15.7 g/dL 11.9   Hematocrit      35.0 - 47.0 % 38.5   MCV      78 - 100 fL 85   MCH      26.5 - 33.0 pg 26.2 (L)   MCHC      31.5 - 36.5 g/dL 30.9 (L)   RDW      10.0 - 15.0 % 13.1   Platelet Count      150 - 450 10e3/uL 265   % Neutrophils      % 56   % Lymphocytes      % 36   % Monocytes      % 5   % Eosinophils      % 2   % Basophils      % 1   % Immature Granulocytes       % 0   NRBCs per 100 WBC      <1 /100 0   Absolute Neutrophils      1.6 - 8.3 10e3/uL 3.6   Absolute Lymphocytes      0.8 - 5.3 10e3/uL 2.3   Absolute Monocytes      0.0 - 1.3 10e3/uL 0.3   Absolute Eosinophils      0.0 - 0.7 10e3/uL 0.1   Absolute Basophils      0.0 - 0.2 10e3/uL 0.0   Absolute Immature Granulocytes      <=0.4 10e3/uL 0.0   Absolute NRBCs      10e3/uL 0.0   Color Urine      Colorless, Straw, Light Yellow, Yellow Straw   Appearance Urine      Clear Clear   Glucose Urine      Negative mg/dL Negative   Bilirubin Urine      Negative Negative   Ketones Urine      Negative mg/dL Negative   Specific Gravity Urine      1.003 - 1.035 1.006   Blood Urine      Negative Negative   pH Urine      5.0 - 7.0 7.0   Protein Albumin Urine      Negative mg/dL Negative   Urobilinogen mg/dL      Normal, 2.0 mg/dL Normal   Nitrite Urine      Negative Negative   Leukocyte Esterase Urine      Negative Negative   RBC Urine      <=2 /HPF 1   WBC Urine      <=5 /HPF <1   Squamous Epithelial /HPF Urine      <=1 /HPF 3 (H)   Iron      35 - 180 ug/dL 28 (L)   Iron Binding Cap      240 - 430 ug/dL 424   Iron Saturation Index      15 - 46 % 7 (L)   Creatinine      0.52 - 1.04 mg/dL 0.56   GFR Estimate      >60 mL/min/1.73m2 >90   AST      0 - 45 U/L 18   ALT      0 - 50 U/L 24   Albumin      3.4 - 5.0 g/dL 4.3   CRP Inflammation      0.0 - 8.0 mg/L <2.9   Sed Rate      0 - 20 mm/hr 8   Complement C3      81 - 157 mg/dL 104   Complement C4      13 - 39 mg/dL 25   DNA-ds      <10.0 IU/mL 5.4   Creatinine Urine      mg/dL 34   Ferritin      12 - 150 ng/mL 7 (L)     Component      Latest Ref Rng & Units 7/8/2022   WBC      4.0 - 11.0 10e3/uL 6.8   RBC Count      3.80 - 5.20 10e6/uL 4.45   Hemoglobin      11.7 - 15.7 g/dL 11.5 (L)   Hematocrit      35.0 - 47.0 % 37.2   MCV      78 - 100 fL 84   MCH      26.5 - 33.0 pg 25.8 (L)   MCHC      31.5 - 36.5 g/dL 30.9 (L)   RDW      10.0 - 15.0 % 14.2   Platelet Count      150 - 450 10e3/uL 195    % Neutrophils      % 52   % Lymphocytes      % 37   % Monocytes      % 7   % Eosinophils      % 3   % Basophils      % 1   % Immature Granulocytes      % 0   NRBCs per 100 WBC      <1 /100 0   Absolute Neutrophils      1.6 - 8.3 10e3/uL 3.6   Absolute Lymphocytes      0.8 - 5.3 10e3/uL 2.5   Absolute Monocytes      0.0 - 1.3 10e3/uL 0.5   Absolute Eosinophils      0.0 - 0.7 10e3/uL 0.2   Absolute Basophils      0.0 - 0.2 10e3/uL 0.1   Absolute Immature Granulocytes      <=0.4 10e3/uL 0.0   Absolute NRBCs      10e3/uL 0.0   Color Urine      Colorless, Straw, Light Yellow, Yellow Yellow   Appearance Urine      Clear Clear   Glucose Urine      Negative mg/dL Negative   Bilirubin Urine      Negative Negative   Ketones Urine      Negative mg/dL Negative   Specific Gravity Urine      1.003 - 1.035 1.010   Blood Urine      Negative Small (A)   pH Urine      5.0 - 7.0 6.5   Protein Albumin Urine      Negative mg/dL Negative   Urobilinogen mg/dL      Normal, 2.0 mg/dL Normal   Nitrite Urine      Negative Negative   Leukocyte Esterase Urine      Negative Negative   Mucus Urine      None Seen /LPF Present (A)   RBC Urine      <=2 /HPF 2   WBC Urine      <=5 /HPF 1   Protein Random Urine      g/L 0.11   Protein Total Urine g/gr Creatinine      0.00 - 0.20 g/g Cr 0.13   Creatinine Urine      mg/dL 82   Iron      35 - 180 ug/dL 14 (L)   Iron Binding Cap      240 - 430 ug/dL 367   Iron Saturation Index      15 - 46 % 4 (L)   Creatinine      0.52 - 1.04 mg/dL 0.60   GFR Estimate      >60 mL/min/1.73m2 >90   ALT      0 - 50 U/L 13   Albumin      3.4 - 5.0 g/dL 3.9   AST      0 - 45 U/L 14   Complement C4      13 - 39 mg/dL 22   Complement C3      81 - 157 mg/dL 94   CRP Inflammation      0.0 - 8.0 mg/L <2.9   DNA-ds      <10.0 IU/mL 5.5   Sed Rate      0 - 20 mm/hr 6   Ferritin      12 - 150 ng/mL 7 (L)     Component      Latest Ref Rng 4/10/2024  11:40 AM 4/10/2024  11:50 AM   WBC      4.0 - 11.0 10e3/uL 5.6     RBC Count       3.80 - 5.20 10e6/uL 5.69 (H)     Hemoglobin      11.7 - 15.7 g/dL 15.6     Hematocrit      35.0 - 47.0 % 48.9 (H)     MCV      78 - 100 fL 86     MCH      26.5 - 33.0 pg 27.4     MCHC      31.5 - 36.5 g/dL 31.9     RDW      10.0 - 15.0 % 12.3     Platelet Count      150 - 450 10e3/uL 180     % Neutrophils      % 54     % Lymphocytes      % 39     % Monocytes      % 5     % Eosinophils      % 1     % Basophils      % 0     % Immature Granulocytes      % 0     Absolute Neutrophils      1.6 - 8.3 10e3/uL 3.1     Absolute Lymphocytes      0.8 - 5.3 10e3/uL 2.2     Absolute Monocytes      0.0 - 1.3 10e3/uL 0.3     Absolute Eosinophils      0.0 - 0.7 10e3/uL 0.1     Absolute Basophils      0.0 - 0.2 10e3/uL 0.0     Absolute Immature Granulocytes      <=0.4 10e3/uL 0.0     Color Urine      Colorless, Straw, Light Yellow, Yellow   Yellow    Appearance Urine      Clear   Clear    Glucose Urine      Negative mg/dL  Negative    Bilirubin Urine      Negative   Negative    Ketones Urine      Negative mg/dL  Negative    Specific Gravity Urine      1.003 - 1.035   1.025    Blood Urine      Negative   Trace !    pH Urine      5.0 - 7.0   7.0    Protein Albumin Urine      Negative mg/dL  Trace !    Urobilinogen Urine      0.2, 1.0 E.U./dL  1.0    Nitrite Urine      Negative   Negative    Leukocyte Esterase Urine      Negative   Negative    Iron      37 - 145 ug/dL 122     Iron Binding Capacity      240 - 430 ug/dL 321     Iron Sat Index      15 - 46 % 38     Total Protein Urine mg/dL        mg/dL  15.6    Total Protein Urine mg/mg Creat      0.00 - 0.20 mg/mg Cr  0.09    Creatinine Urine      mg/dL  175.0    RBC Urine      0-2 /HPF /HPF  None Seen    WBC Urine      0-5 /HPF /HPF  None Seen    Amorphous Crystals      None Seen /HPF  Many !    Creatinine      0.51 - 0.95 mg/dL 0.60     GFR Estimate      >60 mL/min/1.73m2 >90     Vitamin D, Total (25-Hydroxy)      20 - 50 ng/mL 51 (H)     Ferritin      6 - 175 ng/mL 43     ALT      0 -  50 U/L 14     Albumin      3.5 - 5.2 g/dL 4.7     AST      0 - 45 U/L 23     Complement C4      13 - 39 mg/dL 20     Complement C3      81 - 157 mg/dL 100     CRP Inflammation      <5.00 mg/L <3.00     DNA-ds      <10.0 IU/mL 2.9     Sed Rate      0 - 20 mm/hr 5     Lipase      13 - 60 U/L 33     Amylase      28 - 100 U/L 89        Legend:  (H) High  ! Abnormal

## 2025-02-05 NOTE — NURSING NOTE
Current patient location: [address] [facility] [address]    Is the patient currently in the state of [place]? YES    Visit mode: VIDEO    If the visit is dropped, the patient can be reconnected by:VIDEO VISIT: Text to cell phone:   Telephone Information:   Mobile [phone]       Will anyone else be joining the visit? NO  (If patient encounters technical issues they should call [phone]    Are changes needed to the allergy or medication list? No    Are refills needed on medications prescribed by this physician? NO    Rooming Documentation:  Questionnaire(s) completed    Reason for visit: RECHECK    Annamarie CLARKE

## 2025-02-06 LAB
C3 SERPL-MCNC: 96 MG/DL (ref 81–157)
C4 SERPL-MCNC: 22 MG/DL (ref 13–39)
DSDNA AB SER-ACNC: 5.2 IU/ML
VIT D+METAB SERPL-MCNC: 44 NG/ML (ref 20–50)

## 2025-02-08 LAB
PATH REPORT.COMMENTS IMP SPEC: ABNORMAL
PATH REPORT.COMMENTS IMP SPEC: ABNORMAL
PATH REPORT.COMMENTS IMP SPEC: YES
PATH REPORT.FINAL DX SPEC: ABNORMAL
PATH REPORT.GROSS SPEC: ABNORMAL
PATH REPORT.MICROSCOPIC SPEC OTHER STN: ABNORMAL
PATH REPORT.RELEVANT HX SPEC: ABNORMAL

## 2025-02-10 ENCOUNTER — TELEPHONE (OUTPATIENT)
Dept: DERMATOLOGY | Facility: CLINIC | Age: 45
End: 2025-02-10

## 2025-02-10 NOTE — TELEPHONE ENCOUNTER
Left patient a voicemail to schedule a consult & mohs for   Left lower eyelid: - Basal cell carcinoma, nodular type        with Dr. Cintron. Provided my direct phone number.    Мария Chi on 2/10/2025 at 9:05 AM

## 2025-02-10 NOTE — TELEPHONE ENCOUNTER
Called patient to schedule surgery with Dr. Cintron    Date of Surgery: 03/10    Surgery type: Mohs    Consult scheduled: Yes    Has patient had mohs with us before? No    Additional comments: would like sooner consult Mohs. Pt leaves for a spring break trip on 03/29. Pt will have  on day of Mohs.       Мария Chi on 2/10/2025 at 10:04 AM

## 2025-02-19 ENCOUNTER — OFFICE VISIT (OUTPATIENT)
Dept: INTERNAL MEDICINE | Facility: CLINIC | Age: 45
End: 2025-02-19
Payer: COMMERCIAL

## 2025-02-19 VITALS
OXYGEN SATURATION: 100 % | SYSTOLIC BLOOD PRESSURE: 111 MMHG | HEIGHT: 67 IN | DIASTOLIC BLOOD PRESSURE: 77 MMHG | BODY MASS INDEX: 25.3 KG/M2 | HEART RATE: 74 BPM | RESPIRATION RATE: 18 BRPM | WEIGHT: 161.2 LBS | TEMPERATURE: 98 F

## 2025-02-19 DIAGNOSIS — R20.2 TINGLING OF LEFT UPPER EXTREMITY: ICD-10-CM

## 2025-02-19 DIAGNOSIS — R31.29 MICROSCOPIC HEMATURIA: Primary | ICD-10-CM

## 2025-02-19 DIAGNOSIS — R20.9 ALTERATIONS OF SENSATIONS: ICD-10-CM

## 2025-02-19 ASSESSMENT — PATIENT HEALTH QUESTIONNAIRE - PHQ9
SUM OF ALL RESPONSES TO PHQ QUESTIONS 1-9: 0
10. IF YOU CHECKED OFF ANY PROBLEMS, HOW DIFFICULT HAVE THESE PROBLEMS MADE IT FOR YOU TO DO YOUR WORK, TAKE CARE OF THINGS AT HOME, OR GET ALONG WITH OTHER PEOPLE: NOT DIFFICULT AT ALL
SUM OF ALL RESPONSES TO PHQ QUESTIONS 1-9: 0

## 2025-02-19 NOTE — TELEPHONE ENCOUNTER
FUTURE VISIT INFORMATION      FUTURE VISIT INFORMATION:  Date: 2/26/25  Time: 9:00 AM   Location: [facility]  REFERRAL INFORMATION:  Referring provider:  Heather Jerome MD   Referring providers clinic:  [facility]   Reason for visit/diagnosis  Left lower eyelid: - Basal cell carcinoma, nodular type    RECORDS REQUESTED FROM:       Clinic name Comments Records Status Imaging Status   [facility] DERM  2/5/25  Path # [id number] Epic Media Tab

## 2025-02-24 ENCOUNTER — VIRTUAL VISIT (OUTPATIENT)
Dept: DERMATOLOGY | Facility: CLINIC | Age: 45
End: 2025-02-24
Payer: COMMERCIAL

## 2025-02-24 DIAGNOSIS — F41.9 ANXIETY: ICD-10-CM

## 2025-02-24 DIAGNOSIS — C44.1192 BASAL CELL CARCINOMA (BCC) OF LEFT LOWER EYELID: Primary | ICD-10-CM

## 2025-02-24 RX ORDER — DIAZEPAM 5 MG/1
TABLET ORAL
Qty: 1 TABLET | Refills: 0 | Status: SHIPPED | OUTPATIENT
Start: 2025-02-24

## 2025-02-24 NOTE — NURSING NOTE
Chief Complaint   Patient presents with    Consult For     L lower eyelid BCC     Radha RN-BSN  Dermatology Surgery

## 2025-02-24 NOTE — LETTER
2/24/2025       RE: Heather Guillory  [address]  [place] [address]     Dear Colleague,    Thank you for referring your patient, Heather Guillory, to the [facility] [place] at [facility]. Please see a copy of my visit note below.    [facility] Dermatology Note  Virtual Visit Details    Type of service:  Telephone Visit   Phone call duration: 9 minutes   Originating Location (pt. Location): Home    Distant Location (provider location):  On-site  Telephone visit completed due to n/a  Encounter Date: Feb 24, 2025  Store-and-Forward and Telephone. Location of teledermatologist: [facility] [place].  Start time: 3:11. End time: 3:20.    Dermatologic Surgery Telemedicine Consult Note    Dermatology Problem List:  # NMSC  - nBCC, L lower eyelid margin, s/p bx 02/05/25, pending MMS 02/26/25  # Predominantly intradermal melanocytic nevus, R buttock.  - s/p shave bx 05/30/2024  # Ruptured folliculitis, L nipple  - s/p punch bx 05/30/2024    # Family history of melanoma   - Sister with melanoma in situ of the face at age 41    CC: Consult For (L lower eyelid BCC)      Subjective: Heather Guillory is a 44 year old female who presents today for Mohs micrographic surgery consultation for a recent diagnosis of skin cancer.  - Skin cancer(s): BCC  - Location(s): Left lower eyelid margin  - This will be her first Mohs procedure. Pt states she is anxious about the procedure.  - no other concerns today       Objective:   Skin: Focused examination of the left lower eyelid margin within the teledermatology photograph(s) on 02/24/25 was performed.   - Pink translucent pearly papule on the left lower eyelid margin.  - See photo from bx date 02/05/25 below.      Path report:   Case: [id number]  Collected: 02/05/2025  Final Diagnosis   Left lower eyelid:  - Basal cell carcinoma, nodular  type        Assessment and Plan:     1. Plan for Mohs micrographic surgery for skin cancer(s) above:  *Review lab result(s): Dermpath report   - We discussed the nature of the diagnosis/condition above. We discussed the treatment options, including the risks benefits and expectations of these options. We recommend micrographic surgery as the most effective and most tissue sparing option for treatment, and the patient agrees to proceed with this.  The patient is aware of the risks, benefits and expectations of this procedure. The patient will be scheduled for this procedure, if not already done so.  - We anticipate the following closure type: Linear closure, such as complex linear closure (CLC)/pentagonal wedge  - Diazepam prescription sent to pharmacy today. Take on morning of Mohs surgery with breakfast.    The patient was discussed with and evaluated by attending physician, Huang Cintrno MD and Savi Leblanc MD.    Staff Involved:  Staff/Scribe/Fellow    Scribe Disclosure:   I, Sheila Gregory, am serving as a scribe; to document services personally performed by Huang Cintron MD, based on data collection and the provider's statements to me.     Provider Disclosure:  I agree with the above history, review of systems, physical exam and plan.  I have reviewed the content of the documentation and have edited it as needed. I have personally performed the services documented here and the documentation accurately represents those services and the decisions I have made.      Electronically signed by:  Attending Attestation  I attest that I discussed the case with the Fellow.  I agree with the plan.   I have reviewed the note and edited it as necessary.    Huang Cintron M.D.  Professor  Director of Dermatologic Surgery  Department of Dermatology  [facility]         Again, thank you for allowing me to participate in the care of your patient.      Sincerely,    Huang Cintron MD

## 2025-02-26 ENCOUNTER — PRE VISIT (OUTPATIENT)
Dept: DERMATOLOGY | Facility: CLINIC | Age: 45
End: 2025-02-26

## 2025-02-26 ENCOUNTER — TELEPHONE (OUTPATIENT)
Dept: DERMATOLOGY | Facility: CLINIC | Age: 45
End: 2025-02-26

## 2025-02-26 NOTE — TELEPHONE ENCOUNTER
Follow up call completed following Mohs procedure with Dr. Cintron.       Are you having pain? YES: mild  Are you taking pain medication? Tylenol alternating Ibuprofen   Are you applying ice?  NO  Have you had any noticeable bleeding through the bandage? NO   Do you have any other concerns? NO       Please call [phone] if you have any questions or concerns during business hours. For concerns after hours, call the hospital  to reach the dermatology resident on call at [phone], option 4.       Nhung GOMEZ RN  Dermatology Surgery

## 2025-03-07 ENCOUNTER — TELEPHONE (OUTPATIENT)
Dept: DERMATOLOGY | Facility: CLINIC | Age: 45
End: 2025-03-07
Payer: COMMERCIAL

## 2025-03-07 NOTE — TELEPHONE ENCOUNTER
[facility] Call Center    Phone Message    May a detailed message be left on voicemail: yes     Reason for Call: Other: Per Pt, stitches on her eyelid is dangling and she is not sure what to do. Please call [phone] to advise. Thank you.     Action Taken: Message routed to:  [facility] ([facility]): [facility]    Travel Screening: Not Applicable     Date of Service:

## 2025-03-10 NOTE — TELEPHONE ENCOUNTER
Called to discuss concerns. Ok to sched on post op nurse schedule tomorrow if pt wants, otherwise pt does have appt 3/12. Want to confirm suture is not poking pt in eye. Number provided to call back.

## 2025-03-11 NOTE — PROGRESS NOTES
Dermatologic Surgery Post-Op Wound Check     CC: Follow Up (Left eye post mohs)      Dermatology Problem List:  # Hx NMSC  - nBCC, L lower eyelid margin, s/p bx 02/05/25, s/p MMS 02/26/25  # Predominantly intradermal melanocytic nevus, R buttock.  - s/p shave bx 05/30/2024  # Ruptured folliculitis, L nipple  - s/p punch bx 05/30/2024     # Family history of melanoma   - Sister with melanoma in situ of the face at age 41    Subjective: Heather Guillory is a 44 year old female who presents today for wound check after 02/26/25 MMS with pentagonal wedge advancement flap repair of BCC on the left lower eyelid.   - Today, she reports no complications except for minimal soreness. She denies any pain or numbness. She has been applying Vaseline to the wound a couple times per day.  - no other concerns today    Objective: An exam of the left lower eyelid was performed today.  - Well-approximated scar on the left lower eyelid with no signs of complications and no hypertrophy.  - The surgical site noted above is clean, dry, and intact. There is no surrounding erythema, purulence, or significant tenderness to palpation. No clinical evidence of infection noted today.      Assessment and Plan:     1. S/p 02/26/25 MMS with pentagonal wedge advancement flap repair of BCC on the left lower eyelid.  - The patient's surgery site(s) is/are healing very well. No evidence of infection on examination today.  - The patient was told to continue with wound cares until the area(s) is/are no longer crusted.   - Start using OTC ScarAway silicone gel/sheets to further improve scar appearance at one month post-op.  - Sunscreen: Apply 20 minutes prior to going outdoors and reapply every two hours, when wet or sweating. We recommend using an SPF 30 or higher, and to use one that is water resistant. Patient can also use hats or clothing with UV protection.  - The patient should follow up with dermatologic surgery in 2 months, as well as continue with  regular skin exams in general dermatology clinic.    Patient was discussed with and evaluated by attending physician Dr. Huang Cintron and Dr. Nathanael Calero, MDSO fellow, PGY5.    Staff Involved:  Staff/Scribe/Fellow    Scribe Disclosure:   I, Sheila Gregory, am serving as a scribe; to document services personally performed by Huang Cintron MD, based on data collection and the provider's statements to me.     Provider Disclosure:  I agree with the above history, review of systems, physical exam and plan.  I have reviewed the content of the documentation and have edited it as needed. I have personally performed the services documented here and the documentation accurately represents those services and the decisions I have made.      Electronically signed by:  ***

## 2025-03-12 ENCOUNTER — OFFICE VISIT (OUTPATIENT)
Dept: DERMATOLOGY | Facility: CLINIC | Age: 45
End: 2025-03-12
Payer: COMMERCIAL

## 2025-03-12 ENCOUNTER — LAB (OUTPATIENT)
Dept: LAB | Facility: CLINIC | Age: 45
End: 2025-03-12
Payer: COMMERCIAL

## 2025-03-12 DIAGNOSIS — R20.9 ALTERATIONS OF SENSATIONS: ICD-10-CM

## 2025-03-12 DIAGNOSIS — Z85.828 HISTORY OF NONMELANOMA SKIN CANCER: ICD-10-CM

## 2025-03-12 DIAGNOSIS — R30.0 DYSURIA: ICD-10-CM

## 2025-03-12 DIAGNOSIS — Z48.89 ENCOUNTER FOR POSTOPERATIVE CARE: Primary | ICD-10-CM

## 2025-03-12 DIAGNOSIS — R20.2 TINGLING OF LEFT UPPER EXTREMITY: ICD-10-CM

## 2025-03-12 DIAGNOSIS — C44.1192 BASAL CELL CARCINOMA (BCC) OF LEFT LOWER EYELID: ICD-10-CM

## 2025-03-12 DIAGNOSIS — R31.29 MICROSCOPIC HEMATURIA: ICD-10-CM

## 2025-03-12 LAB
ALBUMIN UR-MCNC: NEGATIVE MG/DL
ANION GAP SERPL CALCULATED.3IONS-SCNC: 11 MMOL/L (ref 7–15)
APPEARANCE UR: CLEAR
BACTERIA #/AREA URNS HPF: ABNORMAL /HPF
BILIRUB UR QL STRIP: NEGATIVE
BUN SERPL-MCNC: 12.7 MG/DL (ref 6–20)
CALCIUM SERPL-MCNC: 9.3 MG/DL (ref 8.8–10.4)
CHLORIDE SERPL-SCNC: 106 MMOL/L (ref 98–107)
COLOR UR AUTO: ABNORMAL
CREAT SERPL-MCNC: 0.82 MG/DL (ref 0.51–0.95)
EGFRCR SERPLBLD CKD-EPI 2021: >90 ML/MIN/1.73M2
GLUCOSE SERPL-MCNC: 104 MG/DL (ref 70–99)
GLUCOSE UR STRIP-MCNC: NEGATIVE MG/DL
HCO3 SERPL-SCNC: 22 MMOL/L (ref 22–29)
HGB UR QL STRIP: ABNORMAL
KETONES UR STRIP-MCNC: NEGATIVE MG/DL
LEUKOCYTE ESTERASE UR QL STRIP: ABNORMAL
MUCOUS THREADS #/AREA URNS LPF: PRESENT /LPF
NITRATE UR QL: NEGATIVE
PH UR STRIP: 7 [PH] (ref 5–7)
POTASSIUM SERPL-SCNC: 4.5 MMOL/L (ref 3.4–5.3)
RBC URINE: 8 /HPF
SODIUM SERPL-SCNC: 139 MMOL/L (ref 135–145)
SP GR UR STRIP: 1.03 (ref 1–1.03)
SQUAMOUS EPITHELIAL: 3 /HPF
TSH SERPL DL<=0.005 MIU/L-ACNC: 1.44 UIU/ML (ref 0.3–4.2)
UROBILINOGEN UR STRIP-MCNC: NORMAL MG/DL
WBC URINE: 2 /HPF

## 2025-03-12 PROCEDURE — 80048 BASIC METABOLIC PNL TOTAL CA: CPT | Performed by: PATHOLOGY

## 2025-03-12 PROCEDURE — 84443 ASSAY THYROID STIM HORMONE: CPT | Performed by: PATHOLOGY

## 2025-03-12 PROCEDURE — 82607 VITAMIN B-12: CPT | Performed by: NURSE PRACTITIONER

## 2025-03-12 PROCEDURE — 36415 COLL VENOUS BLD VENIPUNCTURE: CPT | Performed by: PATHOLOGY

## 2025-03-12 PROCEDURE — 81001 URINALYSIS AUTO W/SCOPE: CPT | Performed by: PATHOLOGY

## 2025-03-12 PROCEDURE — 99000 SPECIMEN HANDLING OFFICE-LAB: CPT | Performed by: PATHOLOGY

## 2025-03-12 ASSESSMENT — PAIN SCALES - GENERAL: PAINLEVEL_OUTOF10: NO PAIN (0)

## 2025-03-13 LAB — VIT B12 SERPL-MCNC: 687 PG/ML (ref 232–1245)

## 2025-03-18 NOTE — PROGRESS NOTES
[facility] Dermatology Note  Encounter Date: Mar 19, 2025  Office Visit     Dermatology Problem List:    # Hx NMSC  - nBCC, L lower eyelid margin, s/p bx 02/05/25, s/p MMS 02/26/25  # Predominantly intradermal melanocytic nevus, R buttock.  - s/p shave bx 05/30/2024  # Ruptured folliculitis, L nipple  - s/p punch bx 05/30/2024     # Family history of melanoma   - Sister with melanoma in situ of the face at age 41  ____________________________________________    Assessment & Plan:    # SK, R shoulder   - Benign. Reassurance given     Procedures Performed:   N/A    Follow-up: 06/09/2025 for [facility].     Staff and Scribe:     Scribe Disclosure:   I, Jess Delaney, am serving as a scribe; to document services personally performed by Heather Jerome MD -based on data collection and the provider's statements to me.     Provider Disclosure:  I agree with above History, Review of Systems, Physical exam and Plan.  I have reviewed the content of the documentation and have edited it as needed. I have personally performed the services documented here and the documentation accurately represents those services and the decisions I have made.      Electronically signed by:  ***      ____________________________________________    CC: Derm Problem (Spot of concern on right shoulder. )    HPI:  Ms. Heather Guillory is a(n) 44 year old female who presents today as a return patient for above.    The patient reports that her mms procedure went well.   She has a spot on her shoulder she would like checked today. She first noticed this a few weeks ago. She notes this area is shaped differently.   She has questions about mole mapping and what she should look out for on her skin in between visits.    Patient is otherwise feeling well, without additional skin concerns.    Labs:  Derm path from 02/05/2025  Final Diagnosis   Left lower eyelid:  - Basal cell carcinoma, nodular type - (see description)       Physical  exam:  Vitals: There were no vitals taken for this visit.  GEN: This is a well developed, well-nourished female in no acute distress, in a pleasant mood.    SKIN: Focused examination of the below was performed.  - healing scar, L lower eyelid   - There is a waxy stuck on tan to brown papule on the R shoulder.  - No other lesions of concern on areas examined.     Medications:  Current Outpatient Medications   Medication Sig Dispense Refill    diazepam (VALIUM) 5 MG tablet Take morning of surgery 1 tablet 0    metroNIDAZOLE (METROCREAM) 0.75 % external cream APPLY TOPICALLY TO THE AFFECTED AREA TWICE DAILY AS NEEDED FOR ROSACEA       Current Facility-Administered Medications   Medication Dose Route Frequency Provider Last Rate Last Admin    erythromycin (ROMYCIN) ophthalmic ointment   Left Eye Once         proparacaine (ALCAINE) 0.5 % ophthalmic solution 2 drop  2 drop Left Eye Once           Past Medical History:   Patient Active Problem List   Diagnosis    JAVIER (generalized anxiety disorder)    Undifferentiated connective tissue disease    Rosacea    Varicose veins of left lower extremity with pain    Encounter for sterilization    Pelvic floor dysfunction    Urinary urgency    Urinary frequency    Abnormal LFTs    Other specified anxiety disorders    Other specified eating disorder    Multiple melanocytic nevi    Neoplasm of unspecified behavior of bone, soft tissue, and skin     Past Medical History:   Diagnosis Date    JAVIER (generalized anxiety disorder)     50mg zoloft    Rosacea     Undifferentiated connective tissue disease         CC Referred Self, MD  No address on file on close of this encounter.

## 2025-03-19 ENCOUNTER — OFFICE VISIT (OUTPATIENT)
Dept: DERMATOLOGY | Facility: CLINIC | Age: 45
End: 2025-03-19
Payer: COMMERCIAL

## 2025-03-19 DIAGNOSIS — L82.1 SEBORRHEIC KERATOSES: Primary | ICD-10-CM

## 2025-03-19 PROCEDURE — 99212 OFFICE O/P EST SF 10 MIN: CPT | Mod: 24 | Performed by: DERMATOLOGY

## 2025-03-19 PROCEDURE — 1126F AMNT PAIN NOTED NONE PRSNT: CPT | Performed by: DERMATOLOGY

## 2025-03-19 ASSESSMENT — PAIN SCALES - GENERAL: PAINLEVEL_OUTOF10: NO PAIN (0)

## 2025-03-19 NOTE — LETTER
3/19/2025       RE: Heather Guillory  [address]  [place] [address]     Dear Colleague,    Thank you for referring your patient, Heather Guillory, to the [facility] [place] at [facility]. Please see a copy of my visit note below.    [facility] Dermatology Note  Encounter Date: Mar 19, 2025  Office Visit     Dermatology Problem List:    # Hx NMSC  - nBCC, L lower eyelid margin, s/p bx 02/05/25, s/p MMS 02/26/25  # Predominantly intradermal melanocytic nevus, R buttock.  - s/p shave bx 05/30/2024  # Ruptured folliculitis, L nipple  - s/p punch bx 05/30/2024     # Family history of melanoma   - Sister with melanoma in situ at age 41  ____________________________________________    Assessment & Plan:    # SK, R shoulder   - Benign. Reassurance given     # nBCC, L lower eyelid margin, s/p bx 02/05/25, s/p MMS 02/26/25  - scar healing well  - follow-up with derm surg as planned    Procedures Performed:   N/A    Follow-up: 06/09/2025 for [facility].     Staff and Scribe:     Scribe Disclosure:   I, Jess Delaney, am serving as a scribe; to document services personally performed by Heather Jerome MD -based on data collection and the provider's statements to me.     Provider Disclosure:   The documentation recorded by the scribe accurately reflects the services I personally performed and the decisions made by me.    Heather Jerome MD    Department of Dermatology  [facility]: Phone: [phone], Fax: [phone]  3/25/2025       ____________________________________________    CC: Derm Problem (Spot of concern on right shoulder. )    HPI:  Ms. Heather Guillory is a(n) 44 year old female who presents today as a return patient for above.    The patient reports that her mms procedure went well.   She has a spot on her shoulder she  would like checked today. She first noticed this a few weeks ago. She notes this area is shaped differently.   She has questions about mole mapping and what she should look out for on her skin in between visits.    Patient is otherwise feeling well, without additional skin concerns.    Labs:  Derm path from 02/05/2025  Final Diagnosis   Left lower eyelid:  - Basal cell carcinoma, nodular type - (see description)       Physical exam:  Vitals: There were no vitals taken for this visit.  GEN: This is a well developed, well-nourished female in no acute distress, in a pleasant mood.    SKIN: Focused examination of the below was performed.  - healing scar, L lower eyelid   - There is a waxy stuck on tan to brown papule on the R shoulder.  - No other lesions of concern on areas examined.     Medications:  Current Outpatient Medications   Medication Sig Dispense Refill     diazepam (VALIUM) 5 MG tablet Take morning of surgery 1 tablet 0     metroNIDAZOLE (METROCREAM) 0.75 % external cream APPLY TOPICALLY TO THE AFFECTED AREA TWICE DAILY AS NEEDED FOR ROSACEA       Current Facility-Administered Medications   Medication Dose Route Frequency Provider Last Rate Last Admin     erythromycin (ROMYCIN) ophthalmic ointment   Left Eye Once          proparacaine (ALCAINE) 0.5 % ophthalmic solution 2 drop  2 drop Left Eye Once           Past Medical History:   Patient Active Problem List   Diagnosis     JAVIER (generalized anxiety disorder)     Undifferentiated connective tissue disease     Rosacea     Varicose veins of left lower extremity with pain     Encounter for sterilization     Pelvic floor dysfunction     Urinary urgency     Urinary frequency     Abnormal LFTs     Other specified anxiety disorders     Other specified eating disorder     Multiple melanocytic nevi     Neoplasm of unspecified behavior of bone, soft tissue, and skin     Past Medical History:   Diagnosis Date     JAVIER (generalized anxiety disorder)     50mg zoloft      Rosacea      Undifferentiated connective tissue disease         CC Referred Self, MD  No address on file on close of this encounter.       Again, thank you for allowing me to participate in the care of your patient.      Sincerely,    Heather Jerome MD

## 2025-03-19 NOTE — NURSING NOTE
Dermatology Rooming Note    Heather Guillory's goals for this visit include:   Chief Complaint   Patient presents with    Derm Problem     Spot of concern on right shoulder.      Radha Joseph RN

## 2025-03-19 NOTE — PATIENT INSTRUCTIONS
Dr. Justin Lovett - mole mapping        Checking for Skin Cancer  You can help find cancer early by checking your skin each month. There are 3 main kinds of skin cancer: melanoma, basal cell carcinoma, and squamous cell carcinoma. Doing monthly skin checks is the best way to find new marks, sores, or skin changes. Follow these instructions for checking your skin.   The ABCDEs of checking moles for melanoma   Check your moles or growths for signs of melanoma using ABCDE:   Asymmetry: The sides of the mole or growth don t match.  Border: The edges are ragged, notched, or blurred.  Color: The color within the mole or growth varies. It could be black, brown, tan, white, or shades of red, gray, or blue.  Diameter: The mole or growth is larger than   inch or 6 mm (size of a pencil eraser).  Evolving: The size, shape, texture, or color of the mole or growth is changing.     ABCDE's of moles on light skin.        ABCDE's of moles on dark skin may be harder to identify.     Checking for other types of skin cancer  Basal cell carcinoma or squamous cell carcinoma cause symptoms like:     A spot or mole that looks different from all other marks on your skin  Changes in how an area feels, such as itching, tenderness, or pain  Changes in the skin's surface, such as oozing, bleeding, or scaliness  A sore that doesn't heal  New swelling, redness, or spread of color beyond the border of a mole    Who s at risk?  Anyone of any skin color can get skin cancer. But you're at greater risk if you have:   Fair skin that freckles easily and burns instead of tanning  Light-colored or red hair  Light-colored eyes  Many moles or abnormal moles on your skin  A long history of unprotected exposure to sunlight or tanning beds  A history of many blistering sunburns as a child or teen  A family history of skin cancer  Been exposed to radiation or chemicals  A weakened immune system  Been exposed to arsenic  If you've had skin cancer in the past,  you're at high risk of having it again.   How to check your skin  Do your monthly skin checkups in front of a full-length mirror. Use a room with good lighting so it's easier to see. Use a hand mirror to look at hard-to-see places like your buttocks and back. You can also have a trusted friend or family member help you with these checks. Check every part of your body, including your:   Head (ears, face, neck, and scalp)  Torso (front, back, sides, and under breasts)  Arms (tops, undersides, and armpits)  Hands (palms, backs, and fingers, including under the nails)  Lower back, buttocks, and genitals  Legs (front, back, and sides)  Feet (tops, soles, toes, including under the nails, and between toes)  Watch for new spots on your skin or a spot that's changing in color, shape, size.   If you have a lot of moles, take digital photos of them each month. Make sure to take photos both up close and from a distance. These can help you see if any moles change over time.   Know your skin  Most skin changes aren't cancer. But if you see any changes in your skin, call your healthcare provider right away. Only they can tell you if a change is a problem. If you have skin cancer, seeing your provider can be the first step to getting the treatment that could save your life.   [employer] last reviewed this educational content on 10/1/2021    6678-7854 [employer]. All rights reserved. This information is not intended as a substitute for professional medical care. Always follow your healthcare professional's instructions.

## 2025-03-20 NOTE — TELEPHONE ENCOUNTER
MEDICAL RECORDS REQUEST   [facility]  Urology Clinic  [address] [place] [address]  PHONE: [phone]  Fax: [phone]        FUTURE VISIT INFORMATION                                                   Heather Guillory, : 1980 scheduled for future visit at [facility]    APPOINTMENT INFORMATION:  Date: 2025  Provider:  Christo Herrera PA-C  Reason for Visit/Diagnosis: Microscopic hematuria    REFERRAL INFORMATION:  Referring provider:  Cyndi Zarate APRN CNP in [facility]  RECORDS REQUESTED FOR VISIT                                                     NOTES  STATUS/DETAILS   OFFICE NOTE from referring provider  YES, Cyndi Zarate APRN CNP in [facility]     MEDICATION LIST  yes   LABS     URINALYSIS (UA)  yes     PRE-VISIT CHECKLIST      Joint diagnostic appointment coordinated correctly          (ensure right order & amount of time) Yes   RECORD COLLECTION COMPLETE Yes

## 2025-05-13 ENCOUNTER — PRE VISIT (OUTPATIENT)
Dept: UROLOGY | Facility: CLINIC | Age: 45
End: 2025-05-13
Payer: COMMERCIAL

## 2025-05-13 NOTE — TELEPHONE ENCOUNTER
Reason for visit: New patient     Dx/Hx/Sx: referred for microscopic hematuria on 3/13/25    Records/imaging/labs/orders: UA macro done 3/12/25    At Rooming: Ivan Anders  5/13/2025  1:23 PM

## 2025-05-20 ENCOUNTER — PRE VISIT (OUTPATIENT)
Dept: UROLOGY | Facility: CLINIC | Age: 45
End: 2025-05-20

## 2025-05-21 ENCOUNTER — TELEPHONE (OUTPATIENT)
Dept: DERMATOLOGY | Facility: CLINIC | Age: 45
End: 2025-05-21
Payer: COMMERCIAL

## 2025-05-21 NOTE — TELEPHONE ENCOUNTER
[facility] Call Center    Phone Message    May a detailed message be left on voicemail: yes     Reason for Call: Other: Heather is asking for her 05/27 appt to be changed to virtual, she can/will send photos in prior     Action Taken: Other: TE to [facility]    Travel Screening: Not Applicable     Date of Service:

## 2025-05-27 ENCOUNTER — VIRTUAL VISIT (OUTPATIENT)
Dept: DERMATOLOGY | Facility: CLINIC | Age: 45
End: 2025-05-27
Attending: DERMATOLOGY
Payer: COMMERCIAL

## 2025-05-27 ENCOUNTER — ANCILLARY PROCEDURE (OUTPATIENT)
Dept: MAMMOGRAPHY | Facility: CLINIC | Age: 45
End: 2025-05-27
Attending: NURSE PRACTITIONER
Payer: COMMERCIAL

## 2025-05-27 DIAGNOSIS — Z98.890 POSTOPERATIVE SCAR: ICD-10-CM

## 2025-05-27 DIAGNOSIS — Z48.89 ENCOUNTER FOR POSTOPERATIVE WOUND CHECK: Primary | ICD-10-CM

## 2025-05-27 DIAGNOSIS — L90.5 POSTOPERATIVE SCAR: ICD-10-CM

## 2025-05-27 DIAGNOSIS — Z12.31 VISIT FOR SCREENING MAMMOGRAM: ICD-10-CM

## 2025-05-27 PROCEDURE — 77063 BREAST TOMOSYNTHESIS BI: CPT | Performed by: RADIOLOGY

## 2025-05-27 PROCEDURE — 77067 SCR MAMMO BI INCL CAD: CPT | Performed by: RADIOLOGY

## 2025-05-27 NOTE — PROGRESS NOTES
[facility] Dermatology Note  Encounter Date: He 3, 2025  Office Visit     Dermatology Problem List:    # NUB, L upper back  - s/p shave bx 06/03/2025    # Hx NMSC  - nBCC, L lower eyelid margin, s/p bx 02/05/25, s/p MMS 02/26/25  # Predominantly intradermal melanocytic nevus, R buttock.  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 The differential diagnosis includes recurrent nevus, r/o atypia.  - Reviewed prior outside bx showing mild DN followed by recurrent residual nevus, with comment that if recurrent pigment occurs, they suggest repeat bx   - Shave biopsy performed today. See procedure note below.     # Rosacea. Mostly erythrotelangiectatic. Chronic, active.   - advised can try azelaic acid   - discussed other tx options such as PDL. Referred to cosmetic derm.     # ISK, L forearm  - Cryotherapy performed today. See procedure note below.     # Milia, L inferior eyelid margin, at site of recent BCC mohs site  - just noticed recently, will follow up with derm surg if does not resolve     # Benign lesions - SKs, cherry angiomas, lentigenes.  - No treatment required    # Multiple benign nevi   # fhx melanoma.  - Monitor for ABCDEs of melanoma   - Continue sun protection - recommend SPF 30 or higher with frequent application   - Return sooner if noticing changing or symptomatic lesions     # History of NMSC. No evidence of recurrent disease.  - Continue photoprotection - recommend SPF 30 or higher with frequent reapplication  - Continue yearly skin exams  - Advised to monitor for changing, non-healing, bleeding, painful, changing, or otherwise symptomatic  lesions    Procedures Performed:   Cryotherapy procedure note: After verbal consent and discussion of risks and benefits including but no limited to dyspigmentation/scar, blister, and pain, 1 ISK was(were) treated with 1-2mm freeze border for 2 cycles with liquid nitrogen. Post cryotherapy instructions were provided.   - Shave biopsy: After discussion of benefits and risks including but not limited to bleeding, infection, scar, incomplete removal, recurrence, and non-diagnostic biopsy, written consent and photographs were obtained. The area was cleaned with isopropyl alcohol. < 1mL of 1% lidocaine with epinephrine was injected to obtain adequate anesthesia. A shave biopsy was performed. Hemostasis was achieved with aluminium chloride. Vaseline and a sterile dressing were applied. The patient tolerated the procedure and no complications were noted. The patient was provided with verbal and written post care instructions.     Follow-up: 12/02/2025 for [facility].     Staff and Scribe:     Scribe Disclosure:   I, Jess Delaney, am serving as a scribe; to document services personally performed by Heather Jerome MD -based on data collection and the provider's statements to me.     Provider Disclosure:   The documentation recorded by the scribe accurately reflects the services I personally performed and the decisions made by me.    Heather Jerome MD    Department of Dermatology  [facility]: Phone: [phone], Fax: [phone]  6/5/2025           ____________________________________________    CC: Skin Check ([facility] [place], [place])    HPI:  Ms. Heather Guillory is a(n) 45 year old female who presents today as a return patient for above.    Notes of a spot she would like checked that first presented as a scab.   Had a suture pop through on face. Thinks she may have a new milia on face.   Has had other moles removed at an outside clinic at [facility].   Does not think metrogel works well for her rosacea, would like to discuss other tx options.     Patient is otherwise feeling well, without additional skin concerns.    Labs:  N/A    Physical exam:  Vitals: There were no vitals taken for this visit.  GEN: This is a well developed, well-nourished female in no acute distress, in a pleasant mood.    SKIN: Total skin excluding the undergarment areas was performed. The exam included the head/face, neck, both arms, chest, back, abdomen, both legs, digits and/or nails.   - white papule, L inferior eyelid margin  - There is a tan to brown waxy stuck on papule with surrounding erythema on the L forearm.   - L upper back, recurrent pigment in well healed bx scar  - Multiple regular brown pigmented macules and papules are identified on the trunk and extremities.   - Scattered brown macules on sun exposed areas.  - There are dome shaped bright red papules on the trunk and extremities.   - Waxy stuck on papules and plaques on trunk and extremities.   - No other lesions of concern on areas examined.     Medications:  No current outpatient medications on file.     No current facility-administered medications for this visit.      Past Medical History:   Patient Active Problem List   Diagnosis    JAVIER (generalized anxiety disorder)    Undifferentiated connective tissue disease    Rosacea    Varicose veins of left lower extremity with pain    Encounter for sterilization    Pelvic floor dysfunction    Urinary urgency    Urinary frequency    Abnormal LFTs    Other specified anxiety disorders    Other specified eating disorder    Multiple melanocytic nevi    Neoplasm of unspecified behavior of bone, soft tissue, and skin     Past Medical History:   Diagnosis Date    JAVIER (generalized anxiety disorder)     50mg zoloft    Rosacea     Undifferentiated connective tissue disease         CC Referred Self, MD  No address on file on close of this encounter.

## 2025-05-27 NOTE — Clinical Note
Hello-  Can you please document on the MAR for the eye drops for this pt? Her surgery was down there. The meds are on the med list.  Thanks, Leann BUENO

## 2025-05-27 NOTE — PROGRESS NOTES
Dermatologic Surgery Post-Op Scar Check   Encounter Date: May 27, 2025  Store-and-Forward and Telephone. Location of teledermatologist: [facility].  Start time: 2:55. End time: 3.    CC: No chief complaint on file.      Dermatology Problem List:  # Hx NMSC  - nBCC, L lower eyelid margin, s/p bx 02/05/25, s/p MMS 02/26/25  # Predominantly intradermal melanocytic nevus, R buttock.  - s/p shave bx 05/30/2024  # Ruptured folliculitis, L nipple  - s/p punch bx 05/30/2024     # Family history of melanoma   - Sister with melanoma in situ at age 41    Subjective: Heather Guillory is a 45 year old female who presents today for scar evaluation after MMS.   - No problems  - no other concerns today    Objective: An exam of the left lower eyelid was performed today   - well-healed scar on the minute white cystic papule just inferior to lashes     Assessment and Plan:     1. Post-surgical scar, left lower eyelid, s/p Mohs 02/26/25  - Surgical site healed well with small radha.  Call us in 2 months if it doesn't express  - The patient should follow up with dermatologic surgery PRN, as well as continue with regular skin exams in general dermatology clinic.    Patient was discussed with and evaluated by attending physician Dr. Cintron.    Attending Attestation  I attest that the Scribe recorded the interview and exam that I personally performed.  I have reviewed the note and edited it as necessary.    Huang Cintron M.D.  Professor  Director of Dermatologic Surgery  Department of Dermatology  [facility]

## 2025-05-27 NOTE — LETTER
5/27/2025      Heather Guillory  [address]  [place] [address]      Dear Colleague,    Thank you for referring your patient, Heather Guillory, to the [facility]. Please see a copy of my visit note below.    Dermatologic Surgery Post-Op Scar Check   Encounter Date: May 27, 2025  Store-and-Forward and Telephone. Location of teledermatologist: [facility].  Start time: 2:55. End time: 3.    CC: No chief complaint on file.      Dermatology Problem List:  # Hx NMSC  - nBCC, L lower eyelid margin, s/p bx 02/05/25, s/p MMS 02/26/25  # Predominantly intradermal melanocytic nevus, R buttock.  - s/p shave bx 05/30/2024  # Ruptured folliculitis, L nipple  - s/p punch bx 05/30/2024     # Family history of melanoma   - Sister with melanoma in situ at age 41    Subjective: Heather Guillory is a 45 year old female who presents today for scar evaluation after MMS.   - No problems  - no other concerns today    Objective: An exam of the left lower eyelid was performed today   - well-healed scar on the minute white cystic papule just inferior to lashes     Assessment and Plan:     1. Post-surgical scar, left lower eyelid, s/p Mohs 02/26/25  - Surgical site healed well with small radha.  Call us in 2 months if it doesn't express  - The patient should follow up with dermatologic surgery PRN, as well as continue with regular skin exams in general dermatology clinic.    Patient was discussed with and evaluated by attending physician Dr. Cintron.    Attending Attestation  I attest that the Scribe recorded the interview and exam that I personally performed.  I have reviewed the note and edited it as necessary.    Huang Cintron M.D.  Professor  Director of Dermatologic Surgery  Department of Dermatology  [facility]                Again, thank you for allowing me to participate in the care of your patient.        Sincerely,        Huang Cintron MD    Electronically signed

## 2025-05-27 NOTE — NURSING NOTE
Heather Guillory's goals for this visit include:   Chief Complaint   Patient presents with    Follow Up     Pt is here for a 3 month scar evaluation. It is doing pretty well, maybe another suture that is coming through. There is redness around that suture. The suture is not able to be grabbed.       She requests these members of her care team be copied on today's visit information:    PCP: Cyndi Zarate    Referring Provider:  Huang Cintron MD  [id number] [place] [address]    There were no vitals taken for this visit.    Do you need any medication refills at today's visit?     Leann Kelley LPN on 5/27/2025 at 2:31 PM

## 2025-05-28 ENCOUNTER — PRE VISIT (OUTPATIENT)
Dept: UROLOGY | Facility: CLINIC | Age: 45
End: 2025-05-28
Payer: COMMERCIAL

## 2025-05-28 NOTE — TELEPHONE ENCOUNTER
Reason for visit: New patient     Relevant information: referred for microscopic hematuria on 3/13/25    Records/imaging/labs/orders: labs done 3/12/25    At Rooming: Virtual visit      Victor Manuel Anders  5/28/2025  3:50 PM

## 2025-06-03 ENCOUNTER — OFFICE VISIT (OUTPATIENT)
Dept: DERMATOLOGY | Facility: CLINIC | Age: 45
End: 2025-06-03
Payer: COMMERCIAL

## 2025-06-03 DIAGNOSIS — L81.4 SOLAR LENTIGO: ICD-10-CM

## 2025-06-03 DIAGNOSIS — L71.9 ROSACEA: ICD-10-CM

## 2025-06-03 DIAGNOSIS — D22.9 MULTIPLE BENIGN NEVI: ICD-10-CM

## 2025-06-03 DIAGNOSIS — D49.2 NEOPLASM OF UNSPECIFIED BEHAVIOR OF BONE, SOFT TISSUE, AND SKIN: ICD-10-CM

## 2025-06-03 DIAGNOSIS — L82.0 INFLAMED SEBORRHEIC KERATOSIS: ICD-10-CM

## 2025-06-03 DIAGNOSIS — Z80.8 FAMILY HISTORY OF MELANOMA: ICD-10-CM

## 2025-06-03 DIAGNOSIS — D18.01 CHERRY ANGIOMA: ICD-10-CM

## 2025-06-03 DIAGNOSIS — L82.1 SEBORRHEIC KERATOSES: ICD-10-CM

## 2025-06-03 DIAGNOSIS — L72.0 MILIA: ICD-10-CM

## 2025-06-03 DIAGNOSIS — Z12.83 SKIN CANCER SCREENING: Primary | ICD-10-CM

## 2025-06-03 DIAGNOSIS — Z85.828 HISTORY OF NONMELANOMA SKIN CANCER: ICD-10-CM

## 2025-06-03 NOTE — LETTER
6/3/2025      Heather Guillory  [address]  [place] [address]      Dear Colleague,    Thank you for referring your patient, Heather Guillory, to the [facility]. Please see a copy of my visit note below.    [facility] Dermatology Note  Encounter Date: He 3, 2025  Office Visit     Dermatology Problem List:    # NUB, L upper back  - s/p shave bx 06/03/2025    # Hx NMSC  - nBCC, L lower eyelid margin, s/p bx 02/05/25, s/p MMS 02/26/25  # Predominantly intradermal melanocytic nevus, R buttock.  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 The differential diagnosis includes recurrent nevus, r/o atypia.  - Reviewed prior outside bx showing mild DN followed by recurrent residual nevus, with comment that if recurrent pigment occurs, they suggest repeat bx   - Shave biopsy performed today. See procedure note below.     # Rosacea. Mostly erythrotelangiectatic. Chronic, active.   - advised can try azelaic acid   - discussed other tx options such as PDL. Referred to cosmetic derm.     # ISK, L forearm  - Cryotherapy performed today. See procedure note below.     # Milia, L inferior eyelid margin, at site of recent BCC mohs site  - just noticed recently, will follow up with derm surg if does not resolve     # Benign lesions - SKs, cherry angiomas, lentigenes.  - No treatment required    # Multiple benign nevi   # fhx melanoma.  - Monitor for ABCDEs of melanoma   - Continue sun protection - recommend SPF 30 or higher with frequent application   - Return sooner if noticing changing or symptomatic lesions     # History of NMSC. No evidence of  recurrent disease.  - Continue photoprotection - recommend SPF 30 or higher with frequent reapplication  - Continue yearly skin exams  - Advised to monitor for changing, non-healing, bleeding, painful, changing, or otherwise symptomatic lesions    Procedures Performed:   Cryotherapy procedure note: After verbal consent and discussion of risks and benefits including but no limited to dyspigmentation/scar, blister, and pain, 1 ISK was(were) treated with 1-2mm freeze border for 2 cycles with liquid nitrogen. Post cryotherapy instructions were provided.   - Shave biopsy: After discussion of benefits and risks including but not limited to bleeding, infection, scar, incomplete removal, recurrence, and non-diagnostic biopsy, written consent and photographs were obtained. The area was cleaned with isopropyl alcohol. < 1mL of 1% lidocaine with epinephrine was injected to obtain adequate anesthesia. A shave biopsy was performed. Hemostasis was achieved with aluminium chloride. Vaseline and a sterile dressing were applied. The patient tolerated the procedure and no complications were noted. The patient was provided with verbal and written post care instructions.     Follow-up: 12/02/2025 for [facility].     Staff and Scribe:     Scribe Disclosure:   I, Jess Delaney, am serving as a scribe; to document services personally performed by Heather Jerome MD -based on data collection and the provider's statements to me.     Provider Disclosure:   The documentation recorded by the scribe accurately reflects the services I personally performed and the decisions made by me.    Heather Jerome MD    Department of Dermatology  [facility]: Phone: [phone], Fax: [phone]  6/5/2025           ____________________________________________    CC: Skin Check ([facility])    HPI:  Ms. Heather Guillory is a(n) 45 year old female who presents today as a  return patient for above.    Notes of a spot she would like checked that first presented as a scab.   Had a suture pop through on face. Thinks she may have a new milia on face.   Has had other moles removed at an outside clinic at [facility].   Does not think metrogel works well for her rosacea, would like to discuss other tx options.     Patient is otherwise feeling well, without additional skin concerns.    Labs:  N/A    Physical exam:  Vitals: There were no vitals taken for this visit.  GEN: This is a well developed, well-nourished female in no acute distress, in a pleasant mood.    SKIN: Total skin excluding the undergarment areas was performed. The exam included the head/face, neck, both arms, chest, back, abdomen, both legs, digits and/or nails.   - white papule, L inferior eyelid margin  - There is a tan to brown waxy stuck on papule with surrounding erythema on the L forearm.   - L upper back, recurrent pigment in well healed bx scar  - Multiple regular brown pigmented macules and papules are identified on the trunk and extremities.   - Scattered brown macules on sun exposed areas.  - There are dome shaped bright red papules on the trunk and extremities.   - Waxy stuck on papules and plaques on trunk and extremities.   - No other lesions of concern on areas examined.     Medications:  No current outpatient medications on file.     No current facility-administered medications for this visit.      Past Medical History:   Patient Active Problem List   Diagnosis     JAVIER (generalized anxiety disorder)     Undifferentiated connective tissue disease     Rosacea     Varicose veins of left lower extremity with pain     Encounter for sterilization     Pelvic floor dysfunction     Urinary urgency     Urinary frequency     Abnormal LFTs     Other specified anxiety disorders     Other specified eating disorder     Multiple melanocytic nevi     Neoplasm of unspecified behavior of bone, soft tissue, and skin     Past  Medical History:   Diagnosis Date     JAVIER (generalized anxiety disorder)     50mg zoloft     Rosacea      Undifferentiated connective tissue disease         CC Referred Self, MD  No address on file on close of this encounter.     Again, thank you for allowing me to participate in the care of your patient.        Sincerely,        Heather Jerome MD    Electronically signed

## 2025-06-03 NOTE — PATIENT INSTRUCTIONS
Pulse dye laser (PDL)                  Wound Care After a Biopsy    What is a skin biopsy?  A skin biopsy allows the doctor to examine a very small piece of tissue under the microscope to determine the diagnosis and the best treatment for the skin condition. A local anesthetic (numbing medicine) is injected with a very small needle into the skin area to be tested. A small piece of skin is taken from the area. Sometimes a suture (stitch) is used.     What are the risks of a skin biopsy?  I will experience scar, bleeding, swelling, pain, crusting and redness. I may experience incomplete removal or recurrence. Risks of this procedure are excessive bleeding, bruising, infection, nerve damage, numbness, thick (hypertrophic or keloidal) scar and non-diagnostic biopsy.    How should I care for my wound for the first 24 hours?  Keep the wound dry and covered for 24 hours  If it bleeds, hold direct pressure on the area for 15 minutes. If bleeding does not stop, call us or go to the emergency room  Avoid strenuous exercise the first 1-2 days or as your doctor instructs you    How should I care for the wound after 24 hours?  After 24 hours, remove the bandage  You may bathe or shower as normal  If you had a scalp biopsy, you can shampoo as usual and can use shower water to clean the biopsy site daily  Clean the wound once a day with gentle soap and water  Do not scrub, be gentle  Apply white petroleum/Vaseline after cleaning the wound with a cotton swab or a clean finger, and keep the site covered with a Bandaid /bandage. Bandages are not necessary with a scalp biopsy  If you are unable to cover the site with a Bandaid /bandage, re-apply ointment 2-3 times a day to keep the site moist. Moisture will help with healing  Avoid strenuous activity for first 1-2 days  Avoid lakes, rivers, pools, and oceans until the stitches are removed or the site is healed    How do I clean my wound?  Wash hands thoroughly with soap or use hand   before all wound care  Clean the wound with gentle soap and water  Apply white petroleum/Vaseline  to wound after it is clean  Replace the Bandaid /bandage to keep the wound covered for the first few days or as instructed by your doctor  If you had a scalp biopsy, warm shower water to the area on a daily basis should suffice    What should I use to clean my wound?   Cotton-tipped applicators (Qtips )  White petroleum jelly (Vaseline ). Use a clean new container and use Q-tips to apply.  Bandaids  as needed  Gentle soap     How should I care for my wound long term?  Do not get your wound dirty  Keep up with wound care for one week or until the area is healed.  A small scab will form and fall off by itself when the area is completely healed. The area will be red and will become pink in color as it heals. Sun protection is very important for how your scar will turn out. Sunscreen with an SPF 30 or greater is recommended once the area is healed.  You should have some soreness but it should be mild and slowly go away over several days. Talk to your doctor about using tylenol for pain,    When should I call my doctor?  If you have increased:   Pain or swelling  Pus or drainage (clear or slightly yellow drainage is ok)  Temperature over 100F  Spreading redness or warmth around wound    When will I hear about my results?  The biopsy results can take 2 weeks to come back.  Your results will automatically release to [employer] before your provider has even reviewed them.  The clinic will call you with the results, send you a [employer] message, or have you schedule a follow-up clinic or phone time to discuss the results.  Contact our clinics if you do not hear from us in 2 weeks.    Who should I call with questions?  [facility]: [phone]  [facility]: [phone]  For urgent needs outside of business hours call the [facility] at [phone] and  ask for the dermatology resident on call     Cryotherapy    What is it?  Use of a very cold liquid, such as liquid nitrogen, to freeze and destroy abnormal skin cells that need to be removed    What should I expect?  Tenderness and redness  A small blister that might grow and fill with dark purple blood. There may be crusting.  More than one treatment may be needed if the lesions do not go away.    How do I care for the treated area?  Gently wash the area with your hands when bathing.  Use a thin layer of Vaseline to help with healing. You may use a Band-Aid.   The area should heal within 7-10 days and may leave behind a pink or lighter color.   Do not use an antibiotic or Neosporin ointment.   You may take acetaminophen (Tylenol) for pain.     Call your doctor if you have:  Severe pain  Signs of infection (warmth, redness, cloudy yellow drainage, and or a bad smell)  Questions or concerns    Who should I call with questions?      [facility]: [phone]      [facility]: [phone]      For urgent needs outside of business hours call the [facility] at [phone] and ask for the dermatology resident on call     Proper skin care from [facility]:    -Eliminate harsh soaps as they strip the natural oils from the skin, often resulting in dry itchy skin ( i.e. Dial, Zest, Greek Spring)  -Use mild soaps such as Cetaphil or Dove Sensitive Skin in the shower. You do not need to use soap on arms, legs, and trunk every time you shower unless visibly soiled.   -Avoid hot or cold showers.  -After showering, lightly dry off and apply moisturizing within 2-3 minutes. This will help trap moisture in the skin.   -Aggressive use of a moisturizer at least 1-2 times a day to the entire body (including -Vanicream, Cetaphil, Aquaphor or Cerave) and moisturize hands after every washing.  -We recommend using moisturizers that come in a tub that needs to  be scooped out, not a pump. This has more of an oil base. It will hold moisture in your skin much better than a water base moisturizer. The above recommended are non-pore clogging.      Wear a sunscreen with at least SPF 30 on your face, ears, neck and V of the chest daily. Wear sunscreen on other areas of the body if those areas are exposed to the sun throughout the day. Sunscreens can contain physical and/or chemical blockers. Physical blockers are less likely to clog pores, these include zinc oxide and titanium dioxide. Reapply every two hour and after swimming.     Sunscreen examples: https://www.ewg.org/sunscreen/    UV radiation  UVA radiation remains constant throughout the day and throughout the year. It is a longer wavelength than UVB and therefore penetrates deeper into the skin leading to immediate and delayed tanning, photoaging, and skin cancer. 70-80% of UVA and UVB radiation occurs between the hours of 10am-2pm.  UVB radiation  UVB radiation causes the most harmful effects and is more significant during the summer months. However, snow and ice can reflect UVB radiation leading to skin damage during the winter months as well. UVB radiation is responsible for tanning, burning, inflammation, delayed erythema (pinkness), pigmentation (brown spots), and skin cancer.     I recommend self monthly full body exams and yearly full body exams with a dermatology provider. If you develop a new or changing lesion please follow up for examination. Most skin cancers are pink and scaly or pink and pearly. However, we do see blue/brown/black skin cancers.  Consider the ABCDEs of melanoma when giving yourself your monthly full body exam ( don't forget the groin, buttocks, feet, toes, etc). A-asymmetry, B-borders, C-color, D-diameter, E-elevation or evolving. If you see any of these changes please follow up in clinic. If you cannot see your back I recommend purchasing a hand held mirror to use with a larger wall mirror.        Checking for Skin Cancer  You can find cancer early by checking your skin each month. There are 3 kinds of skin cancer. They are melanoma, basal cell carcinoma, and squamous cell carcinoma. Doing monthly skin checks is the best way to find new marks or skin changes. Follow the instructions below for checking your skin.   The ABCDEs of checking moles for melanoma   Check your moles or growths for signs of melanoma using ABCDE:   Asymmetry: the sides of the mole or growth don t match  Border: the edges are ragged, notched, or blurred  Color: the color within the mole or growth varies  Diameter: the mole or growth is larger than 6 mm (size of a pencil eraser)  Evolving: the size, shape, or color of the mole or growth is changing (evolving is not shown in the images below)    Checking for other types of skin cancer  Basal cell carcinoma or squamous cell carcinoma have symptoms such as:     A spot or mole that looks different from all other marks on your skin  Changes in how an area feels, such as itching, tenderness, or pain  Changes in the skin's surface, such as oozing, bleeding, or scaliness  A sore that does not heal  New swelling or redness beyond the border of a mole    Who s at risk?  Anyone can get skin cancer. But you are at greater risk if you have:   Fair skin, light-colored hair, or light-colored eyes  Many moles or abnormal moles on your skin  A history of sunburns from sunlight or tanning beds  A family history of skin cancer  A history of exposure to radiation or chemicals  A weakened immune system  If you have had skin cancer in the past, you are at risk for recurring skin cancer.   How to check your skin  Do your monthly skin checkups in front of a full-length mirror. Check all parts of your body, including your:   Head (ears, face, neck, and scalp)  Torso (front, back, and sides)  Arms (tops, undersides, upper, and lower armpits)  Hands (palms, backs, and fingers, including under the  nails)  Buttocks and genitals  Legs (front, back, and sides)  Feet (tops, soles, toes, including under the nails, and between toes)  If you have a lot of moles, take digital photos of them each month. Make sure to take photos both up close and from a distance. These can help you see if any moles change over time.   Most skin changes are not cancer. But if you see any changes in your skin, call your doctor right away. Only he or she can diagnose a problem. If you have skin cancer, seeing your doctor can be the first step toward getting the treatment that could save your life.   [employer] last reviewed this educational content on 4/1/2019 2000-2020 [employer]. [address] [place] [address], [place] [address]. All rights reserved. This information is not intended as a substitute for professional medical care. Always follow your healthcare professional's instructions.       When should I call my doctor?  If you are worsening or not improving, please, contact us or seek urgent care as noted below.     Who should I call with questions (adults)?    [facility] [phone]  For urgent needs outside of business hours call the [facility] at [phone] and ask for the dermatology resident on call to be paged  If this is a medical emergency and you are unable to reach an ER, Call [phone]      If you need a prescription refill, please contact your pharmacy. Refills are approved or denied by our Physicians during normal business hours, Monday through Friday.  Per office policy, refills will not be granted if you have not been seen within the past year (or sooner depending on the condition).

## 2025-06-04 ENCOUNTER — PATIENT OUTREACH (OUTPATIENT)
Dept: CARE COORDINATION | Facility: CLINIC | Age: 45
End: 2025-06-04
Payer: COMMERCIAL

## 2025-06-09 ENCOUNTER — RESULTS FOLLOW-UP (OUTPATIENT)
Dept: DERMATOLOGY | Facility: CLINIC | Age: 45
End: 2025-06-09

## 2025-06-09 DIAGNOSIS — D22.9 JUNCTIONAL NEVUS: Primary | ICD-10-CM

## 2025-06-10 ENCOUNTER — TELEPHONE (OUTPATIENT)
Dept: RADIOLOGY | Facility: CLINIC | Age: 45
End: 2025-06-10

## 2025-06-10 DIAGNOSIS — I83.893 SYMPTOMATIC VARICOSE VEINS OF BOTH LOWER EXTREMITIES: Primary | ICD-10-CM

## 2025-06-10 NOTE — TELEPHONE ENCOUNTER
I called and spoke with Heather.  The last 6 months, she has noticing Right leg pain and throbbing, feels swollen. She has been wearing starts below right butt cheek to calf.  Leg feel pretty fatigued.  She has been wearing thigh high and knee high compression regularly. Plan is for right leg venous comp ultrasound and to see Dr Headley at [facility].   Thanks,     ACheyanne Holly RN, BSN  Interventional Radiology Care Coordinator   Phone:  [phone]

## 2025-06-10 NOTE — TELEPHONE ENCOUNTER
[facility] Call Center    Phone Message    May a detailed message be left on voicemail: yes     Reason for Call: Other: pt is still having concerns about vericose veins, pain & discomfort keeping her up at night would like to schedule a follow up with Dr. Headley     Action Taken: Message routed to:  [facility] Vasc    Travel Screening: Not Applicable     Date of Service:

## 2025-06-13 ENCOUNTER — ANCILLARY PROCEDURE (OUTPATIENT)
Dept: ULTRASOUND IMAGING | Facility: CLINIC | Age: 45
End: 2025-06-13
Attending: STUDENT IN AN ORGANIZED HEALTH CARE EDUCATION/TRAINING PROGRAM
Payer: COMMERCIAL

## 2025-06-13 DIAGNOSIS — R31.29 MICROSCOPIC HEMATURIA: ICD-10-CM

## 2025-06-13 PROCEDURE — 76770 US EXAM ABDO BACK WALL COMP: CPT | Mod: GC | Performed by: RADIOLOGY

## 2025-06-13 PROCEDURE — 99000 SPECIMEN HANDLING OFFICE-LAB: CPT | Performed by: PATHOLOGY

## 2025-06-16 ENCOUNTER — MYC MEDICAL ADVICE (OUTPATIENT)
Dept: UROLOGY | Facility: CLINIC | Age: 45
End: 2025-06-16
Payer: COMMERCIAL

## 2025-06-18 NOTE — TELEPHONE ENCOUNTER
Left Voicemail (1st Attempt) for the patient to call back and schedule the following:    Appointment type: Return Patient   Provider: Alem Mojica or Adwoa Herrera  Return date: Set up a first available visit with either Alem Mojica or Adwoa Herrera for discussion of kidney stones   Specialty phone number: [phone]  Additional appointment(s) needed:   Additonal Notes:

## 2025-06-18 NOTE — TELEPHONE ENCOUNTER
Clinic coordinators, would you please assist me in contacting this patient to set up a first available visit with either Alem Mojica or Adwoa Herrera for discussion of kidney stones?    Daniella, wanted to make you aware that this patient will likely need a Litholink in the future, however I am not sure where we are at with those.  Might as well at her name to the list of patients will need one.    Thank you!    Kevin

## 2025-06-27 ENCOUNTER — OFFICE VISIT (OUTPATIENT)
Dept: INTERNAL MEDICINE | Facility: CLINIC | Age: 45
End: 2025-06-27
Payer: COMMERCIAL

## 2025-06-27 ENCOUNTER — ANCILLARY PROCEDURE (OUTPATIENT)
Dept: GENERAL RADIOLOGY | Facility: CLINIC | Age: 45
End: 2025-06-27
Payer: COMMERCIAL

## 2025-06-27 ENCOUNTER — RESULTS FOLLOW-UP (OUTPATIENT)
Dept: INTERNAL MEDICINE | Facility: CLINIC | Age: 45
End: 2025-06-27

## 2025-06-27 VITALS
OXYGEN SATURATION: 100 % | RESPIRATION RATE: 16 BRPM | SYSTOLIC BLOOD PRESSURE: 111 MMHG | HEIGHT: 67 IN | DIASTOLIC BLOOD PRESSURE: 77 MMHG | BODY MASS INDEX: 25.07 KG/M2 | WEIGHT: 159.7 LBS | TEMPERATURE: 97.5 F | HEART RATE: 73 BPM

## 2025-06-27 DIAGNOSIS — M25.562 CHRONIC PAIN OF LEFT KNEE: Primary | ICD-10-CM

## 2025-06-27 DIAGNOSIS — M25.562 CHRONIC PAIN OF LEFT KNEE: ICD-10-CM

## 2025-06-27 DIAGNOSIS — R05.1 ACUTE COUGH: ICD-10-CM

## 2025-06-27 DIAGNOSIS — G89.29 CHRONIC PAIN OF LEFT KNEE: Primary | ICD-10-CM

## 2025-06-27 DIAGNOSIS — G89.29 CHRONIC PAIN OF LEFT KNEE: ICD-10-CM

## 2025-06-27 PROCEDURE — 73562 X-RAY EXAM OF KNEE 3: CPT | Mod: LT | Performed by: RADIOLOGY

## 2025-06-27 RX ORDER — BENZONATATE 200 MG/1
200 CAPSULE ORAL 3 TIMES DAILY PRN
Qty: 30 CAPSULE | Refills: 1 | Status: SHIPPED | OUTPATIENT
Start: 2025-06-27

## 2025-06-27 NOTE — PROGRESS NOTES
Assessment & Plan     Chronic pain of left knee  Pain after doing a squat about 6 months ago. No audible pop, locking, catching, or buckling. Had improved with rest, worsened again with activity. Crepitus on exam today. Possible patellofemoral syndrome. Will complete imaging today. Plan for PT. If no improvement in symptoms, or if symptoms worsen, would recommend MRI.   - Physical Therapy  Referral    Acute cough  Acute/subacute cough, dry, can wake her at night. Lungs clear to auscultation today. Discussed possible post viral vs GERD vs postnasal drip. She does not recall any illness, no noticed reflux/sore throat, no rhinorrhea. Can try benzonatate for cough. No fevers/chills or fatigue. Can complete an xray if symptoms do not improve.   - XR Chest 2 Views  - benzonatate (TESSALON) 200 MG capsule  Dispense: 30 capsule; Refill: 1      Follow-up  Return if symptoms worsen or fail to improve.    Subjective   Heather is a 45 year old, presenting for the following health issues:  Follow Up (Dry cough that started about a month ago, left knee pain )        6/27/2025    12:27 PM   Additional Questions   Roomed by [facility]      Heathersuzanne Guillory presents to the clinic today for evaluation of knee pain and cough.    Knee pain started December/January. Was doing a squat with heavy lifting, something did not feel right in her left knee with squatting. Felt like something happened to knee with standing, felt possible tear. No audible noise. Gave the knee time, seemed to improve. Started to exercise again and pain recurred. Now having symptoms even when bending to get things out of the dryer. No locking or catching. She is able to bear weight and walk, just feels uncomfortable.     Cough over the past several weeks. Has been the same, not worsening or getting better. Dry. Wakes her at night. Otherwise feels pretty good. No recent cold. No known allergies. No abdominal pain. No known reflex. No triggers with  "eating. No dyspnea.       Review of Systems  Constitutional, HEENT, cardiovascular, pulmonary, gi and gu systems are negative, except as otherwise noted.      Objective    /77 (BP Location: Right arm, Patient Position: Sitting, Cuff Size: Adult Regular)   Pulse 73   Temp 97.5  F (36.4  C) (Temporal)   Resp 16   Ht 1.702 m (5' 7.01\")   Wt 72.4 kg (159 lb 11.2 oz)   SpO2 100%   BMI 25.01 kg/m    Body mass index is 25.01 kg/m .  Physical Exam   GENERAL: alert and no distress  RESP: lungs clear to auscultation - no rales, rhonchi or wheezes  CV: regular rate and rhythm, normal S1 S2, no S3 or S4, no murmur, click or rub, no peripheral edema  MS: crepitus with left knee flexion, gait intact, no obvious effusion, no erythema, tenderness with patellofemoral palpation, no pain/laxity with lachman or varus/valgus stress, Hiram negative.   PSYCH: mentation appears normal, affect normal/bright            Signed Electronically by: Sayda Espitia NP    "

## 2025-07-07 ENCOUNTER — VIRTUAL VISIT (OUTPATIENT)
Dept: UROLOGY | Facility: CLINIC | Age: 45
End: 2025-07-07
Payer: COMMERCIAL

## 2025-07-07 DIAGNOSIS — N20.0 NEPHROLITHIASIS: Primary | ICD-10-CM

## 2025-07-07 DIAGNOSIS — R31.29 MICROSCOPIC HEMATURIA: ICD-10-CM

## 2025-07-07 PROCEDURE — 1126F AMNT PAIN NOTED NONE PRSNT: CPT | Mod: 95 | Performed by: NURSE PRACTITIONER

## 2025-07-07 PROCEDURE — 98007 SYNCH AUDIO-VIDEO EST HI 40: CPT | Performed by: NURSE PRACTITIONER

## 2025-07-07 NOTE — PROGRESS NOTES
Urology Video Office Visit    Video-Visit Details    Type of service:  Video Visit    Video Start Time: 1403    Video End Time:1440    Originating Location (pt. Location): Home    Distant Location (provider location):  Off-site     Platform used for Video Visit: [employer]           Assessment and Plan:     Assessment:45 year old female with history of nephrolithiasis.     Plan:  -Reviewed renal US with patient. Noted questionable right 4mm AML vs renal stone. Discussed option of a CT scan for further definition of 4mm echogenic focus. Will hold off at this time.   -The patient and I discussed the diagnosis and natural history of urolithiasis.We discussed some general measures to prevent recurrent kidney stones.  These include fluid intake to achieve 2.5 liters of urine per day, decreased salt intake, normal calcium intake, lowering animal protein intake, avoiding high amounts of oxalate containing foods, and increased citrate intake with orange juice/lemonade.  -We discussed the finding of hematuria. Recommend repeat UA with microscopic evaluation to assess for ongoing microscopic hematuria. Pt amenable to plan. Will message pt of results via [email or web address].   -If having severe flank pain, fevers, chills, nausea, or vomiting please notify Urology clinic or be seen in the ER.   -RTC in 1 year with CT scan or sooner PRN.     Adwoa Herrera CNP  Department of Urology  July 7, 2025    I spent a total of 40 minutes spent on the date of the encounter doing chart review, history and exam, documentation, and further activities as noted above.          Chief Complaint:   History of nephrolithiasis and microscopic hematuria.          History of Present Illness:    Heather Guillory is a pleasant 45 year old female who presents with concerns of a nephrolithiasis and microscopic hematuria.     Ms. Guillory was seen with Brant REBOLLEDO in May 2025 for concerns of microscopic hematuria.     Renal US on 6/13/25 (images personally  reviewed) revealed bilateral symmetrical kidneys without hydronephrosis. Noted a right 4mm echogenic focus which may be a AML vs renal stone.     She is doing well at this time. Denies any s/s of kidney stones.     First stone was in her early 20's which she was able to pass on her own.  She has not had a stone episode since that time.     She follows with Rheumatology for concerns of an autoimmune disorder. She notes on urinalysis with microscopic evaluation she has had microscopic hematuria.     Urinalysis:   2/5/25: RBC's 5/hpf.  4/5/23: RBC's 5-10/hpf  2/13/22: RBC's 10/hpf  7/8/21: RBC's 5-10/hpf    6/30/25: Urine cytology was negative.     Family history of nephrolithiasis in father.     Stone Risk Factors: None    She notes a high fluid intake. Does drink about 1 cup of coffee in am. Likes to drink almond milk. Does tolerate cheese and yogurt. Using creatinine in protein shake.     Will get about 2-4 UTI's per year.     The patient non a family history of bladder or malignancies.  The patient is a non- smoker.  The patient denies exposure to chemicals/dyes, chronic sifuentes, urolithiasis, recurrent UTI, chemotherapy, or pelvic radiation.           Past Medical History:     Past Medical History:   Diagnosis Date    JAVIER (generalized anxiety disorder)     50mg zoloft    Rosacea     Undifferentiated connective tissue disease             Past Surgical History:     Past Surgical History:   Procedure Laterality Date    COLONOSCOPY N/A 10/21/2020    Procedure: COLONOSCOPY;  Surgeon: Yang Pete MD;  [place] [facility] [place]    COLONOSCOPY N/A 12/29/2022    Procedure: COLONOSCOPY, WITH BIOPSY;  Surgeon: Magda Lobo MD;  [place] [facility] [place]    DAVINCI MYOMECTOMY N/A 02/11/2022    Procedure: MYOMECTOMY, UTERUS, ROBOT-ASSISTED, LAPAROSCOPIC;  Surgeon: Cate Mansfield MD;  Location:  OR    ESOPHAGOSCOPY, GASTROSCOPY, DUODENOSCOPY (EGD), COMBINED N/A 12/29/2022    Procedure: ESOPHAGOGASTRODUODENOSCOPY, WITH  BIOPSY;  Surgeon: Magda Lobo MD;  Location: [facility] OR    ESOPHAGOSCOPY, GASTROSCOPY, DUODENOSCOPY (EGD), COMBINED N/A 09/07/2023    Procedure: Esophagoscopy, gastroscopy, duodenoscopy (EGD), combined WITH BIOPSY;  Surgeon: Jackie Aldana MD;  Location: [facility] OR    IR ENDOVENOUS ABLATION VARICOSE VEINS  10/28/2019    IR FOLLOW UP VISIT OUTPATIENT  11/20/2019    IR STAB PHLEBECTOMY 10 - 20 STABS  10/28/2019    IR VEIN SCLEROSING MULTIPLE  10/28/2019    LAPAROSCOPIC SALPINGECTOMY Bilateral 04/23/2021    Procedure: SALPINGECTOMY, LAPAROSCOPIC BILATERAL;  Surgeon: Cate Mansfield MD;  Location: [facility] OR    OPERATIVE HYSTEROSCOPY WITH MORCELLATOR  04/08/2014    Procedure: OPERATIVE HYSTEROSCOPY WITH MORCELLATOR;  Hysteroscopic Polypectomy;  Surgeon: Cate Mansfield MD;  Location: [facility] OR    REMOVE INTRAUTERINE DEVICE N/A 04/23/2021    Procedure: removal of intrauterine device;  Surgeon: Cate Mansfield MD;  Location: [facility] OR    TUBAL LIGATION              Medications     Current Outpatient Medications   Medication Sig Dispense Refill    benzonatate (TESSALON) 200 MG capsule Take 1 capsule (200 mg) by mouth 3 times daily as needed for cough. 30 capsule 1     No current facility-administered medications for this visit.            Family History:     Family History   Problem Relation Age of Onset    Skin Cancer Mother     Elijah Disease Sister     Melanoma Sister     Arrhythmia Brother         details unknown; procedure in his 20s    Anxiety Disorder Brother     Alzheimer Disease Maternal Grandmother     Cervical Cancer Maternal Grandmother     Colon Cancer Maternal Grandfather     Breast Cancer Paternal Grandmother     Hypertension Paternal Grandmother     Hypertension Paternal Grandfather     Lung Cancer Paternal Grandfather         smoker    Diabetes Type 2  Paternal Grandfather     Myocardial Infarction Paternal Grandfather     Myocardial Infarction Paternal Aunt 45    Cerebrovascular Disease No  family hx of     Coronary Artery Disease Early Onset No family hx of     Ovarian Cancer No family hx of             Social History:     Social History     Socioeconomic History    Marital status:      Spouse name: Chi    Number of children: 2    Years of education: Not on file    Highest education level: Not on file   Occupational History    Occupation:      Employer: [employer]     Comment: laid off 7/2020   Tobacco Use    Smoking status: Never     Passive exposure: Never    Smokeless tobacco: Never   Vaping Use    Vaping status: Never Used   Substance and Sexual Activity    Alcohol use: No     Alcohol/week: 0.0 standard drinks of alcohol    Drug use: No    Sexual activity: Yes     Partners: Male     Birth control/protection: I.U.D.     Comment: Mirena placed in 2018   Other Topics Concern     Service No    Blood Transfusions No    Caffeine Concern No    Occupational Exposure No    Hobby Hazards No    Sleep Concern No    Stress Concern No    Weight Concern No    Special Diet No    Back Care No    Exercise No    Bike Helmet Yes     Comment: n/a    Seat Belt Yes    Self-Exams No    Parent/sibling w/ CABG, MI or angioplasty before 65F 55M? No   Social History Narrative    .    3 boys (ages 6, 5 and 3 as of Jan 2022)    Exercises when able.         How much exercise per week? 3-4 x's     How much calcium per day? In foods and PNV       How much caffeine per day? 1 soda occasional    How much vitamin D per day? PNV    Do you/your family wear seatbelts?  Yes    Do you/your family use safety helmets? Yes    Do you/your family use sunscreen? Yes    Do you/your family keep firearms in the home? No    Do you/your family have a smoke detector(s)? Yes        July 16, 2020 Mague Chase LPN         Social Drivers of Health     Financial Resource Strain: Low Risk  (12/11/2023)    Financial Resource Strain     Within the past 12 months, have you or your family members you live with been  unable to get utilities (heat, electricity) when it was really needed?: No   Food Insecurity: Low Risk  (12/11/2023)    Food Insecurity     Within the past 12 months, did you worry that your food would run out before you got money to buy more?: No     Within the past 12 months, did the food you bought just not last and you didn t have money to get more?: No   Transportation Needs: Low Risk  (12/11/2023)    Transportation Needs     Within the past 12 months, has lack of transportation kept you from medical appointments, getting your medicines, non-medical meetings or appointments, work, or from getting things that you need?: No   Physical Activity: Not on file   Stress: Not on file   Social Connections: Not on file   Interpersonal Safety: Low Risk  (2/19/2025)    Interpersonal Safety     Do you feel physically and emotionally safe where you currently live?: Yes     Within the past 12 months, have you been hit, slapped, kicked or otherwise physically hurt by someone?: No     Within the past 12 months, have you been humiliated or emotionally abused in other ways by your partner or ex-partner?: No   Housing Stability: Low Risk  (12/11/2023)    Housing Stability     Do you have housing? : Yes     Are you worried about losing your housing?: No            Allergies:   Cefadroxil, Sulfa antibiotics, Benzoyl peroxide, Latex, Miconazole, and Sulfur         Review of Systems:  From intake questionnaire   Negative 14 system review except as noted on HPI, nurse's note.         Physical Exam:   General Appearance: Well groomed, hygenic  Eyes: No redness, discharge  Respiratory: No cough, no respiratory distress or labored breathing  Musculoskeletal: Grossly normal, full range of motion in upper extremities, no gross deficits  Skin: No discoloration or apparent rashes  Neurologic - No tremors  Psychiatric - Alert and oriented  The rest of a comprehensive physical examination is deferred due to video visit restrictions         Labs:    I personally reviewed all applicable laboratory data and went over findings with patient  Significant for:    CBC RESULTS:  Recent Labs   Lab Test 02/05/25  1543 04/10/24  1140 12/14/23  0923 07/14/23  0919 04/05/23  1030   WBC 8.5 5.6 6.3  --  7.2   HGB 13.7 15.6 14.1 13.7 12.7    180 175  --  212        BMP RESULTS:  Recent Labs   Lab Test 03/12/25  1358 02/05/25  1543 04/10/24  1140 12/14/23  0923 07/14/23  0919 06/20/23  0958 08/27/21  0850 07/08/21  0919 03/31/21  1130 01/14/21  1228 10/28/20  1250 07/16/20  1132     --   --  141 142 142   < >  --   --   --  140  --    POTASSIUM 4.5  --   --  4.6 4.7 4.0   < >  --    < >  --  4.1  --    CHLORIDE 106  --   --  107 107 108*   < >  --   --   --  109  --    CO2 22  --   --  26 26 25   < >  --   --   --  27  --    ANIONGAP 11  --   --  8 9 9   < >  --   --   --  4  --    *  --   --  118* 102* 96   < >  --   --   --  88  --    BUN 12.7  --   --  11.5 7.7 6.6   < >  --   --   --  7  --    CR 0.82 0.71 0.60 0.64 0.68 0.66   < > 0.72  --  0.66 0.63 0.66   GFRESTIMATED >90 >90 >90 >90 >90 >90   < > >90  --  >90 >90 >90   GFRESTBLACK  --   --   --   --   --   --   --  >90  --  >90 >90 >90   PRICILA 9.3  --   --  9.7 9.9 9.3   < >  --   --   --  8.9  --     < > = values in this interval not displayed.       UA RESULTS:   Recent Labs   Lab Test 03/12/25  1400 02/05/25  1628 07/02/24  1320   SG 1.027 1.018 1.010   URINEPH 7.0 7.5* 6.5   NITRITE Negative Negative Negative   RBCU 8* 5* 0-2   WBCU 2 <1 0-5       CALCIUM RESULTS  Lab Results   Component Value Date    PRICILA 9.3 03/12/2025    PRICILA 9.7 12/14/2023    PRICILA 9.9 07/14/2023    PRICILA 8.9 10/28/2020    PRICILA 8.4 01/09/2016           Imaging:    I personally reviewed all applicable imaging and went over the below findings with patient.    Narrative & Impression   EXAMINATION: US RENAL COMPLETE NON-VASCULAR, 6/13/2025 10:52 AM      COMPARISON: CT 7/14/2023     HISTORY: Microscopic hematuria; Microscopic  hematuria     TECHNIQUE: The kidneys and bladder were scanned in the standard  fashion with specialized ultrasound transducer(s) using both gray  scale and limited color/spectral Doppler techniques.     FINDINGS:     Right kidney: Measures 12.4 cm in length. Parenchyma is of normal  thickness and echogenicity. Punctate 3 x 4 x 3 mm echogenic  non-shadowing focus within the inferior renal pole. No hydronephrosis.     Left kidney: Measures 12.2 cm in length. Parenchyma is of normal  thickness and echogenicity. No focal mass. Extrarenal pelvis. No  hydronephrosis.      Bladder: Distended and unremarkable                                                                      IMPRESSION:  1.  Right: 4 mm non-shadowing echogenic focus within the inferior  pole. may represent a benign angiomyolipoma; however, given  microscopic hematuria, renal stone could also be considered.   2.  Left: Normal grayscale and Doppler sonographic appearance of the  left kidney.      I have personally reviewed the examination and initial interpretation  and I agree with the findings.

## 2025-07-07 NOTE — NURSING NOTE
Current patient location: [place]    Is the patient currently in the state of [place]? NO    Visit mode: VIDEO    If the visit is dropped, the patient can be reconnected by:VIDEO VISIT: Text to cell phone:   Telephone Information:   Mobile [phone]       Will anyone else be joining the visit? NO  (If patient encounters technical issues they should call [phone]    Are changes needed to the allergy or medication list? No    Are refills needed on medications prescribed by this physician? NO    Rooming Documentation:  Questionnaire(s) completed    Reason for visit: RECHECK    Leticia CLARKE

## 2025-07-07 NOTE — PATIENT INSTRUCTIONS
UROLOGY CLINIC VISIT PATIENT INSTRUCTIONS    It was a pleasure seeing you today! Thank you for giving us the opportunity to care for you. We hope we provided the excellent service you deserve and look forward to serving you again.    Instructions per today's visit: If having severe flank pain, fevers, chills, nausea, or vomiting please notify Urology clinic or be seen in the ER.       If you have any issues, questions, or concerns, please don't hesitate to contact us at [facility] at [phone] or via [employer].    Important Contact Information:  -To each our nurse triage line, please call our contact center at [phone].  -Our clinic hours are Monday through Friday, 8:00 a.m. - 4:30 p.m. Feel free to call during these hours with any questions.  -You can also contact us anytime via [employer], and we will respond during clinic hours.      Adwoa Herrera CNP  Department of Urology      DIET & LIFESTYLE CHANGES FOR PATIENTS WITH KIDNEY STONES    If you've had a kidney stone, you are likely to form another. Risk of recurrence is 15% at 1 year, 35% to 40% at 2 years, and 50% at 10 years. Therefore, prevention is very important.   These recommendations have shown to be effective.    CALCIUM STONES (Oxalate and Phosphate)    Fluid intake is the most important prevention measure to help prevent stones. Fluid intake should be at least 2.5 liters per day or 90-120oz per day. With goal of urine output of >2.5L per day.   Increasing liquids that have citric acid may help such as low calorie orange juice, lemonade (Crystal Light Lemonade or True Lemon/Lime), or adding a citrus to your water. Try to limit sugar, especially if you have diabetes.  You can add lemon juice or fresh roge to your water daily.  Lemon juice increases the citrate in your urine, and helps decrease the formation of stone and even breakdown certain types of stones. Please use 1/2 cup of lemon juice per day.     Helpful Fluid  "Measurements:  1 Cup = 8oz  1 liter = 34oz  1 quart = 32oz  24 pack water: Each bottle 16.9 oz     Low Oxalate Diet: Limit your consumption of OXALATE-rich foods including:  Some nuts including peanuts, almonds, cashews, peanut butter, almond milk.   Okay to eat with kidney stones: walnuts, pecans, pistachios, and seeds (pumpkin or sunflower).   Spinach  Rhubarb  Beets  Chocolate-Dark chocolate or cocoa  Soybeans and soy products   Potatoes with skin    Website:   [email or web address]    Below is a link to a PDF that is based on [employer] research. Please stick to pages 6-9 of this document. My suggestion is to review the list of food that is OK. The \"avoid\" list can be [email or web address]  [email or web address]_[email or web address]_[email or web address]    Low Sodium Diet: Salt (sodium chloride) is found in abundance in many foods. High sodium levels in the urine can interfere with the kidney's handling of calcium.   Trying a DASH (Dietary Approaches to Stop Hypertension) diet which is eating more fruits and vegetables, limiting salt intake, moderate in low-fat diary products, and low in animal protein.   Try to decrease salt intake to <2000 mg of sodium daily.     Tips for reducing the salt in your diet:  Don't use salt at the table  Reduce the salt used in food preparation. Try 1/2 teaspoon when recipes call for 1 teaspoon.  Use herbs and spices for flavoring instead of salt.  Avoid salty foods.  Check the label before you buy or use a product. Note sodium and portion size information.  Try to consume less than 2,000 mg/day. (1 teaspoon = 2,000 mg)    Foods with high sodium content include:  Processed meat (including luncheon meats, sausage)   Crackers   Instant cereal   Processed cheese   Canned soups   Chips and snack foods   Soy sauce    Low Animal Protein: Reduce animal protein (meat) intake to no more than 8-10 ounces " per day. Increase fruit and vegetables to 5 servings per day.    Maintain a normal calcium diet: Calcium rich foods are encouraged, but no more than 1000 - 1200 mg per day. Researches have found that people with low calcium intakes tend to have more stones. Foods with high calcium content are acceptable and include:  Calcium rich foods include:   Diary (cheese, milk, and yogurt)  Enriched cereals  Oatmeal  Meat and fish such as canned sardines, canned pink salmon with bones- look for low sodium options  Dark green vegetables: Broccoli, peas, chinese cabbage/bok malathi, Kale, mustard greens,  Alternative milks: Coconut, Rice, Flax, or Oat milk (avoid almond milk)  Calcium fortified Orange Juice- look for a low sugar/light variety  Pistachio nuts  Beans: mung beans, red kidney beans     Limit Vitamin C intake to < 1000 mg daily.

## 2025-07-10 ENCOUNTER — MYC MEDICAL ADVICE (OUTPATIENT)
Dept: OBGYN | Facility: CLINIC | Age: 45
End: 2025-07-10
Payer: COMMERCIAL

## 2025-07-10 NOTE — TELEPHONE ENCOUNTER
"This RN spoke to Heather. She explains that a couple weeks ago she has started experiencing new symptoms such as waking up with night sweats and having hot flashes. She reports she \"feels off\" and uncomfortable and feels fatigued. She reports waking up multiple times a night sweating. She has been using a fan and A/C on. Wondering if labs can be done or should she be seen?     Last cycle info:   She typically gets a cycle every 21 to 38 days. She does track them. They last about 4-5 day and gets one every month.   "

## 2025-07-10 NOTE — TELEPHONE ENCOUNTER
Responded to patient via Mychart letting her know she should make an appointment to discussed new symptoms with a provider.

## 2025-07-11 ENCOUNTER — OFFICE VISIT (OUTPATIENT)
Dept: OBGYN | Facility: CLINIC | Age: 45
End: 2025-07-11
Attending: ADVANCED PRACTICE MIDWIFE
Payer: COMMERCIAL

## 2025-07-11 ENCOUNTER — RESULTS FOLLOW-UP (OUTPATIENT)
Dept: OBGYN | Facility: CLINIC | Age: 45
End: 2025-07-11

## 2025-07-11 VITALS
DIASTOLIC BLOOD PRESSURE: 83 MMHG | BODY MASS INDEX: 24.74 KG/M2 | SYSTOLIC BLOOD PRESSURE: 117 MMHG | WEIGHT: 158 LBS | HEART RATE: 73 BPM

## 2025-07-11 DIAGNOSIS — R23.2 HOT FLASHES: Primary | ICD-10-CM

## 2025-07-11 PROCEDURE — 84460 ALANINE AMINO (ALT) (SGPT): CPT | Performed by: INTERNAL MEDICINE

## 2025-07-11 PROCEDURE — 99213 OFFICE O/P EST LOW 20 MIN: CPT | Performed by: ADVANCED PRACTICE MIDWIFE

## 2025-07-11 PROCEDURE — 84450 TRANSFERASE (AST) (SGOT): CPT | Performed by: INTERNAL MEDICINE

## 2025-07-11 ASSESSMENT — PAIN SCALES - GENERAL: PAINLEVEL_OUTOF10: NO PAIN (0)

## 2025-07-11 NOTE — LETTER
2025       RE: Heather Guillory  [address]  [place] [address]     Dear Colleague,    Thank you for referring your patient, Heathre Guillory, to the [facility] [place] [facility]. Please see a copy of my visit note below.    Subjective:  Heather Guillory is an 45 year old, , who requests an evaluation of brittany/menopause symptoms.      Brittany/menopause symptoms include:   Hot flashes and Night sweats  Recent up tick for hot flashes and night sweats. Quick onset.  Afebrile, has been checking temp at home, all nml  Having regular heavier menses--not too crampy  Denies sig brain fog, fatigue, insomnia, hair loss, change in skin    Personal risks associated with hormone therapy:   None        Gynecologic History  Patient's last menstrual period was 2025 (exact date).   Regular menses, no recent changes      Current contraception: tubal ligation  STI History: no    Last mammogram: US Breast Left Limited 1-3 Quadrants  Result Date: 2024  Narrative: Examinations: US BREAST LEFT LIMITED 1-3 QUADRANTS, 2024 10:16 AM Comparisons: Mammogram and ultrasound of 2024 History: Left axillary lump. This lump has been inflamed although is not inflamed today. Small skin lesion medial left breast.  History of left subareolar pain. Ultrasound: Targeted ultrasound evaluation was performed by the technologist and radiologist. The left breast subareolar there is normal ducts. No suspicious ultrasound FINDINGS. Left breast 9:00 4 cm from the nipple in the area of skin lesion demonstrates a tiny hypoechoic cyst within the skin. Ultrasound left axillary lump shows a sebaceous cyst filled with mixed debris.     US Breast Left  Result Date: 2024  Narrative: Examinations: MA DIAGNOSTIC BILATERAL W/ EDUARDA, US BREAST LEFT LIMITED 1-3 QUADRANTS, 2024 9:43 AM Comparisons: 2023, 8/10/2022, 2022, 2020 History: Left nipple  pain. Patient has 2 lesions on her left nipple that she noticed about 2 weeks ago, which drained some clear and bloody fluid, and now look like scabs. Patients states fluid did not come from duct of nipple. Family history of paternal grandmother with breast cancer and maternal grandfather with bowel/colon cancer. BREAST DENSITY: The breasts are heterogeneously dense which may obscure small masses. Findings:   Mammogram: No suspicious abnormality in either breast or significant change compared to prior. Ultrasound: Targeted left breast ultrasound evaluation was performed by the technologist and radiologist. In the area of pain in the subareolar region, there is only normal appearing breast tissue. No intraductal mass or suspicious abnormality. On physical exam there are two small erythematous lesions adjacent to the nipple at about 9:00 and 1:00 positions, which appear to be scabbing.     US Breast Left Limited 1-3 Quadrants  Result Date: 8/10/2022  Narrative: EXAMINATION: MA DIAGNOSTIC LEFT W/ EDUARDA, US BREAST LEFT LIMITED 1-3 QUADRANTS, 8/10/2022 10:55 AM HISTORY/FAMILY HISTORY: Left breast shooting pain, especially with palpation for about a month, intermittent. COMPARISON: 6/26/2022, 5/26/2022, 7/23/2020 FINDINGS: MAMMOGRAM: TECHNIQUE: Standard mammographic views were performed with tomosynthesis and synthetic mammogram. BREAST DENSITY: Scattered fibroglandular densities. Left CC view of the breast demonstrates no suspicious mammographic findings. ULTRASOUND: Targeted, real-time ultrasound evaluation was performed by the technologist and radiologist. Ultrasound of the left central region, area of pain, demonstrates dilated ducts without intraductal mass. No suspicious sonographic findings. During real-time imaging, there are 2 clusters of microcysts at 2:00 and 8:00, no mammographic correlate.      Last colonoscopy:   Results for orders placed or performed during the hospital encounter of 12/29/22   COLONOSCOPY     Collection Time: 12/29/22  1:18 PM   Result Value Ref Range    COLONOSCOPY       [facility]  [address] [place], [place] [address]., [place] [address] [phone]     Endoscopy Department  _______________________________________________________________________________  Patient Name: Heather Guillory          Procedure Date: 12/29/2022 1:18 PM  MRN: [id number]                       Account Number: [id number]  YOB: 1980              Admit Type: Outpatient  Age: 42                               Room: [facility]OM 03  Gender: Female                        Note Status: Finalized  Attending MD: BRIDGET BYNUM MD      Total Sedation Time:   _______________________________________________________________________________     Procedure:             Colonoscopy  Indications:           Abdominal pain in the left upper quadrant, Diarrhea  Providers:             BRIDGET BYNUM MD, Annamarie Solis CRNA, Florence Nava RN  Referring MD:          DENISHA SOLARES  Medicines:             Monitored Anesthesia Care  Complications:          No immediate complications.  _______________________________________________________________________________  Procedure:             Pre-Anesthesia Assessment:                         - Prior to the procedure, a History and Physical was                          performed, and patient medications and allergies were                          reviewed. The patient is competent. The risks and                          benefits of the procedure and the sedation options and                          risks were discussed with the patient. All questions                          were answered and informed consent was obtained.                          Patient identification and proposed procedure were                          verified by the physician in the pre-procedure area.                          Mental Status Examination: alert and oriented. Airway                           Examination: normal oropharyngeal airway and neck                          mobility. Respiratory Examination: clear to                           auscultation. CV Examination: normal. Prophylactic                          Antibiotics: The patient does not require prophylactic                          antibiotics. Prior Anticoagulants: The patient has                          taken no anticoagulant or antiplatelet agents. ASA                          Grade Assessment: per anaesthesiology. After reviewing                          the risks and benefits, the patient was deemed in                          satisfactory condition to undergo the procedure. The                          anesthesia plan was to use monitored anesthesia care                          (MAC). Immediately prior to administration of                          medications, the patient was re-assessed for adequacy                          to receive sedatives. The heart rate, respiratory                          rate, oxygen saturations, blood pressure, adequacy of                          pulmonary ventilation, and response to care were                           monitored throughout the procedure. The physical                          status of the patient was re-assessed after the                          procedure.                         After obtaining informed consent, the colonoscope was                          passed under direct vision. Throughout the procedure,                          the patient's blood pressure, pulse, and oxygen                          saturations were monitored continuously. The                          Colonoscope was introduced through the anus and                          advanced to the terminal ileum. The colonoscopy was                          performed without difficulty. The patient tolerated                          the procedure well. The quality of the bowel                          preparation  was good. The terminal ileum, ileocecal                          valve, appendiceal orifice, and rectum were                          photographed.                                                                                    Findings:       The terminal ileum appeared normal.       The colon (entire examined portion) appeared normal. Biopsies for        histology were taken with a cold forceps from the entire colon for        evaluation of microscopic colitis.       Internal hemorrhoids were found during retroflexion. The hemorrhoids        were medium-sized.                                                                                   Impression:            - The examined portion of the ileum was normal.                         - The entire examined colon is normal. Biopsied.                         - Internal hemorrhoids.  Recommendation:        - Patient has a contact number available for                          emergencies. The signs and symptoms of potential                          delayed complications were discussed with the patient.                          Return to normal activities tomorrow. Written                          discharge instructions  were provided to the patient.                         - Resume previous diet.                         - Continue present medications.                         - Await pathology results.                                                                                     Electronically Signed by: Dr. Bridget Lobo  __________________  BRIDGET LOBO MD  2022 2:28:27 PM  I was physically present for the entire viewing portion of the exam.  __________________________  Signature of teaching physician  BRIDGET LOBO MD  Number of Addenda: 0    Note Initiated On: 2022 1:18 PM  Scope In:  Scope Out:         Obstetric History  OB History    Para Term  AB Living   4 3 3 0 1 3   SAB IAB Ectopic Multiple Live Births   0 0 0 0 3      #  Outcome Date GA Lbr Yasir/2nd Weight Sex Type Anes PTL Lv   4 Term 04/05/18 39w1d / 01:21 3.43 kg (7 lb 9 oz) M Vag-Spont EPI N COSME      Name: DANYA GAONA      Apgar1: 5  Apgar5: 7   3 Term 05/28/16 41w1d 00:45 / 02:05 3.997 kg (8 lb 13 oz) M Vag-Spont EPI N COSME      Birth Comments: Retained placenta      Complications: Placenta Previa      Apgar1: 9  Apgar5: 9   2 Term 02/06/15 41w1d 04:15 / 03:08 3.55 kg (7 lb 13.2 oz) M Vag-Spont EPI N COSME      Complications: Fever      Name: Krupa      Apgar1: 8  Apgar5: 9   1 AB      AB, COMPLETE         Obstetric Comments   IVF, then spon, then IVF again.        Labs:  TSH   Date Value Ref Range Status   07/11/2025 1.04 0.30 - 4.20 uIU/mL Final   02/13/2022 0.43 0.40 - 4.00 mU/L Final   07/08/2021 1.16 0.40 - 4.00 mU/L Final     Lab Results   Component Value Date    CHOL 161 06/20/2023    CHOL 160 04/26/2019     Lab Results   Component Value Date    HDL 57 06/20/2023    HDL 51 04/26/2019     Lab Results   Component Value Date    LDL 95 06/20/2023     04/26/2019     Lab Results   Component Value Date    TRIG 43 06/20/2023    TRIG 45 04/26/2019         Past Medical History  Past Medical History:   Diagnosis Date     Abnormal Pap smear of cervix     Over 10 years ago     JAVIER (generalized anxiety disorder)     50mg zoloft     Infertility, female 12/2013     Rosacea      Undifferentiated connective tissue disease        Past Surgical History  Past Surgical History:   Procedure Laterality Date     BIOPSY  April 2014    Uterine polyp removed     COLONOSCOPY N/A 10/21/2020    Procedure: COLONOSCOPY;  Surgeon: Yang Pete MD;  [place] [facility] [place]     COLONOSCOPY N/A 12/29/2022    Procedure: COLONOSCOPY, WITH BIOPSY;  Surgeon: Magda Lobo MD;  [place] [facility] [place]     DAVINCI MYOMECTOMY N/A 02/11/2022    Procedure: MYOMECTOMY, UTERUS, ROBOT-ASSISTED, LAPAROSCOPIC;  Surgeon: Cate Mansfield MD;  Location: [facility] OR     ESOPHAGOSCOPY, GASTROSCOPY,  DUODENOSCOPY (EGD), COMBINED N/A 12/29/2022    Procedure: ESOPHAGOGASTRODUODENOSCOPY, WITH BIOPSY;  Surgeon: Magda Lobo MD;  Location: [facility] OR     ESOPHAGOSCOPY, GASTROSCOPY, DUODENOSCOPY (EGD), COMBINED N/A 09/07/2023    Procedure: Esophagoscopy, gastroscopy, duodenoscopy (EGD), combined WITH BIOPSY;  Surgeon: Jackie Aldana MD;  Location: [facility] OR     IR ENDOVENOUS ABLATION VARICOSE VEINS  10/28/2019     IR FOLLOW UP VISIT OUTPATIENT  11/20/2019     IR STAB PHLEBECTOMY 10 - 20 STABS  10/28/2019     IR VEIN SCLEROSING MULTIPLE  10/28/2019     LAPAROSCOPIC SALPINGECTOMY Bilateral 04/23/2021    Procedure: SALPINGECTOMY, LAPAROSCOPIC BILATERAL;  Surgeon: Cate Mansfield MD;  Location: [facility] OR     OPERATIVE HYSTEROSCOPY WITH MORCELLATOR  04/08/2014    Procedure: OPERATIVE HYSTEROSCOPY WITH MORCELLATOR;  Hysteroscopic Polypectomy;  Surgeon: Cate Mansfield MD;  Location: [facility] OR     REMOVE INTRAUTERINE DEVICE N/A 04/23/2021    Procedure: removal of intrauterine device;  Surgeon: Cate Mansfield MD;  Location: [facility] OR     TUBAL LIGATION         Medications  No current outpatient medications on file.     No current facility-administered medications for this visit.       Allergies     Allergies   Allergen Reactions     Cefadroxil Hives, Unknown and Rash     PN: LW Reaction: Unknown Reaction,     Sulfa Antibiotics Rash and Hives     Benzoyl Peroxide Swelling     PN: LW Other1: -Benzoyl peroxide-swelling, itching     Latex Rash     Slight reaction      PN: Converted from LW Latex Sensitivity Flag     Miconazole Swelling     PN: LW Reaction: rash     Sulfur Rash       Family History  Family History   Problem Relation Age of Onset     Skin Cancer Mother      Anxiety Disorder Sister      Genetic Disorder Sister         Elijah's Disease     Anxiety Disorder Brother      Alzheimer Disease Maternal Grandmother      Cervical Cancer Maternal Grandmother      Colon Cancer Maternal Grandfather      Colorectal  Cancer Maternal Grandfather      Breast Cancer Paternal Grandmother      Hypertension Paternal Grandmother      Osteoporosis Paternal Grandmother      Hypertension Paternal Grandfather      Lung Cancer Paternal Grandfather         smoker     Diabetes Type 2  Paternal Grandfather      Myocardial Infarction Paternal Grandfather      Diabetes Paternal Grandfather      Other Cancer Paternal Grandfather         Lung     Myocardial Infarction Paternal Aunt 45     Cerebrovascular Disease No family hx of      Coronary Artery Disease Early Onset No family hx of      Ovarian Cancer No family hx of            Objective  EXAM:  Blood pressure 117/83, pulse 73, weight 71.7 kg (158 lb), last menstrual period 2025, not currently breastfeeding. Body mass index is 24.74 kg/m .  General - pleasant female in no acute distress.  Skin - no suspicious lesions or rashes  Musculoskeletal - no gross deformities.  Neurological - normal strength, sensation, and mental status.        ASSESSMENT:  Heather Guillory is an 45 year old, , who requests an evaluation of taurus/menopause symptoms.    ICD-10-CM    1. Hot flashes  R23.2 TSH     Thyroxine, Free     CBC with Platelets Differential     T3 Free     Prolactin     Tick-Borne Disease Panel (Non-Lyme) by PCR     LYME DISEASE TOTAL ANTIBODIES WITH REFLEX TO CONFIRMATION     C-Reactive Protein, Inflammatory          PLAN:  Orders Placed This Encounter   Procedures     TSH     Thyroxine, Free     T3 Free     Prolactin     Tick-Borne Disease Panel (Non-Lyme) by PCR     LYME DISEASE TOTAL ANTIBODIES WITH REFLEX TO CONFIRMATION     C-Reactive Protein, Inflammatory     CBC with Platelets Differential       Heather Guillory is a candidate for systemic HT, but wants to do lab work before considering this, as this is not presenting in a typical pattern for menopause related symptoms    30 minutes spent on the date of the encounter doing chart review, history and exam, documentation and further  activities per the note.    Rama Hale, JUANCHO, APRN, CNM, FACNM        Follow-up as needed.  HARVEY Littlejohn CNM    +paper menonotes given to the patient and referred to the menopause society for good patient resources.      Again, thank you for allowing me to participate in the care of your patient.      Sincerely,    HARVEY Littlejohn CNM

## 2025-07-11 NOTE — PROGRESS NOTES
Subjective:  Heather Guillory is an 45 year old, , who requests an evaluation of taurus/menopause symptoms.      Taurus/menopause symptoms include:   Hot flashes and Night sweats  Recent up tick for hot flashes and night sweats. Quick onset.  Afebrile, has been checking temp at home, all nml  Having regular heavier menses--not too crampy  Denies sig brain fog, fatigue, insomnia, hair loss, change in skin    Personal risks associated with hormone therapy:   None        Gynecologic History  Patient's last menstrual period was 2025 (exact date).   Regular menses, no recent changes      Current contraception: tubal ligation  STI History: no    Last mammogram: US Breast Left Limited 1-3 Quadrants  Result Date: 2024  Narrative: Examinations: US BREAST LEFT LIMITED 1-3 QUADRANTS, 2024 10:16 AM Comparisons: Mammogram and ultrasound of 2024 History: Left axillary lump. This lump has been inflamed although is not inflamed today. Small skin lesion medial left breast.  History of left subareolar pain. Ultrasound: Targeted ultrasound evaluation was performed by the technologist and radiologist. The left breast subareolar there is normal ducts. No suspicious ultrasound FINDINGS. Left breast 9:00 4 cm from the nipple in the area of skin lesion demonstrates a tiny hypoechoic cyst within the skin. Ultrasound left axillary lump shows a sebaceous cyst filled with mixed debris.     US Breast Left  Result Date: 2024  Narrative: Examinations: MA DIAGNOSTIC BILATERAL W/ EDUARDA, US BREAST LEFT LIMITED 1-3 QUADRANTS, 2024 9:43 AM Comparisons: 2023, 8/10/2022, 2022, 2020 History: Left nipple pain. Patient has 2 lesions on her left nipple that she noticed about 2 weeks ago, which drained some clear and bloody fluid, and now look like scabs. Patients states fluid did not come from duct of nipple. Family history of paternal grandmother with breast cancer and maternal grandfather with bowel/colon  cancer. BREAST DENSITY: The breasts are heterogeneously dense which may obscure small masses. Findings:   Mammogram: No suspicious abnormality in either breast or significant change compared to prior. Ultrasound: Targeted left breast ultrasound evaluation was performed by the technologist and radiologist. In the area of pain in the subareolar region, there is only normal appearing breast tissue. No intraductal mass or suspicious abnormality. On physical exam there are two small erythematous lesions adjacent to the nipple at about 9:00 and 1:00 positions, which appear to be scabbing.     US Breast Left Limited 1-3 Quadrants  Result Date: 8/10/2022  Narrative: EXAMINATION: MA DIAGNOSTIC LEFT W/ EDUARDA, US BREAST LEFT LIMITED 1-3 QUADRANTS, 8/10/2022 10:55 AM HISTORY/FAMILY HISTORY: Left breast shooting pain, especially with palpation for about a month, intermittent. COMPARISON: 6/26/2022, 5/26/2022, 7/23/2020 FINDINGS: MAMMOGRAM: TECHNIQUE: Standard mammographic views were performed with tomosynthesis and synthetic mammogram. BREAST DENSITY: Scattered fibroglandular densities. Left CC view of the breast demonstrates no suspicious mammographic findings. ULTRASOUND: Targeted, real-time ultrasound evaluation was performed by the technologist and radiologist. Ultrasound of the left central region, area of pain, demonstrates dilated ducts without intraductal mass. No suspicious sonographic findings. During real-time imaging, there are 2 clusters of microcysts at 2:00 and 8:00, no mammographic correlate.      Last colonoscopy:   Results for orders placed or performed during the hospital encounter of 12/29/22   COLONOSCOPY    Collection Time: 12/29/22  1:18 PM   Result Value Ref Range    COLONOSCOPY       [facility]  [address] [place], [place] [address]., [place] [address] [phone]     Endoscopy Department  _______________________________________________________________________________  Patient Name: Heather Ordaniel           Procedure Date: 12/29/2022 1:18 PM  MRN: [id number]                       Account Number: [id number]  YOB: 1980              Admit Type: Outpatient  Age: 42                               [place]: [facility] [place]OM 03  Gender: Female                        Note Status: Finalized  Attending MD: BRIDGET BYNUM MD      Total Sedation Time:   _______________________________________________________________________________     Procedure:             Colonoscopy  Indications:           Abdominal pain in the left upper quadrant, Diarrhea  Providers:             BRIDGET BYNUM MD, Annamarie Solis CRNA, Florence Nava RN  Referring MD:          DENISHA SOLARES  Medicines:             Monitored Anesthesia Care  Complications:          No immediate complications.  _______________________________________________________________________________  Procedure:             Pre-Anesthesia Assessment:                         - Prior to the procedure, a History and Physical was                          performed, and patient medications and allergies were                          reviewed. The patient is competent. The risks and                          benefits of the procedure and the sedation options and                          risks were discussed with the patient. All questions                          were answered and informed consent was obtained.                          Patient identification and proposed procedure were                          verified by the physician in the pre-procedure area.                          Mental Status Examination: alert and oriented. Airway                          Examination: normal oropharyngeal airway and neck                          mobility. Respiratory Examination: clear to                           auscultation. CV Examination: normal. Prophylactic                          Antibiotics: The patient does not require prophylactic                           antibiotics. Prior Anticoagulants: The patient has                          taken no anticoagulant or antiplatelet agents. ASA                          Grade Assessment: per anaesthesiology. After reviewing                          the risks and benefits, the patient was deemed in                          satisfactory condition to undergo the procedure. The                          anesthesia plan was to use monitored anesthesia care                          (MAC). Immediately prior to administration of                          medications, the patient was re-assessed for adequacy                          to receive sedatives. The heart rate, respiratory                          rate, oxygen saturations, blood pressure, adequacy of                          pulmonary ventilation, and response to care were                           monitored throughout the procedure. The physical                          status of the patient was re-assessed after the                          procedure.                         After obtaining informed consent, the colonoscope was                          passed under direct vision. Throughout the procedure,                          the patient's blood pressure, pulse, and oxygen                          saturations were monitored continuously. The                          Colonoscope was introduced through the anus and                          advanced to the terminal ileum. The colonoscopy was                          performed without difficulty. The patient tolerated                          the procedure well. The quality of the bowel                          preparation was good. The terminal ileum, ileocecal                          valve, appendiceal orifice, and rectum were                          photographed.                                                                                    Findings:       The terminal ileum appeared normal.       The colon  (entire examined portion) appeared normal. Biopsies for        histology were taken with a cold forceps from the entire colon for        evaluation of microscopic colitis.       Internal hemorrhoids were found during retroflexion. The hemorrhoids        were medium-sized.                                                                                   Impression:            - The examined portion of the ileum was normal.                         - The entire examined colon is normal. Biopsied.                         - Internal hemorrhoids.  Recommendation:        - Patient has a contact number available for                          emergencies. The signs and symptoms of potential                          delayed complications were discussed with the patient.                          Return to normal activities tomorrow. Written                          discharge instructions  were provided to the patient.                         - Resume previous diet.                         - Continue present medications.                         - Await pathology results.                                                                                     Electronically Signed by: Dr. Bridget Lobo  __________________  BRIDGET LOBO MD  2022 2:28:27 PM  I was physically present for the entire viewing portion of the exam.  __________________________  Signature of teaching physician  BRIDGET LOBO MD  Number of Addenda: 0    Note Initiated On: 2022 1:18 PM  Scope In:  Scope Out:         Obstetric History  OB History    Para Term  AB Living   4 3 3 0 1 3   SAB IAB Ectopic Multiple Live Births   0 0 0 0 3      # Outcome Date GA Lbr Yasir/2nd Weight Sex Type Anes PTL Lv   4 Term 18 39w1d / 01:21 3.43 kg (7 lb 9 oz) M Vag-Spont EPI N COSME      Name: DANYA GAONA      Apgar1: 5  Apgar5: 7   3 Term 16 41w1d 00:45 / 02:05 3.997 kg (8 lb 13 oz) M Vag-Spont EPI N COSME      Birth Comments: Retained  placenta      Complications: Placenta Previa      Apgar1: 9  Apgar5: 9   2 Term 02/06/15 41w1d 04:15 / 03:08 3.55 kg (7 lb 13.2 oz) M Vag-Spont EPI N COSME      Complications: Fever      Name: Krupa      Apgar1: 8  Apgar5: 9   1 AB      AB, COMPLETE         Obstetric Comments   IVF, then spon, then IVF again.        Labs:  TSH   Date Value Ref Range Status   07/11/2025 1.04 0.30 - 4.20 uIU/mL Final   02/13/2022 0.43 0.40 - 4.00 mU/L Final   07/08/2021 1.16 0.40 - 4.00 mU/L Final     Lab Results   Component Value Date    CHOL 161 06/20/2023    CHOL 160 04/26/2019     Lab Results   Component Value Date    HDL 57 06/20/2023    HDL 51 04/26/2019     Lab Results   Component Value Date    LDL 95 06/20/2023     04/26/2019     Lab Results   Component Value Date    TRIG 43 06/20/2023    TRIG 45 04/26/2019         Past Medical History  Past Medical History:   Diagnosis Date    Abnormal Pap smear of cervix     Over 10 years ago    JAVIER (generalized anxiety disorder)     50mg zoloft    Infertility, female 12/2013    Rosacea     Undifferentiated connective tissue disease        Past Surgical History  Past Surgical History:   Procedure Laterality Date    BIOPSY  April 2014    Uterine polyp removed    COLONOSCOPY N/A 10/21/2020    Procedure: COLONOSCOPY;  Surgeon: Yang Pete MD;  [place] [facility] [place]    COLONOSCOPY N/A 12/29/2022    Procedure: COLONOSCOPY, WITH BIOPSY;  Surgeon: Magda Lobo MD;  [place] [facility] [place]    DAVINCI MYOMECTOMY N/A 02/11/2022    Procedure: MYOMECTOMY, UTERUS, ROBOT-ASSISTED, LAPAROSCOPIC;  Surgeon: Cate Mansfield MD;  Location: [facility] OR    ESOPHAGOSCOPY, GASTROSCOPY, DUODENOSCOPY (EGD), COMBINED N/A 12/29/2022    Procedure: ESOPHAGOGASTRODUODENOSCOPY, WITH BIOPSY;  Surgeon: Magda Lobo MD;  Location: [facility] OR    ESOPHAGOSCOPY, GASTROSCOPY, DUODENOSCOPY (EGD), COMBINED N/A 09/07/2023    Procedure: Esophagoscopy, gastroscopy, duodenoscopy (EGD), combined WITH BIOPSY;  Surgeon:  Jackie Aldana MD;  Location: [facility] OR    IR ENDOVENOUS ABLATION VARICOSE VEINS  10/28/2019    IR FOLLOW UP VISIT OUTPATIENT  11/20/2019    IR STAB PHLEBECTOMY 10 - 20 STABS  10/28/2019    IR VEIN SCLEROSING MULTIPLE  10/28/2019    LAPAROSCOPIC SALPINGECTOMY Bilateral 04/23/2021    Procedure: SALPINGECTOMY, LAPAROSCOPIC BILATERAL;  Surgeon: Cate Mansfield MD;  Location: [facility] OR    OPERATIVE HYSTEROSCOPY WITH MORCELLATOR  04/08/2014    Procedure: OPERATIVE HYSTEROSCOPY WITH MORCELLATOR;  Hysteroscopic Polypectomy;  Surgeon: Cate Mansfield MD;  Location: [facility] OR    REMOVE INTRAUTERINE DEVICE N/A 04/23/2021    Procedure: removal of intrauterine device;  Surgeon: Cate Mansfield MD;  Location: [facility] OR    TUBAL LIGATION         Medications  No current outpatient medications on file.     No current facility-administered medications for this visit.       Allergies     Allergies   Allergen Reactions    Cefadroxil Hives, Unknown and Rash     PN: LW Reaction: Unknown Reaction,    Sulfa Antibiotics Rash and Hives    Benzoyl Peroxide Swelling     PN: LW Other1: -Benzoyl peroxide-swelling, itching    Latex Rash     Slight reaction      PN: Converted from LW Latex Sensitivity Flag    Miconazole Swelling     PN: LW Reaction: rash    Sulfur Rash       Family History  Family History   Problem Relation Age of Onset    Skin Cancer Mother     Anxiety Disorder Sister     Genetic Disorder Sister         Elijah's Disease    Anxiety Disorder Brother     Alzheimer Disease Maternal Grandmother     Cervical Cancer Maternal Grandmother     Colon Cancer Maternal Grandfather     Colorectal Cancer Maternal Grandfather     Breast Cancer Paternal Grandmother     Hypertension Paternal Grandmother     Osteoporosis Paternal Grandmother     Hypertension Paternal Grandfather     Lung Cancer Paternal Grandfather         smoker    Diabetes Type 2  Paternal Grandfather     Myocardial Infarction Paternal Grandfather     Diabetes Paternal  Grandfather     Other Cancer Paternal Grandfather         Lung    Myocardial Infarction Paternal Aunt 45    Cerebrovascular Disease No family hx of     Coronary Artery Disease Early Onset No family hx of     Ovarian Cancer No family hx of            Objective  EXAM:  Blood pressure 117/83, pulse 73, weight 71.7 kg (158 lb), last menstrual period 2025, not currently breastfeeding. Body mass index is 24.74 kg/m .  General - pleasant female in no acute distress.  Skin - no suspicious lesions or rashes  Musculoskeletal - no gross deformities.  Neurological - normal strength, sensation, and mental status.        ASSESSMENT:  Heather Guillory is an 45 year old, , who requests an evaluation of taurus/menopause symptoms.    ICD-10-CM    1. Hot flashes  R23.2 TSH     Thyroxine, Free     CBC with Platelets Differential     T3 Free     Prolactin     Tick-Borne Disease Panel (Non-Lyme) by PCR     LYME DISEASE TOTAL ANTIBODIES WITH REFLEX TO CONFIRMATION     C-Reactive Protein, Inflammatory          PLAN:  Orders Placed This Encounter   Procedures    TSH    Thyroxine, Free    T3 Free    Prolactin    Tick-Borne Disease Panel (Non-Lyme) by PCR    LYME DISEASE TOTAL ANTIBODIES WITH REFLEX TO CONFIRMATION    C-Reactive Protein, Inflammatory    CBC with Platelets Differential       Heather Guillory is a candidate for systemic HT, but wants to do lab work before considering this, as this is not presenting in a typical pattern for menopause related symptoms    30 minutes spent on the date of the encounter doing chart review, history and exam, documentation and further activities per the note.    Rama Hale, DNP, APRN, CNM, FACNM        Follow-up as needed.  Rama Hale, HARVEY CNM    +paper menonotes given to the patient and referred to the menopause society for good patient resources.

## 2025-07-12 DIAGNOSIS — M35.9 UNDIFFERENTIATED CONNECTIVE TISSUE DISEASE: Primary | ICD-10-CM

## 2025-07-12 LAB
ALT SERPL W P-5'-P-CCNC: 16 U/L (ref 0–50)
AST SERPL W P-5'-P-CCNC: 23 U/L (ref 0–45)

## 2025-07-14 NOTE — TELEPHONE ENCOUNTER
"\"Last read by Heather Guillory at 10:26PM on 7/12/2025. \"    Patient viewed result on [employer].     "

## 2025-07-16 ENCOUNTER — OFFICE VISIT (OUTPATIENT)
Dept: DERMATOLOGY | Facility: CLINIC | Age: 45
End: 2025-07-16
Payer: COMMERCIAL

## 2025-07-16 DIAGNOSIS — Z98.890 POSTOPERATIVE SCAR: ICD-10-CM

## 2025-07-16 DIAGNOSIS — H57.89 EYE IRRITATION: ICD-10-CM

## 2025-07-16 DIAGNOSIS — L90.5 POSTOPERATIVE SCAR: ICD-10-CM

## 2025-07-16 DIAGNOSIS — L72.0 MILIUM: Primary | ICD-10-CM

## 2025-07-16 NOTE — PROGRESS NOTES
Dermatologic Mohs Surgery Post-Op Wound Check   Encounter Date: Jul 16, 2025    Dermatology Problem List:  # Hx NMSC  - nBCC, L lower eyelid margin, s/p bx 02/05/25, s/p MMS 02/26/25  # Predominantly intradermal melanocytic nevus, R buttock.  - s/p shave bx 05/30/2024  # Ruptured folliculitis, L nipple  - s/p punch bx 05/30/2024     # Family history of melanoma   - Sister with melanoma in situ at age 41    CC: RECHECK (Eye wound check, discomfort in eye)      Subjective: Heather Guillory is a 45 year old female who presents today for wound check after Mohs surgery on 02/26/25.  - Patient complains of a discomfort in her left eye for the past few weeks.   - Reports started as itching and she was rubbing her eye a lot, but now she has a little tenderness when she touches her eye like when she is washing her face. - Also is bothered by milium that has formed along scar line  - no other concerns today    Objective: An exam of the left lower eyelid was performed today   - The surgical site noted above is clean, dry, and intact. There is no surrounding erythema, purulence, or significant tenderness to palpation. No clinical evidence of infection noted today.  - Left lower eyelid with 1 mm white papule consistent with milium   - No erythema of lid margin on right or left lower eyelid. No obvious suture material or foreign body in the eye.         Assessment and Plan:     1. Post-op wound evaluation status post Mohs and pentagonal wedge advancement flap repair of BCC on left lower eyelid.   - Discussed irritation could be secondary to dissolving suture, but more likely is unrelated and secondary to seasonal allergies, stye formation, or repeated rubbing of eyelid.   - Recommend OTC anti-itch or refresh eye drops prn and/or warm compress to AA   - A 30-gauge needle was used to deroof milium and two cotton tip applicators were used to apply pressure to either side to express milium after verbal consent was obtained.   - The  patient should follow up with dermatologic surgery PRN, as well as continue with regular skin exams in general dermatology clinic.    Patient was discussed with and evaluated by attending physician Dr. Huang Cintron MD.    Natalia Deng MD   Mohs Surgery and Dermatologic Oncology Fellow    Scribe Disclosure:   I, Philly Landa, am serving as a scribe; to document services personally performed by Huang Cintron MD -based on data collection and the provider's statements to me.

## 2025-07-16 NOTE — LETTER
7/16/2025       RE: Heather Guillory  [address]  [place] [address]     Dear Colleague,    Thank you for referring your patient, Heather Guillory, to the [facility] [place] [facility]. Please see a copy of my visit note below.    Dermatologic Mohs Surgery Post-Op Wound Check   Encounter Date: Jul 16, 2025    Dermatology Problem List:  # Hx NMSC  - nBCC, L lower eyelid margin, s/p bx 02/05/25, s/p MMS 02/26/25  # Predominantly intradermal melanocytic nevus, R buttock.  - s/p shave bx 05/30/2024  # Ruptured folliculitis, L nipple  - s/p punch bx 05/30/2024     # Family history of melanoma   - Sister with melanoma in situ at age 41    CC: No chief complaint on file.      Subjective: Heather Guillory is a 45 year old female who presents today for wound check after Mohs surgery on 02/26/25.  - ***  - no other concerns today    Objective: An exam of the left lower eyelid was performed today   - The surgical site noted above is clean, dry, and intact. There is no surrounding erythema, purulence, or significant tenderness to palpation. No clinical evidence of infection noted today.    Assessment and Plan:     1. Post-op wound evaluation status post Mohs and pentagonal wedge advancement flap repair of BCC on left lower eyelid.   - The patient's surgery site(s) is/are healing very well. No evidence of infection on examination today.  - The patient was told to continue with wound cares until the area(s) is/are no longer crusted.   - The patient should follow up with dermatologic surgery PRN, as well as continue with regular skin exams in general dermatology clinic.    Patient was discussed with and evaluated by attending physician Dr. Huang Cintron MD.    ***                Again, thank you for allowing me to participate in the care of your patient.      Sincerely,    Huang Cintron MD     seasonal allergies, stye formation, or repeated rubbing of eyelid.   - Recommend OTC anti-itch or refresh eye drops prn and/or warm compress to AA   - A 30-gauge needle was used to deroof milium and two cotton tip applicators were used to apply pressure to either side to express milium after verbal consent was obtained.   - The patient should follow up with dermatologic surgery PRN, as well as continue with regular skin exams in general dermatology clinic.    Patient was discussed with and evaluated by attending physician Dr. Huang Cintron MD.    Natalia Deng MD   Mohs Surgery and Dermatologic Oncology Fellow    Scribe Disclosure:   I, Philly Landa, am serving as a scribe; to document services personally performed by Huang Cintron MD -based on data collection and the provider's statements to me.               Again, thank you for allowing me to participate in the care of your patient.      Sincerely,    Huang Cintron MD

## 2025-07-16 NOTE — NURSING NOTE
Chief Complaint   Patient presents with    RECHECK     Eye wound check, discomfort in eye     Radha RN-BSN  Dermatology Surgery

## 2025-07-21 DIAGNOSIS — R31.29 MICROSCOPIC HEMATURIA: Primary | ICD-10-CM

## 2025-07-23 ENCOUNTER — TELEPHONE (OUTPATIENT)
Dept: DERMATOLOGY | Facility: CLINIC | Age: 45
End: 2025-07-23
Payer: COMMERCIAL

## 2025-07-23 NOTE — TELEPHONE ENCOUNTER
I'm trying to resched this pt's procedure, but when I go to put it in a spot, it won't let me- I get a pop up. If I use the solutions button, it doesn't show anything coming up indefinitely.     Can you help me find a spot?     Gucci Ling, Complex

## 2025-07-25 PROBLEM — E61.1 LOW IRON: Status: ACTIVE | Noted: 2025-07-25

## 2025-08-28 ENCOUNTER — ANCILLARY PROCEDURE (OUTPATIENT)
Dept: MAMMOGRAPHY | Facility: CLINIC | Age: 45
End: 2025-08-28
Attending: PHYSICIAN ASSISTANT
Payer: COMMERCIAL

## 2025-08-28 ENCOUNTER — ONCOLOGY VISIT (OUTPATIENT)
Dept: SURGERY | Facility: CLINIC | Age: 45
End: 2025-08-28
Attending: PHYSICIAN ASSISTANT
Payer: COMMERCIAL

## 2025-08-28 VITALS
HEIGHT: 67 IN | BODY MASS INDEX: 24.61 KG/M2 | WEIGHT: 156.8 LBS | RESPIRATION RATE: 16 BRPM | TEMPERATURE: 98.2 F | HEART RATE: 81 BPM | DIASTOLIC BLOOD PRESSURE: 70 MMHG | OXYGEN SATURATION: 98 % | SYSTOLIC BLOOD PRESSURE: 102 MMHG

## 2025-08-28 DIAGNOSIS — Z91.89 AT HIGH RISK FOR BREAST CANCER: Primary | ICD-10-CM

## 2025-08-28 DIAGNOSIS — Z12.31 ENCOUNTER FOR SCREENING MAMMOGRAM FOR BREAST CANCER: ICD-10-CM

## 2025-08-28 DIAGNOSIS — N64.4 BREAST PAIN, LEFT: ICD-10-CM

## 2025-08-28 PROCEDURE — 99212 OFFICE O/P EST SF 10 MIN: CPT | Performed by: PHYSICIAN ASSISTANT

## 2025-08-28 ASSESSMENT — PAIN SCALES - GENERAL: PAINLEVEL_OUTOF10: NO PAIN (0)

## (undated) DEVICE — STABILIZER ARCH KOH-EFFICIENT 4.0CM KC-ARCH-40

## (undated) DEVICE — SU WND CLOSURE VLOC 180 ABS 2-0 12" GS-21 VLOCL0315

## (undated) DEVICE — ENDO SCOPE WARMER LF TM500

## (undated) DEVICE — BLADE CLIPPER SGL USE 9680

## (undated) DEVICE — DAVINCI OBTURATOR 8MM BLADELESS 420023

## (undated) DEVICE — SPECIMEN CONTAINER 3OZ W/FORMALIN 59901

## (undated) DEVICE — ENDO FORCEP BX CAPTURA PRO SPIKE G50696

## (undated) DEVICE — LINEN GOWN X4 5410

## (undated) DEVICE — DAVINCI S CANNULA SEAL 8MM 400077

## (undated) DEVICE — DRSG PRIMAPORE 03 1/8X6" 66000318

## (undated) DEVICE — SU STRATAFIX PDS PLUS 0 CT-1 30CM SXPP1B450

## (undated) DEVICE — COVER CAMERA IN-LIGHT DISP LT-C02

## (undated) DEVICE — PREP SKIN SCRUB TRAY 4461A

## (undated) DEVICE — PREP POVIDONE-IODINE 7.5% SCRUB 4OZ BOTTLE MDS093945

## (undated) DEVICE — SOL NACL 0.9% IRRIG 1000ML BOTTLE 2F7124

## (undated) DEVICE — UTERINE MANIPULATOR RUMI 6.7MMX8CM UMB678

## (undated) DEVICE — DECANTER TRANSFER DEVICE 2008S

## (undated) DEVICE — ADH SKIN CLOSURE PREMIERPRO EXOFIN 1.0ML 3470

## (undated) DEVICE — NDL SPINAL 18GA 3.5" 405184

## (undated) DEVICE — SOL WATER IRRIG 1000ML BOTTLE 2F7114

## (undated) DEVICE — PAD CHUX UNDERPAD 30X36" P3036C

## (undated) DEVICE — SU VICRYL 0 UR-6 27" J603H

## (undated) DEVICE — NDL 25GA 1.5" 305127

## (undated) DEVICE — SNARE CAPIVATOR ROUND COLD SNR BX10 M00561101

## (undated) DEVICE — LINEN TOWEL PACK X5 5464

## (undated) DEVICE — SYR 30ML SLIP TIP W/O NDL 302833

## (undated) DEVICE — SUCTION MANIFOLD NEPTUNE 2 SYS 4 PORT 0702-020-000

## (undated) DEVICE — GLOVE PROTEXIS BLUE W/NEU-THERA 7.0  2D73EB70

## (undated) DEVICE — SU PROLENE 3-0 SHDA 36" 8522H

## (undated) DEVICE — PREP POVIDONE IODINE SOLUTION 10% 4OZ BOTTLE 29906-004

## (undated) DEVICE — KIT ENDO TURNOVER/PROCEDURE CARRY-ON 101822

## (undated) DEVICE — DAVINCI HOT SHEARS TIP COVER  400180

## (undated) DEVICE — ENDO TROCAR SLEEVE KII ADV FIXATION 05X100MM CFS02

## (undated) DEVICE — GOWN IMPERVIOUS 2XL BLUE

## (undated) DEVICE — DRSG BANDAID 3/4X3" FABRIC CURITY LATEX FREE 44100

## (undated) DEVICE — ENDO ACCESS PLATFORM GELPOINT MINI CNGL3

## (undated) DEVICE — Device

## (undated) DEVICE — LINEN ORTHO PACK 5446

## (undated) DEVICE — SU MONOCRYL 4-0 PS-2 18" UND Y496G

## (undated) DEVICE — SOL NACL 0.9% IRRIG 3000ML BAG 2B7477

## (undated) DEVICE — SOL WATER IRRIG 500ML BOTTLE 2F7113

## (undated) DEVICE — PAD PERI INDIV WRAP 11" 2022A

## (undated) DEVICE — SUCTION CATH AIRLIFE TRI-FLO W/CONTROL PORT 14FR  T60C

## (undated) DEVICE — TUBING INSUFFLATION W/FILTER 10FT GS1016

## (undated) DEVICE — PANTIES MESH LG/XLG 2PK 706M2

## (undated) DEVICE — DAVINCI SI DRAPE ACCESSORY KIT 3-ARM 420290

## (undated) DEVICE — ENDO POUCH UNIV RETRIEVAL SYSTEM INZII 10MM CD001

## (undated) DEVICE — GLOVE EXAM NITRILE LG PF LATEX FREE 5064

## (undated) DEVICE — SU VICRYL 4-0 PS-2 18" UND J496H

## (undated) DEVICE — JELLY LUBRICATING SURGILUBE 2OZ TUBE 0281-0205-02

## (undated) DEVICE — TUBING SMOKE EVAC PNEUVIEW 9660-XE

## (undated) DEVICE — NDL INSUFFLATION 13GA 120MM C2201

## (undated) DEVICE — SYR 10ML LL W/O NDL 302995

## (undated) DEVICE — ESU HOLSTER PLASTIC DISP E2400

## (undated) DEVICE — SUCTION IRR STRYKERFLOW II W/TIP 250-070-520

## (undated) DEVICE — SUCTION MANIFOLD NEPTUNE 2 SYS 1 PORT 702-025-000

## (undated) DEVICE — ADAPTER DRAPE ALLY AU-AD

## (undated) DEVICE — ENDO TROCAR FIRST ENTRY KII FIOS ADV FIX 05X100MM CFF03

## (undated) DEVICE — NDL SPINAL 22GA 3.5" QUINCKE 405181

## (undated) DEVICE — SOL NACL 0.9% INJ 1000ML BAG 2B1324X

## (undated) DEVICE — SYR 10ML FINGER CONTROL W/O NDL 309695

## (undated) DEVICE — BLADE KNIFE SURG 11 371111

## (undated) DEVICE — DAVINCI S NDL DRIVER LARGE 420006

## (undated) DEVICE — ESU HOLDER LAP INST DISP PURPLE LONG 330MM H-PRO-330

## (undated) DEVICE — GLOVE PROTEXIS W/NEU-THERA 6.5  2D73TE65

## (undated) DEVICE — TUBING SUCTION MEDI-VAC 1/4"X20' N620A

## (undated) DEVICE — ESU CORD BIPOLAR GREEN 10-4000

## (undated) DEVICE — DAVINCI S MONOPOLAR SCISSORS PRECURVED HOT SHEARS 420179

## (undated) DEVICE — ENDO BITE BLOCK ADULT OMNI-BLOC

## (undated) DEVICE — DRSG PRIMAPORE 02X3" 7133

## (undated) DEVICE — PREP SCRUB SOL EXIDINE 4% CHG 4OZ 29002-404

## (undated) DEVICE — ESU LIGASURE LAPAROSCOPIC BLUNT TIP SEALER 5MMX37CM LF1837

## (undated) DEVICE — DAVINCI S FCP BIPOLAR FENESTRATED 420205

## (undated) DEVICE — CATH TRAY FOLEY SURESTEP 16FR W/URINE MTR STATLK LF A303416A

## (undated) DEVICE — UTERINE MANIPULATOR RUMI 5.1MMX6CM UML516

## (undated) DEVICE — ESU GROUND PAD UNIVERSAL W/O CORD

## (undated) DEVICE — DRAPE SLEEVE 599

## (undated) DEVICE — TUBING SUCTION 12"X1/4" N612

## (undated) DEVICE — ENDO TROCAR 12MM LONG VERSASTEP VS101512P

## (undated) DEVICE — NDL SPINAL 22GA 1.5"

## (undated) DEVICE — KIT ENDO FIRST STEP DISINFECTANT 200ML W/POUCH EP-4

## (undated) DEVICE — SU VICRYL 0 CT 36" J358H

## (undated) DEVICE — BLADE SWITCH SCISSORS TIP 5MM 89-5100B

## (undated) DEVICE — DRAPE WARMER 66X44" ORS-300

## (undated) DEVICE — PREP CHLORAPREP 26ML TINTED HI-LITE ORANGE 930815

## (undated) RX ORDER — ACETAMINOPHEN 325 MG/1
TABLET ORAL
Status: DISPENSED
Start: 2022-02-11

## (undated) RX ORDER — LIDOCAINE HYDROCHLORIDE 20 MG/ML
INJECTION, SOLUTION EPIDURAL; INFILTRATION; INTRACAUDAL; PERINEURAL
Status: DISPENSED
Start: 2022-02-11

## (undated) RX ORDER — FENTANYL CITRATE 50 UG/ML
INJECTION, SOLUTION INTRAMUSCULAR; INTRAVENOUS
Status: DISPENSED
Start: 2022-02-11

## (undated) RX ORDER — ONDANSETRON 2 MG/ML
INJECTION INTRAMUSCULAR; INTRAVENOUS
Status: DISPENSED
Start: 2020-10-21

## (undated) RX ORDER — KETOROLAC TROMETHAMINE 30 MG/ML
INJECTION, SOLUTION INTRAMUSCULAR; INTRAVENOUS
Status: DISPENSED
Start: 2022-02-11

## (undated) RX ORDER — DIAZEPAM 5 MG
TABLET ORAL
Status: DISPENSED
Start: 2019-10-28

## (undated) RX ORDER — CLINDAMYCIN PHOSPHATE 900 MG/50ML
INJECTION, SOLUTION INTRAVENOUS
Status: DISPENSED
Start: 2022-02-11

## (undated) RX ORDER — LIDOCAINE HYDROCHLORIDE 10 MG/ML
INJECTION, SOLUTION EPIDURAL; INFILTRATION; INTRACAUDAL; PERINEURAL
Status: DISPENSED
Start: 2019-10-28

## (undated) RX ORDER — PROPOFOL 10 MG/ML
INJECTION, EMULSION INTRAVENOUS
Status: DISPENSED
Start: 2022-02-11

## (undated) RX ORDER — ONDANSETRON 2 MG/ML
INJECTION INTRAMUSCULAR; INTRAVENOUS
Status: DISPENSED
Start: 2022-02-11

## (undated) RX ORDER — FENTANYL CITRATE 50 UG/ML
INJECTION, SOLUTION INTRAMUSCULAR; INTRAVENOUS
Status: DISPENSED
Start: 2021-04-23

## (undated) RX ORDER — EPHEDRINE SULFATE 50 MG/ML
INJECTION, SOLUTION INTRAMUSCULAR; INTRAVENOUS; SUBCUTANEOUS
Status: DISPENSED
Start: 2022-02-11

## (undated) RX ORDER — SCOLOPAMINE TRANSDERMAL SYSTEM 1 MG/1
PATCH, EXTENDED RELEASE TRANSDERMAL
Status: DISPENSED
Start: 2022-02-11

## (undated) RX ORDER — HYDROMORPHONE HYDROCHLORIDE 1 MG/ML
INJECTION, SOLUTION INTRAMUSCULAR; INTRAVENOUS; SUBCUTANEOUS
Status: DISPENSED
Start: 2022-02-11

## (undated) RX ORDER — ERYTHROMYCIN 5 MG/G
OINTMENT OPHTHALMIC
Status: DISPENSED
Start: 2025-02-05

## (undated) RX ORDER — DEXAMETHASONE SODIUM PHOSPHATE 4 MG/ML
INJECTION, SOLUTION INTRA-ARTICULAR; INTRALESIONAL; INTRAMUSCULAR; INTRAVENOUS; SOFT TISSUE
Status: DISPENSED
Start: 2022-02-11

## (undated) RX ORDER — LIDOCAINE HYDROCHLORIDE 10 MG/ML
INJECTION, SOLUTION EPIDURAL; INFILTRATION; INTRACAUDAL; PERINEURAL
Status: DISPENSED
Start: 2019-11-20

## (undated) RX ORDER — FENTANYL CITRATE 50 UG/ML
INJECTION, SOLUTION INTRAMUSCULAR; INTRAVENOUS
Status: DISPENSED
Start: 2020-10-21

## (undated) RX ORDER — EPHEDRINE SULFATE 50 MG/ML
INJECTION, SOLUTION INTRAMUSCULAR; INTRAVENOUS; SUBCUTANEOUS
Status: DISPENSED
Start: 2021-04-23

## (undated) RX ORDER — PROPARACAINE HYDROCHLORIDE 5 MG/ML
SOLUTION/ DROPS OPHTHALMIC
Status: DISPENSED
Start: 2025-02-05

## (undated) RX ORDER — OXYCODONE HYDROCHLORIDE 5 MG/1
TABLET ORAL
Status: DISPENSED
Start: 2022-02-11

## (undated) RX ORDER — PHENAZOPYRIDINE HYDROCHLORIDE 200 MG/1
TABLET, FILM COATED ORAL
Status: DISPENSED
Start: 2022-02-11

## (undated) RX ORDER — PROPOFOL 10 MG/ML
INJECTION, EMULSION INTRAVENOUS
Status: DISPENSED
Start: 2021-04-23

## (undated) RX ORDER — LIDOCAINE HYDROCHLORIDE 20 MG/ML
JELLY TOPICAL
Status: DISPENSED
Start: 2021-04-15

## (undated) RX ORDER — KETOROLAC TROMETHAMINE 30 MG/ML
INJECTION, SOLUTION INTRAMUSCULAR; INTRAVENOUS
Status: DISPENSED
Start: 2021-04-23

## (undated) RX ORDER — PROPARACAINE HYDROCHLORIDE 5 MG/ML
SOLUTION/ DROPS OPHTHALMIC
Status: DISPENSED
Start: 2025-02-26

## (undated) RX ORDER — ERYTHROMYCIN 5 MG/G
OINTMENT OPHTHALMIC
Status: DISPENSED
Start: 2025-02-26

## (undated) RX ORDER — FENTANYL CITRATE 50 UG/ML
INJECTION, SOLUTION INTRAMUSCULAR; INTRAVENOUS
Status: DISPENSED
Start: 2023-09-07

## (undated) RX ORDER — IBUPROFEN 200 MG
TABLET ORAL
Status: DISPENSED
Start: 2019-10-28

## (undated) RX ORDER — ONDANSETRON 2 MG/ML
INJECTION INTRAMUSCULAR; INTRAVENOUS
Status: DISPENSED
Start: 2021-04-23

## (undated) RX ORDER — SCOLOPAMINE TRANSDERMAL SYSTEM 1 MG/1
PATCH, EXTENDED RELEASE TRANSDERMAL
Status: DISPENSED
Start: 2021-04-23

## (undated) RX ORDER — VASOPRESSIN 20 U/ML
INJECTION PARENTERAL
Status: DISPENSED
Start: 2022-02-11